# Patient Record
Sex: MALE | Race: BLACK OR AFRICAN AMERICAN | Employment: UNEMPLOYED | URBAN - METROPOLITAN AREA
[De-identification: names, ages, dates, MRNs, and addresses within clinical notes are randomized per-mention and may not be internally consistent; named-entity substitution may affect disease eponyms.]

---

## 2017-03-20 ENCOUNTER — OFFICE VISIT (OUTPATIENT)
Dept: SURGERY | Age: 42
End: 2017-03-20

## 2017-03-20 DIAGNOSIS — E66.01 MORBID OBESITY WITH BMI OF 60.0-69.9, ADULT (HCC): Primary | ICD-10-CM

## 2017-03-27 VITALS — BODY MASS INDEX: 39.17 KG/M2 | HEIGHT: 75 IN | WEIGHT: 315 LBS

## 2017-03-27 NOTE — PROGRESS NOTES
Ashish Lakhani participated in an educational session on the importance of starting to make healthy choices prior to weight loss surgery. General healthy foods were reviewed. Diet history was reviewed. Patient set a dietary, exercise, and behavioral goal in order to start making healthy changes now.      Visit Vitals    Ht 6' 3\" (1.905 m)    Wt (!) 239 kg (527 lb)    BMI 65.87 kg/m2     Demetri Telles RD

## 2017-04-24 ENCOUNTER — CLINICAL SUPPORT (OUTPATIENT)
Dept: SURGERY | Age: 42
End: 2017-04-24

## 2017-04-24 ENCOUNTER — OFFICE VISIT (OUTPATIENT)
Dept: SURGERY | Age: 42
End: 2017-04-24

## 2017-04-24 VITALS
DIASTOLIC BLOOD PRESSURE: 89 MMHG | SYSTOLIC BLOOD PRESSURE: 130 MMHG | HEART RATE: 115 BPM | OXYGEN SATURATION: 98 % | RESPIRATION RATE: 16 BRPM | BODY MASS INDEX: 39.17 KG/M2 | WEIGHT: 315 LBS | HEIGHT: 75 IN

## 2017-04-24 VITALS — BODY MASS INDEX: 39.17 KG/M2 | HEIGHT: 75 IN | WEIGHT: 315 LBS

## 2017-04-24 DIAGNOSIS — Z74.09 LIMITED MOBILITY: ICD-10-CM

## 2017-04-24 DIAGNOSIS — F17.200 SMOKING: ICD-10-CM

## 2017-04-24 DIAGNOSIS — E66.01 MORBID OBESITY DUE TO EXCESS CALORIES (HCC): Primary | ICD-10-CM

## 2017-04-24 DIAGNOSIS — E66.01 MORBID OBESITY WITH BODY MASS INDEX OF 60.0-69.9 IN ADULT (HCC): ICD-10-CM

## 2017-04-24 DIAGNOSIS — M19.90 ARTHRITIS: ICD-10-CM

## 2017-04-24 DIAGNOSIS — G47.30 SLEEP APNEA, UNSPECIFIED TYPE: ICD-10-CM

## 2017-04-24 DIAGNOSIS — G43.919 INTRACTABLE MIGRAINE, UNSPECIFIED MIGRAINE TYPE: ICD-10-CM

## 2017-04-24 DIAGNOSIS — Z86.79 HISTORY OF ATRIAL FLUTTER: ICD-10-CM

## 2017-04-24 DIAGNOSIS — E66.01 MORBID OBESITY WITH BODY MASS INDEX OF 60.0-69.9 IN ADULT (HCC): Primary | ICD-10-CM

## 2017-04-24 NOTE — PATIENT INSTRUCTIONS
Goals: 1. Start taking a multivitamin daily. 2. Continue walking daily- further distance as tolerated. 3. Very low carbohydrate plan- follow this guide prior to surgery- for pre-op weight loss. Keep a daily diet log.

## 2017-04-24 NOTE — PROGRESS NOTES
Bariatric Surgery Consultation    Subjective: The patient is a 35 y.o. obese male with a Body mass index is 63.5 kg/(m^2). [place] The patient is at his heaviest weight for the past 5 years. he has   been overweight since age 20- 27.   he has been considering surgery since last year. he desires surgery at this time because of multiple health   concerns and their lifestyle issues which are hindered by their weight. he has been referred by his family physician Dr Madelyn Leyden for evaluation and   treatment of their obesity via surgical intervention. Rory Georgeosmar. has tried multiple diets in his lifetime most recently tried   physician supervised, behavior modification, unsupervised diets, Weight Watchers and Atkins    Bariatric comorbidities present are   Patient Active Problem List   Diagnosis Code    Cellulitis of left lower leg L03. 116    Anemia D64.9    Morbid obesity ([facility]) E66.01    Dyspnea R06.00    Sleep apnea G47.30    Sinus tachycardia R00.0    Acute respiratory failure with hypoxia and hypercarbia ([facility]) J96.01, J96.02    LOY (acute kidney injury) ([facility] [address] N17.9    Morbid obesity with body mass index of 60.0-69.9 in adult ([facility]) E66.01, Z68.44    Chronic back pain M54.9, G89.29    Arthritis M19.90    Migraine G43.909    History of atrial flutter Z86.79    Limited mobility Z74.09    Smoking F17.200       The patient is considering laparoscopic sleeve gastrectomy for surgical weight loss due to their ineffective progress with medical forms of   weight loss and the urging of their physician who cares for their primary medical issues. The patient  now presents  for consideration for   weight loss surgery understanding the benefits of this over a medical approach of weight loss as was discussed in our presentation on weight   loss surgery. They have discussed their plans both with their family and primary care physician who is in support of their pursuit of such.    The patient has not had health issues as of late and denies and gastrointestinal disturbances other than what is outlined below in their review   of symptoms. All of their prior evaluations available by both their PCP's and specialists physicians have been reviewed today either in the   Care Everywhere portal or scanned under the media tab. I have spent a large portion of my initial consultation today reviewing the patients current dietary habits which have contributed to their health   issues and obesity. I have suggested to them personally a dietary regimen that they can initiate now to help with their status as it pertains to their weight. They   understand that the most important aspect of their journey through their weight loss endeavor will be their adherence to a new lifestyle of   healthy eating behavior. They also understand that an adherence to an exercise program will not only help with weight loss but is ultimately   important in weight maintenance. The patients goal weight is 250lb. These goals are not consistent with expected outcomes of their desired operation. his Medical goals are resolution of these health issues.         Patient Active Problem List    Diagnosis Date Noted    Morbid obesity with body mass index of 60.0-69.9 in adult [facility])     Chronic back pain     Arthritis     Migraine     History of atrial flutter     Limited mobility     Smoking     LOY [facility]acute kidney [facility]) ([facility] 04/05/2016    Cellulitis of left lower leg 04/04/2016    Anemia 04/04/2016    Morbid obesity ([facility] [address] 04/04/2016    Dyspnea 04/04/2016    Sleep apnea 04/04/2016    Sinus tachycardia 04/04/2016    Acute respiratory failure with hypoxia and hypercarbia ([facility] [address] 04/04/2016     Past Surgical History:   Procedure Laterality Date    CARDIAC SURG PROCEDURE UNLIST      cardioversion    ECHOCARDIOGRAM  04/2016    EF 60-65% (performed at [facility] - see cardiology tab)      Social History   Substance Use Topics    Smoking status: Current Some Day Smoker Types: Cigars    Smokeless tobacco: Not on file    Alcohol use No      Comment: occasionally      Family History   Problem Relation Age of Onset    Obesity Mother     Liver Disease Sister       Current Outpatient Prescriptions   Medication Sig Dispense Refill    diltiazem CD (CARDIZEM CD) 240 mg ER capsule Take 1 Cap by mouth daily. (Patient taking differently: Take 300 mg by mouth daily.) 30 Cap 0    furosemide (LASIX) 40 mg tablet Take 1 Tab by mouth two (2) times a day.  60 Tab 1     No Known Allergies       Review of Systems:       General - No history or complaints of unexpected fever, chills, or weight loss  Head/Neck - No history or complaints of headache, diplopia, dysphagia, hearing loss  Cardiac - No history or complaints of chest pain, palpitations, murmur, or shortness of breath  Pulmonary - No history or complaints of shortness of breath, productive cough, hemoptysis  Gastrointestinal - mild reflux,no  abdominal pain, obstipation/constipation or blood per rectum  Genitourinary - No history or complaints of hematuria/dysuria, stress urinary incontinence symptoms, or renal lithiasis  Musculoskeletal - severe joint pain in their knees,  no muscular weakness  Hematologic - No history or complaints of bleeding disorders,  No blood transfusions  Neurologic - No history or complaints of  migraine headaches, seizure activity, syncopal episodes, TIA or stroke  Integumentary - No history or complaints of rashes, abnormal nevi, skin cancer  Gynecological - n/a               Objective:     Visit Vitals    /89 (BP 1 Location: Left arm, BP Patient Position: Sitting)    Pulse (!) 115    Resp 16    Ht 6' 3\" (1.905 m)    Wt (!) 230.4 kg (508 lb)    SpO2 98%    BMI 63.5 kg/m2       Physical Examination: General appearance - alert, well appearing, and in no distress and oriented to person, place, and time  Mental status - alert, oriented to person, place, and time, normal mood, behavior, speech, dress, motor activity, and thought processes  Eyes - pupils equal and reactive, extraocular eye movements intact, sclera anicteric, left eye normal, right eye normal  Ears - right ear normal, left ear normal  Nose - normal and patent, no erythema, discharge or polyps  Mouth - mucous membranes moist, pharynx normal without lesions  Neck - supple, no significant adenopathy  Lymphatics - no palpable lymphadenopathy, no hepatosplenomegaly  Chest - clear to auscultation, no wheezes, rales or rhonchi, symmetric air entry  Heart - normal rate, regular rhythm, normal S1, S2, no murmurs, rubs, clicks or gallops  Abdomen - soft, nontender, nondistended, no masses or organomegaly, massive central obesity  Back exam - full range of motion, no tenderness, palpable spasm or pain on motion  Neurological - alert, oriented, normal speech, no focal findings or movement disorder noted  Musculoskeletal - severe varus deformity of both knees with limited ROM  Extremities - peripheral pulses normal, moderate pedal edema, severe venous stasis changes  Skin - normal coloration and turgor, no rashes, no suspicious skin lesions noted    Labs:       No results found for this or any previous visit (from the past 1440 hour(s)). Assessment:     Morbid obesity with comorbidity    Plan:     laparoscopic sleeve gastrectomy    This is a 35 y.o. male with a BMI of Body mass index is 63.5 kg/(m^2). and the weight-related co-morbidties as noted below. Chanel Whelan meets the NIH criteria for bariatric surgery based upon the BMI of Body mass index is 63.5 kg/(m^2). and multiple weight-related co-morbidties. Chanel Whelan has elected laparoscopic sleeve gastrectomy as his intervention of choice for treatment of morbid obestiy through surgical means secondary to its safety profile, rapid return to work  and decreases in operative risks over gastric bypass.     In the office today, following Almas's history and physical examination, a 30 minute discussion regarding the anatomic alterations for the laparoscopic sleeve gastrectomy was undertaken. The dietary expectations and the patient and physician dependent factors for success were thoroughly discussed, to include the need for interval follow-up and long-term dietary changes associated with success. The possible complications of the sleeve gastrectomy  were also discussed, to include;death, DVT/PE, staple line leak, bleeding, stricture formation, infection, nutritional deficiencies and sleeve dilation. Specific weight related outcomes for success were also discussed with an emphasis on careful and close follow-up with the first year and eating behavior modification as the baseline and cyclical hunger return. The patient expressed an understanding of the above factors, and his questions were answered in their entirety. In addition, the patient attended a 1.5 hour power point seminar regarding obesity, surgical weight loss including, adjustable gastric band, gastric bypass, and sleeve gastrectomy. This discussion contrasted the different surgical techniques, mechanisms of actions and expected outcomes, and surgical and medical risks associated with each procedure. During this seminar, there was a long question and answer session where each questions was answered until there were no additional questions. Today, the patient had all of his questions answered and desires to proceed with  bariatric surgery initially choosing sleeve gastrectomy as his surgical option. Secondary Diagnoses:     Coronary Artery Disease - The patient has undergone or will undergo a preoperative evaluation by a cardiologist such that they are deemed a reasonable candidate for surgery. The patient understands that with a history of cardiac disease that there is always an increased risk compared to the average patient.   Appropriate recommendations have been followed as recommended by the cardiologist.  The patients ASA will be resumed approximately 1 month postoperatively in a coated form. Dietary Intervention  - The patient is currently scheduled to see or has been followed by a bariatric nutritionist for an attempt at preoperative weight loss as has been dictated by their insurance carrier. They will be assessed at various times during their follow up to evaluate their progress depending on the length of time that is required once again by their carrier. I have explained the importance of preoperative weight loss and the benefits regarding lower surgical risk and also assisting the patient in reaching their weight loss goal.  Finally they understand there is a physiologic benefit from the standpoint of hepatic volume reduction and reduction of central visceral adiposity preoperatively. I have reiterated the importance of a low carbohydrate and high protein regimen to achieve their stated goal. I have reviewed their current eating behavior prior to this encounter and explained to them in an exhaustive fashion the appropriate diet that they should adhere to. They have been encouraged to loose weight pre operatively and understand it is our prerogative to cancel surgery or postpone their procedure in the event of significant weight gain. The patients weight loss goal pre operatively is 30-40 pounds. Obstructive Sleep Apnea -The patient understands the association of sleep apnea and obesity and the additional risk that it caries related to post surgical complications. If they have not been tested for sleep apnea and I feel they are at increased risk for this diagnosis, then they will be scheduled for a consultation with a Pulmonologist for such.  In the event that they kyaw this diagnosis we will have the patient bring their CPAP machine to the hospital for use both postoperatively in the PACU and on the floor at its appropriate setting.  We will have them continue using it while at home after surgery and follow up with their pulmonologist 6 months after to be retested to see if it can be discontinued at that time period. Weight Related Arthritis -The patient understands the benefits that weight loss surgery can have on their arthritis but also understands that weight loss is not a guaranteed cure and relief of symptoms is often dependent on the severity of the underlying disease.  The patient also understands that traditional pharmaceutical treatments for this diagnosis are usually unavailable to post-operative weight loss patients due to the effects on the gastrointestinal tract particularly with the gastric bypass and to a lesser effect with the sleeve gastrectomy.  Any changes to the patients medication treatment will ultimately be made the patients PCP with input by our office. GERD -The patient understands that weight loss surgery is not a guaranteed cure for reflux disease but does understand the benefits that weight loss can have on reflux disease.  They also understand that at the time of surgery the gastroesophageal junction will be evaluated for the presence of a diaphragmatic hernia.  Hernias will be corrected always with the gastric band and sleeve gastrectomy procedures, but only on a case by case basis with the gastric bypass if it prevents our ability to perform the operation at hand, or if I feel that they would benefit long term with correction of this issue.  The patient also understands that neither weight loss surgery nor repair of a diaphragmatic hernia repair guarantees the complete cessation of the disease. They also understand there is a possibility of recurrence with a simple crural repair as is performed with these procedures. They understand they may have to continue their medications in the postoperative period.  They have a good understanding that the gastric bypass procedure is better suited to total resolution of this issue and that neither the Lap Band nor sleeve gastrectomy is considered a curative procedure as it pertains to this diagnosis. DVT / Pulmonary Embolus Risk - The patient is at a higher risk for post operative DVT / pulmonary embolus secondary to their morbid obese status, relative sedentary lifestyle, and impending general anesthetic. We will plan to use anticoagulation therapy pre and post operative as well as TEDs and  pneumatic compression devices and encourage ambulation once on the hospital nursing floor. The need for possible at home anticoagulation therapy has also been discussed and any decision on this matter will be made during post operative evaluations. The patient understands that their efforts at ambulation are of vital importance to reduce the risk of this complication thus placing significant burden on them as to the prevention of such issues. Signs and symptoms of DVT / PE have been discussed with the patient and they have been instructed to call the office if any these occur in the \"at home\" post op phase. Will use Lovenox for 2 weeks post op.         Signed By: Arthur Alcazar MD     April 24, 2017

## 2017-04-24 NOTE — MR AVS SNAPSHOT
Visit Information Date & Time Provider Department Dept. Phone Encounter #  
 4/24/2017  2:00 PM [facility] [place] [phone] [id number] Upcoming Health Maintenance Date Due DTaP/Tdap/Td series (1 - Tdap) 6/7/1994 INFLUENZA AGE 9 TO ADULT 8/1/2016 Allergies as of 4/24/2017  Review Complete On: 4/24/2017 By: Danitza Cabral MD  
 No Known Allergies Current Immunizations  Never Reviewed Name Date Pneumococcal Polysaccharide (PPSV-23) 4/12/2016  2:10 PM  
  
 Not reviewed this visit Vitals Height(growth percentile) Weight(growth percentile) BMI Smoking Status 6' 3\" (1.905 m) (!) 508 lb (230.4 kg) 63.5 kg/m2 Current Some Day Smoker BMI and BSA Data Body Mass Index Body Surface Area  
 63.5 kg/m 2 3.49 m 2 Your Updated Medication List  
  
   
This list is accurate as of: 4/24/17  3:36 PM.  Always use your most recent med list.  
  
  
  
  
 dilTIAZem  mg ER capsule Commonly known as:  CARDIZEM CD Take 1 Cap by mouth daily. furosemide 40 mg tablet Commonly known as:  LASIX Take 1 Tab by mouth two (2) times a day. Patient Instructions Goals: 1. Start taking a multivitamin daily. 2. Continue walking daily- further distance as tolerated. 3. Very low carbohydrate plan- follow this guide prior to surgery- for pre-op weight loss. Keep a daily diet log. Introducing [facility]! Isrrael Nguyen introduces [employer] patient portal. Now you can access parts of your medical record, email your doctor's office, and request medication refills online. 1. In your internet browser, go to [email or web address] 2. Click on the First Time User? Click Here link in the Sign In box. You will see the New Member Sign Up page. 3. Enter your [employer] Access Code exactly as it appears below.  You will not need to use this code after youve completed the sign-up process. If you do not sign up before the expiration date, you must request a new code. · [employer] Access Code: [email or web address] Expires: 7/23/2017  2:45 PM 
 
4. Enter the last four digits of your Social Security Number (xxxx) and Date of Birth (mm/dd/yyyy) as indicated and click Submit. You will be taken to the next sign-up page. 5. Create a [employer] ID. This will be your [employer] login ID and cannot be changed, so think of one that is secure and easy to remember. 6. Create a [employer] password. You can change your password at any time. 7. Enter your Password Reset Question and Answer. This can be used at a later time if you forget your password. 8. Enter your e-mail address. You will receive e-mail notification when new information is available in [address]. 9. Click Sign Up. You can now view and download portions of your medical record. 10. Click the Download Summary menu link to download a portable copy of your medical information. If you have questions, please visit the Frequently Asked Questions section of the [employer] website. Remember, [employer] is NOT to be used for urgent needs. For medical emergencies, dial 911. Now available from your iPhone and Android! Please provide this summary of care documentation to your next provider. Your primary care clinician is listed as Tay Osei. If you have any questions after today's visit, please call [phone].

## 2017-04-24 NOTE — PATIENT INSTRUCTIONS
Body Mass Index: Care Instructions  Your Care Instructions    Body mass index (BMI) can help you see if your weight is raising your risk for health problems. It uses a formula to compare how much you weigh with how tall you are. · A BMI lower than 18.5 is considered underweight. · A BMI between 18.5 and 24.9 is considered healthy. · A BMI between 25 and 29.9 is considered overweight. A BMI of 30 or higher is considered obese. If your BMI is in the normal range, it means that you have a lower risk for weight-related health problems. If your BMI is in the overweight or obese range, you may be at increased risk for weight-related health problems, such as high blood pressure, heart disease, stroke, arthritis or joint pain, and diabetes. If your BMI is in the underweight range, you may be at increased risk for health problems such as fatigue, lower protection (immunity) against illness, muscle loss, bone loss, hair loss, and hormone problems. BMI is just one measure of your risk for weight-related health problems. You may be at higher risk for health problems if you are not active, you eat an unhealthy diet, or you drink too much alcohol or use tobacco products. Follow-up care is a key part of your treatment and safety. Be sure to make and go to all appointments, and call your doctor if you are having problems. It's also a good idea to know your test results and keep a list of the medicines you take. How can you care for yourself at home? · Practice healthy eating habits. This includes eating plenty of fruits, vegetables, whole grains, lean protein, and low-fat dairy. · If your doctor recommends it, get more exercise. Walking is a good choice. Bit by bit, increase the amount you walk every day. Try for at least 30 minutes on most days of the week. · Do not smoke. Smoking can increase your risk for health problems. If you need help quitting, talk to your doctor about stop-smoking programs and medicines. These can increase your chances of quitting for good. · Limit alcohol to 2 drinks a day for men and 1 drink a day for women. Too much alcohol can cause health problems. If you have a BMI higher than 25  · Your doctor may do other tests to check your risk for weight-related health problems. This may include measuring the distance around your waist. A waist measurement of more than 40 inches in men or 35 inches in women can increase the risk of weight-related health problems. · Talk with your doctor about steps you can take to stay healthy or improve your health. You may need to make lifestyle changes to lose weight and stay healthy, such as changing your diet and getting regular exercise. If you have a BMI lower than 18.5  · Your doctor may do other tests to check your risk for health problems. · Talk with your doctor about steps you can take to stay healthy or improve your health. You may need to make lifestyle changes to gain or maintain weight and stay healthy, such as getting more healthy foods in your diet and doing exercises to build muscle. Where can you learn more? Go to http://charo-mirta.info/. Enter S176 in the search box to learn more about \"Body Mass Index: Care Instructions. \"  Current as of: January 23, 2017  Content Version: 11.2  © 3715-5526 [employer]. Care instructions adapted under license by [employer] (which disclaims liability or warranty for this information). If you have questions about a medical condition or this instruction, always ask your healthcare professional. [place] [address] any warranty or liability for your use of this information. New patient Instructions      1. Ensure all pre-operative insurances requirements are complete (ie; dietary visits, psychology consults, primary care documentation, etc)    2. Adhere to pre-operative weight loss / weight maintenance plan discussed in the office today.     3. Contact the office with any questions on pre-operative clearance issues (ie; cardiology work-up, pulmonary work-up, upper GI study, etc). 4. If a barium upper GI study has been ordered for your evaluation, make sure you are on liquids only the morning of the procedure.

## 2017-04-24 NOTE — PROGRESS NOTES
Medical Weight Loss Multi-Disciplinary Program    Name: Chet Blount : 1975    Session# 2  Date: 2017     Height: 6' 3\" (190.5 cm)    Weight: (!) 230.4 kg (508 lb) lbs. Body mass index is 63.5 kg/(m^2). Pounds Lost: 19   Pt to lose 30-40 more lbs from his weight today of 508 lbs, prior to surgery. Dietary Instructions    Reviewed intake  Understanding low carbohydrates, low sugar, higher protein meals  Understanding proper portions  Instruction given for personal dietary changes  Comments: Pt given brief pre/post-op diet ed and diet hx reviewed. Physical Activity/Exercise    Discussed Perceived Compliance  Reasonable Goals Set  Motivation  Comments: Pt walks 30-35 minutes daily. Goal to continue walking daily- further distance as tolerated. Behavior Modification    Positive attitude  Comments: Pt is working on the following goals:    1. Start taking a multivitamin daily. 2. Continue walking daily- further distance as tolerated. 3. Very low carbohydrate plan- follow this guide prior to surgery- for pre-op weight loss    Candidate for surgery (per RD): pending    Dietitian: Maryjo Arroyo RD     Chet Blount is a 35 y.o. male who present for a pre-op evaluation. Visit Vitals    Ht 6' 3\" (1.905 m)    Wt (!) 230.4 kg (508 lb)    BMI 63.5 kg/m2     Past Medical History:   Diagnosis Date    Arthritis     knees    Chronic back pain     History of atrial flutter     Dx 2016 - anticoagulated by Nelida Fothergill    Hypertension     Migraine headache     Morbid obesity ([facility] [address]     Morbid obesity with body mass index of 60.0-69.9 in adult [facility])     Sleep apnea     uses c-pap           Procedure:  laparoscopic sleeve gastrectomy     Reasons for Surgery:  BMI > 40 with one or more medically significant comorbidities    Summary:  Pt given brief pre/post-op diet ed and diet hx reviewed. Pt set several goals. See below.  Pt has an issue with night eating- will grab something in the middle of the night. He attributes his recent weight loss to decreasing fast food and stopping regular sodas. Current Vitamins:  none    Patient Education and Materials Provided:  Supplement Resource Guide, B Vitamin Information, MVI Recommendations, Calcium Citrate Information, Bariatric Supplement Companies, Protein Supplement Information, Fluid Requirements, No Caffeine or Carbonation, No Alcohol for One Year Post Op, 3 Balanced Meals a Day, Food Group Guide, Exercising and Addressed Current Habits / Changes to make    Nutritional Hx: What is the number of meals you eat per day? 3    Do you eat between meals / snack? yes  Typical snack: crasins or granola bar     How fast do you eat your meals? rapid    How many sodas/sugared beverages do you drink per day? Was drinking 2- 16 oz bottles per day. Now has decreased to none now. How many caffeinated drinks do you have per day? Water or crystal light    How much water do you drink per day? 64+ oz    How often do you eat fast food? 1 times a week, just cut down, was about 3 times per week before. Was pizza or chinese food     How often do you consume alcohol? never;     Diet History:  Breakfast  What are you eating and how much? skipped   When? ii   Where? iii   Snacks  What are you eating and how much? i   When? ii   Where? iii   Hydration  What are you eating and how much? i   When? ii   Where? ii   Lunch  What are you eating and how much? Bowl of honey nut cheerios with 1% milk    When? 11:30 am   Where? iii   Snacks  What are you eating and how much? i   When? ii   Where? iii   Hydration  What are you eating and how much? i   When? ii   Where? iii   Dinner  What are you eating and how much? Baked fish, kale and carrot salad    When? 7:30 pm    Where? iii   Snacks  What are you eating and how much? Pineapple- ate a can of these (average regular can of chunks)    When?  Middle of the night   Where? iii   Hydration  What are you eating and how much? water When? ii   Where? iii     Exercise:  Do you currently have an exercise routine? yes  What kind of exercise do you do? walking . For how long? 30-35 min. For how often? daily. Goals:   1. Start taking a multivitamin daily. 2. Continue walking daily- further distance as tolerated.    3. Very low carbohydrate plan- follow this guide prior to surgery- for pre-op weight loss

## 2017-04-24 NOTE — MR AVS SNAPSHOT
Visit Information Date & Time Provider Department Dept. Phone Encounter #  
 4/24/2017  1:00 PM [facility] [phone] [id number] Follow-up Instructions Return in about 2 months (around 6/24/2017). Follow-up and Disposition History Your Appointments 5/23/2017 10:30 AM  
NUTRITION COUNSELING with TSS NUTRI VISIT PEN [facility] ([facility]) Appt Note: f/u 3 of 4, [address] [address]  
[phone]  
  
   
 [address] [place] [address]  
  
    
 6/21/2017 11:00 AM  
NUTRITION COUNSELING with TSS NUTR VISIT2 [facility] ([facility]) Appt Note: 4 of 4, brown chart f/u / UGI at [address] [address]  
[phone]  
  
   
 [address] [place] [address]  
  
    
 6/21/2017 11:45 AM  
New Patient with Pati Srinivasan MD  
[facility]) Appt Note: 4 of 4, brown chart f/u / UGI at [address] [place]  
  
   
 [address] [place] [address] Upcoming Health Maintenance Date Due DTaP/Tdap/Td series (1 - Tdap) 6/7/1994 INFLUENZA AGE 9 TO ADULT 8/1/2016 Allergies as of 4/24/2017  Review Complete On: 4/24/2017 By: Pati Srinivasan MD  
 No Known Allergies Current Immunizations  Never Reviewed Name Date Pneumococcal Polysaccharide (PPSV-23) 4/12/2016  2:10 PM  
  
 Not reviewed this visit You Were Diagnosed With   
  
 Codes Comments Morbid obesity due to excess calories ([facility] [address]    -  Primary ICD-10-CM: E66.01 
ICD-9-CM: 278.01  Morbid obesity with body mass index of 60.0-69.9 in adult [facility])     ICD-10-CM: E66.01, Z68.44 
 ICD-9-CM: 278.01, V85.44 Arthritis     ICD-10-CM: M19.90 ICD-9-CM: 716.90 Intractable migraine, unspecified migraine type     ICD-10-CM: G43.919 ICD-9-CM: 346.91 History of atrial flutter     ICD-10-CM: Z86.79 
ICD-9-CM: V12.59 Limited mobility     ICD-10-CM: Z74.09 
ICD-9-CM: V49.89 Smoking     ICD-10-CM: F17.200 ICD-9-CM: 305.1 Sleep apnea, unspecified type     ICD-10-CM: G47.30 ICD-9-CM: 780.57 Vitals BP Pulse Resp Height(growth percentile) Weight(growth percentile) SpO2  
 130/89 (BP 1 Location: Left arm, BP Patient Position: Sitting) (!) 115 16 6' 3\" (1.905 m) (!) 508 lb (230.4 kg) 98% BMI Smoking Status 63.5 kg/m2 Current Some Day Smoker Vitals History BMI and BSA Data Body Mass Index Body Surface Area  
 63.5 kg/m 2 3.49 m 2 Your Updated Medication List  
  
   
This list is accurate as of: 4/24/17  3:52 PM.  Always use your most recent med list.  
  
  
  
  
 dilTIAZem  mg ER capsule Commonly known as:  CARDIZEM CD Take 1 Cap by mouth daily. furosemide 40 mg tablet Commonly known as:  LASIX Take 1 Tab by mouth two (2) times a day. Follow-up Instructions Return in about 2 months (around 6/24/2017). To-Do List   
 04/24/2017 Lab:  CBC WITH AUTOMATED DIFF   
  
 04/24/2017 Lab:  H. PYLORI ABS, IGG, IGA, IGM   
  
 04/24/2017 Lab:  TSH 3RD GENERATION Patient Instructions Body Mass Index: Care Instructions Your Care Instructions Body mass index (BMI) can help you see if your weight is raising your risk for health problems. It uses a formula to compare how much you weigh with how tall you are. · A BMI lower than 18.5 is considered underweight. · A BMI between 18.5 and 24.9 is considered healthy. · A BMI between 25 and 29.9 is considered overweight. A BMI of 30 or higher is considered obese. If your BMI is in the normal range, it means that you have a lower risk for weight-related health problems. If your BMI is in the overweight or obese range, you may be at increased risk for weight-related health problems, such as high blood pressure, heart disease, stroke, arthritis or joint pain, and diabetes. If your BMI is in the underweight range, you may be at increased risk for health problems such as fatigue, lower protection (immunity) against illness, muscle loss, bone loss, hair loss, and hormone problems. BMI is just one measure of your risk for weight-related health problems. You may be at higher risk for health problems if you are not active, you eat an unhealthy diet, or you drink too much alcohol or use tobacco products. Follow-up care is a key part of your treatment and safety. Be sure to make and go to all appointments, and call your doctor if you are having problems. It's also a good idea to know your test results and keep a list of the medicines you take. How can you care for yourself at home? · Practice healthy eating habits. This includes eating plenty of fruits, vegetables, whole grains, lean protein, and low-fat dairy. · If your doctor recommends it, get more exercise. Walking is a good choice. Bit by bit, increase the amount you walk every day. Try for at least 30 minutes on most days of the week. · Do not smoke. Smoking can increase your risk for health problems. If you need help quitting, talk to your doctor about stop-smoking programs and medicines. These can increase your chances of quitting for good. · Limit alcohol to 2 drinks a day for men and 1 drink a day for women. Too much alcohol can cause health problems. If you have a BMI higher than 25 · Your doctor may do other tests to check your risk for weight-related health problems.  This may include measuring the distance around your waist. A waist measurement of more than 40 inches in men or 35 inches in women can increase the risk of weight-related health problems. · Talk with your doctor about steps you can take to stay healthy or improve your health. You may need to make lifestyle changes to lose weight and stay healthy, such as changing your diet and getting regular exercise. If you have a BMI lower than 18.5 · Your doctor may do other tests to check your risk for health problems. · Talk with your doctor about steps you can take to stay healthy or improve your health. You may need to make lifestyle changes to gain or maintain weight and stay healthy, such as getting more healthy foods in your diet and doing exercises to build muscle. Where can you learn more? Go to http://charo-mirta.info/. Enter S176 in the search box to learn more about \"Body Mass Index: Care Instructions. \" Current as of: January 23, 2017 Content Version: 11.2 © 6879-8574 [employer]. Care instructions adapted under license by [employer] (which disclaims liability or warranty for this information). If you have questions about a medical condition or this instruction, always ask your healthcare professional. [place] [address] any warranty or liability for your use of this information. New patient Instructions 1. Ensure all pre-operative insurances requirements are complete (ie; dietary visits, psychology consults, primary care documentation, etc) 2. Adhere to pre-operative weight loss / weight maintenance plan discussed in the office today. 3. Contact the office with any questions on pre-operative clearance issues (ie; cardiology work-up, pulmonary work-up, upper GI study, etc). 4. If a barium upper GI study has been ordered for your evaluation, make sure you are on liquids only the morning of the procedure. Patient Instructions History Introducing [facility]!    
 [facility] introduces Warply patient portal. Now you can access parts of your medical record, email your doctor's office, and request medication refills online. 1. In your internet browser, go to [email or web address] 2. Click on the First Time User? Click Here link in the Sign In box. You will see the New Member Sign Up page. 3. Enter your Posiq Access Code exactly as it appears below. You will not need to use this code after youve completed the sign-up process. If you do not sign up before the expiration date, you must request a new code. · Posiq Access Code: [email or web address] Expires: 7/23/2017  2:45 PM 
 
4. Enter the last four digits of your Social Security Number (xxxx) and Date of Birth (mm/dd/yyyy) as indicated and click Submit. You will be taken to the next sign-up page. 5. Create a Posiq ID. This will be your Posiq login ID and cannot be changed, so think of one that is secure and easy to remember. 6. Create a Posiq password. You can change your password at any time. 7. Enter your Password Reset Question and Answer. This can be used at a later time if you forget your password. 8. Enter your e-mail address. You will receive e-mail notification when new information is available in [address] 9. Click Sign Up. You can now view and download portions of your medical record. 10. Click the Download Summary menu link to download a portable copy of your medical information. If you have questions, please visit the Frequently Asked Questions section of the Posiq website. Remember, Posiq is NOT to be used for urgent needs. For medical emergencies, dial 911. Now available from your iPhone and Android! Please provide this summary of care documentation to your next provider. Your primary care clinician is listed as Elder Gonzales. If you have any questions after today's visit, please call [phone].

## 2017-05-23 ENCOUNTER — CLINICAL SUPPORT (OUTPATIENT)
Dept: SURGERY | Age: 42
End: 2017-05-23

## 2017-05-23 VITALS — WEIGHT: 315 LBS | BODY MASS INDEX: 39.17 KG/M2 | HEIGHT: 75 IN

## 2017-05-23 DIAGNOSIS — E66.01 MORBID OBESITY WITH BODY MASS INDEX OF 60.0-69.9 IN ADULT (HCC): Primary | ICD-10-CM

## 2017-05-23 NOTE — PROGRESS NOTES
Medical Weight Loss Multi-Disciplinary Program    Name: Valerie Kingsley. : 1975    Session# 3  Date: 2017     Height: 6' 3\" (190.5 cm)    Weight: (!) 221.8 kg (489 lb) lbs. Body mass index is 61.12 kg/(m^2). Pounds Lost: 19, pt has lost 38 lbs since seminar  Pt to lose 30-40 lbs from a starting weight of 508 lbs, prior to surgery. At least 11 lbs left to lose. Dietary Instructions    Reviewed intake  Understanding low carbohydrates, low sugar, higher protein meals  Understanding proper portions  Instruction given for personal dietary changes  Discussed perceived compliance  Comments: Diet hx reviewed and personal dietary changes discussed. Physical Activity/Exercise    Reviewed Activity Log  Discussed Perceived Compliance  Reasonable Goals Set  Motivation  Comments: Walking daily- treadmill in apartment complex or walks around the parking lot, [employer] walking. Daily walking 30-45 minutes. Continue to increase walking as able. Used to use a walker- walked in here today without one. Has stopped using walker as knee pain has decreased with weight loss. Behavior Modification    Achieving/Rewarding goals met  Positive attitude  Discussed perceived compliance  Comments: Pt is doing excellent increasing activity and plans to continue to increase as able. He is also doing excellent following a ketogenic diet (daily diet log reviewed). He is working on the following this month:     Goals:  1. Purchase the Slimfast high protein instead of regular- red and purple. Or you could use Premier protein shake- near [address] at [facility]. 2. Continue low carbohydrates (ketogenic diet). 3. Increase walking and activity as able. Update: pt emailed and let RD know that he is using the Slimfast high protein with the purple label.        Candidate for surgery (per RD): pending    Dietitian: Cherie Brooks RD

## 2017-06-09 ENCOUNTER — HOSPITAL ENCOUNTER (EMERGENCY)
Age: 42
Discharge: HOME | End: 2017-06-10
Attending: EMERGENCY MEDICINE | Admitting: EMERGENCY MEDICINE
Payer: MEDICARE

## 2017-06-09 ENCOUNTER — APPOINTMENT (OUTPATIENT)
Dept: GENERAL RADIOLOGY | Age: 42
End: 2017-06-09
Attending: PHYSICIAN ASSISTANT
Payer: MEDICARE

## 2017-06-09 DIAGNOSIS — E87.6 HYPOKALEMIA: ICD-10-CM

## 2017-06-09 DIAGNOSIS — L03.116 LEFT LEG CELLULITIS: Primary | ICD-10-CM

## 2017-06-09 DIAGNOSIS — Z86.79 HISTORY OF ATRIAL FLUTTER: ICD-10-CM

## 2017-06-09 LAB
ALBUMIN SERPL BCP-MCNC: 3.4 G/DL (ref 3.4–5)
ALBUMIN/GLOB SERPL: 0.7 {RATIO} (ref 0.8–1.7)
ALP SERPL-CCNC: 73 U/L (ref 45–117)
ALT SERPL-CCNC: 35 U/L (ref 16–61)
ANION GAP BLD CALC-SCNC: 10 MMOL/L (ref 3–18)
APPEARANCE UR: CLEAR
AST SERPL W P-5'-P-CCNC: 26 U/L (ref 15–37)
BASOPHILS # BLD AUTO: 0 K/UL (ref 0–0.06)
BASOPHILS # BLD: 0 % (ref 0–2)
BILIRUB SERPL-MCNC: 0.6 MG/DL (ref 0.2–1)
BILIRUB UR QL: NEGATIVE
BNP SERPL-MCNC: 105 PG/ML (ref 0–450)
BUN SERPL-MCNC: 18 MG/DL (ref 7–18)
BUN/CREAT SERPL: 13 (ref 12–20)
CALCIUM SERPL-MCNC: 8.5 MG/DL (ref 8.5–10.1)
CHLORIDE SERPL-SCNC: 102 MMOL/L (ref 100–108)
CO2 SERPL-SCNC: 27 MMOL/L (ref 21–32)
COLOR UR: YELLOW
CREAT SERPL-MCNC: 1.4 MG/DL (ref 0.6–1.3)
DIFFERENTIAL METHOD BLD: ABNORMAL
EOSINOPHIL # BLD: 0.1 K/UL (ref 0–0.4)
EOSINOPHIL NFR BLD: 2 % (ref 0–5)
ERYTHROCYTE [DISTWIDTH] IN BLOOD BY AUTOMATED COUNT: 12.6 % (ref 11.6–14.5)
GLOBULIN SER CALC-MCNC: 4.6 G/DL (ref 2–4)
GLUCOSE SERPL-MCNC: 103 MG/DL (ref 74–99)
GLUCOSE UR STRIP.AUTO-MCNC: NEGATIVE MG/DL
HCT VFR BLD AUTO: 41 % (ref 36–48)
HGB BLD-MCNC: 14 G/DL (ref 13–16)
HGB UR QL STRIP: NEGATIVE
KETONES UR QL STRIP.AUTO: NEGATIVE MG/DL
LACTATE SERPL-SCNC: 1.1 MMOL/L (ref 0.4–2)
LEUKOCYTE ESTERASE UR QL STRIP.AUTO: NEGATIVE
LYMPHOCYTES # BLD AUTO: 20 % (ref 21–52)
LYMPHOCYTES # BLD: 1.7 K/UL (ref 0.9–3.6)
MAGNESIUM SERPL-MCNC: 1.7 MG/DL (ref 1.6–2.6)
MCH RBC QN AUTO: 29.7 PG (ref 24–34)
MCHC RBC AUTO-ENTMCNC: 34.1 G/DL (ref 31–37)
MCV RBC AUTO: 86.9 FL (ref 74–97)
MONOCYTES # BLD: 0.7 K/UL (ref 0.05–1.2)
MONOCYTES NFR BLD AUTO: 9 % (ref 3–10)
NEUTS SEG # BLD: 5.6 K/UL (ref 1.8–8)
NEUTS SEG NFR BLD AUTO: 69 % (ref 40–73)
NITRITE UR QL STRIP.AUTO: NEGATIVE
PH UR STRIP: 6.5 [PH] (ref 5–8)
PLATELET # BLD AUTO: 117 K/UL (ref 135–420)
PMV BLD AUTO: 10.6 FL (ref 9.2–11.8)
POTASSIUM SERPL-SCNC: 3.4 MMOL/L (ref 3.5–5.5)
PROT SERPL-MCNC: 8 G/DL (ref 6.4–8.2)
PROT UR STRIP-MCNC: NEGATIVE MG/DL
RBC # BLD AUTO: 4.72 M/UL (ref 4.7–5.5)
SODIUM SERPL-SCNC: 139 MMOL/L (ref 136–145)
SP GR UR REFRACTOMETRY: 1.01 (ref 1–1.03)
UROBILINOGEN UR QL STRIP.AUTO: 1 EU/DL (ref 0.2–1)
WBC # BLD AUTO: 8.1 K/UL (ref 4.6–13.2)

## 2017-06-09 PROCEDURE — 74011250636 HC RX REV CODE- 250/636: Performed by: PHYSICIAN ASSISTANT

## 2017-06-09 PROCEDURE — 85025 COMPLETE CBC W/AUTO DIFF WBC: CPT | Performed by: PHYSICIAN ASSISTANT

## 2017-06-09 PROCEDURE — 96367 TX/PROPH/DG ADDL SEQ IV INF: CPT

## 2017-06-09 PROCEDURE — 96375 TX/PRO/DX INJ NEW DRUG ADDON: CPT

## 2017-06-09 PROCEDURE — 87040 BLOOD CULTURE FOR BACTERIA: CPT | Performed by: PHYSICIAN ASSISTANT

## 2017-06-09 PROCEDURE — 71010 XR CHEST PORT: CPT

## 2017-06-09 PROCEDURE — 93005 ELECTROCARDIOGRAM TRACING: CPT

## 2017-06-09 PROCEDURE — 80053 COMPREHEN METABOLIC PANEL: CPT | Performed by: PHYSICIAN ASSISTANT

## 2017-06-09 PROCEDURE — 83880 ASSAY OF NATRIURETIC PEPTIDE: CPT | Performed by: PHYSICIAN ASSISTANT

## 2017-06-09 PROCEDURE — 96366 THER/PROPH/DIAG IV INF ADDON: CPT

## 2017-06-09 PROCEDURE — 87086 URINE CULTURE/COLONY COUNT: CPT | Performed by: PHYSICIAN ASSISTANT

## 2017-06-09 PROCEDURE — 74011250637 HC RX REV CODE- 250/637: Performed by: PHYSICIAN ASSISTANT

## 2017-06-09 PROCEDURE — 81003 URINALYSIS AUTO W/O SCOPE: CPT | Performed by: PHYSICIAN ASSISTANT

## 2017-06-09 PROCEDURE — 96365 THER/PROPH/DIAG IV INF INIT: CPT

## 2017-06-09 PROCEDURE — 83735 ASSAY OF MAGNESIUM: CPT | Performed by: PHYSICIAN ASSISTANT

## 2017-06-09 PROCEDURE — 83605 ASSAY OF LACTIC ACID: CPT | Performed by: PHYSICIAN ASSISTANT

## 2017-06-09 PROCEDURE — 99285 EMERGENCY DEPT VISIT HI MDM: CPT

## 2017-06-09 PROCEDURE — 93971 EXTREMITY STUDY: CPT

## 2017-06-09 RX ORDER — FUROSEMIDE 10 MG/ML
100 INJECTION INTRAMUSCULAR; INTRAVENOUS
Status: COMPLETED | OUTPATIENT
Start: 2017-06-09 | End: 2017-06-09

## 2017-06-09 RX ORDER — POTASSIUM CHLORIDE 750 MG/1
20 TABLET, FILM COATED, EXTENDED RELEASE ORAL DAILY
Qty: 20 TAB | Refills: 0 | Status: SHIPPED | OUTPATIENT
Start: 2017-06-09 | End: 2017-06-19

## 2017-06-09 RX ORDER — POTASSIUM CHLORIDE 20 MEQ/1
40 TABLET, EXTENDED RELEASE ORAL
Status: COMPLETED | OUTPATIENT
Start: 2017-06-09 | End: 2017-06-09

## 2017-06-09 RX ORDER — CEPHALEXIN 500 MG/1
500 CAPSULE ORAL 4 TIMES DAILY
Qty: 40 CAP | Refills: 0 | Status: SHIPPED | OUTPATIENT
Start: 2017-06-09 | End: 2017-06-19

## 2017-06-09 RX ORDER — HYDROCODONE BITARTRATE AND ACETAMINOPHEN 5; 325 MG/1; MG/1
1-2 TABLET ORAL
Qty: 16 TAB | Refills: 0 | Status: ON HOLD | OUTPATIENT
Start: 2017-06-09 | End: 2017-09-27 | Stop reason: ALTCHOICE

## 2017-06-09 RX ORDER — SULFAMETHOXAZOLE AND TRIMETHOPRIM 800; 160 MG/1; MG/1
2 TABLET ORAL 2 TIMES DAILY
Qty: 40 TAB | Refills: 0 | Status: SHIPPED | OUTPATIENT
Start: 2017-06-09 | End: 2017-06-19

## 2017-06-09 RX ORDER — DILTIAZEM HYDROCHLORIDE EXTENDED-RELEASE TABLETS 240 MG/1
240 TABLET, EXTENDED RELEASE ORAL DAILY
COMMUNITY
End: 2017-11-13

## 2017-06-09 RX ORDER — SODIUM CHLORIDE 0.9 % (FLUSH) 0.9 %
5-10 SYRINGE (ML) INJECTION AS NEEDED
Status: DISCONTINUED | OUTPATIENT
Start: 2017-06-09 | End: 2017-06-10 | Stop reason: HOSPADM

## 2017-06-09 RX ORDER — LEVOFLOXACIN 5 MG/ML
750 INJECTION, SOLUTION INTRAVENOUS ONCE
Status: COMPLETED | OUTPATIENT
Start: 2017-06-09 | End: 2017-06-09

## 2017-06-09 RX ADMIN — FUROSEMIDE 100 MG: 10 INJECTION, SOLUTION INTRAMUSCULAR; INTRAVENOUS at 19:33

## 2017-06-09 RX ADMIN — VANCOMYCIN HYDROCHLORIDE 1500 MG: 10 INJECTION, POWDER, LYOPHILIZED, FOR SOLUTION INTRAVENOUS at 21:40

## 2017-06-09 RX ADMIN — LEVOFLOXACIN 750 MG: 5 INJECTION, SOLUTION INTRAVENOUS at 19:33

## 2017-06-09 RX ADMIN — POTASSIUM CHLORIDE 40 MEQ: 20 TABLET, EXTENDED RELEASE ORAL at 20:55

## 2017-06-09 NOTE — PROGRESS NOTES
Pharmacy Dosing Services: Vancomycin    Consult for Vancomycin Dosing by Pharmacy by [phone] provided for this 38y.o. year old male , for indication of LLE Cellulitis (Hx MRSA). Day of Therapy 01    Ht Readings from Last 1 Encounters:   06/09/17 180.3 cm (71\")        Wt Readings from Last 1 Encounters:   06/09/17 (!) 221.8 kg (489 lb)        Previous Regimen NA   Last Level NA   Other Current Antibiotics Levaquin 750 mg IV x1   Significant Cultures Pending   Serum Creatinine Lab Results   Component Value Date/Time    Creatinine 1.54 04/12/2016 06:12 AM      Creatinine Clearance CrCl cannot be calculated (Patient has no serum creatinine result on file.). BUN Lab Results   Component Value Date/Time    BUN 22 04/12/2016 06:12 AM      WBC Lab Results   Component Value Date/Time    WBC 6.1 04/08/2016 02:30 AM      H/H Lab Results   Component Value Date/Time    HGB 12.9 04/08/2016 02:30 AM      Platelets Lab Results   Component Value Date/Time    PLATELET 579 24/29/5043 02:30 AM      Temp 99.4 °F (37.4 °C)     Current labs not yet available, but will initiate Vancomycin 1500 mg IV q12h, beginning @ 19:00 today;  this regimen previously produced a Trough of ~13 mcg/ml during last admission. Dose calculated to approximate a therapeutic trough of 10-15 mcg/mL. Pharmacy to follow daily and will make changes to dose and/or frequency based on clinical status. Isi Mayorga, Pharm. D.   Clinical Pharmacist  [phone]

## 2017-06-09 NOTE — ED PROVIDER NOTES
HPI Comments: 5:49 PM     Rory Porter is a 38 y.o. male HX MRSA presenting to the ED via EMS C/O gradually worsening left leg pain onset yesterday. Associated Sx. Include Nausea, Diarrhea, SOB. Pt takes 2x 20 mg of Lasix daily. PMHx Atrial Flutter, and MRSA in the left ankle. Pt denies vomiting, Hx DVT , and any other symptoms or complaints. Written by Desiree Blancw, ED Scribe, as dictated by Reji Castillo PA-C      The history is provided by the patient. No  was used. Past Medical History:   Diagnosis Date    Arthritis     knees    Chronic back pain     History of atrial flutter     Dx 6/2016 - anticoagulated by Tesha Sinclair    Hypertension     Limited mobility     Migraine headache     Morbid obesity ([facility] [address]     Morbid obesity with body mass index of 60.0-69.9 in adult [facility])     Sleep apnea     uses c-pap    Smoking     very passive / cigars only        Past Surgical History:   Procedure Laterality Date    CARDIAC SURG PROCEDURE UNLIST      cardioversion    ECHOCARDIOGRAM  04/2016    EF 60-65% (performed at [facility] - see cardiology tab)         Family History:   Problem Relation Age of Onset    Obesity Mother     Liver Disease Sister        Social History     Social History    Marital status:      Spouse name: N/A    Number of children: N/A    Years of education: N/A     Occupational History    Not on file. Social History Main Topics    Smoking status: Current Some Day Smoker     Types: Cigars    Smokeless tobacco: Not on file    Alcohol use No      Comment: occasionally    Drug use: No    Sexual activity: Not on file     Other Topics Concern    Not on file     Social History Narrative         ALLERGIES: Review of patient's allergies indicates no known allergies. Review of Systems   Constitutional: Negative for chills and fever. Respiratory: Positive for shortness of breath. Negative for cough. Cardiovascular: Positive for leg swelling.  Negative for chest pain and palpitations. Gastrointestinal: Positive for diarrhea and nausea. Negative for abdominal pain and vomiting. Musculoskeletal: Negative for back pain. Skin: Positive for color change. Neurological: Negative for dizziness, weakness and numbness. Hematological: Negative for adenopathy. Vitals:    06/09/17 2000 06/09/17 2057 06/09/17 2337 06/10/17 0016   BP: 125/77 135/83 108/50 111/70   Pulse: 83 84 85 90   Resp: 18 18 16 16   Temp:    98.5 °F (36.9 °C)   SpO2: 97% 98% 97% 97%   Weight:       Height:                Physical Exam   Constitutional: He is oriented to person, place, and time. He appears well-developed and well-nourished. No distress. Morbidly obese AA male. No resp distress. HENT:   Head: Normocephalic and atraumatic. Right Ear: External ear normal.   Left Ear: External ear normal.   Nose: Nose normal. Left sinus exhibits no maxillary sinus tenderness. Mouth/Throat: Oropharynx is clear and moist.   Eyes: Conjunctivae are normal. Left eye exhibits no discharge. Neck: Normal range of motion. Neck supple. Cardiovascular: Normal rate, regular rhythm, normal heart sounds and intact distal pulses. Exam reveals no gallop and no friction rub. No murmur heard. Pulmonary/Chest: Effort normal and breath sounds normal. No accessory muscle usage. No tachypnea. No respiratory distress. He has no decreased breath sounds. He has no wheezes. He has no rhonchi. He has no rales. Abdominal: Soft. Musculoskeletal: Normal range of motion. He exhibits edema. Right lower leg: He exhibits swelling. He exhibits no tenderness. Left lower leg: He exhibits tenderness and edema. Legs:  Neurological: He is alert and oriented to person, place, and time. Skin: Skin is warm and dry. He is not diaphoretic. Psychiatric: He has a normal mood and affect. Judgment normal.   Nursing note and vitals reviewed.      RESULTS:    CARDIAC MONITOR NOTE:  Cardiac Rhythm: Irregular  Rate: 88 bpm     PULSE OXIMETRY NOTE:  Pulse-ox is 92% on Room Air  Interpretation: Normal           DUPLEX LOWER EXT VENOUS LEFT   INTERPRETATION/FINDINGS  Duplex images were obtained using 2-D gray scale, color flow and  spectral doppler analysis.     Left leg :  1. Deep vein(s) visualized include the common femoral, deep femoral,  proximal femoral, mid femoral, distal femoral, popliteal(above knee),  popliteal(fossa), popliteal(below knee) and posterior tibial veins. 2. No evidence of deep venous thrombosis detected in the veins  visualized. 3. No evidence of deep vein thrombosis in the contralateral left  common femoral vein. 4. No evidence of superficial thrombosis detected. 5. Extensive subcutaneous tissue fluid noted left calf/ankle area -  unable to visualize left peroneal veins, can not rule out deep venous  thrombosis in these vessels. 6. Technically difficult and suboptimal study secondary to body  [place]ab[place]. As read by the radiologic technologist.        XR CHEST PORT    (Results Pending)      7:52 PM  RADIOLOGY FINDINGS  Chest Port X-ray shows No discrete infiltrate. Adulates vs small plural effusion at the bases  Pending review by Radiologist  Recorded by IMAN Ge, as dictated by Collins Castro PA-C      Labs Reviewed   METABOLIC PANEL, COMPREHENSIVE - Abnormal; Notable for the following:        Result Value    Potassium 3.4 (*)     Glucose 103 (*)     Creatinine 1.40 (*)     GFR est non-AA 55 (*)     Globulin 4.6 (*)     A-G Ratio 0.7 (*)     All other components within normal limits   CBC WITH AUTOMATED DIFF - Abnormal; Notable for the following:     PLATELET 621 (*)     LYMPHOCYTES 20 (*)     All other components within normal limits   CULTURE, BLOOD   CULTURE, URINE   LACTIC ACID, PLASMA   URINALYSIS W/ RFLX MICROSCOPIC   PRO-BNP   MAGNESIUM       No results found for this or any previous visit (from the past 12 hour(s)).    MDM  Number of Diagnoses or Management Options  History of atrial flutter:   Hypokalemia:   Left leg cellulitis:   Diagnosis management comments: Cellulitis, DVT, peripheral edema, CHF. No evidence of septic joint, ischemic limb, or compartment syndrome. Amount and/or Complexity of Data Reviewed  Clinical lab tests: reviewed and ordered  Tests in the radiology section of CPT®: reviewed and ordered (EKG  CXR)  Tests in the medicine section of CPT®: reviewed and ordered  Independent visualization of images, tracings, or specimens: yes      ED Course     Medications   levoFLOXacin (LEVAQUIN) 750 mg in D5W IVPB (0 mg IntraVENous IV Completed 6/9/17 2135)   furosemide (LASIX) injection 100 mg (100 mg IntraVENous Given 6/9/17 1933)   potassium chloride (K-DUR, KLOR-CON) SR tablet 40 mEq (40 mEq Oral Given 6/9/17 2055)        Procedures      PROGRESS NOTE:  5:59 PM  Initial assessment performed. Written by David Darden ED Scribe, as dictated by Suhas Moncada PA-C     EKG interpretation: (Preliminary)  96 BPM, Possible atrial flutter, QRS 0.112s, Inferior/lateral ST-T abnormality, may be due to MI.  EKG read by Suhas Moncada PA-C  at 5:52 PM      EKG interpretation: (Preliminary)  Rate 80 BPM, Atrial flutter with variable AV Block, St & T wave abnormality, consider inferior ischemia, QRS duration 104 ms  EKG read by Suhas Moncada PA-C  at 7:27 PM     CONSULT NOTE:   8:09 PM  Suhas Moncada PA-C  spoke with Claudell Downs, MD    Specialty: ED Medicine  Discussed pt's hx, disposition, and available diagnostic and imaging results in ED. Reviewed care plans. Consulting physician agrees with plans as outlined. Agrees likely safe to go home. Agrees with treatment. Recommends Bactrim and Keflex for cellulites of LLE. Written by David Darden ED Scribe, as dictated by Suhas Moncada PA-C . PROGRESS NOTE:   8:59 PM  Pt has been re-examined by Suhas Moncada PA-C. Pt feels much better. US is negative for DVT with technically difficult study.  Will treat for LLE cellulitis with Bactrim and Keflex. Will replace K+ at home. Elevation. Rest. Recommend RTED in 48-72 hours for recheck, earlier if sx worsen or vomiting begins. Reasons to RTED discussed with pt. All questions answered. Pt feels comfortable going home at this time. Pt expressed understanding and he agrees with plan. Written by Garfield Jimenez ED Scribe, as dictated by Claudia Ward PA-C.    DISCHARGE NOTE:  9:01 PM  Penny Dumontgalindo Mccarty's  results have been reviewed with him. He has been counseled regarding his diagnosis, treatment, and plan. He verbally conveys understanding and agreement of the signs, symptoms, diagnosis, treatment and prognosis and additionally agrees to follow up as discussed. He also agrees with the care-plan and conveys that all of his questions have been answered. I have also provided discharge instructions for him that include: educational information regarding their diagnosis and treatment, and list of reasons why they would want to return to the ED prior to their follow-up appointment, should his condition change. The patient and/or family has been provided with education for proper Emergency Department utilization. CLINICAL IMPRESSION:    1. Left leg cellulitis    2. Hypokalemia    3. History of atrial flutter        PLAN: DISCHARGE HOME    Follow-up Information     Follow up With Details Comments Contact Eric Valencia MD Call in 1 day  [address]      [facility] EMERGENCY DEPT  return to ER in 48-72 hours for recheck of left leg cellulitis. [address] [facility]  [phone]          Discharge Medication List as of 6/9/2017  9:00 PM      START taking these medications    Details   trimethoprim-sulfamethoxazole (BACTRIM DS) 160-800 mg per tablet Take 2 Tabs by mouth two (2) times a day for 10 days.  Indications: Skin and Skin Structure Infection, Print, Disp-40 Tab, R-0      cephALEXin (KEFLEX) 500 mg capsule Take 1 Cap by mouth four (4) times daily for 10 days. Indications: Skin and Skin Structure Infection, Print, Disp-40 Cap, R-0      potassium chloride SR (KLOR-CON 10) 10 mEq tablet Take 2 Tabs by mouth daily for 10 days. Indications: HYPOKALEMIA, Print, Disp-20 Tab, R-0      HYDROcodone-acetaminophen (NORCO) 5-325 mg per tablet Take 1-2 Tabs by mouth every four (4) hours as needed for Pain. Max Daily Amount: 12 Tabs., Print, Disp-16 Tab, R-0         CONTINUE these medications which have NOT CHANGED    Details   dilTIAZem ER (CARDIZEM LA) 240 mg Tb24 tablet Take 240 mg by mouth daily. , Historical Med      furosemide (LASIX) 40 mg tablet Take 1 Tab by mouth two (2) times a day., Print, Disp-60 Tab, R-1             ATTESTATIONS:  This note is prepared by Tony Lorenzo and Jeison Dunbar, acting as Scribe for Ryan Osman PA-C . Ryan Osman PA-C : The scribe's documentation has been prepared under my direction and personally reviewed by me in its entirety. I confirm that the note above accurately reflects all work, treatment, procedures, and medical decision making performed by me.

## 2017-06-10 VITALS
DIASTOLIC BLOOD PRESSURE: 70 MMHG | RESPIRATION RATE: 16 BRPM | WEIGHT: 315 LBS | HEART RATE: 90 BPM | BODY MASS INDEX: 44.1 KG/M2 | OXYGEN SATURATION: 97 % | SYSTOLIC BLOOD PRESSURE: 111 MMHG | TEMPERATURE: 98.5 F | HEIGHT: 71 IN

## 2017-06-10 LAB
ATRIAL RATE: 240 BPM
CALCULATED P AXIS, ECG09: -93 DEGREES
CALCULATED R AXIS, ECG10: -20 DEGREES
CALCULATED T AXIS, ECG11: 44 DEGREES
DIAGNOSIS, 93000: NORMAL
Q-T INTERVAL, ECG07: 406 MS
QRS DURATION, ECG06: 104 MS
QTC CALCULATION (BEZET), ECG08: 468 MS
VENTRICULAR RATE, ECG03: 80 BPM

## 2017-06-10 NOTE — PROCEDURES
[facility]  *** FINAL REPORT ***    Name: Sultana Blum  MRN: [id number]    Outpatient  : 1975  HIS Order #: [id number]  [address] [facility] Visit #: [id number]  Date: 2017    TYPE OF TEST: Peripheral Venous Testing    REASON FOR TEST  Pain in limb, Limb swelling    Left Leg:-  Deep venous thrombosis:           No  Superficial venous thrombosis:    No  Deep venous insufficiency:        Not examined  Superficial venous insufficiency: Not examined      INTERPRETATION/FINDINGS  Duplex images were obtained using 2-D gray scale, color flow and  spectral doppler analysis. Left leg :  1. Deep vein(s) visualized include the common femoral, deep femoral,  proximal femoral, mid femoral, distal femoral, popliteal(above knee),  popliteal(fossa), popliteal(below knee) and posterior tibial veins. 2. No evidence of deep venous thrombosis detected in the veins  visualized. 3. No evidence of deep vein thrombosis in the contralateral left  common femoral vein. 4. No evidence of superficial thrombosis detected. 5. Extensive subcutaneous tissue fluid noted left calf/ankle area -  unable to visualize left peroneal veins, can not rule out deep venous  thrombosis in these vessels. 6. Technically difficult and suboptimal study secondary to body  habitus. ADDITIONAL COMMENTS    I have personally reviewed the data relevant to the interpretation of  this  study. TECHNOLOGIST: Gloria Gonsales.  Beatriz Dahl  Signed: 2017 08:47 PM    PHYSICIAN: Kavin Morales MD  Signed: 2017 08:07 AM

## 2017-06-11 ENCOUNTER — HOSPITAL ENCOUNTER (EMERGENCY)
Age: 42
Discharge: HOME | End: 2017-06-11
Attending: EMERGENCY MEDICINE
Payer: MEDICARE

## 2017-06-11 VITALS
OXYGEN SATURATION: 98 % | SYSTOLIC BLOOD PRESSURE: 144 MMHG | DIASTOLIC BLOOD PRESSURE: 88 MMHG | RESPIRATION RATE: 24 BRPM | WEIGHT: 315 LBS | HEIGHT: 70 IN | BODY MASS INDEX: 45.1 KG/M2 | TEMPERATURE: 98.6 F | HEART RATE: 79 BPM

## 2017-06-11 DIAGNOSIS — L03.116 CELLULITIS OF LEFT LOWER EXTREMITY: Primary | ICD-10-CM

## 2017-06-11 LAB
BACTERIA SPEC CULT: NORMAL
SERVICE CMNT-IMP: NORMAL

## 2017-06-11 PROCEDURE — 99281 EMR DPT VST MAYX REQ PHY/QHP: CPT

## 2017-06-11 NOTE — DISCHARGE INSTRUCTIONS

## 2017-06-11 NOTE — ED PROVIDER NOTES
[address]  EMERGENCY DEPARTMENT HISTORY AND PHYSICAL EXAM       Date: 6/11/2017   Patient Name: Napoleon Gerard. YOB: 1975  Medical Record Number: [id number]    History of Presenting Illness     Chief Complaint   Patient presents with    Wound Check        History Provided By:  patient    Additional History: 10:37 AM   Napoleon Gerard. is a 38 y.o. male with hx of MRSA, and DVT who presents to the emergency department for wound check. Pt was seen at this facility 2 days ago for left lower leg pain which started 3 days ago. Pt was evaluated by lower leg doppler which was negative, and dx'd with cellulitis. He was rx'd with Bactrim, Keflex, Norco, and Klor-con and was instructed to come back to the ED in 2 days. Pt states he has taken all of his medications today except for his Lasix. Denies fevers and any other sxs or complaints.      Primary Care Provider: Yajaira Ortiz MD   Specialist:    Past History     Past Medical History:   Past Medical History:   Diagnosis Date    Arthritis     knees    Chronic back pain     History of atrial flutter     Dx 6/2016 - anticoagulated by Terrell Bryan    Hypertension     Limited mobility     Migraine headache     Morbid obesity ([facility] [address]     Morbid obesity with body mass index of 60.0-69.9 in adult (HCC)     Sleep apnea     uses c-pap    Smoking     very passive / cigars only         Past Surgical History:   Past Surgical History:   Procedure Laterality Date    CARDIAC SURG PROCEDURE UNLIST      cardioversion    ECHOCARDIOGRAM  04/2016    EF 60-65% (performed at [facility] - see cardiology tab)        Family History:   Family History   Problem Relation Age of Onset    Obesity Mother     Liver Disease Sister         Social History:   Social History   Substance Use Topics    Smoking status: Current Some Day Smoker     Types: Cigars    Smokeless tobacco: None    Alcohol use No      Comment: occasionally        Allergies:   No Known Allergies     Review of Systems   Review of Systems   Constitutional: Negative for fever. Skin:        Left lower leg pain   All other systems reviewed and are negative. Physical Exam  Vitals:    06/11/17 1025   BP: 144/88   Pulse: 79   Resp: 24   Temp: 98.6 °F (37 °C)   SpO2: 98%   Weight: (!) 221.8 kg (489 lb)   Height: 5' 10\" (1.778 m)       Physical Exam   Constitutional: He is oriented to person, place, and time. He appears well-developed and well-nourished. No distress. HENT:   Head: Normocephalic and atraumatic. Eyes: Conjunctivae and EOM are normal. Pupils are equal, round, and reactive to light. Neck: Normal range of motion. Neck supple. Musculoskeletal: Normal range of motion. He exhibits no tenderness. Neurological: He is alert and oriented to person, place, and time. Skin: Skin is warm and dry. Chronic skin changes BLE's, no abscess streaking exudates or lesions noted; distal pulses normal BLE's   Psychiatric: He has a normal mood and affect. His behavior is normal.       Diagnostic Study Results     Labs -    No results found for this or any previous visit (from the past 12 hour(s)). Radiologic Studies -  The following have been ordered and reviewed:  No orders to display       Medical Decision Making   I am the first provider for this patient. I reviewed the vital signs, available nursing notes, past medical history, past surgical history, family history and social history. Vital Signs-Reviewed the patient's vital signs. Patient Vitals for the past 12 hrs:   Temp Pulse Resp BP SpO2   06/11/17 1025 98.6 °F (37 °C) 79 24 144/88 98 %       Pulse Oximetry Analysis - Normal 98% on room air     Old Medical Records: Old medical records. Nursing notes. Procedures:   Procedures    ED Course:  10:37 AM  Initial assessment performed. The patients presenting problems have been discussed, and they are in agreement with the care plan formulated and outlined with them. Medications Given in the ED:  Medications - No data to display    Discharge Note:  10:43 AM   All of pt's questions and concerns were addressed. Patient was instructed and agrees to follow up with his PCP, as well as to return to the ED upon further deterioration. Patient is ready to go home. Diagnosis   Clinical Impression:   1. Cellulitis of left lower extremity, imporved         Follow-up Information     Follow up With Details Comments Contact Cala Olszewski, MD In 2 days  [address] [place] [address]      [facility] EMERGENCY DEPT  If symptoms worsen, As needed [address] [facility]  [phone]          Current Discharge Medication List          _______________________________   Attestations: This note is prepared by Kd Zaman, acting as a Scribe for Jenni Infante PA-C on 10:36 AM on 6/11/2017 . Jenni Infante PA-C : The scribe's documentation has been prepared under my direction and personally reviewed by me in its entirety.   _______________________________

## 2017-06-11 NOTE — ED TRIAGE NOTES
Pt seen here Friday for possible cellulitis to lt lower leg, pt states hx of same in the past, started taking meds yest. Pt states he thinks it is getting better.

## 2017-06-15 LAB
BACTERIA SPEC CULT: NORMAL
SERVICE CMNT-IMP: NORMAL

## 2017-06-21 ENCOUNTER — CLINICAL SUPPORT (OUTPATIENT)
Dept: SURGERY | Age: 42
End: 2017-06-21

## 2017-06-21 ENCOUNTER — APPOINTMENT (OUTPATIENT)
Dept: GENERAL RADIOLOGY | Age: 42
End: 2017-06-21
Attending: SPECIALIST
Payer: MEDICARE

## 2017-06-21 ENCOUNTER — HOSPITAL ENCOUNTER (OUTPATIENT)
Age: 42
Setting detail: OUTPATIENT SURGERY
Discharge: HOME | End: 2017-06-21
Attending: SPECIALIST | Admitting: SPECIALIST
Payer: MEDICARE

## 2017-06-21 VITALS
BODY MASS INDEX: 39.17 KG/M2 | HEART RATE: 118 BPM | RESPIRATION RATE: 18 BRPM | DIASTOLIC BLOOD PRESSURE: 89 MMHG | WEIGHT: 315 LBS | SYSTOLIC BLOOD PRESSURE: 140 MMHG | HEIGHT: 75 IN | TEMPERATURE: 97.1 F | OXYGEN SATURATION: 98 %

## 2017-06-21 VITALS
RESPIRATION RATE: 16 BRPM | HEIGHT: 70 IN | SYSTOLIC BLOOD PRESSURE: 131 MMHG | OXYGEN SATURATION: 96 % | WEIGHT: 315 LBS | HEART RATE: 106 BPM | BODY MASS INDEX: 45.1 KG/M2 | DIASTOLIC BLOOD PRESSURE: 54 MMHG

## 2017-06-21 VITALS — WEIGHT: 315 LBS | BODY MASS INDEX: 39.17 KG/M2 | HEIGHT: 75 IN

## 2017-06-21 DIAGNOSIS — Z86.79 HISTORY OF ATRIAL FLUTTER: ICD-10-CM

## 2017-06-21 DIAGNOSIS — E66.01 MORBID OBESITY WITH BMI OF 50.0-59.9, ADULT (HCC): Primary | ICD-10-CM

## 2017-06-21 DIAGNOSIS — G43.919 INTRACTABLE MIGRAINE, UNSPECIFIED MIGRAINE TYPE: ICD-10-CM

## 2017-06-21 DIAGNOSIS — M19.90 ARTHRITIS: ICD-10-CM

## 2017-06-21 DIAGNOSIS — G47.30 SLEEP APNEA, UNSPECIFIED TYPE: ICD-10-CM

## 2017-06-21 DIAGNOSIS — E66.01 MORBID OBESITY WITH BODY MASS INDEX OF 60.0-69.9 IN ADULT (HCC): ICD-10-CM

## 2017-06-21 DIAGNOSIS — E66.01 MORBID OBESITY DUE TO EXCESS CALORIES (HCC): Primary | ICD-10-CM

## 2017-06-21 DIAGNOSIS — Z74.09 LIMITED MOBILITY: ICD-10-CM

## 2017-06-21 DIAGNOSIS — E66.01 MORBID OBESITY (HCC): ICD-10-CM

## 2017-06-21 PROCEDURE — 76040000019: Performed by: SPECIALIST

## 2017-06-21 PROCEDURE — 74240 X-RAY XM UPR GI TRC 1CNTRST: CPT

## 2017-06-21 PROCEDURE — 74011000255 HC RX REV CODE- 255: Performed by: SPECIALIST

## 2017-06-21 NOTE — IP AVS SNAPSHOT
[facility] 
 
 
 [address] [facility] [address] 
[phone] Patient: Dawson Flores. MRN: [id number] HGA:[birth year] You are allergic to the following No active allergies Recent Documentation Height Weight BMI Smoking Status 1.905 m (!) 210.8 kg 58.1 kg/m2 Former Smoker Emergency Contacts Name Discharge Info Relation Home Work Mobile [address] [facility] CAREGIVER [3] Spouse [3] [phone] About your hospitalization You were admitted on:  June 21, 2017 You last received care in the:  [facility] ENDOSCOPY You were discharged on:  June 21, 2017 Unit phone number:  [phone] Why you were hospitalized Your primary diagnosis was:  Not on File Providers Seen During Your Hospitalizations Provider Role Specialty Primary office phone Mo Garcia MD Attending Provider General Surgery [phone] Your Primary Care Physician (PCP) Primary Care Physician Office Phone Office Fax [address] [facility],[address], [address] [phone] Follow-up Information None Current Discharge Medication List  
  
ASK your doctor about these medications Dose & Instructions Dispensing Information Comments Morning Noon Evening Bedtime  
 dilTIAZem  mg Tb24 tablet Commonly known as:  CARDIZEM LA Your last dose was: Your next dose is:    
   
   
 Dose:  240 mg Take 240 mg by mouth daily. Refills:  0  
     
   
   
   
  
 furosemide 40 mg tablet Commonly known as:  LASIX Your last dose was: Your next dose is:    
   
   
 Dose:  40 mg Take 1 Tab by mouth two (2) times a day. Quantity:  60 Tab Refills:  1 HYDROcodone-acetaminophen 5-325 mg per tablet Commonly known as:  Ella Gowers Your last dose was: Your next dose is:    
   
   
 Dose:  1-2 Tab Take 1-2 Tabs by mouth every four (4) hours as needed for Pain. Max Daily Amount: 12 Tabs. Quantity:  16 Tab Refills:  0 Discharge Instructions Verbal and written post adjustment / UGI instructions given. Patient acknowledges understanding. Discussed diet, activities, and s/s that should be reported. Encouraged to call to schedule next appointment and to call with any questions or concerns. Discharge Orders None Introducing [facility]! Eduarda Lepe introduces [employer] patient portal. Now you can access parts of your medical record, email your doctor's office, and request medication refills online. 1. In your internet browser, go to [email or web address] 2. Click on the First Time User? Click Here link in the Sign In box. You will see the New Member Sign Up page. 3. Enter your [employer] Access Code exactly as it appears below. You will not need to use this code after youve completed the sign-up process. If you do not sign up before the expiration date, you must request a new code. · [employer] Access Code: [email or web address] Expires: 7/23/2017  2:45 PM 
 
4. Enter the last four digits of your Social Security Number (xxxx) and Date of Birth (mm/dd/yyyy) as indicated and click Submit. You will be taken to the next sign-up page. 5. Create a [employer] ID. This will be your [employer] login ID and cannot be changed, so think of one that is secure and easy to remember. 6. Create a [employer] password. You can change your password at any time. 7. Enter your Password Reset Question and Answer. This can be used at a later time if you forget your password. 8. Enter your e-mail address. You will receive e-mail notification when new information is available in [address] 9. Click Sign Up. You can now view and download portions of your medical record. 10. Click the Download Summary menu link to download a portable copy of your medical information. If you have questions, please visit the Frequently Asked Questions section of the MyChart website. Remember, MyChart is NOT to be used for urgent needs. For medical emergencies, dial 911. Now available from your iPhone and Android! General Information Please provide this summary of care documentation to your next provider. Patient Signature:  ____________________________________________________________ Date:  ____________________________________________________________  
  
Gatha Cyphers Provider Signature:  ____________________________________________________________ Date:  ____________________________________________________________

## 2017-06-21 NOTE — PATIENT INSTRUCTIONS
Body Mass Index: Care Instructions  Your Care Instructions    Body mass index (BMI) can help you see if your weight is raising your risk for health problems. It uses a formula to compare how much you weigh with how tall you are. · A BMI lower than 18.5 is considered underweight. · A BMI between 18.5 and 24.9 is considered healthy. · A BMI between 25 and 29.9 is considered overweight. A BMI of 30 or higher is considered obese. If your BMI is in the normal range, it means that you have a lower risk for weight-related health problems. If your BMI is in the overweight or obese range, you may be at increased risk for weight-related health problems, such as high blood pressure, heart disease, stroke, arthritis or joint pain, and diabetes. If your BMI is in the underweight range, you may be at increased risk for health problems such as fatigue, lower protection (immunity) against illness, muscle loss, bone loss, hair loss, and hormone problems. BMI is just one measure of your risk for weight-related health problems. You may be at higher risk for health problems if you are not active, you eat an unhealthy diet, or you drink too much alcohol or use tobacco products. Follow-up care is a key part of your treatment and safety. Be sure to make and go to all appointments, and call your doctor if you are having problems. It's also a good idea to know your test results and keep a list of the medicines you take. How can you care for yourself at home? · Practice healthy eating habits. This includes eating plenty of fruits, vegetables, whole grains, lean protein, and low-fat dairy. · If your doctor recommends it, get more exercise. Walking is a good choice. Bit by bit, increase the amount you walk every day. Try for at least 30 minutes on most days of the week. · Do not smoke. Smoking can increase your risk for health problems. If you need help quitting, talk to your doctor about stop-smoking programs and medicines. These can increase your chances of quitting for good. · Limit alcohol to 2 drinks a day for men and 1 drink a day for women. Too much alcohol can cause health problems. If you have a BMI higher than 25  · Your doctor may do other tests to check your risk for weight-related health problems. This may include measuring the distance around your waist. A waist measurement of more than 40 inches in men or 35 inches in women can increase the risk of weight-related health problems. · Talk with your doctor about steps you can take to stay healthy or improve your health. You may need to make lifestyle changes to lose weight and stay healthy, such as changing your diet and getting regular exercise. If you have a BMI lower than 18.5  · Your doctor may do other tests to check your risk for health problems. · Talk with your doctor about steps you can take to stay healthy or improve your health. You may need to make lifestyle changes to gain or maintain weight and stay healthy, such as getting more healthy foods in your diet and doing exercises to build muscle. Where can you learn more? Go to http://charo-mirta.info/. Enter S176 in the search box to learn more about \"Body Mass Index: Care Instructions. \"  Current as of: January 23, 2017  Content Version: 11.3  © 9161-4223 [employer]. Care instructions adapted under license by [employer] (which disclaims liability or warranty for this information). If you have questions about a medical condition or this instruction, always ask your healthcare professional. [place] [address] any warranty or liability for your use of this information. Patient Instructions      1. Continue to monitor carbohydrate and protein intake- remember to keep your           total  carbohydrates to 50 grams or less per day for best results.   2. Remember hydration goals - usually 48 to 64 ounces of liquids per day  3. Continue to work towards exercise goals - minimum 3 days per week of 45          minutes to  1 hour at a time. 4. Remember to take vitamins as directed        Supplement Resource Guide    Importance of Protein:   Maintains lean body mass, produces antibodies to fight off infections, heals wounds, minimizes hair loss, helps to give you energy, helps with satiety, and keeping you full between meals. Importance of Calcium:  Needed for healthy bones and teeth, normal blood clotting, and nervous system functioning, higher risk of osteoporosis and bone disease with non-compliance. Importance of Multivitamins: Many functions. Supply you with extra nutrients that you may be missing from food. May lead to iron deficiency anemia, weakness, fatigue, and many other symptoms with non-compliance. Importance of B Vitamins:  Important for red blood cell formation, metabolism, energy, and helps to maintain a healthy nervous system. Protein Supplement  Find one you like now. Use immediately after surgery. Look for:  35-50g protein each day from your protein supplement once you reach the progression diet. 0-3 g fat per serving  0-3 g sugar per serving    Protein drinks should be split in separate dosages. Recommend: Lifelong  1 year + Calcium Supplement:     Start taking within a month after surgery. Look for: Calcium Citrate Plus D (1500 mg per day)  Recommend: Citracal     .          Avoid chocolate chewable calcium. Can use chewable bariatric or [employer] brand or similar chewable. The body cannot absorb more than 500-600 mg @ a time. Take for Life Multi-vitamin Supplement:      1st Month After Surgery: Any complete chewable, such as: Bogarts Complete chewables. Avoid Bogart sours or gummies. They lack iron and other important nutrients and also have added sugar.     Continue with chewable vitamin or change to adult complete multivitamin one month after surgery. Menstruating women can take a prenatal vitamin. Make sure has at least 18 mg iron and 261-375 mcg folic acid):    Vitamin B12, B Complex Vitamin, and Biotin  Start taking within a month after surgery. Vitamin B12:  1000 mcg of Vitamin B12 three times weekly    Must take sublingually (meaning you take it under your tongue) or in a liquid drop form for easy absorption. B Complex Vitamin: Take a pill or liquid drop form once daily. Biotin: This vitamin can help prevent hair loss.     Recommend 5mg   (5000 mcg) a day  Biotin is Optional

## 2017-06-21 NOTE — IP AVS SNAPSHOT
Current Discharge Medication List  
  
ASK your doctor about these medications Dose & Instructions Dispensing Information Comments Morning Noon Evening Bedtime  
 dilTIAZem  mg Tb24 tablet Commonly known as:  CARDIZEM LA Your last dose was: Your next dose is:    
   
   
 Dose:  240 mg Take 240 mg by mouth daily. Refills:  0  
     
   
   
   
  
 furosemide 40 mg tablet Commonly known as:  LASIX Your last dose was: Your next dose is:    
   
   
 Dose:  40 mg Take 1 Tab by mouth two (2) times a day. Quantity:  60 Tab Refills:  1 HYDROcodone-acetaminophen 5-325 mg per tablet Commonly known as:  Claudia Carbajal Your last dose was: Your next dose is:    
   
   
 Dose:  1-2 Tab Take 1-2 Tabs by mouth every four (4) hours as needed for Pain. Max Daily Amount: 12 Tabs. Quantity:  16 Tab Refills:  0

## 2017-06-21 NOTE — PROGRESS NOTES
Medical Weight Loss Multi-Disciplinary Program    Name: Rossy Kendrick. : 1975    Session# 4  Date: 2017     Height: 6' 3\" (190.5 cm)    Weight: (!) 211.8 kg (467 lb) lbs. Body mass index is 58.37 kg/(m^2). Pounds Lost: 22     Dietary Instructions    Reviewed intake  Instruction given for personal dietary changes  Discussed perceived compliance  Comments: pt Is drinking a slimfast high protein shake as a meal replacement and snack throughout his day; been eating boiled eggs, turkey skaggs and special K protein cereal with 1% or almond breeze milk; lunch and dinner have been salads with protein or baked chicken or baked pork chops. Pt has been taking his biotin, MVI, and calcium citrate chewies      Physical Activity/Exercise    Reviewed Activity Log  Discussed Perceived Compliance  Reasonable Goals Set  Motivation  Comments: walking 5-7 days a week around his apartment complex or going to the fitness center for 30 minutes a day     Behavior Modification    Reviewed behavior modification log  Identify obstacles to trigger change  Achieving/Rewarding goals met  Positive attitude  Discussed perceived compliance  Comments:     Goals:  1. Continue to take current vitamins once per day before surgery and increase the multivitamin to twice a day after surgery  2. Continue current exercise routine; increasing as schedule permits and weather  3.  Continue proper portion sizes and ketogenic diet     Candidate for surgery (per RD): Yes    Dietitian: Abe Parnell

## 2017-06-21 NOTE — PATIENT INSTRUCTIONS
Goals: 1. Continue to take current vitamins once per day before surgery and increase the multivitamin to twice a day after surgery  2. Continue current exercise routine; increasing as schedule permits and weather  3.  Continue proper portion sizes and ketogenic diet

## 2017-06-21 NOTE — PROCEDURES
Patient:Almas Cabrera. : 1975  Medical Record [id number]            PREPROCEDURE DIAGNOSIS: This patient is preoperative for laparoscopic sleeve gastrectomyprocedure with a history of  reflux disease. POSTPROCEDURE DIAGNOSIS: This patient is preoperative for laparoscopic sleeve gastrectomyprocedure with a history of  reflux disease. PROCEDURES PERFORMED: Upper GI study with barium. ESTIMATED BLOOD LOSS: None. SPECIMENS: None. STATEMENT OF MEDICAL NECESSITY: The patient is a patient with a  longstanding history of obesity. They are now considering the laparoscopic sleeve gastrectomyprocedure as a means of surgical weight control and due to their history of reflux disease and are being assessed preoperatively for such. DESCRIPTION OF PROCEDURE: The patient was brought to the fluoroscopy unit and  was given thin barium. On swallowing of barium, they were noted to have  normal peristalsis of their esophagus. They had prompt filling of distal  esophagus with tapering into the gastroesophageal junction. There was no evidence of a hiatal hernia present. Contrast then filled the gastric cardia, fundus,body and pre pyloric region with no abnormalities noted. Contrast then exited the pylorus in normal fashion. No obstruction was noted. There was no evidence of reflux noted.     (normal anatomy)    Hiwot Blount MD

## 2017-06-21 NOTE — PROGRESS NOTES
Bariatric Surgery Consultation    Subjective:     Chet Blount is a 38 y.o. obese male with a Body mass index is 67.01 kg/(m^2). .  he desires surgery at this time because of health issues and quality of life issues. Chet Blount has been seen by a bariatric nutritionist and has been placed on an appropriate low carbohydrate diet. The patient desires laparoscopic sleeve gastrectomy for surgical weight loss, however he is here today to review their workup to date. Chet Blount is here also today to check progress with weight loss / evaluate nutritional status and review all subspecialty clearances in hopes of proceeding to the operating room. Patient Active Problem List    Diagnosis Date Noted    Chronic renal insufficiency, stage II (mild)     Morbid obesity with body mass index of 60.0-69.9 in [facility])     Chronic back pain     Arthritis     Migraine     History of atrial flutter     Limited mobility     Smoking     Cellulitis of left lower leg 04/04/2016    Anemia 04/04/2016    Morbid obesity ([facility] [address] 04/04/2016    Dyspnea 04/04/2016    Sleep apnea 04/04/2016    Sinus tachycardia 04/04/2016    Acute respiratory failure with hypoxia and hypercarbia ([facility] [address] 04/04/2016      Past Surgical History:   Procedure Laterality Date    CARDIAC SURG PROCEDURE UNLIST      cardioversion    ECHOCARDIOGRAM  04/2016    EF 60-65% (performed at THE [facility] - see cardiology tab)      Social History   Substance Use Topics    Smoking status: Former Smoker     Types: Cigars    Smokeless tobacco: Never Used    Alcohol use No      Comment: occasionally      Family History   Problem Relation Age of Onset    Obesity Mother     Liver Disease Sister       Current Outpatient Prescriptions   Medication Sig Dispense Refill    dilTIAZem ER (CARDIZEM LA) 240 mg Tb24 tablet Take 240 mg by mouth daily.       HYDROcodone-acetaminophen (NORCO) 5-325 mg per tablet Take 1-2 Tabs by mouth every four (4) hours as needed for Pain. Max Daily Amount: 12 Tabs. 16 Tab 0    furosemide (LASIX) 40 mg tablet Take 1 Tab by mouth two (2) times a day.  60 Tab 1     No Known Allergies       Review of Systems:            General - No history or complaints of unexpected fever, chills, or weight loss  Head/Neck - No history or complaints of headache, diplopia, dysphagia, hearing loss  Cardiac - No history or complaints of chest pain, palpitations, murmur, or shortness of breath  Pulmonary - No history or complaints of shortness of breath, productive cough, hemoptysis  Gastrointestinal - No history or complaints of reflux,  abdominal pain, obstipation/constipation, blood per rectum  Genitourinary - No history or complaints of hematuria/dysuria, stress urinary incontinence symptoms, or renal lithiasis  Musculoskeletal - No history or complaints of joint pain or muscular weakness  Hematologic - No history or complaints of bleeding disorders, blood transfusions, sickle cell anemia  Neurologic - No history or complaints of  migraine headaches, seizure activity, syncopal episodes, TIA or stroke  Integumentary - No history or complaints of rashes, abnormal nevi, skin cancer  Gynecological - No history of heavy menses/abnormal menses           Objective:     Visit Vitals    /54 (BP 1 Location: Left arm, BP Patient Position: Sitting)    Pulse (!) 106    Resp 16    Ht 5' 10\" (1.778 m)    Wt (!) 211.8 kg (467 lb)    SpO2 96%    BMI 67.01 kg/m2     Physical Examination: General appearance - alert, well appearing, and in no distress and oriented to person, place, and time  Mental status - alert, oriented to person, place, and time, normal mood, behavior, speech, dress, motor activity, and thought processes  Eyes - pupils equal and reactive, extraocular eye movements intact, sclera anicteric, left eye normal, right eye normal  Ears - right ear normal, left ear normal  Nose - normal and patent, no erythema, discharge or polyps  Mouth - mucous membranes moist, pharynx normal without lesions  Neck - supple, no significant adenopathy  Lymphatics - no palpable lymphadenopathy, no hepatosplenomegaly  Chest - clear to auscultation, no wheezes, rales or rhonchi, symmetric air entry  Heart - normal rate, regular rhythm, occasional premature beats  Abdomen - soft, nontender, nondistended, no masses or organomegaly, massive central obesity  Back exam - full range of motion, no tenderness, palpable spasm or pain on motion  Neurological - alert, oriented, normal speech, no focal findings or movement disorder noted  Musculoskeletal - severe varus deformity of both knees with limited ROM  Extremities - peripheral pulses normal, moderate pedal edema, severe venous stasis changes  Skin - normal coloration and turgor, no rashes, no suspicious skin lesions noted      Labs:     Recent Results (from the past 2016 hour(s))   LACTIC ACID, PLASMA    Collection Time: 06/09/17  5:57 PM   Result Value Ref Range    Lactic acid 1.1 0.4 - 2.0 MMOL/L   CULTURE, BLOOD    Collection Time: 06/09/17  5:57 PM   Result Value Ref Range    Special Requests: NO SPECIAL REQUESTS      Culture result: NO GROWTH 6 DAYS     METABOLIC PANEL, COMPREHENSIVE    Collection Time: 06/09/17  5:57 PM   Result Value Ref Range    Sodium 139 136 - 145 mmol/L    Potassium 3.4 (L) 3.5 - 5.5 mmol/L    Chloride 102 100 - 108 mmol/L    CO2 27 21 - 32 mmol/L    Anion gap 10 3.0 - 18 mmol/L    Glucose 103 (H) 74 - 99 mg/dL    BUN 18 7.0 - 18 MG/DL    Creatinine 1.40 (H) 0.6 - 1.3 MG/DL    BUN/Creatinine ratio 13 12 - 20      GFR est AA >60 >60 ml/min/1.73m2    GFR est non-AA 55 (L) >60 ml/min/1.73m2    Calcium 8.5 8.5 - 10.1 MG/DL    Bilirubin, total 0.6 0.2 - 1.0 MG/DL    ALT (SGPT) 35 16 - 61 U/L    AST (SGOT) 26 15 - 37 U/L    Alk.  phosphatase 73 45 - 117 U/L    Protein, total 8.0 6.4 - 8.2 g/dL    Albumin 3.4 3.4 - 5.0 g/dL    Globulin 4.6 (H) 2.0 - 4.0 g/dL    A-G Ratio 0.7 (L) 0.8 - 1.7     CBC WITH AUTOMATED DIFF Collection Time: 06/09/17  5:57 PM   Result Value Ref Range    WBC 8.1 4.6 - 13.2 K/uL    RBC 4.72 4.70 - 5.50 M/uL    HGB 14.0 13.0 - 16.0 g/dL    HCT 41.0 36.0 - 48.0 %    MCV 86.9 74.0 - 97.0 FL    MCH 29.7 24.0 - 34.0 PG    MCHC 34.1 31.0 - 37.0 g/dL    RDW 12.6 11.6 - 14.5 %    PLATELET 139 (L) 549 - 420 K/uL    MPV 10.6 9.2 - 11.8 FL    NEUTROPHILS 69 40 - 73 %    LYMPHOCYTES 20 (L) 21 - 52 %    MONOCYTES 9 3 - 10 %    EOSINOPHILS 2 0 - 5 %    BASOPHILS 0 0 - 2 %    ABS. NEUTROPHILS 5.6 1.8 - 8.0 K/UL    ABS. LYMPHOCYTES 1.7 0.9 - 3.6 K/UL    ABS. MONOCYTES 0.7 0.05 - 1.2 K/UL    ABS. EOSINOPHILS 0.1 0.0 - 0.4 K/UL    ABS. BASOPHILS 0.0 0.0 - 0.06 K/UL    DF AUTOMATED     PRO-BNP    Collection Time: 06/09/17  5:57 PM   Result Value Ref Range    NT pro- 0 - 450 PG/ML   MAGNESIUM    Collection Time: 06/09/17  5:57 PM   Result Value Ref Range    Magnesium 1.7 1.6 - 2.6 mg/dL   EKG, 12 LEAD, INITIAL    Collection Time: 06/09/17  7:27 PM   Result Value Ref Range    Ventricular Rate 80 BPM    Atrial Rate 240 BPM    QRS Duration 104 ms    Q-T Interval 406 ms    QTC Calculation (Bezet) 468 ms    Calculated P Axis -93 degrees    Calculated R Axis -20 degrees    Calculated T Axis 44 degrees    Diagnosis       Atrial flutter with variable AV block  ST & T wave abnormality, consider inferior ischemia  Prolonged QT  Abnormal ECG  When compared with ECG of 12-APR-2016 08:58,  ST less depressed in Anterior leads  Nonspecific T wave abnormality now evident in Anterolateral leads  Confirmed by Luly Vincent MD. ([id number]) on 6/10/2017 5:35:02 PM     CULTURE, URINE    Collection Time: 06/09/17  7:45 PM   Result Value Ref Range    Special Requests: NO SPECIAL REQUESTS      Culture result:        <10,000  COLONIES/mL  MIXED GRAM POSITIVE BRYN, PROBABLE SKIN/GENITAL CONTAMINATION.      URINALYSIS W/ RFLX MICROSCOPIC    Collection Time: 06/09/17  7:58 PM   Result Value Ref Range    Color YELLOW      Appearance CLEAR Specific gravity 1.010 1.005 - 1.030      pH (UA) 6.5 5.0 - 8.0      Protein NEGATIVE  NEG mg/dL    Glucose NEGATIVE  NEG mg/dL    Ketone NEGATIVE  NEG mg/dL    Bilirubin NEGATIVE  NEG      Blood NEGATIVE  NEG      Urobilinogen 1.0 0.2 - 1.0 EU/dL    Nitrites NEGATIVE  NEG      Leukocyte Esterase NEGATIVE  NEG             Assessment:     Morbid obesity with associated comorbidity &   Plan:     Continuation of Pre-Operative evaluation / clearance. Yanelis Alonzo has returned to the office today to discuss his status as a surgical candidate. his progress has been noted and reviewed. He is ready for surgery other than needing to see his cardiologist .    he will try to [id number] more pounds before proceeding to the OR. (22 pounds lost since last visit)  he has 0 more nutritional visits to complete before proceeding to the OR  he has an outstanding cardiac clearance to review before proceeding to the OR. ( will send to Dr Dinesh Yeung for evaluation)    Yanelis Alonzo understand the rationales for all the above. It has been discussed that given his   condition that the best surgical option for this patient would be the laparoscopic sleeve gastrectomy. Yanelis Alonzo agrees with the surgical choice and has been educated in it's; risks, benefits, and alternatives. We will continue with the pre-operative evaluation as needed to check progress. The patient seems quite motivated to lose weight and is totally committed to the follow up required with weight loss surgery. Secondary Diagnoses:     Obstructive Sleep Apnea -The patient understands the association of sleep apnea and obesity and the additional risk that it caries related to post surgical complications. If they have not been tested for sleep apnea and I feel they are at increased risk for this diagnosis, then they will be scheduled for a consultation with a Pulmonologist for such.  In the event that they kyaw this diagnosis we will have the patient bring their CPAP machine to the hospital for use both postoperatively in the PACU and on the floor at its appropriate setting.  We will have them continue using it while at home after surgery and follow up with their pulmonologist 6 months after to be retested to see if it can be discontinued at that time period. The patient is already established and uses his CPAP nightly and sleeps well.       Signed By: Radha Dockery MD     June 21, 2017

## 2017-09-27 ENCOUNTER — HOSPITAL ENCOUNTER (OUTPATIENT)
Dept: MEDSURG UNIT | Age: 42
Discharge: HOME | End: 2017-09-27

## 2017-09-27 ENCOUNTER — HOSPITAL ENCOUNTER (OUTPATIENT)
Dept: NON INVASIVE DIAGNOSTICS | Age: 42
Discharge: HOME | End: 2017-09-27
Attending: INTERNAL MEDICINE | Admitting: INTERNAL MEDICINE
Payer: MEDICARE

## 2017-09-27 VITALS
HEART RATE: 82 BPM | SYSTOLIC BLOOD PRESSURE: 166 MMHG | TEMPERATURE: 98.3 F | BODY MASS INDEX: 42.66 KG/M2 | WEIGHT: 315 LBS | HEIGHT: 72 IN | RESPIRATION RATE: 21 BRPM | OXYGEN SATURATION: 94 % | DIASTOLIC BLOOD PRESSURE: 111 MMHG

## 2017-09-27 PROCEDURE — 74011250636 HC RX REV CODE- 250/636

## 2017-09-27 PROCEDURE — 93312 ECHO TRANSESOPHAGEAL: CPT

## 2017-09-27 PROCEDURE — 74011000250 HC RX REV CODE- 250: Performed by: INTERNAL MEDICINE

## 2017-09-27 PROCEDURE — 99152 MOD SED SAME PHYS/QHP 5/>YRS: CPT

## 2017-09-27 PROCEDURE — 74011250636 HC RX REV CODE- 250/636: Performed by: INTERNAL MEDICINE

## 2017-09-27 PROCEDURE — 93005 ELECTROCARDIOGRAM TRACING: CPT

## 2017-09-27 RX ORDER — MIDAZOLAM HYDROCHLORIDE 1 MG/ML
.5-2 INJECTION, SOLUTION INTRAMUSCULAR; INTRAVENOUS
Status: DISCONTINUED | OUTPATIENT
Start: 2017-09-27 | End: 2017-09-27 | Stop reason: HOSPADM

## 2017-09-27 RX ORDER — MIDAZOLAM HYDROCHLORIDE 1 MG/ML
INJECTION, SOLUTION INTRAMUSCULAR; INTRAVENOUS
Status: COMPLETED
Start: 2017-09-27 | End: 2017-09-27

## 2017-09-27 RX ORDER — FENTANYL CITRATE 50 UG/ML
INJECTION, SOLUTION INTRAMUSCULAR; INTRAVENOUS
Status: COMPLETED
Start: 2017-09-27 | End: 2017-09-27

## 2017-09-27 RX ORDER — FENTANYL CITRATE 50 UG/ML
25-100 INJECTION, SOLUTION INTRAMUSCULAR; INTRAVENOUS
Status: DISCONTINUED | OUTPATIENT
Start: 2017-09-27 | End: 2017-09-27 | Stop reason: HOSPADM

## 2017-09-27 RX ORDER — AMIODARONE HYDROCHLORIDE 200 MG/1
200 TABLET ORAL DAILY
Qty: 90 TAB | Refills: 3 | Status: ON HOLD | OUTPATIENT
Start: 2017-09-28 | End: 2020-01-01 | Stop reason: ALTCHOICE

## 2017-09-27 RX ORDER — FUROSEMIDE 40 MG/1
40 TABLET ORAL 2 TIMES DAILY
Status: DISCONTINUED | OUTPATIENT
Start: 2017-09-27 | End: 2017-09-27 | Stop reason: HOSPADM

## 2017-09-27 RX ORDER — AMIODARONE HYDROCHLORIDE 200 MG/1
200 TABLET ORAL DAILY
Status: DISCONTINUED | OUTPATIENT
Start: 2017-09-28 | End: 2017-09-27 | Stop reason: HOSPADM

## 2017-09-27 RX ORDER — SODIUM CHLORIDE 9 MG/ML
50 INJECTION, SOLUTION INTRAVENOUS CONTINUOUS
Status: DISCONTINUED | OUTPATIENT
Start: 2017-09-27 | End: 2017-09-27 | Stop reason: HOSPADM

## 2017-09-27 RX ADMIN — FENTANYL CITRATE 50 MCG: 50 INJECTION, SOLUTION INTRAMUSCULAR; INTRAVENOUS at 11:41

## 2017-09-27 RX ADMIN — AMIODARONE HYDROCHLORIDE 150 MG: 1.5 INJECTION, SOLUTION INTRAVENOUS at 11:55

## 2017-09-27 RX ADMIN — BENZOCAINE 3 SPRAY: 220 SPRAY, METERED PERIODONTAL at 11:36

## 2017-09-27 RX ADMIN — MIDAZOLAM HYDROCHLORIDE 1 MG: 1 INJECTION, SOLUTION INTRAMUSCULAR; INTRAVENOUS at 11:52

## 2017-09-27 RX ADMIN — FENTANYL CITRATE 50 MCG: 50 INJECTION, SOLUTION INTRAMUSCULAR; INTRAVENOUS at 11:50

## 2017-09-27 RX ADMIN — MIDAZOLAM HYDROCHLORIDE 1 MG: 1 INJECTION, SOLUTION INTRAMUSCULAR; INTRAVENOUS at 11:48

## 2017-09-27 RX ADMIN — MIDAZOLAM HYDROCHLORIDE 1 MG: 1 INJECTION, SOLUTION INTRAMUSCULAR; INTRAVENOUS at 11:47

## 2017-09-27 RX ADMIN — MIDAZOLAM HYDROCHLORIDE 2 MG: 1 INJECTION, SOLUTION INTRAMUSCULAR; INTRAVENOUS at 11:41

## 2017-09-27 RX ADMIN — SODIUM CHLORIDE 50 ML/HR: 900 INJECTION, SOLUTION INTRAVENOUS at 11:36

## 2017-09-27 RX ADMIN — FENTANYL CITRATE 50 MCG: 50 INJECTION, SOLUTION INTRAMUSCULAR; INTRAVENOUS at 11:42

## 2017-09-27 NOTE — PROCEDURES
Atrial flutter converted to sinus rhythm with 200 joul syncronized shock after HU. Procedure was well tolerated.     Dann Loja MD

## 2017-09-27 NOTE — DISCHARGE INSTRUCTIONS
PATIENT INSTRUCTIONS:    After general anesthesia or intravenous sedation, for 24 hours or while taking prescription Narcotics:  · Limit your activities  · Do not drive and operate hazardous machinery  · Do not make important personal or business decisions  · Do  not drink alcoholic beverages  · If you have not urinated within 8 hours after discharge, please contact your surgeon on call. Report the following to your surgeon:  · Excessive pain, swelling, redness or odor of or around the surgical area  · Temperature over 100.5  · Nausea and vomiting lasting longer than 4 hours or if unable to take medications  · Any signs of decreased circulation or nerve impairment to extremity: change in color, persistent  numbness, tingling, coldness or increase pain  · Any questions        What to do at Home:  Recommended activity: Activity as tolerated and no driving for today,     *  Please give a list of your current medications to your Primary Care Provider. *  Please update this list whenever your medications are discontinued, doses are      changed, or new medications (including over-the-counter products) are added. *  Please carry medication information at all times in case of emergency situations. These are general instructions for a healthy lifestyle:    No smoking/ No tobacco products/ Avoid exposure to second hand smoke    Surgeon General's Warning:  Quitting smoking now greatly reduces serious risk to your health. Obesity, smoking, and sedentary lifestyle greatly increases your risk for illness    A healthy diet, regular physical exercise & weight monitoring are important for maintaining a healthy lifestyle    You may be retaining fluid if you have a history of heart failure or if you experience any of the following symptoms:  Weight gain of 3 pounds or more overnight or 5 pounds in a week, increased swelling in our hands or feet or shortness of breath while lying flat in bed.   Please call your doctor as soon as you notice any of these symptoms; do not wait until your next office visit. Recognize signs and symptoms of STROKE:    F-face looks uneven    A-arms unable to move or move unevenly    S-speech slurred or non-existent    T-time-call 911 as soon as signs and symptoms begin-DO NOT go       Back to bed or wait to see if you get better-TIME IS BRAIN. Warning Signs of HEART ATTACK     Call 911 if you have these symptoms:   Chest discomfort. Most heart attacks involve discomfort in the center of the chest that lasts more than a few minutes, or that goes away and comes back. It can feel like uncomfortable pressure, squeezing, fullness, or pain.  Discomfort in other areas of the upper body. Symptoms can include pain or discomfort in one or both arms, the back, neck, jaw, or stomach.  Shortness of breath with or without chest discomfort.  Other signs may include breaking out in a cold sweat, nausea, or lightheadedness. Don't wait more than five minutes to call 911 - MINUTES MATTER! Fast action can save your life. Calling 911 is almost always the fastest way to get lifesaving treatment. Emergency Medical Services staff can begin treatment when they arrive -- up to an hour sooner than if someone gets to the hospital by car. The discharge information has been reviewed with the patient and spouse. The patient and spouse verbalized understanding. Discharge medications reviewed with the patient and spouse and appropriate educational materials and side effects teaching were provided. Transesophageal Echocardiogram: What to Expect at [address] [place] [address]  A transesophageal echocardiogram is a test to help your doctor look at the inside of your heart. A small device called a transducer directs sound waves toward your heart. The sound waves make a picture of the heart's valves and chambers. Before the test, your throat was sprayed with medicine to numb it.  You won't be able to eat or drink until the numbness wears off. Your throat may be sore for a few days. You may have had a sedative to help you relax. You may be unsteady after having sedation. It can take a few hours for the medicine's effects to wear off. Common side effects include nausea, vomiting, and feeling sleepy or tired. This care sheet gives you a general idea about how long it will take for you to recover. But each person recovers at a different pace. Follow the steps below to feel better as quickly as possible. How can you care for yourself at home? Activity  · If a sedative was used, your doctor will tell you when it is safe for you to do your normal activities. · For your safety, do not drive or operate any machinery that could be dangerous. Wait until the medicine wears off and you can think clearly and react easily. Diet  · You can eat your normal diet. Follow-up care is a key part of your treatment and safety. Be sure to make and go to all appointments, and call your doctor if you are having problems. It's also a good idea to know your test results and keep a list of the medicines you take. When should you call for help? Call your doctor now or seek immediate medical care if:  · You have new or worse trouble swallowing. · You have pain in your chest.  · You have worse pain in your throat. · You have bleeding from your mouth. Watch closely for changes in your health, and be sure to contact your doctor if you have any problems. Where can you learn more? Go to http://charo-mirta.info/. Enter K549 in the search box to learn more about \"Transesophageal Echocardiogram: What to Expect at Home. \"  Current as of: October 26, 2016  Content Version: 11.3  © 4357-7172 [employer]. Care instructions adapted under license by [employer] (which disclaims liability or warranty for this information).  If you have questions about a medical condition or this instruction, always ask your healthcare professional. [address] any warranty or liability for your use of this information. Electrical Cardioversion: What to Expect at Home  Your Recovery    Electrical cardioversion is a treatment for an abnormal heartbeat, such as atrial fibrillation, supraventricular tachycardia, or ventricular tachycardia (VT). It uses a brief electrical shock to reset your heart's rhythm. After cardioversion, you may have redness, like a sunburn, where the patches were. The medicines you got to make you sleepy may make you feel drowsy for the rest of the day. Your doctor may have you take medicines to help the heart beat normally and to prevent blood clots. This care sheet gives you a general idea about how long it will take for you to recover. But each person recovers at a different pace. Follow the steps below to feel better as quickly as possible. How can you care for yourself at home? Medicines  · Be safe with medicines. Take your medicines exactly as prescribed. Call your doctor if you think you are having a problem with your medicine. You may take one or more of the following medicines:  ¨ Rate-control medicines to slow the heart rate. These include beta-blockers, calcium channel blockers, and digoxin. ¨ Rhythm control medicines that help the heart keep a normal rhythm. ¨ Blood thinners, also called anticoagulants, which help prevent blood clots. You will get more details on the specific medicines your doctor prescribes. Be sure you know how to take your medicines safely. · Do not take any vitamins, over-the-counter medicines, or herbal products without talking to your doctor first.  Exercise  · Start light exercise if your doctor says that it is okay. Even a small amount will help you get stronger, have more energy, and manage your stress. Walking is an easy way to get exercise.  Start out by walking a little more than you did in the hospital. Bit by bit, increase the amount you walk.  · When you exercise, watch for signs that your heart is working too hard. You are pushing too hard if you cannot talk while you are exercising. If you become short of breath or dizzy or have chest pain, sit down and rest right away. · Check your pulse regularly. Place two fingers on the artery at the palm side of your wrist in line with your thumb. If your heartbeat seems uneven or fast, talk to your doctor. Other instructions  · Ask your doctor when you can drive again. · Do not smoke. If you need help quitting, talk to your doctor about stop-smoking programs and medicines. These can increase your chances of quitting for good. · Limit alcohol. Follow-up care is a key part of your treatment and safety. Be sure to make and go to all appointments, and call your doctor if you are having problems. It's also a good idea to know your test results and keep a list of the medicines you take. When should you call for help? Call 911 anytime you think you may need emergency care. For example, call if:  · You have chest pain or pressure. This may occur with:  ¨ Sweating. ¨ Shortness of breath. ¨ Nausea or vomiting. ¨ Pain that spreads from the chest to the neck, jaw, or one or both shoulders or arms. ¨ A fast or uneven pulse. After calling 911, the  may tell you to chew 1 adult-strength or 2 to 4 low-dose aspirin. Wait for an ambulance. Do not try to drive yourself. · You have symptoms of a stroke. These may include:  ¨ Sudden numbness, tingling, weakness, or loss of movement in your face, arm, or leg, especially on only one side of your body. ¨ Sudden vision changes. ¨ Sudden trouble speaking. ¨ Sudden confusion or trouble understanding simple statements. ¨ Sudden problems with walking or balance. ¨ A sudden, severe headache that is different from past headaches. · You vomit blood or what looks like coffee grounds. · You pass maroon or very bloody stools.   · You passed out (lost consciousness). Call your doctor now or seek immediate medical care if:  · You feel dizzy or lightheaded, or you feel like you may faint. · Your heart rate becomes irregular. · You have shortness of breath. · You have any unusual bleeding, such as:  ¨ Bruises or blood spots under the skin. ¨ A nosebleed that you cannot stop. ¨ Bleeding gums when you brush your teeth. ¨ Blood in your urine. ¨ Vaginal bleeding when you are not having your period, or heavy period bleeding. ¨ Your stools are black and tarlike or have streaks of blood. Watch closely for any changes in your health, and be sure to contact your doctor if:  · You do not get better as expected. Where can you learn more? Go to http://charo-mirta.info/. Enter A617 in the search box to learn more about \"Electrical Cardioversion: What to Expect at Home. \"  Current as of: September 21, 2016  Content Version: 11.3  © 0114-0231 [employer]. Care instructions adapted under license by [employer] (which disclaims liability or warranty for this information). If you have questions about a medical condition or this instruction, always ask your healthcare professional. [place] [address] any warranty or liability for your use of this information.

## 2017-09-27 NOTE — H&P
Date of Surgery Update:  Clay Armenta was seen and examined. History and physical has been reviewed. The patient has been examined. There have been no significant clinical changes since the completion of the originally dated History and Physical.  Plan HU/cardioversion with moderate sedation.     Signed By: Ayush Coombs MD     September 27, 2017 11:27 AM

## 2017-09-27 NOTE — PROCEDURES
Mr. Tiara Grimaldo was brought to the catheterization laboratory and underwent a transesophageal echocardiogram with moderate conscious sedation. After completion of the echocardiogram which demonstrated no left atrial thrombus or left atrial appendage thrombus, the patient was given additional sedation medications. . After an appropriate sedation level was obtained, the patient was cardioverted with the 150 Jouls which converted his atrial flutter to atrial fibrillation. He was given further sedation and a second shock of 200 J was applied. After this shock, he converted to sinus rhythm. The procedure was well tolerated and there were no complications. Patient was returned to recovery in satisfactory condition having tolerated the procedure well.     Kenia Feliz MD

## 2017-09-27 NOTE — PROGRESS NOTES
Pt discharged to home under care of spouse. Had uneventful recovery on care unit. Denies any pain or concerns. Amiodarone script given to pt's wife. Patient armband removed and shredded.

## 2017-09-27 NOTE — IP AVS SNAPSHOT
Yasmany Moraes 
 
 
 [address] [facility] [address] 
[phone] Patient: Genaro Valladares. MRN: [id number] JUX:[birth year] You are allergic to the following No active allergies Recent Documentation Height Weight BMI Smoking Status 1.829 m (!) 205.9 kg 61.57 kg/m2 Former Smoker Emergency Contacts Name Discharge Info Relation Home Work Mobile [address] [facility] CAREGIVER [3] Spouse [3] [phone] About your hospitalization You were admitted on:  September 27, 2017 You last received care in the:  [address] You were discharged on:  September 27, 2017 Unit phone number:  [phone] Why you were hospitalized Your primary diagnosis was:  Not on File Providers Seen During Your Hospitalizations Provider Role Specialty Primary office phone Paula Topete MD Attending Provider Cardiology [phone] Your Primary Care Physician (PCP) Primary Care Physician Office Phone Office Fax [address] [facility],[address] Floor, [address] [phone] Follow-up Information Follow up With Details Comments Contact Info Paula Topete MD   [id number] [address] 
[phone] Warren Zamora [address] [place] [address] 
[phone] Paula Topete MD In 1 week october 4th at 300 pm [id number] [address] 
[phone] Current Discharge Medication List  
  
START taking these medications Dose & Instructions Dispensing Information Comments Morning Noon Evening Bedtime  
 amiodarone 200 mg tablet Commonly known as:  CORDARONE Start taking on:  9/28/2017 Your last dose was: Your next dose is:    
   
   
 Dose:  200 mg Take 1 Tab by mouth daily. Quantity:  90 Tab Refills:  3 CONTINUE these medications which have NOT CHANGED Dose & Instructions Dispensing Information Comments Morning Noon Evening Bedtime  
 dilTIAZem  mg Tb24 tablet Commonly known as:  CARDIZEM LA Your last dose was: Your next dose is:    
   
   
 Dose:  240 mg Take 240 mg by mouth daily. Refills:  0  
     
   
   
   
  
 furosemide 40 mg tablet Commonly known as:  LASIX Your last dose was: Your next dose is:    
   
   
 Dose:  40 mg Take 1 Tab by mouth two (2) times a day. Quantity:  60 Tab Refills:  1 XARELTO 20 mg Tab tablet Generic drug:  rivaroxaban Your last dose was: Your next dose is:    
   
   
 Dose:  20 mg Take 20 mg by mouth daily. Refills:  0 ASK your doctor about these medications Dose & Instructions Dispensing Information Comments Morning Noon Evening Bedtime HYDROcodone-acetaminophen 5-325 mg per tablet Commonly known as:  Alma No Your last dose was: Your next dose is:    
   
   
 Dose:  1-2 Tab Take 1-2 Tabs by mouth every four (4) hours as needed for Pain. Max Daily Amount: 12 Tabs. Quantity:  16 Tab Refills:  0 Where to Get Your Medications Information on where to get these meds will be given to you by the nurse or doctor. ! Ask your nurse or doctor about these medications  
  amiodarone 200 mg tablet Discharge Instructions PATIENT INSTRUCTIONS: 
 
 
F-face looks uneven A-arms unable to move or move unevenly S-speech slurred or non-existent T-time-call 911 as soon as signs and symptoms begin-DO NOT go Back to bed or wait to see if you get better-TIME IS BRAIN.  
 
Warning Signs of HEART ATTACK  
 
 Call 911 if you have these symptoms: 
? Chest discomfort. Most heart attacks involve discomfort in the center of the chest that lasts more than a few minutes, or that goes away and comes back. It can feel like uncomfortable pressure, squeezing, fullness, or pain. ? Discomfort in other areas of the upper body. Symptoms can include pain or discomfort in one or both arms, the back, neck, jaw, or stomach. ? Shortness of breath with or without chest discomfort. ? Other signs may include breaking out in a cold sweat, nausea, or lightheadedness. Don't wait more than five minutes to call [address]! Fast action can save your life. Calling 911 is almost always the fastest way to get lifesaving treatment. Emergency Medical Services staff can begin treatment when they arrive  up to an hour sooner than if someone gets to the hospital by car. The discharge information has been reviewed with the patient and spouse. The patient and spouse verbalized understanding. Discharge medications reviewed with the patient and spouse and appropriate educational materials and side effects teaching were provided. Transesophageal Echocardiogram: What to Expect at [facility] Your Recovery A transesophageal echocardiogram is a test to help your doctor look at the inside of your heart. A small device called a transducer directs sound waves toward your heart. The sound waves make a picture of the heart's valves and chambers. Before the test, your throat was sprayed with medicine to numb it. You won't be able to eat or drink until the numbness wears off. Your throat may be sore for a few days. You may have had a sedative to help you relax. You may be unsteady after having sedation. It can take a few hours for the medicine's effects to wear off. Common side effects include nausea, vomiting, and feeling sleepy or tired.  
This care sheet gives you a general idea about how long it will take for you to recover. But each person recovers at a different pace. Follow the steps below to feel better as quickly as possible. How can you care for yourself at home? Activity · If a sedative was used, your doctor will tell you when it is safe for you to do your normal activities. · For your safety, do not drive or operate any machinery that could be dangerous. Wait until the medicine wears off and you can think clearly and react easily. Diet · You can eat your normal diet. Follow-up care is a key part of your treatment and safety. Be sure to make and go to all appointments, and call your doctor if you are having problems. It's also a good idea to know your test results and keep a list of the medicines you take. When should you call for help? Call your doctor now or seek immediate medical care if: 
· You have new or worse trouble swallowing. · You have pain in your chest. 
· You have worse pain in your throat. · You have bleeding from your mouth. Watch closely for changes in your health, and be sure to contact your doctor if you have any problems. Where can you learn more? Go to http://charo-mirta.info/. Enter J225 in the search box to learn more about \"Transesophageal Echocardiogram: What to Expect at Home. \" Current as of: October 26, 2016 Content Version: 11.3 © 2446-3862 [employer]. Care instructions adapted under license by [employer] (which disclaims liability or warranty for this information). If you have questions about a medical condition or this instruction, always ask your healthcare professional. [place] [address] any warranty or liability for your use of this information. Electrical Cardioversion: What to Expect at [facility] Your Recovery Electrical cardioversion is a treatment for an abnormal heartbeat, such as atrial fibrillation, supraventricular tachycardia, or ventricular tachycardia (VT). It uses a brief electrical shock to reset your heart's rhythm. After cardioversion, you may have redness, like a sunburn, where the patches were. The medicines you got to make you sleepy may make you feel drowsy for the rest of the day. Your doctor may have you take medicines to help the heart beat normally and to prevent blood clots. This care sheet gives you a general idea about how long it will take for you to recover. But each person recovers at a different pace. Follow the steps below to feel better as quickly as possible. How can you care for yourself at home? Medicines · Be safe with medicines. Take your medicines exactly as prescribed. Call your doctor if you think you are having a problem with your medicine. You may take one or more of the following medicines: 
¨ Rate-control medicines to slow the heart rate. These include beta-blockers, calcium channel blockers, and digoxin. ¨ Rhythm control medicines that help the heart keep a normal rhythm. ¨ Blood thinners, also called anticoagulants, which help prevent blood clots. You will get more details on the specific medicines your doctor prescribes. Be sure you know how to take your medicines safely. · Do not take any vitamins, over-the-counter medicines, or herbal products without talking to your doctor first. 
Exercise · Start light exercise if your doctor says that it is okay. Even a small amount will help you get stronger, have more energy, and manage your stress. Walking is an easy way to get exercise. Start out by walking a little more than you did in the hospital. Bit by bit, increase the amount you walk. · When you exercise, watch for signs that your heart is working too hard. You are pushing too hard if you cannot talk while you are exercising. If you become short of breath or dizzy or have chest pain, sit down and rest right away. · Check your pulse regularly.  Place two fingers on the artery at the palm side of your wrist in line with your thumb. If your heartbeat seems uneven or fast, talk to your doctor. Other instructions · Ask your doctor when you can drive again. · Do not smoke. If you need help quitting, talk to your doctor about stop-smoking programs and medicines. These can increase your chances of quitting for good. · Limit alcohol. Follow-up care is a key part of your treatment and safety. Be sure to make and go to all appointments, and call your doctor if you are having problems. It's also a good idea to know your test results and keep a list of the medicines you take. When should you call for help? Call 911 anytime you think you may need emergency care. For example, call if: 
· You have chest pain or pressure. This may occur with: ¨ Sweating. ¨ Shortness of breath. ¨ Nausea or vomiting. ¨ Pain that spreads from the chest to the neck, jaw, or one or both shoulders or arms. ¨ A fast or uneven pulse. After calling 911, the  may tell you to chew 1 adult-strength or 2 to 4 low-dose aspirin. Wait for an ambulance. Do not try to drive yourself. · You have symptoms of a stroke. These may include: 
¨ Sudden numbness, tingling, weakness, or loss of movement in your face, arm, or leg, especially on only one side of your body. ¨ Sudden vision changes. ¨ Sudden trouble speaking. ¨ Sudden confusion or trouble understanding simple statements. ¨ Sudden problems with walking or balance. ¨ A sudden, severe headache that is different from past headaches. · You vomit blood or what looks like coffee grounds. · You pass maroon or very bloody stools. · You passed out (lost consciousness). Call your doctor now or seek immediate medical care if: 
· You feel dizzy or lightheaded, or you feel like you may faint. · Your heart rate becomes irregular. · You have shortness of breath. · You have any unusual bleeding, such as: ¨ Bruises or blood spots under the skin. ¨ A nosebleed that you cannot stop. ¨ Bleeding gums when you brush your teeth. ¨ Blood in your urine. ¨ Vaginal bleeding when you are not having your period, or heavy period bleeding. ¨ Your stools are black and tarlike or have streaks of blood. Watch closely for any changes in your health, and be sure to contact your doctor if: 
· You do not get better as expected. Where can you learn more? Go to http://charo-mirta.info/. Enter A617 in the search box to learn more about \"Electrical Cardioversion: What to Expect at Home. \" Current as of: September 21, 2016 Content Version: 11.3 © 7900-6687 [employer]. Care instructions adapted under license by [employer] (which disclaims liability or warranty for this information). If you have questions about a medical condition or this instruction, always ask your healthcare professional. [address] any warranty or liability for your use of this information. Discharge Orders None Introducing [facility]! Rafael Brown introduces [employer] patient portal. Now you can access parts of your medical record, email your doctor's office, and request medication refills online. 1. In your internet browser, go to [email or web address] 2. Click on the First Time User? Click Here link in the Sign In box. You will see the New Member Sign Up page. 3. Enter your [employer]ix Access Code exactly as it appears below. You will not need to use this code after youve completed the sign-up process. If you do not sign up before the expiration date, you must request a new code. · [employer] Access Code: [email or web address] Expires: 12/26/2017  9:32 AM 
 
4. Enter the last four digits of your Social Security Number (xxxx) and Date of Birth (mm/dd/yyyy) as indicated and click Submit. You will be taken to the next sign-up page. 5. Create a Simple[employer] ID. This will be your [employer] login ID and cannot be changed, so think of one that is secure and easy to remember. 6. Create a [employer] password. You can change your password at any time. 7. Enter your Password Reset Question and Answer. This can be used at a later time if you forget your password. 8. Enter your e-mail address. You will receive e-mail notification when new information is available in [address] 9. Click Sign Up. You can now view and download portions of your medical record. 10. Click the Download Summary menu link to download a portable copy of your medical information. If you have questions, please visit the Frequently Asked Questions section of the [employer] website. Remember, [employer] is NOT to be used for urgent needs. For medical emergencies, dial 911. Now available from your iPhone and Android! General Information Please provide this summary of care documentation to your next provider. Patient Signature:  ____________________________________________________________ Date:  ____________________________________________________________  
  
Maegan Lyn Provider Signature:  ____________________________________________________________ Date:  ____________________________________________________________

## 2017-10-03 LAB
ATRIAL RATE: 70 BPM
CALCULATED P AXIS, ECG09: 65 DEGREES
CALCULATED R AXIS, ECG10: 27 DEGREES
CALCULATED T AXIS, ECG11: 48 DEGREES
DIAGNOSIS, 93000: NORMAL
P-R INTERVAL, ECG05: 200 MS
Q-T INTERVAL, ECG07: 424 MS
QRS DURATION, ECG06: 104 MS
QTC CALCULATION (BEZET), ECG08: 457 MS
VENTRICULAR RATE, ECG03: 70 BPM

## 2017-11-06 ENCOUNTER — HOSPITAL ENCOUNTER (OUTPATIENT)
Dept: PREADMISSION TESTING | Age: 42
Discharge: HOME | End: 2017-11-06
Payer: MEDICARE

## 2017-11-06 ENCOUNTER — OFFICE VISIT (OUTPATIENT)
Dept: SURGERY | Age: 42
End: 2017-11-06

## 2017-11-06 VITALS
HEIGHT: 72 IN | DIASTOLIC BLOOD PRESSURE: 76 MMHG | HEART RATE: 63 BPM | RESPIRATION RATE: 16 BRPM | BODY MASS INDEX: 42.66 KG/M2 | SYSTOLIC BLOOD PRESSURE: 139 MMHG | OXYGEN SATURATION: 99 % | WEIGHT: 315 LBS

## 2017-11-06 DIAGNOSIS — Z86.79 HISTORY OF ATRIAL FLUTTER: ICD-10-CM

## 2017-11-06 DIAGNOSIS — E66.01 MORBID OBESITY (HCC): ICD-10-CM

## 2017-11-06 DIAGNOSIS — Z01.812 BLOOD TESTS PRIOR TO TREATMENT OR PROCEDURE: ICD-10-CM

## 2017-11-06 DIAGNOSIS — E66.01 MORBID OBESITY WITH BODY MASS INDEX OF 60.0-69.9 IN ADULT (HCC): ICD-10-CM

## 2017-11-06 DIAGNOSIS — N18.2 CHRONIC RENAL INSUFFICIENCY, STAGE II (MILD): ICD-10-CM

## 2017-11-06 DIAGNOSIS — E66.01 MORBID OBESITY (HCC): Primary | ICD-10-CM

## 2017-11-06 DIAGNOSIS — Z74.09 LIMITED MOBILITY: ICD-10-CM

## 2017-11-06 DIAGNOSIS — E66.01 MORBID OBESITY WITH BODY MASS INDEX OF 60.0-69.9 IN ADULT (HCC): Primary | ICD-10-CM

## 2017-11-06 DIAGNOSIS — G43.919 INTRACTABLE MIGRAINE, UNSPECIFIED MIGRAINE TYPE: ICD-10-CM

## 2017-11-06 DIAGNOSIS — G47.30 SLEEP APNEA, UNSPECIFIED TYPE: ICD-10-CM

## 2017-11-06 DIAGNOSIS — M19.90 ARTHRITIS: ICD-10-CM

## 2017-11-06 LAB
ALBUMIN SERPL-MCNC: 3.7 G/DL (ref 3.4–5)
ALBUMIN/GLOB SERPL: 0.8 {RATIO} (ref 0.8–1.7)
ALP SERPL-CCNC: 91 U/L (ref 45–117)
ALT SERPL-CCNC: 41 U/L (ref 16–61)
ANION GAP SERPL CALC-SCNC: 8 MMOL/L (ref 3–18)
AST SERPL-CCNC: 32 U/L (ref 15–37)
BASOPHILS # BLD: 0 K/UL (ref 0–0.06)
BASOPHILS NFR BLD: 1 % (ref 0–2)
BILIRUB SERPL-MCNC: 0.4 MG/DL (ref 0.2–1)
BUN SERPL-MCNC: 8 MG/DL (ref 7–18)
BUN/CREAT SERPL: 7 (ref 12–20)
CALCIUM SERPL-MCNC: 9.1 MG/DL (ref 8.5–10.1)
CHLORIDE SERPL-SCNC: 103 MMOL/L (ref 100–108)
CO2 SERPL-SCNC: 31 MMOL/L (ref 21–32)
CREAT SERPL-MCNC: 1.16 MG/DL (ref 0.6–1.3)
DIFFERENTIAL METHOD BLD: NORMAL
EOSINOPHIL # BLD: 0.2 K/UL (ref 0–0.4)
EOSINOPHIL NFR BLD: 4 % (ref 0–5)
ERYTHROCYTE [DISTWIDTH] IN BLOOD BY AUTOMATED COUNT: 12.5 % (ref 11.6–14.5)
GLOBULIN SER CALC-MCNC: 4.6 G/DL (ref 2–4)
GLUCOSE SERPL-MCNC: 76 MG/DL (ref 74–99)
HCT VFR BLD AUTO: 42.6 % (ref 36–48)
HGB BLD-MCNC: 14.6 G/DL (ref 13–16)
LYMPHOCYTES # BLD: 2.5 K/UL (ref 0.9–3.6)
LYMPHOCYTES NFR BLD: 41 % (ref 21–52)
MCH RBC QN AUTO: 30.2 PG (ref 24–34)
MCHC RBC AUTO-ENTMCNC: 34.3 G/DL (ref 31–37)
MCV RBC AUTO: 88.2 FL (ref 74–97)
MONOCYTES # BLD: 0.4 K/UL (ref 0.05–1.2)
MONOCYTES NFR BLD: 7 % (ref 3–10)
NEUTS SEG # BLD: 2.9 K/UL (ref 1.8–8)
NEUTS SEG NFR BLD: 47 % (ref 40–73)
PLATELET # BLD AUTO: 166 K/UL (ref 135–420)
PMV BLD AUTO: 10.4 FL (ref 9.2–11.8)
POTASSIUM SERPL-SCNC: 3.9 MMOL/L (ref 3.5–5.5)
PROT SERPL-MCNC: 8.3 G/DL (ref 6.4–8.2)
RBC # BLD AUTO: 4.83 M/UL (ref 4.7–5.5)
SODIUM SERPL-SCNC: 142 MMOL/L (ref 136–145)
WBC # BLD AUTO: 6.1 K/UL (ref 4.6–13.2)

## 2017-11-06 PROCEDURE — 85025 COMPLETE CBC W/AUTO DIFF WBC: CPT | Performed by: SPECIALIST

## 2017-11-06 PROCEDURE — 93005 ELECTROCARDIOGRAM TRACING: CPT

## 2017-11-06 PROCEDURE — 36415 COLL VENOUS BLD VENIPUNCTURE: CPT | Performed by: SPECIALIST

## 2017-11-06 PROCEDURE — 80053 COMPREHEN METABOLIC PANEL: CPT | Performed by: SPECIALIST

## 2017-11-06 RX ORDER — PHENOL/SODIUM PHENOLATE
20 AEROSOL, SPRAY (ML) MUCOUS MEMBRANE DAILY
Qty: 30 TAB | Refills: 1 | Status: ON HOLD | OUTPATIENT
Start: 2017-11-06 | End: 2018-10-15

## 2017-11-06 RX ORDER — OXYCODONE AND ACETAMINOPHEN 5; 325 MG/1; MG/1
1 TABLET ORAL
Qty: 30 TAB | Refills: 0 | Status: ON HOLD | OUTPATIENT
Start: 2017-11-06 | End: 2018-10-15

## 2017-11-06 RX ORDER — ENOXAPARIN SODIUM 100 MG/ML
40 INJECTION SUBCUTANEOUS EVERY 12 HOURS
Qty: 28 SYRINGE | Refills: 0 | Status: SHIPPED | OUTPATIENT
Start: 2017-11-06 | End: 2017-11-20

## 2017-11-06 NOTE — PROGRESS NOTES
Appears to have a good understanding of the diet progression, food choices, and dietary/exercise habits for successful weight loss and nourishment after surgery. The class material included: post-op diet progression, including liquid, pureed, and low fat, low sugar food recommendations; proper food group choices, and encouraging dietary and exercise habits that lead to weight loss success.      Brit Purvis, RD

## 2017-11-06 NOTE — PROGRESS NOTES
Sleeve Gastrectomy - History and Physical    Subjective: The patient is a 38 y.o. obese male with a Body mass index is 63.88 kg/(m^2). .   he presents now to review their work up to date to see if they are a candidate for surgery and whether or not to proceed with the previously requested procedure. Bariatric comorbidities continue to include:   Patient Active Problem List   Diagnosis Code    Cellulitis of left lower leg L03. 116    Anemia D64.9    Morbid obesity ([facility]) E66.01    Dyspnea R06.00    Sleep apnea G47.30    Sinus tachycardia R00.0    Acute respiratory failure with hypoxia and hypercarbia ([facility]) J96.01, J96.02    Morbid obesity with body mass index of 60.0-69.9 in adult ([facility]) E66.01, Z68.44    Chronic back pain M54.9, G89.29    Arthritis M19.90    Migraine G43.909    History of atrial flutter Z86.79    Limited mobility Z74.09    Smoking F17.200    Chronic renal insufficiency, stage II (mild) N18.2       They have been generally well prior to this visit and have had no recent significant illnesses. The patient has had no gastrointestinal issues that would preclude them from proceeding with the surgery they have chosen. Sabina Matthews. has recently tried a preoperative weight loss program  in addition to seeing a bariatric nutritionist preoperatively. We have discussed on at least one other occasion about the various types of surgical weight loss procedures and they have considered these options after our initial consultation. We have once again discussed these procedures in detail and they have now decided on a surgical procedure. They present today to discuss this and confirm that their evaluation pre operatively is acceptable to continue with surgery. The patient desires laparoscopic sleeve gastrectomy for surgical weight loss. The patients goal weight is 200lb.  (this represents a BMI of 27)    Patient Active Problem List    Diagnosis Date Noted    Chronic renal insufficiency, stage II (mild)     Morbid obesity with body mass index of 60.0-69.9 in adult [facility])     Chronic back pain     Arthritis     Migraine     History of atrial flutter     Limited mobility     Smoking     Cellulitis of left lower leg 04/04/2016    Anemia 04/04/2016    Morbid obesity ([address] 04/04/2016    Dyspnea 04/04/2016    Sleep apnea 04/04/2016    Sinus tachycardia 04/04/2016    Acute respiratory failure with hypoxia and hypercarbia ([facility]) 04/04/2016     Past Surgical History:   Procedure Laterality Date    CARDIAC SURG PROCEDURE UNLIST      cardioversion    ECHOCARDIOGRAM  04/2016    EF 60-65% (performed at [facility] - see cardiology tab)      Social History   Substance Use Topics    Smoking status: Former Smoker     Types: Cigars    Smokeless tobacco: Never Used    Alcohol use No      Comment: occasionally      Family History   Problem Relation Age of Onset    Obesity Mother     Liver Disease Sister       Current Outpatient Prescriptions   Medication Sig Dispense Refill    oxyCODONE-acetaminophen (PERCOCET) 5-325 mg per tablet Take 1 Tab by mouth every four (4) hours as needed for Pain. Max Daily Amount: 6 Tabs. 30 Tab 0    Omeprazole delayed release (PRILOSEC D/R) 20 mg tablet Take 1 Tab by mouth daily. OTC 30 Tab 1    enoxaparin (LOVENOX) 40 mg/0.4 mL 0.4 mL by SubCUTAneous route every twelve (12) hours every twelve (12) hours for 14 days. 28 Syringe 0    rivaroxaban (XARELTO) 20 mg tab tablet Take 20 mg by mouth daily.  amiodarone (CORDARONE) 200 mg tablet Take 1 Tab by mouth daily. 90 Tab 3    dilTIAZem ER (CARDIZEM LA) 240 mg Tb24 tablet Take 240 mg by mouth daily.  furosemide (LASIX) 40 mg tablet Take 1 Tab by mouth two (2) times a day.  60 Tab 1     No Known Allergies     Review of Systems:     General - No history or complaints of unexpected fever, chills, or weight loss  Head/Neck - No history or complaints of headache, diplopia, dysphagia, hearing loss  Cardiac - No history or complaints of chest pain, palpitations, murmur, or shortness of breath  Pulmonary - No history or complaints of shortness of breath, productive cough, hemoptysis  Gastrointestinal - mild reflux,no  abdominal pain, obstipation/constipation or blood per rectum  Genitourinary - No history or complaints of hematuria/dysuria, stress urinary incontinence symptoms, or renal lithiasis  Musculoskeletal - severe joint pain in their knees,  no muscular weakness  Hematologic - No history or complaints of bleeding disorders,  No blood transfusions  Neurologic - No history or complaints of  migraine headaches, seizure activity, syncopal episodes, TIA or stroke  Integumentary - No history or complaints of rashes, abnormal nevi, skin cancer    Objective:     Visit Vitals    /76 (BP 1 Location: Left arm, BP Patient Position: Sitting)    Pulse 63    Resp 16    Ht 6' (1.829 m)    Wt (!) 213.6 kg (471 lb)    SpO2 99%    BMI 63.88 kg/m2     Physical Examination:   General appearance - alert, well appearing, and in no distress and oriented to person, place, and time  Mental status - alert, oriented to person, place, and time, normal mood, behavior, speech, dress, motor activity, and thought processes  Eyes - pupils equal and reactive, extraocular eye movements intact, sclera anicteric, left eye normal, right eye normal  Ears - right ear normal, left ear normal  Nose - normal and patent, no erythema, discharge or polyps  Mouth - mucous membranes moist, pharynx normal without lesions  Neck - supple, no significant adenopathy  Lymphatics - no palpable lymphadenopathy, no hepatosplenomegaly  Chest - clear to auscultation, no wheezes, rales or rhonchi, symmetric air entry  Heart - normal rate, regular rhythm, normal S1, S2, no murmurs, rubs, clicks or gallops  Abdomen - soft, nontender, nondistended, no masses or organomegaly, massive central obesity  Back exam - full range of motion, no tenderness, palpable spasm or pain on motion  Neurological - alert, oriented, normal speech, no focal findings or movement disorder noted  Musculoskeletal - severe varus deformity of both knees with limited ROM  Extremities - peripheral pulses normal, moderate pedal edema, severe venous stasis changes  Skin - normal coloration and turgor, no rashes, no suspicious skin lesions noted    Labs / Preoperative Evaluation:        Recent Results (from the past 1008 hour(s))   EKG, 12 LEAD, SUBSEQUENT    Collection Time: 09/27/17 12:54 PM   Result Value Ref Range    Ventricular Rate 70 BPM    Atrial Rate 70 BPM    P-R Interval 200 ms    QRS Duration 104 ms    Q-T Interval 424 ms    QTC Calculation (Bezet) 457 ms    Calculated P Axis 65 degrees    Calculated R Axis 27 degrees    Calculated T Axis 48 degrees    Diagnosis       Normal sinus rhythm  Normal ECG  When compared with ECG of 09-JUN-2017 19:27,  Sinus rhythm has replaced Atrial flutter  ST no longer depressed in Inferior leads  ST no longer depressed in Anterolateral leads  T wave inversion no longer evident in Inferior leads  Nonspecific T wave abnormality no longer evident in Anterior leads  Confirmed by Apple Baltazar MD. ([id number]) on 10/3/2017 11:58:22 PM     EKG, 12 LEAD, INITIAL    Collection Time: 11/06/17 12:05 PM   Result Value Ref Range    Ventricular Rate 59 BPM    Atrial Rate 59 BPM    P-R Interval 170 ms    QRS Duration 104 ms    Q-T Interval 428 ms    QTC Calculation (Bezet) 423 ms    Calculated P Axis 49 degrees    Calculated R Axis 42 degrees    Calculated T Axis 69 degrees    Diagnosis       Sinus bradycardia  Otherwise normal ECG  When compared with ECG of 27-SEP-2017 12:54,  No significant change was found     CBC WITH AUTOMATED DIFF    Collection Time: 11/06/17 12:12 PM   Result Value Ref Range    WBC 6.1 4.6 - 13.2 K/uL    RBC 4.83 4.70 - 5.50 M/uL    HGB 14.6 13.0 - 16.0 g/dL    HCT 42.6 36.0 - 48.0 %    MCV 88.2 74.0 - 97.0 FL    MCH 30.2 24.0 - 34.0 PG    MCHC 34.3 31.0 - 37.0 g/dL    RDW 12.5 11.6 - 14.5 %    PLATELET 054 131 - 338 K/uL    MPV 10.4 9.2 - 11.8 FL    NEUTROPHILS 47 40 - 73 %    LYMPHOCYTES 41 21 - 52 %    MONOCYTES 7 3 - 10 %    EOSINOPHILS 4 0 - 5 %    BASOPHILS 1 0 - 2 %    ABS. NEUTROPHILS 2.9 1.8 - 8.0 K/UL    ABS. LYMPHOCYTES 2.5 0.9 - 3.6 K/UL    ABS. MONOCYTES 0.4 0.05 - 1.2 K/UL    ABS. EOSINOPHILS 0.2 0.0 - 0.4 K/UL    ABS. BASOPHILS 0.0 0.0 - 0.06 K/UL    DF AUTOMATED     METABOLIC PANEL, COMPREHENSIVE    Collection Time: 11/06/17 12:12 PM   Result Value Ref Range    Sodium 142 136 - 145 mmol/L    Potassium 3.9 3.5 - 5.5 mmol/L    Chloride 103 100 - 108 mmol/L    CO2 31 21 - 32 mmol/L    Anion gap 8 3.0 - 18 mmol/L    Glucose 76 74 - 99 mg/dL    BUN 8 7.0 - 18 MG/DL    Creatinine 1.16 0.6 - 1.3 MG/DL    BUN/Creatinine ratio 7 (L) 12 - 20      GFR est AA >60 >60 ml/min/1.73m2    GFR est non-AA >60 >60 ml/min/1.73m2    Calcium 9.1 8.5 - 10.1 MG/DL    Bilirubin, total 0.4 0.2 - 1.0 MG/DL    ALT (SGPT) 41 16 - 61 U/L    AST (SGOT) 32 15 - 37 U/L    Alk. phosphatase 91 45 - 117 U/L    Protein, total 8.3 (H) 6.4 - 8.2 g/dL    Albumin 3.7 3.4 - 5.0 g/dL    Globulin 4.6 (H) 2.0 - 4.0 g/dL    A-G Ratio 0.8 0.8 - 1.7         Assessment:     Morbid obesity with comorbidity    Plan:     laparoscopic sleeve gastrectomy    This is a 38 y.o. male with a BMI of Body mass index is 63.88 kg/(m^2). and the weight-related co-morbidties as noted above. Cesar Garcia meets the NIH criteria for bariatric surgery based upon the BMI of Body mass index is 63.88 kg/(m^2). and multiple weight-related co-morbidties. Cesarlele Garcia has elected laparoscopic sleeve gastrectomy as his intervention of choice for treatment of morbid obestiy through surgical means secondary to its safety profile, rapid return to work  and decreases in operative risks over gastric bypass.     In the office today, following Almas's history and physical examination, a 40 minute discussion regarding the anatomic alterations for the laparoscopic sleeve gastrectomy was undertaken. The dietary expectations and the patient  dependent factors for success were thoroughly discussed, to include the need for interval follow-up and long-term dietary changes associated with success. The possible complications of the sleeve gastrectomy  were also discussed, to include;death, DVT/PE, staple line leak, bleeding, stricture formation, infection, nutritional deficiencies and sleeve dilation. Specific weight related outcomes for success were also discussed with an emphasis on careful and close follow-up with the first year and eating behavior modification as the baseline and cyclical hunger return. The patient expressed an understanding of the above factors, and his questions were answered in their entirety. In addition, the patient attended a 1.5 hour power point seminar regarding obesity, surgical weight loss including, adjustable gastric band, gastric bypass, and sleeve gastrectomy. This discussion contrasted the different surgical techniques, mechanisms of actions and expected outcomes, and surgical and medical risks associated with each procedure. During this seminar, there was a long question and answer session where each questions was answered until there were no additional questions. Today, the patient had all of his questions answered and the decision was made today that the patient's preoperative evaluation is acceptable for them  to proceed with bariatric surgery  choosing the sleeve gastrectomy as his surgical option. The patient understands the plan of action    Since the patient's original consult 6.5 months ago they have been seen by their cardiologist for a cardiac clearance and a visit to [facility] ER for a leg cellulitis (seen and released). There has been no change to their overall medical or surgical history and they have been on no steroids in any form.     He has lost 37 lbs over the past 6.5 months    His pre-op UGI was normal    Renal function WNL as above    He understands the need for lovenox 2 weeks post-op given BMI of 63.88    Secondary Diagnoses:     DVT / Pulmonary Embolus Risk - The patient is at a higher risk for post operative DVT / pulmonary embolus secondary to their morbid obese status, relative sedentary lifestyle, and impending general anesthetic. We will plan to use anticoagulation therapy pre and post operative as well as  pneumatic compression devices and encourage ambulation once on the hospital nursing floor. The need for possible at home anticoagulation therapy has also been discussed and any decision on this matter will be made during post operative evaluations. The patient understands that their efforts at ambulation are of vital importance to reduce the risk of this complication thus placing significant burden on them as to the prevention of such issues. Signs and symptoms of DVT / PE have been discussed with the patient and they have been instructed to call the office if any these occur in the \"at home\" post op phase. Will use Lovenox for 2 weeks post op.     Coronary Artery Disease - The patient has undergone or will undergo a preoperative evaluation by a cardiologist such that they are deemed a reasonable candidate for surgery. The patient understands that with a history of cardiac disease that there is always an increased risk compared to the average patient. Appropriate recommendations have been followed as recommended by the cardiologist.  The patients ASA will be resumed approximately 1 month postoperatively in a coated form.     Obstructive Sleep Apnea -The patient understands the association of sleep apnea and obesity and the additional risk that it caries related to post surgical complications. If they have not been tested for sleep apnea and I feel they are at increased risk for this diagnosis, then they will be scheduled for a consultation with a Pulmonologist for such.  In the event that they kyaw this diagnosis we will have the patient bring their CPAP machine to the hospital for use both postoperatively in the PACU and on the floor at its appropriate setting.  We will have them continue using it while at home after surgery and follow up with their pulmonologist 6 months after to be retested to see if it can be discontinued at that time period.     Weight Related Arthritis -The patient understands the benefits that weight loss surgery can have on their arthritis but also understands that weight loss is not a guaranteed cure and relief of symptoms is often dependent on the severity of the underlying disease.  The patient also understands that traditional pharmaceutical treatments for this diagnosis are usually unavailable to post-operative weight loss patients due to the effects on the gastrointestinal tract particularly with the gastric bypass and to a lesser effect with the sleeve gastrectomy.  Any changes to the patients medication treatment will ultimately be made the patients PCP with input by our office.     GERD -The patient understands that weight loss surgery is not a guaranteed cure for reflux disease but does understand the benefits that weight loss can have on reflux disease.  They also understand that at the time of surgery the gastroesophageal junction will be evaluated for the presence of a diaphragmatic hernia.  Hernias will be corrected always with the gastric band and sleeve gastrectomy procedures, but only on a case by case basis with the gastric bypass if it prevents our ability to perform the operation at hand, or if I feel that they would benefit long term with correction of this issue.  The patient also understands that neither weight loss surgery nor repair of a diaphragmatic hernia repair guarantees the complete cessation of the disease. They also understand there is a possibility of recurrence with a simple crural repair as is performed with these procedures.  They understand they may have to continue their medications in the postoperative period.  They have a good understanding that the gastric bypass procedure is better suited to total resolution of this issue and that neither the Lap Band nor sleeve gastrectomy is considered a curative procedure as it pertains to this diagnosis.       Signed By: Minor Benoit MD     November 6, 2017

## 2017-11-06 NOTE — PATIENT INSTRUCTIONS
Body Mass Index: Care Instructions  Your Care Instructions    Body mass index (BMI) can help you see if your weight is raising your risk for health problems. It uses a formula to compare how much you weigh with how tall you are. · A BMI lower than 18.5 is considered underweight. · A BMI between 18.5 and 24.9 is considered healthy. · A BMI between 25 and 29.9 is considered overweight. A BMI of 30 or higher is considered obese. If your BMI is in the normal range, it means that you have a lower risk for weight-related health problems. If your BMI is in the overweight or obese range, you may be at increased risk for weight-related health problems, such as high blood pressure, heart disease, stroke, arthritis or joint pain, and diabetes. If your BMI is in the underweight range, you may be at increased risk for health problems such as fatigue, lower protection (immunity) against illness, muscle loss, bone loss, hair loss, and hormone problems. BMI is just one measure of your risk for weight-related health problems. You may be at higher risk for health problems if you are not active, you eat an unhealthy diet, or you drink too much alcohol or use tobacco products. Follow-up care is a key part of your treatment and safety. Be sure to make and go to all appointments, and call your doctor if you are having problems. It's also a good idea to know your test results and keep a list of the medicines you take. How can you care for yourself at home? · Practice healthy eating habits. This includes eating plenty of fruits, vegetables, whole grains, lean protein, and low-fat dairy. · If your doctor recommends it, get more exercise. Walking is a good choice. Bit by bit, increase the amount you walk every day. Try for at least 30 minutes on most days of the week. · Do not smoke. Smoking can increase your risk for health problems. If you need help quitting, talk to your doctor about stop-smoking programs and medicines. These can increase your chances of quitting for good. · Limit alcohol to 2 drinks a day for men and 1 drink a day for women. Too much alcohol can cause health problems. If you have a BMI higher than 25  · Your doctor may do other tests to check your risk for weight-related health problems. This may include measuring the distance around your waist. A waist measurement of more than 40 inches in men or 35 inches in women can increase the risk of weight-related health problems. · Talk with your doctor about steps you can take to stay healthy or improve your health. You may need to make lifestyle changes to lose weight and stay healthy, such as changing your diet and getting regular exercise. If you have a BMI lower than 18.5  · Your doctor may do other tests to check your risk for health problems. · Talk with your doctor about steps you can take to stay healthy or improve your health. You may need to make lifestyle changes to gain or maintain weight and stay healthy, such as getting more healthy foods in your diet and doing exercises to build muscle. Where can you learn more? Go to http://charo-mirta.info/. Enter S176 in the search box to learn more about \"Body Mass Index: Care Instructions. \"  Current as of: October 13, 2016  Content Version: 11.4  © 0870-1634 [employer]. Care instructions adapted under license by [employer] (which disclaims liability or warranty for this information). If you have questions about a medical condition or this instruction, always ask your healthcare professional. [address] any warranty or liability for your use of this information.

## 2017-11-07 LAB
ATRIAL RATE: 59 BPM
CALCULATED P AXIS, ECG09: 49 DEGREES
CALCULATED R AXIS, ECG10: 42 DEGREES
CALCULATED T AXIS, ECG11: 69 DEGREES
DIAGNOSIS, 93000: NORMAL
P-R INTERVAL, ECG05: 170 MS
Q-T INTERVAL, ECG07: 428 MS
QRS DURATION, ECG06: 104 MS
QTC CALCULATION (BEZET), ECG08: 423 MS
VENTRICULAR RATE, ECG03: 59 BPM

## 2017-11-13 ENCOUNTER — HOSPITAL ENCOUNTER (OUTPATIENT)
Dept: PREADMISSION TESTING | Age: 42
Discharge: HOME | End: 2017-11-13

## 2017-11-13 VITALS — BODY MASS INDEX: 42.66 KG/M2 | HEIGHT: 72 IN | WEIGHT: 315 LBS

## 2017-11-13 RX ORDER — DILTIAZEM HYDROCHLORIDE 300 MG/1
300 CAPSULE, COATED, EXTENDED RELEASE ORAL DAILY
Status: ON HOLD | COMMUNITY
End: 2018-10-15

## 2017-11-13 RX ORDER — SODIUM CHLORIDE, SODIUM LACTATE, POTASSIUM CHLORIDE, CALCIUM CHLORIDE 600; 310; 30; 20 MG/100ML; MG/100ML; MG/100ML; MG/100ML
125 INJECTION, SOLUTION INTRAVENOUS CONTINUOUS
Status: CANCELLED | OUTPATIENT
Start: 2017-11-13

## 2018-10-15 ENCOUNTER — HOSPITAL ENCOUNTER (INPATIENT)
Age: 43
LOS: 7 days | Discharge: ANOTHER HOSPITAL | DRG: 291 | End: 2018-10-22
Attending: EMERGENCY MEDICINE | Admitting: HOSPITALIST
Payer: MEDICARE

## 2018-10-15 ENCOUNTER — APPOINTMENT (OUTPATIENT)
Dept: GENERAL RADIOLOGY | Age: 43
DRG: 291 | End: 2018-10-15
Attending: PHYSICIAN ASSISTANT
Payer: MEDICARE

## 2018-10-15 DIAGNOSIS — R00.0 SINUS TACHYCARDIA: ICD-10-CM

## 2018-10-15 DIAGNOSIS — I50.9 CONGESTIVE HEART FAILURE, UNSPECIFIED HF CHRONICITY, UNSPECIFIED HEART FAILURE TYPE (HCC): Primary | ICD-10-CM

## 2018-10-15 DIAGNOSIS — J96.02 ACUTE RESPIRATORY FAILURE WITH HYPOXIA AND HYPERCARBIA (HCC): ICD-10-CM

## 2018-10-15 DIAGNOSIS — E66.01 MORBID OBESITY WITH BODY MASS INDEX OF 60.0-69.9 IN ADULT (HCC): ICD-10-CM

## 2018-10-15 DIAGNOSIS — J96.01 ACUTE RESPIRATORY FAILURE WITH HYPOXIA AND HYPERCARBIA (HCC): ICD-10-CM

## 2018-10-15 DIAGNOSIS — R09.02 HYPOXIA: ICD-10-CM

## 2018-10-15 DIAGNOSIS — Z86.79 HISTORY OF ATRIAL FLUTTER: ICD-10-CM

## 2018-10-15 DIAGNOSIS — G47.30 SLEEP APNEA, UNSPECIFIED TYPE: ICD-10-CM

## 2018-10-15 PROBLEM — I48.92 ATRIAL FLUTTER (HCC): Chronic | Status: ACTIVE | Noted: 2018-10-15

## 2018-10-15 PROBLEM — I50.31 ACUTE DIASTOLIC CHF (CONGESTIVE HEART FAILURE) (HCC): Status: ACTIVE | Noted: 2018-10-15

## 2018-10-15 PROBLEM — I48.20 CHRONIC A-FIB (HCC): Status: ACTIVE | Noted: 2018-10-15

## 2018-10-15 LAB
ALBUMIN SERPL-MCNC: 3.7 G/DL (ref 3.4–5)
ALBUMIN/GLOB SERPL: 1 {RATIO} (ref 0.8–1.7)
ALP SERPL-CCNC: 70 U/L (ref 45–117)
ALT SERPL-CCNC: 52 U/L (ref 16–61)
ANION GAP SERPL CALC-SCNC: 8 MMOL/L (ref 3–18)
ARTERIAL PATENCY WRIST A: ABNORMAL
ARTERIAL PATENCY WRIST A: ABNORMAL
AST SERPL-CCNC: 45 U/L (ref 15–37)
BASE EXCESS BLD CALC-SCNC: 12 MMOL/L
BASE EXCESS BLDV CALC-SCNC: 16 MMOL/L
BASOPHILS # BLD: 0 K/UL (ref 0–0.1)
BASOPHILS NFR BLD: 0 % (ref 0–2)
BDY SITE: ABNORMAL
BDY SITE: ABNORMAL
BILIRUB SERPL-MCNC: 0.6 MG/DL (ref 0.2–1)
BNP SERPL-MCNC: 177 PG/ML (ref 0–450)
BODY TEMPERATURE: 98.7
BUN SERPL-MCNC: 12 MG/DL (ref 7–18)
BUN/CREAT SERPL: 7 (ref 12–20)
CALCIUM SERPL-MCNC: 9.1 MG/DL (ref 8.5–10.1)
CHLORIDE SERPL-SCNC: 101 MMOL/L (ref 100–108)
CK MB CFR SERPL CALC: 0.8 % (ref 0–4)
CK MB CFR SERPL CALC: 0.8 % (ref 0–4)
CK MB SERPL-MCNC: 10.1 NG/ML (ref 5–25)
CK MB SERPL-MCNC: 9.2 NG/ML (ref 5–25)
CK SERPL-CCNC: 1136 U/L (ref 39–308)
CK SERPL-CCNC: 1236 U/L (ref 39–308)
CO2 SERPL-SCNC: 34 MMOL/L (ref 21–32)
CREAT SERPL-MCNC: 1.64 MG/DL (ref 0.6–1.3)
DIFFERENTIAL METHOD BLD: ABNORMAL
EOSINOPHIL # BLD: 0.1 K/UL (ref 0–0.4)
EOSINOPHIL NFR BLD: 1 % (ref 0–5)
ERYTHROCYTE [DISTWIDTH] IN BLOOD BY AUTOMATED COUNT: 12.9 % (ref 11.6–14.5)
GAS FLOW.O2 O2 DELIVERY SYS: ABNORMAL L/MIN
GAS FLOW.O2 O2 DELIVERY SYS: ABNORMAL L/MIN
GAS FLOW.O2 SETTING OXYMISER: 2 L/M
GLOBULIN SER CALC-MCNC: 3.7 G/DL (ref 2–4)
GLUCOSE SERPL-MCNC: 110 MG/DL (ref 74–99)
HCO3 BLD-SCNC: 37 MMOL/L (ref 22–26)
HCO3 BLDV-SCNC: 39.4 MMOL/L (ref 23–28)
HCT VFR BLD AUTO: 48.2 % (ref 36–48)
HGB BLD-MCNC: 15.8 G/DL (ref 13–16)
LYMPHOCYTES # BLD: 1.5 K/UL (ref 0.9–3.6)
LYMPHOCYTES NFR BLD: 33 % (ref 21–52)
MAGNESIUM SERPL-MCNC: 1.7 MG/DL (ref 1.6–2.6)
MCH RBC QN AUTO: 30 PG (ref 24–34)
MCHC RBC AUTO-ENTMCNC: 32.8 G/DL (ref 31–37)
MCV RBC AUTO: 91.6 FL (ref 74–97)
MONOCYTES # BLD: 0.5 K/UL (ref 0.05–1.2)
MONOCYTES NFR BLD: 11 % (ref 3–10)
NEUTS SEG # BLD: 2.5 K/UL (ref 1.8–8)
NEUTS SEG NFR BLD: 55 % (ref 40–73)
O2/TOTAL GAS SETTING VFR VENT: 0.28 %
O2/TOTAL GAS SETTING VFR VENT: 21 %
PCO2 BLD: 61.6 MMHG (ref 35–45)
PCO2 BLDV: 52.8 MMHG (ref 41–51)
PH BLD: 7.39 [PH] (ref 7.35–7.45)
PH BLDV: 7.48 [PH] (ref 7.32–7.42)
PLATELET # BLD AUTO: 157 K/UL (ref 135–420)
PMV BLD AUTO: 10.7 FL (ref 9.2–11.8)
PO2 BLD: 62 MMHG (ref 80–100)
PO2 BLDV: 47 MMHG (ref 25–40)
POTASSIUM SERPL-SCNC: 3.5 MMOL/L (ref 3.5–5.5)
PROT SERPL-MCNC: 7.4 G/DL (ref 6.4–8.2)
RBC # BLD AUTO: 5.26 M/UL (ref 4.7–5.5)
SAO2 % BLD: 90 % (ref 92–97)
SAO2 % BLDV: 84 % (ref 65–88)
SERVICE CMNT-IMP: ABNORMAL
SERVICE CMNT-IMP: ABNORMAL
SODIUM SERPL-SCNC: 143 MMOL/L (ref 136–145)
SPECIMEN TYPE: ABNORMAL
SPECIMEN TYPE: ABNORMAL
TOTAL RESP. RATE, ITRR: 20
TROPONIN I SERPL-MCNC: 0.07 NG/ML (ref 0–0.06)
TROPONIN I SERPL-MCNC: 0.07 NG/ML (ref 0–0.06)
WBC # BLD AUTO: 4.6 K/UL (ref 4.6–13.2)

## 2018-10-15 PROCEDURE — 99285 EMERGENCY DEPT VISIT HI MDM: CPT

## 2018-10-15 PROCEDURE — 65610000006 HC RM INTENSIVE CARE

## 2018-10-15 PROCEDURE — 82550 ASSAY OF CK (CPK): CPT | Performed by: EMERGENCY MEDICINE

## 2018-10-15 PROCEDURE — 93005 ELECTROCARDIOGRAM TRACING: CPT

## 2018-10-15 PROCEDURE — 5A09557 ASSISTANCE WITH RESPIRATORY VENTILATION, GREATER THAN 96 CONSECUTIVE HOURS, CONTINUOUS POSITIVE AIRWAY PRESSURE: ICD-10-PCS | Performed by: HOSPITALIST

## 2018-10-15 PROCEDURE — 85025 COMPLETE CBC W/AUTO DIFF WBC: CPT | Performed by: EMERGENCY MEDICINE

## 2018-10-15 PROCEDURE — 80053 COMPREHEN METABOLIC PANEL: CPT | Performed by: EMERGENCY MEDICINE

## 2018-10-15 PROCEDURE — 77030035694 HC MSK BIPAP FLL FAC PERFMAX PHIL -B

## 2018-10-15 PROCEDURE — 83880 ASSAY OF NATRIURETIC PEPTIDE: CPT | Performed by: EMERGENCY MEDICINE

## 2018-10-15 PROCEDURE — 94660 CPAP INITIATION&MGMT: CPT

## 2018-10-15 PROCEDURE — 94762 N-INVAS EAR/PLS OXIMTRY CONT: CPT

## 2018-10-15 PROCEDURE — 71046 X-RAY EXAM CHEST 2 VIEWS: CPT

## 2018-10-15 PROCEDURE — 83735 ASSAY OF MAGNESIUM: CPT | Performed by: EMERGENCY MEDICINE

## 2018-10-15 PROCEDURE — 36600 WITHDRAWAL OF ARTERIAL BLOOD: CPT

## 2018-10-15 PROCEDURE — 82803 BLOOD GASES ANY COMBINATION: CPT

## 2018-10-15 PROCEDURE — 74011250636 HC RX REV CODE- 250/636: Performed by: EMERGENCY MEDICINE

## 2018-10-15 RX ORDER — FUROSEMIDE 10 MG/ML
40 INJECTION INTRAMUSCULAR; INTRAVENOUS EVERY 8 HOURS
Status: DISPENSED | OUTPATIENT
Start: 2018-10-15 | End: 2018-10-16

## 2018-10-15 RX ORDER — FUROSEMIDE 10 MG/ML
60 INJECTION INTRAMUSCULAR; INTRAVENOUS 2 TIMES DAILY
Status: DISCONTINUED | OUTPATIENT
Start: 2018-10-16 | End: 2018-10-22 | Stop reason: HOSPADM

## 2018-10-15 RX ORDER — SODIUM CHLORIDE 0.9 % (FLUSH) 0.9 %
5-10 SYRINGE (ML) INJECTION EVERY 8 HOURS
Status: DISCONTINUED | OUTPATIENT
Start: 2018-10-15 | End: 2018-10-22 | Stop reason: HOSPADM

## 2018-10-15 RX ORDER — ONDANSETRON 2 MG/ML
4 INJECTION INTRAMUSCULAR; INTRAVENOUS
Status: DISCONTINUED | OUTPATIENT
Start: 2018-10-15 | End: 2018-10-22 | Stop reason: HOSPADM

## 2018-10-15 RX ORDER — ACETAMINOPHEN 325 MG/1
650 TABLET ORAL
Status: DISCONTINUED | OUTPATIENT
Start: 2018-10-15 | End: 2018-10-22 | Stop reason: HOSPADM

## 2018-10-15 RX ORDER — OMEPRAZOLE 20 MG/1
20 CAPSULE, DELAYED RELEASE ORAL DAILY
Status: DISCONTINUED | OUTPATIENT
Start: 2018-10-16 | End: 2018-10-22 | Stop reason: HOSPADM

## 2018-10-15 RX ORDER — AMIODARONE HYDROCHLORIDE 200 MG/1
200 TABLET ORAL DAILY
Status: DISCONTINUED | OUTPATIENT
Start: 2018-10-16 | End: 2018-10-17

## 2018-10-15 RX ORDER — FUROSEMIDE 10 MG/ML
40 INJECTION INTRAMUSCULAR; INTRAVENOUS ONCE
Status: COMPLETED | OUTPATIENT
Start: 2018-10-15 | End: 2018-10-15

## 2018-10-15 RX ORDER — SODIUM CHLORIDE 0.9 % (FLUSH) 0.9 %
5-10 SYRINGE (ML) INJECTION AS NEEDED
Status: DISCONTINUED | OUTPATIENT
Start: 2018-10-15 | End: 2018-10-22 | Stop reason: HOSPADM

## 2018-10-15 RX ADMIN — FUROSEMIDE 40 MG: 10 INJECTION, SOLUTION INTRAMUSCULAR; INTRAVENOUS at 19:29

## 2018-10-15 RX ADMIN — Medication 10 ML: at 21:05

## 2018-10-15 NOTE — ED NOTES
Patient continues to sleep; sleep apnea present. Arouses easy. Encourage patient to take deep breaths. Request for Dr Giovana Galicia to talk with wife.

## 2018-10-15 NOTE — ED NOTES
Patient sleeping; easily aroused but the drifts back off to sleep. Dr Marcos Shows aware. VBG done. Wife stated that patient uses CPAP at home and only uses oxygen at night.

## 2018-10-15 NOTE — ED PROVIDER NOTES
EMERGENCY DEPARTMENT HISTORY AND PHYSICAL EXAM 
 
Date: 10/15/2018 Patient Name: Justin Vela. History of Presenting Illness Chief Complaint Patient presents with  Shortness of Breath  Dizziness History Provided By: Patient Chief Complaint: SOB Duration: since 2:30 PM - 3:00 PM today Timing:  Intermittent Location: respiratory Quality: short Associated Symptoms: light headedness Additional History (Context):  
4:49 PM 
Justin Vela. is a 37 y.o. male with PMHX of morbid obesity, chronic back pain, arthritis in knees, migraines, chronic renal insufficiency, HTN, arrhythmia, and MARIA FERNANDA who presents to the emergency department C/O intermittent SOB since 2:30 PM - 3:00 PM worse with laying flat today. Associated sxs include lightheadedness. Pt states that he used to be on oxygen at home. Pt lost a lot of weight and turned his oxygen back in. Pt was not accepted for his gastric bypass, so he gained the weight back and has since needed oxygen again, but has not been able to attain it. Pt uses CPAP at night. Pt takes Lasix 40 mg, managed by PCP. Pt states that he was followed by Marlena Cuba MD (Cardiology). NKDA. Pt endorses being a former smoker, a non EtOH user, and a non recreational drug user. PSHx of Echocardiogram and cardioversion. Pt denies chest pain and any other sxs or complaints. PCP: Buck Steward MD 
 
Current Outpatient Prescriptions Medication Sig Dispense Refill  rivaroxaban (XARELTO) 20 mg tab tablet Take 20 mg by mouth daily.  amiodarone (CORDARONE) 200 mg tablet Take 1 Tab by mouth daily. 90 Tab 3  furosemide (LASIX) 40 mg tablet Take 1 Tab by mouth two (2) times a day. 60 Tab 1  
 dilTIAZem CD (CARDIZEM CD) 300 mg ER capsule Take 300 mg by mouth daily.  oxyCODONE-acetaminophen (PERCOCET) 5-325 mg per tablet Take 1 Tab by mouth every four (4) hours as needed for Pain. Max Daily Amount: 6 Tabs.  30 Tab 0  
  Omeprazole delayed release (PRILOSEC D/R) 20 mg tablet Take 1 Tab by mouth daily. OTC 30 Tab 1 Past History Past Medical History: 
Past Medical History:  
Diagnosis Date  Arrhythmia  Arthritis   
 knees  Chronic back pain  Chronic renal insufficiency, stage II (mild)  History of atrial flutter Dx 6/2016 - anticoagulated by Arcelia Tian  Hypertension 2015  Limited mobility  Migraine headache  Morbid obesity ([address]  Morbid obesity with body mass index of 60.0-69.9 in adult [facility])  Sleep apnea   
 uses c-pap instructed to bring day of surgery  Smoking   
 very passive / cigars only Past Surgical History: 
Past Surgical History:  
Procedure Laterality Date  CARDIAC SURG PROCEDURE UNLIST    
 cardioversion  ECHOCARDIOGRAM  04/2016 EF 60-65% (performed at THE [facility] - see cardiology tab) Family History: 
Family History Problem Relation Age of Onset  Obesity Mother  Liver Disease Sister Social History: 
Social History Substance Use Topics  Smoking status: Former Smoker Types: Cigars  Smokeless tobacco: Never Used  Alcohol use No  
 
 
Allergies: 
No Known Allergies Review of Systems Review of Systems Respiratory: Positive for shortness of breath. Cardiovascular: Negative for chest pain. Neurological: Positive for light-headedness. All other systems reviewed and are negative. Physical Exam  
 
Vitals:  
 10/15/18 1614 BP: 131/76 Pulse: (!) 114 Resp: 20 Temp: 98.5 °F (36.9 °C) SpO2: (!) 88% Weight: (!) 220.9 kg (487 lb) Height: 5' 10\" (1.778 m) Physical Exam  
Nursing note and vitals reviewed. Constitutional: Morbidly obese middle aged [facility] American male. Mild acute distress Head: Normocephalic, Atraumatic Eyes: Pupils are equal, round, and reactive to light, EOMI, no scleral icterus, no conjunctival pallor ENT: moist mucous membranes, hearing intact Neck: Supple, non-tender Cardiovascular: Tachycardic rate and regular rhythm, no murmurs, rubs, or gallops Chest: Mildly dyspnic and tachypnic. Lungs, secondary to obese habitus, with bibasilar crackles. Increased work of breathing and chest excursion bilaterally Lungs: Clear to ausculation bilaterally, no wheezes, no rhonchi Abdomen: Soft, non tender, non distended, normoactive bowel sounds Back: No evidence of trauma or deformity Extremities: No evidence of trauma or deformity, +4 non pitting LE edema bilaterally. Skin: Warm and dry, normal cap refill Neuro: Alert and appropriate, CN intact, normal speech, moving all 4 extremities freely and symmetrically Psychiatric: Cooperative, appropriate mood Diagnostic Study Results Labs - No results found for this or any previous visit (from the past 12 hour(s)). Radiologic Studies -  
XR CHEST PA LAT Final Result CT Results  (Last 48 hours) None CXR Results  (Last 48 hours) 10/15/18 1634  XR CHEST PA LAT Final result Impression:  Impression:  
   
Cardiomegaly with bronchovascular and interstitial prominence most likely due to  
vascular congestion possibly due to CHF/fluid overload. No dense consolidation  
is seen to suggest pneumonia. Narrative:  History: Shortness of breath Views: Frontal and lateral views. Lateral view is suboptimal due to motion  
artifacts. Comparison study: 6/9/2017 Findings:  
   
Heart and mediastinum: Enlarged cardiac silhouette. Lungs and pleura: Bronchovascular and interstitial prominence noted without  
dense consolidation or pleural effusion. Aorta: Unremarkable. Bones: Within normal limits for age. Medications given in the ED- Medications - No data to display Medical Decision Making I am the first provider for this patient. I reviewed the vital signs, available nursing notes, past medical history, past surgical history, family history and social history. Vital Signs-Reviewed the patient's vital signs. Pulse Oximetry Analysis - 94% on room air Cardiac Monitor: 
Rate: 78 bpm 
Rhythm: sinus rhythm EKG interpretation: (Preliminary) 6:00 PM  
NSR with sinus arrhythmia at 77 bpm. QTc at 448 ms. Negative ST elevation. EKG read by Mary Moreno DO at 6:08 PM  
 
Records Reviewed: Nursing Notes and Old Medical Records Provider Notes (Medical Decision Making): 37year old male with hx of morbid obesity and CHF, oxygen dependence, and MARIA FERNANDA on CPAP at night who presents with SOB. On exam, pt is noted to be hypoxic with minimal exertion. Saturates in high 80s however with rest is at mid 90s. Tachypnic and dyspnic and noted to be tachycardic. Although there are bibasilar crackles in lung exam, lung auscultation is limited due to habitus. We will evaluate for CHF vs ACS and also obtain a ABG to evaluate for CO2 retention. Due to the fact that pt is hypoxic, he will require admission. Procedures: 
Procedures ED Course:  
4:49 PM Initial assessment performed. The patients presenting problems have been discussed, and they are in agreement with the care plan formulated and outlined with them. I have encouraged them to ask questions as they arise throughout their visit. 
 
7:02 PM Pt workup significant for Top 0.07, CXR showing vascular congestion but normal BNP. Mild hypercarbia on VBG. Discussed patient's history, exam, and available diagnostics results with Abad Aaron MD, cardiology, who agree with recommends pulmonology consult and admit to the ICU. 7:07 PM Discussed patient's history, exam, and available diagnostics results with Krys Carbajal MD, pulmonology, who recommends that we diurese the pt and if the pt is on Xarelto, that a PE is unlikely. Recommends 40 mg of Lasix x 3 and CPAP at night.  
 
7:16 PM Discussed patient's history, exam, and available diagnostics results with Reyna rTimble MD, internal medicine, who agree with admitting to ICU. Diagnosis and Disposition Critical Care Time:  
I have spent 100 minutes of critical care time involved in lab review, consultations with specialist, family decision-making, and documentation. During this entire length of time I was immediately available to the patient. Critical Care: The reason for providing this level of medical care for this critically ill patient was due a critical illness that impaired one or more vital organ systems such that there was a high probability of imminent or life threatening deterioration in the patients condition. This care involved high complexity decision making to assess, manipulate, and support vital system functions, to treat this degreee vital organ system failure and to prevent further life threatening deterioration of the patients condition. Core Measures: 
For Hospitalized Patients: 
 
1. Hospitalization Decision Time: The decision to hospitalize the patient was made by General Hardy DO at 4:49 PM on 10/15/2018 2. Aspirin: Aspirin was not given because the patient did not present with a stroke at the time of their Emergency Department evaluation 7:09 PM  Patient is being admitted to the hospital by Richard Barger MD. The results of their tests and reasons for their admission have been discussed with them and/or available family. They convey agreement and understanding for the need to be admitted and for their admission diagnosis. CONDITIONS ON ADMISSION: 
Sepsis is not present at the time of admission. Deep Vein Thrombosis is not present at the time of admission. Thrombosis is not present at the time of admission. Urinary Tract Infection is not present at the time of admission. Pneumonia is not present at the time of admission. MRSA is not present at the time of admission. Wound infection is not present at the time of admission. Pressure Ulcer is not present at the time of admission.    
 
CLINICAL IMPRESSION: 
 
 1. Congestive heart failure, unspecified HF chronicity, unspecified heart failure type ([address] 2. Hypoxia 3. Morbid obesity with body mass index of 60.0-69.9 in adult [facility]) 4. History of atrial flutter 5. Sleep apnea, unspecified type 6. Sinus tachycardia 7. Acute respiratory failure with hypoxia and hypercarbia (HCC) PLAN: 
1. ADMIT to ICU 
_______________________________ Attestations: This note is prepared by Michelle Hernandez, acting as Scribe for Henok Bryant DO. Henok Bryant DO:  The scribe's documentation has been prepared under my direction and personally reviewed by me in its entirety. I confirm that the note above accurately reflects all work, treatment, procedures, and medical decision making performed by me. 
_______________________________

## 2018-10-15 NOTE — CONSULTS
[facility] Consult Note Patient: Mike Apodaca MRN: [id number]  SSN: [id number] YOB: 1975  Age: 37 y.o. Sex: male Date of Consultation: 10/15/2018 Referring Physician: Dr. Moses Nunez Reason for Consultation: oJnnathan Norris HPI: 
I was asked by Dr. Moses Nunez to see this pleasant patient. Mr. Shanae Byrne is 37years old morbidly obesem patient with history of atrial flutter, sleep apnea on CPAP and on Xarelto for atrial flutter came in worsening of SOB and ankle edema and hypoxia which becomes worse when pt talk. Patient denied CP, fever, cough. No prolonged bed rest or travel. According to patient he is compliant to xarelto. Past Medical History:  
Diagnosis Date  Arrhythmia  Arthritis   
 knees  Chronic back pain  Chronic renal insufficiency, stage II (mild)  History of atrial flutter Dx 6/2016 - anticoagulated by Sixto Basilio  Hypertension 2015  Limited mobility  Migraine headache  Morbid obesity ([address]  Morbid obesity with body mass index of 60.0-69.9 in [facility])  Sleep apnea   
 uses c-pap instructed to bring day of surgery  Smoking   
 very passive / cigars only Past Surgical History:  
Procedure Laterality Date  CARDIAC SURG PROCEDURE UNLIST    
 cardioversion  ECHOCARDIOGRAM  04/2016 EF 60-65% (performed at THE [facility] - see cardiology tab) Current Facility-Administered Medications Medication Dose Route Frequency  sodium chloride (NS) flush 5-10 mL  5-10 mL IntraVENous Q8H  
 sodium chloride (NS) flush 5-10 mL  5-10 mL IntraVENous PRN  
 furosemide (LASIX) injection 40 mg  40 mg IntraVENous ONCE Current Outpatient Prescriptions Medication Sig  
 rivaroxaban (XARELTO) 20 mg tab tablet Take 20 mg by mouth daily.  amiodarone (CORDARONE) 200 mg tablet Take 1 Tab by mouth daily.  furosemide (LASIX) 40 mg tablet Take 1 Tab by mouth two (2) times a day.  dilTIAZem CD (CARDIZEM CD) 300 mg ER capsule Take 300 mg by mouth daily.  oxyCODONE-acetaminophen (PERCOCET) 5-325 mg per tablet Take 1 Tab by mouth every four (4) hours as needed for Pain. Max Daily Amount: 6 Tabs.  Omeprazole delayed release (PRILOSEC D/R) 20 mg tablet Take 1 Tab by mouth daily. OTC Allergies and Intolerances:  
No Known Allergies Family History:  
Family History Problem Relation Age of Onset  Obesity Mother  Liver Disease Sister Social History: He  reports that he has quit smoking. His smoking use included Cigars. He has never used smokeless tobacco.  He  reports that he does not drink alcohol. Review of Systems:  
 
Review of Systems Gen: No fever, chills, malaise, weight loss/gain. Heent: No headache, rhinorrhea, epistaxis, ear pain, hearing loss, sinus pain, neck pain/stiffness, sore throat. Heart: No chest pain, palpitations, +ZAPIEN, pnd, or +orthopnea, +ankle edema Resp: No cough, hemoptysis, wheezing and +shortness of breath. GI: No nausea, vomiting, diarrhea, constipation, melena or hematochezia. : No urinary obstruction, dysuria or hematuria. Derm: No rash, new skin lesion or pruritis. Musc/skeletal: no bone or joint complains. Vasc: No edema, cyanosis or claudication. Endo: No heat/cold intolerance, no polyuria, polydipsia or polyphagia. Neuro: No unilateral weakness, numbness, tingling. No seizures. Heme: No easy bruising or bleeding. Physical:  
Patient Vitals for the past 6 hrs: 
 Temp Pulse Resp BP SpO2  
10/15/18 1830 - 75 (!) 36 93/61 (!) 89 % 10/15/18 1800 - 76 18 126/77 92 % 10/15/18 1700 - (!) 115 24 118/68 90 % 10/15/18 1614 98.5 °F (36.9 °C) (!) 114 20 131/76 (!) 88 % Exam:  
General Appearance: mild distress on face mask HEENT: AMMON. HEAD: Atraumatic NECK: No JVD, no thyroidomeglay. CAROTIDS: clear LUNGS: Clear bilaterally. HEART: S1 variable +S2 ABD: Non-tender, BS Audible EXT: ++ edema, and no cysnosis. VASCULAR EXAM: Pulses are intact. PSYCHIATRIC EXAM: Mood is appropriate. MUSCULOSKELETAL: Grossly no joint deformity. NEUROLOGICAL: Motor and sensory sytem intact and Cranial nerves II-XII intact. Review of Data:  
LABS:  
Lab Results Component Value Date/Time WBC 4.6 10/15/2018 05:45 PM  
 HGB 15.8 10/15/2018 05:45 PM  
 HCT 48.2 (H) 10/15/2018 05:45 PM  
 PLATELET 929 48/64/6099 05:45 PM  
 
Lab Results Component Value Date/Time Sodium 143 10/15/2018 05:45 PM  
 Potassium 3.5 10/15/2018 05:45 PM  
 Chloride 101 10/15/2018 05:45 PM  
 CO2 34 (H) 10/15/2018 05:45 PM  
 Glucose 110 (H) 10/15/2018 05:45 PM  
 BUN 12 10/15/2018 05:45 PM  
 Creatinine 1.64 (H) 10/15/2018 05:45 PM  
 
No results found for: CHOL, CHOLX, CHLST, CHOLV, HDL, LDL, LDLC, DLDLP, TGLX, TRIGL, TRIGP No results found for: GPT Lab Results Component Value Date/Time Hemoglobin A1c 6.1 (H) 04/06/2016 03:53 AM  
 
 
 
Cardiology Procedures:  
Results for orders placed or performed during the hospital encounter of 10/15/18 EKG, 12 LEAD, INITIAL Result Value Ref Range Ventricular Rate 77 BPM  
 Atrial Rate 77 BPM  
 P-R Interval 206 ms QRS Duration 100 ms Q-T Interval 396 ms QTC Calculation (Bezet) 448 ms Calculated R Axis -41 degrees Calculated T Axis 26 degrees Diagnosis Normal sinus rhythm with sinus arrhythmia Left axis deviation Nonspecific ST and T wave abnormality Abnormal ECG When compared with ECG of 06-NOV-2017 12:05, 
ST now depressed in Inferior leads T wave inversion now evident in Inferior leads T wave inversion now evident in Anterior leads Impression / Plan:   
Patient Active Problem List  
Diagnosis Code  Cellulitis of left lower leg L03. 116  
 Anemia D64.9  Morbid obesity ([facility]) E66.01  
 Dyspnea R06.00  Sleep apnea G47.30  Sinus tachycardia R00.0  Acute respiratory failure with hypoxia and hypercarbia ([facility]) J96.01, J96.02  
 Morbid obesity with body mass index of 60.0-69.9 in adult ([facility]) E66.01, Z68.44  Chronic back pain M54.9, G89.29  
 Arthritis M19.90  Migraine G43.909  
 History of atrial flutter Z86.79  
 Limited mobility Z74.09  
 Smoking F17.200  Chronic renal insufficiency, stage II (mild) N18.2  CHF (congestive heart failure) ([facility]) I50.9  Hypoxia R09.02  
 
 
A/P 
 
---  Acute hypoxic respiratory failure/insufficiency- multifactorial ( morbid obesity, DHF, atrial flutter) --- Chronic atrial flutter- on Xarelto+ amioadrone 
--- Obstructive sleep apnea-- sleep apnea 
---  Morbid obesity. Plan: 
IV Lasix Atrial flutter is rate controlled- according to patient he is taking Xarelto Echo to reassess EF and diastolic function/filling pressure. Will exclude DVT/PE Discussed with patient. Signed By: Kami Contreras MD   
 October 15, 2018

## 2018-10-16 ENCOUNTER — APPOINTMENT (OUTPATIENT)
Dept: NON INVASIVE DIAGNOSTICS | Age: 43
DRG: 291 | End: 2018-10-16
Attending: HOSPITALIST
Payer: MEDICARE

## 2018-10-16 ENCOUNTER — APPOINTMENT (OUTPATIENT)
Dept: GENERAL RADIOLOGY | Age: 43
DRG: 291 | End: 2018-10-16
Attending: INTERNAL MEDICINE
Payer: MEDICARE

## 2018-10-16 ENCOUNTER — APPOINTMENT (OUTPATIENT)
Dept: VASCULAR SURGERY | Age: 43
DRG: 291 | End: 2018-10-16
Attending: INTERNAL MEDICINE
Payer: MEDICARE

## 2018-10-16 PROBLEM — R06.03 ACUTE RESPIRATORY DISTRESS: Status: ACTIVE | Noted: 2018-10-16

## 2018-10-16 PROBLEM — R77.8 ELEVATED TROPONIN: Status: ACTIVE | Noted: 2018-10-16

## 2018-10-16 LAB
ANION GAP SERPL CALC-SCNC: 7 MMOL/L (ref 3–18)
BUN SERPL-MCNC: 12 MG/DL (ref 7–18)
BUN/CREAT SERPL: 8 (ref 12–20)
CALCIUM SERPL-MCNC: 9.1 MG/DL (ref 8.5–10.1)
CHLORIDE SERPL-SCNC: 101 MMOL/L (ref 100–108)
CK MB CFR SERPL CALC: 0.7 % (ref 0–4)
CK MB SERPL-MCNC: 7.5 NG/ML (ref 5–25)
CK SERPL-CCNC: 1047 U/L (ref 39–308)
CO2 SERPL-SCNC: 34 MMOL/L (ref 21–32)
CREAT SERPL-MCNC: 1.47 MG/DL (ref 0.6–1.3)
ECHO AO ARCH DIAM: 4.03 CM
ECHO AO ASC DIAM: 3.34 CM
ECHO AO ROOT DIAM: 3.72 CM
ECHO AV AREA PEAK VELOCITY: 2.8 CM2
ECHO AV AREA VTI: 2.6 CM2
ECHO AV AREA/BSA PEAK VELOCITY: 0.8 CM2/M2
ECHO AV AREA/BSA VTI: 0.8 CM2/M2
ECHO AV MEAN GRADIENT: 4.1 MMHG
ECHO AV PEAK GRADIENT: 7.4 MMHG
ECHO AV PEAK VELOCITY: 135.69 CM/S
ECHO AV VTI: 25.85 CM
ECHO LA VOL 2C: 50.89 ML (ref 18–58)
ECHO LA VOL 4C: 101.39 ML (ref 18–58)
ECHO LA VOL BP: 81.08 ML (ref 18–58)
ECHO LA VOL/BSA BIPLANE: 25.96 ML/M2
ECHO LA VOLUME INDEX A2C: 16.3 ML/M2
ECHO LA VOLUME INDEX A4C: 32.47 ML/M2
ECHO LV E' LATERAL VELOCITY: 0.6 CM/S
ECHO LV E' SEPTAL VELOCITY: 0.7 CM/S
ECHO LV EDV A2C: 184.3 ML
ECHO LV EDV A4C: 167.9 ML
ECHO LV EDV BP: 186.6 ML (ref 67–155)
ECHO LV EDV INDEX A4C: 53.8 ML/M2
ECHO LV EDV INDEX BP: 59.8 ML/M2
ECHO LV EDV NDEX A2C: 59 ML/M2
ECHO LV EJECTION FRACTION A2C: 46 %
ECHO LV EJECTION FRACTION A4C: 37 %
ECHO LV EJECTION FRACTION BIPLANE: 44.5 % (ref 55–100)
ECHO LV ESV A2C: 99.4 ML
ECHO LV ESV A4C: 106 ML
ECHO LV ESV BP: 103.6 ML (ref 22–58)
ECHO LV ESV INDEX A2C: 31.8 ML/M2
ECHO LV ESV INDEX A4C: 33.9 ML/M2
ECHO LV ESV INDEX BP: 33.2 ML/M2
ECHO LVOT DIAM: 2.09 CM
ECHO LVOT PEAK GRADIENT: 4.9 MMHG
ECHO LVOT PEAK VELOCITY: 110.84 CM/S
ECHO LVOT VTI: 19.88 CM
ECHO MV A VELOCITY: 39.3 CM/S
ECHO MV AREA PHT: 3.8 CM2
ECHO MV E DECELERATION TIME (DT): 201.6 MS
ECHO MV E VELOCITY: 0.54 CM/S
ECHO MV E/A RATIO: 0.01
ECHO MV E/E' LATERAL: 0.9
ECHO MV E/E' RATIO (AVERAGED): 0.84
ECHO MV E/E' SEPTAL: 0.77
ECHO MV PRESSURE HALF TIME (PHT): 58.5 MS
ECHO RA AREA 4C: 23.94 CM2
ECHO RV INTERNAL DIMENSION: 5.17 CM
ECHO RV TAPSE: 2 CM (ref 1.5–2)
ERYTHROCYTE [DISTWIDTH] IN BLOOD BY AUTOMATED COUNT: 13.1 % (ref 11.6–14.5)
GLUCOSE SERPL-MCNC: 93 MG/DL (ref 74–99)
HCT VFR BLD AUTO: 48.3 % (ref 36–48)
HGB BLD-MCNC: 15.5 G/DL (ref 13–16)
MAGNESIUM SERPL-MCNC: 1.9 MG/DL (ref 1.6–2.6)
MCH RBC QN AUTO: 30 PG (ref 24–34)
MCHC RBC AUTO-ENTMCNC: 32.1 G/DL (ref 31–37)
MCV RBC AUTO: 93.6 FL (ref 74–97)
PHOSPHATE SERPL-MCNC: 5.5 MG/DL (ref 2.5–4.9)
PLATELET # BLD AUTO: 147 K/UL (ref 135–420)
PMV BLD AUTO: 10.5 FL (ref 9.2–11.8)
POTASSIUM SERPL-SCNC: 3.8 MMOL/L (ref 3.5–5.5)
RBC # BLD AUTO: 5.16 M/UL (ref 4.7–5.5)
SODIUM SERPL-SCNC: 142 MMOL/L (ref 136–145)
TROPONIN I SERPL-MCNC: 0.07 NG/ML (ref 0–0.06)
WBC # BLD AUTO: 4.5 K/UL (ref 4.6–13.2)

## 2018-10-16 PROCEDURE — 36415 COLL VENOUS BLD VENIPUNCTURE: CPT | Performed by: INTERNAL MEDICINE

## 2018-10-16 PROCEDURE — 71045 X-RAY EXAM CHEST 1 VIEW: CPT

## 2018-10-16 PROCEDURE — 83735 ASSAY OF MAGNESIUM: CPT | Performed by: INTERNAL MEDICINE

## 2018-10-16 PROCEDURE — 80048 BASIC METABOLIC PNL TOTAL CA: CPT | Performed by: INTERNAL MEDICINE

## 2018-10-16 PROCEDURE — 74011250637 HC RX REV CODE- 250/637: Performed by: HOSPITALIST

## 2018-10-16 PROCEDURE — 93970 EXTREMITY STUDY: CPT

## 2018-10-16 PROCEDURE — 74011250636 HC RX REV CODE- 250/636: Performed by: HOSPITALIST

## 2018-10-16 PROCEDURE — 97166 OT EVAL MOD COMPLEX 45 MIN: CPT

## 2018-10-16 PROCEDURE — 84443 ASSAY THYROID STIM HORMONE: CPT | Performed by: INTERNAL MEDICINE

## 2018-10-16 PROCEDURE — 97162 PT EVAL MOD COMPLEX 30 MIN: CPT

## 2018-10-16 PROCEDURE — 85027 COMPLETE CBC AUTOMATED: CPT | Performed by: HOSPITALIST

## 2018-10-16 PROCEDURE — 74011250636 HC RX REV CODE- 250/636: Performed by: INTERNAL MEDICINE

## 2018-10-16 PROCEDURE — 74011250637 HC RX REV CODE- 250/637: Performed by: INTERNAL MEDICINE

## 2018-10-16 PROCEDURE — 82550 ASSAY OF CK (CPK): CPT | Performed by: INTERNAL MEDICINE

## 2018-10-16 PROCEDURE — 65610000006 HC RM INTENSIVE CARE

## 2018-10-16 PROCEDURE — C8929 TTE W OR WO FOL WCON,DOPPLER: HCPCS

## 2018-10-16 PROCEDURE — 77010033678 HC OXYGEN DAILY

## 2018-10-16 PROCEDURE — 84100 ASSAY OF PHOSPHORUS: CPT | Performed by: INTERNAL MEDICINE

## 2018-10-16 RX ORDER — CARVEDILOL 3.12 MG/1
3.12 TABLET ORAL 2 TIMES DAILY WITH MEALS
Status: DISCONTINUED | OUTPATIENT
Start: 2018-10-16 | End: 2018-10-18

## 2018-10-16 RX ORDER — LOSARTAN POTASSIUM 25 MG/1
25 TABLET ORAL DAILY
Status: DISCONTINUED | OUTPATIENT
Start: 2018-10-16 | End: 2018-10-22 | Stop reason: HOSPADM

## 2018-10-16 RX ADMIN — FUROSEMIDE 60 MG: 10 INJECTION, SOLUTION INTRAMUSCULAR; INTRAVENOUS at 21:19

## 2018-10-16 RX ADMIN — FUROSEMIDE 40 MG: 10 INJECTION, SOLUTION INTRAMUSCULAR; INTRAVENOUS at 04:55

## 2018-10-16 RX ADMIN — CARVEDILOL 3.12 MG: 3.12 TABLET, FILM COATED ORAL at 18:43

## 2018-10-16 RX ADMIN — Medication 10 ML: at 04:55

## 2018-10-16 RX ADMIN — LOSARTAN POTASSIUM 25 MG: 25 TABLET ORAL at 18:43

## 2018-10-16 RX ADMIN — FUROSEMIDE 40 MG: 10 INJECTION, SOLUTION INTRAMUSCULAR; INTRAVENOUS at 13:04

## 2018-10-16 RX ADMIN — OMEPRAZOLE 20 MG: 20 CAPSULE, DELAYED RELEASE ORAL at 09:46

## 2018-10-16 RX ADMIN — Medication 10 ML: at 21:21

## 2018-10-16 RX ADMIN — AMIODARONE HYDROCHLORIDE 200 MG: 200 TABLET ORAL at 09:46

## 2018-10-16 RX ADMIN — PERFLUTREN 1 ML: 6.52 INJECTION, SUSPENSION INTRAVENOUS at 09:55

## 2018-10-16 RX ADMIN — RIVAROXABAN 20 MG: 10 TABLET, FILM COATED ORAL at 09:46

## 2018-10-16 NOTE — PROGRESS NOTES
PULMONARY CRITICAL CARE MEDICINE 
ICU PROGRESS NOTE: 
10/15/2018 Called by ED attending. Patient morbidly obese. On Xarelto for AF. Uses CPAP at home. Too large for CTA. Chest x-ray suggests CHF. No CP. Seen in follow up of dyspnea. No urgency for ETT and PPV. Interval History: In with dyspnea. On Xarelto. OSAS on home CPAP. Assessment:  
 
Principal Problem: 
  Acute diastolic CHF (congestive heart failure) ([facility] (10/15/2018) Active Problems: Morbid obesity ([facility] (4/4/2016) CHF (congestive heart failure) ([facility] (10/15/2018) Hypoxia (10/15/2018) Chronic a-fib ([facility] (10/15/2018) Elevated troponin Plan: PLANS FOR ABOVE PROBLEMS: 
1. Continue Xarelto 2. Echo in am 
3. BI-PAP in the event dyspnea with hypoxemia and increased WOB 4. Am ABG 5. Diuresis 6. Lower extremity venous doppler/US Activity 
bedrest 
 
Disposition and Family  
home Subjective:  
 
Review of Systems Objective:  
 
Visit Vitals  /79  Pulse 83  Temp 98.7 °F (37.1 °C)  Resp 20  
 Ht 5' 10\" (1.778 m)  Wt (!) 220.9 kg (487 lb)  SpO2 92%  BMI 69.88 kg/m2 PA- /; CVP- ; CI-   
DRIPS: 
 
No intake or output data in the 24 hours ending 10/15/18 2025 Physical Exam: 
 
 
Ventilator Settings:  
Reviewed Ventilator Flowsheet:  O2 
PAP:       
Wedge:   
CVP:       
CO:         
CI:           
SVR:       
PVR:       
 
  
  
 
Prophylactic Measures: 
Vt/IBW =  
Glucose: DVT: Drug - Compression Stockings SUP: Yes 
HOB > 30 degrees: Yes Spontaneous Awakening Trial: Yes Spontaneous Breathing Trial: Yes 
Nutrition: Yes Data Review:  
Reviewed available LABS since my last time stamp. Recent Results (from the past 24 hour(s)) CBC WITH AUTOMATED DIFF Collection Time: 10/15/18  5:45 PM  
Result Value Ref Range WBC 4.6 4.6 - 13.2 K/uL  
 RBC 5.26 4.70 - 5.50 M/uL  
 HGB 15.8 13.0 - 16.0 g/dL HCT 48.2 (H) 36.0 - 48.0 %  MCV 91.6 74.0 - 97.0 FL  
 MCH 30.0 24.0 - 34.0 PG  
 MCHC 32.8 31.0 - 37.0 g/dL  
 RDW 12.9 11.6 - 14.5 % PLATELET 339 378 - 699 K/uL MPV 10.7 9.2 - 11.8 FL  
 NEUTROPHILS 55 40 - 73 % LYMPHOCYTES 33 21 - 52 % MONOCYTES 11 (H) 3 - 10 % EOSINOPHILS 1 0 - 5 % BASOPHILS 0 0 - 2 %  
 ABS. NEUTROPHILS 2.5 1.8 - 8.0 K/UL  
 ABS. LYMPHOCYTES 1.5 0.9 - 3.6 K/UL  
 ABS. MONOCYTES 0.5 0.05 - 1.2 K/UL  
 ABS. EOSINOPHILS 0.1 0.0 - 0.4 K/UL  
 ABS. BASOPHILS 0.0 0.0 - 0.1 K/UL  
 DF AUTOMATED METABOLIC PANEL, COMPREHENSIVE Collection Time: 10/15/18  5:45 PM  
Result Value Ref Range Sodium 143 136 - 145 mmol/L Potassium 3.5 3.5 - 5.5 mmol/L Chloride 101 100 - 108 mmol/L  
 CO2 34 (H) 21 - 32 mmol/L Anion gap 8 3.0 - 18 mmol/L Glucose 110 (H) 74 - 99 mg/dL BUN 12 7.0 - 18 MG/DL Creatinine 1.64 (H) 0.6 - 1.3 MG/DL  
 BUN/Creatinine ratio 7 (L) 12 - 20 GFR est AA 55 (L) >60 ml/min/1.73m2 GFR est non-AA 46 (L) >60 ml/min/1.73m2 Calcium 9.1 8.5 - 10.1 MG/DL Bilirubin, total 0.6 0.2 - 1.0 MG/DL  
 ALT (SGPT) 52 16 - 61 U/L  
 AST (SGOT) 45 (H) 15 - 37 U/L Alk. phosphatase 70 45 - 117 U/L Protein, total 7.4 6.4 - 8.2 g/dL Albumin 3.7 3.4 - 5.0 g/dL Globulin 3.7 2.0 - 4.0 g/dL A-G Ratio 1.0 0.8 - 1.7 NT-PRO BNP Collection Time: 10/15/18  5:45 PM  
Result Value Ref Range NT pro- 0 - 450 PG/ML  
MAGNESIUM Collection Time: 10/15/18  5:45 PM  
Result Value Ref Range Magnesium 1.7 1.6 - 2.6 mg/dL CARDIAC PANEL,(CK, CKMB & TROPONIN) Collection Time: 10/15/18  5:45 PM  
Result Value Ref Range CK 1236 (H) 39 - 308 U/L  
 CK - MB 10.1 (H) <3.6 ng/ml CK-MB Index 0.8 0.0 - 4.0 % Troponin-I, Qt. 0.07 (H) 0.00 - 0.06 NG/ML  
EKG, 12 LEAD, INITIAL Collection Time: 10/15/18  6:00 PM  
Result Value Ref Range Ventricular Rate 77 BPM  
 Atrial Rate 77 BPM  
 P-R Interval 206 ms QRS Duration 100 ms Q-T Interval 396 ms QTC Calculation (Bezet) 448 ms Calculated R Axis -41 degrees Calculated T Axis 26 degrees Diagnosis Normal sinus rhythm with sinus arrhythmia Left axis deviation Nonspecific ST and T wave abnormality Abnormal ECG When compared with ECG of 06-NOV-2017 12:05, 
ST now depressed in Inferior leads T wave inversion now evident in Inferior leads T wave inversion now evident in Anterior leads POC VENOUS BLOOD GAS Collection Time: 10/15/18  6:08 PM  
Result Value Ref Range Device: ROOM AIR    
 FIO2 (POC) 21 %  
 pH, venous (POC) 7.481 (H) 7.32 - 7.42    
 pCO2, venous (POC) 52.8 (H) 41 - 51 MMHG  
 pO2, venous (POC) 47 (H) 25 - 40 mmHg HCO3, venous (POC) 39.4 (H) 23.0 - 28.0 MMOL/L  
 sO2, venous (POC) 84 65 - 88 % Base excess, venous (POC) 16 mmol/L Allens test (POC) N/A Site OTHER Specimen type (POC) VENOUS BLOOD Performed by Mariam Callaway CARDIAC PANEL,(CK, CKMB & TROPONIN) Collection Time: 10/15/18  6:45 PM  
Result Value Ref Range CK 1136 (H) 39 - 308 U/L  
 CK - MB 9.2 (H) <3.6 ng/ml CK-MB Index 0.8 0.0 - 4.0 % Troponin-I, Qt. 0.07 (H) 0.00 - 0.06 NG/ML  
EKG, 12 LEAD, SUBSEQUENT Collection Time: 10/15/18  7:26 PM  
Result Value Ref Range Ventricular Rate 76 BPM  
 Atrial Rate 228 BPM  
 QRS Duration 102 ms Q-T Interval 398 ms QTC Calculation (Bezet) 447 ms Calculated P Axis -98 degrees Calculated R Axis -34 degrees Calculated T Axis 40 degrees Diagnosis Atrial flutter with variable AV block Left axis deviation Pulmonary disease pattern Nonspecific ST and T wave abnormality Abnormal ECG When compared with ECG of 15-OCT-2018 18:00, 
Atrial flutter has replaced Sinus rhythm Microbiology: 
 
 
Radiology: Xr Chest Pa Lat Result Date: 10/15/2018 History: Shortness of breath Views: Frontal and lateral views. Lateral view is suboptimal due to motion artifacts.  Comparison study: 6/9/2017 Findings: Heart and mediastinum: Enlarged cardiac silhouette. Lungs and pleura: Bronchovascular and interstitial prominence noted without dense consolidation or pleural effusion. Aorta: Unremarkable. Bones: Within normal limits for age. Impression: Cardiomegaly with bronchovascular and interstitial prominence most likely due to vascular congestion possibly due to CHF/fluid overload. No dense consolidation is seen to suggest pneumonia. [place] [id number] [facility] 
[phone]

## 2018-10-16 NOTE — PROGRESS NOTES
Hospitalist Progress Note-critical care note Patient: Harman Leary. MRN: [id number]  CSN: [id number] YOB: 1975  Age: 37 y.o. Sex: male DOA: 10/15/2018 LOS:  LOS: 1 day Chief complaint: elevated trop , chf with exacerbation, chronic a fib , morbid obesity Assessment/Plan Hospital Problems  Date Reviewed: 11/6/2017 Codes Class Noted POA Elevated troponin ICD-10-CM: R74.8 ICD-9-CM: 790.6  10/16/2018 Unknown Acute respiratory distress ICD-10-CM: R06.03 
ICD-9-CM: 518.82  10/16/2018 Unknown CHF (congestive heart failure) (HCC) ICD-10-CM: I50.9 ICD-9-CM: 428.0  10/15/2018 Yes Hypoxia ICD-10-CM: R09.02 
ICD-9-CM: 799.02  10/15/2018 Yes * (Principal)Acute diastolic CHF (congestive heart failure) (HCC) ICD-10-CM: I50.31 ICD-9-CM: 428.31, 428.0  10/15/2018 Yes Chronic a-fib (HCC) ICD-10-CM: T18.2 ICD-9-CM: 427.31  10/15/2018 Yes Atrial flutter (HCC) (Chronic) ICD-10-CM: H85.48 
ICD-9-CM: 427.32  10/15/2018 Unknown Morbid obesity ([address] ICD-10-CM: E66.01 
ICD-9-CM: 278.01  4/4/2016 Yes  
   
  
 
chf exacerbation , combined systolic/diastalic Ef 41-45 % -Left ventricular global hypokinesis Will continue lasix  
-monitor electrolytes and renal function Cardiology was called per er Chronic a fib Controlled per amiodarone and xarelto Hypoxia /acute respiratory distress Off bipap now-on nc o2 Due to chf exacerbation Will continue wean off nc O2  
 
jac : continue cpap at night/nap time Elevated trop No chest pain Leaking due to chf exacerbation, but left ventricular global hypokinesis from echo, will defer to cardiology for further work up Subjective: sob better Nurse: no acute issue Disposition :tbd, Review of systems: 
 
General: No fevers or chills. Cardiovascular: No chest pain or pressure. No palpitations. Pulmonary:  shortness of breath -a little bit better Gastrointestinal: No nausea, vomiting. Vital signs/Intake and Output: 
Visit Vitals  /74  Pulse 70  Temp 97.7 °F (36.5 °C)  Resp 20  
 Ht 5' 10\" (1.778 m)  Wt (!) 234.1 kg (516 lb)  SpO2 95%  BMI 74.04 kg/m2 Current Shift:  10/16 0701 - 10/16 1900 In: 400 [P.O.:400] Out: 400 [Urine:400] Last three shifts:  10/14 1901 - 10/16 0700 In: -  
Out: 1985 [[id number]] Physical Exam: 
General: WD, WN. Alert, cooperative, no acute distress   
HEENT: NC, Atraumatic. PERRLA, anicteric sclerae. Lungs: CTA Bilaterally. No Wheezing/Rhonchi/Rales. Heart:  Regular  rhythm,  No murmur, No Rubs, No Gallops Abdomen: Soft, Non distended, Non tender.  +Bowel sounds, Extremities: No c/c/e Psych:   Not anxious or agitated. Neurologic:  No acute neurological deficit. Labs: Results:  
   
Chemistry Recent Labs 10/16/18 
 0553  10/15/18 
 1745 GLU  93  110* NA  142  143  
K  3.8  3.5 CL  101  101 CO2  34*  34* BUN  12  12 CREA  1.47*  1.64* CA  9.1  9.1 AGAP  7  8 BUCR  8*  7* AP   --   70  
TP   --   7.4 ALB   --   3.7 GLOB   --   3.7 AGRAT   --   1.0  
  
CBC w/Diff Recent Labs 10/16/18 
 0553  10/15/18 
 1745 WBC  4.5*  4.6  
RBC  5.16  5.26  
HGB  15.5  15.8 HCT  48.3*  48.2*  
PLT  147  157 GRANS   --   55  
LYMPH   --   33 EOS   --   1 Cardiac Enzymes Recent Labs 10/16/18 
 0553  10/15/18 
 1845 CPK  1047*  1136* CKND1  0.7  0.8 Coagulation No results for input(s): PTP, INR, APTT in the last 72 hours. No lab exists for component: INREXT, INREXT Lipid Panel No results found for: CHOL, CHOLPOCT, CHOLX, CHLST, CHOLV, 256105, HDL, LDL, LDLC, DLDLP, 082216, VLDLC, VLDL, TGLX, TRIGL, TRIGP, TGLPOCT, CHHD, CHHDX  
BNP No results for input(s): BNPP in the last 72 hours. Liver Enzymes Recent Labs 10/15/18 
 1745 TP  7.4 ALB  3.7 AP  70  
 SGOT  45* Thyroid Studies Lab Results Component Value Date/Time TSH 0.68 04/04/2016 02:30 PM  
    
Procedures/imaging: see electronic medical records for all procedures/Xrays and details which were not copied into this note but were reviewed prior to creation of Plan Kurt Crawley MD

## 2018-10-16 NOTE — PROGRESS NOTES
2037- Received from ED, assessment completed per flow sheet. Alert, oriented, cooperative, denies pain. +SOB at rest, respirations unlabored. NAD. 2040- SpO2 90%, 2 liters nasal cannula applied. 0000- Reassessment completed and without change. 0430- Reassessment completed and without change. 3477- Noted to drop HR into the 30's briefly when asleep, HR recovers without intervention (especially when awake).  notified, states okay for day RN to administer amiodarone as ordered.

## 2018-10-16 NOTE — H&P
[facility] [place] HISTORY AND PHYSICAL Name:FIOR KO JR. MR#: [id number] : 1975 ACCOUNT #: [de-identified] ADMIT DATE: 10/15/2018 ADMITTING DIAGNOSES: 
1. Acute hypoxic respiratory insufficiency. 2.  Morbid obesity. 3.  Acute diastolic congestive heart failure. 4.  Chronic atrial fibrillation. 5.  Obstructive sleep apnea on CPAP. HISTORY OF PRESENT ILLNESS:  This is a 49-year-old -American gentleman who is morbidly obese over 400 pounds. He does suffer with chronic arthritis in his knees and back. He has chronic renal insufficiency, hypertension, atrial fibrillation. He is on anticoagulation with Xarelto. He has had previous cardioversion in 2017 and he wears a CPAP for obstructive sleep apnea. He came into the hospital with 3-4 days of worsening shortness of breath, dyspnea on exertion, lightheadedness and blurry vision. He was scheduled a number of months ago for a gastric sleeve procedure to lose weight, but apparently his insurance canceled that procedure. He gained about 60 pounds back after having lost that in anticipation of the surgery and he had been off his Lasix for some time as well. He just had represcribed by his PCP. It sounds like he was getting back with some regular followups which he might not have been for some time, but he is short of breath when he arrived, he was hypoxic. His BNP was actually normal, but chest x-ray showed evidence for pulmonary edema. He has responded to Lasix IV and feels better after that. He is already urinating quite a bit after that dosing. He describes orthopnea. He has been compliant with his CPAP; however, he denies any chest pain. He has had no fevers, no respiratory infection symptoms. His meds at home include Xarelto, Lasix and amiodarone.   He is not taking any other prescription meds listed in his current list. 
 
PAST MEDICAL HISTORY:  Morbid obesity, migraines, sleep apnea, cigar smoking, atrial fibrillation and atrial flutter, status post cardioversion in 2017. Chronic knee and back pain and chronic kidney disease. His kidney disease is stage II. PAST SURGICAL HISTORY:  Includes a cardioversion. FAMILY HISTORY:  Cancer in a number of family members, his mother was also obese. He denies any history of heart disease or diabetes. SOCIAL HISTORY:  He quit smoking cigars a few months ago. He was smoking a couple times a week. He denies cigarette smoking. He denies any regular alcohol use. He denies illicit drugs. He is not currently working. He worked as a counselor prior. He lives at home with his wife and 2 stepchildren. ALLERGIES:  HE HAS NO MEDICATION ALLERGIES. REVIEW OF SYSTEMS:   
RESPIRATORY:  Worsening shortness of breath and dyspnea on exertion as well as orthopnea over the last number of days. He also describes leg swelling. CARDIOVASCULAR:  No chest pain, no palpitations. NEUROLOGIC:  He has had lightheadedness and blurry vision in the past 24 hours, which prompted his return to the emergency room. HEMATOLOGIC:  No bleeding or bruisability. GENITOURINARY:  No difficulty urinating, hematuria or dysuria. GASTROINTESTINAL:  No nausea, vomiting, diarrhea or blood in the stool. NEUROLOGIC:  No syncopal symptoms. PHYSICAL EXAMINATION:  The patient is a pleasant, oriented x3 -American male. He is morbidly obese, in no acute distress. VITAL SIGNS:  His blood pressure is 135/82, pulse 76, temperature 98.5, respiratory rate 16, SpO2 is 94%. He is on room air currently. He was 88% when he arrived to the emergency room and was tachycardic to 114. GENERAL:  Obese. HEENT:  Oropharynx is dry. No lesions. Sclerae are anicteric. Conjunctivae pink. LUNGS:  Diminished breath sounds at the bases. Difficult to auscultate breath sounds, but no notable wheezing or rhonchi. HEART:  Irregular rhythm, regular rate. No murmur, rub or gallop. ABDOMEN:  Obese, nontender, no abdominal wall edema. No ascites noted. LOWER EXTREMITIES:  3+ edema to the knees bilaterally. It appears chronic lymphedema. Distal pulses weak but palpable. Feet are warm. NEUROLOGIC:  Nonfocal.  His mentation is appropriate. LABORATORY DATA:  Show a normal CBC. His metabolic panel shows a BUN of 12, creatinine 1.64. Two sets of cardiac markers show troponin of 0.07. CK is 1236 and then 1136. BNP was 177. His sodium, potassium, chloride are normal.  LFTs are unremarkable. Chest x-ray showed cardiomegaly with bronchovascular and interstitial prominence, most likely due to vascular congestion. No dense consolidation to suggest pneumonia. His EKG shows atrial flutter with variable AV block. His last echo was 2016 on our records here and a blood gas earlier showed a pH of 7.481, pCO2 of 52, pO2 was 47. ASSESSMENT: 
1. Acute hypoxic respiratory insufficiency. 2.  Acute on chronic diastolic heart failure. 3.  Chronic atrial fibrillation. 4.  Morbid obesity. 5.  Obstructive sleep apnea on CPAP. PLAN:  Admission to the ICU as recommended by the ER physician who saw the patient initially. He was rather hypoxic on presentation. So for caution this evening since she has already discussed it with Pulmonology and Cardiology will go ahead and proceed to the ICU. Also, I think at this point he is feeling better after Lasix. We will continue the Lasix 60 mg IV q.12. He has a large body habitus. At this point, with what appears to be acute on chronic degree of edema so I think we will need some number of days for a notable difference in his edema pattern at this point. We will continue his Xarelto, which he is on for chronic AFib. He has been compliant with that. We will continue his amiodarone that he is on as well. We will follow up a CBC and metabolic panel daily while he is receiving Lasix. He should be on a cardiac, low sodium diet.   He can be up ad gaurav. We will continue oxygen therapy cardiac monitoring. We will get a duplex of his lower extremities to rule out DVT. We will place him on the hospital CPAP machine at night unless his wife wants to bring his into wear. So our overall plan is to diurese him. Consult with Cardiology and the intensivist.  Jj Ochoa to ICU tonight. Repeat the echo in the morning. Check for DVT. Continue his anticoagulation with Xarelto. Continue IV Lasix. Follow his electrolytes. Continue his amiodarone and expect at least 3 days in the hospital for effective diuresis from what appears to be at this point in time primarily congestive heart failure. Gerardo Prieto MD 
 
  
RI/AMELIA 
D: 10/15/2018 20:03    
T: 10/15/2018 20:31 
JOB #: [id number] CC: Cam Santos MD

## 2018-10-16 NOTE — ED NOTES
TRANSFER - OUT REPORT: 
 
Verbal report given to Gama Garcia RN on Maribell Spears.  being transferred to ICU for routine progression of care Report consisted of patients Situation, Background, Assessment and  
Recommendations(SBAR). Information from the following report(s) SBAR, ED Summary, [facility] and Recent Results was reviewed with the receiving nurse. Lines:  
Peripheral IV 10/15/18 Right Hand (Active) Site Assessment Clean, dry, & intact 10/15/2018  5:45 PM  
Phlebitis Assessment 0 10/15/2018  5:45 PM  
Infiltration Assessment 0 10/15/2018  5:45 PM  
Dressing Status Clean, dry, & intact 10/15/2018  5:45 PM  
Dressing Type Transparent 10/15/2018  5:45 PM  
Hub Color/Line Status Pink 10/15/2018  5:45 PM  
Alcohol Cap Used Yes 10/15/2018  5:45 PM  
  
 
Opportunity for questions and clarification was provided. Patient transported with: 
 Monitor Registered Nurse

## 2018-10-16 NOTE — PROGRESS NOTES
Reason for Admission:    Chart reviewed and noted Pt admitted for CHF. Pt is IADL's prior to admission, lives with spouse. Per patient he has a Bipap machine (trilogy, ABC) that he uses and occasionally a cane for ambulating long distances. Pt admits he has not followed up on his health since he was denied bariatric surgery last year. Pt was being seen by Dr. Benjie Biswas, but due to his MCC should go to the Cardiologist who took over his patients. At this time, Pt is currently wearing O2 in ICU. Per patient he no longer has O2 at home. Should MD feel patient will require home O2 he will need to have a walk test within 48 hours of dc to determine if he qualifies FOC offered and pt has wife have chosen test company [place] [employer]. RRAT Score:      10 Plan for utilizing home health:      Yes, [facility] Likelihood of Readmission:  Low-moderate (depending on if we can get him Oxygen) Transition of Care Plan:   Home with wife and [address] [place] [employer] [facility] Care Management Interventions PCP Verified by CM: Yes Transition of Care Consult (CM Consult): Home Health [address]: Yes Discharge Durable Medical Equipment: No 
Physical Therapy Consult: Yes Occupational Therapy Consult: Yes Current Support Network: Lives with Spouse Confirm Follow Up Transport: Family Plan discussed with Pt/Family/Caregiver: Yes Freedom of Choice Offered: Yes Discharge Location Discharge Placement: Home with home health

## 2018-10-16 NOTE — PROGRESS NOTES
Problem: Mobility Impaired (Adult and Pediatric) Goal: *Acute Goals and Plan of Care (Insert Text) Physical Therapy Goals Physical Therapy Goals Initiated 10/16/2018 to be met in 2-3 days 1. Activity tolerance: Tolerate >10 minutes of activity with minimal c/o dyspnea and SpO2 on O2 >93% for improved efficiency with motor tasks. 2. Gait: Ambulate >75-100ft S with LRAD, O2 saturation > 90% on RA, for improved mobility in environment. 3. Patient Education: Independent with HEP and energy conservation techniques for improved endurance for home/facility discharge as indicated. Outcome: Progressing Towards Goal 
physical Therapy EVALUATION Patient: Floyd Ocampo. (36 y.o. male) Date: 10/16/2018 Primary Diagnosis: Hypoxia CHF (congestive heart failure) ([facility] [address] Morbid obesity ([facility] [address] Precautions:Fall ASSESSMENT : 
Based on the objective data described below, the patient is a 38 yo M seen in ICU. pt found seated EOB. Pt presents with ROM/motor performance WFL bilat, and with decreased activity tolerance during overall functional mobility activities. Demonstrates gait without AD 15ft with wide MATILDE, increased lateral trunk sway and genu varus bilaterally. HR increased from  and O2 sat decreased from 94% to 88%. Note BP in 170's/150's and nurse Ryley Tinajero made aware. Pt returned to bed and left with all needs in reach. Recommend HHPT for follow up physical therapy upon discharge to reach maximal level of independence/safety with functional mobility. Pt Education: pt educated in safety/technique during functional mobility tasks Patient will benefit from skilled intervention to address the above impairments. Patients rehabilitation potential is considered to be Good Factors which may influence rehabilitation potential include:  
[]         None noted 
[]         Mental ability/status [x]         Medical condition 
[]         Home/family situation and support systems []         Safety awareness 
[]         Pain tolerance/management 
[]         Other: PLAN : 
Recommendations and Planned Interventions: 
[x]           Bed Mobility Training             []    Neuromuscular Re-Education 
[x]           Transfer Training                   []    Orthotic/Prosthetic Training 
[x]           Gait Training                          []    Modalities [x]           Therapeutic Exercises          []    Edema Management/Control 
[x]           Therapeutic Activities            [x]    Patient and Family Training/Education 
[]           Other (comment): Frequency/Duration: Patient will be followed by physical therapy 1-2 times per day to address goals. Discharge Recommendations: Home Health Further Equipment Recommendations for Discharge: N/A  
 
SUBJECTIVE:  
Patient stated I get short of breath.  OBJECTIVE DATA SUMMARY:  
 
Past Medical History:  
Diagnosis Date  Arrhythmia  Arthritis   
 knees  Chronic back pain  Chronic renal insufficiency, stage II (mild)  History of atrial flutter Dx 6/2016 - anticoagulated by [facility]  Hypertension 2015  Limited mobility  Migraine headache  Morbid obesity ([address]  Morbid obesity with body mass index of 60.0-69.9 in [facility])  Sleep apnea   
 uses c-pap instructed to bring day of surgery  Smoking   
 very passive / cigars only Past Surgical History:  
Procedure Laterality Date  CARDIAC SURG PROCEDURE UNLIST    
 cardioversion  ECHOCARDIOGRAM  04/2016 EF 60-65% (performed at [facility] - see cardiology tab) Barriers to Learning/Limitations: None Compensate with: N/A Prior Level of Function/Home Situation: amb with QC PTA Home Situation Home Environment: Apartment # Steps to Enter: 0 One/Two Story Residence: One story Living Alone: No 
Support Systems: Spouse/Significant Other/Partner Patient Expects to be Discharged to[de-identified] CVIUYRBVW Current DME Used/Available at Home: Cane, quad Tub or Shower Type: Tub/Shower combinationCritical Behavior: 
Neurologic State: Alert; Appropriate for age Orientation Level: Oriented X4 Safety/Judgement: Awareness of environment; Fall prevention Psychosocial 
Patient Behaviors: Appropriate for age;Calm Purposeful Interaction: Yes Pt Identified Daily Priority: Clinical issues (comment) Caritas Process: Nurture loving kindness;Establish trust;Attend basic human needs; Teaching/learning;Create healing environment;Supportive expression Caring Interventions: Reassure Reassure: Informing;Caring roundsSkin Condition/Temp: Warm;Dry Skin Integrity: Lesion (comment) (Multiple scattered lesions over most of body,1 open on back.) Skin Integumentary Skin Color: Appropriate for ethnicity (except BLE skin darker and leathery.) Skin Condition/Temp: Warm;Dry Skin Integrity: Lesion (comment) (Multiple scattered lesions over most of body,1 open on back.) Turgor: Non-tenting Hair Growth: Present Varicosities: Absent Strength:   
Strength: Within functional limits Tone & Sensation:  
Tone: Normal 
Sensation: Intact Range Of Motion: 
AROM: Within functional limits Functional Mobility: 
Bed Mobility: 
Sit to Supine: Stand-by assistance Transfers: 
Sit to Stand: Stand-by assistance Stand to Sit: Stand-by assistance Balance:  
Sitting: Intact Standing: IntactAmbulation/Gait Training: 
Distance (ft): 15 Feet (ft) Ambulation - Level of Assistance: Supervision Gait Abnormalities: Other (lateral trunk sway L/R with genu varus bilat) Base of Support: Widened Speed/Laurence: Pace decreased (<100 feet/min) Step Length: Left shortened;Right shortened Pain: 
Pain Scale 1: Numeric (0 - 10) Pain Intensity 1: 0 Activity Tolerance:  
Fair Please refer to the flowsheet for vital signs taken during this treatment. After treatment:  
[]         Patient left in no apparent distress sitting up in chair 
[x]         Patient left in no apparent distress in bed [x]         Call bell left within reach [x]         Nursing notified 
[]         Caregiver present 
[]         Bed alarm activated COMMUNICATION/EDUCATION:  
[x]         Fall prevention education was provided and the patient/caregiver indicated understanding. [x]         Patient/family have participated as able in goal setting and plan of care. [x]         Patient/family agree to work toward stated goals and plan of care. []         Patient understands intent and goals of therapy, but is neutral about his/her participation. []         Patient is unable to participate in goal setting and plan of care. Eval Complexity: History: HIGH Complexity :3+ comorbidities / personal factors will impact the outcome/ POC Exam:LOW Complexity : 1-2 Standardized tests and measures addressing body structure, function, activity limitation and / or participation in recreation  Presentation: MEDIUM Complexity : Evolving with changing characteristics  Clinical Decision Making:Medium Complexity amb <30ft, O2 sat decreased to 88% from 94% Overall Complexity:MEDIUM 
 
G-Codes (GP) Mobility  Current  CI= 1-19%   Goal  CI= 1-19% The severity rating is based on the functional mobility assessment. Thank you for this referral. 
Mariola Joseph, PT Time Calculation: 20 mins

## 2018-10-16 NOTE — PROGRESS NOTES
Bedside and Verbal shift change report given to Irlanda Valle RN (oncoming nurse) by Kaylan Johnson RN (offgoing nurse). Report included the following information SBAR, Kardex, Intake/Output, MAR, Recent Results and Cardiac Rhythm A Flutter/fib.  
 
0800- Initial shift assessment complete, see EMR.  
 
1200- Reassessment complete, no changes. 1600- Reassessment complete, no changes. 1915-Bedside and Verbal shift change report given to Rachel Mckee RN (oncoming nurse) by Irlanda Valle RN (offgoing nurse). Report included the following information A Flutter cardiac rhythmn.

## 2018-10-16 NOTE — CONSULTS
[facility] Pulmonary, Critical Care, and Sleep Medicine Name: Harman Leary. MRN: [id number] : 1975 Hospital: [facility] [place] Date: 10/16/2018 Pulmonary Critical Care Initial Patient Consult IMPRESSION:  
· Principal Problem: ·   Acute diastolic CHF (congestive heart failure) ([address] (10/15/2018) ·  
· Active Problems: ·   Morbid obesity ([address] (2016) ·  
·   CHF (congestive heart failure) ([address] (10/15/2018) ·  
·   Hypoxia (10/15/2018) ·  
·   Chronic a-fib ([address] (10/15/2018) ·  
·   Atrial flutter ([address] (10/15/2018) ·  
· Acute on chronic hypercapnic hypoxic respiratory failure · HFpEF · Elevated CK · Severe MARIA FERNANDA, OHS on home O2 and BiPAP  
RECOMMENDATIONS:  
Continue BiPAP use routine at night and PRN during day- adjust settings per ABG or for goal SPO2> 90% Supplemental O2 via NC when off of BiPAP, titrate flow for goal SPO2> 90% Aspiration prevention bundle, head of the bed at 30' all times, pulmonary hygiene care Diuresis per cardiology Low clinica suspicion for any VTE given other explanation for symptoms and clinical response to treatment Defer any anticoagulant agent choice to cardiology since limited data for NOAC in morbidly obese patient Check CK daily, ECHO per cardiology Monitor renal function and lytes Stress ulcer prophylaxis DVT prophylaxis AM labs. Diet when off of BiPAP Will defer respective systems problem management to primary and other consultant and follow patient in ICU with primary and other medical team 
Further recommendations will be based on the patient's response to recommended treatment and results of the investigation ordered. Quality Care: PPI, DVT prophylaxis, HOB elevated, Infection control all reviewed and addressed. PAIN AND SEDATION: none at present · Prophylaxis: DVT and GI Prophylaxis reviewed. · Restraints: none 
· PT/OT eval and treat: as needed · Lines/Tubes: lines PIV; daniel none ADVANCE DIRECTIVE: Full code FAMILY DISCUSSION: spoke with patient and answered all his questions Events and notes from last 24 hours reviewed. Care plan discussed with nursing Subjective/History: Mr. Tracy Manzano. has been seen and evaluated as Dr. Doris Michaels requested for Acute on chronic hypoxic respiratory failure. Patient is a 37 y.o. male with PMhx for severe MARIA FERNANDA [[place] , on BiPAP [25/18 cwp] and O2, morbid obesity, HFpEF, HTN, chronic arthritis in his knees and back, atrial fibrillation on chronic anticoagulation with Xarelto, previous cardioversion in 2017 came to ED with several days of worsening SOB, leg swelling, orthopnea. His cardiologist Dr. Francisca Mckenzie retired recently and after his gastric sleeve procedure was denied by his insurance found himself  gained about 60 pounds back had been off his Lasix for some time. He was represcribed Lasix recently by his PCP. In ED, chest x-ray showed pulmonary edema and he responded to Lasix IV by diuresing and feels better. The patient denies fever, chills, sick contact, cough, chest pain, wheezing, hemoptysis, palpitations, syncope, PND. He has been compliant with his BiPAP and uses O2 intermittently with it during sleep. Review of Systems: 
Pertinent items are noted in HPI. [x]The patient is critically ill on     
[]Mechanical ventilation []Pressors  
[x]BiPAP [] Latest lactic acid:  
Lactic acid Date Value Ref Range Status 06/09/2017 1.1 0.4 - 2.0 MMOL/L Final  
 
 
Past Medical History: 
Past Medical History:  
Diagnosis Date  Arrhythmia  Arthritis   
 knees  Chronic back pain  Chronic renal insufficiency, stage II (mild)  History of atrial flutter Dx 6/2016 - anticoagulated by Francisca Mckenzie  Hypertension 2015  Limited mobility  Migraine headache  Morbid obesity ([facility]  Morbid obesity with body mass index of 60.0-69.9 in adult [facility])  Sleep apnea uses c-pap instructed to bring day of surgery  Smoking   
 very passive / cigars only Past Surgical History: 
Past Surgical History:  
Procedure Laterality Date  CARDIAC SURG PROCEDURE UNLIST    
 cardioversion  ECHOCARDIOGRAM  04/2016 EF 60-65% (performed at THE [facility] - see cardiology tab) Medications: 
Prior to Admission medications Medication Sig Start Date End Date Taking? Authorizing Provider  
rivaroxaban (XARELTO) 20 mg tab tablet Take 20 mg by mouth daily. Yes Historical Provider  
amiodarone (CORDARONE) 200 mg tablet Take 1 Tab by mouth daily. 9/28/17  Yes William Wilder MD  
furosemide (LASIX) 40 mg tablet Take 1 Tab by mouth two (2) times a day. 4/12/16  Yes Freda Garcia MD  
 
 
Current Facility-Administered Medications Medication Dose Route Frequency  perflutren lipid microspheres (DEFINITY) in NS bolus IV  1 mL IntraVENous RAD ONCE  
 sodium chloride (NS) flush 5-10 mL  5-10 mL IntraVENous Q8H  
 amiodarone (CORDARONE) tablet 200 mg  200 mg Oral DAILY  omeprazole (PRILOSEC) capsule 20 mg  20 mg Oral DAILY  rivaroxaban (XARELTO) tablet 20 mg  20 mg Oral DAILY  furosemide (LASIX) injection 60 mg  60 mg IntraVENous BID  furosemide (LASIX) injection 40 mg  40 mg IntraVENous Q8H  
 influenza vaccine 2018-19 (6 mos+)(PF) (FLUARIX QUAD/FLULAVAL QUAD) injection 0.5 mL  0.5 mL IntraMUSCular PRIOR TO DISCHARGE Allergy: No Known Allergies Social History: 
Social History Substance Use Topics  Smoking status: Former Smoker Types: Cigars  Smokeless tobacco: Never Used  Alcohol use No  
  
 
Family History: 
Family History Problem Relation Age of Onset  Obesity Mother  Liver Disease Sister Objective: 
Vital Signs:   
Blood pressure 127/78, pulse 61, temperature 97.5 °F (36.4 °C), resp. rate 20, height 5' 10\" (1.778 m), weight (!) 234.3 kg (516 lb 8.6 oz), SpO2 96 %. Body mass index is 74.12 kg/(m^2). O2 Device: CPAP mask O2 Flow Rate (L/min): 2 l/min Temp (24hrs), Av.1 °F (36.7 °C), Min:97.5 °F (36.4 °C), Max:98.7 °F (37.1 °C) Intake/Output:  
Last shift:      10/16 0701 - 10/16 1900 In: -  
Out: 200 [Urine:200] Last 3 shifts: 10/14 1901 - 10/16 0700 In: -  
Out:  [QRTXE:0693] Intake/Output Summary (Last 24 hours) at 10/16/18 5058 Last data filed at 10/16/18 0872 Gross per 24 hour Intake                0 ml Output             2185 ml Net            -2185 ml Physical Exam: 
General: AAO x 3, in no respiratory distress and acyanotic, cooperative, appears older than stated age, morbidly obese, on BiPAP HEENT: PERRL, fundi benign, BiPAP mask in place Neck: No abnormally enlarged lymph nodes or thyroid, supple Chest: obese chest 
Lungs: moderate air entry and exam limitation 2' to body habitus, breathing normal, normal percussion bilaterally anterior chest, no tenderness/ rash Heart: irregular rate and rhythm, S1S2 present or without murmur or extra heart sounds Abdomen: obese, bowel sounds normoactive, tympanic, abdomen is soft without significant tenderness, masses, organomegaly or guarding, rigidity, rebound Extremity: 2+, pitting and bl LE edema, no cyanosis, clubbing Capillary refill: normal 
Neuro: alert, oriented x 3, no defects noted in general exam. 
Skin: Skin color, texture, turgor fair. Skin dry, warm, non-diaphoretic Data:  
 
Recent Results (from the past 24 hour(s)) CBC WITH AUTOMATED DIFF Collection Time: 10/15/18  5:45 PM  
Result Value Ref Range WBC 4.6 4.6 - 13.2 K/uL  
 RBC 5.26 4.70 - 5.50 M/uL  
 HGB 15.8 13.0 - 16.0 g/dL HCT 48.2 (H) 36.0 - 48.0 % MCV 91.6 74.0 - 97.0 FL  
 MCH 30.0 24.0 - 34.0 PG  
 MCHC 32.8 31.0 - 37.0 g/dL  
 RDW 12.9 11.6 - 14.5 % PLATELET 990 537 - 445 K/uL MPV 10.7 9.2 - 11.8 FL  
 NEUTROPHILS 55 40 - 73 % LYMPHOCYTES 33 21 - 52 % MONOCYTES 11 (H) 3 - 10 % EOSINOPHILS 1 0 - 5 % BASOPHILS 0 0 - 2 % ABS. NEUTROPHILS 2.5 1.8 - 8.0 K/UL  
 ABS. LYMPHOCYTES 1.5 0.9 - 3.6 K/UL  
 ABS. MONOCYTES 0.5 0.05 - 1.2 K/UL  
 ABS. EOSINOPHILS 0.1 0.0 - 0.4 K/UL  
 ABS. BASOPHILS 0.0 0.0 - 0.1 K/UL  
 DF AUTOMATED METABOLIC PANEL, COMPREHENSIVE Collection Time: 10/15/18  5:45 PM  
Result Value Ref Range Sodium 143 136 - 145 mmol/L Potassium 3.5 3.5 - 5.5 mmol/L Chloride 101 100 - 108 mmol/L  
 CO2 34 (H) 21 - 32 mmol/L Anion gap 8 3.0 - 18 mmol/L Glucose 110 (H) 74 - 99 mg/dL BUN 12 7.0 - 18 MG/DL Creatinine 1.64 (H) 0.6 - 1.3 MG/DL  
 BUN/Creatinine ratio 7 (L) 12 - 20 GFR est AA 55 (L) >60 ml/min/1.73m2 GFR est non-AA 46 (L) >60 ml/min/1.73m2 Calcium 9.1 8.5 - 10.1 MG/DL Bilirubin, total 0.6 0.2 - 1.0 MG/DL  
 ALT (SGPT) 52 16 - 61 U/L  
 AST (SGOT) 45 (H) 15 - 37 U/L Alk. phosphatase 70 45 - 117 U/L Protein, total 7.4 6.4 - 8.2 g/dL Albumin 3.7 3.4 - 5.0 g/dL Globulin 3.7 2.0 - 4.0 g/dL A-G Ratio 1.0 0.8 - 1.7 NT-PRO BNP Collection Time: 10/15/18  5:45 PM  
Result Value Ref Range NT pro- 0 - 450 PG/ML  
MAGNESIUM Collection Time: 10/15/18  5:45 PM  
Result Value Ref Range Magnesium 1.7 1.6 - 2.6 mg/dL CARDIAC PANEL,(CK, CKMB & TROPONIN) Collection Time: 10/15/18  5:45 PM  
Result Value Ref Range CK 1236 (H) 39 - 308 U/L  
 CK - MB 10.1 (H) <3.6 ng/ml CK-MB Index 0.8 0.0 - 4.0 % Troponin-I, Qt. 0.07 (H) 0.00 - 0.06 NG/ML  
EKG, 12 LEAD, INITIAL Collection Time: 10/15/18  6:00 PM  
Result Value Ref Range Ventricular Rate 77 BPM  
 Atrial Rate 77 BPM  
 P-R Interval 206 ms QRS Duration 100 ms Q-T Interval 396 ms QTC Calculation (Bezet) 448 ms Calculated R Axis -41 degrees Calculated T Axis 26 degrees Diagnosis Normal sinus rhythm with sinus arrhythmia Left axis deviation Nonspecific ST and T wave abnormality Abnormal ECG When compared with ECG of 06-NOV-2017 12:05, 
 ST now depressed in Inferior leads T wave inversion now evident in Inferior leads T wave inversion now evident in Anterior leads POC VENOUS BLOOD GAS Collection Time: 10/15/18  6:08 PM  
Result Value Ref Range Device: ROOM AIR    
 FIO2 (POC) 21 %  
 pH, venous (POC) 7.481 (H) 7.32 - 7.42    
 pCO2, venous (POC) 52.8 (H) 41 - 51 MMHG  
 pO2, venous (POC) 47 (H) 25 - 40 mmHg HCO3, venous (POC) 39.4 (H) 23.0 - 28.0 MMOL/L  
 sO2, venous (POC) 84 65 - 88 % Base excess, venous (POC) 16 mmol/L Allens test (POC) N/A Site OTHER Specimen type (POC) VENOUS BLOOD Performed by Lane Cole CARDIAC PANEL,(CK, CKMB & TROPONIN) Collection Time: 10/15/18  6:45 PM  
Result Value Ref Range CK 1136 (H) 39 - 308 U/L  
 CK - MB 9.2 (H) <3.6 ng/ml CK-MB Index 0.8 0.0 - 4.0 % Troponin-I, Qt. 0.07 (H) 0.00 - 0.06 NG/ML  
EKG, 12 LEAD, SUBSEQUENT Collection Time: 10/15/18  7:26 PM  
Result Value Ref Range Ventricular Rate 76 BPM  
 Atrial Rate 228 BPM  
 QRS Duration 102 ms Q-T Interval 398 ms QTC Calculation (Bezet) 447 ms Calculated P Axis -98 degrees Calculated R Axis -34 degrees Calculated T Axis 40 degrees Diagnosis Atrial flutter with variable AV block Left axis deviation Pulmonary disease pattern Nonspecific ST and T wave abnormality Abnormal ECG When compared with ECG of 15-OCT-2018 18:00, 
Atrial flutter has replaced Sinus rhythm POC G3 Collection Time: 10/15/18  9:11 PM  
Result Value Ref Range Device: NASAL CANNULA Flow rate (POC) 2 L/M  
 FIO2 (POC) 0.28 % pH (POC) 7.386 7.35 - 7.45    
 pCO2 (POC) 61.6 (H) 35.0 - 45.0 MMHG  
 pO2 (POC) 62 (L) 80 - 100 MMHG  
 HCO3 (POC) 37.0 (H) 22 - 26 MMOL/L  
 sO2 (POC) 90 (L) 92 - 97 % Base excess (POC) 12 mmol/L Allens test (POC) N/A Total resp. rate 20 Site RIGHT RADIAL Patient temp. 98.7 Specimen type (POC) ARTERIAL Performed by Michael Mariano CBC W/O DIFF Collection Time: 10/16/18  5:53 AM  
Result Value Ref Range WBC 4.5 (L) 4.6 - 13.2 K/uL  
 RBC 5.16 4.70 - 5.50 M/uL  
 HGB 15.5 13.0 - 16.0 g/dL HCT 48.3 (H) 36.0 - 48.0 % MCV 93.6 74.0 - 97.0 FL  
 MCH 30.0 24.0 - 34.0 PG  
 MCHC 32.1 31.0 - 37.0 g/dL  
 RDW 13.1 11.6 - 14.5 % PLATELET 543 700 - 421 K/uL MPV 10.5 9.2 - 11.8 FL  
CARDIAC PANEL,(CK, CKMB & TROPONIN) Collection Time: 10/16/18  5:53 AM  
Result Value Ref Range CK 1047 (H) 39 - 308 U/L  
 CK - MB 7.5 (H) <3.6 ng/ml CK-MB Index 0.7 0.0 - 4.0 % Troponin-I, Qt. 0.07 (H) 0.00 - 6.20 NG/ML  
METABOLIC PANEL, BASIC Collection Time: 10/16/18  5:53 AM  
Result Value Ref Range Sodium 142 136 - 145 mmol/L Potassium 3.8 3.5 - 5.5 mmol/L Chloride 101 100 - 108 mmol/L  
 CO2 34 (H) 21 - 32 mmol/L Anion gap 7 3.0 - 18 mmol/L Glucose 93 74 - 99 mg/dL BUN 12 7.0 - 18 MG/DL Creatinine 1.47 (H) 0.6 - 1.3 MG/DL  
 BUN/Creatinine ratio 8 (L) 12 - 20 GFR est AA >60 >60 ml/min/1.73m2 GFR est non-AA 52 (L) >60 ml/min/1.73m2 Calcium 9.1 8.5 - 10.1 MG/DL MAGNESIUM Collection Time: 10/16/18  5:53 AM  
Result Value Ref Range Magnesium 1.9 1.6 - 2.6 mg/dL PHOSPHORUS Collection Time: 10/16/18  5:53 AM  
Result Value Ref Range Phosphorus 5.5 (H) 2.5 - 4.9 MG/DL Recent Labs 10/15/18 
 2111  10/15/18 
 1808 FIO2I  0.28  21 HCO3I  37.0*   --   
PCO2I  61.6*   --   
PHI  7.386   --   
PO2I  62*   -- All Micro Results None Telemetry: AFIB Imaging: 
[x]I have personally reviewed the patients chest radiographs images and report Results from Hospital Encounter encounter on 10/15/18 XR CHEST PA LAT Narrative History: Shortness of breath Views: Frontal and lateral views. Lateral view is suboptimal due to motion 
artifacts. Comparison study: 6/9/2017 Findings: 
 
Heart and mediastinum: Enlarged cardiac silhouette. Lungs and pleura: Bronchovascular and interstitial prominence noted without 
dense consolidation or pleural effusion. Aorta: Unremarkable. Bones: Within normal limits for age. Impression Impression: 
 
Cardiomegaly with bronchovascular and interstitial prominence most likely due to 
vascular congestion possibly due to CHF/fluid overload. No dense consolidation 
is seen to suggest pneumonia. No results found for this or any previous visit. [x]See my orders for details My assessment, plan of care, findings, medications, side effects etc were discussed with: 
[x]nursing []PT/OT [x]respiratory therapy []Dr. Barr Speaker [x]Patient [x]Total critical care time exclusive of procedures 50 minutes with complex decision making performed and > 50% time spent in face to face evaluation.  
 
Vandana Gutiérrez MD

## 2018-10-16 NOTE — DIABETES MGMT
GLYCEMIC CONTROL & NUTRITION: 
 
 
- Discussed in rounds and chart reviewed - BG currently within target range - No glycemic control needs identified at this time Recent Glucose Results:  
Lab Results Component Value Date/Time GLU 93 10/16/2018 05:53 AM  
  (H) 10/15/2018 05:45 PM  
 
Silva Soni MS, RN, CDE Glycemic Control Team 
[phone] Pager [phone] (M-TH 8:00-4:30P) *After Hours pager [phone]

## 2018-10-16 NOTE — PROGRESS NOTES
conducted an initial consultation and Spiritual Assessment for Minal Quach, who is a 37 y.o.,male. Patient's wife was at the bedside. Patient was sitting on the side of the bed. At patient's request I reviewed Advance Medical Directives with him and left a copy for him to complete. He will call for a  if he completes it while here. Wife is a  at [employer]. Good support. The reason the Patient came to the hospital is:  
Patient Active Problem List  
 Diagnosis Date Noted  Elevated troponin 10/16/2018  Acute respiratory distress 10/16/2018  CHF (congestive heart failure) ([address] 10/15/2018  Hypoxia 10/15/2018  Acute diastolic CHF (congestive heart failure) ([address] 10/15/2018  Chronic a-fib ([address] 10/15/2018  Atrial flutter ([facility] 10/15/2018  Chronic renal insufficiency, stage II (mild)  Morbid obesity with body mass index of 60.0-69.9 in adult [facility])  Chronic back pain  Arthritis  Migraine  History of atrial flutter  Limited mobility  Smoking  Cellulitis of left lower leg 04/04/2016  Anemia 04/04/2016  Morbid obesity ([address] 04/04/2016  Dyspnea 04/04/2016  Sleep apnea 04/04/2016  Sinus tachycardia 04/04/2016  Acute respiratory failure with hypoxia and hypercarbia ([address] 04/04/2016 The  provided the following Interventions: 
Initiated a relationship of care and support. Listened empathically. Reviewed Advance Medical Directive forms and left a copy. Provided information about Spiritual Care Services. Offered assurance of continued prayers on patients behalf. Chart reviewed. The following outcomes were achieved: 
 confirmed Patient's Protestant Affiliation. Patient and wife expressed gratitude for Spiritual Care visit. Patient was reviewed in ICU Interdisciplinary Rounds.   
 
Assessment: 
There are no significant spiritual or Presybeterian issues which require further intervention at this time. Patient does not have any Denominational or cultural needs that will affect patients preferences in health care. Plan: 
Chaplains will continue to follow and will provide pastoral care as needed or requested.  recommends bedside caregivers page  on duty if patient shows signs of acute spiritual or emotional distress. [id number] [facility] [address]. Board Certified Amador Snow [phone] - Office

## 2018-10-16 NOTE — PROGRESS NOTES
Cardiology Progress Note Patient: Floyd Ocampo. Sex: male          DOA: 10/15/2018 YOB: 1975      Age:  37 y.o.        LOS:  LOS: 1 day Assessment/Plan Principal Problem: 
  Acute diastolic CHF (congestive heart failure) ([address] (10/15/2018) Active Problems: Morbid obesity ([address] (4/4/2016) CHF (congestive heart failure) ([address] (10/15/2018) Hypoxia (10/15/2018) Chronic a-fib ([address] (10/15/2018) Atrial flutter ([address] (10/15/2018) Elevated troponin (10/16/2018) Acute respiratory distress (10/16/2018) Plan: 
Discussed with pt about Xarelto vs warfarin, he prefer xarelto EF dropped 45% to 50% Will add losartan 25 mg mg po BID Coreg 3.125 mg po BID Discussed with patient Subjective:  
 cc: SOB Vanessa Pierre REVIEW OF SYSTEMS:  
 
General: No fevers or chills. Cardiovascular: No chest pain or pressure. No palpitations. No ankle swelling Pulmonary: No SOB, orthopnea, PND Gastrointestinal: No nausea, vomiting or diarrhea Objective:  
  
Visit Vitals  /55  Pulse 88  Temp 97.7 °F (36.5 °C)  Resp 26  
 Ht 5' 10\" (1.778 m)  Wt (!) 234.1 kg (516 lb)  SpO2 92%  BMI 74.04 kg/m2 Body mass index is 74.04 kg/(m^2). Physical Exam: 
General Appearance: Comfortable, not using accessory muscles of respiration. NECK: No JVD, no thyroidomeglay. LUNGS: Clear bilaterally. HEART: S1+S2 audible, ABD: Non-tender, BS Audible EXT: No edema, and no cysnosis. VASCULAR EXAM: Pulses are intact. PSYCHIATRIC EXAM: Mood is appropriate. Medication: 
Current Facility-Administered Medications Medication Dose Route Frequency  sodium chloride (NS) flush 5-10 mL  5-10 mL IntraVENous Q8H  
 sodium chloride (NS) flush 5-10 mL  5-10 mL IntraVENous PRN  
 amiodarone (CORDARONE) tablet 200 mg  200 mg Oral DAILY  omeprazole (PRILOSEC) capsule 20 mg  20 mg Oral DAILY  rivaroxaban (XARELTO) tablet 20 mg  20 mg Oral DAILY  furosemide (LASIX) injection 60 mg  60 mg IntraVENous BID  acetaminophen (TYLENOL) tablet 650 mg  650 mg Oral Q6H PRN  
 ondansetron (ZOFRAN) injection 4 mg  4 mg IntraVENous Q6H PRN  
 furosemide (LASIX) injection 40 mg  40 mg IntraVENous Q8H  
 influenza vaccine 2018-19 (6 mos+)(PF) (FLUARIX QUAD/FLULAVAL QUAD) injection 0.5 mL  0.5 mL IntraMUSCular PRIOR TO DISCHARGE Lab/Data Reviewed: 
Procedures/imaging: see electronic medical records for all procedures/Xrays  
and details which were not copied into this note but were reviewed prior to creation of Plan 
  
 
All lab results for the last 24 hours reviewed. Recent Labs 10/16/18 
 0553  10/15/18 
 1745 WBC  4.5*  4.6 HGB  15.5  15.8 HCT  48.3*  48.2*  
PLT  147  157 Recent Labs 10/16/18 
 0553  10/15/18 
 1745 NA  142  143  
K  3.8  3.5 CL  101  101 CO2  34*  34* GLU  93  110* BUN  12  12 CREA  1.47*  1.64* CA  9.1  9.1 Signed By: Tejal Downs MD   
 October 16, 2018 Will check TSH. THX

## 2018-10-16 NOTE — PROGRESS NOTES
Pt placed on hospital-issued Resmed cpap in auto-titrating cpap mode with 2 lpm bleed-in O2. Pt sleeping comfortably and VS stable. No distress noted at this time. Will continue to monitor.

## 2018-10-16 NOTE — PROGRESS NOTES
Occupational Therapy Screening: 
Services are indicated. Evaluate and Treat Order is requested. An InBasket screening referral was triggered for occupational therapy based on results obtained during the nursing admission assessment. The patients chart was reviewed and the patient is appropriate for a skilled therapy evaluation. Patient admitted d/t A flutter/hypoxia. Please order a consult for occupational therapy if you are in agreement and would like an evaluation to be completed. Thank you.  
Jessica Perez, OTR/L

## 2018-10-16 NOTE — PROGRESS NOTES
Problem: Self Care Deficits Care Plan (Adult) Goal: *Acute Goals and Plan of Care (Insert Text) Occupational Therapy Goals Initiated 10/16/2018 within 7 day(s). 1.  Patient will perform lower body dressing with supervision/set-up 2. Patient will perform toilet transfers with supervision/set-up. 3.  Patient will perform all aspects of toileting with supervision/set-up. 4.  Patient will complete standing with supervision/set-up for 5 minutes during ADL to increase activity tolerance for functional activity. 5.  Patient will utilize energy conservation techniques during functional activities with verbal cues. Occupational Therapy EVALUATION Patient: Deep Hay (36 y.o. male) Date: 10/16/2018 Primary Diagnosis: Hypoxia CHF (congestive heart failure) ([facility] [address] Morbid obesity ([facility] [address] Precautions:  Fall ASSESSMENT : 
Based on the objective data described below, the patient presents with hypoxia and SOB. Pt sitting EOB for session. /159 and O2 93 on 2 liters of nasal canula. Pt completed about 10 feet of functional mobility in room with SBA and O2 down to 88, RR 30 and HR up to 122. Pt denies dizziness with activity. Pt completed LB dressing with CGA at EOB. Pt could benefit from OT to increase activity tolerance and strength for functional activity. Patient will benefit from skilled intervention to address the above impairments. Patients rehabilitation potential is considered to be Excellent Factors which may influence rehabilitation potential include:  
[]             None noted []             Mental ability/status []             Medical condition []             Home/family situation and support systems []             Safety awareness []             Pain tolerance/management 
[]             Other: PLAN : 
Recommendations and Planned Interventions: 
[x]               Self Care Training                  [x]        Therapeutic Activities [x]               Functional Mobility Training    []        Cognitive Retraining 
[x]               Therapeutic Exercises           [x]        Endurance Activities [x]               Balance Training                   []        Neuromuscular Re-Education []               Visual/Perceptual Training     [x]   Home Safety Training 
[x]               Patient Education                 [x]        Family Training/Education []               Other (comment): Frequency/Duration: Patient will be followed by occupational therapy 1-2 times per day/4-7 days per week to address goals. Discharge Recommendations: Home Health Further Equipment Recommendations for Discharge: N/A  
 
SUBJECTIVE:  
Patient stated I've been getting SOB.  OBJECTIVE DATA SUMMARY:  
 
Past Medical History:  
Diagnosis Date  Arrhythmia  Arthritis   
 knees  Chronic back pain  Chronic renal insufficiency, stage II (mild)  History of atrial flutter Dx 6/2016 - anticoagulated by [facility]  Hypertension 2015  Limited mobility  Migraine headache  Morbid obesity ([facility] [address]  Morbid obesity with body mass index of 60.0-69.9 in adult [facility])  Sleep apnea   
 uses c-pap instructed to bring day of surgery  Smoking   
 very passive / cigars only Past Surgical History:  
Procedure Laterality Date  CARDIAC SURG PROCEDURE UNLIST    
 cardioversion  ECHOCARDIOGRAM  04/2016 EF 60-65% (performed at [facility] - see cardiology tab) Barriers to Learning/Limitations: yes;  physical 
Compensate with: visual, verbal, tactile, kinesthetic cues/model Prior Level of Function/Home Situation: I with ADLs prior to admission Home Situation Home Environment: Apartment # Steps to Enter: 0 One/Two Story Residence: One story Living Alone: No 
Support Systems: Spouse/Significant Other/Partner Patient Expects to be Discharged to[de-identified] [id number] Current DME Used/Available at Home: Cane, quad Tub or Shower Type: Tub/Shower combination []  Right hand dominant   []  Left hand dominantCognitive/Behavioral Status: 
Neurologic State: Alert; Appropriate for age Orientation Level: Oriented X4 Safety/Judgement: Awareness of environment; Fall prevention Skin: intact Edema: min edema noted on bilateral LE Vision/Perceptual:   
Corrective Lenses: Glasses Coordination: 
Coordination: Within functional limits Fine Motor Skills-Upper: Left Intact; Right Intact Gross Motor Skills-Upper: Left Intact; Right Intact Balance: 
Sitting: Intact Standing: Intact; With supportStrength: 
Strength: Within functional limits Tone & Sensation: 
Tone: Normal 
Sensation: Intact Range of Motion: 
AROM: Within functional limits Functional Mobility and Transfers for ADLs: 
Bed Mobility: N/A pt sitting up at EOB upon arrival 
Transfers: 
Sit to Stand: Stand-by assistance ADL Assessment: 
Upper Body Dressing: Supervision Lower Body Dressing: Contact guard assistance ADL Intervention: 
Cognitive Retraining Safety/Judgement: Awareness of environment; Fall prevention Pain: 
Pain Scale 1: Numeric (0 - 10) Pain Intensity 1: 0 Activity Tolerance:  
Fair - Please refer to the flowsheet for vital signs taken during this treatment. After treatment:  
[] Patient left in no apparent distress sitting up in chair 
[x] Patient left in no apparent distress in bed 
[x] Call bell left within reach [x] Nursing notified 
[] Caregiver present 
[] Bed alarm activated COMMUNICATION/EDUCATION:  
[x] Home safety education was provided and the patient/caregiver indicated understanding. [x] Patient/family have participated as able in goal setting and plan of care. [x] Patient/family agree to work toward stated goals and plan of care. [] Patient understands intent and goals of therapy, but is neutral about his/her participation. [] Patient is unable to participate in goal setting and plan of care.  
 
Thank you for this referral. 
 Clive Obregon, OTR/L Time Calculation: 20 mins

## 2018-10-17 ENCOUNTER — HOME HEALTH ADMISSION (OUTPATIENT)
Dept: HOME HEALTH SERVICES | Facility: HOME HEALTH | Age: 43
End: 2018-10-17

## 2018-10-17 LAB
ANION GAP SERPL CALC-SCNC: 5 MMOL/L (ref 3–18)
ANION GAP SERPL CALC-SCNC: 5 MMOL/L (ref 3–18)
BUN SERPL-MCNC: 15 MG/DL (ref 7–18)
BUN SERPL-MCNC: 18 MG/DL (ref 7–18)
BUN/CREAT SERPL: 11 (ref 12–20)
BUN/CREAT SERPL: 11 (ref 12–20)
CALCIUM SERPL-MCNC: 9.1 MG/DL (ref 8.5–10.1)
CALCIUM SERPL-MCNC: 9.2 MG/DL (ref 8.5–10.1)
CHLORIDE SERPL-SCNC: 101 MMOL/L (ref 100–108)
CHLORIDE SERPL-SCNC: 102 MMOL/L (ref 100–108)
CK SERPL-CCNC: 1398 U/L (ref 39–308)
CO2 SERPL-SCNC: 33 MMOL/L (ref 21–32)
CO2 SERPL-SCNC: 33 MMOL/L (ref 21–32)
CREAT SERPL-MCNC: 1.38 MG/DL (ref 0.6–1.3)
CREAT SERPL-MCNC: 1.62 MG/DL (ref 0.6–1.3)
ERYTHROCYTE [DISTWIDTH] IN BLOOD BY AUTOMATED COUNT: 13.1 % (ref 11.6–14.5)
GLUCOSE SERPL-MCNC: 102 MG/DL (ref 74–99)
GLUCOSE SERPL-MCNC: 84 MG/DL (ref 74–99)
HCT VFR BLD AUTO: 49.6 % (ref 36–48)
HGB BLD-MCNC: 15.8 G/DL (ref 13–16)
MAGNESIUM SERPL-MCNC: 1.9 MG/DL (ref 1.6–2.6)
MCH RBC QN AUTO: 30.3 PG (ref 24–34)
MCHC RBC AUTO-ENTMCNC: 31.9 G/DL (ref 31–37)
MCV RBC AUTO: 95.2 FL (ref 74–97)
PLATELET # BLD AUTO: 126 K/UL (ref 135–420)
PMV BLD AUTO: 10.5 FL (ref 9.2–11.8)
POTASSIUM SERPL-SCNC: 3.9 MMOL/L (ref 3.5–5.5)
POTASSIUM SERPL-SCNC: 4 MMOL/L (ref 3.5–5.5)
RBC # BLD AUTO: 5.21 M/UL (ref 4.7–5.5)
SODIUM SERPL-SCNC: 139 MMOL/L (ref 136–145)
SODIUM SERPL-SCNC: 140 MMOL/L (ref 136–145)
TSH SERPL DL<=0.05 MIU/L-ACNC: 2.2 UIU/ML (ref 0.36–3.74)
WBC # BLD AUTO: 5.2 K/UL (ref 4.6–13.2)

## 2018-10-17 PROCEDURE — 94660 CPAP INITIATION&MGMT: CPT

## 2018-10-17 PROCEDURE — 82550 ASSAY OF CK (CPK): CPT | Performed by: INTERNAL MEDICINE

## 2018-10-17 PROCEDURE — 74011250637 HC RX REV CODE- 250/637: Performed by: HOSPITALIST

## 2018-10-17 PROCEDURE — 65660000000 HC RM CCU STEPDOWN

## 2018-10-17 PROCEDURE — 80048 BASIC METABOLIC PNL TOTAL CA: CPT | Performed by: INTERNAL MEDICINE

## 2018-10-17 PROCEDURE — 83735 ASSAY OF MAGNESIUM: CPT | Performed by: INTERNAL MEDICINE

## 2018-10-17 PROCEDURE — 74011250636 HC RX REV CODE- 250/636: Performed by: HOSPITALIST

## 2018-10-17 PROCEDURE — 74011250637 HC RX REV CODE- 250/637: Performed by: INTERNAL MEDICINE

## 2018-10-17 PROCEDURE — 97116 GAIT TRAINING THERAPY: CPT

## 2018-10-17 PROCEDURE — 85027 COMPLETE CBC AUTOMATED: CPT | Performed by: INTERNAL MEDICINE

## 2018-10-17 PROCEDURE — 77010033678 HC OXYGEN DAILY

## 2018-10-17 PROCEDURE — 36415 COLL VENOUS BLD VENIPUNCTURE: CPT | Performed by: INTERNAL MEDICINE

## 2018-10-17 RX ORDER — AMIODARONE HYDROCHLORIDE 200 MG/1
100 TABLET ORAL DAILY
Status: DISCONTINUED | OUTPATIENT
Start: 2018-10-18 | End: 2018-10-18

## 2018-10-17 RX ADMIN — CARVEDILOL 3.12 MG: 3.12 TABLET, FILM COATED ORAL at 10:27

## 2018-10-17 RX ADMIN — LOSARTAN POTASSIUM 25 MG: 25 TABLET ORAL at 09:11

## 2018-10-17 RX ADMIN — FUROSEMIDE 60 MG: 10 INJECTION, SOLUTION INTRAMUSCULAR; INTRAVENOUS at 09:10

## 2018-10-17 RX ADMIN — AMIODARONE HYDROCHLORIDE 200 MG: 200 TABLET ORAL at 10:27

## 2018-10-17 RX ADMIN — Medication 10 ML: at 14:00

## 2018-10-17 RX ADMIN — Medication 10 ML: at 22:00

## 2018-10-17 RX ADMIN — RIVAROXABAN 20 MG: 10 TABLET, FILM COATED ORAL at 09:11

## 2018-10-17 RX ADMIN — CARVEDILOL 3.12 MG: 3.12 TABLET, FILM COATED ORAL at 17:51

## 2018-10-17 RX ADMIN — FUROSEMIDE 60 MG: 10 INJECTION, SOLUTION INTRAMUSCULAR; INTRAVENOUS at 22:00

## 2018-10-17 RX ADMIN — OMEPRAZOLE 20 MG: 20 CAPSULE, DELAYED RELEASE ORAL at 09:10

## 2018-10-17 NOTE — PROGRESS NOTES
Problem: Mobility Impaired (Adult and Pediatric) Goal: *Acute Goals and Plan of Care (Insert Text) Physical Therapy Goals Physical Therapy Goals Initiated 10/16/2018 to be met in 2-3 days 1. Activity tolerance: Tolerate >10 minutes of activity with minimal c/o dyspnea and SpO2 on O2 >93% for improved efficiency with motor tasks. 2. Gait: Ambulate >75-100ft S with LRAD, O2 saturation > 90% on RA, for improved mobility in environment. 3. Patient Education: Independent with HEP and energy conservation techniques for improved endurance for home/facility discharge as indicated. physical Therapy TREATMENT Patient: Robby Langston (36 y.o. male) Date: 10/17/2018 Diagnosis: Hypoxia CHF (congestive heart failure) ([address] Morbid obesity ([address] Acute diastolic CHF (congestive heart failure) ([address] Precautions: Fall Chart, physical therapy assessment, plan of care and goals were reviewed. ASSESSMENT: 
Pt is very mobile, but uses lateral swaying and circumduction to ambulate. Pt has HR increase from 79, to 170's, with 5mIn V-tach (Reported by telemetry monitor tech). Pt remained asymptomatic. Progression toward goals: 
[]      Improving appropriately and progressing toward goals [x]      Improving slowly and progressing toward goals 
[]      Not making progress toward goals and plan of care will be adjusted PLAN: 
Patient continues to benefit from skilled intervention to address the above impairments. Continue treatment per established plan of care. Discharge Recommendations:  Home Health Further Equipment Recommendations for Discharge:  None SUBJECTIVE:  
Patient stated I'll get up.  OBJECTIVE DATA SUMMARY:  
Critical Behavior: 
Neurologic State: Alert Orientation Level: Oriented X4 Safety/Judgement: Awareness of environment, Fall prevention Functional Mobility Training: 
Bed Mobility: 
Rolling: Supervision Supine to Sit: Supervision Sit to Supine: Supervision Scooting: Supervision Transfers: 
Sit to Stand: Supervision Stand to Sit: Supervision Balance: 
Sitting: Intact Standing: IntactAmbulation/Gait Training: 
Distance (ft): 450 Feet (ft) Ambulation - Level of Assistance: Supervision Gait Abnormalities: Other Base of Support: Widened Speed/Laurence: Pace decreased (<100 feet/min) Step Length: Left shortened;Right shortened Pain: 
Pain Scale 1: Numeric (0 - 10) Pain Intensity 1: 0 Activity Tolerance:  
Good Please refer to the flowsheet for vital signs taken during this treatment. After treatment:  
[] Patient left in no apparent distress sitting up in chair 
[x] Patient left in no apparent distress in bed 
[x] Call bell left within reach [x] Nursing notified 
[] Caregiver present 
[] Bed alarm activated Danielle Thomas PTA Time Calculation: 24 mins

## 2018-10-17 NOTE — PROGRESS NOTES
0815: assumed care of pt. Head to toe assessment completed per flow sheet please see for further details. Pt denies pain at this time. No s/s of distress noted. Pt is afebrile and vital signs stable. Denies any needs. Call light left in reach will continue to monitor closely. 1230: reassessment completed. No acute changes noted at this time. Pt denies pain or needs. Afebrile and vital signs stable. Call light left in reach. Will continue to monitor closely. 1600: reassessment completed. No acute changes noted at this time. Pt is afebriel and vss. Denies needs or pain at this time. Call light left in reach. Will continue to monitor closely. 1612: TRANSFER - OUT REPORT: 
 
Verbal report given to [facility], RN(name) on [address]  being transferred to Telemetry(unit) for routine progression of care Report consisted of patients Situation, Background, Assessment and  
Recommendations(SBAR). Information from the following report(s) SBAR, Kardex, Intake/Output, MAR, Recent Results and Cardiac Rhythm A flutter was reviewed with the receiving nurse. Lines:  
Peripheral IV 10/15/18 Right Hand (Active) Site Assessment Clean, dry, & intact 10/17/2018  4:00 AM  
Phlebitis Assessment 0 10/17/2018  4:00 AM  
Infiltration Assessment 0 10/17/2018  4:00 AM  
Dressing Status Clean, dry, & intact 10/17/2018  4:00 AM  
Dressing Type Transparent 10/17/2018  4:00 AM  
Hub Color/Line Status Capped 10/17/2018  4:00 AM  
Action Taken Open ports on tubing capped 10/17/2018  4:00 AM  
Alcohol Cap Used Yes 10/17/2018  4:00 AM  
  
 
Opportunity for questions and clarification was provided. Patient transported with: 
 Monitor Registered Nurse

## 2018-10-17 NOTE — PROGRESS NOTES
Hospitalist Progress Note-critical care note Patient: Edita Chin MRN: [id number]  CSN: [id number] YOB: 1975  Age: 37 y.o. Sex: male DOA: 10/15/2018 LOS:  LOS: 2 days Chief complaint: elevated trop , chf with exacerbation, chronic a fib , morbid obesity  , bradycardia Assessment/Plan Hospital Problems  Date Reviewed: 11/6/2017 Codes Class Noted POA Elevated troponin ICD-10-CM: R74.8 ICD-9-CM: 790.6  10/16/2018 Unknown Acute respiratory distress ICD-10-CM: R06.03 
ICD-9-CM: 518.82  10/16/2018 Unknown CHF (congestive heart failure) (HCC) ICD-10-CM: I50.9 ICD-9-CM: 428.0  10/15/2018 Yes Hypoxia ICD-10-CM: R09.02 
ICD-9-CM: 799.02  10/15/2018 Yes * (Principal) Acute diastolic CHF (congestive heart failure) (HCC) ICD-10-CM: I50.31 ICD-9-CM: 428.31, 428.0  10/15/2018 Yes Chronic a-fib (HCC) ICD-10-CM: B70.3 ICD-9-CM: 427.31  10/15/2018 Yes Atrial flutter (HCC) (Chronic) ICD-10-CM: R62.16 
ICD-9-CM: 427.32  10/15/2018 Unknown Morbid obesity ([address] ICD-10-CM: E66.01 
ICD-9-CM: 278.01  4/4/2016 Yes  
   
  
 
chf exacerbation , combined systolic/diastalic Ef 45-50 % -Left ventricular global hypokinesis Lasix, coreg and losartan-added per cariology  
-monitor electrolytes and renal function Cardiology was called per er Chronic a fib Controlled per amiodarone and xarelto Hypoxia /acute respiratory distress , home O2-2 L Off bipap now-on nc o2 Due to chf exacerbation Will continue wean off nc O2 to his home level Bradycardia During the sleep Likely due to jac  
 
jac : continue cpap at night/nap time Elevated trop No chest pain Trop flat Subjective: sob better Nurse: natalia to 30s during the sleep, coreg was hold Disposition :2-3 days Review of systems: 
 
General: No fevers or chills. Cardiovascular: No chest pain or pressure. No palpitations. Pulmonary:  shortness of breath -a little bit better Gastrointestinal: No nausea, vomiting. Vital signs/Intake and Output: 
Visit Vitals BP (!) 139/126 Pulse 77 Temp 98.4 °F (36.9 °C) Resp 17 Ht 5' 10\" (1.778 m) Wt (!) 234.1 kg (516 lb) SpO2 91% BMI 74.04 kg/m² Current Shift:  No intake/output data recorded. Last three shifts:  10/15 1901 - 10/17 0700 In: 520 [P.O.:520] Out: 4660 [VXOPY:3061] Physical Exam: 
General: WD, WN. Alert, cooperative, no acute distress   
HEENT: NC, Atraumatic. PERRLA, anicteric sclerae. cpap mask noted Lungs: CTA Bilaterally. No Wheezing/Rhonchi/Rales. Heart:  Regular  rhythm,  No murmur, No Rubs, No Gallops Abdomen: Soft, Non distended, Non tender.  +Bowel sounds, Extremities: No c/c/e Psych:   Not anxious or agitated. Neurologic:  No acute neurological deficit. Labs: Results:  
   
Chemistry Recent Labs 10/17/18 
0455 10/16/18 
0553 10/15/18 
1745 GLU 84 93 110*  142 143  
K 4.0 3.8 3.5  101 101 CO2 33* 34* 34* BUN 15 12 12 CREA 1.38* 1.47* 1.64* CA 9.1 9.1 9.1 AGAP 5 7 8 BUCR 11* 8* 7* AP  --   --  70  
TP  --   --  7.4 ALB  --   --  3.7 GLOB  --   --  3.7 AGRAT  --   --  1.0  
  
CBC w/Diff Recent Labs 10/17/18 
0455 10/16/18 
0553 10/15/18 
1745 WBC 5.2 4.5* 4.6  
RBC 5.21 5.16 5.26  
HGB 15.8 15.5 15.8 HCT 49.6* 48.3* 48.2*  
* 147 157 GRANS  --   --  55  
LYMPH  --   --  33 EOS  --   --  1 Cardiac Enzymes Recent Labs 10/17/18 
0455 10/16/18 
0553 10/15/18 
1845 CPK 1,398* 1,047* 1,136* CKND1  --  0.7 0.8 Coagulation No results for input(s): PTP, INR, APTT in the last 72 hours. No lab exists for component: INREXT, INREXT Lipid Panel No results found for: CHOL, CHOLPOCT, CHOLX, CHLST, CHOLV, 375707, HDL, LDL, LDLC, DLDLP, 395197, VLDLC, VLDL, TGLX, TRIGL, TRIGP, TGLPOCT, CHHD, CHHDX  
BNP No results for input(s): BNPP in the last 72 hours. Liver Enzymes Recent Labs 10/15/18 
1745 TP 7.4 ALB 3.7 AP 70 SGOT 45* Thyroid Studies Lab Results Component Value Date/Time TSH 2.20 10/16/2018 05:53 AM  
    
Procedures/imaging: see electronic medical records for all procedures/Xrays and details which were not copied into this note but were reviewed prior to creation of Plan Juan Aguayo MD

## 2018-10-17 NOTE — PROGRESS NOTES
0058--pt placed on hospital-issued Resmed cpap in auto-titrating cpap mode with 2 lpm bleed-in O2 for HS at this time. No distress noted. Will continue to monitor.

## 2018-10-17 NOTE — PROGRESS NOTES
Cardiology Progress Note Patient: Maxime Sotomayor Sex: male          DOA: 10/15/2018 YOB: 1975      Age:  37 y.o.        LOS:  LOS: 2 days Assessment/Plan Principal Problem: 
  Acute diastolic CHF (congestive heart failure) ([address] (10/15/2018) Active Problems: Morbid obesity ([address] (4/4/2016) CHF (congestive heart failure) ([facility] [address] (10/15/2018) Hypoxia (10/15/2018) Chronic a-fib ([address] (10/15/2018) Atrial flutter ([address] (10/15/2018) Elevated troponin (10/16/2018) Acute respiratory distress (10/16/2018) Plan: 
asymptomatic nocturnal bradycardia Decrease amiodarone from 200 mg to 100 mg po once daily Continue with losartan, coreg, Xarelto and lasix Cardiomyopathy- needs ischemia work - stress test vs cath . Pt prefer stress test, will check with stress lab if stress test lab can accommodate pt from weight stand point. Subjective:  
 cc: 
diastolic heart failure Aflutter 
cardiomyopathy REVIEW OF SYSTEMS:  
 
General: No fevers or chills. Cardiovascular: No chest pain or pressure. No palpitations. improving ankle swelling Pulmonary: + SOB improving, orthopnea, PND Gastrointestinal: No nausea, vomiting or diarrhea Objective:  
  
Visit Vitals BP (!) 107/32 Pulse 88 Temp 97.8 °F (36.6 °C) Resp 18 Ht 5' 10\" (1.778 m) Wt (!) 234.1 kg (516 lb) SpO2 100% BMI 74.04 kg/m² Body mass index is 74.04 kg/m². Physical Exam: 
General Appearance: Comfortable, not using accessory muscles of respiration. NECK: No JVD, no thyroidomeglay. LUNGS: Clear bilaterally. HEART: S1+S2 audible, ABD: Non-tender, BS Audible EXT: No edema, and no cysnosis. VASCULAR EXAM: Pulses are intact. PSYCHIATRIC EXAM: Mood is appropriate. Medication: 
Current Facility-Administered Medications Medication Dose Route Frequency  [START ON 10/18/2018] amiodarone (CORDARONE) tablet 100 mg  100 mg Oral DAILY  losartan (COZAAR) tablet 25 mg  25 mg Oral DAILY  carvedilol (COREG) tablet 3.125 mg  3.125 mg Oral BID WITH MEALS  sodium chloride (NS) flush 5-10 mL  5-10 mL IntraVENous Q8H  
 sodium chloride (NS) flush 5-10 mL  5-10 mL IntraVENous PRN  
 omeprazole (PRILOSEC) capsule 20 mg  20 mg Oral DAILY  rivaroxaban (XARELTO) tablet 20 mg  20 mg Oral DAILY  furosemide (LASIX) injection 60 mg  60 mg IntraVENous BID  acetaminophen (TYLENOL) tablet 650 mg  650 mg Oral Q6H PRN  
 ondansetron (ZOFRAN) injection 4 mg  4 mg IntraVENous Q6H PRN  
 influenza vaccine 2018-19 (6 mos+)(PF) (FLUARIX QUAD/FLULAVAL QUAD) injection 0.5 mL  0.5 mL IntraMUSCular PRIOR TO DISCHARGE Lab/Data Reviewed: 
Procedures/imaging: see electronic medical records for all procedures/Xrays  
and details which were not copied into this note but were reviewed prior to creation of Plan 
  
 
All lab results for the last 24 hours reviewed. Recent Labs 10/17/18 
0455 10/16/18 
0553 10/15/18 
1745 WBC 5.2 4.5* 4.6 HGB 15.8 15.5 15.8 HCT 49.6* 48.3* 48.2*  
* 147 157 Recent Labs 10/17/18 
0455 10/16/18 
0553 10/15/18 
1745  142 143  
K 4.0 3.8 3.5  101 101 CO2 33* 34* 34* GLU 84 93 110* BUN 15 12 12 CREA 1.38* 1.47* 1.64* CA 9.1 9.1 9.1 Signed By: Gail Barnes MD   
 October 17, 2018 Discussed with pt, can not do stress test because of weight limit. Discussed with him cath versus medica treatment, he prefer medical treatment since he is not having chest pain. THX

## 2018-10-17 NOTE — ROUTINE PROCESS
Bedside and Verbal shift change report given to ELPIDIO Haines (oncoming nurse) by BAR Vasquez (offgoing nurse). Report included the following information SBAR, Kardex, Intake/Output and MAR\.

## 2018-10-17 NOTE — ROUTINE PROCESS
TRANSFER - IN REPORT: 
 
Verbal report received from Joshua Farias RN (name) on [facility].  being received from ICU(unit) for routine progression of care Report consisted of patients Situation, Background, Assessment and  
Recommendations(SBAR). Information from the following report(s) SBAR, Kardex, Intake/Output and MAR was reviewed with the receiving nurse. Opportunity for questions and clarification was provided. Assessment completed upon patients arrival to unit and care assumed.

## 2018-10-17 NOTE — PROGRESS NOTES
Pulmonary Specialists Pulmonary, Critical Care, and Sleep Medicine Name: April Pickett MRN: [id number] : 1975 Hospital: [facility] [place] Date: 10/17/2018 Pulmonary Critical Care Patient F/Up Note IMPRESSION:  
Principal Problem: 
  Acute diastolic CHF (congestive heart failure) ([address] (10/15/2018) Active Problems: Morbid obesity ([address] (2016) CHF (congestive heart failure) ([address] (10/15/2018) Hypoxia (10/15/2018) Chronic a-fib ([address] (10/15/2018) Atrial flutter ([address] (10/15/2018) Elevated troponin (10/16/2018) Acute respiratory distress (10/16/2018) · Acute on chronic hypercapnic hypoxic respiratory failure · HFpEF · Episode of nocturnal sinus bradycardia, asymptomatic · Elevated CK · Severe MARIA FERNANDA, OHS on home O2 and BiPAP  
RECOMMENDATIONS:  
Continue BiPAP use routine at night and PRN during day- adjust settings per ABG or for goal SPO2> 90% Supplemental O2 via NC when off of BiPAP, titrate flow for goal SPO2> 90% Aspiration prevention bundle, head of the bed at 30' all times, pulmonary hygiene care Diuresis per cardiology Defer any anticoagulant agent choice to cardiology 10/16/18 PVL LE venous negative for DVT Monitor CK daily; ECHO showed LVEF 46-50%; RV dilated Monitor renal function and lytes Stress ulcer prophylaxis DVT prophylaxis AM labs. Diet as tolerated Okay to transfer to [facility] from pulmonary standpoint Will defer respective systems problem management to primary and other consultant and follow patient with primary and other medical team 
Further recommendations will be based on the patient's response to recommended treatment and results of the investigation ordered. Quality Care: PPI, DVT prophylaxis, HOB elevated, Infection control all reviewed and addressed. PAIN AND SEDATION: none at present · Prophylaxis: DVT and GI Prophylaxis reviewed. · Restraints: none 
· PT/OT eval and treat: as needed · Lines/Tubes: lines PIV; daniel none ADVANCE DIRECTIVE: Full code FAMILY DISCUSSION: spoke with patient and answered all his questions Events and notes from last 24 hours reviewed. Care plan discussed with nursing Subjective/History: 
Patient is a 37 y.o. male with PMhx for severe MARIA FERNANDA [[place] , on BiPAP [25/18 cwp] and O2, morbid obesity, HFpEF, HTN, chronic arthritis in his knees and back, atrial fibrillation on chronic anticoagulation with Xarelto, previous cardioversion in 2017 came to ED with several days of worsening SOB, leg swelling, orthopnea. His cardiologist Dr. Mirian Ramos retired recently and after his gastric sleeve procedure was denied by his insurance found himself  gained about 60 pounds back had been off his Lasix for some time. He was represcribed Lasix recently by his PCP. In ED, chest x-ray showed pulmonary edema and he responded to Lasix IV by diuresing and feels better. The patient denies fever, chills, sick contact, cough, chest pain, wheezing, hemoptysis, palpitations, syncope, PND. He has been compliant with his BiPAP and uses O2 intermittently with it during sleep. 10/17/18 Patient used BiPAP last night Feeling better significantly compared to admission day with SOB, leg swelling Ambulating out of bed to bedside camode Diuresing well with Lasix and hemodynamically stable Episodes of sinus bradycardia during sleep without symptoms or hypotension. No awake bradycardia Patient denies any chest pain, palpitations, syncope, orthopnea, or paroxysmal nocturnal dyspnea. Patient admits not using O2 at home regularly despite being prescribed by his pulmonologist 
No fever, chills. Good oral intake Review of Systems: 
General ROS: negative for  - fever, chills, weight loss, fatigue and malaise Cardiovascular ROS: negative for - chest pain, murmur, orthopnea, palpitations or paroxysmal nocturnal dyspnea Gastrointestinal ROS: no abdominal pain, change in bowel habits, or black or bloody stools Dermatological ROS: negative for - pruritus, rash or skin lesion changes Past Medical History: 
Past Medical History:  
Diagnosis Date  Arrhythmia  Arthritis   
 knees  Chronic back pain  Chronic renal insufficiency, stage II (mild)  History of atrial flutter Dx 6/2016 - anticoagulated by Roger Sanchez  Hypertension 2015  Limited mobility  Migraine headache  Morbid obesity ([address]  Morbid obesity with body mass index of 60.0-69.9 in adult [facility])  Sleep apnea   
 uses c-pap instructed to bring day of surgery  Smoking   
 very passive / cigars only Past Surgical History: 
Past Surgical History:  
Procedure Laterality Date  CARDIAC SURG PROCEDURE UNLIST    
 cardioversion  ECHOCARDIOGRAM  04/2016 EF 60-65% (performed at THE [facility] - see cardiology tab) Medications: 
Prior to Admission medications Medication Sig Start Date End Date Taking? Authorizing Provider  
rivaroxaban (XARELTO) 20 mg tab tablet Take 20 mg by mouth daily. Yes Provider, Historical  
amiodarone (CORDARONE) 200 mg tablet Take 1 Tab by mouth daily. 9/28/17  Yes Meghan Jennings MD  
furosemide (LASIX) 40 mg tablet Take 1 Tab by mouth two (2) times a day. 4/12/16  Yes Neeru Costello MD  
 
 
Current Facility-Administered Medications Medication Dose Route Frequency  losartan (COZAAR) tablet 25 mg  25 mg Oral DAILY  carvedilol (COREG) tablet 3.125 mg  3.125 mg Oral BID WITH MEALS  sodium chloride (NS) flush 5-10 mL  5-10 mL IntraVENous Q8H  
 amiodarone (CORDARONE) tablet 200 mg  200 mg Oral DAILY  omeprazole (PRILOSEC) capsule 20 mg  20 mg Oral DAILY  rivaroxaban (XARELTO) tablet 20 mg  20 mg Oral DAILY  furosemide (LASIX) injection 60 mg  60 mg IntraVENous BID  influenza vaccine 2018-19 (6 mos+)(PF) (FLUARIX QUAD/FLULAVAL QUAD) injection 0.5 mL  0.5 mL IntraMUSCular PRIOR TO DISCHARGE Allergy: No Known Allergies Social History: 
Social History Tobacco Use  Smoking status: Former Smoker Types: Cigars  Smokeless tobacco: Never Used Substance Use Topics  Alcohol use: No  
 Drug use: No  
  
 
Family History: 
Family History Problem Relation Age of Onset  Obesity Mother  Liver Disease Sister Objective: 
Vital Signs:   
Blood pressure 138/78, pulse 88, temperature 97.8 °F (36.6 °C), resp. rate 27, height 5' 10\" (1.778 m), weight (!) 234.1 kg (516 lb), SpO2 100 %. Body mass index is 74.04 kg/m². O2 Device: Room air O2 Flow Rate (L/min): 2 l/min Temp (24hrs), Av °F (36.7 °C), Min:97.8 °F (36.6 °C), Max:98.4 °F (36.9 °C) Intake/Output:  
Last shift:      10/17 0701 - 10/17 1900 In: -  
Out: 1000 [Urine:1000] Last 3 shifts: 10/15 1901 - 10/17 0700 In: 520 [P.O.:520] Out: 4660 [[id number]] Intake/Output Summary (Last 24 hours) at 10/17/2018 1212 Last data filed at 10/17/2018 1000 Gross per 24 hour Intake 120 ml Output 3275 ml Net -3155 ml Physical Exam: 
General: AAO x 3, in no respiratory distress and acyanotic, cooperative, appears older than stated age, morbidly obese, on BiPAP HEENT: PERRL, fundi benign, BiPAP mask in place Neck: No abnormally enlarged lymph nodes or thyroid, supple Chest: obese chest 
Lungs: moderate air entry and exam limitation 2' to body habitus, breathing normal, normal percussion bilaterally anterior chest, no tenderness/ rash Heart: irregular rate and rhythm, S1S2 present or without murmur or extra heart sounds Abdomen: obese, bowel sounds normoactive, tympanic, abdomen is soft without significant tenderness, masses, organomegaly or guarding, rigidity, rebound Extremity: 2+, pitting and bl LE edema, no cyanosis, clubbing Capillary refill: normal 
Neuro: alert, oriented x 3, no defects noted in general exam. 
 Skin: Skin color, texture, turgor fair. Skin dry, warm, non-diaphoretic Data:  
 
Recent Results (from the past 24 hour(s)) CK Collection Time: 10/17/18  4:55 AM  
Result Value Ref Range CK 1,398 (H) 39 - 308 U/L  
CBC W/O DIFF Collection Time: 10/17/18  4:55 AM  
Result Value Ref Range WBC 5.2 4.6 - 13.2 K/uL  
 RBC 5.21 4.70 - 5.50 M/uL  
 HGB 15.8 13.0 - 16.0 g/dL HCT 49.6 (H) 36.0 - 48.0 % MCV 95.2 74.0 - 97.0 FL  
 MCH 30.3 24.0 - 34.0 PG  
 MCHC 31.9 31.0 - 37.0 g/dL  
 RDW 13.1 11.6 - 14.5 % PLATELET 008 (L) 675 - 420 K/uL MPV 10.5 9.2 - 56.7 FL  
METABOLIC PANEL, BASIC Collection Time: 10/17/18  4:55 AM  
Result Value Ref Range Sodium 140 136 - 145 mmol/L Potassium 4.0 3.5 - 5.5 mmol/L Chloride 102 100 - 108 mmol/L  
 CO2 33 (H) 21 - 32 mmol/L Anion gap 5 3.0 - 18 mmol/L Glucose 84 74 - 99 mg/dL BUN 15 7.0 - 18 MG/DL Creatinine 1.38 (H) 0.6 - 1.3 MG/DL  
 BUN/Creatinine ratio 11 (L) 12 - 20 GFR est AA >60 >60 ml/min/1.73m2 GFR est non-AA 56 (L) >60 ml/min/1.73m2 Calcium 9.1 8.5 - 10.1 MG/DL Recent Labs 10/15/18 
2111 10/15/18 
1808 FIO2I 0.28 21 HCO3I 37.0*  --   
PCO2I 61.6*  --   
PHI 7.386  --   
PO2I 62*  -- All Micro Results None Telemetry: AFIB Imaging: 
[x]I have personally reviewed the patients chest radiographs images and report Results from Hospital Encounter encounter on 10/15/18 XR CHEST PORT Narrative EXAM: Portable upright chest, one view INDICATION: Hypoxia. CHF. Chronic atrial fibrillation. COMPARISON: 10/15/2018 
 
_______________ FINDINGS: 
 
Cardiac silhouette is within normal range in size for the portable technique. Pulmonary vessels are equalized and indistinct, with improvement of previous 
interstitial markings. Parenchymal band is present at the left lung base not 
definitively present on the prior study. Costophrenic angles are sharp. No 
pneumothorax. _______________ Impression IMPRESSION: 
 
CHF with improvement. Focal left basilar linear atelectasis. No results found for this or any previous visit. [x]See my orders for details My assessment, plan of care, findings, medications, side effects etc were discussed with: 
[x]nursing []PT/OT [x]respiratory therapy []Dr. Elvia Andres [x]Patient [x]Total critical care time exclusive of procedures 50 minutes with complex decision making performed and > 50% time spent in face to face evaluation.  
 
Silvano Sandoval MD

## 2018-10-17 NOTE — PROGRESS NOTES
Chart reviewed and attended IDR's, at that time pt on room air tolerating well, no need for home 02 at this time cm will cont to review and remain available.

## 2018-10-17 NOTE — DIABETES MGMT
GLYCEMIC CONTROL & NUTRITION: 
 
 
- Discussed in rounds and chart reviewed - No glycemic control needs identified at this time Recent Glucose Results:  
Lab Results Component Value Date/Time GLU 84 10/17/2018 04:55 AM  
 
Paulo Weeks MS, RN, CDE Glycemic Control Team 
[phone] Pager [phone] (M-TH 8:00-4:30P) *After Hours pager [phone]

## 2018-10-17 NOTE — PROGRESS NOTES
1900-Bedside verbal report received from Michael Swanosn RN. Sbar, mar, labs, kardex and patient status reviewed. Assumed care of patient. 1935-Assessment completed. No complaints at this time. No changes from previous assessment. 0000-Pt stated his dinner never came today and is hungry at this time Ate 100% of frozen meal tray without difficulty. 0400-No changes from previous assessment. 0740-Verbal report given to [facility] [employer], RN. Sbar, mar, labs, kardex and patients status reviewed.

## 2018-10-18 LAB
ANION GAP SERPL CALC-SCNC: 6 MMOL/L (ref 3–18)
BUN SERPL-MCNC: 18 MG/DL (ref 7–18)
BUN/CREAT SERPL: 13 (ref 12–20)
CALCIUM SERPL-MCNC: 8.9 MG/DL (ref 8.5–10.1)
CHLORIDE SERPL-SCNC: 102 MMOL/L (ref 100–108)
CK SERPL-CCNC: 1737 U/L (ref 39–308)
CO2 SERPL-SCNC: 31 MMOL/L (ref 21–32)
CREAT SERPL-MCNC: 1.39 MG/DL (ref 0.6–1.3)
ERYTHROCYTE [DISTWIDTH] IN BLOOD BY AUTOMATED COUNT: 13 % (ref 11.6–14.5)
GLUCOSE SERPL-MCNC: 80 MG/DL (ref 74–99)
HCT VFR BLD AUTO: 50 % (ref 36–48)
HGB BLD-MCNC: 16 G/DL (ref 13–16)
MCH RBC QN AUTO: 30 PG (ref 24–34)
MCHC RBC AUTO-ENTMCNC: 32 G/DL (ref 31–37)
MCV RBC AUTO: 93.8 FL (ref 74–97)
PLATELET # BLD AUTO: 147 K/UL (ref 135–420)
PMV BLD AUTO: 10.9 FL (ref 9.2–11.8)
POTASSIUM SERPL-SCNC: 4.2 MMOL/L (ref 3.5–5.5)
RBC # BLD AUTO: 5.33 M/UL (ref 4.7–5.5)
SODIUM SERPL-SCNC: 139 MMOL/L (ref 136–145)
WBC # BLD AUTO: 5.3 K/UL (ref 4.6–13.2)

## 2018-10-18 PROCEDURE — 85027 COMPLETE CBC AUTOMATED: CPT | Performed by: HOSPITALIST

## 2018-10-18 PROCEDURE — 80048 BASIC METABOLIC PNL TOTAL CA: CPT | Performed by: HOSPITALIST

## 2018-10-18 PROCEDURE — 36415 COLL VENOUS BLD VENIPUNCTURE: CPT | Performed by: HOSPITALIST

## 2018-10-18 PROCEDURE — 97116 GAIT TRAINING THERAPY: CPT

## 2018-10-18 PROCEDURE — 74011250637 HC RX REV CODE- 250/637: Performed by: HOSPITALIST

## 2018-10-18 PROCEDURE — 74011250636 HC RX REV CODE- 250/636: Performed by: HOSPITALIST

## 2018-10-18 PROCEDURE — 82550 ASSAY OF CK (CPK): CPT | Performed by: HOSPITALIST

## 2018-10-18 PROCEDURE — 65660000000 HC RM CCU STEPDOWN

## 2018-10-18 PROCEDURE — 74011250637 HC RX REV CODE- 250/637: Performed by: INTERNAL MEDICINE

## 2018-10-18 PROCEDURE — 77010033678 HC OXYGEN DAILY

## 2018-10-18 RX ORDER — METOPROLOL SUCCINATE 25 MG/1
25 TABLET, EXTENDED RELEASE ORAL DAILY
Status: DISCONTINUED | OUTPATIENT
Start: 2018-10-18 | End: 2018-10-22 | Stop reason: HOSPADM

## 2018-10-18 RX ORDER — CARVEDILOL 3.12 MG/1
6.25 TABLET ORAL 2 TIMES DAILY WITH MEALS
Status: DISCONTINUED | OUTPATIENT
Start: 2018-10-19 | End: 2018-10-18

## 2018-10-18 RX ORDER — AMIODARONE HYDROCHLORIDE 200 MG/1
200 TABLET ORAL DAILY
Status: DISCONTINUED | OUTPATIENT
Start: 2018-10-19 | End: 2018-10-22 | Stop reason: HOSPADM

## 2018-10-18 RX ADMIN — CARVEDILOL 3.12 MG: 3.12 TABLET, FILM COATED ORAL at 09:07

## 2018-10-18 RX ADMIN — FUROSEMIDE 60 MG: 10 INJECTION, SOLUTION INTRAMUSCULAR; INTRAVENOUS at 09:07

## 2018-10-18 RX ADMIN — LOSARTAN POTASSIUM 25 MG: 25 TABLET ORAL at 09:08

## 2018-10-18 RX ADMIN — METOPROLOL SUCCINATE 25 MG: 25 TABLET, EXTENDED RELEASE ORAL at 18:09

## 2018-10-18 RX ADMIN — OMEPRAZOLE 20 MG: 20 CAPSULE, DELAYED RELEASE ORAL at 09:08

## 2018-10-18 RX ADMIN — AMIODARONE HYDROCHLORIDE 100 MG: 200 TABLET ORAL at 09:00

## 2018-10-18 RX ADMIN — RIVAROXABAN 20 MG: 10 TABLET, FILM COATED ORAL at 09:07

## 2018-10-18 RX ADMIN — Medication 10 ML: at 22:00

## 2018-10-18 RX ADMIN — Medication 10 ML: at 14:00

## 2018-10-18 RX ADMIN — FUROSEMIDE 60 MG: 10 INJECTION, SOLUTION INTRAMUSCULAR; INTRAVENOUS at 22:08

## 2018-10-18 RX ADMIN — Medication 10 ML: at 06:00

## 2018-10-18 NOTE — PROGRESS NOTES
Verbal bedside received from Constantin Calhoun off going nurse. Assumed patient care, bed in low position, call light within reach, white board updated. 0400 Patient HR in the 30's Patient assessed. Deep asleep, asymptomatic. No concerns voiced at the moment. Bedside and Verbal shift change report given to Paula Davenport RN (oncoming nurse) by Aziza Vinson (offgoing nurse). Report included the following information SBAR, Kardex and MAR.

## 2018-10-18 NOTE — PROGRESS NOTES
Problem: Falls - Risk of 
Goal: *Absence of Falls Document Genevive Camera Fall Risk and appropriate interventions in the flowsheet. Outcome: Progressing Towards Goal 
Fall Risk Interventions: 
Mobility Interventions: Patient to call before getting OOB, PT Consult for mobility concerns Medication Interventions: Patient to call before getting OOB, Teach patient to arise slowly Elimination Interventions: Call light in reach, Urinal in reach

## 2018-10-18 NOTE — PROGRESS NOTES
Pulmonary Specialists Pulmonary, Critical Care, and Sleep Medicine Name: Rashawn Mckenna. MRN: [id number] : 1975 Hospital: [facility] [place] Date: 10/18/2018 Pulmonary F/Up Note IMPRESSION:  
Principal Problem: 
  Acute diastolic CHF (congestive heart failure) ([address] (10/15/2018) Active Problems: Morbid obesity ([address] (2016) CHF (congestive heart failure) ([address] (10/15/2018) Hypoxia (10/15/2018) Chronic a-fib ([address] (10/15/2018) Atrial flutter ([address] (10/15/2018) Elevated troponin (10/16/2018) · Acute on chronic hypercapnic hypoxic respiratory failure · HFpEF · Episode of nocturnal sinus bradycardia, asymptomatic · Elevated CK · Severe MARIA FERNANDA, OHS on home O2 and BiPAP  
RECOMMENDATIONS:  
Continue BiPAP use routine at night and PRN during day- adjust settings per goal SPO2> 90% Supplemental O2 via NC when off of BiPAP, titrate flow for goal SPO2> 90% Check O2 requirement prior to DC home with walk test 
Aspiration prevention bundle, head of the bed at 30' all times, pulmonary hygiene care Diuresis, anticoagulant per cardiology 10/16/18 PVL LE venous negative for DVT Monitor CK daily; ECHO showed LVEF 46-50%; RV dilated Monitor renal function and lytes Stress ulcer prophylaxis DVT prophylaxis AM labs. Diet as tolerated Will defer respective systems problem management to primary and other consultant and follow patient with primary and other medical team 
Further recommendations will be based on the patient's response to recommended treatment and results of the investigation ordered. ADVANCE DIRECTIVE: Full code FAMILY DISCUSSION: spoke with patient and answered all his questions Subjective/History: 
Patient is a 37 y.o. male with PMhx for severe MARIA FERNANDA [[place] , on BiPAP [25/18 cwp] and O2, morbid obesity, HFpEF, HTN, chronic arthritis in his knees and back, atrial fibrillation on chronic anticoagulation with Xarelto, previous cardioversion in 2017 came to ED with several days of worsening SOB, leg swelling, orthopnea. His cardiologist Dr. Fabio Kim retired recently and after his gastric sleeve procedure was denied by his insurance found himself  gained about 60 pounds back had been off his Lasix for some time. He was represcribed Lasix recently by his PCP. In ED, chest x-ray showed pulmonary edema and he responded to Lasix IV by diuresing and feels better. The patient denies fever, chills, sick contact, cough, chest pain, wheezing, hemoptysis, palpitations, syncope, PND. He has been compliant with his BiPAP and uses O2 intermittently with it during sleep. 10/18/18 Patient used BiPAP last night; on O2 via NC at 2 lpm 
Feeling better significantly overall. Ambulating out of bed Diuresing with Lasix. Amiodarone adjusted by cardiology Patient denies any chest pain, palpitations, syncope, orthopnea, or paroxysmal nocturnal dyspnea. No fever, chills. Good oral intake Patient transferred to floor yesterday Review of Systems: 
General ROS: negative for  - fever, chills, weight loss, fatigue and malaise Cardiovascular ROS: negative for - chest pain, murmur, orthopnea, palpitations or paroxysmal nocturnal dyspnea Gastrointestinal ROS: no abdominal pain, change in bowel habits, or black or bloody stools Dermatological ROS: negative for - pruritus, rash or skin lesion changes Past Medical History: 
Past Medical History:  
Diagnosis Date  Arrhythmia  Arthritis   
 knees  Chronic back pain  Chronic renal insufficiency, stage II (mild)  History of atrial flutter Dx 6/2016 - anticoagulated by Fabio Kim  Hypertension 2015  Limited mobility  Migraine headache  Morbid obesity [address]  Morbid obesity with body mass index of 60.0-69.9 in adult [facility])  Sleep apnea   
 uses c-pap instructed to bring day of surgery  Smoking   
 very passive / cigars only Past Surgical History: 
Past Surgical History:  
Procedure Laterality Date  CARDIAC SURG PROCEDURE UNLIST    
 cardioversion  ECHOCARDIOGRAM  04/2016 EF 60-65% (performed at THE [facility] - see cardiology tab) Medications: 
Prior to Admission medications Medication Sig Start Date End Date Taking? Authorizing Provider  
rivaroxaban (XARELTO) 20 mg tab tablet Take 20 mg by mouth daily. Yes Provider, Historical  
amiodarone (CORDARONE) 200 mg tablet Take 1 Tab by mouth daily. 9/28/17  Yes Meghan Jennings MD  
furosemide (LASIX) 40 mg tablet Take 1 Tab by mouth two (2) times a day. 4/12/16  Yes Neeru Costello MD  
 
 
Current Facility-Administered Medications Medication Dose Route Frequency  amiodarone (CORDARONE) tablet 100 mg  100 mg Oral DAILY  losartan (COZAAR) tablet 25 mg  25 mg Oral DAILY  carvedilol (COREG) tablet 3.125 mg  3.125 mg Oral BID WITH MEALS  sodium chloride (NS) flush 5-10 mL  5-10 mL IntraVENous Q8H  
 omeprazole (PRILOSEC) capsule 20 mg  20 mg Oral DAILY  rivaroxaban (XARELTO) tablet 20 mg  20 mg Oral DAILY  furosemide (LASIX) injection 60 mg  60 mg IntraVENous BID  influenza vaccine 2018-19 (6 mos+)(PF) (FLUARIX QUAD/FLULAVAL QUAD) injection 0.5 mL  0.5 mL IntraMUSCular PRIOR TO DISCHARGE Allergy: No Known Allergies Social History: 
Social History Tobacco Use  Smoking status: Former Smoker Types: Cigars  Smokeless tobacco: Never Used Substance Use Topics  Alcohol use: No  
 Drug use: No  
  
 
Family History: 
Family History Problem Relation Age of Onset  Obesity Mother  Liver Disease Sister Objective: 
Vital Signs:   
Blood pressure 124/47, pulse 70, temperature 98.1 °F (36.7 °C), resp. rate 16, height 5' 10\" (1.778 m), weight (!) 234.1 kg (516 lb), SpO2 95 %. Body mass index is 74.04 kg/m². O2 Device: Nasal cannula O2 Flow Rate (L/min): 2 l/min Temp (24hrs), Av.8 °F (36.6 °C), Min:97.4 °F (36.3 °C), Max:98.6 °F (37 °C) Intake/Output:  
Last shift:      10/18 0701 - 10/18 1900 In: 240 [P.O.:240] Out: 700 [Urine:700] Last 3 shifts: 10/16 1901 - 10/18 0700 In: 360 [P.O.:360] Out: 2975 [Urine:2975] Intake/Output Summary (Last 24 hours) at 10/18/2018 1548 Last data filed at 10/18/2018 1210 Gross per 24 hour Intake 480 ml Output 1200 ml Net -720 ml Physical Exam: 
General: AAO x 3, appears older than stated age, morbidly obese HEENT: PERRL, fundi benign Neck: No abnormally enlarged lymph nodes or thyroid, supple Chest: obese chest 
Lungs: moderate air entry and exam limitation 2' to body habitus, normal percussion bilaterally anterior chest, no tenderness/ rash Heart: irregular rate and rhythm, S1S2 present or without murmur or extra heart sounds Abdomen: obese, bowel sounds normoactive, tympanic, soft without significant tenderness, masses, organomegaly or guarding, rigidity, rebound Extremity: 2+, pitting and bl LE edema, no cyanosis, clubbing Neuro: alert, oriented x 3, no defects noted in general exam. 
Skin: Skin color, texture, turgor fair. Skin dry, warm, non-diaphoretic Data:  
 
Recent Results (from the past 24 hour(s)) MAGNESIUM Collection Time: 10/17/18  6:45 PM  
Result Value Ref Range Magnesium 1.9 1.6 - 2.6 mg/dL METABOLIC PANEL, BASIC Collection Time: 10/17/18  6:45 PM  
Result Value Ref Range Sodium 139 136 - 145 mmol/L Potassium 3.9 3.5 - 5.5 mmol/L Chloride 101 100 - 108 mmol/L  
 CO2 33 (H) 21 - 32 mmol/L Anion gap 5 3.0 - 18 mmol/L Glucose 102 (H) 74 - 99 mg/dL BUN 18 7.0 - 18 MG/DL Creatinine 1.62 (H) 0.6 - 1.3 MG/DL  
 BUN/Creatinine ratio 11 (L) 12 - 20 GFR est AA 56 (L) >60 ml/min/1.73m2 GFR est non-AA 46 (L) >60 ml/min/1.73m2 Calcium 9.2 8.5 - 10.1 MG/DL  
CBC W/O DIFF Collection Time: 10/18/18  5:00 AM  
Result Value Ref Range WBC 5.3 4.6 - 13.2 K/uL  
 RBC 5.33 4.70 - 5.50 M/uL  
 HGB 16.0 13.0 - 16.0 g/dL HCT 50.0 (H) 36.0 - 48.0 % MCV 93.8 74.0 - 97.0 FL  
 MCH 30.0 24.0 - 34.0 PG  
 MCHC 32.0 31.0 - 37.0 g/dL  
 RDW 13.0 11.6 - 14.5 % PLATELET 106 618 - 992 K/uL MPV 10.9 9.2 - 92.6 FL  
METABOLIC PANEL, BASIC Collection Time: 10/18/18  5:00 AM  
Result Value Ref Range Sodium 139 136 - 145 mmol/L Potassium 4.2 3.5 - 5.5 mmol/L Chloride 102 100 - 108 mmol/L  
 CO2 31 21 - 32 mmol/L Anion gap 6 3.0 - 18 mmol/L Glucose 80 74 - 99 mg/dL BUN 18 7.0 - 18 MG/DL Creatinine 1.39 (H) 0.6 - 1.3 MG/DL  
 BUN/Creatinine ratio 13 12 - 20 GFR est AA >60 >60 ml/min/1.73m2 GFR est non-AA 55 (L) >60 ml/min/1.73m2 Calcium 8.9 8.5 - 10.1 MG/DL  
CK Collection Time: 10/18/18  5:00 AM  
Result Value Ref Range CK 1,737 (H) 39 - 308 U/L Recent Labs 10/15/18 
2111 10/15/18 
1808 FIO2I 0.28 21 HCO3I 37.0*  --   
PCO2I 61.6*  --   
PHI 7.386  --   
PO2I 62*  -- All Micro Results None Telemetry: AFIB Imaging: 
[x]I have personally reviewed the patients chest radiographs images and report Results from Hospital Encounter encounter on 10/15/18 XR CHEST PORT Narrative EXAM: Portable upright chest, one view INDICATION: Hypoxia. CHF. Chronic atrial fibrillation. COMPARISON: 10/15/2018 
 
_______________ FINDINGS: 
 
Cardiac silhouette is within normal range in size for the portable technique. Pulmonary vessels are equalized and indistinct, with improvement of previous 
interstitial markings. Parenchymal band is present at the left lung base not 
definitively present on the prior study. Costophrenic angles are sharp. No 
pneumothorax. 
 
_______________ Impression IMPRESSION: 
 
CHF with improvement. Focal left basilar linear atelectasis. No results found for this or any previous visit. [x]See my orders for details My assessment, plan of care, findings, medications, side effects etc were discussed with: 
[x]nursing []PT/OT [x]respiratory therapy []Dr. Ramin Kline [x]Patient [x]Moderate complex decision making performed and > 50% time spent in face to face evaluation.  
 
Lius Alberto Barroso MD

## 2018-10-18 NOTE — ROUTINE PROCESS
Bedside shift change report given to Tristian Pinon (oncoming nurse) by Ines Wilson RN (offgoing nurse). Report included the following information SBAR, Kardex, Intake/Output and MAR.

## 2018-10-18 NOTE — PROGRESS NOTES
Problem: Mobility Impaired (Adult and Pediatric) Goal: *Acute Goals and Plan of Care (Insert Text) Physical Therapy Goals Physical Therapy Goals Initiated 10/16/2018 to be met in 2-3 days 1. Activity tolerance: Tolerate >10 minutes of activity with minimal c/o dyspnea and SpO2 on O2 >93% for improved efficiency with motor tasks. 2. Gait: Ambulate >75-100ft S with LRAD, O2 saturation > 90% on RA, for improved mobility in environment. 3. Patient Education: Independent with HEP and energy conservation techniques for improved endurance for home/facility discharge as indicated. Outcome: Progressing Towards Goal 
physical Therapy TREATMENT Patient: López Bergman (36 y.o. male) Date: 10/18/2018 Diagnosis: Hypoxia CHF (congestive heart failure) ([facility] [address] Morbid obesity ([facility] [address] Acute diastolic CHF (congestive heart failure) ([facility] [address] Precautions: Fall Chart, physical therapy assessment, plan of care and goals were reviewed. ASSESSMENT: 
Patient able to maintain ambulate distance 450' without AD. Trailed off O2, which remained 91%-93% on exertion. HR however elevated per . Pt nonsymptomatic. Cont POC. Progression toward goals: 
[]      Improving appropriately and progressing toward goals [x]      Improving slowly and progressing toward goals 
[]      Not making progress toward goals and plan of care will be adjusted PLAN: 
Patient continues to benefit from skilled intervention to address the above impairments. Continue treatment per established plan of care. Discharge Recommendations:  Home Health Further Equipment Recommendations for Discharge:  N/A  
 
SUBJECTIVE:  
Patient stated  I should be leaving tomorrow.  
 
OBJECTIVE DATA SUMMARY:  
Critical Behavior: 
Neurologic State: Alert Orientation Level: Oriented X4 Safety/Judgement: Awareness of environment, Fall prevention Functional Mobility Training: 
Bed Mobility: 
Rolling: Supervision Supine to Sit: Supervision Scooting: Supervision Transfers: 
Sit to Stand: Supervision Stand to Sit: Supervision Balance: 
Sitting: Intact Standing: IntactAmbulation/Gait Training: 
Distance (ft): 450 Feet (ft) Ambulation - Level of Assistance: Supervision Gait Abnormalities: Other;Trunk sway increased;Circumduction Speed/Laurence: Slow Step Length: Right shortened;Left shortened Activity Tolerance:  
Fair After treatment:  
[] Patient left in no apparent distress sitting up in chair 
[x] Patient left in no apparent distress in bed 
[x] Call bell left within reach 
[] Nursing notified 
[] Caregiver present 
[] Bed alarm activated Julissa Hudson PTA Time Calculation: 15 mins

## 2018-10-18 NOTE — PROGRESS NOTES
Cardiology Progress Note Patient: Bakari Dillard. Sex: male          DOA: 10/15/2018 YOB: 1975      Age:  37 y.o.        LOS:  LOS: 3 days Assessment/Plan Principal Problem: 
  Acute diastolic CHF (congestive heart failure) ([address] (10/15/2018) Active Problems: Morbid obesity ([address] (4/4/2016) CHF (congestive heart failure) ([address] (10/15/2018) Hypoxia (10/15/2018) Chronic a-fib ([address] (10/15/2018) Atrial flutter ([facility] [address] (10/15/2018) Elevated troponin (10/16/2018) Acute respiratory distress (10/16/2018) Plan: Aflutter with RVR on physical activities/PT Nocturnal bradycardia DC coreg, start metoprolol succinate 25 Resume amiodarone 200 mg po daily Last DCCV in 2017 Discussed with patient about possible  DCCV versus EP consultation, pt want to think about EP evaluation and treatment Subjective:  
 cc: Aflutter with RVR REVIEW OF SYSTEMS:  
 
General: No fevers or chills. Cardiovascular: No chest pain or pressure. No palpitations. No ankle swelling Pulmonary: No SOB, orthopnea, PND Gastrointestinal: No nausea, vomiting or diarrhea Objective:  
  
Visit Vitals /47 (BP 1 Location: Left arm, BP Patient Position: At rest) Pulse 70 Temp 98.1 °F (36.7 °C) Resp 16 Ht 5' 10\" (1.778 m) Wt (!) 234.1 kg (516 lb) SpO2 95% BMI 74.04 kg/m² Body mass index is 74.04 kg/m². Physical Exam: 
General Appearance: Comfortable, not using accessory muscles of respiration. NECK: No JVD, no thyroidomeglay. LUNGS: Clear bilaterally. HEART: S1 variable +S2 audible, ABD: Non-tender, BS Audible EXT: +edema, and no cysnosis. VASCULAR EXAM: Pulses are intact. PSYCHIATRIC EXAM: Mood is appropriate. Medication: 
Current Facility-Administered Medications Medication Dose Route Frequency  [START ON 10/19/2018] amiodarone (CORDARONE) tablet 200 mg  200 mg Oral DAILY  [START ON 10/19/2018] carvedilol (COREG) tablet 6.25 mg  6.25 mg Oral BID WITH MEALS  
 losartan (COZAAR) tablet 25 mg  25 mg Oral DAILY  sodium chloride (NS) flush 5-10 mL  5-10 mL IntraVENous Q8H  
 sodium chloride (NS) flush 5-10 mL  5-10 mL IntraVENous PRN  
 omeprazole (PRILOSEC) capsule 20 mg  20 mg Oral DAILY  rivaroxaban (XARELTO) tablet 20 mg  20 mg Oral DAILY  furosemide (LASIX) injection 60 mg  60 mg IntraVENous BID  acetaminophen (TYLENOL) tablet 650 mg  650 mg Oral Q6H PRN  
 ondansetron (ZOFRAN) injection 4 mg  4 mg IntraVENous Q6H PRN  
 influenza vaccine 2018-19 (6 mos+)(PF) (FLUARIX QUAD/FLULAVAL QUAD) injection 0.5 mL  0.5 mL IntraMUSCular PRIOR TO DISCHARGE Lab/Data Reviewed: 
Procedures/imaging: see electronic medical records for all procedures/Xrays  
and details which were not copied into this note but were reviewed prior to creation of Plan 
  
 
All lab results for the last 24 hours reviewed. Recent Labs 10/18/18 
0500 10/17/18 
0455 10/16/18 
3790 WBC 5.3 5.2 4.5* HGB 16.0 15.8 15.5 HCT 50.0* 49.6* 48.3*  
 126* 147 Recent Labs 10/18/18 
0500 10/17/18 
1845 10/17/18 
0455  139 140  
K 4.2 3.9 4.0  
 101 102 CO2 31 33* 33* GLU 80 102* 84 BUN 18 18 15 CREA 1.39* 1.62* 1.38* CA 8.9 9.2 9.1 Signed By: Kami Contreras MD   
 October 18, 2018

## 2018-10-18 NOTE — PROGRESS NOTES
Hospitalist Progress Note Patient: Nam Charlton. MRN: [id number]  CSN: [id number] YOB: 1975  Age: 37 y.o. Sex: male DOA: 10/15/2018 LOS:  LOS: 3 days Chief Complaint: SOB Assessment/Plan 1. Acute Combined Systolic/Diastolic CHF Exacerbation 2. Chronic Atrial Fibrillation 3. Hypoxia / Acute Respiratory Distress 4. Asymptomatic Nocturnal Bradycardia 5. MARIA FERNANDA 1. Continue diuresis with Lasix. Previous cardiology note stating will need ischemic workup. Defer to cardiology if stress test lab can accommodate patient. 2. Continue Losartan, Coreg, Xarelto. 3. Currently on supplemental oxygen at 2L/min. Patient reported today that he \"voluntarily took himself off oxygen\" at home, and currently does not wear O2 at home. Wean as tolerable for the patient, keep O2 sats >92%. Will also need walk test prior to discharge. 4. Per cardiology, decrease amiodarone from 200mg to 100mg PO once daily. Remains asymptomatic. 5. Continue CPAP qhs. Overall patient states he feels much better today. Will attempt to wean off of oxygen. DVT Prophylaxis - Xarelto Disposition - 24-48 hours. Patient Active Problem List  
Diagnosis Code  Cellulitis of left lower leg L03. 116  
 Anemia D64.9  Morbid obesity ([facility]) E66.01  
 Dyspnea R06.00  Sleep apnea G47.30  Sinus tachycardia R00.0  Acute respiratory failure with hypoxia and hypercarbia ([facility]) J96.01, J96.02  
 Morbid obesity with body mass index of 60.0-69.9 in adult ([facility]) E66.01, Z68.44  Chronic back pain M54.9, G89.29  
 Arthritis M19.90  Migraine G43.909  
 History of atrial flutter Z86.79  
 Limited mobility Z74.09  
 Smoking F17.200  Chronic renal insufficiency, stage II (mild) N18.2  CHF (congestive heart failure) ([facility]) I50.9  Hypoxia R09.02  
 Acute diastolic CHF (congestive heart failure) ([facility]) I50.31  Chronic a-fib ([facility]) I48.2  Atrial flutter ([address] I48.92  
  Elevated troponin R74.8  Acute respiratory distress R06.03 Subjective: 
 
Doing well Says leg swelling has improved No CP Review of systems: 
 
Constitutional: denies fevers, chills, myalgias Respiratory: improving SOB, denies cough Cardiovascular: denies chest pain, palpitations Gastrointestinal: denies nausea, vomiting, diarrhea Vital signs/Intake and Output: 
Visit Vitals /47 (BP 1 Location: Left arm, BP Patient Position: At rest) Pulse 70 Temp 98.1 °F (36.7 °C) Resp 16 Ht 5' 10\" (1.778 m) Wt (!) 234.1 kg (516 lb) SpO2 95% BMI 74.04 kg/m² Current Shift:  10/18 0701 - 10/18 1900 In: 240 [P.O.:240] Out: 700 [Urine:700] Last three shifts:  10/16 1901 - 10/18 0700 In: 360 [P.O.:360] Out: 2975 [Urine:2975] Exam: 
 
General: Morbidly obese AA male, alert, NAD, OX3 Head/Neck: NCAT, supple, No masses, No lymphadenopathy CVS:Regular rate and rhythm, no M/R/G, S1/S2 heard, no thrill Lungs:Clear to auscultation bilaterally, no wheezes, rhonchi, or rales Abdomen: Soft, Nontender, No distention, Normal Bowel sounds, No hepatomegaly Extremities: 3+ edema BLE Skin:normal texture and turgor, no rashes, no lesions Neuro:grossly normal , follows commands Psych:appropriate Labs: Results:  
   
Chemistry Recent Labs 10/18/18 
0500 10/17/18 
1845 10/17/18 
0455  10/15/18 
1745 GLU 80 102* 84   < > 110*  139 140   < > 143 K 4.2 3.9 4.0   < > 3.5  101 102   < > 101 CO2 31 33* 33*   < > 34* BUN 18 18 15   < > 12 CREA 1.39* 1.62* 1.38*   < > 1.64* CA 8.9 9.2 9.1   < > 9.1 AGAP 6 5 5   < > 8  
BUCR 13 11* 11*   < > 7* AP  --   --   --   --  70  
TP  --   --   --   --  7.4 ALB  --   --   --   --  3.7 GLOB  --   --   --   --  3.7 AGRAT  --   --   --   --  1.0  
 < > = values in this interval not displayed. CBC w/Diff Recent Labs 10/18/18 
0500 10/17/18 
0455 10/16/18 
0553 10/15/18 
0695 WBC 5.3 5.2 4.5* 4.6 RBC 5.33 5.21 5.16 5.26  
HGB 16.0 15.8 15.5 15.8 HCT 50.0* 49.6* 48.3* 48.2*  
 126* 147 157 GRANS  --   --   --  55  
LYMPH  --   --   --  33 EOS  --   --   --  1 Cardiac Enzymes Recent Labs 10/18/18 
0500 10/17/18 
0455 10/16/18 
0553 10/15/18 
1845 CPK 1,737* 1,398* 1,047* 1,136* CKND1  --   --  0.7 0.8 Coagulation No results for input(s): PTP, INR, APTT in the last 72 hours. No lab exists for component: INREXT Lipid Panel No results found for: CHOL, CHOLPOCT, CHOLX, CHLST, CHOLV, 482316, HDL, LDL, LDLC, DLDLP, 177032, VLDLC, VLDL, TGLX, TRIGL, TRIGP, TGLPOCT, CHHD, CHHDX  
BNP No results for input(s): BNPP in the last 72 hours. Liver Enzymes Recent Labs 10/15/18 
1745 TP 7.4 ALB 3.7 AP 70 SGOT 45* Thyroid Studies Lab Results Component Value Date/Time TSH 2.20 10/16/2018 05:53 AM  
    
Procedures/imaging: see electronic medical records for all procedures/Xrays and details which were not copied into this note but were reviewed prior to creation of Plan Lm Coleman PA-C

## 2018-10-18 NOTE — PROGRESS NOTES
DC Plan:  Discharge home with [facility], once medically stable Chart reviewed. Pt admitted for CHF. Pt transferred from ICU to telemetry. Met with pt at bedside. Pt lives with his wife who assist him at home. Pts home address confirmed per face sheet. Pt states he has a cane for use as needed. Pt has a home CPAP. Pt noted to be weaing oxygen by NC during assessment, denies wearing at home. Nursing, please conduct and chart walk test if patient will require oxygen at discharge. Please notify CM if pt does not pass walk test.  Pts PCP is MD Cody Leggett, will updated AVS.  Pt states he saw MD Anastacia Godinez for cardiology. Pt requesting dietetics consult, advised him to discuss with provider. PT recommending home health. FOC offered for [place]. Pt provided [place] list.  Pt will call CM with [place] decision. No other dc concerns identified. CM will cont to follow. 200 Received call from Tasha with [facility] earlier that pt had signed [address] for [facility]. Called pt and discussed with him. He agrees to discharge home with [facility], once medically stable.

## 2018-10-18 NOTE — PROGRESS NOTES
Resmed set up at bedside with humidity. Patient wears a Resmed at home and is comfortable with donning and doffing mask.

## 2018-10-18 NOTE — PROGRESS NOTES
0710: Assumed care of pt from 1280 Ayush Bingham 1500: Pt was ambulating in the cee with PT HR was elevated to 204. Jeremias LE is aware he was at monitors at the time. Pt HR returned to 119 when pt was seated back in the bed. Pt states he was not under any distress. Pt states he just has pain in his knees while ambulating. 1700: Dr. Lily Nina was also made aware. He stated pt may need ablation.

## 2018-10-19 LAB — CK SERPL-CCNC: 1694 U/L (ref 39–308)

## 2018-10-19 PROCEDURE — 74011250636 HC RX REV CODE- 250/636: Performed by: HOSPITALIST

## 2018-10-19 PROCEDURE — 74011250637 HC RX REV CODE- 250/637: Performed by: HOSPITALIST

## 2018-10-19 PROCEDURE — 36415 COLL VENOUS BLD VENIPUNCTURE: CPT | Performed by: INTERNAL MEDICINE

## 2018-10-19 PROCEDURE — 77010033678 HC OXYGEN DAILY

## 2018-10-19 PROCEDURE — 77030018846 HC SOL IRR STRL H20 ICUM -A

## 2018-10-19 PROCEDURE — 74011250637 HC RX REV CODE- 250/637: Performed by: INTERNAL MEDICINE

## 2018-10-19 PROCEDURE — 65660000000 HC RM CCU STEPDOWN

## 2018-10-19 PROCEDURE — 97116 GAIT TRAINING THERAPY: CPT

## 2018-10-19 PROCEDURE — 82550 ASSAY OF CK (CPK): CPT | Performed by: INTERNAL MEDICINE

## 2018-10-19 RX ADMIN — FUROSEMIDE 60 MG: 10 INJECTION, SOLUTION INTRAMUSCULAR; INTRAVENOUS at 09:25

## 2018-10-19 RX ADMIN — LOSARTAN POTASSIUM 25 MG: 25 TABLET ORAL at 09:25

## 2018-10-19 RX ADMIN — Medication 10 ML: at 14:00

## 2018-10-19 RX ADMIN — AMIODARONE HYDROCHLORIDE 200 MG: 200 TABLET ORAL at 09:25

## 2018-10-19 RX ADMIN — FUROSEMIDE 60 MG: 10 INJECTION, SOLUTION INTRAMUSCULAR; INTRAVENOUS at 21:59

## 2018-10-19 RX ADMIN — Medication 10 ML: at 06:00

## 2018-10-19 RX ADMIN — OMEPRAZOLE 20 MG: 20 CAPSULE, DELAYED RELEASE ORAL at 09:25

## 2018-10-19 RX ADMIN — METOPROLOL SUCCINATE 25 MG: 25 TABLET, EXTENDED RELEASE ORAL at 09:25

## 2018-10-19 RX ADMIN — RIVAROXABAN 20 MG: 10 TABLET, FILM COATED ORAL at 09:25

## 2018-10-19 NOTE — PROGRESS NOTES
PULMONARY CRITICAL CARE MEDICINE 
ICU PROGRESS NOTE: 
10/19/2018 Seen in follow up of dyspnea. Interval History:  OSAS with acute decompensated CHF. Assessment:  
 
Principal Problem: 
  Acute diastolic CHF (congestive heart failure) ([facility] [address] (10/15/2018) Active Problems: Morbid obesity ([address] (4/4/2016) CHF (congestive heart failure) ([facility] [address] (10/15/2018) Hypoxia (10/15/2018) Chronic a-fib ([facility] [address] (10/15/2018) Atrial flutter ([address] (10/15/2018) Elevated troponin (10/16/2018) Acute respiratory distress (10/16/2018) Hypercapneic respiratory failure Suspected WHO III pulmonary hypertension Plan: PLANS FOR ABOVE PROBLEMS: 
1. CPAP HS and Naps 2.  diuresis 3. PFTs as outpatient 4. Weight loss would be ideal 
5. Cardiac diet 6. anticoagulation 7. Nocturnal O2 trend? Activity As tolerated Disposition and Family  
home Subjective: No CP. No orthopnea. Legs less swollen. Review of Systems See H&P Objective:  
 
Visit Vitals /85 (BP 1 Location: Left arm, BP Patient Position: Sitting) Pulse 90 Temp 97.9 °F (36.6 °C) Resp 18 Ht 5' 10\" (1.778 m) Wt (!) 226.3 kg (498 lb 14.4 oz) SpO2 94% BMI 71.58 kg/m² PA- /; CVP- ; CI-   
DRIPS: 
 
 
Intake/Output Summary (Last 24 hours) at 10/19/2018 1409 Last data filed at 10/19/2018 1139 Gross per 24 hour Intake 340 ml Output 1325 ml Net -985 ml Physical Exam: 
Awake, alert, NAD Skin warm and dry Neck supple, no stridor Chest decreased BS bilaterally Heart RSR 
abdomen soft, BS active 
legs +edema Speech coherent Ventilator Settings:  
Reviewed Ventilator Flowsheet:  CPAP 
PAP:       
Wedge:   
CVP:       
CO:         
CI:           
SVR:       
PVR:       
 
  
  
 
Prophylactic Measures: 
Vt/IBW =  
Glucose: DVT: Drug - Compression Stockings SUP: Yes 
HOB > 30 degrees: Yes Spontaneous Awakening Trial:  
Spontaneous Breathing Trial:  
Nutrition: Yes Data Review: Reviewed available LABS since my last time stamp. Recent Results (from the past 24 hour(s)) CK Collection Time: 10/19/18  4:13 AM  
Result Value Ref Range CK 1,694 (H) 39 - 308 U/L Microbiology: 
 
 
Radiology: No results found. Stable, [place] [id number] [facility] 
[phone]

## 2018-10-19 NOTE — PROGRESS NOTES
1930 - 2200 The documentation for this period is being entered following the guidelines as defined in the [facility] downtime policy by Rody Chiang RN.

## 2018-10-19 NOTE — PROGRESS NOTES
Problem: Pressure Injury - Risk of 
Goal: *Prevention of pressure injury Document Kevin Scale and appropriate interventions in the flowsheet. Outcome: Progressing Towards Goal 
Pressure Injury Interventions: 
Sensory Interventions: Pressure redistribution bed/mattress (bed type) Moisture Interventions: Maintain skin hydration (lotion/cream) Activity Interventions: PT/OT evaluation, Pressure redistribution bed/mattress(bed type) Mobility Interventions: PT/OT evaluation, Pressure redistribution bed/mattress (bed type) Nutrition Interventions: Offer support with meals,snacks and hydration, Document food/fluid/supplement intake Friction and Shear Interventions: Transferring/repositioning devices Problem: Falls - Risk of 
Goal: *Absence of Falls Document Ladi Jesus Fall Risk and appropriate interventions in the flowsheet. Outcome: Progressing Towards Goal 
Fall Risk Interventions: 
Mobility Interventions: Patient to call before getting OOB Medication Interventions: Patient to call before getting OOB, Teach patient to arise slowly Elimination Interventions: Call light in reach, Urinal in reach

## 2018-10-19 NOTE — PROGRESS NOTES
Hospitalist Progress Note Patient: Kvng Hanson. MRN: [id number]  CSN: [id number] YOB: 1975  Age: 37 y.o. Sex: male DOA: 10/15/2018 LOS:  LOS: 4 days Chief Complaint: 
 
Dypsnea Assessment/Plan 1. Acute Combined Systolic/Diastolic CHF 2. Aflutter with RVR on Exertion 3. Hypoxia / Acute Respiratory Distress - Resolved 4. Asymptomatic Nocturnal Bradycardia 5. MARIA FERNANDA 1. Continue diuresis with Lasix. Continued deferment to cardiologist for necessity of ischemic workup. No chest pain at time of exam. Still with some edema to lower extremities. 2. Continue Losartan, Toprol XL, Xarelto. Patient discussing options with cardiology for DCCV or EP studies. 3. No longer requiring supplemental oxygen. Worked with PT yesterday without oxygen and maintained satisfactory saturations. Continue CPAP qhs.  
4. Cardiology following. 5. CPAP qhs and for naps. DVT Prophylaxis - Xarelto Disposition - TBD 
 
CDMP - Acute respiratory failure, which has resolved. Patient Active Problem List  
Diagnosis Code  Cellulitis of left lower leg L03. 116  
 Anemia D64.9  Morbid obesity ([facility]) E66.01  
 Dyspnea R06.00  Sleep apnea G47.30  Sinus tachycardia R00.0  Acute respiratory failure with hypoxia and hypercarbia ([facility]) J96.01, J96.02  
 Morbid obesity with body mass index of 60.0-69.9 in adult ([facility]) E66.01, Z68.44  Chronic back pain M54.9, G89.29  
 Arthritis M19.90  Migraine G43.909  
 History of atrial flutter Z86.79  
 Limited mobility Z74.09  
 Smoking F17.200  Chronic renal insufficiency, stage II (mild) N18.2  CHF (congestive heart failure) ([facility]) I50.9  Hypoxia R09.02  
 Acute diastolic CHF (congestive heart failure) ([facility]) I50.31  Chronic a-fib ([facility]) I48.2  Atrial flutter ([facility]) I48.92  
 Elevated troponin R74.8  Acute respiratory distress R06.03 Subjective: 
 
Feeling well. No longer needing oxygen. Review of systems: Constitutional: denies fevers, chills, myalgias Respiratory: denies SOB, cough Cardiovascular: denies chest pain, palpitations Gastrointestinal: denies nausea, vomiting, diarrhea Vital signs/Intake and Output: 
Visit Vitals /85 (BP 1 Location: Left arm, BP Patient Position: Sitting) Pulse 90 Temp 97.9 °F (36.6 °C) Resp 18 Ht 5' 10\" (1.778 m) Wt (!) 226.3 kg (498 lb 14.4 oz) SpO2 94% BMI 71.58 kg/m² Current Shift:  10/19 0701 - 10/19 1900 In: 240 [P.O.:240] Out: 725 [Urine:725] Last three shifts:  10/17 1901 - 10/19 0700 In: 340 [P.O.:340] Out: 1800 [Urine:1800] Exam: 
 
General: Morbidly obese AA male, alert, NAD, OX3 Head/Neck: NCAT, supple, No masses, No lymphadenopathy CVS:Regular rate and rhythm, no M/R/G, S1/S2 heard, no thrill Lungs:Clear to auscultation bilaterally, no wheezes, rhonchi, or rales Abdomen: Soft, Nontender, No distention, Normal Bowel sounds, No hepatomegaly Extremities: BLE nonpitting edema. Skin:normal texture and turgor, no rashes, no lesions Neuro:grossly normal , follows commands Psych:appropriate Labs: Results:  
   
Chemistry Recent Labs 10/18/18 
0500 10/17/18 
1845 10/17/18 
0455 GLU 80 102* 84  
 139 140  
K 4.2 3.9 4.0  
 101 102 CO2 31 33* 33* BUN 18 18 15 CREA 1.39* 1.62* 1.38* CA 8.9 9.2 9.1 AGAP 6 5 5 BUCR 13 11* 11* CBC w/Diff Recent Labs 10/18/18 
0500 10/17/18 
0455 WBC 5.3 5.2  
RBC 5.33 5.21  
HGB 16.0 15.8 HCT 50.0* 49.6*  
 126* Cardiac Enzymes Recent Labs 10/19/18 
0413 10/18/18 
0500 CPK 1,694* 1,737* Coagulation No results for input(s): PTP, INR, APTT in the last 72 hours. No lab exists for component: INREXT Lipid Panel No results found for: CHOL, CHOLPOCT, CHOLX, CHLST, CHOLV, 644264, HDL, LDL, LDLC, DLDLP, 654798, VLDLC, VLDL, TGLX, TRIGL, TRIGP, TGLPOCT, CHHD, CHHDX  
BNP No results for input(s): BNPP in the last 72 hours. Liver Enzymes No results for input(s): TP, ALB, TBIL, AP, SGOT, GPT in the last 72 hours. No lab exists for component: DBIL Thyroid Studies Lab Results Component Value Date/Time TSH 2.20 10/16/2018 05:53 AM  
    
Procedures/imaging: see electronic medical records for all procedures/Xrays and details which were not copied into this note but were reviewed prior to creation of Plan Shama Mayorga PA-C

## 2018-10-19 NOTE — PROGRESS NOTES
Problem: Mobility Impaired (Adult and Pediatric) Goal: *Acute Goals and Plan of Care (Insert Text) Physical Therapy Goals Physical Therapy Goals Initiated 10/16/2018 to be met in 2-3 days 1. Activity tolerance: Tolerate >10 minutes of activity with minimal c/o dyspnea and SpO2 on O2 >93% for improved efficiency with motor tasks. 2. Gait: Ambulate >75-100ft S with LRAD, O2 saturation > 90% on RA, for improved mobility in environment. 3. Patient Education: Independent with HEP and energy conservation techniques for improved endurance for home/facility discharge as indicated. Outcome: Progressing Towards Goal 
physical Therapy TREATMENT Patient: Vinayak Moise (36 y.o. male) Date: 10/19/2018 Diagnosis: Hypoxia CHF (congestive heart failure) ([facility] [address] Morbid obesity ([facility] [address] Acute diastolic CHF (congestive heart failure) ([facility] [address] Precautions: Fall Chart, physical therapy assessment, plan of care and goals were reviewed. PLOF: independent ASSESSMENT: 
, SpO2 98% on RA pre ambulation. No assistance needed for bed mobility or sit to stand. (B) Varus knee deformity, wide MATILDE and increased trunk sway during gait due to body habitus. No AD needed during gait. , SpO2 87% on RA during ambulation. Returned to room, donned nasal cannula at 2L and pt left sitting EOB with Dr. weaver Education: safety Progression toward goals: 
[]      Improving appropriately and progressing toward goals [x]      Improving slowly and progressing toward goals 
[]      Not making progress toward goals and plan of care will be adjusted PLAN: 
Patient continues to benefit from skilled intervention to address the above impairments. Continue treatment per established plan of care. Discharge Recommendations:  Home Health Further Equipment Recommendations for Discharge:  N/A  
 
SUBJECTIVE:  
Patient stated I feel fine.  OBJECTIVE DATA SUMMARY:  
Critical Behavior: 
Neurologic State: Alert Orientation Level: Oriented X4 Safety/Judgement: Awareness of environment, Fall prevention Functional Mobility Training: 
Bed Mobility: 
Supine to Sit: Independent Transfers: 
Sit to Stand: Independent Stand to Sit: Independent Balance: 
Sitting: Intact Standing: Intact; Without supportAmbulation/Gait Training: 
Distance (ft): 200 Feet (ft) Ambulation - Level of Assistance: Supervision Gait Abnormalities: Trunk sway increased Base of Support: Widened Speed/Laurence: Slow [address] [facility] Current  CH= 0%   Goal  CI= 1-19%. The severity rating is based on the Level of Assistance required for Functional Mobility and ADLs. Activity Tolerance:  
Decreased O2, increased HR Please refer to the flowsheet for vital signs taken during this treatment. After treatment:  
[] Patient left in no apparent distress sitting up in chair 
[x] Patient left in no apparent distress in bed 
[x] Call bell left within reach 
[] Nursing notified 
[] Caregiver present 
[] Bed alarm activated Yesika Holloway PTA Time Calculation: 25 mins

## 2018-10-19 NOTE — PROGRESS NOTES
0730: in to do bedside report, patient sleeping quietly with BiPap on. Updated white board, allowed patient to rest.  
 
0930: Assessed patient. Refilled water pitcher. No further needs at this time, call bell within reach. 1200: patient ordering lunch, no needs at this time. Call bell within reach. 1342: patient sleeping. No further needs at this time, call bell within reach. 1530: walked with patient to assess heart rate with activity. Patient heart rate shot up to 216, returned to room. Pt denies chest pain or abnormal SOB.   Reported findings to [place]vallis[place]enny lottma.

## 2018-10-19 NOTE — PROGRESS NOTES
Verbal bedside received from Ki Bland off going nurse. Assumed patient care, bed in low position, call light within reach, white board updated. 0400 Patient bradycardia. HR in 30s but not sustaining. Patient had uneventful night. No complains of pain was reported or observed. NAD Bedside and Verbal shift change report given to Evelyn Anderson RN (oncoming nurse) by Casey Ayoub (offgoing nurse). Report included the following information SBAR, Kardex and MAR.

## 2018-10-19 NOTE — PROGRESS NOTES
Cardiology Progress Note Patient: Madonna Salcedo. Sex: male          DOA: 10/15/2018 YOB: 1975      Age:  37 y.o.        LOS:  LOS: 4 days Assessment/Plan Principal Problem: 
  Acute diastolic CHF (congestive heart failure) ([address] (10/15/2018) Active Problems: Morbid obesity ([address] (4/4/2016) CHF (congestive heart failure) ([address] (10/15/2018) Hypoxia (10/15/2018) Chronic a-fib ([address] (10/15/2018) Atrial flutter ([address] (10/15/2018) Elevated troponin (10/16/2018) Acute respiratory distress (10/16/2018) Plan: Aflutter with variable block Nocturnal bradycardia 30s Exertional heart rate > 200 History of DCCV in past 
Pt does not want DCCV, , he prefer Aflutter ablation at [facility]- [facility]CHIATRY [facility] ADDICTIVE MED Will try to call [facility] electrophysiologist 
Discussed with pt and RN. Subjective:  
 cc: Aflutter 
cardiomyopathy REVIEW OF SYSTEMS:  
 
General: No fevers or chills. Cardiovascular: No chest pain or pressure. No palpitations. No ankle swelling Pulmonary: No SOB, orthopnea, PND Gastrointestinal: No nausea, vomiting or diarrhea Objective:  
  
Visit Vitals /80 (BP 1 Location: Left arm, BP Patient Position: Sitting) Pulse 83 Temp 97.8 °F (36.6 °C) Resp 18 Ht 5' 10\" (1.778 m) Wt (!) 226.3 kg (498 lb 14.4 oz) SpO2 96% BMI 71.58 kg/m² Body mass index is 71.58 kg/m². Physical Exam: 
General Appearance: Comfortable, not using accessory muscles of respiration. NECK: No JVD, no thyroidomeglay. LUNGS: Clear bilaterally. HEART: S1 variable +S2 audible, ABD: Non-tender, BS Audible EXT: No edema, and no cysnosis. VASCULAR EXAM: Pulses are intact. PSYCHIATRIC EXAM: Mood is appropriate. Medication: 
Current Facility-Administered Medications Medication Dose Route Frequency  methylPREDNISolone (PF) (SOLU-MEDROL) 125 mg/2 mL injection  amiodarone (CORDARONE) tablet 200 mg  200 mg Oral DAILY  metoprolol succinate (TOPROL-XL) XL tablet 25 mg  25 mg Oral DAILY  losartan (COZAAR) tablet 25 mg  25 mg Oral DAILY  sodium chloride (NS) flush 5-10 mL  5-10 mL IntraVENous Q8H  
 sodium chloride (NS) flush 5-10 mL  5-10 mL IntraVENous PRN  
 omeprazole (PRILOSEC) capsule 20 mg  20 mg Oral DAILY  rivaroxaban (XARELTO) tablet 20 mg  20 mg Oral DAILY  furosemide (LASIX) injection 60 mg  60 mg IntraVENous BID  acetaminophen (TYLENOL) tablet 650 mg  650 mg Oral Q6H PRN  
 ondansetron (ZOFRAN) injection 4 mg  4 mg IntraVENous Q6H PRN  
 influenza vaccine 2018-19 (6 mos+)(PF) (FLUARIX QUAD/FLULAVAL QUAD) injection 0.5 mL  0.5 mL IntraMUSCular PRIOR TO DISCHARGE Lab/Data Reviewed: 
Procedures/imaging: see electronic medical records for all procedures/Xrays  
and details which were not copied into this note but were reviewed prior to creation of Plan 
  
 
All lab results for the last 24 hours reviewed. Recent Labs 10/18/18 
0500 10/17/18 
0455 WBC 5.3 5.2 HGB 16.0 15.8 HCT 50.0* 49.6*  
 126* Recent Labs 10/18/18 
0500 10/17/18 
1845 10/17/18 
0455  139 140  
K 4.2 3.9 4.0  
 101 102 CO2 31 33* 33* GLU 80 102* 84 BUN 18 18 15 CREA 1.39* 1.62* 1.38* CA 8.9 9.2 9.1 Signed By: Alicia Flores MD   
 October 19, 2018

## 2018-10-20 PROCEDURE — 74011250636 HC RX REV CODE- 250/636: Performed by: HOSPITALIST

## 2018-10-20 PROCEDURE — 74011250637 HC RX REV CODE- 250/637: Performed by: INTERNAL MEDICINE

## 2018-10-20 PROCEDURE — 97116 GAIT TRAINING THERAPY: CPT

## 2018-10-20 PROCEDURE — 74011250637 HC RX REV CODE- 250/637: Performed by: HOSPITALIST

## 2018-10-20 PROCEDURE — 65660000000 HC RM CCU STEPDOWN

## 2018-10-20 RX ADMIN — LOSARTAN POTASSIUM 25 MG: 25 TABLET ORAL at 09:55

## 2018-10-20 RX ADMIN — OMEPRAZOLE 20 MG: 20 CAPSULE, DELAYED RELEASE ORAL at 09:55

## 2018-10-20 RX ADMIN — Medication 10 ML: at 15:25

## 2018-10-20 RX ADMIN — FUROSEMIDE 60 MG: 10 INJECTION, SOLUTION INTRAMUSCULAR; INTRAVENOUS at 20:32

## 2018-10-20 RX ADMIN — METOPROLOL SUCCINATE 25 MG: 25 TABLET, EXTENDED RELEASE ORAL at 09:55

## 2018-10-20 RX ADMIN — Medication 10 ML: at 09:56

## 2018-10-20 RX ADMIN — FUROSEMIDE 60 MG: 10 INJECTION, SOLUTION INTRAMUSCULAR; INTRAVENOUS at 09:55

## 2018-10-20 RX ADMIN — RIVAROXABAN 20 MG: 10 TABLET, FILM COATED ORAL at 09:55

## 2018-10-20 RX ADMIN — AMIODARONE HYDROCHLORIDE 200 MG: 200 TABLET ORAL at 09:55

## 2018-10-20 RX ADMIN — Medication 10 ML: at 21:35

## 2018-10-20 NOTE — ROUTINE PROCESS
Bedside and Verbal shift change report given to L. Leopold Pass (oncoming nurse) by Steve (offgoing nurse). Report included the following information SBAR, Kardex, Intake/Output, MAR, Recent Results and Cardiac Rhythm Aflutter.

## 2018-10-20 NOTE — PROGRESS NOTES
Cardiology Progress Note 
 
 
10/20/2018 7:47 PM 
 
Admit Date: 10/15/2018 Admit Diagnosis: Hypoxia CHF (congestive heart failure) ([facility] Morbid obesity ([facility] Subjective:  
 
[place] denies chest pain. Visit Vitals /81 (BP 1 Location: Left arm, BP Patient Position: At rest) Pulse 74 Temp 98 °F (36.7 °C) Resp 18 Ht 5' 10\" (1.778 m) Wt (!) 226 kg (498 lb 3.8 oz) SpO2 96% BMI 71.49 kg/m² Current Facility-Administered Medications Medication Dose Route Frequency  amiodarone (CORDARONE) tablet 200 mg  200 mg Oral DAILY  metoprolol succinate (TOPROL-XL) XL tablet 25 mg  25 mg Oral DAILY  losartan (COZAAR) tablet 25 mg  25 mg Oral DAILY  sodium chloride (NS) flush 5-10 mL  5-10 mL IntraVENous Q8H  
 sodium chloride (NS) flush 5-10 mL  5-10 mL IntraVENous PRN  
 omeprazole (PRILOSEC) capsule 20 mg  20 mg Oral DAILY  rivaroxaban (XARELTO) tablet 20 mg  20 mg Oral DAILY  furosemide (LASIX) injection 60 mg  60 mg IntraVENous BID  acetaminophen (TYLENOL) tablet 650 mg  650 mg Oral Q6H PRN  
 ondansetron (ZOFRAN) injection 4 mg  4 mg IntraVENous Q6H PRN  
 influenza vaccine 2018-19 (6 mos+)(PF) (FLUARIX QUAD/FLULAVAL QUAD) injection 0.5 mL  0.5 mL IntraMUSCular PRIOR TO DISCHARGE Objective:  
  
Physical Exam: 
Visit Vitals /81 (BP 1 Location: Left arm, BP Patient Position: At rest) Pulse 74 Temp 98 °F (36.7 °C) Resp 18 Ht 5' 10\" (1.778 m) Wt (!) 226 kg (498 lb 3.8 oz) SpO2 96% BMI 71.49 kg/m² General Appearance:  Well developed, morbidly obese individual in no acute distress. Ears/Nose/Mouth/Throat:   Hearing grossly normal. 
  
    Neck: Supple. Chest:   Lungs clear to auscultation bilaterally. Cardiovascular:  Regular rate and rhythm, S1, S2 normal, no murmur. Abdomen:   Soft, non-tender, bowel sounds are active. Extremities: No edema bilaterally. Skin: Warm and dry. Data Review: Labs:  No results found for this or any previous visit (from the past 24 hour(s)). Telemetry: AFlut Assessment:  
 
Principal Problem: 
  Acute diastolic CHF (congestive heart failure) ([address] (10/15/2018) Active Problems: Morbid obesity ([address] (4/4/2016) BMI 70 and over, adult [facility]) () 
 
  CHF (congestive heart failure) [address] (10/15/2018) Hypoxia (10/15/2018) Chronic a-fib ([address] (10/15/2018) Atrial flutter ([address] (10/15/2018) Elevated troponin (10/16/2018) Acute respiratory distress (10/16/2018) Plan:  
 
Heart rate is controlled at rest and increased some with activity. He is considering EP evaluation.  
 
Ayan Resendiz MD

## 2018-10-20 NOTE — PROGRESS NOTES
Hospitalist Progress Note Patient: Minal Quach MRN: [id number]  CSN: [id number] YOB: 1975  Age: 37 y.o. Sex: male DOA: 10/15/2018 LOS:  LOS: 5 days Assessment/Plan Principal Problem: 
  Acute diastolic CHF (congestive heart failure) ([address] (10/15/2018) Active Problems: Morbid obesity ([address] (4/4/2016) CHF (congestive heart failure) ([address] (10/15/2018) Hypoxia (10/15/2018) Chronic a-fib ([address] (10/15/2018) Atrial flutter ([address] (10/15/2018) Elevated troponin (10/16/2018) Acute respiratory distress (10/16/2018) Acute Combined Systolic/Diastolic CHF: Continue diuresis with Lasix. Cardiology following, monitor In/out. Continue Losartan, Toprol XL, Xarelto. Aflutter with RVR on Exertion: On Losartan, Toprol XL, Xarelto. Pt does not want DCCV, , he prefer Aflutter ablation at [facility]- PSYCHIATRY & ADDICTIVE MED. Cardiology will arrange for him Hypoxia / Acute Respiratory Distress: improving Asymptomatic Nocturnal Bradycardia: HR drop to 30s at night. Cardiology aware. Defer to cardiology MARIA FERNANDA: On CPAP Morbid Obesity with BMI of 71: weight control 
  
DVT Prophylaxis - Xarelto Disposition - TBD 
  
CDMP - Acute respiratory failure, which has resolved.  
  
 
CC:   Combined systolic and diastolic CHF and A flutter. Subjective: Pt was seen and examined with the nurse in the morning round. Feeling better. Less SOB. HR drop to 30s per monitor but asymptomatic Review of systems General: No fevers or chills. Cardiovascular: No chest pain or pressure. No palpitations. Pulmonary: No cough, SOB Gastrointestinal: No nausea, vomiting. Objective:  
  
Visit Vitals /60 (BP 1 Location: Left arm, BP Patient Position: At rest;Standing) Pulse 72 Temp 98.7 °F (37.1 °C) Resp 20 Ht 5' 10\" (1.778 m) Wt (!) 226 kg (498 lb 3.8 oz) SpO2 94% BMI 71.49 kg/m² Physical Exam: 
 
Gen: NAD, obese Heent:  MMM, NC, AT. Cor: Irregular irregular Resp:  CTA b/l. No w/r/r. Nml effort and diaphragmatic excursion. Abd:  NT ND.  BS positive. No rebound or guarding. No masses. Ext: 2+ edema. Intake and Output: 
Current Shift:  No intake/output data recorded. Last three shifts:  10/18 1901 - 10/20 0700 In: 480 [P.O.:480] Out: 1740 [Urine:2825] Labs: Results:  
   
Chemistry Recent Labs 10/18/18 
0500 10/17/18 
1845 GLU 80 102*  139  
K 4.2 3.9  101 CO2 31 33* BUN 18 18 CREA 1.39* 1.62* CA 8.9 9.2 AGAP 6 5 BUCR 13 11* CBC w/Diff Recent Labs 10/18/18 
0500 WBC 5.3  
RBC 5.33  
HGB 16.0 HCT 50.0*  
 Cardiac Enzymes Recent Labs 10/19/18 
0413 10/18/18 
0500 CPK 1,694* 1,737* Coagulation No results for input(s): PTP, INR, APTT in the last 72 hours. No lab exists for component: INREXT Lipid Panel No results found for: CHOL, CHOLPOCT, CHOLX, CHLST, CHOLV, 414950, HDL, LDL, LDLC, DLDLP, 116116, VLDLC, VLDL, TGLX, TRIGL, TRIGP, TGLPOCT, CHHD, CHHDX  
BNP No results for input(s): BNPP in the last 72 hours. Liver Enzymes No results for input(s): TP, ALB, TBIL, AP, SGOT, GPT in the last 72 hours. No lab exists for component: DBIL Thyroid Studies Lab Results Component Value Date/Time TSH 2.20 10/16/2018 05:53 AM  
    
Procedures/imaging: see electronic medical records for all procedures/Xrays and details which were not copied into this note but were reviewed prior to creation of Plan Medications Reviewed Roland Nelson MD

## 2018-10-20 NOTE — ROUTINE PROCESS
Bedside and Verbal shift change report given to Boris Iqbal RN (oncoming nurse) by Yash Kumar RN 
 (offgoing nurse). Report included the following information SBAR, Kardex, Intake/Output, MAR, Recent Results and Med Rec Status.

## 2018-10-20 NOTE — PROGRESS NOTES
0730 Assumed care of patient from [address] [place] [address]. Patient awaiting transfer to [place] [address], bed assignment pending. 1123 Dr Mala Rubin on unit rounding, made aware patient's heart rate has been as low as 29 while at rest/sleeping. Rhythm changes from Aflutter to NSR intermittently. No verbal orders received. 35 20 43 Patient had a uneventful shift, waiting on Cardiology plans for transfer to [place] [address]. EMTALA not initiated as of yet.

## 2018-10-20 NOTE — PROGRESS NOTES
Problem: Falls - Risk of 
Goal: *Absence of Falls Document Flip Brooke Fall Risk and appropriate interventions in the flowsheet. Outcome: Progressing Towards Goal 
Fall Risk Interventions: 
Mobility Interventions: Assess mobility with egress test 
 
  
 
Medication Interventions: Evaluate medications/consider consulting pharmacy, Teach patient to arise slowly Elimination Interventions: Call light in reach, Urinal in reach

## 2018-10-20 NOTE — PROGRESS NOTES
Pulmonary Specialists Pulmonary, Critical Care, and Sleep Medicine Name: Robby Langston MRN: [id number] : 1975 Hospital: [facility] [place] Date: 10/20/2018 Pulmonary F/Up Note IMPRESSION:  
Principal Problem: 
  Acute diastolic CHF (congestive heart failure) ([address] (10/15/2018) Active Problems: Morbid obesity ([address] (2016) CHF (congestive heart failure) ([address] (10/15/2018) Hypoxia (10/15/2018) Chronic a-fib ([address] (10/15/2018) Atrial flutter ([address] (10/15/2018) Elevated troponin (10/16/2018) · Chronic hypercapnic hypoxic respiratory failure · HFpEF · Episode of nocturnal sinus bradycardia, asymptomatic · Elevated CK · Severe MARIA FERNANDA, OHS on home O2 and BiPAP  
RECOMMENDATIONS:  
Continue BiPAP use routine at night and PRN during day- adjust settings per goal SPO2> 90% Supplemental O2 via NC with activity as found on walk test 
Aspiration prevention bundle, head of the bed at 30' all times, pulmonary hygiene care Diuresis, anticoagulant per cardiology 10/16/18 PVL LE venous negative for DVT 
ECHO showed LVEF 46-50%; RV dilated Monitor Ck, renal function and lytes per hospitalist 
Stress ulcer prophylaxis DVT prophylaxis AM labs. Diet as tolerated Will defer respective systems problem management to primary and other consultant and follow patient with primary and other medical team 
Further recommendations will be based on the patient's response to recommended treatment and results of the investigation ordered. ADVANCE DIRECTIVE: Full code FAMILY DISCUSSION: spoke with patient and answered all his questions Subjective/History: 
Patient is a 37 y.o. male with PMhx for severe MARIA FERNANDA [[place] , on BiPAP [25/18 cwp] and O2, morbid obesity, HFpEF, HTN, chronic arthritis in his knees and back, atrial fibrillation on chronic anticoagulation with Xarelto, previous cardioversion in 2017 came to ED with several days of worsening SOB, leg swelling, orthopnea. His cardiologist Dr. Smiley Troy retired recently and after his gastric sleeve procedure was denied by his insurance found himself  gained about 60 pounds back had been off his Lasix for some time. He was represcribed Lasix recently by his PCP. In ED, chest x-ray showed pulmonary edema and he responded to Lasix IV by diuresing and feels better. The patient denies fever, chills, sick contact, cough, chest pain, wheezing, hemoptysis, palpitations, syncope, PND. He has been compliant with his BiPAP and uses O2 intermittently with it during sleep. 10/20/18 Patient used BiPAP last night; on O2 via NC at 2 lpm with activity and RA at rest 
Feeling better significantly with SOB. Ambulating out of bed Patient denies any chest pain, palpitations, syncope, orthopnea, or paroxysmal nocturnal dyspnea. No fever, chills. Good oral intake Review of Systems: 
General ROS: negative for  - fever, chills, weight loss, fatigue and malaise Cardiovascular ROS: negative for - chest pain, murmur, orthopnea, palpitations or paroxysmal nocturnal dyspnea Gastrointestinal ROS: no abdominal pain, change in bowel habits, or black or bloody stools Dermatological ROS: negative for - pruritus, rash or skin lesion changes Past Medical History: 
Past Medical History:  
Diagnosis Date  Arrhythmia  Arthritis   
 knees  Chronic back pain  Chronic renal insufficiency, stage II (mild)  History of atrial flutter Dx 6/2016 - anticoagulated by Smiley Troy  Hypertension 2015  Limited mobility  Migraine headache  Morbid obesity ([facility]  Morbid obesity with body mass index of 60.0-69.9 in adult [facility])  Sleep apnea   
 uses c-pap instructed to bring day of surgery  Smoking   
 very passive / cigars only Past Surgical History: 
Past Surgical History:  
Procedure Laterality Date  CARDIAC SURG PROCEDURE UNLIST    
 cardioversion  ECHOCARDIOGRAM  2016 EF 60-65% (performed at [facility] - see cardiology tab) Medications: 
Prior to Admission medications Medication Sig Start Date End Date Taking? Authorizing Provider  
rivaroxaban (XARELTO) 20 mg tab tablet Take 20 mg by mouth daily. Yes Provider, Historical  
amiodarone (CORDARONE) 200 mg tablet Take 1 Tab by mouth daily. 17  Yes Mamie Canales MD  
furosemide (LASIX) 40 mg tablet Take 1 Tab by mouth two (2) times a day. 16  Yes Kwasi Corrigan MD  
 
 
Current Facility-Administered Medications Medication Dose Route Frequency  amiodarone (CORDARONE) tablet 200 mg  200 mg Oral DAILY  metoprolol succinate (TOPROL-XL) XL tablet 25 mg  25 mg Oral DAILY  losartan (COZAAR) tablet 25 mg  25 mg Oral DAILY  sodium chloride (NS) flush 5-10 mL  5-10 mL IntraVENous Q8H  
 omeprazole (PRILOSEC) capsule 20 mg  20 mg Oral DAILY  rivaroxaban (XARELTO) tablet 20 mg  20 mg Oral DAILY  furosemide (LASIX) injection 60 mg  60 mg IntraVENous BID  influenza vaccine - (6 mos+)(PF) (FLUARIX QUAD/FLULAVAL QUAD) injection 0.5 mL  0.5 mL IntraMUSCular PRIOR TO DISCHARGE Allergy: No Known Allergies Social History: 
Social History Tobacco Use  Smoking status: Former Smoker Types: Cigars  Smokeless tobacco: Never Used Substance Use Topics  Alcohol use: No  
 Drug use: No  
  
 
Family History: 
Family History Problem Relation Age of Onset  Obesity Mother  Liver Disease Sister Objective: 
Vital Signs:   
Blood pressure 111/81, pulse 77, temperature 98.5 °F (36.9 °C), resp. rate 20, height 5' 10\" (1.778 m), weight (!) 226 kg (498 lb 3.8 oz), SpO2 94 %. Body mass index is 71.49 kg/m². O2 Device: Room air O2 Flow Rate (L/min): 2 l/min Temp (24hrs), Av °F (36.7 °C), Min:97.5 °F (36.4 °C), Max:98.7 °F (37.1 °C) Intake/Output:  
Last shift:      10/20 0701 - 10/20 1900 In: -  
Out: 200 [Urine:200] Last 3 shifts: 10/18 1901 - 10/20 0700 In: 480 [P.O.:480] Out: 0053 [Urine:2825] Intake/Output Summary (Last 24 hours) at 10/20/2018 1510 Last data filed at 10/20/2018 1408 Gross per 24 hour Intake 240 ml Output 2100 ml Net -1860 ml Physical Exam: 
General: AAO x 3, appears older than stated age, morbidly obese, on RA in bed HEENT: PERRL, oral mucosa moist 
Neck: No abnormally enlarged lymph nodes or thyroid, supple Chest: obese chest 
Lungs: moderate air entry and exam limitation 2' to body habitus, normal percussion bilaterally anterior chest, no tenderness/ rash Heart: irregular rate and rhythm, S1S2 present or without murmur or extra heart sounds Abdomen: obese, bowel sounds normoactive, tympanic, soft without significant tenderness, masses, organomegaly or guarding, rigidity, rebound Extremity: 1-2+, pitting and bl LE edema, no cyanosis, clubbing Neuro: alert, oriented x 3, no defects noted in general exam. 
Skin: Skin color, texture, turgor fair. Skin dry, warm, non-diaphoretic Data:  
 
No results found for this or any previous visit (from the past 24 hour(s)). No results for input(s): FIO2I, IFO2, HCO3I, IHCO3, HCOPOC, PCO2I, PCOPOC, IPHI, PHI, PHPOC, PO2I, PO2POC in the last 72 hours. No lab exists for component: IPOC2 All Micro Results None Telemetry: AFIB Imaging: 
[x]I have personally reviewed the patients chest radiographs images and report Results from Hospital Encounter encounter on 10/15/18 XR CHEST PORT Narrative EXAM: Portable upright chest, one view INDICATION: Hypoxia. CHF. Chronic atrial fibrillation. COMPARISON: 10/15/2018 
 
_______________ FINDINGS: 
 
Cardiac silhouette is within normal range in size for the portable technique. Pulmonary vessels are equalized and indistinct, with improvement of previous 
interstitial markings. Parenchymal band is present at the left lung base not definitively present on the prior study. Costophrenic angles are sharp. No 
pneumothorax. 
 
_______________ Impression IMPRESSION: 
 
CHF with improvement. Focal left basilar linear atelectasis. No results found for this or any previous visit. [x]See my orders for details My assessment, plan of care, findings, medications, side effects etc were discussed with: 
[x]nursing []PT/OT   
[]respiratory therapy []Dr. Glover Cuff [x]Patient [x]Moderate complex decision making performed and > 50% time spent in face to face evaluation.  
 
Sana Novoa MD

## 2018-10-20 NOTE — PROGRESS NOTES
Problem: Mobility Impaired (Adult and Pediatric) Goal: *Acute Goals and Plan of Care (Insert Text) Physical Therapy Goals Physical Therapy Goals Initiated 10/16/2018 to be met in 2-3 days 1. Activity tolerance: Tolerate >10 minutes of activity with minimal c/o dyspnea and SpO2 on O2 >93% for improved efficiency with motor tasks. 2. Gait: Ambulate >75-100ft S with LRAD, O2 saturation > 90% on RA, for improved mobility in environment. 3. Patient Education: Independent with HEP and energy conservation techniques for improved endurance for home/facility discharge as indicated. Outcome: Resolved/Met Date Met: 10/20/18 physical Therapy TREATMENT/DISCHARGE Patient: Johanne Rojas. (36 y.o. male) Date: 10/20/2018 Diagnosis: Hypoxia CHF (congestive heart failure) ([address] Morbid obesity ([address] Acute diastolic CHF (congestive heart failure) ([facility] Precautions: Fall Chart, physical therapy assessment, plan of care and goals were reviewed. ASSESSMENT: 
No assistance needed for bed mobility or sit to stand. Ambulated 180ft without AD, reciprocal gt pattern, steady pace, wide MATILDE and increased trunk sway due to body habitus. Pt has been exceeding ambulation distance every session, no longer needs supplemental O2 outside of CPAP when sleeping, discharging pt from PT caseload. Nursing/CNAs can safely ambulate with pt daily. EDUCATION wt loss Progression toward goals: 
[x]      Goals met 
[]      Improving appropriately and progressing toward goals 
[]      Improving slowly and progressing toward goals 
[]      Not making progress toward goals and plan of care will be adjusted PLAN: 
Patient will be discharged from physical therapy at this time. Rationale for discharge: 
[x] Goals Achieved 
[] [address] 
[] Patient not participating in therapy 
[] Other: 
Discharge Recommendations:  None Further Equipment Recommendations for Discharge:  N/A  
 
SUBJECTIVE:  
 Patient stated I have to transfer to [facility] for electro physiology for my heart.  OBJECTIVE DATA SUMMARY:  
 
G CODES: Mobility  [id number] Goal  CI= 1-19%  D/C  CH= 0%. The severity rating is based on the Level of Assistance required for Functional Mobility and ADLs. Critical Behavior: 
Neurologic State: Alert Orientation Level: Oriented X4 Safety/Judgement: Awareness of environment, Fall prevention Functional Mobility Training: 
Bed Mobility: 
Supine to Sit: Independent Sit to Supine: Independent Transfers: 
Sit to Stand: Independent Stand to Sit: Independent Balance: 
Sitting: Intact Standing: Intact; Without supportAmbulation/Gait Training: 
Distance (ft): 180 Feet (ft) Ambulation - Level of Assistance: Independent Gait Abnormalities: Trunk sway increased Pain: 
Pre treatment pain:  0 Post treatment pain:  0 Pain Scale 1: Numeric (0 - 10) Pain Intensity 1: 0 Activity Tolerance:  
good Please refer to the flowsheet for vital signs taken during this treatment. After treatment:  
[] Patient left in no apparent distress sitting up in chair 
[x] Patient left in no apparent distress in bed 
[x] Call bell left within reach 
[] Nursing notified 
[] Caregiver present 
[] Bed alarm activated Kathy Montez PTA Time Calculation: 13 mins

## 2018-10-21 LAB
ANION GAP SERPL CALC-SCNC: 6 MMOL/L (ref 3–18)
BASOPHILS # BLD: 0 K/UL (ref 0–0.1)
BASOPHILS NFR BLD: 0 % (ref 0–2)
BUN SERPL-MCNC: 15 MG/DL (ref 7–18)
BUN/CREAT SERPL: 10 (ref 12–20)
CALCIUM SERPL-MCNC: 9 MG/DL (ref 8.5–10.1)
CHLORIDE SERPL-SCNC: 102 MMOL/L (ref 100–108)
CO2 SERPL-SCNC: 34 MMOL/L (ref 21–32)
CREAT SERPL-MCNC: 1.47 MG/DL (ref 0.6–1.3)
DIFFERENTIAL METHOD BLD: ABNORMAL
EOSINOPHIL # BLD: 0.2 K/UL (ref 0–0.4)
EOSINOPHIL NFR BLD: 4 % (ref 0–5)
ERYTHROCYTE [DISTWIDTH] IN BLOOD BY AUTOMATED COUNT: 12.8 % (ref 11.6–14.5)
GLUCOSE SERPL-MCNC: 87 MG/DL (ref 74–99)
HCT VFR BLD AUTO: 50.1 % (ref 36–48)
HGB BLD-MCNC: 15.9 G/DL (ref 13–16)
LYMPHOCYTES # BLD: 2.1 K/UL (ref 0.9–3.6)
LYMPHOCYTES NFR BLD: 46 % (ref 21–52)
MCH RBC QN AUTO: 29.8 PG (ref 24–34)
MCHC RBC AUTO-ENTMCNC: 31.7 G/DL (ref 31–37)
MCV RBC AUTO: 93.8 FL (ref 74–97)
MONOCYTES # BLD: 0.4 K/UL (ref 0.05–1.2)
MONOCYTES NFR BLD: 9 % (ref 3–10)
NEUTS SEG # BLD: 1.9 K/UL (ref 1.8–8)
NEUTS SEG NFR BLD: 41 % (ref 40–73)
PLATELET # BLD AUTO: 146 K/UL (ref 135–420)
PMV BLD AUTO: 10.6 FL (ref 9.2–11.8)
POTASSIUM SERPL-SCNC: 4.1 MMOL/L (ref 3.5–5.5)
RBC # BLD AUTO: 5.34 M/UL (ref 4.7–5.5)
SODIUM SERPL-SCNC: 142 MMOL/L (ref 136–145)
WBC # BLD AUTO: 4.5 K/UL (ref 4.6–13.2)

## 2018-10-21 PROCEDURE — 65660000000 HC RM CCU STEPDOWN

## 2018-10-21 PROCEDURE — 36415 COLL VENOUS BLD VENIPUNCTURE: CPT | Performed by: FAMILY MEDICINE

## 2018-10-21 PROCEDURE — 74011250636 HC RX REV CODE- 250/636: Performed by: HOSPITALIST

## 2018-10-21 PROCEDURE — 80048 BASIC METABOLIC PNL TOTAL CA: CPT | Performed by: FAMILY MEDICINE

## 2018-10-21 PROCEDURE — 74011250637 HC RX REV CODE- 250/637: Performed by: INTERNAL MEDICINE

## 2018-10-21 PROCEDURE — 85025 COMPLETE CBC W/AUTO DIFF WBC: CPT | Performed by: FAMILY MEDICINE

## 2018-10-21 PROCEDURE — 94660 CPAP INITIATION&MGMT: CPT

## 2018-10-21 PROCEDURE — 74011250637 HC RX REV CODE- 250/637: Performed by: HOSPITALIST

## 2018-10-21 RX ADMIN — FUROSEMIDE 60 MG: 10 INJECTION, SOLUTION INTRAMUSCULAR; INTRAVENOUS at 20:55

## 2018-10-21 RX ADMIN — Medication 10 ML: at 22:00

## 2018-10-21 RX ADMIN — Medication 10 ML: at 06:21

## 2018-10-21 RX ADMIN — LOSARTAN POTASSIUM 25 MG: 25 TABLET ORAL at 08:29

## 2018-10-21 RX ADMIN — OMEPRAZOLE 20 MG: 20 CAPSULE, DELAYED RELEASE ORAL at 08:29

## 2018-10-21 RX ADMIN — RIVAROXABAN 20 MG: 10 TABLET, FILM COATED ORAL at 08:28

## 2018-10-21 RX ADMIN — AMIODARONE HYDROCHLORIDE 200 MG: 200 TABLET ORAL at 08:29

## 2018-10-21 RX ADMIN — FUROSEMIDE 60 MG: 10 INJECTION, SOLUTION INTRAMUSCULAR; INTRAVENOUS at 08:29

## 2018-10-21 RX ADMIN — METOPROLOL SUCCINATE 25 MG: 25 TABLET, EXTENDED RELEASE ORAL at 08:29

## 2018-10-21 NOTE — PROGRESS NOTES
Hospitalist Progress Note Patient: Yani Lane MRN: [id number]  CSN: [id number] YOB: 1975  Age: 37 y.o. Sex: male DOA: 10/15/2018 LOS:  LOS: 6 days Assessment/Plan Principal Problem: 
  Acute diastolic CHF (congestive heart failure) ([facility] (10/15/2018) Active Problems: Morbid obesity ([facility] (4/4/2016) BMI 70 and over, adult [facility]) () 
 
  CHF (congestive heart failure) ([facility] (10/15/2018) Hypoxia (10/15/2018) Chronic a-fib ([facility] (10/15/2018) Atrial flutter ([facility] (10/15/2018) Elevated troponin (10/16/2018) Acute respiratory distress (10/16/2018) Acute Combined Systolic/Diastolic CHF: Continue diuresis with Lasix. Cardiology following, monitor In/out. Continue Losartan, Toprol XL, Xarelto.  
  
Aflutter with RVR on Exertion: On Losartan, Toprol XL, Xarelto. Pt does not want DCCV, , he prefer Aflutter ablation at [facility]. Cardiology will arrange for him 
  
Hypoxia / Acute Respiratory Distress: improving 
  
Asymptomatic Nocturnal Bradycardia: HR drop to 30s at night. Cardiology aware. Defer to cardiology 
  
MARIA FERNANDA: On CPAP 
  
Morbid Obesity with BMI of 71: weight control 
  
DVT Prophylaxis - Xarelto 
  
Disposition - TBD 
  
CDMP - Acute respiratory failure, which has resolved.  
  
  
CC:   Combined systolic and diastolic CHF and A flutter. Subjective:  
  
Pt was seen and examined with the nurse in the morning round.  
  
Feeling better. Less SOB. HR drop to 29 per monitor but asymptomatic Review of systems General: No fevers or chills. Cardiovascular: No chest pain or pressure. No palpitations. Pulmonary: No cough, + SOB Gastrointestinal: No nausea, vomiting. Objective:  
  
Visit Vitals /68 (BP 1 Location: Left arm, BP Patient Position: At rest;Sitting) Pulse 70 Temp 97.8 °F (36.6 °C) Resp 18 Ht 5' 10\" (1.778 m) Wt (!) 226 kg (498 lb 3.8 oz) SpO2 96% BMI 71.49 kg/m² Physical Exam: Gen: NAD, obese Heent:  MMM, NC, AT. Cor: s1s2 RRR. No MRG. PMI mid 5th intercostal space. Resp:  CTA b/l. No w/r/r. Nml effort and diaphragmatic excursion. Abd:  NT ND.  BS positive. No rebound or guarding. No masses. Ext: 2+ edema. Intake and Output: 
Current Shift:  10/21 0701 - 10/21 1900 In: 360 [P.O.:360] Out: 300 [Urine:300] Last three shifts:  10/19 1901 - 10/21 0700 In: 370 [P.O.:360; I.V.:10] Out: 3460 [RSXIF:7794] Labs: Results:  
   
Chemistry Recent Labs 10/21/18 
0535 GLU 87   
K 4.1  CO2 34* BUN 15  
CREA 1.47* CA 9.0 AGAP 6  
BUCR 10* CBC w/Diff Recent Labs 10/21/18 
0535 WBC 4.5*  
RBC 5.34  
HGB 15.9 HCT 50.1*  
 GRANS 41  
LYMPH 46 EOS 4 Cardiac Enzymes Recent Labs 10/19/18 
0413 CPK 1,694* Coagulation No results for input(s): PTP, INR, APTT in the last 72 hours. No lab exists for component: INREXT Lipid Panel No results found for: CHOL, CHOLPOCT, CHOLX, CHLST, CHOLV, 858735, HDL, LDL, LDLC, DLDLP, 343119, VLDLC, VLDL, TGLX, TRIGL, TRIGP, TGLPOCT, CHHD, CHHDX  
BNP No results for input(s): BNPP in the last 72 hours. Liver Enzymes No results for input(s): TP, ALB, TBIL, AP, SGOT, GPT in the last 72 hours. No lab exists for component: DBIL Thyroid Studies Lab Results Component Value Date/Time TSH 2.20 10/16/2018 05:53 AM  
    
Procedures/imaging: see electronic medical records for all procedures/Xrays and details which were not copied into this note but were reviewed prior to creation of Plan Medications Reviewed Kade Watts MD

## 2018-10-21 NOTE — ROUTINE PROCESS
Bedside and Verbal shift change report given to [address] [place] [address],[address] (oncoming nurse) by [facility] (offgoing nurse). Report included the following information SBAR, Kardex, MAR and Cardiac Rhythm A Flutter.

## 2018-10-21 NOTE — PROGRESS NOTES
Cardiology Progress Note 
 
 
10/21/2018 12:56 PM 
 
Admit Date: 10/15/2018 Admit Diagnosis: Hypoxia CHF (congestive heart failure) ([facility] Morbid obesity ([facility] Subjective:  
 
[place] denies chest pain. Visit Vitals /68 (BP 1 Location: Left arm, BP Patient Position: At rest;Sitting) Pulse 70 Temp 97.8 °F (36.6 °C) Resp 18 Ht 5' 10\" (1.778 m) Wt (!) 226 kg (498 lb 3.8 oz) SpO2 96% BMI 71.49 kg/m² Current Facility-Administered Medications Medication Dose Route Frequency  amiodarone (CORDARONE) tablet 200 mg  200 mg Oral DAILY  metoprolol succinate (TOPROL-XL) XL tablet 25 mg  25 mg Oral DAILY  losartan (COZAAR) tablet 25 mg  25 mg Oral DAILY  sodium chloride (NS) flush 5-10 mL  5-10 mL IntraVENous Q8H  
 sodium chloride (NS) flush 5-10 mL  5-10 mL IntraVENous PRN  
 omeprazole (PRILOSEC) capsule 20 mg  20 mg Oral DAILY  rivaroxaban (XARELTO) tablet 20 mg  20 mg Oral DAILY  furosemide (LASIX) injection 60 mg  60 mg IntraVENous BID  acetaminophen (TYLENOL) tablet 650 mg  650 mg Oral Q6H PRN  
 ondansetron (ZOFRAN) injection 4 mg  4 mg IntraVENous Q6H PRN  
 influenza vaccine 2018-19 (6 mos+)(PF) (FLUARIX QUAD/FLULAVAL QUAD) injection 0.5 mL  0.5 mL IntraMUSCular PRIOR TO DISCHARGE Objective:  
  
Physical Exam: 
Visit Vitals /68 (BP 1 Location: Left arm, BP Patient Position: At rest;Sitting) Pulse 70 Temp 97.8 °F (36.6 °C) Resp 18 Ht 5' 10\" (1.778 m) Wt (!) 226 kg (498 lb 3.8 oz) SpO2 96% BMI 71.49 kg/m² General Appearance:  Well developed, well nourished,alert and oriented x 3, and individual in no acute distress. Ears/Nose/Mouth/Throat:   Hearing grossly normal. 
  
    Neck: Supple. Chest:   Lungs clear to auscultation bilaterally. Cardiovascular:  Regular rate and rhythm, S1, S2 normal, no murmur. Abdomen:   Soft, non-tender, bowel sounds are active. Extremities: No edema bilaterally. Skin: Warm and dry. Data Review:  
Labs:   
Recent Results (from the past 24 hour(s)) CBC WITH AUTOMATED DIFF Collection Time: 10/21/18  5:35 AM  
Result Value Ref Range WBC 4.5 (L) 4.6 - 13.2 K/uL  
 RBC 5.34 4.70 - 5.50 M/uL  
 HGB 15.9 13.0 - 16.0 g/dL HCT 50.1 (H) 36.0 - 48.0 % MCV 93.8 74.0 - 97.0 FL  
 MCH 29.8 24.0 - 34.0 PG  
 MCHC 31.7 31.0 - 37.0 g/dL  
 RDW 12.8 11.6 - 14.5 % PLATELET 996 319 - 510 K/uL MPV 10.6 9.2 - 11.8 FL  
 NEUTROPHILS 41 40 - 73 % LYMPHOCYTES 46 21 - 52 % MONOCYTES 9 3 - 10 % EOSINOPHILS 4 0 - 5 % BASOPHILS 0 0 - 2 %  
 ABS. NEUTROPHILS 1.9 1.8 - 8.0 K/UL  
 ABS. LYMPHOCYTES 2.1 0.9 - 3.6 K/UL  
 ABS. MONOCYTES 0.4 0.05 - 1.2 K/UL  
 ABS. EOSINOPHILS 0.2 0.0 - 0.4 K/UL  
 ABS. BASOPHILS 0.0 0.0 - 0.1 K/UL  
 DF AUTOMATED METABOLIC PANEL, BASIC Collection Time: 10/21/18  5:35 AM  
Result Value Ref Range Sodium 142 136 - 145 mmol/L Potassium 4.1 3.5 - 5.5 mmol/L Chloride 102 100 - 108 mmol/L  
 CO2 34 (H) 21 - 32 mmol/L Anion gap 6 3.0 - 18 mmol/L Glucose 87 74 - 99 mg/dL BUN 15 7.0 - 18 MG/DL Creatinine 1.47 (H) 0.6 - 1.3 MG/DL  
 BUN/Creatinine ratio 10 (L) 12 - 20 GFR est AA >60 >60 ml/min/1.73m2 GFR est non-AA 52 (L) >60 ml/min/1.73m2 Calcium 9.0 8.5 - 10.1 MG/DL Telemetry: AFlut Assessment:  
 
Principal Problem: 
  Acute diastolic CHF (congestive heart failure) ([facility] (10/15/2018) Active Problems: Morbid obesity ([facility] (4/4/2016) BMI 70 and over, adult [facility]) () 
 
  CHF (congestive heart failure) ([facility] (10/15/2018) Hypoxia (10/15/2018) Chronic a-fib ([facility] (10/15/2018) Atrial flutter ([facility] (10/15/2018) Elevated troponin (10/16/2018) Acute respiratory distress (10/16/2018) Plan:  
 
Pt has decided to go the EP route with possible ablation. Rate is currently controlled.  
 
Shama Valdovinos MD

## 2018-10-21 NOTE — PROGRESS NOTES
Pulmonary Specialists Pulmonary, Critical Care, and Sleep Medicine Name: Carlos Lawrence. MRN: [id number] : 1975 Hospital: [facility] [place] Date: 10/21/2018 Pulmonary F/Up Note IMPRESSION:  
Principal Problem: 
  Acute diastolic CHF (congestive heart failure) ([address] (10/15/2018) Active Problems: Morbid obesity ([address] (2016) CHF (congestive heart failure) ([address] (10/15/2018) Hypoxia (10/15/2018) Chronic a-fib ([address] (10/15/2018) Atrial flutter ([address] (10/15/2018) Elevated troponin (10/16/2018) · Chronic hypercapnic hypoxic respiratory failure · HFpEF · Episode of nocturnal sinus bradycardia, asymptomatic · Elevated CK · Severe MARIA FERNANDA, OHS on home O2 and BiPAP  
RECOMMENDATIONS:  
Continue BiPAP use routine at night and PRN during day- adjust settings per goal SPO2> 90% Supplemental O2 via NC with activity as found on walk test. Patient already has home O2 Aspiration prevention bundle, head of the bed at 30' all times, pulmonary hygiene care Diuresis, anticoagulant per cardiology Await decision on any transfer to [facility] for management of bradycardia. Patient is using BiPAP and clinically benefiting from device and bradycardia does not appear to be related with MARIA FERNANDA as patient is already treated 10/16/18 PVL LE venous negative for DVT 
ECHO showed LVEF 46-50%; RV dilated Monitor Ck, renal function and lytes per hospitalist 
Stress ulcer and DVT prophylaxis AM labs. Diet as tolerated Will defer respective systems problem management to primary and other consultant and sign off. Call if need assistance. ADVANCE DIRECTIVE: Full code FAMILY DISCUSSION: spoke with patient and answered all his questions Subjective/History: 
Patient is a 37 y.o. male with PMhx for severe MARIA FERNANDA [[place] , on BiPAP [25/18 cwp] and O2, morbid obesity, HFpEF, HTN, chronic arthritis in his knees and back, atrial fibrillation on chronic anticoagulation with Xarelto, previous cardioversion in 2017 came to ED with several days of worsening SOB, leg swelling, orthopnea. His cardiologist Dr. [place] retired recently and after his gastric sleeve procedure was denied by his insurance found himself  gained about 60 pounds back had been off his Lasix for some time. He was represcribed Lasix recently by his PCP. In ED, chest x-ray showed pulmonary edema and he responded to Lasix IV by diuresing and feels better. The patient denies fever, chills, sick contact, cough, chest pain, wheezing, hemoptysis, palpitations, syncope, PND. He has been compliant with his BiPAP and uses O2 intermittently with it during sleep. 10/21/18 Patient used BiPAP last night; on O2 via NC at 2 lpm with activity and RA at rest during daytime Continued with nocturnal bradycardia per staff and cardiology aware and following Feeling better significantly with SOB. Ambulating out of bed Patient denies any chest pain, palpitations, syncope, orthopnea, or paroxysmal nocturnal dyspnea. No fever, chills. Review of Systems: 
General ROS: negative for  - fever, chills, weight loss, fatigue and malaise Cardiovascular ROS: negative for - chest pain, murmur, orthopnea, palpitations or paroxysmal nocturnal dyspnea Gastrointestinal ROS: no abdominal pain, change in bowel habits, or black or bloody stools Dermatological ROS: negative for - pruritus, rash or skin lesion changes Past Medical History: 
Past Medical History:  
Diagnosis Date  Arrhythmia  Arthritis   
 knees  Chronic back pain  Chronic renal insufficiency, stage II (mild)  History of atrial flutter Dx 6/2016 - anticoagulated by [place]  Hypertension 2015  Limited mobility  Migraine headache  Morbid obesity ([address]  Morbid obesity with body mass index of 60.0-69.9 in adult [facility])  Sleep apnea   
 uses c-pap instructed to bring day of surgery  Smoking   
 very passive / cigars only Past Surgical History: 
Past Surgical History:  
Procedure Laterality Date  CARDIAC SURG PROCEDURE UNLIST    
 cardioversion  ECHOCARDIOGRAM  04/2016 EF 60-65% (performed at [facility] - see cardiology tab) Medications: 
Prior to Admission medications Medication Sig Start Date End Date Taking? Authorizing Provider  
rivaroxaban (XARELTO) 20 mg tab tablet Take 20 mg by mouth daily. Yes Provider, Historical  
amiodarone (CORDARONE) 200 mg tablet Take 1 Tab by mouth daily. 9/28/17  Yes Joaquín Caceres MD  
furosemide (LASIX) 40 mg tablet Take 1 Tab by mouth two (2) times a day. 4/12/16  Yes Rachel Mann MD  
 
 
Current Facility-Administered Medications Medication Dose Route Frequency  amiodarone (CORDARONE) tablet 200 mg  200 mg Oral DAILY  metoprolol succinate (TOPROL-XL) XL tablet 25 mg  25 mg Oral DAILY  losartan (COZAAR) tablet 25 mg  25 mg Oral DAILY  sodium chloride (NS) flush 5-10 mL  5-10 mL IntraVENous Q8H  
 omeprazole (PRILOSEC) capsule 20 mg  20 mg Oral DAILY  rivaroxaban (XARELTO) tablet 20 mg  20 mg Oral DAILY  furosemide (LASIX) injection 60 mg  60 mg IntraVENous BID  influenza vaccine 2018-19 (6 mos+)(PF) (FLUARIX QUAD/FLULAVAL QUAD) injection 0.5 mL  0.5 mL IntraMUSCular PRIOR TO DISCHARGE Allergy: No Known Allergies Social History: 
Social History Tobacco Use  Smoking status: Former Smoker Types: Cigars  Smokeless tobacco: Never Used Substance Use Topics  Alcohol use: No  
 Drug use: No  
  
 
Family History: 
Family History Problem Relation Age of Onset  Obesity Mother  Liver Disease Sister Objective: 
Vital Signs:   
Blood pressure 120/68, pulse 70, temperature 97.8 °F (36.6 °C), resp. rate 18, height 5' 10\" (1.778 m), weight (!) 226 kg (498 lb 3.8 oz), SpO2 96 %. Body mass index is 71.49 kg/m². O2 Device: Room air O2 Flow Rate (L/min): 2 l/min Temp (24hrs), Av.9 °F (36.6 °C), Min:97.4 °F (36.3 °C), Max:98.2 °F (36.8 °C) Intake/Output:  
Last shift:      10/21 0701 - 10/21 1900 In: 360 [P.O.:360] Out: 300 [Urine:300] Last 3 shifts: 10/19 1901 - 10/21 0700 In: 370 [P.O.:360; I.V.:10] Out: 3460 [XTUHW:1228] Intake/Output Summary (Last 24 hours) at 10/21/2018 1510 Last data filed at 10/21/2018 4967 Gross per 24 hour Intake 730 ml Output 1860 ml Net -1130 ml Physical Exam: 
General: AAO x 3, appears older than stated age, morbidly obese, on RA in bed HEENT: PERRL, oral mucosa moist 
Neck: No abnormally enlarged lymph nodes or thyroid, supple Chest: obese chest 
Lungs: moderate air entry and exam limitation 2' to body habitus, normal percussion bilaterally anterior chest, no tenderness/ rash Heart: irregular rate and rhythm, S1S2 present or without murmur or extra heart sounds Abdomen: obese, bowel sounds normoactive, tympanic, soft without significant tenderness, masses, organomegaly or guarding, rigidity, rebound Extremity: 1-2+, pitting and bl LE edema, chronic venous stasis changes, no cyanosis, clubbing Skin: Skin color, texture, turgor fair. Skin dry, warm, non-diaphoretic Data:  
 
Recent Results (from the past 24 hour(s)) CBC WITH AUTOMATED DIFF Collection Time: 10/21/18  5:35 AM  
Result Value Ref Range WBC 4.5 (L) 4.6 - 13.2 K/uL  
 RBC 5.34 4.70 - 5.50 M/uL  
 HGB 15.9 13.0 - 16.0 g/dL HCT 50.1 (H) 36.0 - 48.0 % MCV 93.8 74.0 - 97.0 FL  
 MCH 29.8 24.0 - 34.0 PG  
 MCHC 31.7 31.0 - 37.0 g/dL  
 RDW 12.8 11.6 - 14.5 % PLATELET 880 114 - 872 K/uL MPV 10.6 9.2 - 11.8 FL  
 NEUTROPHILS 41 40 - 73 % LYMPHOCYTES 46 21 - 52 % MONOCYTES 9 3 - 10 % EOSINOPHILS 4 0 - 5 % BASOPHILS 0 0 - 2 %  
 ABS. NEUTROPHILS 1.9 1.8 - 8.0 K/UL  
 ABS. LYMPHOCYTES 2.1 0.9 - 3.6 K/UL  
 ABS. MONOCYTES 0.4 0.05 - 1.2 K/UL  
 ABS. EOSINOPHILS 0.2 0.0 - 0.4 K/UL  
 ABS. BASOPHILS 0.0 0.0 - 0.1 K/UL DF AUTOMATED METABOLIC PANEL, BASIC Collection Time: 10/21/18  5:35 AM  
Result Value Ref Range Sodium 142 136 - 145 mmol/L Potassium 4.1 3.5 - 5.5 mmol/L Chloride 102 100 - 108 mmol/L  
 CO2 34 (H) 21 - 32 mmol/L Anion gap 6 3.0 - 18 mmol/L Glucose 87 74 - 99 mg/dL BUN 15 7.0 - 18 MG/DL Creatinine 1.47 (H) 0.6 - 1.3 MG/DL  
 BUN/Creatinine ratio 10 (L) 12 - 20 GFR est AA >60 >60 ml/min/1.73m2 GFR est non-AA 52 (L) >60 ml/min/1.73m2 Calcium 9.0 8.5 - 10.1 MG/DL No results for input(s): FIO2I, IFO2, HCO3I, IHCO3, HCOPOC, PCO2I, PCOPOC, IPHI, PHI, PHPOC, PO2I, PO2POC in the last 72 hours. No lab exists for component: IPOC2 All Micro Results None Telemetry: AFIB Imaging: 
[x]I have personally reviewed the patients chest radiographs images and report Results from Hospital Encounter encounter on 10/15/18 XR CHEST PORT Narrative EXAM: Portable upright chest, one view INDICATION: Hypoxia. CHF. Chronic atrial fibrillation. COMPARISON: 10/15/2018 
 
_______________ FINDINGS: 
 
Cardiac silhouette is within normal range in size for the portable technique. Pulmonary vessels are equalized and indistinct, with improvement of previous 
interstitial markings. Parenchymal band is present at the left lung base not 
definitively present on the prior study. Costophrenic angles are sharp. No 
pneumothorax. 
 
_______________ Impression IMPRESSION: 
 
CHF with improvement. Focal left basilar linear atelectasis. No results found for this or any previous visit. [x]See my orders for details My assessment, plan of care, findings, medications, side effects etc were discussed with: 
[x]nursing []PT/OT   
[]respiratory therapy []Dr. Tony Sanchez [x]Patient [x]Moderate complex decision making performed and > 50% time spent in face to face evaluation.  
 
Cherise Burnham MD

## 2018-10-21 NOTE — PROGRESS NOTES
2220- Patient sitting up in bed watching TV at this time, no complaints verbalized, and no distress noted. Call bell within patient's reach and lights dimmed. 1428- Patient resting quietly in bed at this time with eyes closed, CPAP in place, and no apparent distress/problems noted. Lights dimmed and call bell and bedside table left within patient's reach. 56- Patient resting quietly in bed at this time with eyes closed. NAD noted, call bell within patient's reach, and lights dimmed. 1330- Patient resting quietly in bed at this time, CPAP in place, and NAD noted. Call light within reach and lights dimmed.

## 2018-10-21 NOTE — PROGRESS NOTES
Problem: Pressure Injury - Risk of 
Goal: *Prevention of pressure injury Document Kevin Scale and appropriate interventions in the flowsheet. Outcome: Progressing Towards Goal 
Pressure Injury Interventions: 
Sensory Interventions: Assess changes in LOC, Pressure redistribution bed/mattress (bed type) Moisture Interventions: Absorbent underpads Activity Interventions: Pressure redistribution bed/mattress(bed type) Mobility Interventions: Pressure redistribution bed/mattress (bed type) Nutrition Interventions: Document food/fluid/supplement intake Friction and Shear Interventions: HOB 30 degrees or less

## 2018-10-21 NOTE — PROGRESS NOTES
Pt placed on hospital-issued Resmed cpap in auto-titrating mode on 21% fio2 for HS. No distress noted. Will continue to monitor.

## 2018-10-21 NOTE — ROUTINE PROCESS
Bedside and Verbal shift change report given to Fay Lee. RN (oncoming nurse) by Kwan Tabares (offgoing nurse). Report included the following information SBAR, Kardex and Cardiac Rhythm A flutter.

## 2018-10-21 NOTE — ROUTINE PROCESS
Bedside and Verbal shift change report given to BAR Torres (oncoming nurse) by [employer] (offgoing nurse). Report included the following information SBAR, Kardex, Intake/Output, MAR, Recent Results and Cardiac Rhythm Aflutter.

## 2018-10-22 VITALS
RESPIRATION RATE: 18 BRPM | OXYGEN SATURATION: 94 % | WEIGHT: 315 LBS | TEMPERATURE: 97.7 F | SYSTOLIC BLOOD PRESSURE: 141 MMHG | HEIGHT: 70 IN | BODY MASS INDEX: 45.1 KG/M2 | DIASTOLIC BLOOD PRESSURE: 63 MMHG | HEART RATE: 98 BPM

## 2018-10-22 PROCEDURE — 74011250636 HC RX REV CODE- 250/636: Performed by: HOSPITALIST

## 2018-10-22 PROCEDURE — 74011250637 HC RX REV CODE- 250/637: Performed by: INTERNAL MEDICINE

## 2018-10-22 PROCEDURE — 77030018846 HC SOL IRR STRL H20 ICUM -A

## 2018-10-22 PROCEDURE — 94660 CPAP INITIATION&MGMT: CPT

## 2018-10-22 PROCEDURE — 74011250637 HC RX REV CODE- 250/637: Performed by: HOSPITALIST

## 2018-10-22 RX ORDER — LOSARTAN POTASSIUM 25 MG/1
25 TABLET ORAL DAILY
Qty: 30 TAB | Refills: 0 | Status: ON HOLD
Start: 2018-10-23 | End: 2020-01-01 | Stop reason: ALTCHOICE

## 2018-10-22 RX ORDER — METOPROLOL SUCCINATE 25 MG/1
25 TABLET, EXTENDED RELEASE ORAL DAILY
Qty: 30 TAB | Refills: 0 | Status: ON HOLD
Start: 2018-10-23 | End: 2020-01-01 | Stop reason: ALTCHOICE

## 2018-10-22 RX ADMIN — RIVAROXABAN 20 MG: 10 TABLET, FILM COATED ORAL at 09:33

## 2018-10-22 RX ADMIN — LOSARTAN POTASSIUM 25 MG: 25 TABLET ORAL at 09:33

## 2018-10-22 RX ADMIN — FUROSEMIDE 60 MG: 10 INJECTION, SOLUTION INTRAMUSCULAR; INTRAVENOUS at 09:32

## 2018-10-22 RX ADMIN — OMEPRAZOLE 20 MG: 20 CAPSULE, DELAYED RELEASE ORAL at 09:33

## 2018-10-22 RX ADMIN — METOPROLOL SUCCINATE 25 MG: 25 TABLET, EXTENDED RELEASE ORAL at 09:33

## 2018-10-22 RX ADMIN — Medication 6 ML: at 06:00

## 2018-10-22 RX ADMIN — AMIODARONE HYDROCHLORIDE 200 MG: 200 TABLET ORAL at 09:33

## 2018-10-22 NOTE — PROGRESS NOTES
Problem: Falls - Risk of 
Goal: *Absence of Falls Document Rios Krishna Fall Risk and appropriate interventions in the flowsheet. Outcome: Progressing Towards Goal 
Fall Risk Interventions: 
Mobility Interventions: Assess mobility with egress test, Patient to call before getting OOB Medication Interventions: Patient to call before getting OOB, Teach patient to arise slowly Elimination Interventions: Urinal in reach, Call light in reach

## 2018-10-22 NOTE — PROGRESS NOTES
0700: Assumed care of pt from [facility]. 1350: Asked to weigh pt and get girth. See flow chart for measurements.

## 2018-10-22 NOTE — PROGRESS NOTES
Chart reviewed. Pt will be transferred to [facility] today. CM will cont to follow, as needed.

## 2018-10-22 NOTE — PROGRESS NOTES
Cardiology Progress Note Patient: Marissa Jarrell. Sex: male          DOA: 10/15/2018 YOB: 1975      Age:  37 y.o.        LOS:  LOS: 7 days Assessment/Plan Principal Problem: 
  Acute diastolic CHF (congestive heart failure) ([address] (10/15/2018) Active Problems: Morbid obesity ([address] (4/4/2016) BMI 70 and over, adult [facility]) () 
 
  CHF (congestive heart failure) ([address] (10/15/2018) Hypoxia (10/15/2018) Chronic a-fib ([address] (10/15/2018) Atrial flutter ([facility] (10/15/2018) Elevated troponin (10/16/2018) Acute respiratory distress (10/16/2018) Plan: 
Resting and nocturnal bradycardia with aflutter Exertional HR> 200 Pt is accepted at [facility] for possible ablation/EP evaluation Discussed with patint Subjective:  
 cc: JUSTIN Kim REVIEW OF SYSTEMS:  
 
General: No fevers or chills. Cardiovascular: No chest pain or pressure. No palpitations. No ankle swelling Pulmonary: No SOB, orthopnea, PND Gastrointestinal: No nausea, vomiting or diarrhea Objective:  
  
Visit Vitals /63 (BP 1 Location: Left arm, BP Patient Position: Sitting) Pulse 98 Temp 97.7 °F (36.5 °C) Resp 18 Ht 5' 10\" (1.778 m) Wt (!) 221.6 kg (488 lb 8.6 oz) SpO2 94% BMI 70.10 kg/m² Body mass index is 70.1 kg/m². Physical Exam: 
General Appearance: Comfortable, not using accessory muscles of respiration. NECK: No JVD, no thyroidomeglay. LUNGS: Clear bilaterally. HEART: S1 variable +S2 audible, ABD: Non-tender, BS Audible EXT: chronic mild edema, and no cysnosis. VASCULAR EXAM: Pulses are intact. PSYCHIATRIC EXAM: Mood is appropriate. Medication: 
Current Facility-Administered Medications Medication Dose Route Frequency  amiodarone (CORDARONE) tablet 200 mg  200 mg Oral DAILY  metoprolol succinate (TOPROL-XL) XL tablet 25 mg  25 mg Oral DAILY  losartan (COZAAR) tablet 25 mg  25 mg Oral DAILY  sodium chloride (NS) flush 5-10 mL  5-10 mL IntraVENous Q8H  
 sodium chloride (NS) flush 5-10 mL  5-10 mL IntraVENous PRN  
 omeprazole (PRILOSEC) capsule 20 mg  20 mg Oral DAILY  rivaroxaban (XARELTO) tablet 20 mg  20 mg Oral DAILY  furosemide (LASIX) injection 60 mg  60 mg IntraVENous BID  acetaminophen (TYLENOL) tablet 650 mg  650 mg Oral Q6H PRN  
 ondansetron (ZOFRAN) injection 4 mg  4 mg IntraVENous Q6H PRN  
 influenza vaccine 2018-19 (6 mos+)(PF) (FLUARIX QUAD/FLULAVAL QUAD) injection 0.5 mL  0.5 mL IntraMUSCular PRIOR TO DISCHARGE Lab/Data Reviewed: 
Procedures/imaging: see electronic medical records for all procedures/Xrays  
and details which were not copied into this note but were reviewed prior to creation of Plan 
  
 
All lab results for the last 24 hours reviewed. Recent Labs 10/21/18 
0535 WBC 4.5* HGB 15.9 HCT 50.1*  
 Recent Labs 10/21/18 
0535   
K 4.1  CO2 34* GLU 87 BUN 15  
CREA 1.47* CA 9.0 Signed By: Jen Mendez MD   
 October 22, 2018

## 2018-10-22 NOTE — ROUTINE PROCESS
Bedside and Verbal shift change report given to [address] (oncoming nurse) by [id number] (offgoing nurse). Report included the following information SBAR, Kardex, Intake/Output and Cardiac Rhythm  NSR, A flutter.

## 2018-10-22 NOTE — PROGRESS NOTES
Pt on hospital-issued Resmed cpap in auto-titrating cpap mode for HS on 21% fio2 at this time. No distress noted. Will continue to monitor.

## 2018-10-22 NOTE — PROGRESS NOTES
2024-Patient up in room sitting on side of bed watching TV, NAD noted, and no complaints made at this time. Call bell and urinal within patient's reach. Patient encouraged to call for help if needed and understanding verbalized. 2232- Patient sitting quietly in bed talking on cell phone at this time. NAD noted and no complaints voiced. Call bell within reach and lights dimmed. 9148- Patient lying in bed on cell phone at this time, NAD noted, and no complaints verbalized. CPAP in place, call light within reach, and lights dimmed. 4864- Patient resting quietly in bed at this time with eyes closed and CPAP in place. NAD noted, call light within reach, and lights dimmed. 7301- Patient resting quietly in bed at this time. CPAP in place and NAD noted. Call bell within reach. 0720-Patient had an uneventful night and continues to rest quietly in bed at this time with CPAP in place.

## 2018-10-22 NOTE — PROGRESS NOTES
Problem: Pressure Injury - Risk of 
Goal: *Prevention of pressure injury Document Kevin Scale and appropriate interventions in the flowsheet. Outcome: Progressing Towards Goal 
Pressure Injury Interventions: 
Sensory Interventions: Assess changes in LOC, Pressure redistribution bed/mattress (bed type) Moisture Interventions: Absorbent underpads Activity Interventions: Pressure redistribution bed/mattress(bed type) Mobility Interventions: HOB 30 degrees or less, Pressure redistribution bed/mattress (bed type), PT/OT evaluation Nutrition Interventions: Document food/fluid/supplement intake Friction and Shear Interventions: HOB 30 degrees or less Problem: Falls - Risk of 
Goal: *Absence of Falls Document Carlos Steward Fall Risk and appropriate interventions in the flowsheet. Outcome: Progressing Towards Goal 
Fall Risk Interventions: 
Mobility Interventions: Assess mobility with egress test 
 
  
 
Medication Interventions: Patient to call before getting OOB Elimination Interventions: Call light in reach

## 2018-10-22 NOTE — DISCHARGE SUMMARY
Discharge Summary Patient: Julio Juárez MRN: [id number]  CSN: [id number] YOB: 1975  Age: 37 y.o. Sex: male DOA: 10/15/2018 LOS:  LOS: 7 days   Discharge Date:   
 
Primary Care Provider:  Juventino Posada MD 
 
Admission Diagnoses: Hypoxia CHF (congestive heart failure) ([facility] [address] Morbid obesity ([facility] [address] Discharge Diagnoses:   
Problem List as of 10/22/2018 Date Reviewed: 11/6/2017 Codes Class Noted - Resolved Elevated troponin ICD-10-CM: R74.8 ICD-9-CM: 790.6  10/16/2018 - Present Acute respiratory distress ICD-10-CM: R06.03 
ICD-9-CM: 518.82  10/16/2018 - Present CHF (congestive heart failure) (HCC) ICD-10-CM: I50.9 ICD-9-CM: 428.0  10/15/2018 - Present Hypoxia ICD-10-CM: R09.02 
ICD-9-CM: 799.02  10/15/2018 - Present * (Principal) Acute diastolic CHF (congestive heart failure) (HCC) ICD-10-CM: I50.31 ICD-9-CM: 428.31, 428.0  10/15/2018 - Present Chronic a-fib (HCC) ICD-10-CM: I14.4 ICD-9-CM: 427.31  10/15/2018 - Present Atrial flutter (HCC) (Chronic) ICD-10-CM: A89.08 
ICD-9-CM: 427.32  10/15/2018 - Present Chronic renal insufficiency, stage II (mild) ICD-10-CM: N18.2 ICD-9-CM: 418. 2  Unknown - Present BMI 70 and over, adult [facility]) ICD-10-CM: C59.33 ICD-9-CM: V85.45  Unknown - Present Chronic back pain ICD-10-CM: M54.9, G89.29 ICD-9-CM: 724.5, 338.29  Unknown - Present Arthritis ICD-10-CM: M19.90 ICD-9-CM: 716.90  Unknown - Present Overview Signed 4/24/2017  2:31 PM by REY Levine  
  knees Migraine ICD-10-CM: V41.344 ICD-9-CM: 346.90  Unknown - Present History of atrial flutter ICD-10-CM: Z86.79 
ICD-9-CM: V12.59  Unknown - Present Overview Signed 4/24/2017  2:33 PM by REY Levine Dx 6/2016 - anticoagulated by Robel Fredo Limited mobility ICD-10-CM: Z74.09 
ICD-9-CM: V49.89  Unknown - Present Smoking ICD-10-CM: F17.200 ICD-9-CM: 305.1  Unknown - Present Overview Signed 4/24/2017  2:39 PM by REY Hargrove  
  very passive Cellulitis of left lower leg ICD-10-CM: L03.116 ICD-9-CM: 682.6  4/4/2016 - Present Anemia ICD-10-CM: D64.9 ICD-9-CM: 285.9  4/4/2016 - Present Morbid obesity ([address] ICD-10-CM: E66.01 
ICD-9-CM: 278.01  4/4/2016 - Present Dyspnea ICD-10-CM: R06.00 
ICD-9-CM: 786.09  4/4/2016 - Present Sleep apnea ICD-10-CM: G47.30 ICD-9-CM: 780.57  4/4/2016 - Present Sinus tachycardia ICD-10-CM: R00.0 ICD-9-CM: 427.89  4/4/2016 - Present Acute respiratory failure with hypoxia and hypercarbia (HCC) ICD-10-CM: J96.01, J96.02 
ICD-9-CM: 518.81  4/4/2016 - Present RESOLVED: Suspected pulmonary embolism ICD-10-CM: R09.89 ICD-9-CM: 786.9  4/4/2016 - 4/5/2016 RESOLVED: Hypoxia ICD-10-CM: R09.02 
ICD-9-CM: 799.02  4/4/2016 - 4/5/2016 RESOLVED: PE (pulmonary embolism) ICD-10-CM: I26.99 
ICD-9-CM: 415.19  4/4/2016 - 4/5/2016 Discharge Medications:    
Current Discharge Medication List  
  
START taking these medications Details  
losartan (COZAAR) 25 mg tablet Take 1 Tab by mouth daily. Qty: 30 Tab, Refills: 0  
  
metoprolol succinate (TOPROL-XL) 25 mg XL tablet Take 1 Tab by mouth daily. Qty: 30 Tab, Refills: 0 CONTINUE these medications which have NOT CHANGED Details  
rivaroxaban (XARELTO) 20 mg tab tablet Take 20 mg by mouth daily. amiodarone (CORDARONE) 200 mg tablet Take 1 Tab by mouth daily. Qty: 90 Tab, Refills: 3  
  
furosemide (LASIX) 40 mg tablet Take 1 Tab by mouth two (2) times a day. Qty: 60 Tab, Refills: 1 STOP taking these medications Omeprazole delayed release (PRILOSEC D/R) 20 mg tablet Comments:  
Reason for Stopping:   
   
  
 
 
Discharge Condition: Stable Procedures : None Consults: Cardiology PHYSICAL EXAM   
Visit Vitals /75 (BP 1 Location: Left arm, BP Patient Position: Sitting) Pulse 88 Temp 97.8 °F (36.6 °C) Resp 20 Ht 5' 10\" (1.778 m) Wt (!) 221.6 kg (488 lb 8.6 oz) SpO2 96% BMI 70.10 kg/m² General: Morbidly obese AA male. Awake, cooperative, no acute distress   
HEENT: NC, Atraumatic. PERRLA, EOMI. Anicteric sclerae. Lungs:  CTA Bilaterally. No Wheezing/Rhonchi/Rales. Heart:  Regular  rhythm,  No murmur, No Rubs, No Gallops Abdomen: Soft, Non distended, Non tender. +Bowel sounds, Extremities: 2+ edema bilateral lower extremities Psych:   Not anxious or agitated. Neurologic:  No acute neurological deficits. Admission HPI : This is a 47-year-old -American gentleman who is morbidly obese over 400 pounds. He does suffer with chronic arthritis in his knees and back. He has chronic renal insufficiency, hypertension, atrial fibrillation. He is on anticoagulation with Xarelto. He has had previous cardioversion in 2017 and he wears a CPAP for obstructive sleep apnea. He came into the hospital with 3-4 days of worsening shortness of breath, dyspnea on exertion, lightheadedness and blurry vision. He was scheduled a number of months ago for a gastric sleeve procedure to lose weight, but apparently his insurance canceled that procedure. He gained about 60 pounds back after having lost that in anticipation of the surgery and he had been off his Lasix for some time as well. He just had represcribed by his PCP. It sounds like he was getting back with some regular followups which he might not have been for some time, but he is short of breath when he arrived, he was hypoxic. His BNP was actually normal, but chest x-ray showed evidence for pulmonary edema. He has responded to Lasix IV and feels better after that. He is already urinating quite a bit after that dosing. He describes orthopnea.   He has been compliant with his CPAP; however, he denies any chest pain. He has had no fevers, no respiratory infection symptoms. His meds at home include Xarelto, Lasix and amiodarone. He is not taking any other prescription meds listed in his current list. 
 
 
 
Hospital Course : This patient was admitted to medical services on October 15, 2018 for acute hypoxic respiratory insufficiency. Other admission diagnoses include acute diastolic CHF, chronic atrial fibrillation, and MARIA FERNANDA on CPAP. Acute Hypoxic Respiratory Insufficiency The patient was admitted to the ICU initially for close monitoring and stabilization. He was placed on supplemental oxygen, placed on IV diuretics, and monitored overnight in the ICU. The patient was seen by pulmonology for co-management purposes in the ICU. He was stabilized initially, then transferred to the telemetry unit for the remainder of his stay. He underwent an echocardiogram which showed an EF of 46-60%. He was weaned off of supplemental oxygen during his stay on the telemetry unit, and no longer required oxygen during his stay. Acute CHF Exacerbation As stated above, the patient was monitored in the ICU initially. He was started on IV diuretics and monitored. His edema was monitored over the course of his hospital stay and improved marginally. His fluid status improved, he was weaned off of supplemental oxygen, and remains on room air at the time of discharge. Chronic Atrial Fibrillation The patient was resumed on his usual Xarelto at the time of admission. Over the course of his stay, he developed asymptomatic nocturnal bradycardia. Cardiology was on the case and recommended changes to his amiodarone dosing. After the change to the amiodarone dose, he continued to have asymptomatic nocturnal bradycardia.  Even with the nocturnal bradycardia, the patient would experience atrial fibrillation with RVR during exertion, with hear rates reaching >200 bpm. At this juncture, cardiology recommended DCCV vs EP studies. The patient decided that since he has had DCCV in the past without long term success, he wished to proceed with EP studies. It was decided at this time that the patient needed to be transferred for a higher level of care. The case was discussed with the cardiac ICU nurse practitioner, Jessica Isbell, at [facility]. Ultimately, the patient was accepted to the CICU under Dr Faina Ratliff' services. He will be transferred to [facility] today. MARIA FERNANDA The patient continued with CPAP while sleeping and with naps without any further respiratory distress after initial stabilization. He was advised to continue outpatient follow up with his PCP once he is discharged from [facility]. He was also advised that his medication list may change after discharge from [facility]. Activity: Activity as tolerated Diet: Cardiac Diet Follow-up: To be determined upon discharge from [facility] Disposition: To be determined upon discharge from [facility] Minutes spent on discharge: 60 Labs: Results:  
   
Chemistry Recent Labs 10/21/18 
0535 GLU 87   
K 4.1  CO2 34* BUN 15  
CREA 1.47* CA 9.0 AGAP 6  
BUCR 10* CBC w/Diff Recent Labs 10/21/18 
0535 WBC 4.5*  
RBC 5.34  
HGB 15.9 HCT 50.1*  
 GRANS 41  
LYMPH 46 EOS 4 Cardiac Enzymes No results for input(s): CPK, CKND1, CONSTANZA in the last 72 hours. No lab exists for component: Roselind Salon Coagulation No results for input(s): PTP, INR, APTT in the last 72 hours. No lab exists for component: INREXT Lipid Panel No results found for: CHOL, CHOLPOCT, CHOLX, CHLST, CHOLV, 182120, HDL, LDL, LDLC, DLDLP, 265946, VLDLC, VLDL, TGLX, TRIGL, TRIGP, TGLPOCT, CHHD, CHHDX  
BNP No results for input(s): BNPP in the last 72 hours. Liver Enzymes No results for input(s): TP, ALB, TBIL, AP, SGOT, GPT in the last 72 hours. No lab exists for component: DBIL Thyroid Studies Lab Results Component Value Date/Time TSH 2.20 10/16/2018 05:53 AM  
    
 
 
Significant Diagnostic Studies: Xr Chest Pa Lat Result Date: 10/15/2018 History: Shortness of breath Views: Frontal and lateral views. Lateral view is suboptimal due to motion artifacts. Comparison study: 6/9/2017 Findings: Heart and mediastinum: Enlarged cardiac silhouette. Lungs and pleura: Bronchovascular and interstitial prominence noted without dense consolidation or pleural effusion. Aorta: Unremarkable. Bones: Within normal limits for age. Impression: Cardiomegaly with bronchovascular and interstitial prominence most likely due to vascular congestion possibly due to CHF/fluid overload. No dense consolidation is seen to suggest pneumonia. Xr Chest [facility] Result Date: 10/16/2018 EXAM: Portable upright chest, one view INDICATION: Hypoxia. CHF. Chronic atrial fibrillation. COMPARISON: 10/15/2018 _______________ FINDINGS: Cardiac silhouette is within normal range in size for the portable technique. Pulmonary vessels are equalized and indistinct, with improvement of previous interstitial markings. Parenchymal band is present at the left lung base not definitively present on the prior study. Costophrenic angles are sharp. No pneumothorax. _______________ IMPRESSION: CHF with improvement. Focal left basilar linear atelectasis. No results found for this or any previous visit.  
 
 
 
CC: Gilbert Mohamud MD

## 2018-11-25 LAB
ATRIAL RATE: 228 BPM
ATRIAL RATE: 77 BPM
CALCULATED P AXIS, ECG09: -98 DEGREES
CALCULATED R AXIS, ECG10: -34 DEGREES
CALCULATED R AXIS, ECG10: -41 DEGREES
CALCULATED T AXIS, ECG11: 26 DEGREES
CALCULATED T AXIS, ECG11: 40 DEGREES
DIAGNOSIS, 93000: NORMAL
DIAGNOSIS, 93000: NORMAL
P-R INTERVAL, ECG05: 206 MS
Q-T INTERVAL, ECG07: 396 MS
Q-T INTERVAL, ECG07: 398 MS
QRS DURATION, ECG06: 100 MS
QRS DURATION, ECG06: 102 MS
QTC CALCULATION (BEZET), ECG08: 447 MS
QTC CALCULATION (BEZET), ECG08: 448 MS
VENTRICULAR RATE, ECG03: 76 BPM
VENTRICULAR RATE, ECG03: 77 BPM

## 2020-01-01 ENCOUNTER — APPOINTMENT (OUTPATIENT)
Dept: GENERAL RADIOLOGY | Age: 45
DRG: 004 | End: 2020-01-01
Attending: INTERNAL MEDICINE
Payer: MEDICARE

## 2020-01-01 ENCOUNTER — APPOINTMENT (OUTPATIENT)
Dept: GENERAL RADIOLOGY | Age: 45
DRG: 004 | End: 2020-01-01
Attending: FAMILY MEDICINE
Payer: MEDICARE

## 2020-01-01 ENCOUNTER — APPOINTMENT (OUTPATIENT)
Dept: NON INVASIVE DIAGNOSTICS | Age: 45
DRG: 004 | End: 2020-01-01
Attending: INTERNAL MEDICINE
Payer: MEDICARE

## 2020-01-01 ENCOUNTER — APPOINTMENT (OUTPATIENT)
Dept: VASCULAR SURGERY | Age: 45
DRG: 004 | End: 2020-01-01
Attending: INTERNAL MEDICINE
Payer: MEDICARE

## 2020-01-01 ENCOUNTER — ANESTHESIA (OUTPATIENT)
Dept: SURGERY | Age: 45
DRG: 004 | End: 2020-01-01
Payer: MEDICARE

## 2020-01-01 ENCOUNTER — ANESTHESIA EVENT (OUTPATIENT)
Dept: SURGERY | Age: 45
DRG: 004 | End: 2020-01-01
Payer: MEDICARE

## 2020-01-01 ENCOUNTER — ANESTHESIA (OUTPATIENT)
Dept: ICU | Age: 45
DRG: 004 | End: 2020-01-01
Payer: MEDICARE

## 2020-01-01 ENCOUNTER — APPOINTMENT (OUTPATIENT)
Dept: GENERAL RADIOLOGY | Age: 45
DRG: 004 | End: 2020-01-01
Attending: EMERGENCY MEDICINE
Payer: MEDICARE

## 2020-01-01 ENCOUNTER — ANESTHESIA EVENT (OUTPATIENT)
Dept: ICU | Age: 45
DRG: 004 | End: 2020-01-01
Payer: MEDICARE

## 2020-01-01 ENCOUNTER — HOSPITAL ENCOUNTER (EMERGENCY)
Age: 45
End: 2020-05-25
Attending: EMERGENCY MEDICINE
Payer: MEDICARE

## 2020-01-01 ENCOUNTER — APPOINTMENT (OUTPATIENT)
Dept: NON INVASIVE DIAGNOSTICS | Age: 45
DRG: 004 | End: 2020-01-01
Attending: FAMILY MEDICINE
Payer: MEDICARE

## 2020-01-01 ENCOUNTER — HOSPITAL ENCOUNTER (INPATIENT)
Age: 45
LOS: 33 days | Discharge: NURSING, REHAB OR LONG-TERM CARE | DRG: 004 | End: 2020-05-24
Attending: EMERGENCY MEDICINE | Admitting: FAMILY MEDICINE
Payer: MEDICARE

## 2020-01-01 ENCOUNTER — APPOINTMENT (OUTPATIENT)
Dept: INTERVENTIONAL RADIOLOGY/VASCULAR | Age: 45
DRG: 004 | End: 2020-01-01
Attending: INTERNAL MEDICINE
Payer: MEDICARE

## 2020-01-01 VITALS
RESPIRATION RATE: 18 BRPM | SYSTOLIC BLOOD PRESSURE: 142 MMHG | BODY MASS INDEX: 45.1 KG/M2 | TEMPERATURE: 99.2 F | OXYGEN SATURATION: 96 % | WEIGHT: 315 LBS | HEART RATE: 94 BPM | HEIGHT: 70 IN | DIASTOLIC BLOOD PRESSURE: 88 MMHG

## 2020-01-01 VITALS
HEIGHT: 70 IN | WEIGHT: 315 LBS | DIASTOLIC BLOOD PRESSURE: 80 MMHG | SYSTOLIC BLOOD PRESSURE: 123 MMHG | OXYGEN SATURATION: 100 % | HEART RATE: 139 BPM | RESPIRATION RATE: 28 BRPM | BODY MASS INDEX: 45.1 KG/M2

## 2020-01-01 DIAGNOSIS — I46.9 CARDIAC ARREST (HCC): Primary | ICD-10-CM

## 2020-01-01 DIAGNOSIS — I50.9 CHF (CONGESTIVE HEART FAILURE) (HCC): ICD-10-CM

## 2020-01-01 DIAGNOSIS — I50.23 ACUTE ON CHRONIC SYSTOLIC HEART FAILURE (HCC): ICD-10-CM

## 2020-01-01 DIAGNOSIS — R04.2 MASSIVE HEMOPTYSIS: ICD-10-CM

## 2020-01-01 DIAGNOSIS — J96.22 ACUTE ON CHRONIC RESPIRATORY FAILURE WITH HYPOXIA AND HYPERCAPNIA (HCC): ICD-10-CM

## 2020-01-01 DIAGNOSIS — J96.21 ACUTE ON CHRONIC RESPIRATORY FAILURE WITH HYPOXIA AND HYPERCAPNIA (HCC): ICD-10-CM

## 2020-01-01 DIAGNOSIS — G93.1 ANOXIC BRAIN INJURY (HCC): ICD-10-CM

## 2020-01-01 DIAGNOSIS — I48.0 PAF (PAROXYSMAL ATRIAL FIBRILLATION) (HCC): ICD-10-CM

## 2020-01-01 DIAGNOSIS — E66.01 MORBIDLY OBESE (HCC): Primary | ICD-10-CM

## 2020-01-01 DIAGNOSIS — R09.02 HYPOXIA: ICD-10-CM

## 2020-01-01 DIAGNOSIS — I50.9 ACUTE CONGESTIVE HEART FAILURE, UNSPECIFIED HEART FAILURE TYPE (HCC): ICD-10-CM

## 2020-01-01 DIAGNOSIS — R06.02 SOB (SHORTNESS OF BREATH): ICD-10-CM

## 2020-01-01 DIAGNOSIS — I50.23 SYSTOLIC CHF, ACUTE ON CHRONIC (HCC): ICD-10-CM

## 2020-01-01 DIAGNOSIS — R77.8 ELEVATED TROPONIN: ICD-10-CM

## 2020-01-01 LAB
ALBUMIN SERPL-MCNC: 2 G/DL (ref 3.4–5)
ALBUMIN SERPL-MCNC: 2.1 G/DL (ref 3.4–5)
ALBUMIN SERPL-MCNC: 2.1 G/DL (ref 3.4–5)
ALBUMIN SERPL-MCNC: 2.3 G/DL (ref 3.4–5)
ALBUMIN SERPL-MCNC: 2.4 G/DL (ref 3.4–5)
ALBUMIN SERPL-MCNC: 2.5 G/DL (ref 3.4–5)
ALBUMIN SERPL-MCNC: 2.5 G/DL (ref 3.4–5)
ALBUMIN SERPL-MCNC: 2.6 G/DL (ref 3.4–5)
ALBUMIN SERPL-MCNC: 2.7 G/DL (ref 3.4–5)
ALBUMIN SERPL-MCNC: 2.8 G/DL (ref 3.4–5)
ALBUMIN SERPL-MCNC: 2.9 G/DL (ref 3.4–5)
ALBUMIN SERPL-MCNC: 2.9 G/DL (ref 3.4–5)
ALBUMIN SERPL-MCNC: 3 G/DL (ref 3.4–5)
ALBUMIN SERPL-MCNC: 3 G/DL (ref 3.4–5)
ALBUMIN SERPL-MCNC: 3.1 G/DL (ref 3.4–5)
ALBUMIN SERPL-MCNC: 3.1 G/DL (ref 3.4–5)
ALBUMIN SERPL-MCNC: 3.2 G/DL (ref 3.4–5)
ALBUMIN SERPL-MCNC: 3.4 G/DL (ref 3.4–5)
ALBUMIN SERPL-MCNC: 3.4 G/DL (ref 3.4–5)
ALBUMIN SERPL-MCNC: 3.7 G/DL (ref 3.4–5)
ALBUMIN SERPL-MCNC: 3.8 G/DL (ref 3.4–5)
ALBUMIN SERPL-MCNC: 3.9 G/DL (ref 3.4–5)
ALBUMIN SERPL-MCNC: 4.1 G/DL (ref 3.4–5)
ALBUMIN SERPL-MCNC: 4.2 G/DL (ref 3.4–5)
ALBUMIN SERPL-MCNC: 4.5 G/DL (ref 3.4–5)
ALBUMIN SERPL-MCNC: 4.8 G/DL (ref 3.4–5)
ALBUMIN/GLOB SERPL: 0.6 {RATIO} (ref 0.8–1.7)
ALBUMIN/GLOB SERPL: 0.6 {RATIO} (ref 0.8–1.7)
ALBUMIN/GLOB SERPL: 0.7 {RATIO} (ref 0.8–1.7)
ALBUMIN/GLOB SERPL: 0.8 {RATIO} (ref 0.8–1.7)
ALBUMIN/GLOB SERPL: 0.9 {RATIO} (ref 0.8–1.7)
ALBUMIN/GLOB SERPL: 1 {RATIO} (ref 0.8–1.7)
ALBUMIN/GLOB SERPL: 1 {RATIO} (ref 0.8–1.7)
ALBUMIN/GLOB SERPL: 1.1 {RATIO} (ref 0.8–1.7)
ALBUMIN/GLOB SERPL: 1.2 {RATIO} (ref 0.8–1.7)
ALBUMIN/GLOB SERPL: 1.3 {RATIO} (ref 0.8–1.7)
ALBUMIN/GLOB SERPL: 1.3 {RATIO} (ref 0.8–1.7)
ALBUMIN/GLOB SERPL: 1.4 {RATIO} (ref 0.8–1.7)
ALBUMIN/GLOB SERPL: 1.4 {RATIO} (ref 0.8–1.7)
ALBUMIN/GLOB SERPL: 1.5 {RATIO} (ref 0.8–1.7)
ALBUMIN/GLOB SERPL: 1.6 {RATIO} (ref 0.8–1.7)
ALBUMIN/GLOB SERPL: 1.6 {RATIO} (ref 0.8–1.7)
ALBUMIN/GLOB SERPL: 1.7 {RATIO} (ref 0.8–1.7)
ALP SERPL-CCNC: 111 U/L (ref 45–117)
ALP SERPL-CCNC: 35 U/L (ref 45–117)
ALP SERPL-CCNC: 35 U/L (ref 45–117)
ALP SERPL-CCNC: 37 U/L (ref 45–117)
ALP SERPL-CCNC: 38 U/L (ref 45–117)
ALP SERPL-CCNC: 39 U/L (ref 45–117)
ALP SERPL-CCNC: 39 U/L (ref 45–117)
ALP SERPL-CCNC: 40 U/L (ref 45–117)
ALP SERPL-CCNC: 41 U/L (ref 45–117)
ALP SERPL-CCNC: 43 U/L (ref 45–117)
ALP SERPL-CCNC: 44 U/L (ref 45–117)
ALP SERPL-CCNC: 45 U/L (ref 45–117)
ALP SERPL-CCNC: 46 U/L (ref 45–117)
ALP SERPL-CCNC: 47 U/L (ref 45–117)
ALP SERPL-CCNC: 48 U/L (ref 45–117)
ALP SERPL-CCNC: 50 U/L (ref 45–117)
ALP SERPL-CCNC: 51 U/L (ref 45–117)
ALP SERPL-CCNC: 52 U/L (ref 45–117)
ALP SERPL-CCNC: 52 U/L (ref 45–117)
ALP SERPL-CCNC: 53 U/L (ref 45–117)
ALP SERPL-CCNC: 54 U/L (ref 45–117)
ALP SERPL-CCNC: 54 U/L (ref 45–117)
ALP SERPL-CCNC: 55 U/L (ref 45–117)
ALP SERPL-CCNC: 56 U/L (ref 45–117)
ALP SERPL-CCNC: 57 U/L (ref 45–117)
ALP SERPL-CCNC: 58 U/L (ref 45–117)
ALP SERPL-CCNC: 60 U/L (ref 45–117)
ALP SERPL-CCNC: 61 U/L (ref 45–117)
ALP SERPL-CCNC: 62 U/L (ref 45–117)
ALP SERPL-CCNC: 63 U/L (ref 45–117)
ALP SERPL-CCNC: 70 U/L (ref 45–117)
ALP SERPL-CCNC: 78 U/L (ref 45–117)
ALP SERPL-CCNC: 80 U/L (ref 45–117)
ALT SERPL-CCNC: 20 U/L (ref 16–61)
ALT SERPL-CCNC: 206 U/L (ref 16–61)
ALT SERPL-CCNC: 23 U/L (ref 16–61)
ALT SERPL-CCNC: 24 U/L (ref 16–61)
ALT SERPL-CCNC: 25 U/L (ref 16–61)
ALT SERPL-CCNC: 25 U/L (ref 16–61)
ALT SERPL-CCNC: 26 U/L (ref 16–61)
ALT SERPL-CCNC: 27 U/L (ref 16–61)
ALT SERPL-CCNC: 27 U/L (ref 16–61)
ALT SERPL-CCNC: 28 U/L (ref 16–61)
ALT SERPL-CCNC: 28 U/L (ref 16–61)
ALT SERPL-CCNC: 29 U/L (ref 16–61)
ALT SERPL-CCNC: 30 U/L (ref 16–61)
ALT SERPL-CCNC: 30 U/L (ref 16–61)
ALT SERPL-CCNC: 31 U/L (ref 16–61)
ALT SERPL-CCNC: 32 U/L (ref 16–61)
ALT SERPL-CCNC: 32 U/L (ref 16–61)
ALT SERPL-CCNC: 33 U/L (ref 16–61)
ALT SERPL-CCNC: 33 U/L (ref 16–61)
ALT SERPL-CCNC: 34 U/L (ref 16–61)
ALT SERPL-CCNC: 35 U/L (ref 16–61)
ALT SERPL-CCNC: 38 U/L (ref 16–61)
ALT SERPL-CCNC: 39 U/L (ref 16–61)
ALT SERPL-CCNC: 41 U/L (ref 16–61)
ALT SERPL-CCNC: 42 U/L (ref 16–61)
ALT SERPL-CCNC: 42 U/L (ref 16–61)
ALT SERPL-CCNC: 45 U/L (ref 16–61)
ALT SERPL-CCNC: 48 U/L (ref 16–61)
ALT SERPL-CCNC: 49 U/L (ref 16–61)
ALT SERPL-CCNC: 50 U/L (ref 16–61)
ALT SERPL-CCNC: 54 U/L (ref 16–61)
ALT SERPL-CCNC: 59 U/L (ref 16–61)
ALT SERPL-CCNC: 68 U/L (ref 16–61)
ALT SERPL-CCNC: 80 U/L (ref 16–61)
ALT SERPL-CCNC: 99 U/L (ref 16–61)
ANION GAP SERPL CALC-SCNC: 0 MMOL/L (ref 3–18)
ANION GAP SERPL CALC-SCNC: 2 MMOL/L (ref 3–18)
ANION GAP SERPL CALC-SCNC: 3 MMOL/L (ref 3–18)
ANION GAP SERPL CALC-SCNC: 4 MMOL/L (ref 3–18)
ANION GAP SERPL CALC-SCNC: 5 MMOL/L (ref 3–18)
ANION GAP SERPL CALC-SCNC: 6 MMOL/L (ref 3–18)
ANION GAP SERPL CALC-SCNC: 7 MMOL/L (ref 3–18)
APPEARANCE UR: ABNORMAL
APPEARANCE UR: CLEAR
APPEARANCE UR: CLEAR
APTT PPP: 102 SEC (ref 23–36.4)
APTT PPP: 148.7 SEC (ref 23–36.4)
APTT PPP: 170.5 SEC (ref 23–36.4)
APTT PPP: 22.1 SEC (ref 23–36.4)
APTT PPP: 25.3 SEC (ref 23–36.4)
APTT PPP: 27.1 SEC (ref 23–36.4)
APTT PPP: 28 SEC (ref 23–36.4)
APTT PPP: 28.2 SEC (ref 23–36.4)
APTT PPP: 28.4 SEC (ref 23–36.4)
APTT PPP: 28.6 SEC (ref 23–36.4)
APTT PPP: 28.6 SEC (ref 23–36.4)
APTT PPP: 29 SEC (ref 23–36.4)
APTT PPP: 29 SEC (ref 23–36.4)
APTT PPP: 29.6 SEC (ref 23–36.4)
APTT PPP: 30 SEC (ref 23–36.4)
APTT PPP: 30.1 SEC (ref 23–36.4)
APTT PPP: 30.4 SEC (ref 23–36.4)
APTT PPP: 31.3 SEC (ref 23–36.4)
APTT PPP: 32 SEC (ref 23–36.4)
APTT PPP: 34 SEC (ref 23–36.4)
APTT PPP: 34 SEC (ref 23–36.4)
APTT PPP: 35.3 SEC (ref 23–36.4)
APTT PPP: 35.8 SEC (ref 23–36.4)
APTT PPP: 36.1 SEC (ref 23–36.4)
APTT PPP: 40.4 SEC (ref 23–36.4)
APTT PPP: 43.9 SEC (ref 23–36.4)
APTT PPP: 44 SEC (ref 23–36.4)
APTT PPP: 51.4 SEC (ref 23–36.4)
APTT PPP: 56.6 SEC (ref 23–36.4)
APTT PPP: 60.7 SEC (ref 23–36.4)
APTT PPP: 63.2 SEC (ref 23–36.4)
APTT PPP: 65.5 SEC (ref 23–36.4)
APTT PPP: 68.5 SEC (ref 23–36.4)
APTT PPP: 68.6 SEC (ref 23–36.4)
APTT PPP: 71.8 SEC (ref 23–36.4)
APTT PPP: 77 SEC (ref 23–36.4)
APTT PPP: 80 SEC (ref 23–36.4)
APTT PPP: 91.4 SEC (ref 23–36.4)
APTT PPP: 96.1 SEC (ref 23–36.4)
APTT PPP: >180 SEC (ref 23–36.4)
ARTERIAL PATENCY WRIST A: ABNORMAL
ARTERIAL PATENCY WRIST A: NORMAL
ARTERIAL PATENCY WRIST A: NORMAL
ARTERIAL PATENCY WRIST A: YES
AST SERPL-CCNC: 106 U/L (ref 10–38)
AST SERPL-CCNC: 11 U/L (ref 10–38)
AST SERPL-CCNC: 11 U/L (ref 10–38)
AST SERPL-CCNC: 14 U/L (ref 10–38)
AST SERPL-CCNC: 15 U/L (ref 10–38)
AST SERPL-CCNC: 16 U/L (ref 10–38)
AST SERPL-CCNC: 18 U/L (ref 10–38)
AST SERPL-CCNC: 18 U/L (ref 10–38)
AST SERPL-CCNC: 20 U/L (ref 10–38)
AST SERPL-CCNC: 21 U/L (ref 10–38)
AST SERPL-CCNC: 23 U/L (ref 10–38)
AST SERPL-CCNC: 24 U/L (ref 10–38)
AST SERPL-CCNC: 26 U/L (ref 10–38)
AST SERPL-CCNC: 27 U/L (ref 10–38)
AST SERPL-CCNC: 29 U/L (ref 10–38)
AST SERPL-CCNC: 29 U/L (ref 10–38)
AST SERPL-CCNC: 31 U/L (ref 10–38)
AST SERPL-CCNC: 32 U/L (ref 10–38)
AST SERPL-CCNC: 33 U/L (ref 10–38)
AST SERPL-CCNC: 34 U/L (ref 10–38)
AST SERPL-CCNC: 35 U/L (ref 10–38)
AST SERPL-CCNC: 35 U/L (ref 10–38)
AST SERPL-CCNC: 42 U/L (ref 10–38)
AST SERPL-CCNC: 43 U/L (ref 10–38)
AST SERPL-CCNC: 44 U/L (ref 10–38)
AST SERPL-CCNC: 48 U/L (ref 10–38)
AST SERPL-CCNC: 48 U/L (ref 10–38)
AST SERPL-CCNC: 49 U/L (ref 10–38)
AST SERPL-CCNC: 50 U/L (ref 10–38)
AST SERPL-CCNC: 52 U/L (ref 10–38)
AST SERPL-CCNC: 57 U/L (ref 10–38)
ATRIAL RATE: 54 BPM
ATRIAL RATE: 81 BPM
AV VELOCITY RATIO: 1.04
AV VTI RATIO: 1.1
BACTERIA SPEC CULT: ABNORMAL
BACTERIA SPEC CULT: NORMAL
BACTERIA URNS QL MICRO: ABNORMAL /HPF
BACTERIA URNS QL MICRO: NEGATIVE /HPF
BASE DEFICIT BLD-SCNC: 1 MMOL/L
BASE DEFICIT BLD-SCNC: 2 MMOL/L
BASE DEFICIT BLD-SCNC: 3 MMOL/L
BASE EXCESS BLD CALC-SCNC: 1 MMOL/L
BASE EXCESS BLD CALC-SCNC: 1 MMOL/L
BASE EXCESS BLD CALC-SCNC: 10 MMOL/L
BASE EXCESS BLD CALC-SCNC: 11 MMOL/L
BASE EXCESS BLD CALC-SCNC: 12 MMOL/L
BASE EXCESS BLD CALC-SCNC: 12 MMOL/L
BASE EXCESS BLD CALC-SCNC: 13 MMOL/L
BASE EXCESS BLD CALC-SCNC: 13 MMOL/L
BASE EXCESS BLD CALC-SCNC: 14 MMOL/L
BASE EXCESS BLD CALC-SCNC: 15 MMOL/L
BASE EXCESS BLD CALC-SCNC: 16 MMOL/L
BASE EXCESS BLD CALC-SCNC: 16 MMOL/L
BASE EXCESS BLD CALC-SCNC: 2 MMOL/L
BASE EXCESS BLD CALC-SCNC: 3 MMOL/L
BASE EXCESS BLD CALC-SCNC: 3 MMOL/L
BASE EXCESS BLD CALC-SCNC: 5 MMOL/L
BASE EXCESS BLD CALC-SCNC: 5 MMOL/L
BASE EXCESS BLD CALC-SCNC: 6 MMOL/L
BASE EXCESS BLD CALC-SCNC: 7 MMOL/L
BASE EXCESS BLD CALC-SCNC: 8 MMOL/L
BASE EXCESS BLD CALC-SCNC: 9 MMOL/L
BASE EXCESS BLD CALC-SCNC: 9 MMOL/L
BASOPHILS # BLD: 0 K/UL (ref 0–0.1)
BASOPHILS NFR BLD: 0 % (ref 0–2)
BASOPHILS NFR BLD: 0 % (ref 0–3)
BDY SITE: ABNORMAL
BDY SITE: NORMAL
BDY SITE: NORMAL
BILIRUB SERPL-MCNC: 0.5 MG/DL (ref 0.2–1)
BILIRUB SERPL-MCNC: 0.8 MG/DL (ref 0.2–1)
BILIRUB SERPL-MCNC: 0.9 MG/DL (ref 0.2–1)
BILIRUB SERPL-MCNC: 1 MG/DL (ref 0.2–1)
BILIRUB SERPL-MCNC: 1.1 MG/DL (ref 0.2–1)
BILIRUB SERPL-MCNC: 1.2 MG/DL (ref 0.2–1)
BILIRUB SERPL-MCNC: 1.3 MG/DL (ref 0.2–1)
BILIRUB SERPL-MCNC: 1.4 MG/DL (ref 0.2–1)
BILIRUB SERPL-MCNC: 1.5 MG/DL (ref 0.2–1)
BILIRUB SERPL-MCNC: 1.5 MG/DL (ref 0.2–1)
BILIRUB SERPL-MCNC: 1.6 MG/DL (ref 0.2–1)
BILIRUB SERPL-MCNC: 1.7 MG/DL (ref 0.2–1)
BILIRUB SERPL-MCNC: 1.8 MG/DL (ref 0.2–1)
BILIRUB SERPL-MCNC: 1.9 MG/DL (ref 0.2–1)
BILIRUB SERPL-MCNC: 2 MG/DL (ref 0.2–1)
BILIRUB UR QL: ABNORMAL
BILIRUB UR QL: NEGATIVE
BILIRUB UR QL: NEGATIVE
BNP SERPL-MCNC: 1183 PG/ML (ref 0–450)
BNP SERPL-MCNC: 1740 PG/ML (ref 0–450)
BNP SERPL-MCNC: 2097 PG/ML (ref 0–450)
BNP SERPL-MCNC: 341 PG/ML (ref 0–450)
BNP SERPL-MCNC: 614 PG/ML (ref 0–450)
BODY TEMPERATURE: 100
BODY TEMPERATURE: 102
BODY TEMPERATURE: 36.3
BODY TEMPERATURE: 37
BODY TEMPERATURE: 97
BODY TEMPERATURE: 97
BODY TEMPERATURE: 97.4
BODY TEMPERATURE: 97.7
BODY TEMPERATURE: 98.3
BODY TEMPERATURE: 98.4
BODY TEMPERATURE: 98.6
BODY TEMPERATURE: 98.7
BODY TEMPERATURE: 98.8
BODY TEMPERATURE: 98.8
BODY TEMPERATURE: 98.9
BODY TEMPERATURE: 99
BODY TEMPERATURE: 99.1
BODY TEMPERATURE: 99.1
BODY TEMPERATURE: 99.5
BODY TEMPERATURE: 99.6
BODY TEMPERATURE: 99.7
BODY TEMPERATURE: 99.8
BODY TEMPERATURE: 99.9
BUN SERPL-MCNC: 15 MG/DL (ref 7–18)
BUN SERPL-MCNC: 17 MG/DL (ref 7–18)
BUN SERPL-MCNC: 18 MG/DL (ref 7–18)
BUN SERPL-MCNC: 20 MG/DL (ref 7–18)
BUN SERPL-MCNC: 21 MG/DL (ref 7–18)
BUN SERPL-MCNC: 22 MG/DL (ref 7–18)
BUN SERPL-MCNC: 22 MG/DL (ref 7–18)
BUN SERPL-MCNC: 23 MG/DL (ref 7–18)
BUN SERPL-MCNC: 24 MG/DL (ref 7–18)
BUN SERPL-MCNC: 25 MG/DL (ref 7–18)
BUN SERPL-MCNC: 26 MG/DL (ref 7–18)
BUN SERPL-MCNC: 27 MG/DL (ref 7–18)
BUN SERPL-MCNC: 28 MG/DL (ref 7–18)
BUN SERPL-MCNC: 29 MG/DL (ref 7–18)
BUN SERPL-MCNC: 31 MG/DL (ref 7–18)
BUN SERPL-MCNC: 32 MG/DL (ref 7–18)
BUN SERPL-MCNC: 32 MG/DL (ref 7–18)
BUN SERPL-MCNC: 33 MG/DL (ref 7–18)
BUN SERPL-MCNC: 33 MG/DL (ref 7–18)
BUN SERPL-MCNC: 35 MG/DL (ref 7–18)
BUN SERPL-MCNC: 36 MG/DL (ref 7–18)
BUN SERPL-MCNC: 37 MG/DL (ref 7–18)
BUN SERPL-MCNC: 38 MG/DL (ref 7–18)
BUN SERPL-MCNC: 39 MG/DL (ref 7–18)
BUN SERPL-MCNC: 40 MG/DL (ref 7–18)
BUN SERPL-MCNC: 41 MG/DL (ref 7–18)
BUN SERPL-MCNC: 46 MG/DL (ref 7–18)
BUN SERPL-MCNC: 50 MG/DL (ref 7–18)
BUN SERPL-MCNC: 50 MG/DL (ref 7–18)
BUN SERPL-MCNC: 51 MG/DL (ref 7–18)
BUN SERPL-MCNC: 52 MG/DL (ref 7–18)
BUN SERPL-MCNC: 53 MG/DL (ref 7–18)
BUN/CREAT SERPL: 12 (ref 12–20)
BUN/CREAT SERPL: 13 (ref 12–20)
BUN/CREAT SERPL: 14 (ref 12–20)
BUN/CREAT SERPL: 16 (ref 12–20)
BUN/CREAT SERPL: 22 (ref 12–20)
BUN/CREAT SERPL: 23 (ref 12–20)
BUN/CREAT SERPL: 25 (ref 12–20)
BUN/CREAT SERPL: 27 (ref 12–20)
BUN/CREAT SERPL: 28 (ref 12–20)
BUN/CREAT SERPL: 29 (ref 12–20)
BUN/CREAT SERPL: 29 (ref 12–20)
BUN/CREAT SERPL: 30 (ref 12–20)
BUN/CREAT SERPL: 30 (ref 12–20)
BUN/CREAT SERPL: 31 (ref 12–20)
BUN/CREAT SERPL: 31 (ref 12–20)
BUN/CREAT SERPL: 32 (ref 12–20)
BUN/CREAT SERPL: 32 (ref 12–20)
BUN/CREAT SERPL: 33 (ref 12–20)
BUN/CREAT SERPL: 33 (ref 12–20)
BUN/CREAT SERPL: 35 (ref 12–20)
BUN/CREAT SERPL: 36 (ref 12–20)
BUN/CREAT SERPL: 37 (ref 12–20)
BUN/CREAT SERPL: 38 (ref 12–20)
BUN/CREAT SERPL: 39 (ref 12–20)
BUN/CREAT SERPL: 40 (ref 12–20)
BUN/CREAT SERPL: 41 (ref 12–20)
BUN/CREAT SERPL: 41 (ref 12–20)
BUN/CREAT SERPL: 42 (ref 12–20)
BUN/CREAT SERPL: 43 (ref 12–20)
BUN/CREAT SERPL: 45 (ref 12–20)
BUN/CREAT SERPL: 49 (ref 12–20)
CA-I SERPL-SCNC: 1.05 MMOL/L (ref 1.12–1.32)
CA-I SERPL-SCNC: 1.09 MMOL/L (ref 1.12–1.32)
CA-I SERPL-SCNC: 1.09 MMOL/L (ref 1.12–1.32)
CA-I SERPL-SCNC: 1.1 MMOL/L (ref 1.12–1.32)
CA-I SERPL-SCNC: 1.12 MMOL/L (ref 1.12–1.32)
CA-I SERPL-SCNC: 1.13 MMOL/L (ref 1.12–1.32)
CA-I SERPL-SCNC: 1.14 MMOL/L (ref 1.12–1.32)
CA-I SERPL-SCNC: 1.14 MMOL/L (ref 1.12–1.32)
CA-I SERPL-SCNC: 1.15 MMOL/L (ref 1.12–1.32)
CA-I SERPL-SCNC: 1.16 MMOL/L (ref 1.12–1.32)
CA-I SERPL-SCNC: 1.16 MMOL/L (ref 1.12–1.32)
CA-I SERPL-SCNC: 1.17 MMOL/L (ref 1.12–1.32)
CA-I SERPL-SCNC: 1.17 MMOL/L (ref 1.12–1.32)
CA-I SERPL-SCNC: 1.18 MMOL/L (ref 1.12–1.32)
CA-I SERPL-SCNC: 1.19 MMOL/L (ref 1.12–1.32)
CA-I SERPL-SCNC: 1.21 MMOL/L (ref 1.12–1.32)
CA-I SERPL-SCNC: 1.22 MMOL/L (ref 1.12–1.32)
CA-I SERPL-SCNC: 1.22 MMOL/L (ref 1.12–1.32)
CA-I SERPL-SCNC: 1.24 MMOL/L (ref 1.12–1.32)
CA-I SERPL-SCNC: 1.24 MMOL/L (ref 1.12–1.32)
CALCIUM SERPL-MCNC: 7.2 MG/DL (ref 8.5–10.1)
CALCIUM SERPL-MCNC: 7.5 MG/DL (ref 8.5–10.1)
CALCIUM SERPL-MCNC: 7.5 MG/DL (ref 8.5–10.1)
CALCIUM SERPL-MCNC: 7.6 MG/DL (ref 8.5–10.1)
CALCIUM SERPL-MCNC: 7.6 MG/DL (ref 8.5–10.1)
CALCIUM SERPL-MCNC: 7.7 MG/DL (ref 8.5–10.1)
CALCIUM SERPL-MCNC: 7.7 MG/DL (ref 8.5–10.1)
CALCIUM SERPL-MCNC: 7.8 MG/DL (ref 8.5–10.1)
CALCIUM SERPL-MCNC: 7.9 MG/DL (ref 8.5–10.1)
CALCIUM SERPL-MCNC: 8 MG/DL (ref 8.5–10.1)
CALCIUM SERPL-MCNC: 8.1 MG/DL (ref 8.5–10.1)
CALCIUM SERPL-MCNC: 8.1 MG/DL (ref 8.5–10.1)
CALCIUM SERPL-MCNC: 8.2 MG/DL (ref 8.5–10.1)
CALCIUM SERPL-MCNC: 8.3 MG/DL (ref 8.5–10.1)
CALCIUM SERPL-MCNC: 8.4 MG/DL (ref 8.5–10.1)
CALCIUM SERPL-MCNC: 8.6 MG/DL (ref 8.5–10.1)
CALCIUM SERPL-MCNC: 8.7 MG/DL (ref 8.5–10.1)
CALCIUM SERPL-MCNC: 8.8 MG/DL (ref 8.5–10.1)
CALCIUM SERPL-MCNC: 8.8 MG/DL (ref 8.5–10.1)
CALCIUM SERPL-MCNC: 9 MG/DL (ref 8.5–10.1)
CALCIUM SERPL-MCNC: 9 MG/DL (ref 8.5–10.1)
CALCIUM SERPL-MCNC: 9.2 MG/DL (ref 8.5–10.1)
CALCULATED P AXIS, ECG09: 45 DEGREES
CALCULATED P AXIS, ECG09: 60 DEGREES
CALCULATED R AXIS, ECG10: -25 DEGREES
CALCULATED R AXIS, ECG10: 37 DEGREES
CALCULATED T AXIS, ECG11: -14 DEGREES
CALCULATED T AXIS, ECG11: 156 DEGREES
CHLORIDE SERPL-SCNC: 101 MMOL/L (ref 100–111)
CHLORIDE SERPL-SCNC: 102 MMOL/L (ref 100–111)
CHLORIDE SERPL-SCNC: 103 MMOL/L (ref 100–111)
CHLORIDE SERPL-SCNC: 104 MMOL/L (ref 100–111)
CHLORIDE SERPL-SCNC: 105 MMOL/L (ref 100–111)
CHLORIDE SERPL-SCNC: 106 MMOL/L (ref 100–111)
CHLORIDE SERPL-SCNC: 107 MMOL/L (ref 100–111)
CHLORIDE SERPL-SCNC: 108 MMOL/L (ref 100–111)
CHLORIDE SERPL-SCNC: 109 MMOL/L (ref 100–111)
CHLORIDE SERPL-SCNC: 109 MMOL/L (ref 100–111)
CHLORIDE SERPL-SCNC: 110 MMOL/L (ref 100–111)
CHLORIDE SERPL-SCNC: 111 MMOL/L (ref 100–111)
CHLORIDE SERPL-SCNC: 112 MMOL/L (ref 100–111)
CHLORIDE SERPL-SCNC: 113 MMOL/L (ref 100–111)
CHLORIDE SERPL-SCNC: 113 MMOL/L (ref 100–111)
CHLORIDE SERPL-SCNC: 114 MMOL/L (ref 100–111)
CHLORIDE SERPL-SCNC: 117 MMOL/L (ref 100–111)
CHLORIDE SERPL-SCNC: 118 MMOL/L (ref 100–111)
CHLORIDE SERPL-SCNC: 119 MMOL/L (ref 100–111)
CHLORIDE SERPL-SCNC: 119 MMOL/L (ref 100–111)
CK MB CFR SERPL CALC: 0.8 % (ref 0–4)
CK MB CFR SERPL CALC: 1 % (ref 0–4)
CK MB CFR SERPL CALC: 1.1 % (ref 0–4)
CK MB CFR SERPL CALC: 1.3 % (ref 0–4)
CK MB CFR SERPL CALC: 1.6 % (ref 0–4)
CK MB CFR SERPL CALC: 1.9 % (ref 0–4)
CK MB SERPL-MCNC: 1.6 NG/ML (ref 5–25)
CK MB SERPL-MCNC: 4.6 NG/ML (ref 5–25)
CK MB SERPL-MCNC: 6.1 NG/ML (ref 5–25)
CK MB SERPL-MCNC: 8 NG/ML (ref 5–25)
CK MB SERPL-MCNC: 9.2 NG/ML (ref 5–25)
CK MB SERPL-MCNC: 9.4 NG/ML (ref 5–25)
CK SERPL-CCNC: 100 U/L (ref 39–308)
CK SERPL-CCNC: 1097 U/L (ref 39–308)
CK SERPL-CCNC: 324 U/L (ref 39–308)
CK SERPL-CCNC: 347 U/L (ref 39–308)
CK SERPL-CCNC: 738 U/L (ref 39–308)
CK SERPL-CCNC: 979 U/L (ref 39–308)
CO2 SERPL-SCNC: 24 MMOL/L (ref 21–32)
CO2 SERPL-SCNC: 25 MMOL/L (ref 21–32)
CO2 SERPL-SCNC: 26 MMOL/L (ref 21–32)
CO2 SERPL-SCNC: 27 MMOL/L (ref 21–32)
CO2 SERPL-SCNC: 27 MMOL/L (ref 21–32)
CO2 SERPL-SCNC: 28 MMOL/L (ref 21–32)
CO2 SERPL-SCNC: 30 MMOL/L (ref 21–32)
CO2 SERPL-SCNC: 31 MMOL/L (ref 21–32)
CO2 SERPL-SCNC: 31 MMOL/L (ref 21–32)
CO2 SERPL-SCNC: 32 MMOL/L (ref 21–32)
CO2 SERPL-SCNC: 33 MMOL/L (ref 21–32)
CO2 SERPL-SCNC: 33 MMOL/L (ref 21–32)
CO2 SERPL-SCNC: 34 MMOL/L (ref 21–32)
CO2 SERPL-SCNC: 35 MMOL/L (ref 21–32)
CO2 SERPL-SCNC: 36 MMOL/L (ref 21–32)
CO2 SERPL-SCNC: 37 MMOL/L (ref 21–32)
CO2 SERPL-SCNC: 37 MMOL/L (ref 21–32)
CO2 SERPL-SCNC: 38 MMOL/L (ref 21–32)
CO2 SERPL-SCNC: 39 MMOL/L (ref 21–32)
CO2 SERPL-SCNC: 39 MMOL/L (ref 21–32)
COLOR UR: ABNORMAL
COLOR UR: YELLOW
COLOR UR: YELLOW
CREAT SERPL-MCNC: 0.59 MG/DL (ref 0.6–1.3)
CREAT SERPL-MCNC: 0.6 MG/DL (ref 0.6–1.3)
CREAT SERPL-MCNC: 0.64 MG/DL (ref 0.6–1.3)
CREAT SERPL-MCNC: 0.65 MG/DL (ref 0.6–1.3)
CREAT SERPL-MCNC: 0.66 MG/DL (ref 0.6–1.3)
CREAT SERPL-MCNC: 0.66 MG/DL (ref 0.6–1.3)
CREAT SERPL-MCNC: 0.68 MG/DL (ref 0.6–1.3)
CREAT SERPL-MCNC: 0.69 MG/DL (ref 0.6–1.3)
CREAT SERPL-MCNC: 0.69 MG/DL (ref 0.6–1.3)
CREAT SERPL-MCNC: 0.71 MG/DL (ref 0.6–1.3)
CREAT SERPL-MCNC: 0.72 MG/DL (ref 0.6–1.3)
CREAT SERPL-MCNC: 0.74 MG/DL (ref 0.6–1.3)
CREAT SERPL-MCNC: 0.75 MG/DL (ref 0.6–1.3)
CREAT SERPL-MCNC: 0.76 MG/DL (ref 0.6–1.3)
CREAT SERPL-MCNC: 0.76 MG/DL (ref 0.6–1.3)
CREAT SERPL-MCNC: 0.78 MG/DL (ref 0.6–1.3)
CREAT SERPL-MCNC: 0.78 MG/DL (ref 0.6–1.3)
CREAT SERPL-MCNC: 0.79 MG/DL (ref 0.6–1.3)
CREAT SERPL-MCNC: 0.79 MG/DL (ref 0.6–1.3)
CREAT SERPL-MCNC: 0.84 MG/DL (ref 0.6–1.3)
CREAT SERPL-MCNC: 0.93 MG/DL (ref 0.6–1.3)
CREAT SERPL-MCNC: 0.95 MG/DL (ref 0.6–1.3)
CREAT SERPL-MCNC: 0.97 MG/DL (ref 0.6–1.3)
CREAT SERPL-MCNC: 0.98 MG/DL (ref 0.6–1.3)
CREAT SERPL-MCNC: 0.98 MG/DL (ref 0.6–1.3)
CREAT SERPL-MCNC: 1 MG/DL (ref 0.6–1.3)
CREAT SERPL-MCNC: 1 MG/DL (ref 0.6–1.3)
CREAT SERPL-MCNC: 1.02 MG/DL (ref 0.6–1.3)
CREAT SERPL-MCNC: 1.03 MG/DL (ref 0.6–1.3)
CREAT SERPL-MCNC: 1.06 MG/DL (ref 0.6–1.3)
CREAT SERPL-MCNC: 1.06 MG/DL (ref 0.6–1.3)
CREAT SERPL-MCNC: 1.07 MG/DL (ref 0.6–1.3)
CREAT SERPL-MCNC: 1.08 MG/DL (ref 0.6–1.3)
CREAT SERPL-MCNC: 1.1 MG/DL (ref 0.6–1.3)
CREAT SERPL-MCNC: 1.13 MG/DL (ref 0.6–1.3)
CREAT SERPL-MCNC: 1.16 MG/DL (ref 0.6–1.3)
CREAT SERPL-MCNC: 1.16 MG/DL (ref 0.6–1.3)
CREAT SERPL-MCNC: 1.24 MG/DL (ref 0.6–1.3)
CREAT SERPL-MCNC: 1.26 MG/DL (ref 0.6–1.3)
CREAT SERPL-MCNC: 1.28 MG/DL (ref 0.6–1.3)
CREAT SERPL-MCNC: 1.3 MG/DL (ref 0.6–1.3)
CREAT SERPL-MCNC: 1.31 MG/DL (ref 0.6–1.3)
CREAT SERPL-MCNC: 1.33 MG/DL (ref 0.6–1.3)
CREAT SERPL-MCNC: 1.39 MG/DL (ref 0.6–1.3)
CREAT SERPL-MCNC: 1.44 MG/DL (ref 0.6–1.3)
CREAT SERPL-MCNC: 1.5 MG/DL (ref 0.6–1.3)
CREAT SERPL-MCNC: 1.58 MG/DL (ref 0.6–1.3)
CREAT UR-MCNC: 153 MG/DL (ref 30–125)
CREAT UR-MCNC: 161 MG/DL (ref 30–125)
D DIMER PPP FEU-MCNC: 0.58 UG/ML(FEU)
DATE LAST DOSE: ABNORMAL
DIAGNOSIS, 93000: NORMAL
DIAGNOSIS, 93000: NORMAL
DIFFERENTIAL METHOD BLD: ABNORMAL
ECHO AO ROOT DIAM: 3.72 CM
ECHO AV AREA PEAK VELOCITY: 3.4 CM2
ECHO AV AREA VTI: 3.5 CM2
ECHO AV CUSP MM: 0 CM
ECHO AV MEAN GRADIENT: 4 MMHG
ECHO AV MEAN VELOCITY: 0.96 M/S
ECHO AV PEAK GRADIENT: 6.3 MMHG
ECHO AV PEAK VELOCITY: 125.75 CM/S
ECHO AV VTI: 24.83 CM
ECHO IVC PROX: 3.81 CM
ECHO LA AREA 4C: 17.4 CM2
ECHO LA MAJOR AXIS: 3.86 CM
ECHO LA VOL 4C: 38.14 ML (ref 18–58)
ECHO LA VOLUME INDEX A4C: 11.5 ML/M2 (ref 16–28)
ECHO LV E' LATERAL VELOCITY: 4 CM/S
ECHO LV E' SEPTAL VELOCITY: 4 CM/S
ECHO LV EDV A2C: 0 ML
ECHO LV EDV A4C: 254.1 ML
ECHO LV EDV INDEX A4C: 76.6 ML/M2
ECHO LV EDV NDEX A2C: 0 ML/M2
ECHO LV EJECTION FRACTION A4C: 57 %
ECHO LV ESV A4C: 109.1 ML
ECHO LV ESV INDEX A4C: 32.9 ML/M2
ECHO LV INTERNAL DIMENSION DIASTOLIC: 6.59 CM (ref 4.2–5.9)
ECHO LV INTERNAL DIMENSION SYSTOLIC: 5.35 CM
ECHO LV IVSD: 1.01 CM (ref 0.6–1)
ECHO LV MASS 2D: 376.9 G (ref 88–224)
ECHO LV MASS INDEX 2D: 113.6 G/M2 (ref 49–115)
ECHO LV POSTERIOR WALL DIASTOLIC: 1.11 CM (ref 0.6–1)
ECHO LVOT DIAM: 2.05 CM
ECHO LVOT PEAK GRADIENT: 6.9 MMHG
ECHO LVOT PEAK VELOCITY: 130.87 CM/S
ECHO LVOT VTI: 26.41 CM
ECHO MV A VELOCITY: 47.31 CM/S
ECHO MV AREA PHT: 4 CM2
ECHO MV E DECELERATION TIME (DT): 189.4 MS
ECHO MV E VELOCITY: 68.34 CM/S
ECHO MV E/A RATIO: 1.44
ECHO MV E/E' LATERAL: 17.09
ECHO MV E/E' RATIO (AVERAGED): 17.09
ECHO MV E/E' SEPTAL: 17.09
ECHO MV PRESSURE HALF TIME (PHT): 54.9 MS
ECHO RA AREA 4C: 22.83 CM2
ECHO RA VOLUME: 82.9 ML
ECHO RV INTERNAL DIMENSION: 6.35 CM
ECHO RV TAPSE: 2.32 CM (ref 1.5–2)
EOSINOPHIL # BLD: 0 K/UL (ref 0–0.4)
EOSINOPHIL # BLD: 0.1 K/UL (ref 0–0.4)
EOSINOPHIL # BLD: 0.2 K/UL (ref 0–0.4)
EOSINOPHIL # BLD: 0.2 K/UL (ref 0–0.4)
EOSINOPHIL # BLD: 0.3 K/UL (ref 0–0.4)
EOSINOPHIL NFR BLD: 0 % (ref 0–5)
EOSINOPHIL NFR BLD: 1 % (ref 0–5)
EOSINOPHIL NFR BLD: 2 % (ref 0–5)
EOSINOPHIL NFR BLD: 3 % (ref 0–5)
EPITH CASTS URNS QL MICRO: ABNORMAL /LPF (ref 0–5)
EPITH CASTS URNS QL MICRO: ABNORMAL /LPF (ref 0–5)
ERYTHROCYTE [DISTWIDTH] IN BLOOD BY AUTOMATED COUNT: 14.6 % (ref 11.6–14.5)
ERYTHROCYTE [DISTWIDTH] IN BLOOD BY AUTOMATED COUNT: 14.6 % (ref 11.6–14.5)
ERYTHROCYTE [DISTWIDTH] IN BLOOD BY AUTOMATED COUNT: 14.8 % (ref 11.6–14.5)
ERYTHROCYTE [DISTWIDTH] IN BLOOD BY AUTOMATED COUNT: 14.9 % (ref 11.6–14.5)
ERYTHROCYTE [DISTWIDTH] IN BLOOD BY AUTOMATED COUNT: 15 % (ref 11.6–14.5)
ERYTHROCYTE [DISTWIDTH] IN BLOOD BY AUTOMATED COUNT: 15.1 % (ref 11.6–14.5)
ERYTHROCYTE [DISTWIDTH] IN BLOOD BY AUTOMATED COUNT: 15.2 % (ref 11.6–14.5)
ERYTHROCYTE [DISTWIDTH] IN BLOOD BY AUTOMATED COUNT: 15.4 % (ref 11.6–14.5)
ERYTHROCYTE [DISTWIDTH] IN BLOOD BY AUTOMATED COUNT: 15.5 % (ref 11.6–14.5)
ERYTHROCYTE [DISTWIDTH] IN BLOOD BY AUTOMATED COUNT: 15.5 % (ref 11.6–14.5)
ERYTHROCYTE [DISTWIDTH] IN BLOOD BY AUTOMATED COUNT: 15.7 % (ref 11.6–14.5)
ERYTHROCYTE [DISTWIDTH] IN BLOOD BY AUTOMATED COUNT: 16.2 % (ref 11.6–14.5)
ERYTHROCYTE [DISTWIDTH] IN BLOOD BY AUTOMATED COUNT: 16.4 % (ref 11.6–14.5)
ERYTHROCYTE [DISTWIDTH] IN BLOOD BY AUTOMATED COUNT: 16.5 % (ref 11.6–14.5)
ERYTHROCYTE [DISTWIDTH] IN BLOOD BY AUTOMATED COUNT: 16.6 % (ref 11.6–14.5)
ERYTHROCYTE [DISTWIDTH] IN BLOOD BY AUTOMATED COUNT: 16.7 % (ref 11.6–14.5)
ERYTHROCYTE [DISTWIDTH] IN BLOOD BY AUTOMATED COUNT: 16.9 % (ref 11.6–14.5)
ERYTHROCYTE [DISTWIDTH] IN BLOOD BY AUTOMATED COUNT: 17.1 % (ref 11.6–14.5)
ERYTHROCYTE [DISTWIDTH] IN BLOOD BY AUTOMATED COUNT: 17.4 % (ref 11.6–14.5)
ERYTHROCYTE [DISTWIDTH] IN BLOOD BY AUTOMATED COUNT: 17.9 % (ref 11.6–14.5)
ERYTHROCYTE [DISTWIDTH] IN BLOOD BY AUTOMATED COUNT: 18.4 % (ref 11.6–14.5)
ERYTHROCYTE [DISTWIDTH] IN BLOOD BY AUTOMATED COUNT: 18.9 % (ref 11.6–14.5)
ERYTHROCYTE [SEDIMENTATION RATE] IN BLOOD: 3 MM/HR (ref 0–15)
FERRITIN SERPL-MCNC: 26 NG/ML (ref 8–388)
GAS FLOW.O2 O2 DELIVERY SYS: ABNORMAL L/MIN
GAS FLOW.O2 O2 DELIVERY SYS: NORMAL L/MIN
GAS FLOW.O2 O2 DELIVERY SYS: NORMAL L/MIN
GAS FLOW.O2 SETTING OXYMISER: 15 L/M
GAS FLOW.O2 SETTING OXYMISER: 16 BPM
GAS FLOW.O2 SETTING OXYMISER: 20 BPM
GAS FLOW.O2 SETTING OXYMISER: 24 BPM
GLOBULIN SER CALC-MCNC: 2.5 G/DL (ref 2–4)
GLOBULIN SER CALC-MCNC: 2.8 G/DL (ref 2–4)
GLOBULIN SER CALC-MCNC: 2.8 G/DL (ref 2–4)
GLOBULIN SER CALC-MCNC: 2.9 G/DL (ref 2–4)
GLOBULIN SER CALC-MCNC: 3 G/DL (ref 2–4)
GLOBULIN SER CALC-MCNC: 3.1 G/DL (ref 2–4)
GLOBULIN SER CALC-MCNC: 3.1 G/DL (ref 2–4)
GLOBULIN SER CALC-MCNC: 3.2 G/DL (ref 2–4)
GLOBULIN SER CALC-MCNC: 3.2 G/DL (ref 2–4)
GLOBULIN SER CALC-MCNC: 3.3 G/DL (ref 2–4)
GLOBULIN SER CALC-MCNC: 3.4 G/DL (ref 2–4)
GLOBULIN SER CALC-MCNC: 3.5 G/DL (ref 2–4)
GLOBULIN SER CALC-MCNC: 3.5 G/DL (ref 2–4)
GLOBULIN SER CALC-MCNC: 3.6 G/DL (ref 2–4)
GLOBULIN SER CALC-MCNC: 3.7 G/DL (ref 2–4)
GLOBULIN SER CALC-MCNC: 3.8 G/DL (ref 2–4)
GLOBULIN SER CALC-MCNC: 3.8 G/DL (ref 2–4)
GLOBULIN SER CALC-MCNC: 3.9 G/DL (ref 2–4)
GLOBULIN SER CALC-MCNC: 3.9 G/DL (ref 2–4)
GLOBULIN SER CALC-MCNC: 4.7 G/DL (ref 2–4)
GLUCOSE BLD STRIP.AUTO-MCNC: 100 MG/DL (ref 70–110)
GLUCOSE BLD STRIP.AUTO-MCNC: 101 MG/DL (ref 70–110)
GLUCOSE BLD STRIP.AUTO-MCNC: 102 MG/DL (ref 70–110)
GLUCOSE BLD STRIP.AUTO-MCNC: 103 MG/DL (ref 70–110)
GLUCOSE BLD STRIP.AUTO-MCNC: 104 MG/DL (ref 70–110)
GLUCOSE BLD STRIP.AUTO-MCNC: 104 MG/DL (ref 70–110)
GLUCOSE BLD STRIP.AUTO-MCNC: 105 MG/DL (ref 70–110)
GLUCOSE BLD STRIP.AUTO-MCNC: 107 MG/DL (ref 70–110)
GLUCOSE BLD STRIP.AUTO-MCNC: 107 MG/DL (ref 70–110)
GLUCOSE BLD STRIP.AUTO-MCNC: 108 MG/DL (ref 70–110)
GLUCOSE BLD STRIP.AUTO-MCNC: 109 MG/DL (ref 70–110)
GLUCOSE BLD STRIP.AUTO-MCNC: 109 MG/DL (ref 70–110)
GLUCOSE BLD STRIP.AUTO-MCNC: 110 MG/DL (ref 70–110)
GLUCOSE BLD STRIP.AUTO-MCNC: 111 MG/DL (ref 70–110)
GLUCOSE BLD STRIP.AUTO-MCNC: 112 MG/DL (ref 70–110)
GLUCOSE BLD STRIP.AUTO-MCNC: 113 MG/DL (ref 70–110)
GLUCOSE BLD STRIP.AUTO-MCNC: 114 MG/DL (ref 70–110)
GLUCOSE BLD STRIP.AUTO-MCNC: 115 MG/DL (ref 70–110)
GLUCOSE BLD STRIP.AUTO-MCNC: 116 MG/DL (ref 70–110)
GLUCOSE BLD STRIP.AUTO-MCNC: 119 MG/DL (ref 70–110)
GLUCOSE BLD STRIP.AUTO-MCNC: 122 MG/DL (ref 70–110)
GLUCOSE BLD STRIP.AUTO-MCNC: 122 MG/DL (ref 70–110)
GLUCOSE BLD STRIP.AUTO-MCNC: 124 MG/DL (ref 70–110)
GLUCOSE BLD STRIP.AUTO-MCNC: 131 MG/DL (ref 70–110)
GLUCOSE BLD STRIP.AUTO-MCNC: 140 MG/DL (ref 70–110)
GLUCOSE BLD STRIP.AUTO-MCNC: 75 MG/DL (ref 70–110)
GLUCOSE BLD STRIP.AUTO-MCNC: 83 MG/DL (ref 70–110)
GLUCOSE BLD STRIP.AUTO-MCNC: 84 MG/DL (ref 70–110)
GLUCOSE BLD STRIP.AUTO-MCNC: 86 MG/DL (ref 70–110)
GLUCOSE BLD STRIP.AUTO-MCNC: 86 MG/DL (ref 70–110)
GLUCOSE BLD STRIP.AUTO-MCNC: 87 MG/DL (ref 70–110)
GLUCOSE BLD STRIP.AUTO-MCNC: 87 MG/DL (ref 70–110)
GLUCOSE BLD STRIP.AUTO-MCNC: 88 MG/DL (ref 70–110)
GLUCOSE BLD STRIP.AUTO-MCNC: 89 MG/DL (ref 70–110)
GLUCOSE BLD STRIP.AUTO-MCNC: 90 MG/DL (ref 70–110)
GLUCOSE BLD STRIP.AUTO-MCNC: 90 MG/DL (ref 70–110)
GLUCOSE BLD STRIP.AUTO-MCNC: 91 MG/DL (ref 70–110)
GLUCOSE BLD STRIP.AUTO-MCNC: 92 MG/DL (ref 70–110)
GLUCOSE BLD STRIP.AUTO-MCNC: 92 MG/DL (ref 70–110)
GLUCOSE BLD STRIP.AUTO-MCNC: 93 MG/DL (ref 70–110)
GLUCOSE BLD STRIP.AUTO-MCNC: 94 MG/DL (ref 70–110)
GLUCOSE BLD STRIP.AUTO-MCNC: 94 MG/DL (ref 70–110)
GLUCOSE BLD STRIP.AUTO-MCNC: 95 MG/DL (ref 70–110)
GLUCOSE BLD STRIP.AUTO-MCNC: 96 MG/DL (ref 70–110)
GLUCOSE BLD STRIP.AUTO-MCNC: 97 MG/DL (ref 70–110)
GLUCOSE BLD STRIP.AUTO-MCNC: 98 MG/DL (ref 70–110)
GLUCOSE BLD STRIP.AUTO-MCNC: 99 MG/DL (ref 70–110)
GLUCOSE SERPL-MCNC: 100 MG/DL (ref 74–99)
GLUCOSE SERPL-MCNC: 101 MG/DL (ref 74–99)
GLUCOSE SERPL-MCNC: 102 MG/DL (ref 74–99)
GLUCOSE SERPL-MCNC: 104 MG/DL (ref 74–99)
GLUCOSE SERPL-MCNC: 105 MG/DL (ref 74–99)
GLUCOSE SERPL-MCNC: 106 MG/DL (ref 74–99)
GLUCOSE SERPL-MCNC: 106 MG/DL (ref 74–99)
GLUCOSE SERPL-MCNC: 107 MG/DL (ref 74–99)
GLUCOSE SERPL-MCNC: 107 MG/DL (ref 74–99)
GLUCOSE SERPL-MCNC: 108 MG/DL (ref 74–99)
GLUCOSE SERPL-MCNC: 110 MG/DL (ref 74–99)
GLUCOSE SERPL-MCNC: 111 MG/DL (ref 74–99)
GLUCOSE SERPL-MCNC: 112 MG/DL (ref 74–99)
GLUCOSE SERPL-MCNC: 113 MG/DL (ref 74–99)
GLUCOSE SERPL-MCNC: 114 MG/DL (ref 74–99)
GLUCOSE SERPL-MCNC: 114 MG/DL (ref 74–99)
GLUCOSE SERPL-MCNC: 115 MG/DL (ref 74–99)
GLUCOSE SERPL-MCNC: 116 MG/DL (ref 74–99)
GLUCOSE SERPL-MCNC: 117 MG/DL (ref 74–99)
GLUCOSE SERPL-MCNC: 117 MG/DL (ref 74–99)
GLUCOSE SERPL-MCNC: 120 MG/DL (ref 74–99)
GLUCOSE SERPL-MCNC: 122 MG/DL (ref 74–99)
GLUCOSE SERPL-MCNC: 124 MG/DL (ref 74–99)
GLUCOSE SERPL-MCNC: 131 MG/DL (ref 74–99)
GLUCOSE SERPL-MCNC: 132 MG/DL (ref 74–99)
GLUCOSE SERPL-MCNC: 135 MG/DL (ref 74–99)
GLUCOSE SERPL-MCNC: 139 MG/DL (ref 74–99)
GLUCOSE SERPL-MCNC: 141 MG/DL (ref 74–99)
GLUCOSE SERPL-MCNC: 90 MG/DL (ref 74–99)
GLUCOSE SERPL-MCNC: 92 MG/DL (ref 74–99)
GLUCOSE SERPL-MCNC: 93 MG/DL (ref 74–99)
GLUCOSE SERPL-MCNC: 95 MG/DL (ref 74–99)
GLUCOSE SERPL-MCNC: 97 MG/DL (ref 74–99)
GLUCOSE SERPL-MCNC: 98 MG/DL (ref 74–99)
GLUCOSE SERPL-MCNC: 99 MG/DL (ref 74–99)
GLUCOSE SERPL-MCNC: 99 MG/DL (ref 74–99)
GLUCOSE UR STRIP.AUTO-MCNC: NEGATIVE MG/DL
GRAM STN SPEC: ABNORMAL
HCO3 BLD-SCNC: 22.1 MMOL/L (ref 22–26)
HCO3 BLD-SCNC: 23 MMOL/L (ref 22–26)
HCO3 BLD-SCNC: 24.1 MMOL/L (ref 22–26)
HCO3 BLD-SCNC: 25.2 MMOL/L (ref 22–26)
HCO3 BLD-SCNC: 25.9 MMOL/L (ref 22–26)
HCO3 BLD-SCNC: 26.8 MMOL/L (ref 22–26)
HCO3 BLD-SCNC: 27.2 MMOL/L (ref 22–26)
HCO3 BLD-SCNC: 27.3 MMOL/L (ref 22–26)
HCO3 BLD-SCNC: 29.3 MMOL/L (ref 22–26)
HCO3 BLD-SCNC: 29.9 MMOL/L (ref 22–26)
HCO3 BLD-SCNC: 30 MMOL/L (ref 22–26)
HCO3 BLD-SCNC: 30.5 MMOL/L (ref 22–26)
HCO3 BLD-SCNC: 30.9 MMOL/L (ref 22–26)
HCO3 BLD-SCNC: 30.9 MMOL/L (ref 22–26)
HCO3 BLD-SCNC: 31 MMOL/L (ref 22–26)
HCO3 BLD-SCNC: 31.2 MMOL/L (ref 22–26)
HCO3 BLD-SCNC: 31.3 MMOL/L (ref 22–26)
HCO3 BLD-SCNC: 31.4 MMOL/L (ref 22–26)
HCO3 BLD-SCNC: 31.5 MMOL/L (ref 22–26)
HCO3 BLD-SCNC: 32 MMOL/L (ref 22–26)
HCO3 BLD-SCNC: 32.2 MMOL/L (ref 22–26)
HCO3 BLD-SCNC: 32.3 MMOL/L (ref 22–26)
HCO3 BLD-SCNC: 32.5 MMOL/L (ref 22–26)
HCO3 BLD-SCNC: 33.1 MMOL/L (ref 22–26)
HCO3 BLD-SCNC: 33.3 MMOL/L (ref 22–26)
HCO3 BLD-SCNC: 34 MMOL/L (ref 22–26)
HCO3 BLD-SCNC: 34.1 MMOL/L (ref 22–26)
HCO3 BLD-SCNC: 34.6 MMOL/L (ref 22–26)
HCO3 BLD-SCNC: 34.8 MMOL/L (ref 22–26)
HCO3 BLD-SCNC: 35.1 MMOL/L (ref 22–26)
HCO3 BLD-SCNC: 35.4 MMOL/L (ref 22–26)
HCO3 BLD-SCNC: 35.9 MMOL/L (ref 22–26)
HCO3 BLD-SCNC: 36.2 MMOL/L (ref 22–26)
HCO3 BLD-SCNC: 36.7 MMOL/L (ref 22–26)
HCO3 BLD-SCNC: 37.3 MMOL/L (ref 22–26)
HCO3 BLD-SCNC: 37.4 MMOL/L (ref 22–26)
HCO3 BLD-SCNC: 37.9 MMOL/L (ref 22–26)
HCO3 BLD-SCNC: 38.2 MMOL/L (ref 22–26)
HCO3 BLD-SCNC: 38.4 MMOL/L (ref 22–26)
HCO3 BLD-SCNC: 38.6 MMOL/L (ref 22–26)
HCO3 BLD-SCNC: 39.7 MMOL/L (ref 22–26)
HCO3 BLD-SCNC: 40.6 MMOL/L (ref 22–26)
HCT VFR BLD AUTO: 30.8 % (ref 36–48)
HCT VFR BLD AUTO: 31.8 % (ref 36–48)
HCT VFR BLD AUTO: 32.3 % (ref 36–48)
HCT VFR BLD AUTO: 32.7 % (ref 36–48)
HCT VFR BLD AUTO: 32.9 % (ref 36–48)
HCT VFR BLD AUTO: 33 % (ref 36–48)
HCT VFR BLD AUTO: 33.3 % (ref 36–48)
HCT VFR BLD AUTO: 33.6 % (ref 36–48)
HCT VFR BLD AUTO: 33.7 % (ref 36–48)
HCT VFR BLD AUTO: 34 % (ref 36–48)
HCT VFR BLD AUTO: 34.1 % (ref 36–48)
HCT VFR BLD AUTO: 34.3 % (ref 36–48)
HCT VFR BLD AUTO: 34.9 % (ref 36–48)
HCT VFR BLD AUTO: 35.1 % (ref 36–48)
HCT VFR BLD AUTO: 37.5 % (ref 36–48)
HCT VFR BLD AUTO: 38.6 % (ref 36–48)
HCT VFR BLD AUTO: 39.5 % (ref 36–48)
HCT VFR BLD AUTO: 40.8 % (ref 36–48)
HCT VFR BLD AUTO: 41.2 % (ref 36–48)
HCT VFR BLD AUTO: 41.4 % (ref 36–48)
HCT VFR BLD AUTO: 41.9 % (ref 36–48)
HCT VFR BLD AUTO: 42.2 % (ref 36–48)
HCT VFR BLD AUTO: 42.7 % (ref 36–48)
HCT VFR BLD AUTO: 42.8 % (ref 36–48)
HCT VFR BLD AUTO: 43.1 % (ref 36–48)
HCT VFR BLD AUTO: 43.6 % (ref 36–48)
HCT VFR BLD AUTO: 43.7 % (ref 36–48)
HCT VFR BLD AUTO: 43.9 % (ref 36–48)
HCT VFR BLD AUTO: 44 % (ref 36–48)
HCT VFR BLD AUTO: 45.6 % (ref 36–48)
HCT VFR BLD AUTO: 45.7 % (ref 36–48)
HCT VFR BLD AUTO: 48.6 % (ref 36–48)
HCT VFR BLD AUTO: 50.7 % (ref 36–48)
HCT VFR BLD AUTO: 50.8 % (ref 36–48)
HGB BLD-MCNC: 10 G/DL (ref 13–16)
HGB BLD-MCNC: 10.4 G/DL (ref 13–16)
HGB BLD-MCNC: 10.5 G/DL (ref 13–16)
HGB BLD-MCNC: 10.6 G/DL (ref 13–16)
HGB BLD-MCNC: 10.7 G/DL (ref 13–16)
HGB BLD-MCNC: 10.7 G/DL (ref 13–16)
HGB BLD-MCNC: 10.8 G/DL (ref 13–16)
HGB BLD-MCNC: 11.3 G/DL (ref 13–16)
HGB BLD-MCNC: 11.3 G/DL (ref 13–16)
HGB BLD-MCNC: 11.6 G/DL (ref 13–16)
HGB BLD-MCNC: 11.8 G/DL (ref 13–16)
HGB BLD-MCNC: 11.8 G/DL (ref 13–16)
HGB BLD-MCNC: 12.1 G/DL (ref 13–16)
HGB BLD-MCNC: 12.3 G/DL (ref 13–16)
HGB BLD-MCNC: 12.4 G/DL (ref 13–16)
HGB BLD-MCNC: 12.5 G/DL (ref 13–16)
HGB BLD-MCNC: 12.6 G/DL (ref 13–16)
HGB BLD-MCNC: 12.6 G/DL (ref 13–16)
HGB BLD-MCNC: 12.8 G/DL (ref 13–16)
HGB BLD-MCNC: 12.8 G/DL (ref 13–16)
HGB BLD-MCNC: 12.9 G/DL (ref 13–16)
HGB BLD-MCNC: 13.1 G/DL (ref 13–16)
HGB BLD-MCNC: 13.6 G/DL (ref 13–16)
HGB BLD-MCNC: 13.7 G/DL (ref 13–16)
HGB BLD-MCNC: 13.8 G/DL (ref 13–16)
HGB BLD-MCNC: 13.9 G/DL (ref 13–16)
HGB BLD-MCNC: 13.9 G/DL (ref 13–16)
HGB BLD-MCNC: 14.6 G/DL (ref 13–16)
HGB BLD-MCNC: 15.3 G/DL (ref 13–16)
HGB BLD-MCNC: 9.6 G/DL (ref 13–16)
HGB BLD-MCNC: 9.6 G/DL (ref 13–16)
HGB BLD-MCNC: 9.8 G/DL (ref 13–16)
HGB UR QL STRIP: ABNORMAL
HGB UR QL STRIP: ABNORMAL
HGB UR QL STRIP: NEGATIVE
INR PPP: 1.4 (ref 0.8–1.2)
INR PPP: 1.5 (ref 0.8–1.2)
INR PPP: 1.7 (ref 0.8–1.2)
INR PPP: 2 (ref 0.8–1.2)
INR PPP: 2.1 (ref 0.8–1.2)
INR PPP: 2.2 (ref 0.8–1.2)
INR PPP: 2.2 (ref 0.8–1.2)
INR PPP: 2.4 (ref 0.8–1.2)
INR PPP: 2.9 (ref 0.8–1.2)
INR PPP: 4.1 (ref 0.8–1.2)
INR PPP: 4.9 (ref 0.8–1.2)
INR PPP: 5.6 (ref 0.8–1.2)
INSPIRATION.DURATION SETTING TIME VENT: 0.9 SEC
KETONES UR QL STRIP.AUTO: ABNORMAL MG/DL
KETONES UR QL STRIP.AUTO: NEGATIVE MG/DL
KETONES UR QL STRIP.AUTO: NEGATIVE MG/DL
LACTATE SERPL-SCNC: 1.4 MMOL/L (ref 0.4–2)
LACTATE SERPL-SCNC: 1.8 MMOL/L (ref 0.4–2)
LACTATE SERPL-SCNC: 2 MMOL/L (ref 0.4–2)
LACTATE SERPL-SCNC: 2.1 MMOL/L (ref 0.4–2)
LACTATE SERPL-SCNC: 4.1 MMOL/L (ref 0.4–2)
LDH SERPL L TO P-CCNC: 273 U/L (ref 81–234)
LEUKOCYTE ESTERASE UR QL STRIP.AUTO: ABNORMAL
LEUKOCYTE ESTERASE UR QL STRIP.AUTO: ABNORMAL
LEUKOCYTE ESTERASE UR QL STRIP.AUTO: NEGATIVE
LVFS 2D: 18.8 %
LVOT MG: 4.92 MMHG
LVOT MV: 1.09 CM/S
LYMPHOCYTES # BLD: 0.3 K/UL (ref 0.8–3.5)
LYMPHOCYTES # BLD: 0.4 K/UL (ref 0.9–3.6)
LYMPHOCYTES # BLD: 0.5 K/UL (ref 0.9–3.6)
LYMPHOCYTES # BLD: 0.6 K/UL (ref 0.9–3.6)
LYMPHOCYTES # BLD: 0.7 K/UL (ref 0.9–3.6)
LYMPHOCYTES # BLD: 0.8 K/UL (ref 0.9–3.6)
LYMPHOCYTES # BLD: 0.9 K/UL (ref 0.9–3.6)
LYMPHOCYTES # BLD: 1 K/UL (ref 0.9–3.6)
LYMPHOCYTES # BLD: 1.1 K/UL (ref 0.9–3.6)
LYMPHOCYTES # BLD: 1.1 K/UL (ref 0.9–3.6)
LYMPHOCYTES # BLD: 1.4 K/UL (ref 0.9–3.6)
LYMPHOCYTES # BLD: 1.5 K/UL (ref 0.9–3.6)
LYMPHOCYTES NFR BLD: 10 % (ref 21–52)
LYMPHOCYTES NFR BLD: 11 % (ref 21–52)
LYMPHOCYTES NFR BLD: 12 % (ref 21–52)
LYMPHOCYTES NFR BLD: 13 % (ref 21–52)
LYMPHOCYTES NFR BLD: 13 % (ref 21–52)
LYMPHOCYTES NFR BLD: 18 % (ref 21–52)
LYMPHOCYTES NFR BLD: 18 % (ref 21–52)
LYMPHOCYTES NFR BLD: 25 % (ref 21–52)
LYMPHOCYTES NFR BLD: 3 % (ref 21–52)
LYMPHOCYTES NFR BLD: 4 % (ref 21–52)
LYMPHOCYTES NFR BLD: 5 % (ref 20–51)
LYMPHOCYTES NFR BLD: 5 % (ref 21–52)
LYMPHOCYTES NFR BLD: 6 % (ref 21–52)
LYMPHOCYTES NFR BLD: 6 % (ref 21–52)
LYMPHOCYTES NFR BLD: 7 % (ref 21–52)
LYMPHOCYTES NFR BLD: 7 % (ref 21–52)
LYMPHOCYTES NFR BLD: 8 % (ref 21–52)
LYMPHOCYTES NFR BLD: 8 % (ref 21–52)
LYMPHOCYTES NFR BLD: 9 % (ref 21–52)
MAGNESIUM SERPL-MCNC: 1.6 MG/DL (ref 1.6–2.6)
MAGNESIUM SERPL-MCNC: 1.8 MG/DL (ref 1.6–2.6)
MAGNESIUM SERPL-MCNC: 1.8 MG/DL (ref 1.6–2.6)
MAGNESIUM SERPL-MCNC: 1.9 MG/DL (ref 1.6–2.6)
MAGNESIUM SERPL-MCNC: 2 MG/DL (ref 1.6–2.6)
MAGNESIUM SERPL-MCNC: 2.1 MG/DL (ref 1.6–2.6)
MAGNESIUM SERPL-MCNC: 2.2 MG/DL (ref 1.6–2.6)
MAGNESIUM SERPL-MCNC: 2.3 MG/DL (ref 1.6–2.6)
MAGNESIUM SERPL-MCNC: 2.4 MG/DL (ref 1.6–2.6)
MAGNESIUM SERPL-MCNC: 2.6 MG/DL (ref 1.6–2.6)
MAGNESIUM SERPL-MCNC: 2.7 MG/DL (ref 1.6–2.6)
MAGNESIUM SERPL-MCNC: 2.7 MG/DL (ref 1.6–2.6)
MAGNESIUM SERPL-MCNC: 2.8 MG/DL (ref 1.6–2.6)
MAGNESIUM SERPL-MCNC: 3 MG/DL (ref 1.6–2.6)
MCH RBC QN AUTO: 26.1 PG (ref 24–34)
MCH RBC QN AUTO: 26.2 PG (ref 24–34)
MCH RBC QN AUTO: 26.3 PG (ref 24–34)
MCH RBC QN AUTO: 26.4 PG (ref 24–34)
MCH RBC QN AUTO: 26.5 PG (ref 24–34)
MCH RBC QN AUTO: 26.6 PG (ref 24–34)
MCH RBC QN AUTO: 26.6 PG (ref 24–34)
MCH RBC QN AUTO: 26.7 PG (ref 24–34)
MCH RBC QN AUTO: 26.8 PG (ref 24–34)
MCH RBC QN AUTO: 26.9 PG (ref 24–34)
MCH RBC QN AUTO: 27 PG (ref 24–34)
MCH RBC QN AUTO: 27 PG (ref 24–34)
MCH RBC QN AUTO: 27.1 PG (ref 24–34)
MCH RBC QN AUTO: 27.1 PG (ref 24–34)
MCH RBC QN AUTO: 27.2 PG (ref 24–34)
MCH RBC QN AUTO: 27.2 PG (ref 24–34)
MCHC RBC AUTO-ENTMCNC: 28.6 G/DL (ref 31–37)
MCHC RBC AUTO-ENTMCNC: 28.8 G/DL (ref 31–37)
MCHC RBC AUTO-ENTMCNC: 29.1 G/DL (ref 31–37)
MCHC RBC AUTO-ENTMCNC: 29.2 G/DL (ref 31–37)
MCHC RBC AUTO-ENTMCNC: 29.3 G/DL (ref 31–37)
MCHC RBC AUTO-ENTMCNC: 29.4 G/DL (ref 31–37)
MCHC RBC AUTO-ENTMCNC: 29.6 G/DL (ref 31–37)
MCHC RBC AUTO-ENTMCNC: 29.7 G/DL (ref 31–37)
MCHC RBC AUTO-ENTMCNC: 29.7 G/DL (ref 31–37)
MCHC RBC AUTO-ENTMCNC: 29.8 G/DL (ref 31–37)
MCHC RBC AUTO-ENTMCNC: 29.9 G/DL (ref 31–37)
MCHC RBC AUTO-ENTMCNC: 29.9 G/DL (ref 31–37)
MCHC RBC AUTO-ENTMCNC: 30 G/DL (ref 31–37)
MCHC RBC AUTO-ENTMCNC: 30 G/DL (ref 31–37)
MCHC RBC AUTO-ENTMCNC: 30.1 G/DL (ref 31–37)
MCHC RBC AUTO-ENTMCNC: 30.2 G/DL (ref 31–37)
MCHC RBC AUTO-ENTMCNC: 30.3 G/DL (ref 31–37)
MCHC RBC AUTO-ENTMCNC: 30.4 G/DL (ref 31–37)
MCHC RBC AUTO-ENTMCNC: 30.5 G/DL (ref 31–37)
MCHC RBC AUTO-ENTMCNC: 30.6 G/DL (ref 31–37)
MCHC RBC AUTO-ENTMCNC: 31 G/DL (ref 31–37)
MCHC RBC AUTO-ENTMCNC: 31.2 G/DL (ref 31–37)
MCHC RBC AUTO-ENTMCNC: 31.5 G/DL (ref 31–37)
MCHC RBC AUTO-ENTMCNC: 31.6 G/DL (ref 31–37)
MCHC RBC AUTO-ENTMCNC: 31.7 G/DL (ref 31–37)
MCHC RBC AUTO-ENTMCNC: 32.1 G/DL (ref 31–37)
MCV RBC AUTO: 83.7 FL (ref 74–97)
MCV RBC AUTO: 83.9 FL (ref 74–97)
MCV RBC AUTO: 84.6 FL (ref 74–97)
MCV RBC AUTO: 84.6 FL (ref 74–97)
MCV RBC AUTO: 85.3 FL (ref 74–97)
MCV RBC AUTO: 85.8 FL (ref 74–97)
MCV RBC AUTO: 86.4 FL (ref 74–97)
MCV RBC AUTO: 86.6 FL (ref 74–97)
MCV RBC AUTO: 87.2 FL (ref 74–97)
MCV RBC AUTO: 87.7 FL (ref 74–97)
MCV RBC AUTO: 88 FL (ref 74–97)
MCV RBC AUTO: 88.3 FL (ref 74–97)
MCV RBC AUTO: 88.6 FL (ref 74–97)
MCV RBC AUTO: 88.8 FL (ref 74–97)
MCV RBC AUTO: 88.8 FL (ref 74–97)
MCV RBC AUTO: 88.9 FL (ref 74–97)
MCV RBC AUTO: 88.9 FL (ref 74–97)
MCV RBC AUTO: 89 FL (ref 74–97)
MCV RBC AUTO: 89.1 FL (ref 74–97)
MCV RBC AUTO: 89.2 FL (ref 74–97)
MCV RBC AUTO: 89.2 FL (ref 74–97)
MCV RBC AUTO: 89.4 FL (ref 74–97)
MCV RBC AUTO: 89.4 FL (ref 74–97)
MCV RBC AUTO: 89.5 FL (ref 74–97)
MCV RBC AUTO: 89.6 FL (ref 74–97)
MCV RBC AUTO: 89.6 FL (ref 74–97)
MCV RBC AUTO: 89.7 FL (ref 74–97)
MCV RBC AUTO: 89.8 FL (ref 74–97)
MCV RBC AUTO: 89.9 FL (ref 74–97)
MCV RBC AUTO: 90.2 FL (ref 74–97)
MCV RBC AUTO: 90.5 FL (ref 74–97)
MCV RBC AUTO: 90.5 FL (ref 74–97)
MCV RBC AUTO: 90.7 FL (ref 74–97)
MCV RBC AUTO: 90.9 FL (ref 74–97)
MCV RBC AUTO: 90.9 FL (ref 74–97)
MCV RBC AUTO: 91.2 FL (ref 74–97)
MCV RBC AUTO: 92.4 FL (ref 74–97)
MCV RBC AUTO: 94.2 FL (ref 74–97)
MONOCYTES # BLD: 0.1 K/UL (ref 0–1)
MONOCYTES # BLD: 0.3 K/UL (ref 0.05–1.2)
MONOCYTES # BLD: 0.3 K/UL (ref 0.05–1.2)
MONOCYTES # BLD: 0.4 K/UL (ref 0.05–1.2)
MONOCYTES # BLD: 0.5 K/UL (ref 0.05–1.2)
MONOCYTES # BLD: 0.6 K/UL (ref 0.05–1.2)
MONOCYTES # BLD: 0.7 K/UL (ref 0.05–1.2)
MONOCYTES # BLD: 0.8 K/UL (ref 0.05–1.2)
MONOCYTES # BLD: 0.9 K/UL (ref 0.05–1.2)
MONOCYTES # BLD: 1 K/UL (ref 0.05–1.2)
MONOCYTES # BLD: 1 K/UL (ref 0.05–1.2)
MONOCYTES # BLD: 1.1 K/UL (ref 0.05–1.2)
MONOCYTES # BLD: 1.2 K/UL (ref 0.05–1.2)
MONOCYTES NFR BLD: 10 % (ref 3–10)
MONOCYTES NFR BLD: 11 % (ref 3–10)
MONOCYTES NFR BLD: 12 % (ref 3–10)
MONOCYTES NFR BLD: 13 % (ref 3–10)
MONOCYTES NFR BLD: 13 % (ref 3–10)
MONOCYTES NFR BLD: 14 % (ref 3–10)
MONOCYTES NFR BLD: 14 % (ref 3–10)
MONOCYTES NFR BLD: 16 % (ref 3–10)
MONOCYTES NFR BLD: 2 % (ref 2–9)
MONOCYTES NFR BLD: 3 % (ref 3–10)
MONOCYTES NFR BLD: 4 % (ref 3–10)
MONOCYTES NFR BLD: 4 % (ref 3–10)
MONOCYTES NFR BLD: 5 % (ref 3–10)
MONOCYTES NFR BLD: 6 % (ref 3–10)
MONOCYTES NFR BLD: 7 % (ref 3–10)
MONOCYTES NFR BLD: 8 % (ref 3–10)
MONOCYTES NFR BLD: 8 % (ref 3–10)
MONOCYTES NFR BLD: 9 % (ref 3–10)
MONOCYTES NFR BLD: 9 % (ref 3–10)
MV DEC SLOPE: 3.61
NEUTS BAND NFR BLD MANUAL: 4 % (ref 0–5)
NEUTS SEG # BLD: 10.2 K/UL (ref 1.8–8)
NEUTS SEG # BLD: 10.4 K/UL (ref 1.8–8)
NEUTS SEG # BLD: 11.7 K/UL (ref 1.8–8)
NEUTS SEG # BLD: 2.6 K/UL (ref 1.8–8)
NEUTS SEG # BLD: 3 K/UL (ref 1.8–8)
NEUTS SEG # BLD: 3.7 K/UL (ref 1.8–8)
NEUTS SEG # BLD: 4.6 K/UL (ref 1.8–8)
NEUTS SEG # BLD: 5 K/UL (ref 1.8–8)
NEUTS SEG # BLD: 5.1 K/UL (ref 1.8–8)
NEUTS SEG # BLD: 5.4 K/UL (ref 1.8–8)
NEUTS SEG # BLD: 5.5 K/UL (ref 1.8–8)
NEUTS SEG # BLD: 5.6 K/UL (ref 1.8–8)
NEUTS SEG # BLD: 5.7 K/UL (ref 1.8–8)
NEUTS SEG # BLD: 5.8 K/UL (ref 1.8–8)
NEUTS SEG # BLD: 5.8 K/UL (ref 1.8–8)
NEUTS SEG # BLD: 5.9 K/UL (ref 1.8–8)
NEUTS SEG # BLD: 6 K/UL (ref 1.8–8)
NEUTS SEG # BLD: 6 K/UL (ref 1.8–8)
NEUTS SEG # BLD: 6.1 K/UL (ref 1.8–8)
NEUTS SEG # BLD: 6.2 K/UL (ref 1.8–8)
NEUTS SEG # BLD: 6.4 K/UL (ref 1.8–8)
NEUTS SEG # BLD: 6.8 K/UL (ref 1.8–8)
NEUTS SEG # BLD: 6.9 K/UL (ref 1.8–8)
NEUTS SEG # BLD: 6.9 K/UL (ref 1.8–8)
NEUTS SEG # BLD: 7.2 K/UL (ref 1.8–8)
NEUTS SEG # BLD: 7.3 K/UL (ref 1.8–8)
NEUTS SEG # BLD: 7.4 K/UL (ref 1.8–8)
NEUTS SEG # BLD: 7.4 K/UL (ref 1.8–8)
NEUTS SEG # BLD: 7.9 K/UL (ref 1.8–8)
NEUTS SEG # BLD: 8.5 K/UL (ref 1.8–8)
NEUTS SEG # BLD: 8.6 K/UL (ref 1.8–8)
NEUTS SEG # BLD: 8.6 K/UL (ref 1.8–8)
NEUTS SEG # BLD: 9 K/UL (ref 1.8–8)
NEUTS SEG # BLD: 9.5 K/UL (ref 1.8–8)
NEUTS SEG # BLD: 9.9 K/UL (ref 1.8–8)
NEUTS SEG NFR BLD: 58 % (ref 40–73)
NEUTS SEG NFR BLD: 64 % (ref 40–73)
NEUTS SEG NFR BLD: 69 % (ref 40–73)
NEUTS SEG NFR BLD: 71 % (ref 40–73)
NEUTS SEG NFR BLD: 74 % (ref 40–73)
NEUTS SEG NFR BLD: 77 % (ref 40–73)
NEUTS SEG NFR BLD: 78 % (ref 40–73)
NEUTS SEG NFR BLD: 79 % (ref 40–73)
NEUTS SEG NFR BLD: 81 % (ref 40–73)
NEUTS SEG NFR BLD: 82 % (ref 40–73)
NEUTS SEG NFR BLD: 83 % (ref 40–73)
NEUTS SEG NFR BLD: 84 % (ref 40–73)
NEUTS SEG NFR BLD: 84 % (ref 40–73)
NEUTS SEG NFR BLD: 86 % (ref 40–73)
NEUTS SEG NFR BLD: 86 % (ref 40–73)
NEUTS SEG NFR BLD: 87 % (ref 40–73)
NEUTS SEG NFR BLD: 88 % (ref 40–73)
NEUTS SEG NFR BLD: 89 % (ref 40–73)
NEUTS SEG NFR BLD: 89 % (ref 40–73)
NEUTS SEG NFR BLD: 89 % (ref 42–75)
NEUTS SEG NFR BLD: 90 % (ref 40–73)
NEUTS SEG NFR BLD: 90 % (ref 40–73)
NEUTS SEG NFR BLD: 93 % (ref 40–73)
NEUTS SEG NFR BLD: 93 % (ref 40–73)
NITRITE UR QL STRIP.AUTO: NEGATIVE
O2/TOTAL GAS SETTING VFR VENT: 0.4 %
O2/TOTAL GAS SETTING VFR VENT: 0.65 %
O2/TOTAL GAS SETTING VFR VENT: 0.7 %
O2/TOTAL GAS SETTING VFR VENT: 0.75 %
O2/TOTAL GAS SETTING VFR VENT: 0.8 %
O2/TOTAL GAS SETTING VFR VENT: 0.8 %
O2/TOTAL GAS SETTING VFR VENT: 0.9 %
O2/TOTAL GAS SETTING VFR VENT: 0.9 %
O2/TOTAL GAS SETTING VFR VENT: 1 %
O2/TOTAL GAS SETTING VFR VENT: 100 %
O2/TOTAL GAS SETTING VFR VENT: 35 %
O2/TOTAL GAS SETTING VFR VENT: 35 %
O2/TOTAL GAS SETTING VFR VENT: 40 %
O2/TOTAL GAS SETTING VFR VENT: 40 %
O2/TOTAL GAS SETTING VFR VENT: 50 %
O2/TOTAL GAS SETTING VFR VENT: 50 %
O2/TOTAL GAS SETTING VFR VENT: 60 %
O2/TOTAL GAS SETTING VFR VENT: 70 %
O2/TOTAL GAS SETTING VFR VENT: 80 %
O2/TOTAL GAS SETTING VFR VENT: 80 %
O2/TOTAL GAS SETTING VFR VENT: 95 %
P-R INTERVAL, ECG05: 198 MS
P-R INTERVAL, ECG05: 202 MS
PCO2 BLD: 36.4 MMHG (ref 35–45)
PCO2 BLD: 37.7 MMHG (ref 35–45)
PCO2 BLD: 37.9 MMHG (ref 35–45)
PCO2 BLD: 38.9 MMHG (ref 35–45)
PCO2 BLD: 40 MMHG (ref 35–45)
PCO2 BLD: 40.1 MMHG (ref 35–45)
PCO2 BLD: 40.8 MMHG (ref 35–45)
PCO2 BLD: 41.4 MMHG (ref 35–45)
PCO2 BLD: 42 MMHG (ref 35–45)
PCO2 BLD: 43.1 MMHG (ref 35–45)
PCO2 BLD: 43.8 MMHG (ref 35–45)
PCO2 BLD: 44 MMHG (ref 35–45)
PCO2 BLD: 44 MMHG (ref 35–45)
PCO2 BLD: 46 MMHG (ref 35–45)
PCO2 BLD: 47.2 MMHG (ref 35–45)
PCO2 BLD: 49.5 MMHG (ref 35–45)
PCO2 BLD: 50 MMHG (ref 35–45)
PCO2 BLD: 50.1 MMHG (ref 35–45)
PCO2 BLD: 50.7 MMHG (ref 35–45)
PCO2 BLD: 51 MMHG (ref 35–45)
PCO2 BLD: 51.3 MMHG (ref 35–45)
PCO2 BLD: 51.4 MMHG (ref 35–45)
PCO2 BLD: 51.5 MMHG (ref 35–45)
PCO2 BLD: 51.7 MMHG (ref 35–45)
PCO2 BLD: 51.8 MMHG (ref 35–45)
PCO2 BLD: 52.5 MMHG (ref 35–45)
PCO2 BLD: 52.6 MMHG (ref 35–45)
PCO2 BLD: 52.9 MMHG (ref 35–45)
PCO2 BLD: 52.9 MMHG (ref 35–45)
PCO2 BLD: 55.1 MMHG (ref 35–45)
PCO2 BLD: 55.3 MMHG (ref 35–45)
PCO2 BLD: 55.6 MMHG (ref 35–45)
PCO2 BLD: 57.1 MMHG (ref 35–45)
PCO2 BLD: 57.5 MMHG (ref 35–45)
PCO2 BLD: 58.5 MMHG (ref 35–45)
PCO2 BLD: 58.8 MMHG (ref 35–45)
PCO2 BLD: 66.8 MMHG (ref 35–45)
PCO2 BLD: 68.9 MMHG (ref 35–45)
PCO2 BLD: 69.1 MMHG (ref 35–45)
PCO2 BLD: 73.2 MMHG (ref 35–45)
PCO2 BLD: 80.8 MMHG (ref 35–45)
PCO2 BLD: 96.5 MMHG (ref 35–45)
PCO2 BLD: >130 MMHG (ref 35–45)
PCO2 BLD: >130 MMHG (ref 35–45)
PEEP RESPIRATORY: 10 CMH2O
PEEP RESPIRATORY: 12 CMH2O
PEEP RESPIRATORY: 12 CMH2O
PEEP RESPIRATORY: 14 CMH2O
PEEP RESPIRATORY: 15 CMH2O
PEEP RESPIRATORY: 16 CMH2O
PEEP RESPIRATORY: 16 CMH2O
PEEP RESPIRATORY: 18 CMH2O
PEEP RESPIRATORY: 20 CMH2O
PEEP RESPIRATORY: 5 CMH2O
PEEP RESPIRATORY: 8 CMH2O
PF4 HEPARIN CMPLX AB SER-ACNC: 0.08 OD (ref 0–0.4)
PH BLD: 7.12 [PH] (ref 7.35–7.45)
PH BLD: 7.12 [PH] (ref 7.35–7.45)
PH BLD: 7.21 [PH] (ref 7.35–7.45)
PH BLD: 7.27 [PH] (ref 7.35–7.45)
PH BLD: 7.29 [PH] (ref 7.35–7.45)
PH BLD: 7.32 [PH] (ref 7.35–7.45)
PH BLD: 7.35 [PH] (ref 7.35–7.45)
PH BLD: 7.36 [PH] (ref 7.35–7.45)
PH BLD: 7.37 [PH] (ref 7.35–7.45)
PH BLD: 7.38 [PH] (ref 7.35–7.45)
PH BLD: 7.39 [PH] (ref 7.35–7.45)
PH BLD: 7.4 [PH] (ref 7.35–7.45)
PH BLD: 7.41 [PH] (ref 7.35–7.45)
PH BLD: 7.42 [PH] (ref 7.35–7.45)
PH BLD: 7.43 [PH] (ref 7.35–7.45)
PH BLD: 7.45 [PH] (ref 7.35–7.45)
PH BLD: 7.46 [PH] (ref 7.35–7.45)
PH BLD: 7.46 [PH] (ref 7.35–7.45)
PH BLD: 7.47 [PH] (ref 7.35–7.45)
PH BLD: 7.48 [PH] (ref 7.35–7.45)
PH BLD: 7.5 [PH] (ref 7.35–7.45)
PH BLD: 7.51 [PH] (ref 7.35–7.45)
PH UR STRIP: 5.5 [PH] (ref 5–8)
PH UR STRIP: 5.5 [PH] (ref 5–8)
PH UR STRIP: 6 [PH] (ref 5–8)
PHOSPHATE SERPL-MCNC: 2 MG/DL (ref 2.5–4.9)
PHOSPHATE SERPL-MCNC: 2.2 MG/DL (ref 2.5–4.9)
PHOSPHATE SERPL-MCNC: 2.3 MG/DL (ref 2.5–4.9)
PHOSPHATE SERPL-MCNC: 2.3 MG/DL (ref 2.5–4.9)
PHOSPHATE SERPL-MCNC: 2.4 MG/DL (ref 2.5–4.9)
PHOSPHATE SERPL-MCNC: 2.4 MG/DL (ref 2.5–4.9)
PHOSPHATE SERPL-MCNC: 2.5 MG/DL (ref 2.5–4.9)
PHOSPHATE SERPL-MCNC: 2.6 MG/DL (ref 2.5–4.9)
PHOSPHATE SERPL-MCNC: 2.6 MG/DL (ref 2.5–4.9)
PHOSPHATE SERPL-MCNC: 2.7 MG/DL (ref 2.5–4.9)
PHOSPHATE SERPL-MCNC: 2.8 MG/DL (ref 2.5–4.9)
PHOSPHATE SERPL-MCNC: 2.9 MG/DL (ref 2.5–4.9)
PHOSPHATE SERPL-MCNC: 3 MG/DL (ref 2.5–4.9)
PHOSPHATE SERPL-MCNC: 3 MG/DL (ref 2.5–4.9)
PHOSPHATE SERPL-MCNC: 3.1 MG/DL (ref 2.5–4.9)
PHOSPHATE SERPL-MCNC: 3.2 MG/DL (ref 2.5–4.9)
PHOSPHATE SERPL-MCNC: 3.3 MG/DL (ref 2.5–4.9)
PHOSPHATE SERPL-MCNC: 3.3 MG/DL (ref 2.5–4.9)
PHOSPHATE SERPL-MCNC: 3.4 MG/DL (ref 2.5–4.9)
PHOSPHATE SERPL-MCNC: 3.8 MG/DL (ref 2.5–4.9)
PHOSPHATE SERPL-MCNC: 3.8 MG/DL (ref 2.5–4.9)
PHOSPHATE SERPL-MCNC: 4.4 MG/DL (ref 2.5–4.9)
PHOSPHATE SERPL-MCNC: 4.7 MG/DL (ref 2.5–4.9)
PHOSPHATE SERPL-MCNC: 4.9 MG/DL (ref 2.5–4.9)
PIP ISTAT,IPIP: 13
PIP ISTAT,IPIP: 15
PIP ISTAT,IPIP: 17
PIP ISTAT,IPIP: 17
PIP ISTAT,IPIP: 18
PIP ISTAT,IPIP: 18
PIP ISTAT,IPIP: 19
PIP ISTAT,IPIP: 26
PIP ISTAT,IPIP: 26
PIP ISTAT,IPIP: 28
PIP ISTAT,IPIP: 29
PIP ISTAT,IPIP: 30
PIP ISTAT,IPIP: 31
PIP ISTAT,IPIP: 32
PIP ISTAT,IPIP: 33
PIP ISTAT,IPIP: 35
PLATELET # BLD AUTO: 103 K/UL (ref 135–420)
PLATELET # BLD AUTO: 105 K/UL (ref 135–420)
PLATELET # BLD AUTO: 107 K/UL (ref 135–420)
PLATELET # BLD AUTO: 109 K/UL (ref 135–420)
PLATELET # BLD AUTO: 114 K/UL (ref 135–420)
PLATELET # BLD AUTO: 118 K/UL (ref 135–420)
PLATELET # BLD AUTO: 122 K/UL (ref 135–420)
PLATELET # BLD AUTO: 126 K/UL (ref 135–420)
PLATELET # BLD AUTO: 130 K/UL (ref 135–420)
PLATELET # BLD AUTO: 131 K/UL (ref 135–420)
PLATELET # BLD AUTO: 139 K/UL (ref 135–420)
PLATELET # BLD AUTO: 139 K/UL (ref 135–420)
PLATELET # BLD AUTO: 141 K/UL (ref 135–420)
PLATELET # BLD AUTO: 148 K/UL (ref 135–420)
PLATELET # BLD AUTO: 150 K/UL (ref 135–420)
PLATELET # BLD AUTO: 152 K/UL (ref 135–420)
PLATELET # BLD AUTO: 152 K/UL (ref 135–420)
PLATELET # BLD AUTO: 154 K/UL (ref 135–420)
PLATELET # BLD AUTO: 155 K/UL (ref 135–420)
PLATELET # BLD AUTO: 155 K/UL (ref 135–420)
PLATELET # BLD AUTO: 162 K/UL (ref 135–420)
PLATELET # BLD AUTO: 164 K/UL (ref 135–420)
PLATELET # BLD AUTO: 169 K/UL (ref 135–420)
PLATELET # BLD AUTO: 169 K/UL (ref 135–420)
PLATELET # BLD AUTO: 170 K/UL (ref 135–420)
PLATELET # BLD AUTO: 171 K/UL (ref 135–420)
PLATELET # BLD AUTO: 177 K/UL (ref 135–420)
PLATELET # BLD AUTO: 183 K/UL (ref 135–420)
PLATELET # BLD AUTO: 188 K/UL (ref 135–420)
PLATELET # BLD AUTO: 191 K/UL (ref 135–420)
PLATELET # BLD AUTO: 195 K/UL (ref 135–420)
PLATELET # BLD AUTO: 206 K/UL (ref 135–420)
PLATELET # BLD AUTO: 207 K/UL (ref 135–420)
PLATELET # BLD AUTO: 210 K/UL (ref 135–420)
PLATELET # BLD AUTO: 213 K/UL (ref 135–420)
PLATELET # BLD AUTO: 87 K/UL (ref 135–420)
PLATELET # BLD AUTO: 94 K/UL (ref 135–420)
PLATELET # BLD AUTO: 96 K/UL (ref 135–420)
PLATELET COMMENTS,PCOM: ABNORMAL
PMV BLD AUTO: 10 FL (ref 9.2–11.8)
PMV BLD AUTO: 10 FL (ref 9.2–11.8)
PMV BLD AUTO: 10.1 FL (ref 9.2–11.8)
PMV BLD AUTO: 10.1 FL (ref 9.2–11.8)
PMV BLD AUTO: 10.2 FL (ref 9.2–11.8)
PMV BLD AUTO: 10.3 FL (ref 9.2–11.8)
PMV BLD AUTO: 10.5 FL (ref 9.2–11.8)
PMV BLD AUTO: 10.6 FL (ref 9.2–11.8)
PMV BLD AUTO: 10.6 FL (ref 9.2–11.8)
PMV BLD AUTO: 10.7 FL (ref 9.2–11.8)
PMV BLD AUTO: 10.8 FL (ref 9.2–11.8)
PMV BLD AUTO: 10.9 FL (ref 9.2–11.8)
PMV BLD AUTO: 10.9 FL (ref 9.2–11.8)
PMV BLD AUTO: 11.1 FL (ref 9.2–11.8)
PMV BLD AUTO: 11.1 FL (ref 9.2–11.8)
PMV BLD AUTO: 11.2 FL (ref 9.2–11.8)
PMV BLD AUTO: 11.2 FL (ref 9.2–11.8)
PMV BLD AUTO: 11.5 FL (ref 9.2–11.8)
PMV BLD AUTO: 11.5 FL (ref 9.2–11.8)
PMV BLD AUTO: 11.6 FL (ref 9.2–11.8)
PMV BLD AUTO: 11.6 FL (ref 9.2–11.8)
PMV BLD AUTO: 11.8 FL (ref 9.2–11.8)
PMV BLD AUTO: 9.4 FL (ref 9.2–11.8)
PMV BLD AUTO: 9.5 FL (ref 9.2–11.8)
PMV BLD AUTO: 9.6 FL (ref 9.2–11.8)
PMV BLD AUTO: 9.6 FL (ref 9.2–11.8)
PMV BLD AUTO: 9.7 FL (ref 9.2–11.8)
PMV BLD AUTO: 9.7 FL (ref 9.2–11.8)
PMV BLD AUTO: 9.8 FL (ref 9.2–11.8)
PMV BLD AUTO: 9.9 FL (ref 9.2–11.8)
PO2 BLD: 106 MMHG (ref 80–100)
PO2 BLD: 116 MMHG (ref 80–100)
PO2 BLD: 51 MMHG (ref 80–100)
PO2 BLD: 55 MMHG (ref 80–100)
PO2 BLD: 58 MMHG (ref 80–100)
PO2 BLD: 58 MMHG (ref 80–100)
PO2 BLD: 59 MMHG (ref 80–100)
PO2 BLD: 59 MMHG (ref 80–100)
PO2 BLD: 60 MMHG (ref 80–100)
PO2 BLD: 61 MMHG (ref 80–100)
PO2 BLD: 63 MMHG (ref 80–100)
PO2 BLD: 63 MMHG (ref 80–100)
PO2 BLD: 64 MMHG (ref 80–100)
PO2 BLD: 65 MMHG (ref 80–100)
PO2 BLD: 65 MMHG (ref 80–100)
PO2 BLD: 66 MMHG (ref 80–100)
PO2 BLD: 66 MMHG (ref 80–100)
PO2 BLD: 67 MMHG (ref 80–100)
PO2 BLD: 68 MMHG (ref 80–100)
PO2 BLD: 69 MMHG (ref 80–100)
PO2 BLD: 70 MMHG (ref 80–100)
PO2 BLD: 71 MMHG (ref 80–100)
PO2 BLD: 71 MMHG (ref 80–100)
PO2 BLD: 72 MMHG (ref 80–100)
PO2 BLD: 72 MMHG (ref 80–100)
PO2 BLD: 73 MMHG (ref 80–100)
PO2 BLD: 73 MMHG (ref 80–100)
PO2 BLD: 74 MMHG (ref 80–100)
PO2 BLD: 75 MMHG (ref 80–100)
PO2 BLD: 76 MMHG (ref 80–100)
PO2 BLD: 78 MMHG (ref 80–100)
PO2 BLD: 83 MMHG (ref 80–100)
PO2 BLD: 86 MMHG (ref 80–100)
PO2 BLD: 89 MMHG (ref 80–100)
PO2 BLD: 89 MMHG (ref 80–100)
PO2 BLD: 90 MMHG (ref 80–100)
POTASSIUM SERPL-SCNC: 3.5 MMOL/L (ref 3.5–5.5)
POTASSIUM SERPL-SCNC: 3.5 MMOL/L (ref 3.5–5.5)
POTASSIUM SERPL-SCNC: 3.6 MMOL/L (ref 3.5–5.5)
POTASSIUM SERPL-SCNC: 3.7 MMOL/L (ref 3.5–5.5)
POTASSIUM SERPL-SCNC: 3.8 MMOL/L (ref 3.5–5.5)
POTASSIUM SERPL-SCNC: 3.9 MMOL/L (ref 3.5–5.5)
POTASSIUM SERPL-SCNC: 4 MMOL/L (ref 3.5–5.5)
POTASSIUM SERPL-SCNC: 4.1 MMOL/L (ref 3.5–5.5)
POTASSIUM SERPL-SCNC: 4.2 MMOL/L (ref 3.5–5.5)
POTASSIUM SERPL-SCNC: 4.3 MMOL/L (ref 3.5–5.5)
POTASSIUM SERPL-SCNC: 4.4 MMOL/L (ref 3.5–5.5)
POTASSIUM SERPL-SCNC: 4.5 MMOL/L (ref 3.5–5.5)
POTASSIUM SERPL-SCNC: 4.5 MMOL/L (ref 3.5–5.5)
POTASSIUM SERPL-SCNC: 4.6 MMOL/L (ref 3.5–5.5)
POTASSIUM SERPL-SCNC: 4.6 MMOL/L (ref 3.5–5.5)
POTASSIUM SERPL-SCNC: 4.7 MMOL/L (ref 3.5–5.5)
POTASSIUM SERPL-SCNC: 5.3 MMOL/L (ref 3.5–5.5)
PRESSURE CONTROL, IPC: YES
PRESSURE SUPPORT SETTING VENT: 10 CMH2O
PRESSURE SUPPORT SETTING VENT: 15 CMH2O
PRESSURE SUPPORT SETTING VENT: 15 CMH2O
PROCALCITONIN SERPL-MCNC: 0.06 NG/ML
PROCALCITONIN SERPL-MCNC: 0.2 NG/ML
PROCALCITONIN SERPL-MCNC: <0.05 NG/ML
PROT SERPL-MCNC: 5.2 G/DL (ref 6.4–8.2)
PROT SERPL-MCNC: 5.3 G/DL (ref 6.4–8.2)
PROT SERPL-MCNC: 5.5 G/DL (ref 6.4–8.2)
PROT SERPL-MCNC: 5.7 G/DL (ref 6.4–8.2)
PROT SERPL-MCNC: 5.8 G/DL (ref 6.4–8.2)
PROT SERPL-MCNC: 5.9 G/DL (ref 6.4–8.2)
PROT SERPL-MCNC: 6.1 G/DL (ref 6.4–8.2)
PROT SERPL-MCNC: 6.2 G/DL (ref 6.4–8.2)
PROT SERPL-MCNC: 6.2 G/DL (ref 6.4–8.2)
PROT SERPL-MCNC: 6.3 G/DL (ref 6.4–8.2)
PROT SERPL-MCNC: 6.4 G/DL (ref 6.4–8.2)
PROT SERPL-MCNC: 6.4 G/DL (ref 6.4–8.2)
PROT SERPL-MCNC: 6.5 G/DL (ref 6.4–8.2)
PROT SERPL-MCNC: 6.5 G/DL (ref 6.4–8.2)
PROT SERPL-MCNC: 6.6 G/DL (ref 6.4–8.2)
PROT SERPL-MCNC: 6.7 G/DL (ref 6.4–8.2)
PROT SERPL-MCNC: 6.8 G/DL (ref 6.4–8.2)
PROT SERPL-MCNC: 7 G/DL (ref 6.4–8.2)
PROT SERPL-MCNC: 7.1 G/DL (ref 6.4–8.2)
PROT SERPL-MCNC: 7.2 G/DL (ref 6.4–8.2)
PROT SERPL-MCNC: 7.3 G/DL (ref 6.4–8.2)
PROT SERPL-MCNC: 7.7 G/DL (ref 6.4–8.2)
PROT SERPL-MCNC: 7.8 G/DL (ref 6.4–8.2)
PROT UR STRIP-MCNC: 100 MG/DL
PROT UR STRIP-MCNC: ABNORMAL MG/DL
PROT UR STRIP-MCNC: NEGATIVE MG/DL
PROT UR-MCNC: 18 MG/DL
PROT/CREAT UR-RTO: 0.1
PROTHROMBIN TIME: 16.9 SEC (ref 11.5–15.2)
PROTHROMBIN TIME: 17 SEC (ref 11.5–15.2)
PROTHROMBIN TIME: 17.1 SEC (ref 11.5–15.2)
PROTHROMBIN TIME: 17.2 SEC (ref 11.5–15.2)
PROTHROMBIN TIME: 17.3 SEC (ref 11.5–15.2)
PROTHROMBIN TIME: 17.4 SEC (ref 11.5–15.2)
PROTHROMBIN TIME: 17.5 SEC (ref 11.5–15.2)
PROTHROMBIN TIME: 17.8 SEC (ref 11.5–15.2)
PROTHROMBIN TIME: 17.9 SEC (ref 11.5–15.2)
PROTHROMBIN TIME: 18 SEC (ref 11.5–15.2)
PROTHROMBIN TIME: 18.3 SEC (ref 11.5–15.2)
PROTHROMBIN TIME: 19.9 SEC (ref 11.5–15.2)
PROTHROMBIN TIME: 22.4 SEC (ref 11.5–15.2)
PROTHROMBIN TIME: 22.6 SEC (ref 11.5–15.2)
PROTHROMBIN TIME: 22.6 SEC (ref 11.5–15.2)
PROTHROMBIN TIME: 23.3 SEC (ref 11.5–15.2)
PROTHROMBIN TIME: 23.7 SEC (ref 11.5–15.2)
PROTHROMBIN TIME: 24.4 SEC (ref 11.5–15.2)
PROTHROMBIN TIME: 25.8 SEC (ref 11.5–15.2)
PROTHROMBIN TIME: 29.9 SEC (ref 11.5–15.2)
PROTHROMBIN TIME: 39.5 SEC (ref 11.5–15.2)
PROTHROMBIN TIME: 45.3 SEC (ref 11.5–15.2)
PROTHROMBIN TIME: 50.8 SEC (ref 11.5–15.2)
Q-T INTERVAL, ECG07: 406 MS
Q-T INTERVAL, ECG07: 516 MS
QRS DURATION, ECG06: 112 MS
QRS DURATION, ECG06: 112 MS
QTC CALCULATION (BEZET), ECG08: 471 MS
QTC CALCULATION (BEZET), ECG08: 489 MS
RBC # BLD AUTO: 3.64 M/UL (ref 4.7–5.5)
RBC # BLD AUTO: 3.67 M/UL (ref 4.7–5.5)
RBC # BLD AUTO: 3.74 M/UL (ref 4.7–5.5)
RBC # BLD AUTO: 3.75 M/UL (ref 4.7–5.5)
RBC # BLD AUTO: 3.78 M/UL (ref 4.7–5.5)
RBC # BLD AUTO: 3.81 M/UL (ref 4.7–5.5)
RBC # BLD AUTO: 3.9 M/UL (ref 4.7–5.5)
RBC # BLD AUTO: 3.91 M/UL (ref 4.7–5.5)
RBC # BLD AUTO: 3.93 M/UL (ref 4.7–5.5)
RBC # BLD AUTO: 3.93 M/UL (ref 4.7–5.5)
RBC # BLD AUTO: 3.95 M/UL (ref 4.7–5.5)
RBC # BLD AUTO: 3.95 M/UL (ref 4.7–5.5)
RBC # BLD AUTO: 3.97 M/UL (ref 4.7–5.5)
RBC # BLD AUTO: 4.03 M/UL (ref 4.7–5.5)
RBC # BLD AUTO: 4.21 M/UL (ref 4.7–5.5)
RBC # BLD AUTO: 4.31 M/UL (ref 4.7–5.5)
RBC # BLD AUTO: 4.4 M/UL (ref 4.7–5.5)
RBC # BLD AUTO: 4.42 M/UL (ref 4.7–5.5)
RBC # BLD AUTO: 4.43 M/UL (ref 4.7–5.5)
RBC # BLD AUTO: 4.54 M/UL (ref 4.7–5.5)
RBC # BLD AUTO: 4.64 M/UL (ref 4.7–5.5)
RBC # BLD AUTO: 4.64 M/UL (ref 4.7–5.5)
RBC # BLD AUTO: 4.69 M/UL (ref 4.7–5.5)
RBC # BLD AUTO: 4.71 M/UL (ref 4.7–5.5)
RBC # BLD AUTO: 4.71 M/UL (ref 4.7–5.5)
RBC # BLD AUTO: 4.76 M/UL (ref 4.7–5.5)
RBC # BLD AUTO: 4.76 M/UL (ref 4.7–5.5)
RBC # BLD AUTO: 4.83 M/UL (ref 4.7–5.5)
RBC # BLD AUTO: 4.84 M/UL (ref 4.7–5.5)
RBC # BLD AUTO: 4.87 M/UL (ref 4.7–5.5)
RBC # BLD AUTO: 4.92 M/UL (ref 4.7–5.5)
RBC # BLD AUTO: 5 M/UL (ref 4.7–5.5)
RBC # BLD AUTO: 5.09 M/UL (ref 4.7–5.5)
RBC # BLD AUTO: 5.11 M/UL (ref 4.7–5.5)
RBC # BLD AUTO: 5.16 M/UL (ref 4.7–5.5)
RBC # BLD AUTO: 5.26 M/UL (ref 4.7–5.5)
RBC # BLD AUTO: 5.38 M/UL (ref 4.7–5.5)
RBC # BLD AUTO: 5.63 M/UL (ref 4.7–5.5)
RBC #/AREA URNS HPF: ABNORMAL /HPF (ref 0–5)
RBC #/AREA URNS HPF: ABNORMAL /HPF (ref 0–5)
RBC MORPH BLD: ABNORMAL
REPORTED DOSE,DOSE: ABNORMAL UNITS
REPORTED DOSE/TIME,TMG: 1334
SAO2 % BLD: 85 % (ref 92–97)
SAO2 % BLD: 85 % (ref 92–97)
SAO2 % BLD: 88 % (ref 92–97)
SAO2 % BLD: 88 % (ref 92–97)
SAO2 % BLD: 89 % (ref 92–97)
SAO2 % BLD: 89 % (ref 92–97)
SAO2 % BLD: 91 % (ref 92–97)
SAO2 % BLD: 91 % (ref 92–97)
SAO2 % BLD: 92 % (ref 92–97)
SAO2 % BLD: 93 % (ref 92–97)
SAO2 % BLD: 94 % (ref 92–97)
SAO2 % BLD: 95 % (ref 92–97)
SAO2 % BLD: 96 % (ref 92–97)
SAO2 % BLD: 97 % (ref 92–97)
SAO2 % BLD: 97 % (ref 92–97)
SAO2 % BLD: 98 % (ref 92–97)
SAO2 % BLD: 99 % (ref 92–97)
SARS-COV-2, COV2NT: NOT DETECTED
SARS-COV-2, COV2NT: NOT DETECTED
SERVICE CMNT-IMP: ABNORMAL
SERVICE CMNT-IMP: NORMAL
SODIUM SERPL-SCNC: 137 MMOL/L (ref 136–145)
SODIUM SERPL-SCNC: 138 MMOL/L (ref 136–145)
SODIUM SERPL-SCNC: 139 MMOL/L (ref 136–145)
SODIUM SERPL-SCNC: 139 MMOL/L (ref 136–145)
SODIUM SERPL-SCNC: 140 MMOL/L (ref 136–145)
SODIUM SERPL-SCNC: 141 MMOL/L (ref 136–145)
SODIUM SERPL-SCNC: 141 MMOL/L (ref 136–145)
SODIUM SERPL-SCNC: 142 MMOL/L (ref 136–145)
SODIUM SERPL-SCNC: 143 MMOL/L (ref 136–145)
SODIUM SERPL-SCNC: 144 MMOL/L (ref 136–145)
SODIUM SERPL-SCNC: 145 MMOL/L (ref 136–145)
SODIUM SERPL-SCNC: 146 MMOL/L (ref 136–145)
SODIUM SERPL-SCNC: 147 MMOL/L (ref 136–145)
SODIUM SERPL-SCNC: 148 MMOL/L (ref 136–145)
SODIUM SERPL-SCNC: 148 MMOL/L (ref 136–145)
SODIUM SERPL-SCNC: 149 MMOL/L (ref 136–145)
SODIUM SERPL-SCNC: 150 MMOL/L (ref 136–145)
SODIUM SERPL-SCNC: 151 MMOL/L (ref 136–145)
SODIUM SERPL-SCNC: 151 MMOL/L (ref 136–145)
SODIUM SERPL-SCNC: 152 MMOL/L (ref 136–145)
SODIUM SERPL-SCNC: 152 MMOL/L (ref 136–145)
SODIUM SERPL-SCNC: 153 MMOL/L (ref 136–145)
SODIUM SERPL-SCNC: 153 MMOL/L (ref 136–145)
SODIUM UR-SCNC: 24 MMOL/L (ref 20–110)
SP GR UR REFRACTOMETRY: 1.01 (ref 1–1.03)
SP GR UR REFRACTOMETRY: 1.02 (ref 1–1.03)
SP GR UR REFRACTOMETRY: >1.03 (ref 1–1.03)
SPECIMEN TYPE: ABNORMAL
SPECIMEN TYPE: NORMAL
SPECIMEN TYPE: NORMAL
SRA 100IU/ML UFH SER-ACNC: <1 % (ref 0–20)
SRA UFH SER-IMP: NORMAL
TOTAL RESP. RATE, ITRR: 16
TOTAL RESP. RATE, ITRR: 17
TOTAL RESP. RATE, ITRR: 18
TOTAL RESP. RATE, ITRR: 18
TOTAL RESP. RATE, ITRR: 19
TOTAL RESP. RATE, ITRR: 20
TOTAL RESP. RATE, ITRR: 22
TOTAL RESP. RATE, ITRR: 24
TOTAL RESP. RATE, ITRR: 25
TOTAL RESP. RATE, ITRR: 26
TOTAL RESP. RATE, ITRR: 27
TOTAL RESP. RATE, ITRR: 33
TROPONIN I SERPL-MCNC: 0.09 NG/ML (ref 0–0.04)
TROPONIN I SERPL-MCNC: 0.09 NG/ML (ref 0–0.04)
TROPONIN I SERPL-MCNC: 0.12 NG/ML (ref 0–0.04)
TROPONIN I SERPL-MCNC: 0.47 NG/ML (ref 0–0.04)
TROPONIN I SERPL-MCNC: 2.43 NG/ML (ref 0–0.04)
TROPONIN I SERPL-MCNC: 3.31 NG/ML (ref 0–0.04)
TSH SERPL DL<=0.05 MIU/L-ACNC: 1.45 UIU/ML (ref 0.36–3.74)
UNFRAC HEPARIN LOW DOSE: <1 % (ref 0–20)
UROBILINOGEN UR QL STRIP.AUTO: 1 EU/DL (ref 0.2–1)
VANCOMYCIN TROUGH SERPL-MCNC: 37.1 UG/ML (ref 10–20)
VENTILATION MODE VENT: ABNORMAL
VENTILATION MODE VENT: NORMAL
VENTILATION MODE VENT: NORMAL
VENTRICULAR RATE, ECG03: 54 BPM
VENTRICULAR RATE, ECG03: 81 BPM
VOLUME CONTROL IVLC: YES
VOLUME CONTROL PLUS IVLCP: YES
VT SETTING VENT: 490 ML
VT SETTING VENT: 580 ML
VT SETTING VENT: 586 ML
VT SETTING VENT: 600 ML
VT SETTING VENT: 829 ML
WBC # BLD AUTO: 10.4 K/UL (ref 4.6–13.2)
WBC # BLD AUTO: 10.5 K/UL (ref 4.6–13.2)
WBC # BLD AUTO: 10.6 K/UL (ref 4.6–13.2)
WBC # BLD AUTO: 10.7 K/UL (ref 4.6–13.2)
WBC # BLD AUTO: 10.7 K/UL (ref 4.6–13.2)
WBC # BLD AUTO: 11.6 K/UL (ref 4.6–13.2)
WBC # BLD AUTO: 11.9 K/UL (ref 4.6–13.2)
WBC # BLD AUTO: 13.5 K/UL (ref 4.6–13.2)
WBC # BLD AUTO: 4.2 K/UL (ref 4.6–13.2)
WBC # BLD AUTO: 4.4 K/UL (ref 4.6–13.2)
WBC # BLD AUTO: 4.5 K/UL (ref 4.6–13.2)
WBC # BLD AUTO: 4.5 K/UL (ref 4.6–13.2)
WBC # BLD AUTO: 4.8 K/UL (ref 4.6–13.2)
WBC # BLD AUTO: 5.3 K/UL (ref 4.6–13.2)
WBC # BLD AUTO: 5.9 K/UL (ref 4.6–13.2)
WBC # BLD AUTO: 6.5 K/UL (ref 4.6–13.2)
WBC # BLD AUTO: 6.7 K/UL (ref 4.6–13.2)
WBC # BLD AUTO: 6.8 K/UL (ref 4.6–13.2)
WBC # BLD AUTO: 6.8 K/UL (ref 4.6–13.2)
WBC # BLD AUTO: 7.2 K/UL (ref 4.6–13.2)
WBC # BLD AUTO: 7.2 K/UL (ref 4.6–13.2)
WBC # BLD AUTO: 7.3 K/UL (ref 4.6–13.2)
WBC # BLD AUTO: 7.4 K/UL (ref 4.6–13.2)
WBC # BLD AUTO: 7.5 K/UL (ref 4.6–13.2)
WBC # BLD AUTO: 7.7 K/UL (ref 4.6–13.2)
WBC # BLD AUTO: 7.8 K/UL (ref 4.6–13.2)
WBC # BLD AUTO: 7.9 K/UL (ref 4.6–13.2)
WBC # BLD AUTO: 8.1 K/UL (ref 4.6–13.2)
WBC # BLD AUTO: 8.2 K/UL (ref 4.6–13.2)
WBC # BLD AUTO: 8.2 K/UL (ref 4.6–13.2)
WBC # BLD AUTO: 8.3 K/UL (ref 4.6–13.2)
WBC # BLD AUTO: 8.4 K/UL (ref 4.6–13.2)
WBC # BLD AUTO: 8.6 K/UL (ref 4.6–13.2)
WBC # BLD AUTO: 8.7 K/UL (ref 4.6–13.2)
WBC # BLD AUTO: 8.9 K/UL (ref 4.6–13.2)
WBC # BLD AUTO: 9.3 K/UL (ref 4.6–13.2)
WBC URNS QL MICRO: ABNORMAL /HPF (ref 0–5)
WBC URNS QL MICRO: ABNORMAL /HPF (ref 0–5)

## 2020-01-01 PROCEDURE — 36600 WITHDRAWAL OF ARTERIAL BLOOD: CPT

## 2020-01-01 PROCEDURE — 74011250636 HC RX REV CODE- 250/636: Performed by: FAMILY MEDICINE

## 2020-01-01 PROCEDURE — 65610000006 HC RM INTENSIVE CARE

## 2020-01-01 PROCEDURE — 74011000258 HC RX REV CODE- 258: Performed by: INTERNAL MEDICINE

## 2020-01-01 PROCEDURE — 74011250636 HC RX REV CODE- 250/636: Performed by: INTERNAL MEDICINE

## 2020-01-01 PROCEDURE — 74011000250 HC RX REV CODE- 250: Performed by: FAMILY MEDICINE

## 2020-01-01 PROCEDURE — 80053 COMPREHEN METABOLIC PANEL: CPT

## 2020-01-01 PROCEDURE — 84145 PROCALCITONIN (PCT): CPT

## 2020-01-01 PROCEDURE — 83735 ASSAY OF MAGNESIUM: CPT

## 2020-01-01 PROCEDURE — 74011250637 HC RX REV CODE- 250/637: Performed by: INTERNAL MEDICINE

## 2020-01-01 PROCEDURE — 82962 GLUCOSE BLOOD TEST: CPT

## 2020-01-01 PROCEDURE — 94003 VENT MGMT INPAT SUBQ DAY: CPT

## 2020-01-01 PROCEDURE — 74011000250 HC RX REV CODE- 250: Performed by: INTERNAL MEDICINE

## 2020-01-01 PROCEDURE — 85730 THROMBOPLASTIN TIME PARTIAL: CPT

## 2020-01-01 PROCEDURE — 77010033678 HC OXYGEN DAILY

## 2020-01-01 PROCEDURE — 82330 ASSAY OF CALCIUM: CPT

## 2020-01-01 PROCEDURE — 85027 COMPLETE CBC AUTOMATED: CPT

## 2020-01-01 PROCEDURE — P9047 ALBUMIN (HUMAN), 25%, 50ML: HCPCS | Performed by: FAMILY MEDICINE

## 2020-01-01 PROCEDURE — 71045 X-RAY EXAM CHEST 1 VIEW: CPT

## 2020-01-01 PROCEDURE — 85025 COMPLETE CBC W/AUTO DIFF WBC: CPT

## 2020-01-01 PROCEDURE — 80048 BASIC METABOLIC PNL TOTAL CA: CPT

## 2020-01-01 PROCEDURE — 84100 ASSAY OF PHOSPHORUS: CPT

## 2020-01-01 PROCEDURE — 74011250636 HC RX REV CODE- 250/636: Performed by: PHYSICIAN ASSISTANT

## 2020-01-01 PROCEDURE — 85610 PROTHROMBIN TIME: CPT

## 2020-01-01 PROCEDURE — 82803 BLOOD GASES ANY COMBINATION: CPT

## 2020-01-01 PROCEDURE — 84300 ASSAY OF URINE SODIUM: CPT

## 2020-01-01 PROCEDURE — 74018 RADEX ABDOMEN 1 VIEW: CPT

## 2020-01-01 PROCEDURE — 74011250637 HC RX REV CODE- 250/637: Performed by: FAMILY MEDICINE

## 2020-01-01 PROCEDURE — 92610 EVALUATE SWALLOWING FUNCTION: CPT

## 2020-01-01 PROCEDURE — 83880 ASSAY OF NATRIURETIC PEPTIDE: CPT

## 2020-01-01 PROCEDURE — 94640 AIRWAY INHALATION TREATMENT: CPT

## 2020-01-01 PROCEDURE — C9113 INJ PANTOPRAZOLE SODIUM, VIA: HCPCS | Performed by: FAMILY MEDICINE

## 2020-01-01 PROCEDURE — 93005 ELECTROCARDIOGRAM TRACING: CPT

## 2020-01-01 PROCEDURE — 74011250636 HC RX REV CODE- 250/636

## 2020-01-01 PROCEDURE — 81003 URINALYSIS AUTO W/O SCOPE: CPT

## 2020-01-01 PROCEDURE — P9047 ALBUMIN (HUMAN), 25%, 50ML: HCPCS | Performed by: INTERNAL MEDICINE

## 2020-01-01 PROCEDURE — 77030018836 HC SOL IRR NACL ICUM -A: Performed by: OTOLARYNGOLOGY

## 2020-01-01 PROCEDURE — B246ZZ4 ULTRASONOGRAPHY OF RIGHT AND LEFT HEART, TRANSESOPHAGEAL: ICD-10-PCS | Performed by: INTERNAL MEDICINE

## 2020-01-01 PROCEDURE — B543ZZA ULTRASONOGRAPHY OF RIGHT JUGULAR VEINS, GUIDANCE: ICD-10-PCS | Performed by: INTERNAL MEDICINE

## 2020-01-01 PROCEDURE — 77030040361 HC SLV COMPR DVT MDII -B

## 2020-01-01 PROCEDURE — 74011636637 HC RX REV CODE- 636/637: Performed by: INTERNAL MEDICINE

## 2020-01-01 PROCEDURE — 36591 DRAW BLOOD OFF VENOUS DEVICE: CPT

## 2020-01-01 PROCEDURE — 93306 TTE W/DOPPLER COMPLETE: CPT

## 2020-01-01 PROCEDURE — 97167 OT EVAL HIGH COMPLEX 60 MIN: CPT

## 2020-01-01 PROCEDURE — 77030035694 HC MSK BIPAP FLL FAC PERFMAX PHIL -B

## 2020-01-01 PROCEDURE — 77030018798 HC PMP KT ENTRL FED COVD -A

## 2020-01-01 PROCEDURE — 76450000000

## 2020-01-01 PROCEDURE — 77030040361 HC SLV COMPR DVT MDII -B: Performed by: OTOLARYNGOLOGY

## 2020-01-01 PROCEDURE — 83605 ASSAY OF LACTIC ACID: CPT

## 2020-01-01 PROCEDURE — 99285 EMERGENCY DEPT VISIT HI MDM: CPT

## 2020-01-01 PROCEDURE — 87186 SC STD MICRODIL/AGAR DIL: CPT

## 2020-01-01 PROCEDURE — 85652 RBC SED RATE AUTOMATED: CPT

## 2020-01-01 PROCEDURE — 81001 URINALYSIS AUTO W/SCOPE: CPT

## 2020-01-01 PROCEDURE — 92526 ORAL FUNCTION THERAPY: CPT

## 2020-01-01 PROCEDURE — 36415 COLL VENOUS BLD VENIPUNCTURE: CPT

## 2020-01-01 PROCEDURE — 31500 INSERT EMERGENCY AIRWAY: CPT

## 2020-01-01 PROCEDURE — 82550 ASSAY OF CK (CPK): CPT

## 2020-01-01 PROCEDURE — 94002 VENT MGMT INPAT INIT DAY: CPT

## 2020-01-01 PROCEDURE — 77030020010 HC FCPS BPLR DISP INLC -E: Performed by: OTOLARYNGOLOGY

## 2020-01-01 PROCEDURE — 03HY32Z INSERTION OF MONITORING DEVICE INTO UPPER ARTERY, PERCUTANEOUS APPROACH: ICD-10-PCS | Performed by: SURGERY

## 2020-01-01 PROCEDURE — 77030018846 HC SOL IRR STRL H20 ICUM -A

## 2020-01-01 PROCEDURE — 80202 ASSAY OF VANCOMYCIN: CPT

## 2020-01-01 PROCEDURE — 87077 CULTURE AEROBIC IDENTIFY: CPT

## 2020-01-01 PROCEDURE — 97530 THERAPEUTIC ACTIVITIES: CPT

## 2020-01-01 PROCEDURE — 83615 LACTATE (LD) (LDH) ENZYME: CPT

## 2020-01-01 PROCEDURE — 02HV33Z INSERTION OF INFUSION DEVICE INTO SUPERIOR VENA CAVA, PERCUTANEOUS APPROACH: ICD-10-PCS | Performed by: FAMILY MEDICINE

## 2020-01-01 PROCEDURE — 77030020291 HC FLEXSEAL FMS BMS -C

## 2020-01-01 PROCEDURE — 77030019563 HC DEV ATTCH FEED HOLL -A

## 2020-01-01 PROCEDURE — 82542 COL CHROMOTOGRAPHY QUAL/QUAN: CPT

## 2020-01-01 PROCEDURE — 74011000258 HC RX REV CODE- 258: Performed by: PHYSICIAN ASSISTANT

## 2020-01-01 PROCEDURE — 76060000033 HC ANESTHESIA 1 TO 1.5 HR: Performed by: OTOLARYNGOLOGY

## 2020-01-01 PROCEDURE — 97163 PT EVAL HIGH COMPLEX 45 MIN: CPT

## 2020-01-01 PROCEDURE — 87070 CULTURE OTHR SPECIMN AEROBIC: CPT

## 2020-01-01 PROCEDURE — 85379 FIBRIN DEGRADATION QUANT: CPT

## 2020-01-01 PROCEDURE — 74011636320 HC RX REV CODE- 636/320: Performed by: FAMILY MEDICINE

## 2020-01-01 PROCEDURE — 97110 THERAPEUTIC EXERCISES: CPT

## 2020-01-01 PROCEDURE — 77030018626 HC TU TRACH CUF3 COVD -B: Performed by: OTOLARYNGOLOGY

## 2020-01-01 PROCEDURE — 87040 BLOOD CULTURE FOR BACTERIA: CPT

## 2020-01-01 PROCEDURE — 74011000250 HC RX REV CODE- 250: Performed by: NURSE ANESTHETIST, CERTIFIED REGISTERED

## 2020-01-01 PROCEDURE — 87635 SARS-COV-2 COVID-19 AMP PRB: CPT

## 2020-01-01 PROCEDURE — 77030018836 HC SOL IRR NACL ICUM -A

## 2020-01-01 PROCEDURE — 86022 PLATELET ANTIBODIES: CPT

## 2020-01-01 PROCEDURE — B54MZZA ULTRASONOGRAPHY OF RIGHT UPPER EXTREMITY VEINS, GUIDANCE: ICD-10-PCS | Performed by: SURGERY

## 2020-01-01 PROCEDURE — 87086 URINE CULTURE/COLONY COUNT: CPT

## 2020-01-01 PROCEDURE — 5A1955Z RESPIRATORY VENTILATION, GREATER THAN 96 CONSECUTIVE HOURS: ICD-10-PCS | Performed by: INTERNAL MEDICINE

## 2020-01-01 PROCEDURE — 94660 CPAP INITIATION&MGMT: CPT

## 2020-01-01 PROCEDURE — 0B113F4 BYPASS TRACHEA TO CUTANEOUS WITH TRACHEOSTOMY DEVICE, PERCUTANEOUS APPROACH: ICD-10-PCS | Performed by: OTOLARYNGOLOGY

## 2020-01-01 PROCEDURE — 74011000250 HC RX REV CODE- 250: Performed by: OTOLARYNGOLOGY

## 2020-01-01 PROCEDURE — 76010000149 HC OR TIME 1 TO 1.5 HR: Performed by: OTOLARYNGOLOGY

## 2020-01-01 PROCEDURE — 74011250637 HC RX REV CODE- 250/637: Performed by: NURSE PRACTITIONER

## 2020-01-01 PROCEDURE — 92950 HEART/LUNG RESUSCITATION CPR: CPT

## 2020-01-01 PROCEDURE — 36556 INSERT NON-TUNNEL CV CATH: CPT

## 2020-01-01 PROCEDURE — A4217 STERILE WATER/SALINE, 500 ML: HCPCS

## 2020-01-01 PROCEDURE — 99291 CRITICAL CARE FIRST HOUR: CPT

## 2020-01-01 PROCEDURE — B54NZZA ULTRASONOGRAPHY OF LEFT UPPER EXTREMITY VEINS, GUIDANCE: ICD-10-PCS | Performed by: SURGERY

## 2020-01-01 PROCEDURE — 74011000250 HC RX REV CODE- 250: Performed by: ANESTHESIOLOGY

## 2020-01-01 PROCEDURE — 93970 EXTREMITY STUDY: CPT

## 2020-01-01 PROCEDURE — 74011250636 HC RX REV CODE- 250/636: Performed by: NURSE PRACTITIONER

## 2020-01-01 PROCEDURE — 05HM33Z INSERTION OF INFUSION DEVICE INTO RIGHT INTERNAL JUGULAR VEIN, PERCUTANEOUS APPROACH: ICD-10-PCS | Performed by: INTERNAL MEDICINE

## 2020-01-01 PROCEDURE — 74011000250 HC RX REV CODE- 250: Performed by: NURSE PRACTITIONER

## 2020-01-01 PROCEDURE — 82728 ASSAY OF FERRITIN: CPT

## 2020-01-01 PROCEDURE — C1751 CATH, INF, PER/CENT/MIDLINE: HCPCS

## 2020-01-01 PROCEDURE — 97535 SELF CARE MNGMENT TRAINING: CPT

## 2020-01-01 PROCEDURE — 5A09457 ASSISTANCE WITH RESPIRATORY VENTILATION, 24-96 CONSECUTIVE HOURS, CONTINUOUS POSITIVE AIRWAY PRESSURE: ICD-10-PCS | Performed by: INTERNAL MEDICINE

## 2020-01-01 PROCEDURE — 84156 ASSAY OF PROTEIN URINE: CPT

## 2020-01-01 PROCEDURE — 84443 ASSAY THYROID STIM HORMONE: CPT

## 2020-01-01 PROCEDURE — 94762 N-INVAS EAR/PLS OXIMTRY CONT: CPT

## 2020-01-01 PROCEDURE — 36592 COLLECT BLOOD FROM PICC: CPT

## 2020-01-01 PROCEDURE — 77030040831 HC BAG URINE DRNG MDII -A

## 2020-01-01 PROCEDURE — 93312 ECHO TRANSESOPHAGEAL: CPT

## 2020-01-01 PROCEDURE — 74011250636 HC RX REV CODE- 250/636: Performed by: ANESTHESIOLOGY

## 2020-01-01 PROCEDURE — 74011250636 HC RX REV CODE- 250/636: Performed by: NURSE ANESTHETIST, CERTIFIED REGISTERED

## 2020-01-01 PROCEDURE — 77030005402 HC CATH RAD ART LN KT TELE -B

## 2020-01-01 PROCEDURE — 77030002996 HC SUT SLK J&J -A: Performed by: OTOLARYNGOLOGY

## 2020-01-01 PROCEDURE — 92597 ORAL SPEECH DEVICE EVAL: CPT

## 2020-01-01 PROCEDURE — 96374 THER/PROPH/DIAG INJ IV PUSH: CPT

## 2020-01-01 PROCEDURE — 82570 ASSAY OF URINE CREATININE: CPT

## 2020-01-01 PROCEDURE — 77030018390 HC SPNG HEMSTAT2 J&J -B: Performed by: OTOLARYNGOLOGY

## 2020-01-01 PROCEDURE — 74011250636 HC RX REV CODE- 250/636: Performed by: EMERGENCY MEDICINE

## 2020-01-01 PROCEDURE — 0BH17EZ INSERTION OF ENDOTRACHEAL AIRWAY INTO TRACHEA, VIA NATURAL OR ARTIFICIAL OPENING: ICD-10-PCS | Performed by: INTERNAL MEDICINE

## 2020-01-01 DEVICE — TRACHEOSTOMY TUBE XLT CUFFED,PROXIMAL WITH DISPOSABLE INNER CANNULA
Type: IMPLANTABLE DEVICE | Site: NECK | Status: FUNCTIONAL
Brand: SHILEY

## 2020-01-01 RX ORDER — MIDAZOLAM HYDROCHLORIDE 1 MG/ML
INJECTION, SOLUTION INTRAMUSCULAR; INTRAVENOUS
Status: COMPLETED
Start: 2020-01-01 | End: 2020-01-01

## 2020-01-01 RX ORDER — FENTANYL CITRATE 50 UG/ML
INJECTION, SOLUTION INTRAMUSCULAR; INTRAVENOUS AS NEEDED
Status: DISCONTINUED | OUTPATIENT
Start: 2020-01-01 | End: 2020-01-01 | Stop reason: HOSPADM

## 2020-01-01 RX ORDER — GUAIFENESIN 100 MG/5ML
81 LIQUID (ML) ORAL DAILY
Qty: 30 TAB | Refills: 0 | Status: SHIPPED
Start: 2020-01-01

## 2020-01-01 RX ORDER — SCOLOPAMINE TRANSDERMAL SYSTEM 1 MG/1
1 PATCH, EXTENDED RELEASE TRANSDERMAL
Status: DISCONTINUED | OUTPATIENT
Start: 2020-01-01 | End: 2020-01-01

## 2020-01-01 RX ORDER — ARFORMOTEROL TARTRATE 15 UG/2ML
15 SOLUTION RESPIRATORY (INHALATION) 2 TIMES DAILY
Qty: 1 VIAL | Refills: 0 | Status: SHIPPED
Start: 2020-01-01

## 2020-01-01 RX ORDER — BUDESONIDE 0.5 MG/2ML
500 INHALANT ORAL
Status: DISCONTINUED | OUTPATIENT
Start: 2020-01-01 | End: 2020-01-01 | Stop reason: HOSPADM

## 2020-01-01 RX ORDER — FENTANYL 50 UG/1
1 PATCH TRANSDERMAL
Status: DISCONTINUED | OUTPATIENT
Start: 2020-01-01 | End: 2020-01-01

## 2020-01-01 RX ORDER — SODIUM,POTASSIUM PHOSPHATES 280-250MG
2 POWDER IN PACKET (EA) ORAL
Status: COMPLETED | OUTPATIENT
Start: 2020-01-01 | End: 2020-01-01

## 2020-01-01 RX ORDER — DOCUSATE SODIUM 100 MG/1
100 CAPSULE, LIQUID FILLED ORAL 2 TIMES DAILY
Status: DISCONTINUED | OUTPATIENT
Start: 2020-01-01 | End: 2020-01-01 | Stop reason: HOSPADM

## 2020-01-01 RX ORDER — MIDAZOLAM IN 0.9 % SOD.CHLORID 1 MG/ML
0-5 PLASTIC BAG, INJECTION (ML) INTRAVENOUS
Status: DISCONTINUED | OUTPATIENT
Start: 2020-01-01 | End: 2020-01-01

## 2020-01-01 RX ORDER — FUROSEMIDE 10 MG/ML
20 INJECTION INTRAMUSCULAR; INTRAVENOUS ONCE
Status: COMPLETED | OUTPATIENT
Start: 2020-01-01 | End: 2020-01-01

## 2020-01-01 RX ORDER — GUAIFENESIN 100 MG/5ML
81 LIQUID (ML) ORAL DAILY
Status: DISCONTINUED | OUTPATIENT
Start: 2020-01-01 | End: 2020-01-01 | Stop reason: HOSPADM

## 2020-01-01 RX ORDER — CISATRACURIUM BESYLATE 2 MG/ML
0.03 INJECTION, SOLUTION INTRAVENOUS ONCE
Status: COMPLETED | OUTPATIENT
Start: 2020-01-01 | End: 2020-01-01

## 2020-01-01 RX ORDER — DOCUSATE SODIUM 100 MG/1
100 CAPSULE, LIQUID FILLED ORAL 2 TIMES DAILY
Qty: 60 CAP | Refills: 2 | Status: SHIPPED | OUTPATIENT
Start: 2020-01-01 | End: 2020-08-22

## 2020-01-01 RX ORDER — POLYETHYLENE GLYCOL 3350 17 G/17G
17 POWDER, FOR SOLUTION ORAL
Qty: 1 PACKET | Refills: 1 | Status: SHIPPED
Start: 2020-01-01 | End: 2020-01-01

## 2020-01-01 RX ORDER — ALBUMIN HUMAN 250 G/1000ML
25 SOLUTION INTRAVENOUS EVERY 6 HOURS
Status: DISCONTINUED | OUTPATIENT
Start: 2020-01-01 | End: 2020-01-01

## 2020-01-01 RX ORDER — LORAZEPAM 2 MG/ML
1 INJECTION INTRAMUSCULAR
Status: DISCONTINUED | OUTPATIENT
Start: 2020-01-01 | End: 2020-01-01

## 2020-01-01 RX ORDER — AMLODIPINE BESYLATE 10 MG/1
10 TABLET ORAL DAILY
Qty: 30 TAB | Refills: 1 | Status: SHIPPED | OUTPATIENT
Start: 2020-01-01

## 2020-01-01 RX ORDER — PANTOPRAZOLE SODIUM 40 MG/1
40 TABLET, DELAYED RELEASE ORAL DAILY
Qty: 30 TAB | Refills: 0 | Status: SHIPPED | OUTPATIENT
Start: 2020-01-01 | End: 2020-06-23

## 2020-01-01 RX ORDER — CALCIUM GLUCONATE 20 MG/ML
2 INJECTION, SOLUTION INTRAVENOUS ONCE
Status: COMPLETED | OUTPATIENT
Start: 2020-01-01 | End: 2020-01-01

## 2020-01-01 RX ORDER — METOPROLOL SUCCINATE 25 MG/1
25 TABLET, EXTENDED RELEASE ORAL DAILY
Status: DISCONTINUED | OUTPATIENT
Start: 2020-01-01 | End: 2020-01-01

## 2020-01-01 RX ORDER — HEPARIN SODIUM 1000 [USP'U]/ML
10000 INJECTION, SOLUTION INTRAVENOUS; SUBCUTANEOUS ONCE
Status: COMPLETED | OUTPATIENT
Start: 2020-01-01 | End: 2020-01-01

## 2020-01-01 RX ORDER — FENTANYL CITRATE 50 UG/ML
25-50 INJECTION, SOLUTION INTRAMUSCULAR; INTRAVENOUS
Status: COMPLETED | OUTPATIENT
Start: 2020-01-01 | End: 2020-01-01

## 2020-01-01 RX ORDER — BUMETANIDE 0.25 MG/ML
1 INJECTION INTRAMUSCULAR; INTRAVENOUS DAILY
Status: DISCONTINUED | OUTPATIENT
Start: 2020-01-01 | End: 2020-01-01

## 2020-01-01 RX ORDER — PROPOFOL 10 MG/ML
INJECTION, EMULSION INTRAVENOUS AS NEEDED
Status: DISCONTINUED | OUTPATIENT
Start: 2020-01-01 | End: 2020-01-01 | Stop reason: HOSPADM

## 2020-01-01 RX ORDER — MAGNESIUM SULFATE HEPTAHYDRATE 40 MG/ML
2 INJECTION, SOLUTION INTRAVENOUS ONCE
Status: COMPLETED | OUTPATIENT
Start: 2020-01-01 | End: 2020-01-01

## 2020-01-01 RX ORDER — POTASSIUM CHLORIDE 20 MEQ/1
20 TABLET, EXTENDED RELEASE ORAL ONCE
Status: COMPLETED | OUTPATIENT
Start: 2020-01-01 | End: 2020-01-01

## 2020-01-01 RX ORDER — BUMETANIDE 0.25 MG/ML
1 INJECTION INTRAMUSCULAR; INTRAVENOUS 2 TIMES DAILY
Status: DISCONTINUED | OUTPATIENT
Start: 2020-01-01 | End: 2020-01-01

## 2020-01-01 RX ORDER — SODIUM,POTASSIUM PHOSPHATES 280-250MG
2 POWDER IN PACKET (EA) ORAL ONCE
Status: COMPLETED | OUTPATIENT
Start: 2020-01-01 | End: 2020-01-01

## 2020-01-01 RX ORDER — HYDRALAZINE HYDROCHLORIDE 20 MG/ML
20 INJECTION INTRAMUSCULAR; INTRAVENOUS
Qty: 1 VIAL | Refills: 0 | Status: SHIPPED
Start: 2020-01-01

## 2020-01-01 RX ORDER — SODIUM CHLORIDE 0.9 % (FLUSH) 0.9 %
5-40 SYRINGE (ML) INJECTION EVERY 8 HOURS
Status: DISCONTINUED | OUTPATIENT
Start: 2020-01-01 | End: 2020-01-01 | Stop reason: HOSPADM

## 2020-01-01 RX ORDER — LIDOCAINE HYDROCHLORIDE 10 MG/ML
1-20 INJECTION INFILTRATION; PERINEURAL
Status: COMPLETED | OUTPATIENT
Start: 2020-01-01 | End: 2020-01-01

## 2020-01-01 RX ORDER — BUMETANIDE 0.25 MG/ML
1 INJECTION INTRAMUSCULAR; INTRAVENOUS ONCE
Status: COMPLETED | OUTPATIENT
Start: 2020-01-01 | End: 2020-01-01

## 2020-01-01 RX ORDER — BUMETANIDE 0.25 MG/ML
1 INJECTION INTRAMUSCULAR; INTRAVENOUS 3 TIMES DAILY
Status: DISCONTINUED | OUTPATIENT
Start: 2020-01-01 | End: 2020-01-01

## 2020-01-01 RX ORDER — FUROSEMIDE 10 MG/ML
80 INJECTION INTRAMUSCULAR; INTRAVENOUS
Status: COMPLETED | OUTPATIENT
Start: 2020-01-01 | End: 2020-01-01

## 2020-01-01 RX ORDER — VANCOMYCIN 2 GRAM/500 ML IN 0.9 % SODIUM CHLORIDE INTRAVENOUS
2000 ONCE
Status: COMPLETED | OUTPATIENT
Start: 2020-01-01 | End: 2020-01-01

## 2020-01-01 RX ORDER — HEPARIN SODIUM 10000 [USP'U]/100ML
13.8-36 INJECTION, SOLUTION INTRAVENOUS
Status: DISCONTINUED | OUTPATIENT
Start: 2020-01-01 | End: 2020-01-01

## 2020-01-01 RX ORDER — MIDAZOLAM IN 0.9 % SOD.CHLORID 1 MG/ML
1-7 PLASTIC BAG, INJECTION (ML) INTRAVENOUS
Status: DISCONTINUED | OUTPATIENT
Start: 2020-01-01 | End: 2020-01-01 | Stop reason: SDUPTHER

## 2020-01-01 RX ORDER — POTASSIUM CHLORIDE 7.45 MG/ML
10 INJECTION INTRAVENOUS
Status: COMPLETED | OUTPATIENT
Start: 2020-01-01 | End: 2020-01-01

## 2020-01-01 RX ORDER — ACETAMINOPHEN 10 MG/ML
1000 INJECTION, SOLUTION INTRAVENOUS ONCE
Status: COMPLETED | OUTPATIENT
Start: 2020-01-01 | End: 2020-01-01

## 2020-01-01 RX ORDER — IPRATROPIUM BROMIDE 0.5 MG/2.5ML
0.5 SOLUTION RESPIRATORY (INHALATION)
Qty: 1 VIAL | Refills: 0 | Status: SHIPPED
Start: 2020-01-01

## 2020-01-01 RX ORDER — ASPIRIN 325 MG
325 TABLET ORAL DAILY
Status: DISCONTINUED | OUTPATIENT
Start: 2020-01-01 | End: 2020-01-01

## 2020-01-01 RX ORDER — AMIODARONE HYDROCHLORIDE 200 MG/1
200 TABLET ORAL DAILY
Status: DISCONTINUED | OUTPATIENT
Start: 2020-01-01 | End: 2020-01-01

## 2020-01-01 RX ORDER — LOSARTAN POTASSIUM 25 MG/1
25 TABLET ORAL DAILY
Status: DISCONTINUED | OUTPATIENT
Start: 2020-01-01 | End: 2020-01-01

## 2020-01-01 RX ORDER — CALCIUM GLUCONATE 20 MG/ML
2 INJECTION, SOLUTION INTRAVENOUS ONCE
Status: ACTIVE | OUTPATIENT
Start: 2020-01-01 | End: 2020-01-01

## 2020-01-01 RX ORDER — SUCCINYLCHOLINE CHLORIDE 20 MG/ML
INJECTION INTRAMUSCULAR; INTRAVENOUS
Status: DISCONTINUED
Start: 2020-01-01 | End: 2020-05-26 | Stop reason: HOSPADM

## 2020-01-01 RX ORDER — ARFORMOTEROL TARTRATE 15 UG/2ML
15 SOLUTION RESPIRATORY (INHALATION)
Status: DISCONTINUED | OUTPATIENT
Start: 2020-01-01 | End: 2020-01-01 | Stop reason: HOSPADM

## 2020-01-01 RX ORDER — FUROSEMIDE 10 MG/ML
20 INJECTION INTRAMUSCULAR; INTRAVENOUS 2 TIMES DAILY
Status: DISCONTINUED | OUTPATIENT
Start: 2020-01-01 | End: 2020-01-01

## 2020-01-01 RX ORDER — ALBUMIN HUMAN 250 G/1000ML
50 SOLUTION INTRAVENOUS ONCE
Status: COMPLETED | OUTPATIENT
Start: 2020-01-01 | End: 2020-01-01

## 2020-01-01 RX ORDER — ACETAZOLAMIDE 250 MG/1
250 TABLET ORAL 2 TIMES DAILY
Status: COMPLETED | OUTPATIENT
Start: 2020-01-01 | End: 2020-01-01

## 2020-01-01 RX ORDER — FUROSEMIDE 10 MG/ML
40 INJECTION INTRAMUSCULAR; INTRAVENOUS 2 TIMES DAILY
Status: DISCONTINUED | OUTPATIENT
Start: 2020-01-01 | End: 2020-01-01

## 2020-01-01 RX ORDER — ATORVASTATIN CALCIUM 20 MG/1
40 TABLET, FILM COATED ORAL DAILY
Status: DISCONTINUED | OUTPATIENT
Start: 2020-01-01 | End: 2020-01-01

## 2020-01-01 RX ORDER — MIDAZOLAM HYDROCHLORIDE 1 MG/ML
1 INJECTION, SOLUTION INTRAMUSCULAR; INTRAVENOUS
Status: COMPLETED | OUTPATIENT
Start: 2020-01-01 | End: 2020-01-01

## 2020-01-01 RX ORDER — LIDOCAINE HYDROCHLORIDE AND EPINEPHRINE 10; 10 MG/ML; UG/ML
INJECTION, SOLUTION INFILTRATION; PERINEURAL AS NEEDED
Status: DISCONTINUED | OUTPATIENT
Start: 2020-01-01 | End: 2020-01-01 | Stop reason: HOSPADM

## 2020-01-01 RX ORDER — HYDRALAZINE HYDROCHLORIDE 20 MG/ML
20 INJECTION INTRAMUSCULAR; INTRAVENOUS
Status: DISCONTINUED | OUTPATIENT
Start: 2020-01-01 | End: 2020-01-01 | Stop reason: HOSPADM

## 2020-01-01 RX ORDER — BUDESONIDE 0.5 MG/2ML
500 INHALANT ORAL 2 TIMES DAILY
Qty: 1 ML | Refills: 1 | Status: SHIPPED | OUTPATIENT
Start: 2020-01-01

## 2020-01-01 RX ORDER — POTASSIUM CHLORIDE 7.45 MG/ML
10 INJECTION INTRAVENOUS
Status: DISCONTINUED | OUTPATIENT
Start: 2020-01-01 | End: 2020-01-01

## 2020-01-01 RX ORDER — GLYCOPYRROLATE 1 MG/1
1 TABLET ORAL 2 TIMES DAILY
Status: DISCONTINUED | OUTPATIENT
Start: 2020-01-01 | End: 2020-01-01

## 2020-01-01 RX ORDER — HEPARIN SODIUM 10000 [USP'U]/100ML
8-36 INJECTION, SOLUTION INTRAVENOUS
Status: DISCONTINUED | OUTPATIENT
Start: 2020-01-01 | End: 2020-01-01

## 2020-01-01 RX ORDER — CALCIUM GLUCONATE 94 MG/ML
INJECTION, SOLUTION INTRAVENOUS
Status: COMPLETED
Start: 2020-01-01 | End: 2020-01-01

## 2020-01-01 RX ORDER — HEPARIN SODIUM 1000 [USP'U]/ML
40 INJECTION, SOLUTION INTRAVENOUS; SUBCUTANEOUS ONCE
Status: COMPLETED | OUTPATIENT
Start: 2020-01-01 | End: 2020-01-01

## 2020-01-01 RX ORDER — CHLORHEXIDINE GLUCONATE 1.2 MG/ML
10 RINSE ORAL EVERY 12 HOURS
Status: DISCONTINUED | OUTPATIENT
Start: 2020-01-01 | End: 2020-01-01 | Stop reason: HOSPADM

## 2020-01-01 RX ORDER — FENTANYL CITRATE 50 UG/ML
INJECTION, SOLUTION INTRAMUSCULAR; INTRAVENOUS
Status: DISPENSED
Start: 2020-01-01 | End: 2020-01-01

## 2020-01-01 RX ORDER — HEPARIN SODIUM (PORCINE) LOCK FLUSH IV SOLN 100 UNIT/ML 100 UNIT/ML
500 SOLUTION INTRAVENOUS
Status: DISCONTINUED | OUTPATIENT
Start: 2020-01-01 | End: 2020-01-01 | Stop reason: HOSPADM

## 2020-01-01 RX ORDER — SODIUM CHLORIDE 9 MG/ML
15 INJECTION INTRAMUSCULAR; INTRAVENOUS; SUBCUTANEOUS ONCE
Status: COMPLETED | OUTPATIENT
Start: 2020-01-01 | End: 2020-01-01

## 2020-01-01 RX ORDER — MAGNESIUM SULFATE 1 G/100ML
1 INJECTION INTRAVENOUS ONCE
Status: COMPLETED | OUTPATIENT
Start: 2020-01-01 | End: 2020-01-01

## 2020-01-01 RX ORDER — FUROSEMIDE 10 MG/ML
INJECTION INTRAMUSCULAR; INTRAVENOUS
Status: COMPLETED
Start: 2020-01-01 | End: 2020-01-01

## 2020-01-01 RX ORDER — SODIUM CHLORIDE 9 MG/ML
INJECTION INTRAMUSCULAR; INTRAVENOUS; SUBCUTANEOUS
Status: DISPENSED
Start: 2020-01-01 | End: 2020-01-01

## 2020-01-01 RX ORDER — VANCOMYCIN 1.75 GRAM/500 ML IN 0.9 % SODIUM CHLORIDE INTRAVENOUS
1750 EVERY 8 HOURS
Status: DISCONTINUED | OUTPATIENT
Start: 2020-01-01 | End: 2020-01-01

## 2020-01-01 RX ORDER — CHLORHEXIDINE GLUCONATE 1.2 MG/ML
10 RINSE ORAL EVERY 12 HOURS
Qty: 280 ML | Refills: 0 | Status: SHIPPED | OUTPATIENT
Start: 2020-01-01 | End: 2020-06-07

## 2020-01-01 RX ORDER — SODIUM CHLORIDE 0.9 % (FLUSH) 0.9 %
5-40 SYRINGE (ML) INJECTION AS NEEDED
Status: DISCONTINUED | OUTPATIENT
Start: 2020-01-01 | End: 2020-01-01 | Stop reason: HOSPADM

## 2020-01-01 RX ORDER — EPHEDRINE SULFATE/0.9% NACL/PF 50 MG/5 ML
SYRINGE (ML) INTRAVENOUS AS NEEDED
Status: DISCONTINUED | OUTPATIENT
Start: 2020-01-01 | End: 2020-01-01 | Stop reason: HOSPADM

## 2020-01-01 RX ORDER — ACETAMINOPHEN 325 MG/1
650 TABLET ORAL
Status: DISCONTINUED | OUTPATIENT
Start: 2020-01-01 | End: 2020-01-01 | Stop reason: HOSPADM

## 2020-01-01 RX ORDER — IPRATROPIUM BROMIDE AND ALBUTEROL SULFATE 2.5; .5 MG/3ML; MG/3ML
3 SOLUTION RESPIRATORY (INHALATION)
Status: DISCONTINUED | OUTPATIENT
Start: 2020-01-01 | End: 2020-01-01 | Stop reason: HOSPADM

## 2020-01-01 RX ORDER — BUMETANIDE 0.25 MG/ML
1 INJECTION INTRAMUSCULAR; INTRAVENOUS DAILY
Status: DISCONTINUED | OUTPATIENT
Start: 2020-01-01 | End: 2020-01-01 | Stop reason: HOSPADM

## 2020-01-01 RX ORDER — AMLODIPINE BESYLATE 5 MG/1
10 TABLET ORAL DAILY
Status: DISCONTINUED | OUTPATIENT
Start: 2020-01-01 | End: 2020-01-01 | Stop reason: HOSPADM

## 2020-01-01 RX ORDER — POTASSIUM CHLORIDE 20 MEQ/1
40 TABLET, EXTENDED RELEASE ORAL ONCE
Status: COMPLETED | OUTPATIENT
Start: 2020-01-01 | End: 2020-01-01

## 2020-01-01 RX ORDER — POLYETHYLENE GLYCOL 3350 17 G/17G
17 POWDER, FOR SOLUTION ORAL 2 TIMES DAILY
Status: DISCONTINUED | OUTPATIENT
Start: 2020-01-01 | End: 2020-01-01 | Stop reason: HOSPADM

## 2020-01-01 RX ORDER — PROPOFOL 10 MG/ML
0-50 VIAL (ML) INTRAVENOUS
Status: DISCONTINUED | OUTPATIENT
Start: 2020-01-01 | End: 2020-01-01

## 2020-01-01 RX ORDER — PROPOFOL 10 MG/ML
INJECTION, EMULSION INTRAVENOUS
Status: COMPLETED
Start: 2020-01-01 | End: 2020-01-01

## 2020-01-01 RX ORDER — HEPARIN SODIUM 200 [USP'U]/100ML
500 INJECTION, SOLUTION INTRAVENOUS
Status: COMPLETED | OUTPATIENT
Start: 2020-01-01 | End: 2020-01-01

## 2020-01-01 RX ORDER — ROCURONIUM BROMIDE 10 MG/ML
INJECTION, SOLUTION INTRAVENOUS AS NEEDED
Status: DISCONTINUED | OUTPATIENT
Start: 2020-01-01 | End: 2020-01-01 | Stop reason: HOSPADM

## 2020-01-01 RX ORDER — ACETAMINOPHEN 325 MG/1
650 TABLET ORAL
Qty: 30 TAB | Refills: 1 | Status: SHIPPED
Start: 2020-01-01

## 2020-01-01 RX ORDER — FENTANYL CITRATE 50 UG/ML
50 INJECTION, SOLUTION INTRAMUSCULAR; INTRAVENOUS
Status: DISCONTINUED | OUTPATIENT
Start: 2020-01-01 | End: 2020-01-01 | Stop reason: HOSPADM

## 2020-01-01 RX ORDER — DEXTROSE MONOHYDRATE 50 MG/ML
75 INJECTION, SOLUTION INTRAVENOUS CONTINUOUS
Status: DISCONTINUED | OUTPATIENT
Start: 2020-01-01 | End: 2020-01-01

## 2020-01-01 RX ORDER — FENTANYL 75 UG/H
1 PATCH TRANSDERMAL
Status: DISCONTINUED | OUTPATIENT
Start: 2020-01-01 | End: 2020-01-01

## 2020-01-01 RX ORDER — ATROPINE SULFATE 0.1 MG/ML
INJECTION INTRAVENOUS
Status: DISCONTINUED
Start: 2020-01-01 | End: 2020-01-01 | Stop reason: WASHOUT

## 2020-01-01 RX ORDER — FUROSEMIDE 10 MG/ML
80 INJECTION INTRAMUSCULAR; INTRAVENOUS 2 TIMES DAILY
Status: DISCONTINUED | OUTPATIENT
Start: 2020-01-01 | End: 2020-01-01

## 2020-01-01 RX ORDER — PHENYLEPHRINE HCL IN 0.9% NACL 1 MG/10 ML
SYRINGE (ML) INTRAVENOUS AS NEEDED
Status: DISCONTINUED | OUTPATIENT
Start: 2020-01-01 | End: 2020-01-01 | Stop reason: HOSPADM

## 2020-01-01 RX ORDER — POTASSIUM CHLORIDE 20 MEQ/1
40 TABLET, EXTENDED RELEASE ORAL ONCE
Status: DISCONTINUED | OUTPATIENT
Start: 2020-01-01 | End: 2020-01-01

## 2020-01-01 RX ORDER — VANCOMYCIN 1.75 GRAM/500 ML IN 0.9 % SODIUM CHLORIDE INTRAVENOUS
1750 EVERY 12 HOURS
Status: DISCONTINUED | OUTPATIENT
Start: 2020-01-01 | End: 2020-01-01

## 2020-01-01 RX ORDER — PREDNISONE 10 MG/1
10 TABLET ORAL
Status: DISCONTINUED | OUTPATIENT
Start: 2020-01-01 | End: 2020-01-01

## 2020-01-01 RX ORDER — IPRATROPIUM BROMIDE 0.5 MG/2.5ML
0.5 SOLUTION RESPIRATORY (INHALATION)
Status: DISCONTINUED | OUTPATIENT
Start: 2020-01-01 | End: 2020-01-01 | Stop reason: HOSPADM

## 2020-01-01 RX ORDER — SUCCINYLCHOLINE CHLORIDE 100 MG/5ML
SYRINGE (ML) INTRAVENOUS AS NEEDED
Status: DISCONTINUED | OUTPATIENT
Start: 2020-01-01 | End: 2020-01-01

## 2020-01-01 RX ORDER — NOREPINEPHRINE BIT/0.9 % NACL 8 MG/250ML
.5-3 INFUSION BOTTLE (ML) INTRAVENOUS
Status: DISCONTINUED | OUTPATIENT
Start: 2020-01-01 | End: 2020-01-01

## 2020-01-01 RX ORDER — ALBUTEROL SULFATE 90 UG/1
2 AEROSOL, METERED RESPIRATORY (INHALATION)
Status: DISCONTINUED | OUTPATIENT
Start: 2020-01-01 | End: 2020-01-01

## 2020-01-01 RX ORDER — CIPROFLOXACIN 2 MG/ML
400 INJECTION, SOLUTION INTRAVENOUS EVERY 12 HOURS
Status: DISCONTINUED | OUTPATIENT
Start: 2020-01-01 | End: 2020-01-01 | Stop reason: HOSPADM

## 2020-01-01 RX ORDER — BUMETANIDE 0.25 MG/ML
1 INJECTION INTRAMUSCULAR; INTRAVENOUS DAILY
Qty: 1 VIAL | Refills: 0 | Status: SHIPPED
Start: 2020-01-01

## 2020-01-01 RX ORDER — ETOMIDATE 2 MG/ML
INJECTION INTRAVENOUS
Status: DISCONTINUED
Start: 2020-01-01 | End: 2020-01-01 | Stop reason: WASHOUT

## 2020-01-01 RX ORDER — IPRATROPIUM BROMIDE AND ALBUTEROL SULFATE 2.5; .5 MG/3ML; MG/3ML
3 SOLUTION RESPIRATORY (INHALATION)
Qty: 30 NEBULE | Refills: 1 | Status: SHIPPED
Start: 2020-01-01

## 2020-01-01 RX ORDER — SODIUM CHLORIDE, SODIUM LACTATE, POTASSIUM CHLORIDE, CALCIUM CHLORIDE 600; 310; 30; 20 MG/100ML; MG/100ML; MG/100ML; MG/100ML
INJECTION, SOLUTION INTRAVENOUS
Status: DISCONTINUED | OUTPATIENT
Start: 2020-01-01 | End: 2020-01-01 | Stop reason: HOSPADM

## 2020-01-01 RX ORDER — ALBUTEROL SULFATE 0.83 MG/ML
2.5 SOLUTION RESPIRATORY (INHALATION)
Status: DISCONTINUED | OUTPATIENT
Start: 2020-01-01 | End: 2020-01-01

## 2020-01-01 RX ORDER — FUROSEMIDE 10 MG/ML
60 INJECTION INTRAMUSCULAR; INTRAVENOUS 2 TIMES DAILY
Status: DISCONTINUED | OUTPATIENT
Start: 2020-01-01 | End: 2020-01-01

## 2020-01-01 RX ORDER — CIPROFLOXACIN 2 MG/ML
400 INJECTION, SOLUTION INTRAVENOUS EVERY 12 HOURS
Qty: 1 ML | Refills: 1 | Status: SHIPPED | OUTPATIENT
Start: 2020-01-01

## 2020-01-01 RX ORDER — HEPARIN SODIUM 200 [USP'U]/100ML
INJECTION, SOLUTION INTRAVENOUS
Status: COMPLETED
Start: 2020-01-01 | End: 2020-01-01

## 2020-01-01 RX ORDER — HEPARIN SODIUM (PORCINE) LOCK FLUSH IV SOLN 100 UNIT/ML 100 UNIT/ML
SOLUTION INTRAVENOUS
Status: COMPLETED
Start: 2020-01-01 | End: 2020-01-01

## 2020-01-01 RX ORDER — LORAZEPAM 2 MG/ML
2 INJECTION INTRAMUSCULAR
Status: DISCONTINUED | OUTPATIENT
Start: 2020-01-01 | End: 2020-01-01 | Stop reason: HOSPADM

## 2020-01-01 RX ORDER — MIDAZOLAM HYDROCHLORIDE 1 MG/ML
INJECTION, SOLUTION INTRAMUSCULAR; INTRAVENOUS
Status: DISCONTINUED
Start: 2020-01-01 | End: 2020-01-01 | Stop reason: WASHOUT

## 2020-01-01 RX ADMIN — DEXTROSE MONOHYDRATE 50 ML/HR: 5 INJECTION, SOLUTION INTRAVENOUS at 13:36

## 2020-01-01 RX ADMIN — Medication 100 MCG/HR: at 20:10

## 2020-01-01 RX ADMIN — Medication 4 MG/HR: at 21:02

## 2020-01-01 RX ADMIN — CISATRACURIUM BESYLATE 2 MCG/KG/MIN: 2 INJECTION, SOLUTION INTRAVENOUS at 07:22

## 2020-01-01 RX ADMIN — WATER 500 MG: 1 INJECTION INTRAMUSCULAR; INTRAVENOUS; SUBCUTANEOUS at 21:18

## 2020-01-01 RX ADMIN — DOXYCYCLINE 100 MG: 100 INJECTION, POWDER, LYOPHILIZED, FOR SOLUTION INTRAVENOUS at 22:26

## 2020-01-01 RX ADMIN — HEPARIN SODIUM 10000 UNITS: 1000 INJECTION INTRAVENOUS; SUBCUTANEOUS at 03:55

## 2020-01-01 RX ADMIN — FUROSEMIDE 40 MG: 10 INJECTION, SOLUTION INTRAMUSCULAR; INTRAVENOUS at 20:44

## 2020-01-01 RX ADMIN — POTASSIUM BICARBONATE 20 MEQ: 782 TABLET, EFFERVESCENT ORAL at 08:23

## 2020-01-01 RX ADMIN — FUROSEMIDE 8 MG/HR: 10 INJECTION, SOLUTION INTRAMUSCULAR; INTRAVENOUS at 15:18

## 2020-01-01 RX ADMIN — PIPERACILLIN AND TAZOBACTAM 4.5 G: 4; .5 INJECTION, POWDER, LYOPHILIZED, FOR SOLUTION INTRAVENOUS; PARENTERAL at 03:29

## 2020-01-01 RX ADMIN — APIXABAN 2.5 MG: 5 TABLET, FILM COATED ORAL at 10:47

## 2020-01-01 RX ADMIN — Medication 10 ML: at 21:19

## 2020-01-01 RX ADMIN — CISATRACURIUM BESYLATE 3 MCG/KG/MIN: 2 INJECTION, SOLUTION INTRAVENOUS at 17:19

## 2020-01-01 RX ADMIN — ALBUMIN (HUMAN) 25 G: 0.25 INJECTION, SOLUTION INTRAVENOUS at 16:09

## 2020-01-01 RX ADMIN — PIPERACILLIN AND TAZOBACTAM 3.38 G: 3; .375 INJECTION, POWDER, LYOPHILIZED, FOR SOLUTION INTRAVENOUS at 00:18

## 2020-01-01 RX ADMIN — METHYLPREDNISOLONE SODIUM SUCCINATE 20 MG: 40 INJECTION, POWDER, FOR SOLUTION INTRAMUSCULAR; INTRAVENOUS at 21:51

## 2020-01-01 RX ADMIN — BUMETANIDE 1 MG: 0.25 INJECTION INTRAMUSCULAR; INTRAVENOUS at 10:46

## 2020-01-01 RX ADMIN — ACETAMINOPHEN 650 MG: 325 TABLET ORAL at 02:11

## 2020-01-01 RX ADMIN — Medication 50 MCG/HR: at 14:37

## 2020-01-01 RX ADMIN — SODIUM CHLORIDE 250 ML: 900 INJECTION, SOLUTION INTRAVENOUS at 11:15

## 2020-01-01 RX ADMIN — MIDAZOLAM HYDROCHLORIDE 1 MG: 1 INJECTION, SOLUTION INTRAMUSCULAR; INTRAVENOUS at 12:13

## 2020-01-01 RX ADMIN — ALBUMIN (HUMAN) 25 G: 0.25 INJECTION, SOLUTION INTRAVENOUS at 03:37

## 2020-01-01 RX ADMIN — DOCUSATE SODIUM 100 MG: 100 CAPSULE, LIQUID FILLED ORAL at 10:32

## 2020-01-01 RX ADMIN — DOCUSATE SODIUM 100 MG: 100 CAPSULE, LIQUID FILLED ORAL at 10:36

## 2020-01-01 RX ADMIN — FENTANYL CITRATE 25 MCG: 50 INJECTION, SOLUTION INTRAMUSCULAR; INTRAVENOUS at 11:46

## 2020-01-01 RX ADMIN — ALBUMIN (HUMAN) 25 G: 0.25 INJECTION, SOLUTION INTRAVENOUS at 19:00

## 2020-01-01 RX ADMIN — ASPIRIN 81 MG 81 MG: 81 TABLET ORAL at 09:00

## 2020-01-01 RX ADMIN — FENTANYL CITRATE 50 MCG: 50 INJECTION INTRAMUSCULAR; INTRAVENOUS at 00:55

## 2020-01-01 RX ADMIN — Medication 9 MG/HR: at 00:09

## 2020-01-01 RX ADMIN — Medication 5 MG/HR: at 13:18

## 2020-01-01 RX ADMIN — Medication 4 MG/HR: at 01:04

## 2020-01-01 RX ADMIN — POLYETHYLENE GLYCOL 3350 17 G: 17 POWDER, FOR SOLUTION ORAL at 09:12

## 2020-01-01 RX ADMIN — ASPIRIN 81 MG 81 MG: 81 TABLET ORAL at 10:38

## 2020-01-01 RX ADMIN — AMLODIPINE BESYLATE 10 MG: 5 TABLET ORAL at 08:54

## 2020-01-01 RX ADMIN — FENTANYL CITRATE 50 MCG: 50 INJECTION INTRAMUSCULAR; INTRAVENOUS at 12:13

## 2020-01-01 RX ADMIN — ALBUMIN (HUMAN) 25 G: 0.25 INJECTION, SOLUTION INTRAVENOUS at 08:55

## 2020-01-01 RX ADMIN — FUROSEMIDE 8 MG/HR: 10 INJECTION, SOLUTION INTRAMUSCULAR; INTRAVENOUS at 13:00

## 2020-01-01 RX ADMIN — ALBUMIN (HUMAN) 25 G: 0.25 INJECTION, SOLUTION INTRAVENOUS at 11:51

## 2020-01-01 RX ADMIN — Medication 100 MCG/HR: at 17:13

## 2020-01-01 RX ADMIN — CISATRACURIUM BESYLATE 3 MCG/KG/MIN: 2 INJECTION, SOLUTION INTRAVENOUS at 09:50

## 2020-01-01 RX ADMIN — ALBUMIN (HUMAN) 25 G: 0.25 INJECTION, SOLUTION INTRAVENOUS at 05:30

## 2020-01-01 RX ADMIN — ACETAMINOPHEN 650 MG: 325 TABLET ORAL at 13:21

## 2020-01-01 RX ADMIN — METHYLPREDNISOLONE SODIUM SUCCINATE 40 MG: 40 INJECTION, POWDER, FOR SOLUTION INTRAMUSCULAR; INTRAVENOUS at 21:16

## 2020-01-01 RX ADMIN — CHLORHEXIDINE GLUCONATE 10 ML: 1.2 RINSE ORAL at 08:28

## 2020-01-01 RX ADMIN — ARFORMOTEROL TARTRATE 15 MCG: 15 SOLUTION RESPIRATORY (INHALATION) at 11:20

## 2020-01-01 RX ADMIN — Medication 5 MG/HR: at 09:29

## 2020-01-01 RX ADMIN — Medication 2 MCG/MIN: at 10:49

## 2020-01-01 RX ADMIN — METHYLPREDNISOLONE SODIUM SUCCINATE 20 MG: 40 INJECTION, POWDER, FOR SOLUTION INTRAMUSCULAR; INTRAVENOUS at 21:23

## 2020-01-01 RX ADMIN — PROPOFOL 20 MCG/KG/MIN: 10 INJECTION, EMULSION INTRAVENOUS at 06:02

## 2020-01-01 RX ADMIN — ALBUMIN (HUMAN) 25 G: 0.25 INJECTION, SOLUTION INTRAVENOUS at 04:46

## 2020-01-01 RX ADMIN — PIPERACILLIN AND TAZOBACTAM 4.5 G: 4; .5 INJECTION, POWDER, LYOPHILIZED, FOR SOLUTION INTRAVENOUS; PARENTERAL at 18:23

## 2020-01-01 RX ADMIN — DEXTROSE MONOHYDRATE 75 ML/HR: 5 INJECTION, SOLUTION INTRAVENOUS at 14:25

## 2020-01-01 RX ADMIN — CHLORHEXIDINE GLUCONATE 10 ML: 1.2 RINSE ORAL at 09:12

## 2020-01-01 RX ADMIN — FUROSEMIDE 4 MG/HR: 10 INJECTION, SOLUTION INTRAMUSCULAR; INTRAVENOUS at 17:55

## 2020-01-01 RX ADMIN — POTASSIUM CHLORIDE 10 MEQ: 7.46 INJECTION, SOLUTION INTRAVENOUS at 01:55

## 2020-01-01 RX ADMIN — PIPERACILLIN AND TAZOBACTAM 4.5 G: 4; .5 INJECTION, POWDER, LYOPHILIZED, FOR SOLUTION INTRAVENOUS; PARENTERAL at 11:25

## 2020-01-01 RX ADMIN — GLYCOPYRROLATE 1 MG: 1 TABLET ORAL at 10:46

## 2020-01-01 RX ADMIN — CHLORHEXIDINE GLUCONATE 10 ML: 1.2 RINSE ORAL at 21:52

## 2020-01-01 RX ADMIN — POTASSIUM CHLORIDE 20 MEQ: 1500 TABLET, EXTENDED RELEASE ORAL at 08:51

## 2020-01-01 RX ADMIN — ASPIRIN 81 MG 81 MG: 81 TABLET ORAL at 08:39

## 2020-01-01 RX ADMIN — FENTANYL CITRATE 50 MCG: 50 INJECTION, SOLUTION INTRAMUSCULAR; INTRAVENOUS at 11:52

## 2020-01-01 RX ADMIN — Medication 50 MCG/HR: at 12:52

## 2020-01-01 RX ADMIN — ALBUMIN (HUMAN) 25 G: 0.25 INJECTION, SOLUTION INTRAVENOUS at 21:05

## 2020-01-01 RX ADMIN — SODIUM CHLORIDE 40 MG: 9 INJECTION INTRAMUSCULAR; INTRAVENOUS; SUBCUTANEOUS at 09:36

## 2020-01-01 RX ADMIN — WATER 500 MG: 1 INJECTION INTRAMUSCULAR; INTRAVENOUS; SUBCUTANEOUS at 10:28

## 2020-01-01 RX ADMIN — ALBUMIN (HUMAN) 25 G: 0.25 INJECTION, SOLUTION INTRAVENOUS at 14:47

## 2020-01-01 RX ADMIN — POTASSIUM BICARBONATE 20 MEQ: 782 TABLET, EFFERVESCENT ORAL at 09:20

## 2020-01-01 RX ADMIN — Medication 10 ML: at 16:37

## 2020-01-01 RX ADMIN — CHLORHEXIDINE GLUCONATE 10 ML: 1.2 RINSE ORAL at 08:44

## 2020-01-01 RX ADMIN — Medication 10 ML: at 23:00

## 2020-01-01 RX ADMIN — Medication 100 MCG/HR: at 03:07

## 2020-01-01 RX ADMIN — POTASSIUM & SODIUM PHOSPHATES POWDER PACK 280-160-250 MG 2 PACKET: 280-160-250 PACK at 13:50

## 2020-01-01 RX ADMIN — METHYLPREDNISOLONE SODIUM SUCCINATE 40 MG: 40 INJECTION, POWDER, FOR SOLUTION INTRAMUSCULAR; INTRAVENOUS at 04:16

## 2020-01-01 RX ADMIN — CALCIUM GLUCONATE 2 G: 20 INJECTION, SOLUTION INTRAVENOUS at 15:31

## 2020-01-01 RX ADMIN — POTASSIUM BICARBONATE 40 MEQ: 782 TABLET, EFFERVESCENT ORAL at 06:52

## 2020-01-01 RX ADMIN — CHLORHEXIDINE GLUCONATE 10 ML: 1.2 RINSE ORAL at 10:56

## 2020-01-01 RX ADMIN — DOCUSATE SODIUM 100 MG: 100 CAPSULE, LIQUID FILLED ORAL at 20:21

## 2020-01-01 RX ADMIN — CHLORHEXIDINE GLUCONATE 10 ML: 1.2 RINSE ORAL at 09:48

## 2020-01-01 RX ADMIN — ALBUMIN (HUMAN) 25 G: 0.25 INJECTION, SOLUTION INTRAVENOUS at 14:39

## 2020-01-01 RX ADMIN — CALCIUM GLUCONATE 2 G: 20 INJECTION, SOLUTION INTRAVENOUS at 09:20

## 2020-01-01 RX ADMIN — Medication 10 ML: at 21:41

## 2020-01-01 RX ADMIN — CISATRACURIUM BESYLATE 3 MCG/KG/MIN: 2 INJECTION, SOLUTION INTRAVENOUS at 19:25

## 2020-01-01 RX ADMIN — PROPOFOL 200 MG: 10 INJECTION, EMULSION INTRAVENOUS at 07:45

## 2020-01-01 RX ADMIN — LORAZEPAM 2 MG: 2 INJECTION INTRAMUSCULAR at 08:56

## 2020-01-01 RX ADMIN — METHYLPREDNISOLONE SODIUM SUCCINATE 20 MG: 40 INJECTION, POWDER, FOR SOLUTION INTRAMUSCULAR; INTRAVENOUS at 08:54

## 2020-01-01 RX ADMIN — CHLORHEXIDINE GLUCONATE 10 ML: 1.2 RINSE ORAL at 22:28

## 2020-01-01 RX ADMIN — Medication 50 MCG/HR: at 08:09

## 2020-01-01 RX ADMIN — ASPIRIN 81 MG 81 MG: 81 TABLET ORAL at 09:12

## 2020-01-01 RX ADMIN — DOCUSATE SODIUM 100 MG: 100 CAPSULE, LIQUID FILLED ORAL at 21:04

## 2020-01-01 RX ADMIN — Medication 10 ML: at 06:18

## 2020-01-01 RX ADMIN — HYDRALAZINE HYDROCHLORIDE 20 MG: 20 INJECTION INTRAMUSCULAR; INTRAVENOUS at 08:41

## 2020-01-01 RX ADMIN — CISATRACURIUM BESYLATE 4 MCG/KG/MIN: 2 INJECTION, SOLUTION INTRAVENOUS at 04:15

## 2020-01-01 RX ADMIN — DOCUSATE SODIUM 100 MG: 100 CAPSULE, LIQUID FILLED ORAL at 20:41

## 2020-01-01 RX ADMIN — BUDESONIDE 500 MCG: 0.5 INHALANT RESPIRATORY (INHALATION) at 11:20

## 2020-01-01 RX ADMIN — SODIUM CHLORIDE 40 MG: 9 INJECTION INTRAMUSCULAR; INTRAVENOUS; SUBCUTANEOUS at 08:51

## 2020-01-01 RX ADMIN — Medication 150 MCG/HR: at 12:34

## 2020-01-01 RX ADMIN — DEXTROSE MONOHYDRATE 75 ML/HR: 5 INJECTION, SOLUTION INTRAVENOUS at 09:26

## 2020-01-01 RX ADMIN — Medication 10 ML: at 15:55

## 2020-01-01 RX ADMIN — ALBUMIN (HUMAN) 25 G: 0.25 INJECTION, SOLUTION INTRAVENOUS at 08:32

## 2020-01-01 RX ADMIN — CISATRACURIUM BESYLATE 4 MCG/KG/MIN: 2 INJECTION, SOLUTION INTRAVENOUS at 22:26

## 2020-01-01 RX ADMIN — METHYLPREDNISOLONE SODIUM SUCCINATE 40 MG: 40 INJECTION, POWDER, FOR SOLUTION INTRAMUSCULAR; INTRAVENOUS at 22:03

## 2020-01-01 RX ADMIN — Medication 50 MCG/HR: at 12:06

## 2020-01-01 RX ADMIN — CISATRACURIUM BESYLATE 4 MCG/KG/MIN: 2 INJECTION, SOLUTION INTRAVENOUS at 05:34

## 2020-01-01 RX ADMIN — Medication 10 ML: at 15:45

## 2020-01-01 RX ADMIN — ALBUMIN (HUMAN) 25 G: 0.25 INJECTION, SOLUTION INTRAVENOUS at 03:47

## 2020-01-01 RX ADMIN — PIPERACILLIN AND TAZOBACTAM 4.5 G: 4; .5 INJECTION, POWDER, LYOPHILIZED, FOR SOLUTION INTRAVENOUS; PARENTERAL at 11:58

## 2020-01-01 RX ADMIN — CIPROFLOXACIN 400 MG: 2 INJECTION, SOLUTION INTRAVENOUS at 10:41

## 2020-01-01 RX ADMIN — FENTANYL CITRATE 50 MCG: 50 INJECTION INTRAMUSCULAR; INTRAVENOUS at 09:36

## 2020-01-01 RX ADMIN — ALBUMIN (HUMAN) 25 G: 0.25 INJECTION, SOLUTION INTRAVENOUS at 15:25

## 2020-01-01 RX ADMIN — Medication 10 ML: at 14:00

## 2020-01-01 RX ADMIN — CISATRACURIUM BESYLATE 3 MCG/KG/MIN: 2 INJECTION, SOLUTION INTRAVENOUS at 09:30

## 2020-01-01 RX ADMIN — POTASSIUM CHLORIDE 40 MEQ: 1500 TABLET, EXTENDED RELEASE ORAL at 18:32

## 2020-01-01 RX ADMIN — Medication 10 ML: at 16:54

## 2020-01-01 RX ADMIN — METHYLPREDNISOLONE SODIUM SUCCINATE 20 MG: 40 INJECTION, POWDER, FOR SOLUTION INTRAMUSCULAR; INTRAVENOUS at 22:28

## 2020-01-01 RX ADMIN — DOCUSATE SODIUM 100 MG: 100 CAPSULE, LIQUID FILLED ORAL at 09:36

## 2020-01-01 RX ADMIN — METHYLPREDNISOLONE SODIUM SUCCINATE 20 MG: 40 INJECTION, POWDER, FOR SOLUTION INTRAMUSCULAR; INTRAVENOUS at 10:13

## 2020-01-01 RX ADMIN — Medication 9 MG/HR: at 13:04

## 2020-01-01 RX ADMIN — Medication 9 MG/HR: at 01:09

## 2020-01-01 RX ADMIN — Medication 10 ML: at 22:00

## 2020-01-01 RX ADMIN — DOCUSATE SODIUM 100 MG: 100 CAPSULE, LIQUID FILLED ORAL at 09:41

## 2020-01-01 RX ADMIN — FENTANYL CITRATE 50 MCG: 50 INJECTION INTRAMUSCULAR; INTRAVENOUS at 16:02

## 2020-01-01 RX ADMIN — SODIUM CHLORIDE 40 MG: 9 INJECTION INTRAMUSCULAR; INTRAVENOUS; SUBCUTANEOUS at 08:35

## 2020-01-01 RX ADMIN — FUROSEMIDE 6 MG/HR: 10 INJECTION, SOLUTION INTRAMUSCULAR; INTRAVENOUS at 03:36

## 2020-01-01 RX ADMIN — POTASSIUM & SODIUM PHOSPHATES POWDER PACK 280-160-250 MG 2 PACKET: 280-160-250 PACK at 18:25

## 2020-01-01 RX ADMIN — LORAZEPAM 2 MG: 2 INJECTION INTRAMUSCULAR at 16:03

## 2020-01-01 RX ADMIN — CISATRACURIUM BESYLATE 4 MCG/KG/MIN: 2 INJECTION, SOLUTION INTRAVENOUS at 19:12

## 2020-01-01 RX ADMIN — CHLORHEXIDINE GLUCONATE 10 ML: 1.2 RINSE ORAL at 08:23

## 2020-01-01 RX ADMIN — LORAZEPAM 2 MG: 2 INJECTION INTRAMUSCULAR at 14:01

## 2020-01-01 RX ADMIN — Medication 10 MG: at 06:26

## 2020-01-01 RX ADMIN — DOCUSATE SODIUM 100 MG: 100 CAPSULE, LIQUID FILLED ORAL at 21:51

## 2020-01-01 RX ADMIN — AMLODIPINE BESYLATE 10 MG: 5 TABLET ORAL at 10:14

## 2020-01-01 RX ADMIN — Medication 9 MG/HR: at 14:03

## 2020-01-01 RX ADMIN — METHYLPREDNISOLONE SODIUM SUCCINATE 40 MG: 40 INJECTION, POWDER, FOR SOLUTION INTRAMUSCULAR; INTRAVENOUS at 13:30

## 2020-01-01 RX ADMIN — APIXABAN 2.5 MG: 5 TABLET, FILM COATED ORAL at 10:38

## 2020-01-01 RX ADMIN — CISATRACURIUM BESYLATE 3 MCG/KG/MIN: 2 INJECTION, SOLUTION INTRAVENOUS at 05:33

## 2020-01-01 RX ADMIN — CISATRACURIUM BESYLATE 2 MCG/KG/MIN: 2 INJECTION, SOLUTION INTRAVENOUS at 02:02

## 2020-01-01 RX ADMIN — CHLORHEXIDINE GLUCONATE 10 ML: 1.2 RINSE ORAL at 21:00

## 2020-01-01 RX ADMIN — POTASSIUM BICARBONATE 20 MEQ: 782 TABLET, EFFERVESCENT ORAL at 22:28

## 2020-01-01 RX ADMIN — PIPERACILLIN AND TAZOBACTAM 4.5 G: 4; .5 INJECTION, POWDER, LYOPHILIZED, FOR SOLUTION INTRAVENOUS; PARENTERAL at 03:35

## 2020-01-01 RX ADMIN — METHYLPREDNISOLONE SODIUM SUCCINATE 40 MG: 40 INJECTION, POWDER, FOR SOLUTION INTRAMUSCULAR; INTRAVENOUS at 13:43

## 2020-01-01 RX ADMIN — Medication 7 MG/HR: at 15:46

## 2020-01-01 RX ADMIN — SODIUM CHLORIDE 40 MG: 9 INJECTION INTRAMUSCULAR; INTRAVENOUS; SUBCUTANEOUS at 08:49

## 2020-01-01 RX ADMIN — CALCIUM GLUCONATE 2 G: 20 INJECTION, SOLUTION INTRAVENOUS at 11:59

## 2020-01-01 RX ADMIN — SODIUM CHLORIDE 40 MG: 9 INJECTION INTRAMUSCULAR; INTRAVENOUS; SUBCUTANEOUS at 09:27

## 2020-01-01 RX ADMIN — DOXYCYCLINE 100 MG: 100 INJECTION, POWDER, LYOPHILIZED, FOR SOLUTION INTRAVENOUS at 22:01

## 2020-01-01 RX ADMIN — METHYLPREDNISOLONE SODIUM SUCCINATE 20 MG: 40 INJECTION, POWDER, FOR SOLUTION INTRAMUSCULAR; INTRAVENOUS at 21:19

## 2020-01-01 RX ADMIN — Medication 10 ML: at 00:15

## 2020-01-01 RX ADMIN — DEXTROSE MONOHYDRATE 50 ML/HR: 5 INJECTION, SOLUTION INTRAVENOUS at 09:47

## 2020-01-01 RX ADMIN — Medication 75 MCG/HR: at 05:53

## 2020-01-01 RX ADMIN — CISATRACURIUM BESYLATE 4 MCG/KG/MIN: 2 INJECTION, SOLUTION INTRAVENOUS at 13:59

## 2020-01-01 RX ADMIN — CHLORHEXIDINE GLUCONATE 10 ML: 1.2 RINSE ORAL at 10:12

## 2020-01-01 RX ADMIN — DOCUSATE SODIUM 100 MG: 100 CAPSULE, LIQUID FILLED ORAL at 10:46

## 2020-01-01 RX ADMIN — APIXABAN 2.5 MG: 5 TABLET, FILM COATED ORAL at 21:07

## 2020-01-01 RX ADMIN — CISATRACURIUM BESYLATE 4 MCG/KG/MIN: 2 INJECTION, SOLUTION INTRAVENOUS at 16:50

## 2020-01-01 RX ADMIN — METHYLPREDNISOLONE SODIUM SUCCINATE 20 MG: 40 INJECTION, POWDER, FOR SOLUTION INTRAMUSCULAR; INTRAVENOUS at 22:03

## 2020-01-01 RX ADMIN — Medication 10 ML: at 05:12

## 2020-01-01 RX ADMIN — Medication 10 ML: at 05:46

## 2020-01-01 RX ADMIN — PIPERACILLIN AND TAZOBACTAM 4.5 G: 4; .5 INJECTION, POWDER, LYOPHILIZED, FOR SOLUTION INTRAVENOUS; PARENTERAL at 12:45

## 2020-01-01 RX ADMIN — METHYLPREDNISOLONE SODIUM SUCCINATE 40 MG: 40 INJECTION, POWDER, FOR SOLUTION INTRAMUSCULAR; INTRAVENOUS at 14:24

## 2020-01-01 RX ADMIN — CISATRACURIUM BESYLATE 2 MCG/KG/MIN: 2 INJECTION, SOLUTION INTRAVENOUS at 13:18

## 2020-01-01 RX ADMIN — CISATRACURIUM BESYLATE 4 MCG/KG/MIN: 2 INJECTION, SOLUTION INTRAVENOUS at 06:55

## 2020-01-01 RX ADMIN — POLYETHYLENE GLYCOL 3350 17 G: 17 POWDER, FOR SOLUTION ORAL at 08:43

## 2020-01-01 RX ADMIN — WATER 1 MG: 1 INJECTION INTRAMUSCULAR; INTRAVENOUS; SUBCUTANEOUS at 09:27

## 2020-01-01 RX ADMIN — ASPIRIN 81 MG 81 MG: 81 TABLET ORAL at 08:19

## 2020-01-01 RX ADMIN — CISATRACURIUM BESYLATE 3 MCG/KG/MIN: 2 INJECTION, SOLUTION INTRAVENOUS at 19:16

## 2020-01-01 RX ADMIN — Medication 10 ML: at 21:16

## 2020-01-01 RX ADMIN — Medication 10 ML: at 14:29

## 2020-01-01 RX ADMIN — CISATRACURIUM BESYLATE 4 MCG/KG/MIN: 2 INJECTION, SOLUTION INTRAVENOUS at 12:44

## 2020-01-01 RX ADMIN — PIPERACILLIN AND TAZOBACTAM 4.5 G: 4; .5 INJECTION, POWDER, LYOPHILIZED, FOR SOLUTION INTRAVENOUS; PARENTERAL at 04:10

## 2020-01-01 RX ADMIN — IPRATROPIUM BROMIDE 0.5 MG: 0.5 SOLUTION RESPIRATORY (INHALATION) at 11:20

## 2020-01-01 RX ADMIN — Medication 9 MG/HR: at 11:55

## 2020-01-01 RX ADMIN — METHYLPREDNISOLONE SODIUM SUCCINATE 30 MG: 40 INJECTION, POWDER, FOR SOLUTION INTRAMUSCULAR; INTRAVENOUS at 21:47

## 2020-01-01 RX ADMIN — MIDAZOLAM HYDROCHLORIDE 1 MG: 1 INJECTION, SOLUTION INTRAMUSCULAR; INTRAVENOUS at 12:27

## 2020-01-01 RX ADMIN — ASPIRIN 81 MG 81 MG: 81 TABLET ORAL at 09:05

## 2020-01-01 RX ADMIN — CISATRACURIUM BESYLATE 4 MCG/KG/MIN: 2 INJECTION, SOLUTION INTRAVENOUS at 01:14

## 2020-01-01 RX ADMIN — METHYLPREDNISOLONE SODIUM SUCCINATE 40 MG: 40 INJECTION, POWDER, FOR SOLUTION INTRAMUSCULAR; INTRAVENOUS at 18:05

## 2020-01-01 RX ADMIN — SODIUM CHLORIDE 40 MG: 9 INJECTION INTRAMUSCULAR; INTRAVENOUS; SUBCUTANEOUS at 09:00

## 2020-01-01 RX ADMIN — CISATRACURIUM BESYLATE 4 MCG/KG/MIN: 2 INJECTION, SOLUTION INTRAVENOUS at 09:54

## 2020-01-01 RX ADMIN — METHYLPREDNISOLONE SODIUM SUCCINATE 40 MG: 40 INJECTION, POWDER, FOR SOLUTION INTRAMUSCULAR; INTRAVENOUS at 20:54

## 2020-01-01 RX ADMIN — Medication 200 MG: at 05:55

## 2020-01-01 RX ADMIN — Medication 4 MG/HR: at 00:06

## 2020-01-01 RX ADMIN — Medication 2 MCG/MIN: at 06:38

## 2020-01-01 RX ADMIN — FUROSEMIDE 8 MG/HR: 10 INJECTION, SOLUTION INTRAMUSCULAR; INTRAVENOUS at 01:53

## 2020-01-01 RX ADMIN — SODIUM CHLORIDE 40 MG: 9 INJECTION INTRAMUSCULAR; INTRAVENOUS; SUBCUTANEOUS at 09:48

## 2020-01-01 RX ADMIN — Medication 10 ML: at 06:09

## 2020-01-01 RX ADMIN — BUMETANIDE 1 MG: 0.25 INJECTION INTRAMUSCULAR; INTRAVENOUS at 10:36

## 2020-01-01 RX ADMIN — IOHEXOL 30 ML: 240 INJECTION, SOLUTION INTRATHECAL; INTRAVASCULAR; INTRAVENOUS; ORAL at 09:12

## 2020-01-01 RX ADMIN — METHYLPREDNISOLONE SODIUM SUCCINATE 40 MG: 40 INJECTION, POWDER, FOR SOLUTION INTRAMUSCULAR; INTRAVENOUS at 05:48

## 2020-01-01 RX ADMIN — ALBUMIN (HUMAN) 25 G: 0.25 INJECTION, SOLUTION INTRAVENOUS at 22:03

## 2020-01-01 RX ADMIN — CISATRACURIUM BESYLATE 4 MCG/KG/MIN: 2 INJECTION, SOLUTION INTRAVENOUS at 10:00

## 2020-01-01 RX ADMIN — METHYLPREDNISOLONE SODIUM SUCCINATE 40 MG: 40 INJECTION, POWDER, FOR SOLUTION INTRAMUSCULAR; INTRAVENOUS at 04:57

## 2020-01-01 RX ADMIN — Medication 100 MCG/HR: at 06:44

## 2020-01-01 RX ADMIN — BUMETANIDE 1 MG: 0.25 INJECTION INTRAMUSCULAR; INTRAVENOUS at 09:47

## 2020-01-01 RX ADMIN — ALBUMIN (HUMAN) 25 G: 0.25 INJECTION, SOLUTION INTRAVENOUS at 21:51

## 2020-01-01 RX ADMIN — ASPIRIN 81 MG 81 MG: 81 TABLET ORAL at 09:41

## 2020-01-01 RX ADMIN — PIPERACILLIN AND TAZOBACTAM 3.38 G: 3; .375 INJECTION, POWDER, LYOPHILIZED, FOR SOLUTION INTRAVENOUS at 17:07

## 2020-01-01 RX ADMIN — CISATRACURIUM BESYLATE 3 MCG/KG/MIN: 2 INJECTION, SOLUTION INTRAVENOUS at 22:19

## 2020-01-01 RX ADMIN — Medication 5 MG/HR: at 10:59

## 2020-01-01 RX ADMIN — CHLORHEXIDINE GLUCONATE 10 ML: 1.2 RINSE ORAL at 08:30

## 2020-01-01 RX ADMIN — Medication 10 ML: at 06:02

## 2020-01-01 RX ADMIN — POTASSIUM & SODIUM PHOSPHATES POWDER PACK 280-160-250 MG 2 PACKET: 280-160-250 PACK at 09:40

## 2020-01-01 RX ADMIN — SODIUM CHLORIDE 40 MG: 9 INJECTION INTRAMUSCULAR; INTRAVENOUS; SUBCUTANEOUS at 08:44

## 2020-01-01 RX ADMIN — LORAZEPAM 2 MG: 2 INJECTION INTRAMUSCULAR at 20:41

## 2020-01-01 RX ADMIN — Medication 20 MG: at 06:15

## 2020-01-01 RX ADMIN — APIXABAN 2.5 MG: 5 TABLET, FILM COATED ORAL at 22:28

## 2020-01-01 RX ADMIN — PIPERACILLIN AND TAZOBACTAM 3.38 G: 3; .375 INJECTION, POWDER, LYOPHILIZED, FOR SOLUTION INTRAVENOUS at 06:55

## 2020-01-01 RX ADMIN — POTASSIUM BICARBONATE 20 MEQ: 782 TABLET, EFFERVESCENT ORAL at 13:50

## 2020-01-01 RX ADMIN — POLYETHYLENE GLYCOL 3350 17 G: 17 POWDER, FOR SOLUTION ORAL at 08:28

## 2020-01-01 RX ADMIN — Medication 10 ML: at 21:29

## 2020-01-01 RX ADMIN — Medication 9 MG/HR: at 05:44

## 2020-01-01 RX ADMIN — Medication 10 ML: at 05:16

## 2020-01-01 RX ADMIN — CISATRACURIUM BESYLATE 1 MCG/KG/MIN: 2 INJECTION, SOLUTION INTRAVENOUS at 00:30

## 2020-01-01 RX ADMIN — METHYLPREDNISOLONE SODIUM SUCCINATE 20 MG: 40 INJECTION, POWDER, FOR SOLUTION INTRAMUSCULAR; INTRAVENOUS at 10:39

## 2020-01-01 RX ADMIN — SODIUM CHLORIDE 40 MG: 9 INJECTION INTRAMUSCULAR; INTRAVENOUS; SUBCUTANEOUS at 08:30

## 2020-01-01 RX ADMIN — VANCOMYCIN HYDROCHLORIDE 2000 MG: 10 INJECTION, POWDER, LYOPHILIZED, FOR SOLUTION INTRAVENOUS at 14:44

## 2020-01-01 RX ADMIN — LORAZEPAM 2 MG: 2 INJECTION INTRAMUSCULAR at 22:41

## 2020-01-01 RX ADMIN — METHYLPREDNISOLONE SODIUM SUCCINATE 40 MG: 40 INJECTION, POWDER, FOR SOLUTION INTRAMUSCULAR; INTRAVENOUS at 14:28

## 2020-01-01 RX ADMIN — Medication 9 MG/HR: at 18:14

## 2020-01-01 RX ADMIN — HEPARIN SODIUM 10 UNITS/KG/HR: 10000 INJECTION, SOLUTION INTRAVENOUS at 23:55

## 2020-01-01 RX ADMIN — BUMETANIDE 1 MG: 0.25 INJECTION INTRAMUSCULAR; INTRAVENOUS at 18:05

## 2020-01-01 RX ADMIN — ALBUMIN (HUMAN) 25 G: 0.25 INJECTION, SOLUTION INTRAVENOUS at 17:16

## 2020-01-01 RX ADMIN — BUMETANIDE 1 MG: 0.25 INJECTION INTRAMUSCULAR; INTRAVENOUS at 08:23

## 2020-01-01 RX ADMIN — Medication 8 MG/HR: at 10:27

## 2020-01-01 RX ADMIN — METHYLPREDNISOLONE SODIUM SUCCINATE 20 MG: 40 INJECTION, POWDER, FOR SOLUTION INTRAMUSCULAR; INTRAVENOUS at 10:47

## 2020-01-01 RX ADMIN — ASPIRIN 81 MG 81 MG: 81 TABLET ORAL at 08:28

## 2020-01-01 RX ADMIN — CHLORHEXIDINE GLUCONATE 10 ML: 1.2 RINSE ORAL at 21:04

## 2020-01-01 RX ADMIN — RIVAROXABAN 20 MG: 10 TABLET, FILM COATED ORAL at 15:52

## 2020-01-01 RX ADMIN — CISATRACURIUM BESYLATE 3 MCG/KG/MIN: 2 INJECTION, SOLUTION INTRAVENOUS at 02:53

## 2020-01-01 RX ADMIN — POTASSIUM BICARBONATE 20 MEQ: 782 TABLET, EFFERVESCENT ORAL at 02:01

## 2020-01-01 RX ADMIN — ALBUMIN (HUMAN) 25 G: 0.25 INJECTION, SOLUTION INTRAVENOUS at 04:16

## 2020-01-01 RX ADMIN — ALBUMIN (HUMAN) 25 G: 0.25 INJECTION, SOLUTION INTRAVENOUS at 05:46

## 2020-01-01 RX ADMIN — DOCUSATE SODIUM 100 MG: 100 CAPSULE, LIQUID FILLED ORAL at 08:44

## 2020-01-01 RX ADMIN — Medication 10 ML: at 22:44

## 2020-01-01 RX ADMIN — BUMETANIDE 1 MG: 0.25 INJECTION INTRAMUSCULAR; INTRAVENOUS at 10:32

## 2020-01-01 RX ADMIN — Medication 100 MCG/HR: at 18:32

## 2020-01-01 RX ADMIN — CHLORHEXIDINE GLUCONATE 10 ML: 1.2 RINSE ORAL at 20:21

## 2020-01-01 RX ADMIN — CHLORHEXIDINE GLUCONATE 10 ML: 1.2 RINSE ORAL at 22:44

## 2020-01-01 RX ADMIN — ALBUMIN (HUMAN) 25 G: 0.25 INJECTION, SOLUTION INTRAVENOUS at 03:58

## 2020-01-01 RX ADMIN — LORAZEPAM 2 MG: 2 INJECTION INTRAMUSCULAR at 05:35

## 2020-01-01 RX ADMIN — SODIUM CHLORIDE 2 MCG/KG/MIN: 900 INJECTION, SOLUTION INTRAVENOUS at 12:42

## 2020-01-01 RX ADMIN — SODIUM CHLORIDE 40 MG: 9 INJECTION INTRAMUSCULAR; INTRAVENOUS; SUBCUTANEOUS at 08:02

## 2020-01-01 RX ADMIN — BUMETANIDE 1 MG: 0.25 INJECTION INTRAMUSCULAR; INTRAVENOUS at 08:35

## 2020-01-01 RX ADMIN — MAGNESIUM SULFATE HEPTAHYDRATE 2 G: 40 INJECTION, SOLUTION INTRAVENOUS at 15:31

## 2020-01-01 RX ADMIN — CHLORHEXIDINE GLUCONATE 10 ML: 1.2 RINSE ORAL at 21:28

## 2020-01-01 RX ADMIN — APIXABAN 5 MG: 5 TABLET, FILM COATED ORAL at 21:19

## 2020-01-01 RX ADMIN — DEXTROSE MONOHYDRATE 100 ML/HR: 5 INJECTION, SOLUTION INTRAVENOUS at 08:48

## 2020-01-01 RX ADMIN — METHYLPREDNISOLONE SODIUM SUCCINATE 40 MG: 40 INJECTION, POWDER, FOR SOLUTION INTRAMUSCULAR; INTRAVENOUS at 06:09

## 2020-01-01 RX ADMIN — Medication 150 MCG/HR: at 05:13

## 2020-01-01 RX ADMIN — CISATRACURIUM BESYLATE 3 MCG/KG/MIN: 2 INJECTION, SOLUTION INTRAVENOUS at 00:54

## 2020-01-01 RX ADMIN — Medication 10 ML: at 23:29

## 2020-01-01 RX ADMIN — PREDNISONE 10 MG: 10 TABLET ORAL at 12:09

## 2020-01-01 RX ADMIN — SODIUM CHLORIDE 2 MCG/KG/MIN: 900 INJECTION, SOLUTION INTRAVENOUS at 05:53

## 2020-01-01 RX ADMIN — CHLORHEXIDINE GLUCONATE 10 ML: 1.2 RINSE ORAL at 08:54

## 2020-01-01 RX ADMIN — Medication 100 MCG: at 06:33

## 2020-01-01 RX ADMIN — DOCUSATE SODIUM 100 MG: 100 CAPSULE, LIQUID FILLED ORAL at 08:03

## 2020-01-01 RX ADMIN — PIPERACILLIN AND TAZOBACTAM 4.5 G: 4; .5 INJECTION, POWDER, LYOPHILIZED, FOR SOLUTION INTRAVENOUS; PARENTERAL at 11:12

## 2020-01-01 RX ADMIN — METHYLPREDNISOLONE SODIUM SUCCINATE 20 MG: 40 INJECTION, POWDER, FOR SOLUTION INTRAMUSCULAR; INTRAVENOUS at 08:20

## 2020-01-01 RX ADMIN — Medication 10 ML: at 22:20

## 2020-01-01 RX ADMIN — APIXABAN 2.5 MG: 5 TABLET, FILM COATED ORAL at 21:37

## 2020-01-01 RX ADMIN — SODIUM CHLORIDE 40 MG: 9 INJECTION INTRAMUSCULAR; INTRAVENOUS; SUBCUTANEOUS at 10:35

## 2020-01-01 RX ADMIN — ALBUMIN (HUMAN) 25 G: 0.25 INJECTION, SOLUTION INTRAVENOUS at 03:10

## 2020-01-01 RX ADMIN — Medication 100 MCG/HR: at 09:13

## 2020-01-01 RX ADMIN — CHLORHEXIDINE GLUCONATE 10 ML: 1.2 RINSE ORAL at 20:08

## 2020-01-01 RX ADMIN — Medication 9 MG/HR: at 09:45

## 2020-01-01 RX ADMIN — ALBUMIN (HUMAN) 25 G: 0.25 INJECTION, SOLUTION INTRAVENOUS at 03:17

## 2020-01-01 RX ADMIN — Medication 10 ML: at 05:40

## 2020-01-01 RX ADMIN — FENTANYL CITRATE 25 MCG: 50 INJECTION INTRAMUSCULAR; INTRAVENOUS at 12:16

## 2020-01-01 RX ADMIN — FUROSEMIDE 20 MG: 10 INJECTION, SOLUTION INTRAMUSCULAR; INTRAVENOUS at 07:18

## 2020-01-01 RX ADMIN — Medication 10 ML: at 22:28

## 2020-01-01 RX ADMIN — Medication 10 ML: at 06:00

## 2020-01-01 RX ADMIN — CISATRACURIUM BESYLATE 4 MCG/KG/MIN: 2 INJECTION, SOLUTION INTRAVENOUS at 16:12

## 2020-01-01 RX ADMIN — CHLORHEXIDINE GLUCONATE 10 ML: 1.2 RINSE ORAL at 22:42

## 2020-01-01 RX ADMIN — Medication 10 ML: at 13:22

## 2020-01-01 RX ADMIN — HEPARIN SODIUM 13.8 UNITS/KG/HR: 10000 INJECTION, SOLUTION INTRAVENOUS at 02:57

## 2020-01-01 RX ADMIN — POTASSIUM & SODIUM PHOSPHATES POWDER PACK 280-160-250 MG 2 PACKET: 280-160-250 PACK at 18:11

## 2020-01-01 RX ADMIN — CHLORHEXIDINE GLUCONATE 10 ML: 1.2 RINSE ORAL at 21:29

## 2020-01-01 RX ADMIN — POLYETHYLENE GLYCOL 3350 17 G: 17 POWDER, FOR SOLUTION ORAL at 09:00

## 2020-01-01 RX ADMIN — ASPIRIN 81 MG 81 MG: 81 TABLET ORAL at 08:11

## 2020-01-01 RX ADMIN — POTASSIUM CHLORIDE 10 MEQ: 7.46 INJECTION, SOLUTION INTRAVENOUS at 10:30

## 2020-01-01 RX ADMIN — VANCOMYCIN HYDROCHLORIDE 1750 MG: 10 INJECTION, POWDER, LYOPHILIZED, FOR SOLUTION INTRAVENOUS at 03:08

## 2020-01-01 RX ADMIN — SODIUM CHLORIDE 2 MCG/KG/MIN: 900 INJECTION, SOLUTION INTRAVENOUS at 16:11

## 2020-01-01 RX ADMIN — CISATRACURIUM BESYLATE 3 MCG/KG/MIN: 2 INJECTION, SOLUTION INTRAVENOUS at 13:11

## 2020-01-01 RX ADMIN — CHLORHEXIDINE GLUCONATE 10 ML: 1.2 RINSE ORAL at 21:46

## 2020-01-01 RX ADMIN — Medication 10 ML: at 05:33

## 2020-01-01 RX ADMIN — ALBUMIN (HUMAN) 25 G: 0.25 INJECTION, SOLUTION INTRAVENOUS at 10:07

## 2020-01-01 RX ADMIN — Medication 8 MG/HR: at 18:14

## 2020-01-01 RX ADMIN — Medication 7 MG/HR: at 07:04

## 2020-01-01 RX ADMIN — METHYLPREDNISOLONE SODIUM SUCCINATE 125 MG: 125 INJECTION, POWDER, FOR SOLUTION INTRAMUSCULAR; INTRAVENOUS at 17:49

## 2020-01-01 RX ADMIN — Medication 10 ML: at 00:57

## 2020-01-01 RX ADMIN — MIDAZOLAM HYDROCHLORIDE 1 MG: 1 INJECTION, SOLUTION INTRAMUSCULAR; INTRAVENOUS at 12:19

## 2020-01-01 RX ADMIN — ACETAMINOPHEN 650 MG: 325 TABLET ORAL at 21:23

## 2020-01-01 RX ADMIN — Medication 5 MG/HR: at 16:19

## 2020-01-01 RX ADMIN — FUROSEMIDE 40 MG: 10 INJECTION, SOLUTION INTRAMUSCULAR; INTRAVENOUS at 14:33

## 2020-01-01 RX ADMIN — ASPIRIN 81 MG 81 MG: 81 TABLET ORAL at 08:35

## 2020-01-01 RX ADMIN — CHLORHEXIDINE GLUCONATE 10 ML: 1.2 RINSE ORAL at 10:38

## 2020-01-01 RX ADMIN — POTASSIUM & SODIUM PHOSPHATES POWDER PACK 280-160-250 MG 2 PACKET: 280-160-250 PACK at 12:43

## 2020-01-01 RX ADMIN — Medication 100 MCG/HR: at 05:12

## 2020-01-01 RX ADMIN — FUROSEMIDE 40 MG: 10 INJECTION, SOLUTION INTRAMUSCULAR; INTRAVENOUS at 08:49

## 2020-01-01 RX ADMIN — BUMETANIDE 1 MG: 0.25 INJECTION INTRAMUSCULAR; INTRAVENOUS at 08:57

## 2020-01-01 RX ADMIN — APIXABAN 2.5 MG: 5 TABLET, FILM COATED ORAL at 21:01

## 2020-01-01 RX ADMIN — DEXTROSE MONOHYDRATE 100 ML/HR: 5 INJECTION, SOLUTION INTRAVENOUS at 18:32

## 2020-01-01 RX ADMIN — AMLODIPINE BESYLATE 10 MG: 5 TABLET ORAL at 08:33

## 2020-01-01 RX ADMIN — BUMETANIDE 1 MG: 0.25 INJECTION INTRAMUSCULAR; INTRAVENOUS at 08:19

## 2020-01-01 RX ADMIN — PIPERACILLIN AND TAZOBACTAM 4.5 G: 4; .5 INJECTION, POWDER, LYOPHILIZED, FOR SOLUTION INTRAVENOUS; PARENTERAL at 05:29

## 2020-01-01 RX ADMIN — PIPERACILLIN AND TAZOBACTAM 3.38 G: 3; .375 INJECTION, POWDER, LYOPHILIZED, FOR SOLUTION INTRAVENOUS at 11:38

## 2020-01-01 RX ADMIN — CHLORHEXIDINE GLUCONATE 10 ML: 1.2 RINSE ORAL at 08:33

## 2020-01-01 RX ADMIN — GLYCOPYRROLATE 1 MG: 1 TABLET ORAL at 10:32

## 2020-01-01 RX ADMIN — HEPARIN SODIUM 13.8 UNITS/KG/HR: 10000 INJECTION, SOLUTION INTRAVENOUS at 01:50

## 2020-01-01 RX ADMIN — FENTANYL CITRATE 50 MCG: 50 INJECTION INTRAMUSCULAR; INTRAVENOUS at 05:02

## 2020-01-01 RX ADMIN — GLYCOPYRROLATE 1 MG: 1 TABLET ORAL at 22:28

## 2020-01-01 RX ADMIN — Medication 50 MCG/HR: at 08:35

## 2020-01-01 RX ADMIN — DOXYCYCLINE 100 MG: 100 INJECTION, POWDER, LYOPHILIZED, FOR SOLUTION INTRAVENOUS at 14:31

## 2020-01-01 RX ADMIN — METHYLPREDNISOLONE SODIUM SUCCINATE 40 MG: 40 INJECTION, POWDER, FOR SOLUTION INTRAMUSCULAR; INTRAVENOUS at 19:52

## 2020-01-01 RX ADMIN — CISATRACURIUM BESYLATE 2 MCG/KG/MIN: 2 INJECTION, SOLUTION INTRAVENOUS at 05:48

## 2020-01-01 RX ADMIN — Medication 100 MCG/HR: at 01:14

## 2020-01-01 RX ADMIN — Medication 125 MCG/HR: at 01:29

## 2020-01-01 RX ADMIN — CHLORHEXIDINE GLUCONATE 10 ML: 1.2 RINSE ORAL at 22:41

## 2020-01-01 RX ADMIN — Medication 10 ML: at 21:23

## 2020-01-01 RX ADMIN — SODIUM CHLORIDE 40 MG: 9 INJECTION INTRAMUSCULAR; INTRAVENOUS; SUBCUTANEOUS at 08:33

## 2020-01-01 RX ADMIN — CHLORHEXIDINE GLUCONATE 10 ML: 1.2 RINSE ORAL at 09:35

## 2020-01-01 RX ADMIN — SODIUM CHLORIDE 15 ML: 9 INJECTION, SOLUTION INTRAMUSCULAR; INTRAVENOUS; SUBCUTANEOUS at 13:12

## 2020-01-01 RX ADMIN — ALBUMIN (HUMAN) 25 G: 0.25 INJECTION, SOLUTION INTRAVENOUS at 17:17

## 2020-01-01 RX ADMIN — CISATRACURIUM BESYLATE 3 MCG/KG/MIN: 2 INJECTION, SOLUTION INTRAVENOUS at 04:10

## 2020-01-01 RX ADMIN — CHLORHEXIDINE GLUCONATE 10 ML: 1.2 RINSE ORAL at 22:03

## 2020-01-01 RX ADMIN — HYDRALAZINE HYDROCHLORIDE 20 MG: 20 INJECTION INTRAMUSCULAR; INTRAVENOUS at 09:58

## 2020-01-01 RX ADMIN — Medication 25 MCG/HR: at 16:20

## 2020-01-01 RX ADMIN — GLYCOPYRROLATE 1 MG: 1 TABLET ORAL at 20:41

## 2020-01-01 RX ADMIN — PIPERACILLIN AND TAZOBACTAM 3.38 G: 3; .375 INJECTION, POWDER, LYOPHILIZED, FOR SOLUTION INTRAVENOUS at 14:49

## 2020-01-01 RX ADMIN — Medication 100 MCG/HR: at 23:03

## 2020-01-01 RX ADMIN — SODIUM CHLORIDE 2 MCG/KG/MIN: 900 INJECTION, SOLUTION INTRAVENOUS at 19:12

## 2020-01-01 RX ADMIN — PIPERACILLIN AND TAZOBACTAM 3.38 G: 3; .375 INJECTION, POWDER, LYOPHILIZED, FOR SOLUTION INTRAVENOUS at 06:07

## 2020-01-01 RX ADMIN — ALBUMIN (HUMAN) 25 G: 0.25 INJECTION, SOLUTION INTRAVENOUS at 08:28

## 2020-01-01 RX ADMIN — SODIUM CHLORIDE 250 ML: 900 INJECTION, SOLUTION INTRAVENOUS at 11:45

## 2020-01-01 RX ADMIN — HEPARIN SODIUM (PORCINE) LOCK FLUSH IV SOLN 100 UNIT/ML 500 UNITS: 100 SOLUTION at 16:37

## 2020-01-01 RX ADMIN — Medication 10 ML: at 23:21

## 2020-01-01 RX ADMIN — CISATRACURIUM BESYLATE 3 MCG/KG/MIN: 2 INJECTION, SOLUTION INTRAVENOUS at 18:44

## 2020-01-01 RX ADMIN — WATER 500 MG: 1 INJECTION INTRAMUSCULAR; INTRAVENOUS; SUBCUTANEOUS at 23:35

## 2020-01-01 RX ADMIN — BUMETANIDE 1 MG: 0.25 INJECTION INTRAMUSCULAR; INTRAVENOUS at 09:36

## 2020-01-01 RX ADMIN — DORNASE ALFA 2.5 MG: 1 SOLUTION RESPIRATORY (INHALATION) at 07:21

## 2020-01-01 RX ADMIN — SODIUM CHLORIDE 40 MG: 9 INJECTION INTRAMUSCULAR; INTRAVENOUS; SUBCUTANEOUS at 10:13

## 2020-01-01 RX ADMIN — VANCOMYCIN HYDROCHLORIDE 1750 MG: 10 INJECTION, POWDER, LYOPHILIZED, FOR SOLUTION INTRAVENOUS at 12:58

## 2020-01-01 RX ADMIN — SODIUM CHLORIDE 40 MG: 9 INJECTION INTRAMUSCULAR; INTRAVENOUS; SUBCUTANEOUS at 08:18

## 2020-01-01 RX ADMIN — METHYLPREDNISOLONE SODIUM SUCCINATE 80 MG: 40 INJECTION, POWDER, FOR SOLUTION INTRAMUSCULAR; INTRAVENOUS at 21:16

## 2020-01-01 RX ADMIN — CISATRACURIUM BESYLATE 4 MCG/KG/MIN: 2 INJECTION, SOLUTION INTRAVENOUS at 01:41

## 2020-01-01 RX ADMIN — DEXTROSE MONOHYDRATE 75 ML/HR: 5 INJECTION, SOLUTION INTRAVENOUS at 22:44

## 2020-01-01 RX ADMIN — ALBUMIN (HUMAN) 25 G: 0.25 INJECTION, SOLUTION INTRAVENOUS at 20:06

## 2020-01-01 RX ADMIN — Medication 10 ML: at 14:47

## 2020-01-01 RX ADMIN — IOHEXOL 20 ML: 240 INJECTION, SOLUTION INTRATHECAL; INTRAVASCULAR; INTRAVENOUS; ORAL at 10:00

## 2020-01-01 RX ADMIN — Medication 5 MG/HR: at 00:57

## 2020-01-01 RX ADMIN — ASPIRIN 81 MG 81 MG: 81 TABLET ORAL at 08:50

## 2020-01-01 RX ADMIN — WATER 500 MG: 1 INJECTION INTRAMUSCULAR; INTRAVENOUS; SUBCUTANEOUS at 16:29

## 2020-01-01 RX ADMIN — Medication 10 ML: at 06:11

## 2020-01-01 RX ADMIN — Medication 50 MCG/HR: at 05:44

## 2020-01-01 RX ADMIN — HEPARIN SODIUM 9 UNITS/KG/HR: 10000 INJECTION, SOLUTION INTRAVENOUS at 14:56

## 2020-01-01 RX ADMIN — POLYETHYLENE GLYCOL 3350 17 G: 17 POWDER, FOR SOLUTION ORAL at 21:05

## 2020-01-01 RX ADMIN — Medication 10 ML: at 17:59

## 2020-01-01 RX ADMIN — CISATRACURIUM BESYLATE 3 MCG/KG/MIN: 2 INJECTION, SOLUTION INTRAVENOUS at 22:59

## 2020-01-01 RX ADMIN — SODIUM CHLORIDE 40 MG: 9 INJECTION INTRAMUSCULAR; INTRAVENOUS; SUBCUTANEOUS at 08:11

## 2020-01-01 RX ADMIN — Medication 9 MG/HR: at 23:44

## 2020-01-01 RX ADMIN — DOXYCYCLINE 100 MG: 100 INJECTION, POWDER, LYOPHILIZED, FOR SOLUTION INTRAVENOUS at 15:53

## 2020-01-01 RX ADMIN — ALBUMIN (HUMAN) 25 G: 0.25 INJECTION, SOLUTION INTRAVENOUS at 02:11

## 2020-01-01 RX ADMIN — Medication 25 MCG/HR: at 06:50

## 2020-01-01 RX ADMIN — PIPERACILLIN AND TAZOBACTAM 4.5 G: 4; .5 INJECTION, POWDER, LYOPHILIZED, FOR SOLUTION INTRAVENOUS; PARENTERAL at 03:23

## 2020-01-01 RX ADMIN — LORAZEPAM 2 MG: 2 INJECTION INTRAMUSCULAR at 15:07

## 2020-01-01 RX ADMIN — ACETAMINOPHEN 650 MG: 325 TABLET ORAL at 13:28

## 2020-01-01 RX ADMIN — Medication 20 MG: at 11:37

## 2020-01-01 RX ADMIN — HYDRALAZINE HYDROCHLORIDE 20 MG: 20 INJECTION INTRAMUSCULAR; INTRAVENOUS at 10:12

## 2020-01-01 RX ADMIN — DOCUSATE SODIUM 100 MG: 100 CAPSULE, LIQUID FILLED ORAL at 21:35

## 2020-01-01 RX ADMIN — Medication 10 MG/HR: at 06:21

## 2020-01-01 RX ADMIN — METHYLPREDNISOLONE SODIUM SUCCINATE 40 MG: 40 INJECTION, POWDER, FOR SOLUTION INTRAMUSCULAR; INTRAVENOUS at 14:34

## 2020-01-01 RX ADMIN — WATER 500 MG: 1 INJECTION INTRAMUSCULAR; INTRAVENOUS; SUBCUTANEOUS at 11:00

## 2020-01-01 RX ADMIN — BUMETANIDE 1 MG: 0.25 INJECTION INTRAMUSCULAR; INTRAVENOUS at 21:21

## 2020-01-01 RX ADMIN — DEXTROSE MONOHYDRATE 100 ML/HR: 5 INJECTION, SOLUTION INTRAVENOUS at 13:41

## 2020-01-01 RX ADMIN — AMLODIPINE BESYLATE 10 MG: 5 TABLET ORAL at 10:32

## 2020-01-01 RX ADMIN — METHYLPREDNISOLONE SODIUM SUCCINATE 20 MG: 40 INJECTION, POWDER, FOR SOLUTION INTRAMUSCULAR; INTRAVENOUS at 21:00

## 2020-01-01 RX ADMIN — APIXABAN 5 MG: 5 TABLET, FILM COATED ORAL at 08:19

## 2020-01-01 RX ADMIN — METHYLPREDNISOLONE SODIUM SUCCINATE 40 MG: 40 INJECTION, POWDER, FOR SOLUTION INTRAMUSCULAR; INTRAVENOUS at 21:04

## 2020-01-01 RX ADMIN — METHYLPREDNISOLONE SODIUM SUCCINATE 40 MG: 40 INJECTION, POWDER, FOR SOLUTION INTRAMUSCULAR; INTRAVENOUS at 13:56

## 2020-01-01 RX ADMIN — POLYETHYLENE GLYCOL 3350 17 G: 17 POWDER, FOR SOLUTION ORAL at 21:29

## 2020-01-01 RX ADMIN — POTASSIUM BICARBONATE 20 MEQ: 782 TABLET, EFFERVESCENT ORAL at 20:41

## 2020-01-01 RX ADMIN — METHYLPREDNISOLONE SODIUM SUCCINATE 40 MG: 40 INJECTION, POWDER, FOR SOLUTION INTRAMUSCULAR; INTRAVENOUS at 08:33

## 2020-01-01 RX ADMIN — ALBUMIN (HUMAN) 25 G: 0.25 INJECTION, SOLUTION INTRAVENOUS at 22:43

## 2020-01-01 RX ADMIN — CISATRACURIUM BESYLATE 4 MCG/KG/MIN: 2 INJECTION, SOLUTION INTRAVENOUS at 19:45

## 2020-01-01 RX ADMIN — CISATRACURIUM BESYLATE 4 MCG/KG/MIN: 2 INJECTION, SOLUTION INTRAVENOUS at 22:28

## 2020-01-01 RX ADMIN — DOCUSATE SODIUM 100 MG: 100 CAPSULE, LIQUID FILLED ORAL at 08:54

## 2020-01-01 RX ADMIN — CISATRACURIUM BESYLATE 4 MCG/KG/MIN: 2 INJECTION, SOLUTION INTRAVENOUS at 19:34

## 2020-01-01 RX ADMIN — Medication 9 MG/HR: at 06:30

## 2020-01-01 RX ADMIN — Medication 10 ML: at 09:35

## 2020-01-01 RX ADMIN — AMIODARONE HYDROCHLORIDE 0.5 MG/MIN: 1.8 INJECTION, SOLUTION INTRAVENOUS at 08:15

## 2020-01-01 RX ADMIN — CISATRACURIUM BESYLATE 4 MCG/KG/MIN: 2 INJECTION, SOLUTION INTRAVENOUS at 20:44

## 2020-01-01 RX ADMIN — CISATRACURIUM BESYLATE 4 MCG/KG/MIN: 2 INJECTION, SOLUTION INTRAVENOUS at 23:52

## 2020-01-01 RX ADMIN — METHYLPREDNISOLONE SODIUM SUCCINATE 40 MG: 40 INJECTION, POWDER, FOR SOLUTION INTRAMUSCULAR; INTRAVENOUS at 06:32

## 2020-01-01 RX ADMIN — CHLORHEXIDINE GLUCONATE 10 ML: 1.2 RINSE ORAL at 21:07

## 2020-01-01 RX ADMIN — SODIUM CHLORIDE 40 MG: 9 INJECTION INTRAMUSCULAR; INTRAVENOUS; SUBCUTANEOUS at 10:46

## 2020-01-01 RX ADMIN — ALBUMIN (HUMAN) 25 G: 0.25 INJECTION, SOLUTION INTRAVENOUS at 03:23

## 2020-01-01 RX ADMIN — Medication 10 ML: at 22:07

## 2020-01-01 RX ADMIN — CISATRACURIUM BESYLATE 3 MCG/KG/MIN: 2 INJECTION, SOLUTION INTRAVENOUS at 20:51

## 2020-01-01 RX ADMIN — PIPERACILLIN AND TAZOBACTAM 4.5 G: 4; .5 INJECTION, POWDER, LYOPHILIZED, FOR SOLUTION INTRAVENOUS; PARENTERAL at 04:38

## 2020-01-01 RX ADMIN — PIPERACILLIN AND TAZOBACTAM 4.5 G: 4; .5 INJECTION, POWDER, LYOPHILIZED, FOR SOLUTION INTRAVENOUS; PARENTERAL at 03:37

## 2020-01-01 RX ADMIN — Medication 75 MCG/HR: at 17:30

## 2020-01-01 RX ADMIN — PROPOFOL 50 MG: 10 INJECTION, EMULSION INTRAVENOUS at 11:34

## 2020-01-01 RX ADMIN — PIPERACILLIN AND TAZOBACTAM 4.5 G: 4; .5 INJECTION, POWDER, LYOPHILIZED, FOR SOLUTION INTRAVENOUS; PARENTERAL at 03:36

## 2020-01-01 RX ADMIN — VANCOMYCIN HYDROCHLORIDE 1750 MG: 10 INJECTION, POWDER, LYOPHILIZED, FOR SOLUTION INTRAVENOUS at 20:14

## 2020-01-01 RX ADMIN — CISATRACURIUM BESYLATE 4 MCG/KG/MIN: 2 INJECTION, SOLUTION INTRAVENOUS at 08:41

## 2020-01-01 RX ADMIN — LORAZEPAM 2 MG: 2 INJECTION INTRAMUSCULAR at 01:36

## 2020-01-01 RX ADMIN — ASPIRIN 81 MG 81 MG: 81 TABLET ORAL at 10:33

## 2020-01-01 RX ADMIN — BUMETANIDE 1 MG: 0.25 INJECTION INTRAMUSCULAR; INTRAVENOUS at 10:13

## 2020-01-01 RX ADMIN — WATER 500 MG: 1 INJECTION INTRAMUSCULAR; INTRAVENOUS; SUBCUTANEOUS at 06:34

## 2020-01-01 RX ADMIN — BUMETANIDE 1 MG: 0.25 INJECTION INTRAMUSCULAR; INTRAVENOUS at 15:45

## 2020-01-01 RX ADMIN — DOCUSATE SODIUM 100 MG: 100 CAPSULE, LIQUID FILLED ORAL at 08:23

## 2020-01-01 RX ADMIN — ASPIRIN 81 MG 81 MG: 81 TABLET ORAL at 15:52

## 2020-01-01 RX ADMIN — Medication 10 ML: at 20:35

## 2020-01-01 RX ADMIN — Medication 10 ML: at 14:09

## 2020-01-01 RX ADMIN — ASPIRIN 81 MG 81 MG: 81 TABLET ORAL at 09:27

## 2020-01-01 RX ADMIN — Medication 8 MG/HR: at 18:27

## 2020-01-01 RX ADMIN — VANCOMYCIN HYDROCHLORIDE 1750 MG: 10 INJECTION, POWDER, LYOPHILIZED, FOR SOLUTION INTRAVENOUS at 17:15

## 2020-01-01 RX ADMIN — SODIUM CHLORIDE 40 MG: 9 INJECTION INTRAMUSCULAR; INTRAVENOUS; SUBCUTANEOUS at 08:28

## 2020-01-01 RX ADMIN — SODIUM CHLORIDE 40 MG: 9 INJECTION INTRAMUSCULAR; INTRAVENOUS; SUBCUTANEOUS at 15:52

## 2020-01-01 RX ADMIN — CHLORHEXIDINE GLUCONATE 10 ML: 1.2 RINSE ORAL at 10:00

## 2020-01-01 RX ADMIN — FUROSEMIDE 80 MG: 10 INJECTION, SOLUTION INTRAMUSCULAR; INTRAVENOUS at 19:35

## 2020-01-01 RX ADMIN — Medication 10 ML: at 06:08

## 2020-01-01 RX ADMIN — FUROSEMIDE 6 MG/HR: 10 INJECTION, SOLUTION INTRAMUSCULAR; INTRAVENOUS at 08:32

## 2020-01-01 RX ADMIN — SODIUM CHLORIDE 40 MG: 9 INJECTION INTRAMUSCULAR; INTRAVENOUS; SUBCUTANEOUS at 08:23

## 2020-01-01 RX ADMIN — FUROSEMIDE 40 MG: 10 INJECTION, SOLUTION INTRAMUSCULAR; INTRAVENOUS at 19:52

## 2020-01-01 RX ADMIN — APIXABAN 2.5 MG: 5 TABLET, FILM COATED ORAL at 09:18

## 2020-01-01 RX ADMIN — AMLODIPINE BESYLATE 10 MG: 5 TABLET ORAL at 08:27

## 2020-01-01 RX ADMIN — Medication 100 MCG/HR: at 03:53

## 2020-01-01 RX ADMIN — FENTANYL CITRATE 25 MCG: 50 INJECTION, SOLUTION INTRAMUSCULAR; INTRAVENOUS at 11:41

## 2020-01-01 RX ADMIN — Medication 125 MCG/HR: at 08:45

## 2020-01-01 RX ADMIN — PIPERACILLIN AND TAZOBACTAM 4.5 G: 4; .5 INJECTION, POWDER, LYOPHILIZED, FOR SOLUTION INTRAVENOUS; PARENTERAL at 10:47

## 2020-01-01 RX ADMIN — BUMETANIDE 1 MG: 0.25 INJECTION INTRAMUSCULAR; INTRAVENOUS at 21:28

## 2020-01-01 RX ADMIN — IPRATROPIUM BROMIDE AND ALBUTEROL SULFATE 3 ML: .5; 3 SOLUTION RESPIRATORY (INHALATION) at 17:01

## 2020-01-01 RX ADMIN — ALBUMIN (HUMAN) 25 G: 0.25 INJECTION, SOLUTION INTRAVENOUS at 21:34

## 2020-01-01 RX ADMIN — ALBUMIN (HUMAN) 50 G: 0.25 INJECTION, SOLUTION INTRAVENOUS at 13:05

## 2020-01-01 RX ADMIN — Medication 10 ML: at 20:36

## 2020-01-01 RX ADMIN — SODIUM CHLORIDE 40 MG: 9 INJECTION INTRAMUSCULAR; INTRAVENOUS; SUBCUTANEOUS at 10:32

## 2020-01-01 RX ADMIN — CISATRACURIUM BESYLATE 3 MCG/KG/MIN: 2 INJECTION, SOLUTION INTRAVENOUS at 16:44

## 2020-01-01 RX ADMIN — VANCOMYCIN HYDROCHLORIDE 2000 MG: 10 INJECTION, POWDER, LYOPHILIZED, FOR SOLUTION INTRAVENOUS at 11:57

## 2020-01-01 RX ADMIN — BUMETANIDE 1 MG: 0.25 INJECTION INTRAMUSCULAR; INTRAVENOUS at 21:47

## 2020-01-01 RX ADMIN — CISATRACURIUM BESYLATE 3 MCG/KG/MIN: 2 INJECTION, SOLUTION INTRAVENOUS at 20:48

## 2020-01-01 RX ADMIN — PIPERACILLIN AND TAZOBACTAM 4.5 G: 4; .5 INJECTION, POWDER, LYOPHILIZED, FOR SOLUTION INTRAVENOUS; PARENTERAL at 18:32

## 2020-01-01 RX ADMIN — POTASSIUM BICARBONATE 40 MEQ: 782 TABLET, EFFERVESCENT ORAL at 15:31

## 2020-01-01 RX ADMIN — APIXABAN 2.5 MG: 5 TABLET, FILM COATED ORAL at 20:41

## 2020-01-01 RX ADMIN — VANCOMYCIN HYDROCHLORIDE 1500 MG: 1.5 INJECTION, POWDER, LYOPHILIZED, FOR SOLUTION INTRAVENOUS at 06:09

## 2020-01-01 RX ADMIN — ALBUMIN (HUMAN) 25 G: 0.25 INJECTION, SOLUTION INTRAVENOUS at 03:55

## 2020-01-01 RX ADMIN — POLYETHYLENE GLYCOL 3350 17 G: 17 POWDER, FOR SOLUTION ORAL at 10:48

## 2020-01-01 RX ADMIN — FENTANYL CITRATE 50 MCG: 50 INJECTION INTRAMUSCULAR; INTRAVENOUS at 09:38

## 2020-01-01 RX ADMIN — ALBUMIN (HUMAN) 25 G: 0.25 INJECTION, SOLUTION INTRAVENOUS at 14:58

## 2020-01-01 RX ADMIN — FUROSEMIDE 80 MG: 10 INJECTION, SOLUTION INTRAMUSCULAR; INTRAVENOUS at 05:15

## 2020-01-01 RX ADMIN — ALBUMIN (HUMAN) 25 G: 0.25 INJECTION, SOLUTION INTRAVENOUS at 05:11

## 2020-01-01 RX ADMIN — CISATRACURIUM BESYLATE 3 MCG/KG/MIN: 2 INJECTION, SOLUTION INTRAVENOUS at 21:14

## 2020-01-01 RX ADMIN — FUROSEMIDE 60 MG: 10 INJECTION, SOLUTION INTRAMUSCULAR; INTRAVENOUS at 20:41

## 2020-01-01 RX ADMIN — CHLORHEXIDINE GLUCONATE 10 ML: 1.2 RINSE ORAL at 21:16

## 2020-01-01 RX ADMIN — METHYLPREDNISOLONE SODIUM SUCCINATE 40 MG: 40 INJECTION, POWDER, FOR SOLUTION INTRAMUSCULAR; INTRAVENOUS at 20:40

## 2020-01-01 RX ADMIN — POTASSIUM & SODIUM PHOSPHATES POWDER PACK 280-160-250 MG 2 PACKET: 280-160-250 PACK at 13:41

## 2020-01-01 RX ADMIN — Medication 25 MCG/HR: at 13:46

## 2020-01-01 RX ADMIN — CHLORHEXIDINE GLUCONATE 10 ML: 1.2 RINSE ORAL at 20:40

## 2020-01-01 RX ADMIN — DEXTROSE MONOHYDRATE 75 ML/HR: 5 INJECTION, SOLUTION INTRAVENOUS at 01:07

## 2020-01-01 RX ADMIN — CHLORHEXIDINE GLUCONATE 10 ML: 1.2 RINSE ORAL at 21:23

## 2020-01-01 RX ADMIN — ACETAZOLAMIDE 250 MG: 250 TABLET ORAL at 08:12

## 2020-01-01 RX ADMIN — DORNASE ALFA 2.5 MG: 1 SOLUTION RESPIRATORY (INHALATION) at 20:15

## 2020-01-01 RX ADMIN — LORAZEPAM 2 MG: 2 INJECTION INTRAMUSCULAR at 16:10

## 2020-01-01 RX ADMIN — ACETAMINOPHEN 650 MG: 325 TABLET ORAL at 14:33

## 2020-01-01 RX ADMIN — SODIUM CHLORIDE 40 MG: 9 INJECTION INTRAMUSCULAR; INTRAVENOUS; SUBCUTANEOUS at 09:40

## 2020-01-01 RX ADMIN — ALBUMIN (HUMAN) 25 G: 0.25 INJECTION, SOLUTION INTRAVENOUS at 21:00

## 2020-01-01 RX ADMIN — ALBUMIN (HUMAN) 25 G: 0.25 INJECTION, SOLUTION INTRAVENOUS at 00:37

## 2020-01-01 RX ADMIN — POTASSIUM BICARBONATE 40 MEQ: 782 TABLET, EFFERVESCENT ORAL at 04:38

## 2020-01-01 RX ADMIN — CISATRACURIUM BESYLATE 3 MCG/KG/MIN: 2 INJECTION, SOLUTION INTRAVENOUS at 09:29

## 2020-01-01 RX ADMIN — PIPERACILLIN AND TAZOBACTAM 4.5 G: 4; .5 INJECTION, POWDER, LYOPHILIZED, FOR SOLUTION INTRAVENOUS; PARENTERAL at 18:10

## 2020-01-01 RX ADMIN — CALCIUM GLUCONATE 2 G: 20 INJECTION, SOLUTION INTRAVENOUS at 06:38

## 2020-01-01 RX ADMIN — Medication 10 ML: at 05:32

## 2020-01-01 RX ADMIN — DORNASE ALFA 2.5 MG: 1 SOLUTION RESPIRATORY (INHALATION) at 08:00

## 2020-01-01 RX ADMIN — HYDRALAZINE HYDROCHLORIDE 20 MG: 20 INJECTION INTRAMUSCULAR; INTRAVENOUS at 03:41

## 2020-01-01 RX ADMIN — CISATRACURIUM BESYLATE 3 MCG/KG/MIN: 2 INJECTION, SOLUTION INTRAVENOUS at 12:20

## 2020-01-01 RX ADMIN — ALBUMIN (HUMAN) 25 G: 0.25 INJECTION, SOLUTION INTRAVENOUS at 09:11

## 2020-01-01 RX ADMIN — POTASSIUM & SODIUM PHOSPHATES POWDER PACK 280-160-250 MG 2 PACKET: 280-160-250 PACK at 07:56

## 2020-01-01 RX ADMIN — PIPERACILLIN AND TAZOBACTAM 4.5 G: 4; .5 INJECTION, POWDER, LYOPHILIZED, FOR SOLUTION INTRAVENOUS; PARENTERAL at 06:09

## 2020-01-01 RX ADMIN — AMLODIPINE BESYLATE 10 MG: 5 TABLET ORAL at 08:49

## 2020-01-01 RX ADMIN — METHYLPREDNISOLONE SODIUM SUCCINATE 30 MG: 40 INJECTION, POWDER, FOR SOLUTION INTRAMUSCULAR; INTRAVENOUS at 09:52

## 2020-01-01 RX ADMIN — SODIUM CHLORIDE 40 MG: 9 INJECTION INTRAMUSCULAR; INTRAVENOUS; SUBCUTANEOUS at 08:39

## 2020-01-01 RX ADMIN — AMIODARONE HYDROCHLORIDE 0.5 MG/MIN: 1.8 INJECTION, SOLUTION INTRAVENOUS at 01:01

## 2020-01-01 RX ADMIN — SODIUM CHLORIDE 40 MG: 9 INJECTION INTRAMUSCULAR; INTRAVENOUS; SUBCUTANEOUS at 08:58

## 2020-01-01 RX ADMIN — AMIODARONE HYDROCHLORIDE 1 MG/MIN: 1.8 INJECTION, SOLUTION INTRAVENOUS at 14:00

## 2020-01-01 RX ADMIN — CISATRACURIUM BESYLATE 4 MCG/KG/MIN: 2 INJECTION, SOLUTION INTRAVENOUS at 18:14

## 2020-01-01 RX ADMIN — Medication 10 ML: at 22:38

## 2020-01-01 RX ADMIN — CISATRACURIUM BESYLATE 4 MCG/KG/MIN: 2 INJECTION, SOLUTION INTRAVENOUS at 03:06

## 2020-01-01 RX ADMIN — Medication 10 ML: at 21:08

## 2020-01-01 RX ADMIN — CHLORHEXIDINE GLUCONATE 10 ML: 1.2 RINSE ORAL at 10:25

## 2020-01-01 RX ADMIN — DEXTROSE MONOHYDRATE 75 ML/HR: 5 INJECTION, SOLUTION INTRAVENOUS at 11:31

## 2020-01-01 RX ADMIN — Medication 7 MG/HR: at 16:31

## 2020-01-01 RX ADMIN — PIPERACILLIN AND TAZOBACTAM 4.5 G: 4; .5 INJECTION, POWDER, LYOPHILIZED, FOR SOLUTION INTRAVENOUS; PARENTERAL at 18:14

## 2020-01-01 RX ADMIN — METHYLPREDNISOLONE SODIUM SUCCINATE 80 MG: 40 INJECTION, POWDER, FOR SOLUTION INTRAMUSCULAR; INTRAVENOUS at 05:12

## 2020-01-01 RX ADMIN — DEXTROSE MONOHYDRATE 100 ML/HR: 5 INJECTION, SOLUTION INTRAVENOUS at 22:31

## 2020-01-01 RX ADMIN — Medication 100 MCG/HR: at 21:44

## 2020-01-01 RX ADMIN — CHLORHEXIDINE GLUCONATE 10 ML: 1.2 RINSE ORAL at 10:34

## 2020-01-01 RX ADMIN — METHYLPREDNISOLONE SODIUM SUCCINATE 40 MG: 40 INJECTION, POWDER, FOR SOLUTION INTRAMUSCULAR; INTRAVENOUS at 08:49

## 2020-01-01 RX ADMIN — DOCUSATE SODIUM 100 MG: 100 CAPSULE, LIQUID FILLED ORAL at 22:42

## 2020-01-01 RX ADMIN — SODIUM CHLORIDE 2 MCG/KG/MIN: 900 INJECTION, SOLUTION INTRAVENOUS at 03:32

## 2020-01-01 RX ADMIN — METHYLPREDNISOLONE SODIUM SUCCINATE 40 MG: 40 INJECTION, POWDER, FOR SOLUTION INTRAMUSCULAR; INTRAVENOUS at 20:05

## 2020-01-01 RX ADMIN — BUMETANIDE 1 MG: 0.25 INJECTION INTRAMUSCULAR; INTRAVENOUS at 08:54

## 2020-01-01 RX ADMIN — FENTANYL CITRATE 50 MCG: 50 INJECTION INTRAMUSCULAR; INTRAVENOUS at 01:28

## 2020-01-01 RX ADMIN — Medication 10 MG/HR: at 18:17

## 2020-01-01 RX ADMIN — PROPOFOL 300 MG: 10 INJECTION, EMULSION INTRAVENOUS at 05:55

## 2020-01-01 RX ADMIN — Medication 10 ML: at 05:52

## 2020-01-01 RX ADMIN — Medication 10 ML: at 14:33

## 2020-01-01 RX ADMIN — SODIUM CHLORIDE 40 MG: 9 INJECTION INTRAMUSCULAR; INTRAVENOUS; SUBCUTANEOUS at 09:06

## 2020-01-01 RX ADMIN — Medication 10 MG/HR: at 14:02

## 2020-01-01 RX ADMIN — METHYLPREDNISOLONE SODIUM SUCCINATE 40 MG: 40 INJECTION, POWDER, FOR SOLUTION INTRAMUSCULAR; INTRAVENOUS at 09:48

## 2020-01-01 RX ADMIN — ALBUMIN (HUMAN) 25 G: 0.25 INJECTION, SOLUTION INTRAVENOUS at 17:59

## 2020-01-01 RX ADMIN — METHYLPREDNISOLONE SODIUM SUCCINATE 40 MG: 40 INJECTION, POWDER, FOR SOLUTION INTRAMUSCULAR; INTRAVENOUS at 05:46

## 2020-01-01 RX ADMIN — CISATRACURIUM BESYLATE 4 MCG/KG/MIN: 2 INJECTION, SOLUTION INTRAVENOUS at 08:36

## 2020-01-01 RX ADMIN — Medication 10 ML: at 04:47

## 2020-01-01 RX ADMIN — ASPIRIN 81 MG 81 MG: 81 TABLET ORAL at 09:36

## 2020-01-01 RX ADMIN — AMLODIPINE BESYLATE 10 MG: 5 TABLET ORAL at 10:46

## 2020-01-01 RX ADMIN — POLYETHYLENE GLYCOL 3350 17 G: 17 POWDER, FOR SOLUTION ORAL at 22:41

## 2020-01-01 RX ADMIN — BUMETANIDE 1 MG: 0.25 INJECTION INTRAMUSCULAR; INTRAVENOUS at 08:24

## 2020-01-01 RX ADMIN — CISATRACURIUM BESYLATE 3 MCG/KG/MIN: 2 INJECTION, SOLUTION INTRAVENOUS at 12:19

## 2020-01-01 RX ADMIN — Medication 100 MCG/HR: at 09:46

## 2020-01-01 RX ADMIN — ALBUMIN (HUMAN) 25 G: 0.25 INJECTION, SOLUTION INTRAVENOUS at 15:03

## 2020-01-01 RX ADMIN — Medication 100 MCG/HR: at 05:45

## 2020-01-01 RX ADMIN — GLYCOPYRROLATE 1 MG: 1 TABLET ORAL at 08:23

## 2020-01-01 RX ADMIN — Medication 100 MCG: at 06:28

## 2020-01-01 RX ADMIN — ASPIRIN 81 MG 81 MG: 81 TABLET ORAL at 08:44

## 2020-01-01 RX ADMIN — VANCOMYCIN HYDROCHLORIDE 1750 MG: 10 INJECTION, POWDER, LYOPHILIZED, FOR SOLUTION INTRAVENOUS at 11:24

## 2020-01-01 RX ADMIN — Medication 10 ML: at 20:55

## 2020-01-01 RX ADMIN — FENTANYL CITRATE 50 MCG: 50 INJECTION INTRAMUSCULAR; INTRAVENOUS at 19:11

## 2020-01-01 RX ADMIN — SODIUM CHLORIDE 40 MG: 9 INJECTION INTRAMUSCULAR; INTRAVENOUS; SUBCUTANEOUS at 14:38

## 2020-01-01 RX ADMIN — POTASSIUM CHLORIDE 10 MEQ: 7.46 INJECTION, SOLUTION INTRAVENOUS at 00:56

## 2020-01-01 RX ADMIN — Medication 10 ML: at 13:29

## 2020-01-01 RX ADMIN — CEFTRIAXONE 1 G: 1 INJECTION, POWDER, FOR SOLUTION INTRAMUSCULAR; INTRAVENOUS at 15:51

## 2020-01-01 RX ADMIN — Medication 9 MG/HR: at 02:09

## 2020-01-01 RX ADMIN — METHYLPREDNISOLONE SODIUM SUCCINATE 40 MG: 40 INJECTION, POWDER, FOR SOLUTION INTRAMUSCULAR; INTRAVENOUS at 20:21

## 2020-01-01 RX ADMIN — CISATRACURIUM BESYLATE 4 MCG/KG/MIN: 2 INJECTION, SOLUTION INTRAVENOUS at 06:10

## 2020-01-01 RX ADMIN — LORAZEPAM 2 MG: 2 INJECTION INTRAMUSCULAR at 21:59

## 2020-01-01 RX ADMIN — Medication 100 MCG/HR: at 18:53

## 2020-01-01 RX ADMIN — METHYLPREDNISOLONE SODIUM SUCCINATE 20 MG: 40 INJECTION, POWDER, FOR SOLUTION INTRAMUSCULAR; INTRAVENOUS at 09:36

## 2020-01-01 RX ADMIN — HEPARIN SODIUM IN SODIUM CHLORIDE 1000 UNITS: 200 INJECTION INTRAVENOUS at 16:36

## 2020-01-01 RX ADMIN — HEPARIN SODIUM 13.8 UNITS/KG/HR: 10000 INJECTION, SOLUTION INTRAVENOUS at 14:24

## 2020-01-01 RX ADMIN — PIPERACILLIN AND TAZOBACTAM 4.5 G: 4; .5 INJECTION, POWDER, LYOPHILIZED, FOR SOLUTION INTRAVENOUS; PARENTERAL at 12:00

## 2020-01-01 RX ADMIN — ALBUMIN (HUMAN) 25 G: 0.25 INJECTION, SOLUTION INTRAVENOUS at 10:33

## 2020-01-01 RX ADMIN — DOCUSATE SODIUM 100 MG: 100 CAPSULE, LIQUID FILLED ORAL at 10:48

## 2020-01-01 RX ADMIN — CISATRACURIUM BESYLATE 3 MCG/KG/MIN: 2 INJECTION, SOLUTION INTRAVENOUS at 21:50

## 2020-01-01 RX ADMIN — METHYLPREDNISOLONE SODIUM SUCCINATE 20 MG: 40 INJECTION, POWDER, FOR SOLUTION INTRAMUSCULAR; INTRAVENOUS at 08:29

## 2020-01-01 RX ADMIN — Medication 10 ML: at 21:36

## 2020-01-01 RX ADMIN — CISATRACURIUM BESYLATE 4 MCG/KG/MIN: 2 INJECTION, SOLUTION INTRAVENOUS at 02:57

## 2020-01-01 RX ADMIN — FENTANYL CITRATE 25 MCG: 50 INJECTION INTRAMUSCULAR; INTRAVENOUS at 12:27

## 2020-01-01 RX ADMIN — VANCOMYCIN HYDROCHLORIDE 1750 MG: 10 INJECTION, POWDER, LYOPHILIZED, FOR SOLUTION INTRAVENOUS at 04:31

## 2020-01-01 RX ADMIN — ATORVASTATIN CALCIUM 40 MG: 20 TABLET, FILM COATED ORAL at 18:05

## 2020-01-01 RX ADMIN — ACETAMINOPHEN 650 MG: 325 TABLET ORAL at 20:41

## 2020-01-01 RX ADMIN — PIPERACILLIN AND TAZOBACTAM 4.5 G: 4; .5 INJECTION, POWDER, LYOPHILIZED, FOR SOLUTION INTRAVENOUS; PARENTERAL at 03:57

## 2020-01-01 RX ADMIN — FENTANYL CITRATE 50 MCG: 50 INJECTION INTRAMUSCULAR; INTRAVENOUS at 00:49

## 2020-01-01 RX ADMIN — IPRATROPIUM BROMIDE AND ALBUTEROL SULFATE 3 ML: .5; 3 SOLUTION RESPIRATORY (INHALATION) at 11:48

## 2020-01-01 RX ADMIN — CHLORHEXIDINE GLUCONATE 10 ML: 1.2 RINSE ORAL at 08:02

## 2020-01-01 RX ADMIN — CISATRACURIUM BESYLATE 4 MCG/KG/MIN: 2 INJECTION, SOLUTION INTRAVENOUS at 13:04

## 2020-01-01 RX ADMIN — Medication 10 ML: at 15:19

## 2020-01-01 RX ADMIN — ALBUMIN (HUMAN) 25 G: 0.25 INJECTION, SOLUTION INTRAVENOUS at 09:57

## 2020-01-01 RX ADMIN — APIXABAN 2.5 MG: 5 TABLET, FILM COATED ORAL at 09:12

## 2020-01-01 RX ADMIN — METHYLPREDNISOLONE SODIUM SUCCINATE 80 MG: 40 INJECTION, POWDER, FOR SOLUTION INTRAMUSCULAR; INTRAVENOUS at 08:57

## 2020-01-01 RX ADMIN — PIPERACILLIN AND TAZOBACTAM 4.5 G: 4; .5 INJECTION, POWDER, LYOPHILIZED, FOR SOLUTION INTRAVENOUS; PARENTERAL at 22:38

## 2020-01-01 RX ADMIN — POLYETHYLENE GLYCOL 3350 17 G: 17 POWDER, FOR SOLUTION ORAL at 20:21

## 2020-01-01 RX ADMIN — SODIUM CHLORIDE 40 MG: 9 INJECTION INTRAMUSCULAR; INTRAVENOUS; SUBCUTANEOUS at 10:47

## 2020-01-01 RX ADMIN — Medication 10 ML: at 15:23

## 2020-01-01 RX ADMIN — DEXTROSE MONOHYDRATE 75 ML/HR: 5 INJECTION, SOLUTION INTRAVENOUS at 23:43

## 2020-01-01 RX ADMIN — MIDAZOLAM HYDROCHLORIDE 1 MG: 1 INJECTION, SOLUTION INTRAMUSCULAR; INTRAVENOUS at 12:16

## 2020-01-01 RX ADMIN — Medication 10 MG/HR: at 04:18

## 2020-01-01 RX ADMIN — ALBUMIN (HUMAN) 25 G: 0.25 INJECTION, SOLUTION INTRAVENOUS at 10:46

## 2020-01-01 RX ADMIN — ASPIRIN 81 MG 81 MG: 81 TABLET ORAL at 10:48

## 2020-01-01 RX ADMIN — Medication 10 ML: at 14:07

## 2020-01-01 RX ADMIN — Medication 50 MCG/HR: at 09:45

## 2020-01-01 RX ADMIN — Medication 10 ML: at 06:52

## 2020-01-01 RX ADMIN — METHYLPREDNISOLONE SODIUM SUCCINATE 40 MG: 40 INJECTION, POWDER, FOR SOLUTION INTRAMUSCULAR; INTRAVENOUS at 22:43

## 2020-01-01 RX ADMIN — HEPARIN SODIUM 8 UNITS/KG/HR: 10000 INJECTION, SOLUTION INTRAVENOUS at 05:44

## 2020-01-01 RX ADMIN — Medication 10 ML: at 06:37

## 2020-01-01 RX ADMIN — Medication 10 MG: at 06:17

## 2020-01-01 RX ADMIN — Medication 10 MG/HR: at 06:08

## 2020-01-01 RX ADMIN — ALBUMIN (HUMAN) 25 G: 0.25 INJECTION, SOLUTION INTRAVENOUS at 18:05

## 2020-01-01 RX ADMIN — CISATRACURIUM BESYLATE 4 MCG/KG/MIN: 2 INJECTION, SOLUTION INTRAVENOUS at 11:21

## 2020-01-01 RX ADMIN — CISATRACURIUM BESYLATE 4 MCG/KG/MIN: 2 INJECTION, SOLUTION INTRAVENOUS at 09:30

## 2020-01-01 RX ADMIN — HYDRALAZINE HYDROCHLORIDE 20 MG: 20 INJECTION INTRAMUSCULAR; INTRAVENOUS at 12:32

## 2020-01-01 RX ADMIN — FUROSEMIDE 80 MG: 10 INJECTION, SOLUTION INTRAMUSCULAR; INTRAVENOUS at 15:07

## 2020-01-01 RX ADMIN — Medication 100 MCG: at 06:25

## 2020-01-01 RX ADMIN — Medication 8 MG/HR: at 19:53

## 2020-01-01 RX ADMIN — HEPARIN SODIUM 10000 UNITS: 1000 INJECTION INTRAVENOUS; SUBCUTANEOUS at 19:31

## 2020-01-01 RX ADMIN — Medication 125 MCG/HR: at 20:53

## 2020-01-01 RX ADMIN — CHLORHEXIDINE GLUCONATE 10 ML: 1.2 RINSE ORAL at 09:00

## 2020-01-01 RX ADMIN — VANCOMYCIN HYDROCHLORIDE 1500 MG: 1.5 INJECTION, POWDER, LYOPHILIZED, FOR SOLUTION INTRAVENOUS at 22:38

## 2020-01-01 RX ADMIN — ALBUMIN (HUMAN) 25 G: 0.25 INJECTION, SOLUTION INTRAVENOUS at 23:05

## 2020-01-01 RX ADMIN — CISATRACURIUM BESYLATE 3 MCG/KG/MIN: 2 INJECTION, SOLUTION INTRAVENOUS at 12:47

## 2020-01-01 RX ADMIN — POLYETHYLENE GLYCOL 3350 17 G: 17 POWDER, FOR SOLUTION ORAL at 08:03

## 2020-01-01 RX ADMIN — Medication 10 ML: at 21:21

## 2020-01-01 RX ADMIN — ALBUMIN (HUMAN) 25 G: 0.25 INJECTION, SOLUTION INTRAVENOUS at 11:17

## 2020-01-01 RX ADMIN — ASPIRIN 81 MG 81 MG: 81 TABLET ORAL at 10:47

## 2020-01-01 RX ADMIN — BUMETANIDE 1 MG: 0.25 INJECTION INTRAMUSCULAR; INTRAVENOUS at 10:25

## 2020-01-01 RX ADMIN — PIPERACILLIN AND TAZOBACTAM 3.38 G: 3; .375 INJECTION, POWDER, LYOPHILIZED, FOR SOLUTION INTRAVENOUS at 21:59

## 2020-01-01 RX ADMIN — CISATRACURIUM BESYLATE 4 MCG/KG/MIN: 2 INJECTION, SOLUTION INTRAVENOUS at 14:00

## 2020-01-01 RX ADMIN — Medication 10 ML: at 21:50

## 2020-01-01 RX ADMIN — PIPERACILLIN AND TAZOBACTAM 4.5 G: 4; .5 INJECTION, POWDER, LYOPHILIZED, FOR SOLUTION INTRAVENOUS; PARENTERAL at 18:20

## 2020-01-01 RX ADMIN — METHYLPREDNISOLONE SODIUM SUCCINATE 40 MG: 40 INJECTION, POWDER, FOR SOLUTION INTRAMUSCULAR; INTRAVENOUS at 22:44

## 2020-01-01 RX ADMIN — FENTANYL CITRATE 50 MCG: 50 INJECTION INTRAMUSCULAR; INTRAVENOUS at 21:59

## 2020-01-01 RX ADMIN — POLYETHYLENE GLYCOL 3350 17 G: 17 POWDER, FOR SOLUTION ORAL at 08:23

## 2020-01-01 RX ADMIN — CISATRACURIUM BESYLATE 4 MCG/KG/MIN: 2 INJECTION, SOLUTION INTRAVENOUS at 17:00

## 2020-01-01 RX ADMIN — DOCUSATE SODIUM 100 MG: 100 CAPSULE, LIQUID FILLED ORAL at 22:03

## 2020-01-01 RX ADMIN — CALCIUM GLUCONATE 2 G: 20 INJECTION, SOLUTION INTRAVENOUS at 08:11

## 2020-01-01 RX ADMIN — HEPARIN SODIUM 10000 UNITS: 1000 INJECTION INTRAVENOUS; SUBCUTANEOUS at 11:02

## 2020-01-01 RX ADMIN — HEPARIN SODIUM 8 UNITS/KG/HR: 10000 INJECTION, SOLUTION INTRAVENOUS at 14:51

## 2020-01-01 RX ADMIN — LORAZEPAM 2 MG: 2 INJECTION INTRAMUSCULAR at 08:02

## 2020-01-01 RX ADMIN — CISATRACURIUM BESYLATE 2 MCG/KG/MIN: 2 INJECTION, SOLUTION INTRAVENOUS at 09:56

## 2020-01-01 RX ADMIN — CHLORHEXIDINE GLUCONATE 10 ML: 1.2 RINSE ORAL at 08:35

## 2020-01-01 RX ADMIN — CHLORHEXIDINE GLUCONATE 10 ML: 1.2 RINSE ORAL at 08:57

## 2020-01-01 RX ADMIN — POLYETHYLENE GLYCOL 3350 17 G: 17 POWDER, FOR SOLUTION ORAL at 22:42

## 2020-01-01 RX ADMIN — FUROSEMIDE 8 MG/HR: 10 INJECTION, SOLUTION INTRAMUSCULAR; INTRAVENOUS at 06:53

## 2020-01-01 RX ADMIN — POTASSIUM BICARBONATE 40 MEQ: 782 TABLET, EFFERVESCENT ORAL at 23:41

## 2020-01-01 RX ADMIN — BUMETANIDE 1 MG: 0.25 INJECTION INTRAMUSCULAR; INTRAVENOUS at 22:03

## 2020-01-01 RX ADMIN — METHYLPREDNISOLONE SODIUM SUCCINATE 40 MG: 40 INJECTION, POWDER, FOR SOLUTION INTRAMUSCULAR; INTRAVENOUS at 05:53

## 2020-01-01 RX ADMIN — CISATRACURIUM BESYLATE 4 MCG/KG/MIN: 2 INJECTION, SOLUTION INTRAVENOUS at 05:45

## 2020-01-01 RX ADMIN — Medication 9 MG/HR: at 22:57

## 2020-01-01 RX ADMIN — PIPERACILLIN AND TAZOBACTAM 4.5 G: 4; .5 INJECTION, POWDER, LYOPHILIZED, FOR SOLUTION INTRAVENOUS; PARENTERAL at 10:22

## 2020-01-01 RX ADMIN — FUROSEMIDE 60 MG: 10 INJECTION, SOLUTION INTRAMUSCULAR; INTRAVENOUS at 08:11

## 2020-01-01 RX ADMIN — FENTANYL CITRATE 50 MCG: 50 INJECTION INTRAMUSCULAR; INTRAVENOUS at 01:36

## 2020-01-01 RX ADMIN — CHLORHEXIDINE GLUCONATE 10 ML: 1.2 RINSE ORAL at 10:46

## 2020-01-01 RX ADMIN — CHLORHEXIDINE GLUCONATE 10 ML: 1.2 RINSE ORAL at 21:41

## 2020-01-01 RX ADMIN — DOCUSATE SODIUM 100 MG: 100 CAPSULE, LIQUID FILLED ORAL at 08:28

## 2020-01-01 RX ADMIN — Medication 100 MCG: at 06:35

## 2020-01-01 RX ADMIN — Medication 5 MG/HR: at 05:33

## 2020-01-01 RX ADMIN — Medication 10 ML: at 21:20

## 2020-01-01 RX ADMIN — Medication 10 ML: at 06:59

## 2020-01-01 RX ADMIN — LORAZEPAM 2 MG: 2 INJECTION INTRAMUSCULAR at 13:22

## 2020-01-01 RX ADMIN — POLYETHYLENE GLYCOL 3350 17 G: 17 POWDER, FOR SOLUTION ORAL at 10:46

## 2020-01-01 RX ADMIN — Medication 100 MCG/HR: at 11:42

## 2020-01-01 RX ADMIN — CISATRACURIUM BESYLATE 4 MCG/KG/MIN: 2 INJECTION, SOLUTION INTRAVENOUS at 02:53

## 2020-01-01 RX ADMIN — Medication 75 MCG/HR: at 20:31

## 2020-01-01 RX ADMIN — Medication 50 MCG/HR: at 19:46

## 2020-01-01 RX ADMIN — CHLORHEXIDINE GLUCONATE 10 ML: 1.2 RINSE ORAL at 09:57

## 2020-01-01 RX ADMIN — METHYLPREDNISOLONE SODIUM SUCCINATE 20 MG: 40 INJECTION, POWDER, FOR SOLUTION INTRAMUSCULAR; INTRAVENOUS at 21:36

## 2020-01-01 RX ADMIN — FENTANYL CITRATE 50 MCG: 50 INJECTION INTRAMUSCULAR; INTRAVENOUS at 00:52

## 2020-01-01 RX ADMIN — METHYLPREDNISOLONE SODIUM SUCCINATE 40 MG: 40 INJECTION, POWDER, FOR SOLUTION INTRAMUSCULAR; INTRAVENOUS at 05:52

## 2020-01-01 RX ADMIN — Medication 10 ML: at 14:58

## 2020-01-01 RX ADMIN — CHLORHEXIDINE GLUCONATE 10 ML: 1.2 RINSE ORAL at 20:24

## 2020-01-01 RX ADMIN — ALBUMIN (HUMAN) 25 G: 0.25 INJECTION, SOLUTION INTRAVENOUS at 14:33

## 2020-01-01 RX ADMIN — DIATRIZOATE MEGLUMINE AND DIATRIZOATE SODIUM 30 ML: 660; 100 LIQUID ORAL; RECTAL at 10:56

## 2020-01-01 RX ADMIN — POLYETHYLENE GLYCOL 3350 17 G: 17 POWDER, FOR SOLUTION ORAL at 20:41

## 2020-01-01 RX ADMIN — ALBUMIN (HUMAN) 25 G: 0.25 INJECTION, SOLUTION INTRAVENOUS at 18:00

## 2020-01-01 RX ADMIN — SODIUM CHLORIDE 2 MCG/KG/MIN: 900 INJECTION, SOLUTION INTRAVENOUS at 01:38

## 2020-01-01 RX ADMIN — Medication 150 MCG/HR: at 19:23

## 2020-01-01 RX ADMIN — POTASSIUM BICARBONATE 20 MEQ: 782 TABLET, EFFERVESCENT ORAL at 01:15

## 2020-01-01 RX ADMIN — Medication 10 ML: at 13:55

## 2020-01-01 RX ADMIN — DOCUSATE SODIUM 100 MG: 100 CAPSULE, LIQUID FILLED ORAL at 21:23

## 2020-01-01 RX ADMIN — BUMETANIDE 1 MG: 0.25 INJECTION INTRAMUSCULAR; INTRAVENOUS at 21:16

## 2020-01-01 RX ADMIN — Medication 10 ML: at 16:31

## 2020-01-01 RX ADMIN — CISATRACURIUM BESYLATE 4 MCG/KG/MIN: 2 INJECTION, SOLUTION INTRAVENOUS at 11:28

## 2020-01-01 RX ADMIN — POTASSIUM BICARBONATE 40 MEQ: 782 TABLET, EFFERVESCENT ORAL at 06:09

## 2020-01-01 RX ADMIN — CHLORHEXIDINE GLUCONATE 10 ML: 1.2 RINSE ORAL at 21:18

## 2020-01-01 RX ADMIN — DOCUSATE SODIUM 100 MG: 100 CAPSULE, LIQUID FILLED ORAL at 08:49

## 2020-01-01 RX ADMIN — POTASSIUM CHLORIDE 10 MEQ: 7.46 INJECTION, SOLUTION INTRAVENOUS at 09:30

## 2020-01-01 RX ADMIN — PIPERACILLIN AND TAZOBACTAM 4.5 G: 4; .5 INJECTION, POWDER, LYOPHILIZED, FOR SOLUTION INTRAVENOUS; PARENTERAL at 19:03

## 2020-01-01 RX ADMIN — METHYLPREDNISOLONE SODIUM SUCCINATE 40 MG: 40 INJECTION, POWDER, FOR SOLUTION INTRAMUSCULAR; INTRAVENOUS at 05:30

## 2020-01-01 RX ADMIN — POLYETHYLENE GLYCOL 3350 17 G: 17 POWDER, FOR SOLUTION ORAL at 22:03

## 2020-01-01 RX ADMIN — Medication 10 ML: at 18:05

## 2020-01-01 RX ADMIN — Medication 10 ML: at 05:49

## 2020-01-01 RX ADMIN — POLYETHYLENE GLYCOL 3350 17 G: 17 POWDER, FOR SOLUTION ORAL at 21:50

## 2020-01-01 RX ADMIN — ALBUMIN (HUMAN) 25 G: 0.25 INJECTION, SOLUTION INTRAVENOUS at 14:29

## 2020-01-01 RX ADMIN — GLYCOPYRROLATE 1 MG: 1 TABLET ORAL at 23:21

## 2020-01-01 RX ADMIN — Medication 10 ML: at 14:45

## 2020-01-01 RX ADMIN — CISATRACURIUM BESYLATE 4 MCG/KG/MIN: 2 INJECTION, SOLUTION INTRAVENOUS at 06:43

## 2020-01-01 RX ADMIN — Medication 30 MG: at 11:40

## 2020-01-01 RX ADMIN — ALBUMIN (HUMAN) 25 G: 0.25 INJECTION, SOLUTION INTRAVENOUS at 10:35

## 2020-01-01 RX ADMIN — CALCIUM GLUCONATE: 94 INJECTION, SOLUTION INTRAVENOUS at 09:00

## 2020-01-01 RX ADMIN — MAGNESIUM SULFATE HEPTAHYDRATE 1 G: 1 INJECTION, SOLUTION INTRAVENOUS at 01:11

## 2020-01-01 RX ADMIN — Medication 10 ML: at 05:09

## 2020-01-01 RX ADMIN — Medication 100 MCG/HR: at 20:30

## 2020-01-01 RX ADMIN — SODIUM CHLORIDE, SODIUM LACTATE, POTASSIUM CHLORIDE, AND CALCIUM CHLORIDE: 600; 310; 30; 20 INJECTION, SOLUTION INTRAVENOUS at 11:30

## 2020-01-01 RX ADMIN — DOCUSATE SODIUM 100 MG: 100 CAPSULE, LIQUID FILLED ORAL at 22:41

## 2020-01-01 RX ADMIN — Medication 125 MCG/HR: at 01:30

## 2020-01-01 RX ADMIN — PIPERACILLIN AND TAZOBACTAM 4.5 G: 4; .5 INJECTION, POWDER, LYOPHILIZED, FOR SOLUTION INTRAVENOUS; PARENTERAL at 14:43

## 2020-01-01 RX ADMIN — CISATRACURIUM BESYLATE 4 MCG/KG/MIN: 2 INJECTION, SOLUTION INTRAVENOUS at 16:06

## 2020-01-01 RX ADMIN — LORAZEPAM 2 MG: 2 INJECTION INTRAMUSCULAR at 01:13

## 2020-01-01 RX ADMIN — DOCUSATE SODIUM 100 MG: 100 CAPSULE, LIQUID FILLED ORAL at 21:50

## 2020-01-01 RX ADMIN — Medication 100 MCG/HR: at 12:47

## 2020-01-01 RX ADMIN — DOCUSATE SODIUM 100 MG: 100 CAPSULE, LIQUID FILLED ORAL at 21:29

## 2020-01-01 RX ADMIN — HEPARIN SODIUM 13.8 UNITS/KG/HR: 10000 INJECTION, SOLUTION INTRAVENOUS at 16:14

## 2020-01-01 RX ADMIN — METHYLPREDNISOLONE SODIUM SUCCINATE 20 MG: 40 INJECTION, POWDER, FOR SOLUTION INTRAMUSCULAR; INTRAVENOUS at 14:44

## 2020-01-01 RX ADMIN — PIPERACILLIN AND TAZOBACTAM 4.5 G: 4; .5 INJECTION, POWDER, LYOPHILIZED, FOR SOLUTION INTRAVENOUS; PARENTERAL at 10:06

## 2020-01-01 RX ADMIN — PIPERACILLIN AND TAZOBACTAM 4.5 G: 4; .5 INJECTION, POWDER, LYOPHILIZED, FOR SOLUTION INTRAVENOUS; PARENTERAL at 12:44

## 2020-01-01 RX ADMIN — PROPOFOL 20 MCG/KG/MIN: 10 INJECTION, EMULSION INTRAVENOUS at 09:33

## 2020-01-01 RX ADMIN — POLYETHYLENE GLYCOL 3350 17 G: 17 POWDER, FOR SOLUTION ORAL at 21:23

## 2020-01-01 RX ADMIN — CISATRACURIUM BESYLATE 4 MCG/KG/MIN: 2 INJECTION, SOLUTION INTRAVENOUS at 03:58

## 2020-01-01 RX ADMIN — CISATRACURIUM BESYLATE 2 MCG/KG/MIN: 2 INJECTION, SOLUTION INTRAVENOUS at 07:35

## 2020-01-01 RX ADMIN — Medication 10 ML: at 22:03

## 2020-01-01 RX ADMIN — METHYLPREDNISOLONE SODIUM SUCCINATE 40 MG: 40 INJECTION, POWDER, FOR SOLUTION INTRAMUSCULAR; INTRAVENOUS at 21:15

## 2020-01-01 RX ADMIN — Medication 10 MG: at 11:52

## 2020-01-01 RX ADMIN — PROPOFOL 30 MG: 10 INJECTION, EMULSION INTRAVENOUS at 11:51

## 2020-01-01 RX ADMIN — Medication 10 ML: at 06:22

## 2020-01-01 RX ADMIN — BUMETANIDE 1 MG: 0.25 INJECTION INTRAMUSCULAR; INTRAVENOUS at 16:30

## 2020-01-01 RX ADMIN — CHLORHEXIDINE GLUCONATE 10 ML: 1.2 RINSE ORAL at 21:20

## 2020-01-01 RX ADMIN — Medication 10 ML: at 21:59

## 2020-01-01 RX ADMIN — POLYETHYLENE GLYCOL 3350 17 G: 17 POWDER, FOR SOLUTION ORAL at 10:36

## 2020-01-01 RX ADMIN — ACETAMINOPHEN 1000 MG: 10 INJECTION, SOLUTION INTRAVENOUS at 15:00

## 2020-01-01 RX ADMIN — ALBUMIN (HUMAN) 25 G: 0.25 INJECTION, SOLUTION INTRAVENOUS at 05:32

## 2020-01-01 RX ADMIN — CHLORHEXIDINE GLUCONATE 10 ML: 1.2 RINSE ORAL at 09:37

## 2020-01-01 RX ADMIN — LORAZEPAM 1 MG: 2 INJECTION, SOLUTION INTRAMUSCULAR; INTRAVENOUS at 00:10

## 2020-01-01 RX ADMIN — BUMETANIDE 1 MG: 0.25 INJECTION INTRAMUSCULAR; INTRAVENOUS at 08:33

## 2020-01-01 RX ADMIN — Medication 20 MG: at 06:19

## 2020-01-01 RX ADMIN — LORAZEPAM 2 MG: 2 INJECTION INTRAMUSCULAR at 23:56

## 2020-01-01 RX ADMIN — ALBUMIN (HUMAN) 25 G: 0.25 INJECTION, SOLUTION INTRAVENOUS at 23:59

## 2020-01-01 RX ADMIN — DOCUSATE SODIUM 100 MG: 100 CAPSULE, LIQUID FILLED ORAL at 20:05

## 2020-01-01 RX ADMIN — POTASSIUM & SODIUM PHOSPHATES POWDER PACK 280-160-250 MG 2 PACKET: 280-160-250 PACK at 07:19

## 2020-01-01 RX ADMIN — Medication 50 MCG/HR: at 16:28

## 2020-01-01 RX ADMIN — POLYETHYLENE GLYCOL 3350 17 G: 17 POWDER, FOR SOLUTION ORAL at 21:35

## 2020-01-01 RX ADMIN — METHYLPREDNISOLONE SODIUM SUCCINATE 40 MG: 40 INJECTION, POWDER, FOR SOLUTION INTRAMUSCULAR; INTRAVENOUS at 13:50

## 2020-01-01 RX ADMIN — VANCOMYCIN HYDROCHLORIDE 1750 MG: 10 INJECTION, POWDER, LYOPHILIZED, FOR SOLUTION INTRAVENOUS at 23:53

## 2020-01-01 RX ADMIN — POLYETHYLENE GLYCOL 3350 17 G: 17 POWDER, FOR SOLUTION ORAL at 21:54

## 2020-01-01 RX ADMIN — PIPERACILLIN AND TAZOBACTAM 4.5 G: 4; .5 INJECTION, POWDER, LYOPHILIZED, FOR SOLUTION INTRAVENOUS; PARENTERAL at 18:07

## 2020-01-01 RX ADMIN — Medication 9 MG/HR: at 19:13

## 2020-01-01 RX ADMIN — Medication 10 ML: at 06:53

## 2020-01-01 RX ADMIN — ALBUMIN (HUMAN) 25 G: 0.25 INJECTION, SOLUTION INTRAVENOUS at 06:09

## 2020-01-01 RX ADMIN — CISATRACURIUM BESYLATE 6.6 MG: 2 INJECTION INTRAVENOUS at 17:46

## 2020-01-01 RX ADMIN — SODIUM CHLORIDE 40 MG: 9 INJECTION INTRAMUSCULAR; INTRAVENOUS; SUBCUTANEOUS at 08:54

## 2020-01-01 RX ADMIN — ALBUTEROL SULFATE 2 PUFF: 90 AEROSOL, METERED RESPIRATORY (INHALATION) at 20:00

## 2020-01-01 RX ADMIN — ACETAZOLAMIDE 250 MG: 250 TABLET ORAL at 21:33

## 2020-01-01 RX ADMIN — HEPARIN SODIUM 1000 UNITS: 200 INJECTION, SOLUTION INTRAVENOUS at 16:36

## 2020-01-01 RX ADMIN — Medication 10 ML: at 21:28

## 2020-01-01 RX ADMIN — PIPERACILLIN AND TAZOBACTAM 4.5 G: 4; .5 INJECTION, POWDER, LYOPHILIZED, FOR SOLUTION INTRAVENOUS; PARENTERAL at 18:25

## 2020-01-01 RX ADMIN — METHYLPREDNISOLONE SODIUM SUCCINATE 40 MG: 40 INJECTION, POWDER, FOR SOLUTION INTRAMUSCULAR; INTRAVENOUS at 09:27

## 2020-01-01 RX ADMIN — CHLORHEXIDINE GLUCONATE 10 ML: 1.2 RINSE ORAL at 08:19

## 2020-01-01 RX ADMIN — Medication 100 MCG/HR: at 10:15

## 2020-01-01 RX ADMIN — Medication 100 MCG/HR: at 02:31

## 2020-01-01 RX ADMIN — Medication 50 MCG/HR: at 02:05

## 2020-01-01 RX ADMIN — Medication 10 ML: at 21:05

## 2020-01-01 RX ADMIN — Medication 5 MG/HR: at 20:23

## 2020-01-01 RX ADMIN — POTASSIUM & SODIUM PHOSPHATES POWDER PACK 280-160-250 MG 2 PACKET: 280-160-250 PACK at 14:59

## 2020-01-01 RX ADMIN — LIDOCAINE HYDROCHLORIDE 5 ML: 10 INJECTION, SOLUTION INFILTRATION; PERINEURAL at 16:36

## 2020-01-01 RX ADMIN — Medication 10 MG: at 06:29

## 2020-01-01 RX ADMIN — CALCIUM GLUCONATE 2 G: 20 INJECTION, SOLUTION INTRAVENOUS at 02:01

## 2020-01-01 RX ADMIN — METHYLPREDNISOLONE SODIUM SUCCINATE 40 MG: 40 INJECTION, POWDER, FOR SOLUTION INTRAMUSCULAR; INTRAVENOUS at 21:35

## 2020-01-01 RX ADMIN — POTASSIUM BICARBONATE 20 MEQ: 782 TABLET, EFFERVESCENT ORAL at 10:46

## 2020-01-01 RX ADMIN — POLYETHYLENE GLYCOL 3350 17 G: 17 POWDER, FOR SOLUTION ORAL at 20:08

## 2020-01-01 RX ADMIN — Medication 100 MCG/HR: at 08:24

## 2020-01-01 RX ADMIN — CISATRACURIUM BESYLATE 3 MCG/KG/MIN: 2 INJECTION, SOLUTION INTRAVENOUS at 01:50

## 2020-01-01 RX ADMIN — Medication 100 MCG: at 06:31

## 2020-01-01 RX ADMIN — VANCOMYCIN HYDROCHLORIDE 1750 MG: 10 INJECTION, POWDER, LYOPHILIZED, FOR SOLUTION INTRAVENOUS at 13:34

## 2020-01-01 RX ADMIN — FUROSEMIDE 8 MG/HR: 10 INJECTION, SOLUTION INTRAMUSCULAR; INTRAVENOUS at 18:25

## 2020-01-01 RX ADMIN — ALBUMIN (HUMAN) 25 G: 0.25 INJECTION, SOLUTION INTRAVENOUS at 05:15

## 2020-01-01 RX ADMIN — AMLODIPINE BESYLATE 10 MG: 5 TABLET ORAL at 10:36

## 2020-01-01 RX ADMIN — PIPERACILLIN AND TAZOBACTAM 4.5 G: 4; .5 INJECTION, POWDER, LYOPHILIZED, FOR SOLUTION INTRAVENOUS; PARENTERAL at 18:54

## 2020-01-01 RX ADMIN — Medication 5 MG/HR: at 04:04

## 2020-01-01 RX ADMIN — CISATRACURIUM BESYLATE 4 MCG/KG/MIN: 2 INJECTION, SOLUTION INTRAVENOUS at 00:26

## 2020-01-01 RX ADMIN — ALBUMIN (HUMAN) 25 G: 0.25 INJECTION, SOLUTION INTRAVENOUS at 20:21

## 2020-01-01 RX ADMIN — POTASSIUM CHLORIDE 40 MEQ: 1500 TABLET, EXTENDED RELEASE ORAL at 15:25

## 2020-01-01 RX ADMIN — PIPERACILLIN AND TAZOBACTAM 3.38 G: 3; .375 INJECTION, POWDER, LYOPHILIZED, FOR SOLUTION INTRAVENOUS at 13:34

## 2020-01-01 RX ADMIN — Medication 100 MCG: at 06:26

## 2020-01-01 RX ADMIN — ALBUMIN (HUMAN) 25 G: 0.25 INJECTION, SOLUTION INTRAVENOUS at 21:22

## 2020-01-01 RX ADMIN — SODIUM CHLORIDE 40 MG: 9 INJECTION INTRAMUSCULAR; INTRAVENOUS; SUBCUTANEOUS at 09:12

## 2020-01-01 RX ADMIN — ALBUMIN (HUMAN) 25 G: 0.25 INJECTION, SOLUTION INTRAVENOUS at 09:47

## 2020-01-01 RX ADMIN — Medication 100 MCG/HR: at 03:33

## 2020-01-01 RX ADMIN — CISATRACURIUM BESYLATE 4 MCG/KG/MIN: 2 INJECTION, SOLUTION INTRAVENOUS at 21:49

## 2020-01-01 RX ADMIN — ACETAMINOPHEN 650 MG: 325 TABLET ORAL at 16:17

## 2020-01-01 RX ADMIN — Medication 10 ML: at 18:39

## 2020-01-01 RX ADMIN — METHYLPREDNISOLONE SODIUM SUCCINATE 20 MG: 40 INJECTION, POWDER, FOR SOLUTION INTRAMUSCULAR; INTRAVENOUS at 10:35

## 2020-01-01 RX ADMIN — APIXABAN 2.5 MG: 5 TABLET, FILM COATED ORAL at 21:50

## 2020-01-01 RX ADMIN — PIPERACILLIN AND TAZOBACTAM 4.5 G: 4; .5 INJECTION, POWDER, LYOPHILIZED, FOR SOLUTION INTRAVENOUS; PARENTERAL at 15:16

## 2020-01-01 RX ADMIN — CISATRACURIUM BESYLATE 3 MCG/KG/MIN: 2 INJECTION, SOLUTION INTRAVENOUS at 16:17

## 2020-01-01 RX ADMIN — Medication 100 MCG/HR: at 05:28

## 2020-01-01 RX ADMIN — METHYLPREDNISOLONE SODIUM SUCCINATE 40 MG: 40 INJECTION, POWDER, FOR SOLUTION INTRAMUSCULAR; INTRAVENOUS at 14:39

## 2020-01-01 RX ADMIN — CHLORHEXIDINE GLUCONATE 10 ML: 1.2 RINSE ORAL at 21:36

## 2020-01-01 RX ADMIN — POTASSIUM CHLORIDE 20 MEQ: 1500 TABLET, EXTENDED RELEASE ORAL at 18:25

## 2020-01-01 RX ADMIN — Medication 100 MG: at 07:46

## 2020-01-01 RX ADMIN — LORAZEPAM 2 MG: 2 INJECTION INTRAMUSCULAR at 20:33

## 2020-01-01 RX ADMIN — CISATRACURIUM BESYLATE 3 MCG/KG/MIN: 2 INJECTION, SOLUTION INTRAVENOUS at 14:50

## 2020-01-01 RX ADMIN — CHLORHEXIDINE GLUCONATE 10 ML: 1.2 RINSE ORAL at 08:49

## 2020-01-01 RX ADMIN — POTASSIUM BICARBONATE 20 MEQ: 782 TABLET, EFFERVESCENT ORAL at 10:34

## 2020-01-01 RX ADMIN — Medication 10 ML: at 05:36

## 2020-01-01 RX ADMIN — SODIUM CHLORIDE 40 MG: 9 INJECTION INTRAMUSCULAR; INTRAVENOUS; SUBCUTANEOUS at 09:52

## 2020-01-01 RX ADMIN — SODIUM CHLORIDE 40 MG: 9 INJECTION INTRAMUSCULAR; INTRAVENOUS; SUBCUTANEOUS at 09:57

## 2020-01-01 RX ADMIN — APIXABAN 2.5 MG: 5 TABLET, FILM COATED ORAL at 10:33

## 2020-01-01 RX ADMIN — Medication 10 ML: at 13:44

## 2020-01-01 RX ADMIN — CHLORHEXIDINE GLUCONATE 10 ML: 1.2 RINSE ORAL at 09:52

## 2020-01-01 RX ADMIN — Medication 100 MCG/HR: at 14:11

## 2020-01-01 RX ADMIN — Medication 100 MCG/HR: at 17:32

## 2020-01-01 RX ADMIN — CHLORHEXIDINE GLUCONATE 10 ML: 1.2 RINSE ORAL at 09:41

## 2020-04-21 PROBLEM — I50.9 ACUTE CHF (CONGESTIVE HEART FAILURE) (HCC): Status: ACTIVE | Noted: 2020-01-01

## 2020-04-21 PROBLEM — J96.92 RESPIRATORY FAILURE WITH HYPOXIA AND HYPERCAPNIA (HCC): Status: ACTIVE | Noted: 2020-01-01

## 2020-04-21 PROBLEM — J96.91 RESPIRATORY FAILURE WITH HYPOXIA AND HYPERCAPNIA (HCC): Status: ACTIVE | Noted: 2020-01-01

## 2020-04-21 NOTE — ED TRIAGE NOTES
Triage: pt with increasing SOB that began today. Pt with hx of CHF. States that he takes 40mg Lasix daily. Room air sat 79%. Pt with swelling to adbomen, scrotum, LLE.

## 2020-04-21 NOTE — ED NOTES
Patient noted to have o2 sats of 64% after pulling nasal cannula off. Patient placed on non-rebreather and Dr. Marcial Hernandez to bedside.

## 2020-04-21 NOTE — ED PROVIDER NOTES
EMERGENCY DEPARTMENT HISTORY AND PHYSICAL EXAM 
 
Date: 4/21/2020 Patient Name: Staci Owens. History of Presenting Illness Chief Complaint Patient presents with  Shortness of Breath  Leg Swelling History Provided By: Patient Additional History (Context):  
Staci Alcantara is a 53 y.o. male with PMHX atrial flutter on Xarelto, morbidly obese, history of congestive heart failure, history of sleep apnea on CPAP at night presents to the emergency department C/O testicular swelling, lower extremity swelling, shortness of breath. Dates that he has missed his Lasix x3 days. Reports that his swelling has increased to his abdomen. Pt denies chest pain, abdominal pain, nausea, vomiting, and any other sxs or complaints. PCP: Carmen Dang MD 
 
Current Outpatient Medications Medication Sig Dispense Refill  losartan (COZAAR) 25 mg tablet Take 1 Tab by mouth daily. 30 Tab 0  
 metoprolol succinate (TOPROL-XL) 25 mg XL tablet Take 1 Tab by mouth daily. 30 Tab 0  
 rivaroxaban (XARELTO) 20 mg tab tablet Take 20 mg by mouth daily.  amiodarone (CORDARONE) 200 mg tablet Take 1 Tab by mouth daily. 90 Tab 3  furosemide (LASIX) 40 mg tablet Take 1 Tab by mouth two (2) times a day. 60 Tab 1 Past History Past Medical History: 
Past Medical History:  
Diagnosis Date  Arrhythmia  Arthritis   
 knees  CHF (congestive heart failure) ([address]  Chronic back pain  Chronic renal insufficiency, stage II (mild)  History of atrial flutter Dx 6/2016 - anticoagulated by Chad Chauhan  Hypertension 2015  Limited mobility  Migraine headache  Morbid obesity ([address]  Morbid obesity with body mass index of 60.0-69.9 in adult [facility])  Sleep apnea   
 uses c-pap instructed to bring day of surgery  Smoking   
 very passive / cigars only Past Surgical History: 
Past Surgical History:  
Procedure Laterality Date  CARDIAC SURG PROCEDURE UNLIST    
 cardioversion  ECHOCARDIOGRAM  04/2016 EF 60-65% (performed at [facility] - see cardiology tab) Family History: 
Family History Problem Relation Age of Onset  Obesity Mother  Liver Disease Sister Social History: 
Social History Tobacco Use  Smoking status: Former Smoker Types: Cigars  Smokeless tobacco: Never Used Substance Use Topics  Alcohol use: No  
 Drug use: No  
 
 
Allergies: 
No Known Allergies Review of Systems Review of Systems Constitutional: Negative for chills and fever. HENT: Negative for congestion, ear pain, sinus pain and sore throat. Eyes: Negative for pain and visual disturbance. Respiratory: Positive for shortness of breath. Negative for cough. Cardiovascular: Negative for chest pain and leg swelling. Gastrointestinal: Negative for abdominal pain, constipation, diarrhea, nausea and vomiting. Genitourinary: Positive for scrotal swelling. Negative for dysuria and hematuria. Musculoskeletal: Negative for back pain and neck pain. Skin: Negative for pallor and rash. Neurological: Negative for dizziness, tremors, weakness, light-headedness and headaches. All other systems reviewed and are negative. Physical Exam  
 
Vitals:  
 04/21/20 1915 04/21/20 1916 04/21/20 1930 04/21/20 1951 BP: (!) 147/96  (!) 171/106 Pulse: 78 78 80 Resp: 24 25 22 Temp:      
SpO2: 90% 94% 92% 98% Weight:      
Height:      
 
Physical Exam 
 
Nursing note and vitals reviewed Constitutional: Morbidly obese middle-age -American male, dyspneic, generalized anasarca  
head: Normocephalic, Atraumatic Eyes: Pupils are equal, round, and reactive to light, EOMI Neck: Supple, non-tender Cardiovascular: Regular rate and rhythm, no murmurs, rubs, or gallops Chest: Dyspneic and tachypneic with symmetrical chest excursion bilaterally Lungs: Diminished breath sounds secondary to obese habitus, bibasilar crackles no wheezes, no rhonchi Abdomen: Soft, non tender, non distended, normoactive bowel sounds : Bilateral testicular swelling with generalized anasarca extending to his abdomen Back: No evidence of trauma or deformity Extremities: +4 pitting edema of his lower extremities generalized anasarca. No streaking erythema, vesicular lesions, ulcerations or bulla Skin: Warm and dry, normal cap refill Neuro: Alert and appropriate, CN intact, normal speech, moving all 4 extremities freely and symmetrically Psychiatric: Normal mood and affect Diagnostic Study Results Labs - Recent Results (from the past 12 hour(s)) CARDIAC PANEL,(CK, CKMB & TROPONIN) Collection Time: 04/21/20  6:30 PM  
Result Value Ref Range CK 1,097 (H) 39 - 308 U/L  
 CK - MB 9.2 (H) <3.6 ng/ml CK-MB Index 0.8 0.0 - 4.0 % Troponin-I, QT 0.12 (H) 0.0 - 0.045 NG/ML  
CBC WITH AUTOMATED DIFF Collection Time: 04/21/20  6:30 PM  
Result Value Ref Range WBC 4.5 (L) 4.6 - 13.2 K/uL  
 RBC 5.00 4.70 - 5.50 M/uL  
 HGB 13.6 13.0 - 16.0 g/dL HCT 45.6 36.0 - 48.0 % MCV 91.2 74.0 - 97.0 FL  
 MCH 27.2 24.0 - 34.0 PG  
 MCHC 29.8 (L) 31.0 - 37.0 g/dL  
 RDW 14.6 (H) 11.6 - 14.5 % PLATELET 923 180 - 436 K/uL MPV 9.8 9.2 - 11.8 FL  
 NEUTROPHILS 58 40 - 73 % LYMPHOCYTES 25 21 - 52 % MONOCYTES 14 (H) 3 - 10 % EOSINOPHILS 3 0 - 5 % BASOPHILS 0 0 - 2 %  
 ABS. NEUTROPHILS 2.6 1.8 - 8.0 K/UL  
 ABS. LYMPHOCYTES 1.1 0.9 - 3.6 K/UL  
 ABS. MONOCYTES 0.6 0.05 - 1.2 K/UL  
 ABS. EOSINOPHILS 0.1 0.0 - 0.4 K/UL  
 ABS. BASOPHILS 0.0 0.0 - 0.1 K/UL  
 DF AUTOMATED METABOLIC PANEL, COMPREHENSIVE Collection Time: 04/21/20  6:30 PM  
Result Value Ref Range Sodium 141 136 - 145 mmol/L Potassium 4.0 3.5 - 5.5 mmol/L Chloride 102 100 - 111 mmol/L  
 CO2 36 (H) 21 - 32 mmol/L  Anion gap 3 3.0 - 18 mmol/L  
 Glucose 95 74 - 99 mg/dL BUN 17 7.0 - 18 MG/DL Creatinine 1.31 (H) 0.6 - 1.3 MG/DL  
 BUN/Creatinine ratio 13 12 - 20 GFR est AA >60 >60 ml/min/1.73m2 GFR est non-AA 59 (L) >60 ml/min/1.73m2 Calcium 8.0 (L) 8.5 - 10.1 MG/DL Bilirubin, total 0.5 0.2 - 1.0 MG/DL  
 ALT (SGPT) 49 16 - 61 U/L  
 AST (SGOT) 48 (H) 10 - 38 U/L Alk. phosphatase 62 45 - 117 U/L Protein, total 6.7 6.4 - 8.2 g/dL Albumin 2.8 (L) 3.4 - 5.0 g/dL Globulin 3.9 2.0 - 4.0 g/dL A-G Ratio 0.7 (L) 0.8 - 1.7 NT-PRO BNP Collection Time: 04/21/20  6:30 PM  
Result Value Ref Range NT pro-BNP 1,740 (H) 0 - 450 PG/ML  
EKG, 12 LEAD, INITIAL Collection Time: 04/21/20  6:34 PM  
Result Value Ref Range Ventricular Rate 81 BPM  
 Atrial Rate 81 BPM  
 P-R Interval 198 ms QRS Duration 112 ms  
 Q-T Interval 406 ms QTC Calculation (Bezet) 471 ms Calculated P Axis 60 degrees Calculated R Axis -25 degrees Calculated T Axis -14 degrees Diagnosis Normal sinus rhythm Nonspecific ST and T wave abnormality Prolonged QT Abnormal ECG Confirmed by Olga Mcguire MD, [address] [place] [address] ([facility]) on 4/21/2020 7:49:37 PM 
  
POC G3 Collection Time: 04/21/20  7:45 PM  
Result Value Ref Range Device: Non rebreather Flow rate (POC) 15 L/M  
 FIO2 (POC) 1.0 %  
 pH (POC) 7.206 (LL) 7.35 - 7.45    
 pCO2 (POC) 96.5 (H) 35.0 - 45.0 MMHG  
 pO2 (POC) 89 80 - 100 MMHG  
 HCO3 (POC) 37.9 (H) 22 - 26 MMOL/L  
 sO2 (POC) 93 92 - 97 % Base excess (POC) 10 mmol/L Allens test (POC) YES Total resp. rate 22 Site LEFT RADIAL Patient temp. 100.0 Specimen type (POC) ARTERIAL Performed by Nikunj Alvarez Radiologic Studies -  
XR CHEST PORT Final Result IMPRESSION:  
  
  
1. Stable cardiomegaly with probable mild pulmonary congestion. 2. Cannot exclude left lower lobe infiltrate/atelectasis. CT Results  (Last 48 hours) None CXR Results  (Last 48 hours) 04/21/20 1848  XR CHEST PORT Final result Impression:  IMPRESSION:  
   
   
1. Stable cardiomegaly with probable mild pulmonary congestion. 2. Cannot exclude left lower lobe infiltrate/atelectasis. Narrative:  EXAM: CHEST RADIOGRAPH  
   
CLINICAL INDICATION/HISTORY: SOB  
  > Additional: None COMPARISON: 10/16/2018. TECHNIQUE: Portable frontal view of the chest  
   
_______________ FINDINGS:  
   
SUPPORT DEVICES: None. HEART AND MEDIASTINUM: Cardiac size remains enlarged. Pulmonary vasculature  
mediastinal structures are stable. LUNGS AND PLEURAL SPACES: Shallow inspiration. Cannot exclude a left  
retrocardiac opacity. No definite pleural effusion or pneumothorax. BONY THORAX AND SOFT TISSUES: Unremarkable.  
   
_______________ Medical Decision Making I am the first provider for this patient. I reviewed the vital signs, available nursing notes, past medical history, past surgical history, family history and social history. Vital Signs-Reviewed the patient's vital signs. Pulse Oximetry Analysis -69 % on room air Cardiac Monitor: 
Rate: 78 bpm 
Rhythm: Regular EKG interpretation: (Preliminary) 7:07 PM  
Normal sinus rhythm 78 beats minute. QTc 471 ms. No acute ST elevation. Records Reviewed: Nursing Notes and Old Medical Records Provider Notes:  
53 y.o. male with a history of morbid obesity, atrial flutter on anticoagulation, CHF, sleep apnea presenting with shortness of breath. On presentation, patient noted to be hypoxic. He appears dyspneic and tachypneic. He has generalized anasarca with +4 pitting edema of his lower extremities. Bibasilar crackles. Clinical suspicion for likely fluid overload as patient has been missing his Lasix dose. Will check BNP and chest x-ray. Will give 80 mg of Lasix. Will likely require admission for diuresis. Procedures: 
Procedures ED Course:  
7:07 PM 
 Initial assessment performed. The patients presenting problems have been discussed, and they are in agreement with the care plan formulated and outlined with them. I have encouraged them to ask questions as they arise throughout their visit. 7:44 PM 
Patient noted to have an elevated troponin at 0.12. Also CPK elevated at 1097. He does have chronically elevated troponin and is on Xarelto daily. BNP is elevated at 1700 and patient with pulmonary congestion on chest x-ray. Patient given 80 mg of Lasix. ABG showing acute on chronic hypercapnia. Although patient is usually on CPAP at night, he will require BiPAP for his acute hypercapnia. Will admit to the ICU. Diagnosis and Disposition 7:47 PM 
I have spent 45 minutes of critical care time involved in lab review, consultations with specialist, family decision-making, and documentation. During this entire length of time I was immediately available to the patient. Critical Care: The reason for providing this level of medical care for this critically ill patient was due a critical illness that impaired one or more vital organ systems such that there was a high probability of imminent or life threatening deterioration in the patients condition. This care involved high complexity decision making to assess, manipulate, and support vital system functions, to treat this degreee vital organ system failure and to prevent further life threatening deterioration of the patients condition. Core Measures: 
For Hospitalized Patients: 
 
1. Hospitalization Decision Time: The decision to hospitalize the patient was made by Anayeli Ross DO at 7:48 PM on 4/21/2020 2. Aspirin: Aspirin was not given because the patient did not present with a stroke at the time of their Emergency Department evaluation 7:28 PM  Patient is being admitted to the hospital by Dr. Liset Lopez Lazaro. The results of their tests and reasons for their admission have been discussed with them and/or available family. They convey agreement and understanding for the need to be admitted and for their admission diagnosis. CONDITIONS ON ADMISSION: 
Sepsis is not present at the time of admission. Pneumonia is not present at the time of admission. MRSA is not present at the time of admission. Wound infection is not present at the time of admission. Pressure Ulcer is not present at the time of admission. CLINICAL IMPRESSION: 
 
1. Morbidly obese ([address] 2. Acute congestive heart failure, unspecified heart failure type ([address] 3. Elevated troponin 4. Hypoxia 5. Acute on chronic respiratory failure with hypoxia and hypercapnia (HCC)   
 
____________________________________ Please note that this dictation was completed with [employer], the computer voice recognition software. Quite often unanticipated grammatical, syntax, homophones, and other interpretive errors are inadvertently transcribed by the computer software. Please disregard these errors. Please excuse any errors that have escaped final proofreading.

## 2020-04-22 PROBLEM — J96.21 ACUTE ON CHRONIC RESPIRATORY FAILURE WITH HYPOXIA AND HYPERCAPNIA (HCC): Status: ACTIVE | Noted: 2020-01-01

## 2020-04-22 PROBLEM — J96.22 ACUTE ON CHRONIC RESPIRATORY FAILURE WITH HYPOXIA AND HYPERCAPNIA (HCC): Status: ACTIVE | Noted: 2020-01-01

## 2020-04-22 NOTE — PROGRESS NOTES
PER HOSPITAL POLICY, PATIENTS ON DROPLET PLUS ISOLATION SHOULD NOT BE GIVEN AEROSOLIZED NEB TX'S. D/C'D ALBUTEROL NEBS.

## 2020-04-22 NOTE — PROCEDURES
[facility] Pul[facility], Critical Care, and Sleep Medicine Central Line Procedure with Ultrasound Guidance Indication: Need for vasopressors Risks, benefits, alternatives explained and consent obtained. Patient positioned in Trendelenburg. Timeout called with RN and confirmed patient ID, procedure, site, allergy, consent etc.  
 
Central line Bundle: 
Full sterile barrier precautions used. 7-Step Sterility Protocol followed. (cap, mask sterile gown, sterile gloves, large sterile sheet, hand hygiene, 2% chlorhexidine for cutaneous antisepsis) 5 mL 1% Lidocaine placed at insertion site. Using ultrasound guidance, Right internal jugular cannulated x 1 attempt(s) utilizing the modified Seldinger technique. mild difficulty. Position of wire confirmed in vein using US before dilating. Wire was removed. Good blood return. Catheter secured & Biopatch applied. Sterile Tegaderm placed. Ultrasound used to rule out pneumothorax. CXR pending.    
 
Jonah Meza MD  
4/22/2020 2:53 PM

## 2020-04-22 NOTE — ROUTINE PROCESS
In chart to receive report from ED. I was informed that this patient is on continuous bipap. This unit is does not accept continuous bipap. Nurse informed of this concern.

## 2020-04-22 NOTE — PROGRESS NOTES
conducted an initial consultation and Spiritual Assessment for Rosi Chandra, who is a 53 y.o.,male. Patient is intubated and in isolation. I called his wife at home. They have a daughter (24) in [place], [place] and two teens at home (13 and 15). They are active at a [employer] and the  is aware he is here. Wife is grateful for call, prayers and support The reason the Patient came to the hospital is:  
Patient Active Problem List  
 Diagnosis Date Noted  Acute on chronic respiratory failure with hypoxia and hypercapnia ([address] 04/22/2020  Acute CHF (congestive heart failure) ([address] 04/21/2020  Respiratory failure with hypoxia and hypercapnia ([address] 04/21/2020  Elevated troponin 10/16/2018  Acute respiratory distress 10/16/2018  CHF (congestive heart failure) ([address] 10/15/2018  Acute diastolic CHF (congestive heart failure) ([address] 10/15/2018  Chronic a-fib 10/15/2018  Atrial flutter ([address] 10/15/2018  Chronic renal insufficiency, stage II (mild)  BMI 70 and over, adult ([address]  Chronic back pain  Arthritis  Migraine  History of atrial flutter  Limited mobility  Smoking  Cellulitis of left lower leg 04/04/2016  Anemia 04/04/2016  Morbid obesity ([address] 04/04/2016  Dyspnea 04/04/2016  Sleep apnea 04/04/2016  Sinus tachycardia 04/04/2016 Initiated a relationship of care and support with family. Listened empathically. Provided information about Spiritual Care Services. Offered assurance of continued prayers on patients behalf. Chart reviewed.  confirmed Patient's [employer]ism Affiliation. Wife expressed gratitude for Spiritual Care visit. Chaplains will continue to follow and will provide pastoral care as needed or requested.  recommends bedside caregivers page  on duty if patient shows signs of acute spiritual or emotional distress.  
 
[address] [facility] M.Div. 
 Board Certified [employer] [phone] - Office

## 2020-04-22 NOTE — PROGRESS NOTES
Chart reviewed. Pt admitted to ICU. Pt noted to be vented. No ACP noted, per protocol palliative consult placed. Pt has spouse listed as emergency contact. Pt noted to be covid r/o. CM will follow for transition of care needs [phone] Reason for Admission:   Per H&P, pt is \"is a 53 y.o. male who has a hx of morbid obesity, MARIA FERNANDA, chronic afib on anticoagulation, and systolic CHF who presents with worsening swelling of his legs and scrotum over the past 3 days. He was not able to get his lasix refilled due to the pandemic. He reports taking all of his other prescribed medications and compliance with CPAP at night. He denies any CP or SOB. He has some abdominal distension. He reports being intubated once in the past for breathing problems. \" 
                
RUR Score:    low Plan for utilizing home health:      TBD 
 
PCP: First and Last name:  Listed as Marium Garvin Name of Practice:  
 Are you a current patient: Yes/No:  
 Approximate date of last visit:  
                 
Current Advanced Directive/Advance Care Plan: Not on file, palliative consult noted Transition of Care Plan:     TBD Care Management Interventions Transition of Care Consult (CM Consult): Discharge Planning

## 2020-04-22 NOTE — PROGRESS NOTES
Bedside, Verbal and Written shift change report given to JAS Cleary (oncoming nurse) by Johana Lu Ed (offgoing nurse). Report included the following information SBAR, Kardex, Intake/Output and Recent Results. 6050  Dr. Leeroy Schreiber at bedside for central line placement. Materials at bedside, consent obtained by Dr. Leeroy Schreiber. 200  Dr. Blas Finley at bedside assessing pt. Per Dr. Blas Finley, ok to given po amio, hold  For Hrt less than 50.   
1600  No change, VSS, remains vented with sedation, moved pt from rm 1 to rm 3 
1900  Bedside, Verbal and Written shift change report given to [address]  (oncoming nurse) by JAS Cleary (offgoing nurse). Report included the following information SBAR, Kardex, Intake/Output and Recent Results.

## 2020-04-22 NOTE — CONSULTS
[facility]ists Pulmonary, Critical Care, and Sleep Medicine Name: Justyn Avery MRN: [id number] : 1975 Hospital: [facility] [place] Date: 2020 Pulmonary Critical Care Consult IMPRESSION:  
Patient Active Problem List  
Diagnosis Code  Cellulitis of left lower leg L03. 116  
 Anemia D64.9  Morbid obesity ([facility]) E66.01  
 Dyspnea R06.00  Sleep apnea G47.30  Sinus tachycardia R00.0  BMI 70 and over, adult [facility]) T37.91  Chronic back pain M54.9, G89.29  
 Arthritis M19.90  Migraine G43.909  
 History of atrial flutter Z86.79  
 Limited mobility Z74.09  
 Smoking F17.200  Chronic renal insufficiency, stage II (mild) N18.2  CHF (congestive heart failure) ([facility]) I50.9  Acute diastolic CHF (congestive heart failure) ([facility]) I50.31  Chronic a-fib I48.20  Atrial flutter ([facility]) I48.92  
 Elevated troponin R79.89  
 Acute respiratory distress R06.03  
 Acute CHF (congestive heart failure) ([facility]) I50.9  Respiratory failure with hypoxia and hypercapnia ([facility]) J96.91, J96.92  
 Acute on chronic respiratory failure with hypoxia and hypercapnia ([facility]) J96.21, J96.22 · · Neuro: PRN pain medications, +/- sedation avoid propofol due to low HR. Versed and fentanyl. · CVS: hypotension and bradycardia sedation vs primary cardiac issues. Dc propofol, follow echo trop ecg cardiology consult add asa and continue xarelto  HD stable; Monitor CVP, Actively titrate vasopressors aim MAP >65mmHg, Check cardiac panel, ECHO results. · Resp: Intubated on 2020 on  at 7 AM seld extubated brioef cardiac arrest PEA/ re intubated. · Titrate FiO2/ supp O2 for SpO2 >90%; full ventilaotry support  Ac Fio2 90% wean · PEEP 10 · I/D:  ? Aspiration pneumonia check for COVID very unlikely  for now no plaquenil cardiac risks Afebrile; aleukocytosis; Sepsis bundle per hospital protocol, f/u BxCx/UC, Sputum Cx's.  LA ordered- initial and repeat Q4hrs till normalized. ABX :ceftriaone doxy  Descalate ABX once Cx's finalize. RESP CULURE COVID 19  
· Hem/Onc: Daily CBC; H/H, and plts are stable. pvl Velna Beer · Metabolic: Daily BMP; monitor e-lytes; replace PRN 
· Renal: Trend Renal indices; Diuresis, Clark to BSD, · Endocrine: POC Glucose q6; Check TSH level · GI: SUP, Trend LFTs, Zofran PRN for N/V  
· Musc/Skin: No acute issues, wound care · Fluids: NACL LBUMIBN  
· Code Status:FULL  
RECOMMENDATIONS:  
Neuro: VERSED FENTANYL Respiratory :full ventilatory support Ac TV  550 stable fio2 wean , PEEP 10 continue morbid obesity [facility]. Ventilated patients- aim to keep peak plateau pressure less than or equal to 30cm H2O. Titrate FiO2 for goal SPO2> 90% VAP prevention bundle, head of the bed at 30' all times Daily sedation holiday and assessment for weaning with SBT as tolerated Sputum culture Continue bronchodilators, pulmonary hygiene care Steroids add Sepsis bundle per hospital protocol CVS:? CHF/ ? NSTEMI 
 xarelto Asa 
lipitor Fluids: 
Monitor CVP Actively titrate vasopressors aim MAP >65mmHg Check cardiac panel, ECHO Diuresis ID Antibiotic choice: 
Cultures drawn and will be followed. Lactic acid ordered- initial and repeat Q6hrs if elevated till normalized. Endo Glycemic control Renal 
GI Stress ulcer prophylaxis DVT prophylaxis AM labs Diet: DIET NPO Palliative care consult Vent Bundle Followed, Vent Day 1 Will defer respective systems problem management to primary and other consultant and follow patient in ICU with primary and other medical team 
Further recommendations will be based on the patient's response to recommended treatment and results of the investigation ordered. Quality Care: PPI, DVT prophylaxis, HOB elevated, Infection control all reviewed and addressed. PAIN AND SEDATION: yes · Skin/Wound: yes · Nutritiyes · Prophylaxis: DVT and GI Prophylaxis reviewed. · Restraints: yes · PT/OT eval and treat: ·   
· Lines/Tubes: lines daniel  Ventilator ADVANCE DIRECTIVE: . Palliative FAMILY DISCUSSION: full Events and notes from last 24 hours reviewed. Care plan discussed with nursing Subjective/History:  
Mac Sanchez. has been seen and evaluated as respiratory failure. 26-year-old male with morbid obesity, ex-smoker, history of asthma, history of severe obstructive sleep apnea on CPAP questionable component compliance, history of congestive heart failure with reduced ejection fraction atrial fibrillation atrial flutter on amiodarone, history of chronic renal insufficiency, cellulitis of lower extremities and anemia,. Patient was in his usual state of health. As per family did not have any fever or upper respiratory tract infections and no flulike symptoms Ms. he is Lasix not clear if also missed his CPAP presented with acute shortness of breath worsening edema of lower extremities He was found to be in acute hypercarbic hypoxemic respiratory failure initially had a trial of BiPAP but failed and then eventually was intubated he was hypotensive. Periodically and slightly bradycardic. Moderate amount of secretions per next of breath groundglass clinical presentation trial strongly suspicious for congestive heart failure but to be on the safe side we will check patient also for viral infection and bacterial pneumonia. Consider also aspiration event. Morbid obesity. Patient is compliant with Xarelto. We will do follow-up PVL and continue his anticoagulation The patient is critically ill and can not provide additional history due to Ventilated. Review of Systems: A comprehensive review of systems was negative except for that written in the HPI. []The patient is unable to give any meaningful history or review of systems because the patient is: 
[]Intubated []Sedated  
[]Unresponsive []Ventilated []The patient is critically ill on     
 []Mechanical ventilation []Pressors []BiPAP [] Latest lactic acid:  
Lactic acid Date Value Ref Range Status 06/09/2017 1.1 0.4 - 2.0 MMOL/L Final  
 
 
Completed IVF Resuscitation (30ml/kg) - yes  IVF choice: 0.9NS Suspected source/s of severe sepsis: Pneumonia Organ dysfunction: Yes Antibiotics: ceftriaxone Completed physical exam: yes Past Medical History: 
Past Medical History:  
Diagnosis Date  Arrhythmia  Arthritis   
 knees  CHF (congestive heart failure) ([facility] [address]  Chronic back pain  Chronic renal insufficiency, stage II (mild)  History of atrial flutter Dx 6/2016 - anticoagulated by Roc Price  Hypertension 2015  Limited mobility  Migraine headache  Morbid obesity ([facility] [address]  Morbid obesity with body mass index of 60.0-69.9 in adult [facility])  Sleep apnea   
 uses c-pap instructed to bring day of surgery  Smoking   
 very passive / cigars only Past Surgical History: 
Past Surgical History:  
Procedure Laterality Date  CARDIAC SURG PROCEDURE UNLIST    
 cardioversion  ECHOCARDIOGRAM  04/2016 EF 60-65% (performed at [facility] - see cardiology tab) Medications: 
Prior to Admission medications Medication Sig Start Date End Date Taking? Authorizing Provider  
furosemide (LASIX) 40 mg tablet Take 1 Tab by mouth two (2) times a day. 4/12/16  Yes Deana Ovalles MD  
 
 
Current Facility-Administered Medications Medication Dose Route Frequency  pantoprazole (PROTONIX) 40 mg in 0.9% sodium chloride 10 mL injection  40 mg IntraVENous DAILY  albumin human 25% (BUMINATE) solution 25 g  25 g IntraVENous Q6H  
 furosemide (LASIX) injection 80 mg  80 mg IntraVENous BID  fentaNYL (PF) 900 mcg/30 ml infusion soln  25-50 mcg/hr IntraVENous TITRATE  
 NOREPINephrine (LEVOPHED) 8 mg in 0.9% NS 250ml infusion  0.5-30 mcg/min IntraVENous TITRATE  midazolam in normal saline (VERSED) 1 mg/mL infusion  1-3 mg/hr IntraVENous TITRATE  perflutren lipid microspheres (DEFINITY) in NS bolus IV  1 mL IntraVENous RAD ONCE  
 cefTRIAXone (ROCEPHIN) 1 g in sterile water (preservative free) 10 mL IV syringe  1 g IntraVENous Q24H  
 doxycycline (VIBRAMYCIN) 100 mg in 0.9% sodium chloride (MBP/ADV) 100 mL MBP  100 mg IntraVENous Q12H  
 sodium chloride (NS) flush 5-40 mL  5-40 mL IntraVENous Q8H  
 amiodarone (CORDARONE) tablet 200 mg  200 mg Oral DAILY  losartan (COZAAR) tablet 25 mg  25 mg Oral DAILY  rivaroxaban (XARELTO) tablet 20 mg  20 mg Oral DAILY  metoprolol succinate (TOPROL-XL) XL tablet 25 mg  25 mg Oral DAILY Allergy: No Known Allergies Social History: 
Social History Tobacco Use  Smoking status: Former Smoker Types: Cigars  Smokeless tobacco: Never Used Substance Use Topics  Alcohol use: No  
 Drug use: No  
  
 
Family History: 
Family History Problem Relation Age of Onset  Obesity Mother  Liver Disease Sister Objective:  
Vital Signs:   
Blood pressure 153/86, pulse 66, temperature 98.2 °F (36.8 °C), resp. rate 24, height 5' 10\" (1.778 m), weight (!) 269.9 kg (595 lb), SpO2 92 %. Body mass index is 85.37 kg/m². O2 Device: BIPAP  
O2 Flow Rate (L/min): 12 l/min Temp (24hrs), Av.1 °F (36.7 °C), Min:97.5 °F (36.4 °C), Max:99 °F (37.2 °C) Patient Vitals for the past 8 hrs: 
 Temp Pulse Resp BP SpO2  
20 0912  66 24  92 % 20 0706 98.2 °F (36.8 °C)      
20 0606  (!) 59 20  96 % 20 0515  80  153/86   
20 0410 97.5 °F (36.4 °C)      
20 0400 97.5 °F (36.4 °C) 78 23 144/67 93 % 20 0347     97 % 20 0330  79 22 160/90 94 % 20 0300  78 26 (!) 151/98 96 % Intake/Output:  
Last shift:      701 - 1900 In: 300 [I.V.:300] Out: 0 [Urine:2200] Last 3 shifts: 1901 -  0700 In: 71.9 [I.V.:71.9] Out: 0951 [BPHGY:1005] Intake/Output Summary (Last 24 hours) at 4/22/2020 1034 Last data filed at 4/22/2020 0909 Gross per 24 hour Intake 371.86 ml Output 5625 ml Net -5253.14 ml Ventilator Settings: 
Mode Rate Tidal Volume Pressure FiO2 PEEP Assist control, VC+   600 ml    100 % 10 cm H20 Peak airway pressure: 33 cm H2O Minute ventilation: 15.8 l/min ARDS network Guidelines: Lung protective strategy, Pl pressure goals 35less than or equal to 30. Physical Exam:intubated sedated General: Other, moderately ill, cooperative, uncooperative, no distress, appears stated age, moderately obese HEENT: Normal, ENT exam normal, no neck nodes or sinus tenderness Neck: No abnormally enlarged lymph nodes. or thyroid, supple Chest: normal 
Lungs: rales bilaterally, breathing normal , clear to auscultation bilaterally, diminished breath sounds R base, no tenderness/ rash Heart: ir Regular rate and rhythm Abdomen: non distended, bowel sounds normoactive, tympanic, no rebound tenderness, rigidity, rebound morbidly obesee Extremity: 3+ edema Capillary refill: normal 
Neuro: responds to voice, alert, oriented x3, affect appropriate, no focal neurological deficits, moves all extremities well, no involuntary movements, reflexes at knee and ankle intact Skin: Skin color, texture, turgor normal. No rashes or lesions or Skin color, texture, turgor normal 
 
Data:  
 
Recent Results (from the past 24 hour(s)) CARDIAC PANEL,(CK, CKMB & TROPONIN) Collection Time: 04/21/20  6:30 PM  
Result Value Ref Range CK 1,097 (H) 39 - 308 U/L  
 CK - MB 9.2 (H) <3.6 ng/ml CK-MB Index 0.8 0.0 - 4.0 % Troponin-I, QT 0.12 (H) 0.0 - 0.045 NG/ML  
CBC WITH AUTOMATED DIFF Collection Time: 04/21/20  6:30 PM  
Result Value Ref Range WBC 4.5 (L) 4.6 - 13.2 K/uL  
 RBC 5.00 4.70 - 5.50 M/uL  
 HGB 13.6 13.0 - 16.0 g/dL HCT 45.6 36.0 - 48.0 %  MCV 91.2 74.0 - 97.0 FL  
 MCH 27.2 24.0 - 34.0 PG  
 MCHC 29.8 (L) 31.0 - 37.0 g/dL  
 RDW 14.6 (H) 11.6 - 14.5 % PLATELET 858 190 - 842 K/uL MPV 9.8 9.2 - 11.8 FL  
 NEUTROPHILS 58 40 - 73 % LYMPHOCYTES 25 21 - 52 % MONOCYTES 14 (H) 3 - 10 % EOSINOPHILS 3 0 - 5 % BASOPHILS 0 0 - 2 %  
 ABS. NEUTROPHILS 2.6 1.8 - 8.0 K/UL  
 ABS. LYMPHOCYTES 1.1 0.9 - 3.6 K/UL  
 ABS. MONOCYTES 0.6 0.05 - 1.2 K/UL  
 ABS. EOSINOPHILS 0.1 0.0 - 0.4 K/UL  
 ABS. BASOPHILS 0.0 0.0 - 0.1 K/UL  
 DF AUTOMATED METABOLIC PANEL, COMPREHENSIVE Collection Time: 04/21/20  6:30 PM  
Result Value Ref Range Sodium 141 136 - 145 mmol/L Potassium 4.0 3.5 - 5.5 mmol/L Chloride 102 100 - 111 mmol/L  
 CO2 36 (H) 21 - 32 mmol/L Anion gap 3 3.0 - 18 mmol/L Glucose 95 74 - 99 mg/dL BUN 17 7.0 - 18 MG/DL Creatinine 1.31 (H) 0.6 - 1.3 MG/DL  
 BUN/Creatinine ratio 13 12 - 20 GFR est AA >60 >60 ml/min/1.73m2 GFR est non-AA 59 (L) >60 ml/min/1.73m2 Calcium 8.0 (L) 8.5 - 10.1 MG/DL Bilirubin, total 0.5 0.2 - 1.0 MG/DL  
 ALT (SGPT) 49 16 - 61 U/L  
 AST (SGOT) 48 (H) 10 - 38 U/L Alk. phosphatase 62 45 - 117 U/L Protein, total 6.7 6.4 - 8.2 g/dL Albumin 2.8 (L) 3.4 - 5.0 g/dL Globulin 3.9 2.0 - 4.0 g/dL A-G Ratio 0.7 (L) 0.8 - 1.7 NT-PRO BNP Collection Time: 04/21/20  6:30 PM  
Result Value Ref Range NT pro-BNP 1,740 (H) 0 - 450 PG/ML  
EKG, 12 LEAD, INITIAL Collection Time: 04/21/20  6:34 PM  
Result Value Ref Range Ventricular Rate 81 BPM  
 Atrial Rate 81 BPM  
 P-R Interval 198 ms QRS Duration 112 ms  
 Q-T Interval 406 ms QTC Calculation (Bezet) 471 ms Calculated P Axis 60 degrees Calculated R Axis -25 degrees Calculated T Axis -14 degrees Diagnosis Normal sinus rhythm Nonspecific ST and T wave abnormality Prolonged QT Abnormal ECG Confirmed by Annelise Vasquez MD, [address] [place] [address] (77) on 4/21/2020 7:49:37 PM 
  
POC G3 Collection Time: 04/21/20  7:45 PM  
Result Value Ref Range Device: Non rebreather Flow rate (POC) 15 L/M  
 FIO2 (POC) 1.0 %  
 pH (POC) 7.206 (LL) 7.35 - 7.45    
 pCO2 (POC) 96.5 (H) 35.0 - 45.0 MMHG  
 pO2 (POC) 89 80 - 100 MMHG  
 HCO3 (POC) 37.9 (H) 22 - 26 MMOL/L  
 sO2 (POC) 93 92 - 97 % Base excess (POC) 10 mmol/L Allens test (POC) YES Total resp. rate 22 Site LEFT RADIAL Patient temp. 100.0 Specimen type (POC) ARTERIAL Performed by Nae Baker URINALYSIS W/ RFLX MICROSCOPIC Collection Time: 04/21/20 10:45 PM  
Result Value Ref Range Color YELLOW Appearance CLEAR Specific gravity 1.006 1.005 - 1.030    
 pH (UA) 6.0 5.0 - 8.0 Protein Negative NEG mg/dL Glucose Negative NEG mg/dL Ketone Negative NEG mg/dL Bilirubin Negative NEG Blood Negative NEG Urobilinogen 1.0 0.2 - 1.0 EU/dL Nitrites Negative NEG Leukocyte Esterase Negative NEG    
CBC W/O DIFF Collection Time: 04/22/20  3:54 AM  
Result Value Ref Range WBC 4.5 (L) 4.6 - 13.2 K/uL  
 RBC 5.38 4.70 - 5.50 M/uL  
 HGB 14.6 13.0 - 16.0 g/dL HCT 50.7 (H) 36.0 - 48.0 % MCV 94.2 74.0 - 97.0 FL  
 MCH 27.1 24.0 - 34.0 PG  
 MCHC 28.8 (L) 31.0 - 37.0 g/dL  
 RDW 14.6 (H) 11.6 - 14.5 % PLATELET 836 (L) 772 - 420 K/uL MPV 10.8 9.2 - 34.6 FL  
METABOLIC PANEL, COMPREHENSIVE Collection Time: 04/22/20  3:54 AM  
Result Value Ref Range Sodium 140 136 - 145 mmol/L Potassium 5.3 3.5 - 5.5 mmol/L Chloride 101 100 - 111 mmol/L  
 CO2 39 (H) 21 - 32 mmol/L Anion gap 0 (L) 3.0 - 18 mmol/L Glucose 98 74 - 99 mg/dL BUN 15 7.0 - 18 MG/DL Creatinine 1.24 0.6 - 1.3 MG/DL  
 BUN/Creatinine ratio 12 12 - 20 GFR est AA >60 >60 ml/min/1.73m2 GFR est non-AA >60 >60 ml/min/1.73m2 Calcium 8.2 (L) 8.5 - 10.1 MG/DL Bilirubin, total 0.8 0.2 - 1.0 MG/DL  
 ALT (SGPT) 54 16 - 61 U/L  
 AST (SGOT) 57 (H) 10 - 38 U/L Alk. phosphatase 70 45 - 117 U/L Protein, total 7.8 6.4 - 8.2 g/dL Albumin 3.1 (L) 3.4 - 5.0 g/dL Globulin 4.7 (H) 2.0 - 4.0 g/dL A-G Ratio 0.7 (L) 0.8 - 1.7 MAGNESIUM Collection Time: 04/22/20  3:54 AM  
Result Value Ref Range Magnesium 2.2 1.6 - 2.6 mg/dL CARDIAC PANEL,(CK, CKMB & TROPONIN) Collection Time: 04/22/20  3:54 AM  
Result Value Ref Range  (H) 39 - 308 U/L  
 CK - MB 9.4 (H) <3.6 ng/ml CK-MB Index 1.0 0.0 - 4.0 % Troponin-I, QT 0.09 (H) 0.0 - 0.045 NG/ML  
NT-PRO BNP Collection Time: 04/22/20  4:09 AM  
Result Value Ref Range NT pro-BNP 1,183 (H) 0 - 450 PG/ML  
POC G3 Collection Time: 04/22/20  5:09 AM  
Result Value Ref Range Device: BIPAP    
 FIO2 (POC) 1.0 %  
 pH (POC) 7.116 (LL) 7.35 - 7.45    
 pCO2 (POC) >130.0 (H) 35.0 - 45.0 MMHG  
 pO2 (POC) 72 (L) 80 - 100 MMHG PEEP/CPAP (POC) 5 cmH2O  
 PIP (POC) 26 Pressure support 15 cmH2O Allens test (POC) YES Total resp. rate 24 Site RIGHT RADIAL Patient temp. 97.0 Specimen type (POC) ARTERIAL Performed by [employer] ([employer]) POC G3 Collection Time: 04/22/20  5:14 AM  
Result Value Ref Range Device: BIPAP    
 FIO2 (POC) 1.0 %  
 pH (POC) 7.119 (LL) 7.35 - 7.45    
 pCO2 (POC) >130.0 (H) 35.0 - 45.0 MMHG  
 pO2 (POC) 75 (L) 80 - 100 MMHG PEEP/CPAP (POC) 5 cmH2O  
 PIP (POC) 19 Pressure support 15 cmH2O Allens test (POC) YES Total resp. rate 19 Site RIGHT RADIAL Patient temp. 97.0 Specimen type (POC) ARTERIAL Performed by [employer] (TT[employer]) POC G3 Collection Time: 04/22/20  9:05 AM  
Result Value Ref Range Device: VENT    
 FIO2 (POC) 100 % pH (POC) 7.392 7.35 - 7.45    
 pCO2 (POC) 66.8 (H) 35.0 - 45.0 MMHG  
 pO2 (POC) 69 (L) 80 - 100 MMHG  
 HCO3 (POC) 40.6 (H) 22 - 26 MMOL/L  
 sO2 (POC) 92 92 - 97 % Base excess (POC) 16 mmol/L Mode ASSIST CONTROL Tidal volume 600 ml Set Rate 24 bpm  
 PEEP/CPAP (POC) 10 cmH2O Allens test (POC) YES Inspiratory Time 0.9 sec Total resp. rate 24 Site RIGHT RADIAL Specimen type (POC) ARTERIAL Performed by [place] [employer] Volume control plus YES Recent Labs  
  04/22/20 
0905 04/22/20 
0514 04/22/20 
0509 04/21/20 
1945 FIO2I 100 1.0 1.0 1.0  
HCO3I 40.6*  --   --  37.9*  
PCO2I 66.8* >130.0* >130.0* 96.5* PHI 7.392 7.119* 7.116* 7.206* PO2I 69* 75* 72* 89 All Micro Results Procedure Component Value Units Date/Time CULTURE, RESPIRATORY/SPUTUM/BRONCH Abdiel Monk STAIN [[id number]] Order Status:  Sent Specimen:  Sputum from Tracheal Aspirate Telemetry: AFIB 
 
 ECHO Imaging: 
[x]I have personally reviewed the patients chest radiographs images and report Results from [facility] Encounter encounter on 04/21/20 XR CHEST PORT Narrative EXAM: CHEST RADIOGRAPH, SINGLE VIEW CLINICAL INDICATION/HISTORY: Endotracheal and orogastric tube placement COMPARISON: 4/21/2020 TECHNIQUE: Portable frontal view of the chest was obtained.  
 
_______________ FINDINGS: 
 
SUPPORT DEVICES: Vertebral placement of endotracheal tube with tip approximately 5.3 cm above the miguel ángel. Enteric tube is seen coursing through midline thoracic 
esophagus. HEART AND MEDIASTINUM: Cardiomediastinal silhouette appears unchanged, markedly 
enlarged. Tortuous and atherosclerotic thoracic aorta. No central vascular 
congestion. LUNGS AND PLEURAL SPACES: Retrocardiac medial left lower lobe opacity 
silhouettes left hemidiaphragm. No confluent airspace opacity elsewhere 
throughout the lungs. No definite pleural effusion. No pneumothorax. BONY THORAX AND SOFT TISSUES: No acute osseous abnormality. _______________ Impression IMPRESSION: 
 
1. Adequate interval endotracheal tube placement. Enteric tube is better 
visualized on the concurrent abdominal radiographs. 2. Unchanged global cardiomegaly and retrocardiac left lower lobe 
atelectasis/infiltrate. No results found for this or any previous visit. [x]See my orders for details My assessment, plan of care, findings, medications, side effects etc were discussed with: 
[x]nursing []PT/OT [x]respiratory therapy []Dr. Martín Bush []Patient [x]Total critical care time exclusive of procedures 100 minutes with complex decision making performed and > 50% time spent in face to face evaluation.  
 
Iman Song MD

## 2020-04-22 NOTE — WOUND CARE
Called ICU about special bariatric bed for patient. They stated that they called [facility] @ 2230 and received a confirmation number but never received a return call for ETA so they called back at [address]. I called [employer] and left message about the bariatric beds in storage. I went to ICU. Paul Rivers and I followed Lorena Helen over to surgical pavilion and retrieved a bariatric plus bed. I will reach out to [facility] about the confirmation # [id number].

## 2020-04-22 NOTE — PROGRESS NOTES
ABG shows worsening acidosis and increased CO2 despite bipap and lasix. Will page anesthesia for intubation. I contacted his wife Ezra Cabral to notify her of intubation. She reports he was on a ventilator for about 2 days last year due to a CHF exacerbation at that time.

## 2020-04-22 NOTE — CDMP QUERY
Pt noted to have differing documentation regarding his type of CHF. After study, could you please document in progress notes and d/c summary if you are evaluating and /or treating any of the following: ? Acute on chronic systolic CHF ? Acute on chronic diastolic CHF ? Acute on chronic combined systolic and diastolic CHF 
? Other, please specify ? Clinically unable to determine The medical record reflects the following: 
 
---> Risk Factors: 53 yr old morbidly obese male with CHF 
 
---> Clinical Indicators: * 4-22-20 H&P:  \". ..acute diastolic CHF. ... hx of. ..systolic CHF. ..admitted with. ...acute CHF exacerbation. Bula Dayhoff Bula Dayhoff \" 
 
---> Treatment: labs; imaging; lasix IV Thank you for your time, 
Wendie Duverney, [address] [facility], [address] [place] [address]

## 2020-04-22 NOTE — ANESTHESIA PROCEDURE NOTES
Emergent Intubation Performed by: Miriam Akers CRNA Authorized by: Miriam Akers CRNA Emergent Intubation:  
Date/Time:  4/22/2020 5:57 AM 
Indications:  Respiratory failure Spontaneous Ventilation: present Level of Consciousness: awake Preoxygenated: Yes (on bipap) Airway Documentation: Airway:  ETT - Cuffed Technique:  Intubating stylet Advanced Technique:  Youssef Insertion Site:  Oral 
Blade Size:  4 ETT size (mm):  8.0 ETT Line Crispin:  Lips ETT Insertion depth (cm):  23 Placement verified by: auscultation, EtCO2 and BBS Attempts:  1 Difficult airway: No   
Did not attempt bag-mask ventilation. VL: VC visualized, Gr 1 view.

## 2020-04-22 NOTE — H&P
History & Physical 
 
Patient: Desire Monday. MRN: [id number]  CSN: [id number] YOB: 1975  Age: 53 y.o. Sex: male DOA: 4/21/2020 Primary Care Provider:  Daniel Garcia MD 
 
 
Assessment/Plan Patient Active Problem List  
Diagnosis Code  Cellulitis of left lower leg L03. 116  
 Anemia D64.9  Morbid obesity ([facility]) E66.01  
 Dyspnea R06.00  Sleep apnea G47.30  Sinus tachycardia R00.0  BMI 70 and over, adult [facility]) O23.57  Chronic back pain M54.9, G89.29  
 Arthritis M19.90  Migraine G43.909  
 History of atrial flutter Z86.79  
 Limited mobility Z74.09  
 Smoking F17.200  Chronic renal insufficiency, stage II (mild) N18.2  CHF (congestive heart failure) ([facility]) I50.9  Acute diastolic CHF (congestive heart failure) ([facility]) I50.31  Chronic a-fib I48.20  Atrial flutter ([facility]) I48.92  
 Elevated troponin R79.89  
 Acute respiratory distress R06.03  
 Acute CHF (congestive heart failure) ([facility]) I50.9  Respiratory failure with hypoxia and hypercapnia ([facility]) J96.91, J96.92  
 
52 y/o male with hx of morbid obesity, MARIA FERNANDA, chronic afib on anticoagulation, and systolic CHF (EF 44-93%) is admitted with respiratory failure with hypoxia and hypercapnia requiring BiPAP as well as acute CHF exacerbation. Neuro-no issues, is alert and conversant, will monitor for sleepiness given elevated CO2 level Pulm-MARIA FERNANDA compliant w/ CPAP at home. Mixed respiratory failure likely due to pulmonary edema, currently on BiPAP. CV-CHF w/ exacerbation, will diurese and get echo in the morning. Trend troponins. Cardiology consult. GI-NPO while on BiPAP Renal-CKD stage II, is stable, will monitor Cr with IV diuresis Heme-no issues ID-no issues Endo-morbid obesity F/E/N-SLIV, replete prn/NPO on bipap Full code Prophylaxis: SCDs, protonix IV, xarelto PO Estimated length of stay : 3 nights Fany Soni MD 
Nocturnist 
 
Critical Care Time:  
1507-5513 I have spent 45 minutes of critical care time involved in lab review, consultations with specialist, family decision-making, and documentation.  During this entire length of time I was immediately available to the patient. 
  
Critical Care:  The reason for providing this level of medical care for this critically ill patient was due a critical illness that impaired one or more vital organ systems such that there was a high probability of imminent or life threatening deterioration in the patients condition. This care involved high complexity decision making to assess, manipulate, and support vital system functions, to treat this degree vital organ system failure and to prevent further life threatening deterioration of the patients condition. 
  
CC: worsening swelling HPI:  
 
Lilia Hope is a 53 y.o. male who has a hx of morbid obesity, MARIA FERNANDA, chronic afib on anticoagulation, and systolic CHF who presents with worsening swelling of his legs and scrotum over the past 3 days. He was not able to get his lasix refilled due to the pandemic. He reports taking all of his other prescribed medications and compliance with CPAP at night. He denies any CP or SOB. He has some abdominal distension. He reports being intubated once in the past for breathing problems. Past Medical History:  
Diagnosis Date  Arrhythmia  Arthritis   
 knees  CHF (congestive heart failure) ([address]  Chronic back pain  Chronic renal insufficiency, stage II (mild)  History of atrial flutter Dx 6/2016 - anticoagulated by Kelton Samaniego  Hypertension 2015  Limited mobility  Migraine headache  Morbid obesity ([address]  Morbid obesity with body mass index of 60.0-69.9 in adult [facility])  Sleep apnea   
 uses c-pap instructed to bring day of surgery  Smoking   
 very passive / cigars only Past Surgical History:  
Procedure Laterality Date  CARDIAC SURG PROCEDURE UNLIST    
 cardioversion  ECHOCARDIOGRAM  2016 EF 60-65% (performed at [facility] - see cardiology tab) Family History Problem Relation Age of Onset  Obesity Mother  Liver Disease Sister Social History Socioeconomic History  Marital status:  Spouse name: Not on file  Number of children: Not on file  Years of education: Not on file  Highest education level: Not on file Tobacco Use  Smoking status: Former Smoker Types: Cigars  Smokeless tobacco: Never Used Substance and Sexual Activity  Alcohol use: No  
 Drug use: No  
 Sexual activity: Yes  
  Partners: Female Prior to Admission medications Medication Sig Start Date End Date Taking? Authorizing Provider  
furosemide (LASIX) 40 mg tablet Take 1 Tab by mouth two (2) times a day. 16  Yes Lety Pedraza MD  
 
 
No Known Allergies Review of Systems Gen: No fever, chills, malaise, +weight gain. Heent: No headache, rhinorrhea, epistaxis, ear pain, hearing loss, sinus pain, neck pain/stiffness, sore throat. Heart: No chest pain, palpitations, ZAPIEN, pnd, or orthopnea. Resp: No cough, hemoptysis, wheezing and shortness of breath. GI: No nausea, vomiting, diarrhea, constipation, melena or hematochezia. : No urinary obstruction, dysuria or hematuria. Derm: No rash, new skin lesion or pruritis. Musc/skeletal: no bone or joint complaints. Vasc: +edema throughout body, no cyanosis or claudication. Endo: No heat/cold intolerance, no polyuria,polydipsia or polyphagia. Neuro: No unilateral weakness, numbness, tingling. No seizures. Heme: No easy bruising or bleeding. Physical Exam:  
 
Physical Exam: 
Visit Vitals BP (!) 142/94 Pulse 77 Temp 97.6 °F (36.4 °C) Resp 24 Ht 5' 10\" (1.778 m) Wt (!) 269.9 kg (595 lb) SpO2 100% BMI 85.37 kg/m² O2 Flow Rate (L/min): 12 l/min O2 Device: BIPAP Temp (24hrs), Av.3 °F (36.8 °C), Min:97.6 °F (36.4 °C), Max:99 °F (37.2 °C) 04/21 1901 - 04/22 0700 In: 30 [I.V.:30] Out: 1500 [Urine:1500]   No intake/output data recorded. General:  Awake, cooperative, no distress, BiPAP in place. Head:  Normocephalic, without obvious abnormality, atraumatic. Eyes:  Conjunctivae/corneas clear, sclera anicteric. Neck: Supple, symmetrical, trachea midline. Lungs:   Rhonchi throughout b/l lung bases. Heart:  Regular rate and rhythm, S1, S2 normal, no murmur, click, rub or gallop. Abdomen: Distended due to edema, morbidly obese. Bowel sounds normal. No masses,  No organomegaly. Extremities: Extremities normal, atraumatic, edema in b/l lower extremities, scrotum and abdomen. Capillary refill normal.  
Pulses: 2+ and symmetric all extremities. Skin: Skin color pink, turgor normal. No rashes or lesions Neurologic: No focal motor or sensory deficit. Labs Reviewed: All lab results for the last 24 hours reviewed. Recent Results (from the past 24 hour(s)) CARDIAC PANEL,(CK, CKMB & TROPONIN) Collection Time: 04/21/20  6:30 PM  
Result Value Ref Range CK 1,097 (H) 39 - 308 U/L  
 CK - MB 9.2 (H) <3.6 ng/ml CK-MB Index 0.8 0.0 - 4.0 % Troponin-I, QT 0.12 (H) 0.0 - 0.045 NG/ML  
CBC WITH AUTOMATED DIFF Collection Time: 04/21/20  6:30 PM  
Result Value Ref Range WBC 4.5 (L) 4.6 - 13.2 K/uL  
 RBC 5.00 4.70 - 5.50 M/uL  
 HGB 13.6 13.0 - 16.0 g/dL HCT 45.6 36.0 - 48.0 % MCV 91.2 74.0 - 97.0 FL  
 MCH 27.2 24.0 - 34.0 PG  
 MCHC 29.8 (L) 31.0 - 37.0 g/dL  
 RDW 14.6 (H) 11.6 - 14.5 % PLATELET 556 183 - 317 K/uL MPV 9.8 9.2 - 11.8 FL  
 NEUTROPHILS 58 40 - 73 % LYMPHOCYTES 25 21 - 52 % MONOCYTES 14 (H) 3 - 10 % EOSINOPHILS 3 0 - 5 % BASOPHILS 0 0 - 2 %  
 ABS. NEUTROPHILS 2.6 1.8 - 8.0 K/UL  
 ABS. LYMPHOCYTES 1.1 0.9 - 3.6 K/UL  
 ABS. MONOCYTES 0.6 0.05 - 1.2 K/UL  
 ABS. EOSINOPHILS 0.1 0.0 - 0.4 K/UL  
 ABS. BASOPHILS 0.0 0.0 - 0.1 K/UL  
 DF AUTOMATED METABOLIC PANEL, COMPREHENSIVE  
 Collection Time: 04/21/20  6:30 PM  
Result Value Ref Range Sodium 141 136 - 145 mmol/L Potassium 4.0 3.5 - 5.5 mmol/L Chloride 102 100 - 111 mmol/L  
 CO2 36 (H) 21 - 32 mmol/L Anion gap 3 3.0 - 18 mmol/L Glucose 95 74 - 99 mg/dL BUN 17 7.0 - 18 MG/DL Creatinine 1.31 (H) 0.6 - 1.3 MG/DL  
 BUN/Creatinine ratio 13 12 - 20 GFR est AA >60 >60 ml/min/1.73m2 GFR est non-AA 59 (L) >60 ml/min/1.73m2 Calcium 8.0 (L) 8.5 - 10.1 MG/DL Bilirubin, total 0.5 0.2 - 1.0 MG/DL  
 ALT (SGPT) 49 16 - 61 U/L  
 AST (SGOT) 48 (H) 10 - 38 U/L Alk. phosphatase 62 45 - 117 U/L Protein, total 6.7 6.4 - 8.2 g/dL Albumin 2.8 (L) 3.4 - 5.0 g/dL Globulin 3.9 2.0 - 4.0 g/dL A-G Ratio 0.7 (L) 0.8 - 1.7 NT-PRO BNP Collection Time: 04/21/20  6:30 PM  
Result Value Ref Range NT pro-BNP 1,740 (H) 0 - 450 PG/ML  
EKG, 12 LEAD, INITIAL Collection Time: 04/21/20  6:34 PM  
Result Value Ref Range Ventricular Rate 81 BPM  
 Atrial Rate 81 BPM  
 P-R Interval 198 ms QRS Duration 112 ms  
 Q-T Interval 406 ms QTC Calculation (Bezet) 471 ms Calculated P Axis 60 degrees Calculated R Axis -25 degrees Calculated T Axis -14 degrees Diagnosis Normal sinus rhythm Nonspecific ST and T wave abnormality Prolonged QT Abnormal ECG Confirmed by Marcus David MD, [address] [place] [address] ([id number]) on 4/21/2020 7:49:37 PM 
  
POC G3 Collection Time: 04/21/20  7:45 PM  
Result Value Ref Range Device: Non rebreather Flow rate (POC) 15 L/M  
 FIO2 (POC) 1.0 %  
 pH (POC) 7.206 (LL) 7.35 - 7.45    
 pCO2 (POC) 96.5 (H) 35.0 - 45.0 MMHG  
 pO2 (POC) 89 80 - 100 MMHG  
 HCO3 (POC) 37.9 (H) 22 - 26 MMOL/L  
 sO2 (POC) 93 92 - 97 % Base excess (POC) 10 mmol/L Allens test (POC) YES Total resp. rate 22 Site LEFT RADIAL Patient temp. 100.0 Specimen type (POC) ARTERIAL Performed by Betito Christensen URINALYSIS W/ RFLX MICROSCOPIC  Collection Time: 04/21/20 10:45 PM  
 Result Value Ref Range Color YELLOW Appearance CLEAR Specific gravity 1.006 1.005 - 1.030    
 pH (UA) 6.0 5.0 - 8.0 Protein Negative NEG mg/dL Glucose Negative NEG mg/dL Ketone Negative NEG mg/dL Bilirubin Negative NEG Blood Negative NEG Urobilinogen 1.0 0.2 - 1.0 EU/dL Nitrites Negative NEG Leukocyte Esterase Negative NEG Results XR CHEST PORT (Accession [id number]) (Order [id number]) Allergies     
No Known Allergies Exam Information Status Exam Begun  Exam Ended Final [99] 4/21/2020 18:38 4/21/2020 18:48 Result Information Status: Final result (Exam End: 4/21/2020 18:48) Provider Status: Open Study Result EXAM: CHEST RADIOGRAPH 
  
CLINICAL INDICATION/HISTORY: SOB 
  > Additional: None 
  
COMPARISON: 10/16/2018. 
  
TECHNIQUE: Portable frontal view of the chest 
  
_______________ 
  
FINDINGS: 
  
SUPPORT DEVICES: None. 
  
HEART AND MEDIASTINUM: Cardiac size remains enlarged. Pulmonary vasculature 
mediastinal structures are stable. 
  
LUNGS AND PLEURAL SPACES: Shallow inspiration. Cannot exclude a left 
retrocardiac opacity. No definite pleural effusion or pneumothorax. 
  
BONY THORAX AND SOFT TISSUES: Unremarkable. 
  
_______________ 
  
IMPRESSION IMPRESSION: 
  
  
1. Stable cardiomegaly with probable mild pulmonary congestion. 2. Cannot exclude left lower lobe infiltrate/atelectasis.

## 2020-04-22 NOTE — PROGRESS NOTES
Palliative Medicine Consult [facility] [phone] (COPE) Patient Name: Sandra Mejia. YOB: 1975 Reason for Consult: Advance Care Planning Requesting Provider: Dr. La Nena Garner Primary Care Physician: Maranda Pratt MD 
 
 SUMMARY:  
Sandra Thomas is a 53 y.o. with a past history of  morbid obesity, MARIA FERNANDA, chronic afib on anticoagulation, and systolic CHF who was admitted on 4/21/2020 from home with a diagnosis of respiratory failure with hypoxia and hypercapnia requiring BiPAP as well as acute CHF exacerbation, Rule out COVID.  
hx of. Current medical issues leading to Palliative Medicine involvement include: Respiratory distress. Palliative medicine consult noted and appreciated. Patient currently sedated on ventilator. PM team members Charlotte REY and this writer spoke with his wife, Wife, Farnaz Hicks [phone], cell [phone]. She confirmed that patient does not have an advance directive, as such, she is his healthcare agent. She stated she was updated by physicians so feels well informed. She states awareness that her  is now on life support. She shared he was on life support two years ago but was only on for 2 days. She is hopeful for full recovery and return home to his family. She further stated I am optimistic but at peace with whatever comes. He will be with the Lord either way\". She wants to continue Full Code and all aggressive measures at this time. Palliative medicine team remains available for support as condition progresses. We plan to address Advance Directive when patient able to participate. GOALS OF CARE: 
Patient/Health Care Proxy Stated Goals: Prolong life TREATMENT PREFERENCES:  
Code Status: Full Code Advance Care Planning: 
 
Advance Care Planning 4/22/2020 Patient's Healthcare Decision Maker is: Legal Next of Kin Primary Decision Maker Name -  
Primary Decision Maker Phone Number -  
 Primary Decision Maker Relationship to Patient -  
Confirm Advance Directive None Patient Would Like to Complete Advance Directive Unable Patient does not have an Advance Directive and is currently unable to complete due to being on ventilator. Legal next of kin is his wife Wife, Veronica Hayes [phone], cell [phone]. Medical Interventions: Full interventions CLINICAL ASSESSMENT:  
Palliative Performance Scale (PPS): PPS: 20 Modified ESAS Completed by: provider Drowsiness: 10(Sedated on ventilator) PSYCHOSOCIAL/SPIRITUAL ASSESSMENT:  
Palliative IDT has assessed this patient for cultural preferences / practices and a referral made as appropriate to needs (Cultural Services, Patient Advocacy, Ethics, etc.)  for 6 years. Has one son from previous relationship and two step children. Wife is hopeful for full recovery but states she is [place] peace with whatever happens\". Support from family, friends and Faith Any spiritual / Alevism concerns: 
[] Yes /  [x] No 
 
Caregiver Burnout: 
[] Yes /  [] No /  [] No Caregiver Present Anticipatory grief assessment:  
[x] Normal  / [] Maladaptive

## 2020-04-22 NOTE — PROGRESS NOTES
1230-Accessed patient chart to assist in patient care with Paula cabral RN. Discussed with RN Supervisor, Brady Murillo RN that patient will need bariatric bed per orders from Dr. Johana Serna. Jesu-sammy notified that patient currently on ED stretcher at this time and will transfer to bariatric bed when available. Bariatric bed confirmation # [id number]. 
 
0042-Pt remains on ED stretcher at this time. Recalled Jesu-Sammy to verify placement of bed. Per after hours call center, a technician should be calling back soon for ETA of bed arrival. 
 
7310-No return call from [address] [place] [address] at this time. Notified Supervisor. Supervisor stated she spoke with Corbin Keith from [facility] after hours hotline and they will check status of Bed delivery.

## 2020-04-22 NOTE — PROGRESS NOTES
Hospitalist Progress Note Patient: Jason Castro MRN: [id number]  [facility]: [id number] YOB: 1975  Age: 53 y.o. Sex: male DOA: 4/21/2020 LOS:  LOS: 1 day Assessment/Plan Patient Active Problem List  
Diagnosis Code  Cellulitis of left lower leg L03. 116  
 Anemia D64.9  Morbid obesity ([facility]) E66.01  
 Dyspnea R06.00  Sleep apnea G47.30  Sinus tachycardia R00.0  BMI 70 and over, adult [facility]) O68.75  Chronic back pain M54.9, G89.29  
 Arthritis M19.90  Migraine G43.909  
 History of atrial flutter Z86.79  
 Limited mobility Z74.09  
 Smoking F17.200  Chronic renal insufficiency, stage II (mild) N18.2  CHF (congestive heart failure) ([facility]) I50.9  Acute diastolic CHF (congestive heart failure) ([facility]) I50.31  Chronic a-fib I48.20  Atrial flutter ([facility]) I48.92  
 Elevated troponin R79.89  
 Acute respiratory distress R06.03  
 Acute CHF (congestive heart failure) ([facility]) I50.9  Respiratory failure with hypoxia and hypercapnia ([facility]) J96.91, J96.92  
 Acute on chronic respiratory failure with hypoxia and hypercapnia ([facility]) J96.21, J96.22  
  
 
 
 
52 y/o male with hx of morbid obesity, MARIA FERNANDA, chronic afib on anticoagulation, and systolic CHF (EF 32-40%) is admitted with respiratory failure with hypoxia and hypercapnia, he required to be intubated. Orally intubated and follows command. CRITICAL CARE PLAN Resp - See vent orders, VAP bundle. HOB>30 degrees. Acute respiratory failure - with hypoxia and hypercapnia, follow ABGs. MARIA FERNANDA/OHS - per reports compliant with CPAP at home. ID - Follow up resp, cx. ANTIBIOTICS ceftriaxone, doxycycline. Suspected COVID 19 [facility] - Monitor HD. Hypotension - started on levophed, wean as tolerated Chronic A-fib - monitor rate, betablocker and amiodarone on hold. Continue with anticoagulation with xarelto. Acute on chronic systolic CHF -  
Continue with lasix IV. Follow echo. Elevated troponin - Troponin trending down Cardiology consulted. Heme/onc - Follow H&H, plts. Renal - Trend BUN, Cr, follow I/O, daniel in place. Check and replace Mg, K, phos. Endocrine -  Follow FSG Neuro/ Pain/ Sedation - Fentanyl, versed prn. Sedation bundle. GI - NPO for now. Morbid obesity Prophylaxis - DVT: xarelto, GI: protonix 2903-1016 
40 minutes of critical care time spent in the direct evaluation and treatment of this high risk patient. The reason for providing this level of medical care for this critically ill patient was due a critical illness that impaired one or more vital organ systems such that there was a high probability of imminent or life threatening deterioration in the patients condition. This care involved high complexity decision making to assess, manipulate, and support vital system functions, to treat this degreee vital organ system failure and to prevent further life threatening deterioration of the patients condition. Called and discussed with wife, also got consent for virtual visits. Disposition : TBD Physical Exam: 
General: Awake, cooperative, no acute distress   
HEENT: NC, Atraumatic. PERRLA, anicteric sclerae. Lungs: CTA Bilaterally. No Wheezing/Rhonchi/Rales. Heart:  S1 S2, Irregular No murmur, No Rubs, No Gallops Abdomen: Soft, Non distended, Non tender.  +Bowel sounds, Extremities: No c/c/e Vital signs/Intake and Output: 
Visit Vitals /89 Pulse 63 Temp 98.2 °F (36.8 °C) Resp 24 Ht 5' 10\" (1.778 m) Wt (!) 269.9 kg (595 lb) SpO2 97% BMI 85.37 kg/m² Current Shift:  04/22 0701 - 04/22 1900 In: 300 [I.V.:300] Out: 2200 [Urine:2200] Last three shifts:  04/20 1901 - 04/22 0700 In: 71.9 [I.V.:71.9] Out: 8123 [QQUIP:9163] Labs: Results:  
   
Chemistry Recent Labs  
  04/22/20 
0354 04/21/20 
1830 GLU 98 95  141  
K 5.3 4.0  
 102 CO2 39* 36* BUN 15 17  
 CREA 1.24 1.31* CA 8.2* 8.0* AGAP 0* 3 BUCR 12 13 AP 70 62 TP 7.8 6.7 ALB 3.1* 2.8*  
GLOB 4.7* 3.9 AGRAT 0.7* 0.7* CBC w/Diff Recent Labs  
  04/22/20 
0354 04/21/20 
1830 WBC 4.5* 4.5*  
RBC 5.38 5.00 HGB 14.6 13.6 HCT 50.7* 45.6 * 164 GRANS  --  58  
LYMPH  --  25  
EOS  --  3 Cardiac Enzymes Recent Labs  
  04/22/20 
1018 04/22/20 
0354 * 979* CKND1 1.1 1.0 Coagulation No results for input(s): PTP, INR, APTT, INREXT in the last 72 hours. Lipid Panel No results found for: CHOL, CHOLPOCT, CHOLX, CHLST, CHOLV, 096993, HDL, HDLP, LDL, LDLC, DLDLP, 057215, VLDLC, VLDL, TGLX, TRIGL, TRIGP, TGLPOCT, CHHD, CHHDX  
BNP No results for input(s): BNPP in the last 72 hours. Liver Enzymes Recent Labs  
  04/22/20 
0354 TP 7.8 ALB 3.1* AP 70 SGOT 57* Thyroid Studies Lab Results Component Value Date/Time TSH 2.20 10/16/2018 05:53 AM  
    
Procedures/imaging: see electronic medical records for all procedures/Xrays and details which were not copied into this note but were reviewed prior to creation of Plan

## 2020-04-22 NOTE — ACP (ADVANCE CARE PLANNING)
Palliative Medicine Consult [facility] [phone] Patient does not have an Advance Medical Directive. Unable to complete one at this time as patient sedated and on ventilator. Legal next of kin is his wife, Nellie Almaguer [phone], cell [phone]. Advance Directive will carlie to be addressed if patient recovers ability to complete. Full Code Family wants all aggressive treatments. Tab Jaquez RN Palliative Medicine

## 2020-04-22 NOTE — PROGRESS NOTES
Received ER report from Aldean Lombard, RN for ICU admission. 2230: Transfer/admission assessment completed. 2240: Yasir May RN (preceptor) contacted [facility]'s afterhours line to request a bariatric bed per physician's orders due to pt exceeding max bed weight limit. Representative stated he would contact tech and return call with estimated time frame. Confirmation number - [id number]. 
 
0000: Reassessment completed. 0057Harbelinda Escobedo RN contacted [facility] again and received the same response, without estimated delivery time for bariatric bed.  
 
0345: [facility] has not returned call with ETA of bariatric bed. Nursing supervisor contacted afterhours line as well and representative stated we will receive a call with status soon. 0400: Reassessment completed 46: Paged Dr. Portia Beatty concerning increased swelling in scrotum. 2956: Paged returned. Awaiting K results and ABG's before treating with diuretic. CMP resulted and physician aware. 3774: Paged RT to perform ABG. 
 
0456: RT at beside to draw ABG 
 
0500: Orders received for Lasix 80mg BID and Albumin 25% q6h. Awaiting pharmacy approval in [facility]. 8507: Physician at bedside. 9992: Paged Anesthesia for intubation due to severe levels of ABGs. 0543: Anesthesia at bedside prepping for intubation. 5371: Intubation initiated by CRNA, physician at bedside. >>Please see MAR for BP maintenance and sedation rates during 0600-0700hrs 0715: Bedside and Verbal shift change report given to Jose Caballero RN (oncoming nurse) by Dianah Najjar, RN (offgoing nurse). Report included the following information SBAR, Kardex, ED Summary, Intake/Output, MAR, Recent Results, Med Rec Status, Cardiac Rhythm NSR and Alarm Parameters .

## 2020-04-22 NOTE — ROUTINE PROCESS
TRANSFER - OUT REPORT: 
 
Verbal report [facility]  [facility] to ICU(unit) for routine progression of care Report consisted of patients Situation, Background, Assessment and  
Recommendations(SBAR). Information from the following report(s) SBAR, ED Summary, MAR, Recent Results and Cardiac Rhythm SR was reviewed with the receiving nurse. Lines:  
Peripheral IV 04/21/20 Right Antecubital (Active) Site Assessment Clean, dry, & intact 4/21/2020  6:38 PM  
Phlebitis Assessment 0 4/21/2020  6:38 PM  
Infiltration Assessment 0 4/21/2020  6:38 PM  
Dressing Status Clean, dry, & intact 4/21/2020  6:38 PM  
Dressing Type Transparent 4/21/2020  6:38 PM  
  
 
Opportunity for questions and clarification was provided. Patient transported with: 
 Monitor Registered Nurse

## 2020-04-23 PROBLEM — I46.9 CARDIAC ARREST (HCC): Status: ACTIVE | Noted: 2020-01-01

## 2020-04-23 NOTE — PROGRESS NOTES
RT at bedside and pt pulled ET tube out. RT unsuccessfully attempted to push tube back. NRB placed over ET tube and anaesthesia called. After several attempts pt intubated with 8.0 ET tube taped 25 cm at the lip. FIO2 and PEEP increased to 18 per Dr. Cristal Higgins for poor sats.

## 2020-04-23 NOTE — PROCEDURES
Arterial catheter Placement Using Ultrasound Guidance Date :  4/23/2020 Pre / Post Op Dx : inadvertent removal left radial arterial catheter. Hypotension. Procedure : placement of left radial artery, 20g, arterial catheter. Surgeon : Paul Prasad Procedure Performed by : Joe Gonzalez PA-C Anesthesia : 1% Lidocaine EBL : Minimal  
Complications : None Description :  
 The patient was properly identified and a time out was performed. The patient was then positioned supine. The area of the left wrist was prepped and draped in sterile fashion. This area was then anesthetized with approximately 5 ml of 1% lidocaine. A Notable Limited ultrasound was used to identify the right radial artery. Under direct ultrasound guidance, the right radial artery was accessed on the first stick via the Seldinger technique. A soft tipped J wire was inserted through the needle and advanced into the vein without difficulty. The 20g angiocatheter was advanced over the guide wire into the left radial artery. The guidewire was then removed. The catheter was then sutured in place and was dressed with a Biopatch and Tegaderm dressing. The patient tolerated the procedure well. There were no immediate complications noted.

## 2020-04-23 NOTE — PROGRESS NOTES
1900: Bedside and Verbal shift change report given to Kassi Barr RN (oncoming nurse) by Wilman Chin RN (offgoing nurse). Report included the following information SBAR, Kardex, Procedure Summary, Intake/Output, MAR, Recent Results, Med Rec Status, Cardiac Rhythm NSR and Alarm Parameters . 1930: Shift assessment performed during RIJ dressing change. 0000: Reassessment completed. 0400: Reassessment completed. Partial CHG bath performed due to insufficient staff to properly maneuver pt at this time. 0730: Bedside and Verbal shift change report given to RISHI Thomas RN (oncoming nurse) by Kassi Barr RN (offgoing nurse). Report included the following information SBAR, Kardex, Procedure Summary, Intake/Output, MAR, Recent Results, Med Rec Status, Cardiac Rhythm NSR and Alarm Parameters .

## 2020-04-23 NOTE — CONSULTS
[facility] Consult Note Patient: Lonn Halsted. MRN: [id number]  SSN: [id number] YOB: 1975  Age: 53 y.o. Sex: male Date of Consultation: 04/22/2020 Referring Physician: Yan Alonso MD 
Reason for Consultation: CHF Chief complain: 47-year-old gentleman brought to the emergency room with worsening of leg swelling, shortness of breath. Patient was given noninvasive ventilator for hypoxic and hypercapnic respiratory failure but his breathing got worse and he was intubated. Currently patient is intubated and sedated. Patient is morbidly obese. No other history available at this time. Cardiology consult called for congestive heart failure. Past Medical History:  
Diagnosis Date  Arrhythmia  Arthritis   
 knees  CHF (congestive heart failure) ([address]  Chronic back pain  Chronic renal insufficiency, stage II (mild)  History of atrial flutter Dx 6/2016 - anticoagulated by Gaylon Buerger  Hypertension 2015  Limited mobility  Migraine headache  Morbid obesity ([facility]  Morbid obesity with body mass index of 60.0-69.9 in [facility])  Sleep apnea   
 uses c-pap instructed to bring day of surgery  Smoking   
 very passive / cigars only Past Surgical History:  
Procedure Laterality Date  CARDIAC SURG PROCEDURE UNLIST    
 cardioversion  ECHOCARDIOGRAM  04/2016 EF 60-65% (performed at [facility] - see cardiology tab) Current Facility-Administered Medications Medication Dose Route Frequency  pantoprazole (PROTONIX) 40 mg in 0.9% sodium chloride 10 mL injection  40 mg IntraVENous DAILY  albumin human 25% (BUMINATE) solution 25 g  25 g IntraVENous Q6H  
 fentaNYL (PF) 900 mcg/30 ml infusion soln  0-150 mcg/hr IntraVENous TITRATE  
 NOREPINephrine (LEVOPHED) 8 mg in 0.9% NS 250ml infusion  0.5-30 mcg/min IntraVENous TITRATE  midazolam in normal saline (VERSED) 1 mg/mL infusion  1-7 mg/hr IntraVENous TITRATE  cefTRIAXone (ROCEPHIN) 1 g in sterile water (preservative free) 10 mL IV syringe  1 g IntraVENous Q24H  
 doxycycline (VIBRAMYCIN) 100 mg in 0.9% sodium chloride (MBP/ADV) 100 mL MBP  100 mg IntraVENous Q12H  furosemide (LASIX) injection 40 mg  40 mg IntraVENous BID  aspirin chewable tablet 81 mg  81 mg Oral DAILY  atorvastatin (LIPITOR) tablet 40 mg  40 mg Oral DAILY  methylPREDNISolone (PF) (SOLU-MEDROL) injection 40 mg  40 mg IntraVENous Q8H  
 sodium chloride (NS) flush 5-40 mL  5-40 mL IntraVENous Q8H  
 sodium chloride (NS) flush 5-40 mL  5-40 mL IntraVENous PRN  
 [Held by provider] amiodarone (CORDARONE) tablet 200 mg  200 mg Oral DAILY  [Held by provider] losartan (COZAAR) tablet 25 mg  25 mg Oral DAILY  rivaroxaban (XARELTO) tablet 20 mg  20 mg Oral DAILY  [Held by provider] metoprolol succinate (TOPROL-XL) XL tablet 25 mg  25 mg Oral DAILY Facility-Administered Medications Ordered in Other Encounters Medication Dose Route Frequency  propofoL (DIPRIVAN) 10 mg/mL injection   IntraVENous PRN  
 ePHEDrine (PF) (MISTOLE) 10 mg/mL in NS syringe   IntraVENous PRN  
 PHENYLephrine (RUPERT-SYNEPHRINE) 100 mcg/mL in NS syringe   IntraVENous PRN Allergies and Intolerances:  
No Known Allergies Family History:  
Family History Problem Relation Age of Onset  Obesity Mother  Liver Disease Sister Social History: He  reports that he has quit smoking. His smoking use included cigars. He has never used smokeless tobacco.  He  reports no history of alcohol use. Review of Systems:  
 
Can not obtain Physical:  
Patient Vitals for the past 6 hrs: 
 Temp Pulse Resp BP SpO2  
04/22/20 2100  (!) 53 24 144/85 92 % 04/22/20 2000 98.5 °F (36.9 °C) 62 21 149/88 93 % 04/22/20 1900  (!) 56 24 135/74 94 % 04/22/20 1830  62 24 134/79 95 % 04/22/20 1800  (!) 57 24 129/73 95 % Exam: General Appearance: Morbidly obese, Comfortable, not using accessory muscles of respiration. Orally intubated on mechanical ventilator HEENT: AMMON. HEAD: Atraumatic NECK: No JVD, no thyroidomeglay. CAROTIDS: No bruit LUNGS: Bilateral conducted sounds HEART: S1+S2 audible, no murmur, no pericardial rub. ABD: Non-tender, BS Audible EXT: Bilateral lower extremity edema, and no cyanosis. VASCULAR EXAM: Pulses are intact. PSYCHIATRIC EXAM: Mood is appropriate. MUSCULOSKELETAL: Grossly no joint deformity. NEUROLOGICAL: Sedated Review of Data:  
LABS:  
Lab Results Component Value Date/Time WBC 4.5 (L) 04/22/2020 03:54 AM  
 HGB 14.6 04/22/2020 03:54 AM  
 HCT 50.7 (H) 04/22/2020 03:54 AM  
 PLATELET 115 (L) 00/35/9661 03:54 AM  
 
Lab Results Component Value Date/Time Sodium 140 04/22/2020 03:54 AM  
 Potassium 5.3 04/22/2020 03:54 AM  
 Chloride 101 04/22/2020 03:54 AM  
 CO2 39 (H) 04/22/2020 03:54 AM  
 Glucose 98 04/22/2020 03:54 AM  
 BUN 15 04/22/2020 03:54 AM  
 Creatinine 1.24 04/22/2020 03:54 AM  
 
No results found for: CHOL, CHOLX, CHLST, CHOLV, HDL, HDLP, LDL, LDLC, DLDLP, TGLX, TRIGL, TRIGP No results found for: GPT Lab Results Component Value Date/Time Hemoglobin A1c 6.1 (H) 04/06/2016 03:53 AM  
 
 
 
Cardiology Procedures:  
Results for orders placed or performed during the hospital encounter of 04/21/20 EKG, 12 LEAD, INITIAL Result Value Ref Range Ventricular Rate 81 BPM  
 Atrial Rate 81 BPM  
 P-R Interval 198 ms QRS Duration 112 ms  
 Q-T Interval 406 ms QTC Calculation (Bezet) 471 ms Calculated P Axis 60 degrees Calculated R Axis -25 degrees Calculated T Axis -14 degrees Diagnosis Normal sinus rhythm Nonspecific ST and T wave abnormality Prolonged QT Abnormal ECG Confirmed by Deepika Ballard MD, Valerie Pagan ([id number]) on 4/21/2020 7:49:37 PM 
  
 
 
 
 
Impression / Plan:   
Patient Active Problem List  
Diagnosis Code  Cellulitis of left lower leg L03. 116  
 Anemia D64.9  Morbid obesity ([facility]) E66.01  
 Dyspnea R06.00  Sleep apnea G47.30  Sinus tachycardia R00.0  BMI 70 and over, adult [facility]) B95.72  Chronic back pain M54.9, G89.29  
 Arthritis M19.90  Migraine G43.909  
 History of atrial flutter Z86.79  
 Limited mobility Z74.09  
 Smoking F17.200  Chronic renal insufficiency, stage II (mild) N18.2  CHF (congestive heart failure) ([facility]) I50.9     Elevated troponin R79.89  
 Acute respiratory distress R06.03  
 Acute CHF (congestive heart failure) ([facility]) I50.9  Respiratory failure with hypoxia and hypercapnia ([facility]) J96.91, J96.92  
 Acute on chronic respiratory failure with hypoxia and hypercapnia ([facility]) J96.21, J96.22 Acute on chronic systolic heart failure Abnormal troponin no evidence of active coronary syndrome due to demand ischemia. Paroxysmal atrial flutter Echocardiogram done today revealed · Left Ventricle: Normal wall thickness and systolic function (ejection fraction normal). Mildly dilated left ventricle. The estimated ejection fraction is 45 - 50%. There is moderate (grade 2) left ventricular diastolic dysfunction E/e' Ratio = 17.09. Wall Scoring: The left ventricular wall motion is globally hypokinetic. · Right Ventricle: Not well visualized. Normal global systolic function. Moderately dilated right ventricle. · Right Atrium: Right atrium not well visualized. Moderately dilated right atrium. · IVC/Hepatic Veins: Dilated inferior vena cava. Mechanically ventilated; cannot use inferior caval vein diameter to estimate central venous pressure. Continue Xarelto. Continue oral Amiodarone. Continue IV Lasix 40 mg twice a day Strict I/O Continue Ventilatory support. Continue management as per hospital medicine Patient  Is on droplet plus isolation. COVID 19 is pending.   
 
45 minutes of critical care time spent in the direct evaluation and treatment of this high risk patient. The reason for providing this level of medical care for this critically ill patient was due a critical illness that impaired one or more vital organ systems such that there was a high probability of imminent or life threatening deterioration in the patients condition. This care involved high complexity decision making to assess, manipulate, and support vital system functions, to treat this degree vital organ system failure and to prevent further life threatening deterioration of the patients condition. Signed By: Avery Roberts MD   
 April 22, 2020

## 2020-04-23 NOTE — PROGRESS NOTES
1910 - Bedside and Verbal shift change report given to Martin Muhammad RN (oncoming nurse) by Ashley Epstein RN (offgoing nurse). Report included the following information SBAR, Kardex, ED Summary, Procedure Summary, Intake/Output, MAR, Recent Results, Med Rec Status and Cardiac Rhythm NSR.  
 
2000 - Shift assessment completed, patient frequently restless, follows simple commands. Pupils reactive to light. Bilateral soft wrist restraints in place to protect integrity of tubes, lines, and drains. OGT removed at this time due to inability for radiologist to confirm placement due to patients size. 0000 - Reassessment completed, patient intermittently restless. 7955 - Orders received by Dr Portia Beatty for second COVID test.  
 
9521 - Critical PTT reported from lab.  
 
6245 - Patient is extremely restless/agitated to the point where he disconnected the tubing connecting ET tube to ventilator and aggressively tugging wrists/restraints against bed side rails. Oxygen saturation decreased to 83%. O2 saturation returned to baseline of 90/91% after approximately 5 min post reconnecting ET tubing to ventilator. Patients sedation, both Fentanyl and Versed increased at this time to decrease patients agitation. 0400 - Reassessment completed, as noted above.  
 
9614 - Decreased patients Versed drip due to HR of 49/50.  
 
4288 - Patient became increasingly restless and agitated. Versed drip increased to maintain appropriate sedation. 0715 - Bedside and Verbal shift change report given to Ashley Epstein RN (oncoming nurse) by Martin Muhammad RN (offgoing nurse). Report included the following information SBAR, Kardex, ED Summary, Procedure Summary, Intake/Output, MAR, Recent Results, Med Rec Status and Cardiac Rhythm NSR/Sinus Daisy Cassette.

## 2020-04-23 NOTE — PROGRESS NOTES
With permission of the medical staff I called the wife and informed her that he was in a crisis and that we had the team working on him. She had been called earlier to get consent but didn't understand the seriousness of the condition. She was grateful for the brief update and is in prayer. She is awaiting more news. [address] \[facility] [place] [facility]\"", M.Div. Board Certified [employer] [phone] - Office

## 2020-04-23 NOTE — PROGRESS NOTES
Hospitalist Progress Note Patient: Je Alvarado. MRN: [id number]  CSN: [id number] YOB: 1975  Age: 53 y.o. Sex: male DOA: 4/21/2020 LOS:  LOS: 2 days Assessment/Plan Patient Active Problem List  
Diagnosis Code  Cellulitis of left lower leg L03. 116  
 Anemia D64.9  Morbid obesity ([facility]) E66.01  
 Dyspnea R06.00  Sleep apnea G47.30  Sinus tachycardia R00.0  BMI 70 and over, adult [facility]) C46.86  Chronic back pain M54.9, G89.29  
 Arthritis M19.90  Migraine G43.909  
 History of atrial flutter Z86.79  
 Limited mobility Z74.09  
 Smoking F17.200  Chronic renal insufficiency, stage II (mild) N18.2  CHF (congestive heart failure) ([facility]) I50.9  Acute diastolic CHF (congestive heart failure) ([facility]) I50.31  Chronic a-fib I48.20  Atrial flutter ([facility]) I48.92  
 Elevated troponin R79.89  
 Acute respiratory distress R06.03  
 Acute CHF (congestive heart failure) ([facility]) I50.9  Respiratory failure with hypoxia and hypercapnia ([facility]) J96.91, J96.92  
 Acute on chronic respiratory failure with hypoxia and hypercapnia ([facility]) J96.21, J96.22  
 Cardiac arrest ([facility]) I46.9  
  
 
 
 
52 y/o male with hx of morbid obesity, MARIA FERNANDA, chronic afib on anticoagulation, and systolic CHF (EF 71-29%) is admitted with respiratory failure with hypoxia and hypercapnia, he required to be intubated. Patient self extubated this morning and went into PEA arrest, he was re intubated, difficult intubation. He is awake and post resuscitation, placed on sedation. Requiring high PEEP 
 
CRITICAL CARE PLAN Resp - See vent orders, VAP bundle. HOB>30 degrees. Acute respiratory failure - with hypoxia and hypercapnia, follow ABGs. MARIA FERNANDA/OHS -  
 
ID - Follow up resp, cx. ANTIBIOTICS zosyn, doxycycline. COVID 19 pending results [facility] - Monitor HD. Hypotension - on pressors, wean as tolerated Chronic A-fib - monitor rate, on amiodarone drip. On heparin drip Acute on chronic systolic CHF -  
Continue with lasix IV. Echo with EF of 45-50% Elevated troponin -  
Cardiology following. Heme/onc - Follow H&H, plts. Renal - Trend BUN, Cr, follow I/O, daniel in place. Check and replace Mg, K, phos. Endocrine -  Follow FSG Neuro/ Pain/ Sedation - Fentanyl, versed prn. Sedation bundle. GI - NPO for now. Morbid obesity Prophylaxis - DVT: heparin drip, GI: protonix [id number] 
40 minutes of critical care time spent in the direct evaluation and treatment of this high risk patient. The reason for providing this level of medical care for this critically ill patient was due a critical illness that impaired one or more vital organ systems such that there was a high probability of imminent or life threatening deterioration in the patients condition. This care involved high complexity decision making to assess, manipulate, and support vital system functions, to treat this degreee vital organ system failure and to prevent further life threatening deterioration of the patients condition. Disposition : TBD Physical Exam: 
General: As above   
HEENT: NC, Atraumatic. PERRLA, anicteric sclerae. Lungs: CTA Bilaterally. Distant breath sounds secondary to body habitus. Heart:  S1 S2, No murmur, No Rubs, No Gallops Abdomen: Soft, Non distended, Non tender.  +Bowel sounds, Extremities: LE edema Vital signs/Intake and Output: 
Visit Vitals BP (!) 153/94 Pulse 92 Temp 98.1 °F (36.7 °C) Resp 26 Ht 5' 10\" (1.778 m) Wt (!) 269.9 kg (595 lb) SpO2 92% BMI 85.37 kg/m² Current Shift:  No intake/output data recorded. Last three shifts:  04/21 1901 - 04/23 0700 In: 1122.9 [I.V.:1122.9] Out: 25962 [QYVBE:[id number]] Labs: Results:  
   
Chemistry Recent Labs  
  04/23/20 
0530 04/22/20 
0354 04/21/20 
1830 * 98 95  140 141  
K 4.3 5.3 4.0  
 101 102 CO2 37* 39* 36* BUN 18 15 17  
 CREA 1.30 1.24 1.31* CA 8.4* 8.2* 8.0* AGAP 4 0* 3 BUCR 14 12 13 AP 61 70 62 TP 6.6 7.8 6.7 ALB 2.9* 3.1* 2.8*  
GLOB 3.7 4.7* 3.9 AGRAT 0.8 0.7* 0.7* CBC w/Diff Recent Labs  
  04/23/20 
0830 04/23/20 
0530 04/22/20 
0354 04/21/20 
1830 WBC 5.3 4.4* 4.5* 4.5*  
RBC 5.63* 5.16 5.38 5.00 HGB 15.3 13.9 14.6 13.6 HCT 50.8* 45.7 50.7* 45.6  152 107* 164 GRANS  --   --   --  58  
LYMPH  --   --   --  25  
EOS  --   --   --  3 Cardiac Enzymes Recent Labs  
  04/22/20 
1018 04/22/20 
0354 * 979* CKND1 1.1 1.0 Coagulation Recent Labs  
  04/23/20 
0830 APTT 28.6 Lipid Panel No results found for: CHOL, CHOLPOCT, CHOLX, CHLST, CHOLV, 883030, HDL, HDLP, LDL, LDLC, DLDLP, 767210, VLDLC, VLDL, TGLX, TRIGL, TRIGP, TGLPOCT, CHHD, CHHDX  
BNP No results for input(s): BNPP in the last 72 hours. Liver Enzymes Recent Labs  
  04/23/20 
0530 TP 6.6 ALB 2.9* AP 61 SGOT 32 Thyroid Studies Lab Results Component Value Date/Time TSH 2.20 10/16/2018 05:53 AM  
    
Procedures/imaging: see electronic medical records for all procedures/Xrays and details which were not copied into this note but were reviewed prior to creation of Plan

## 2020-04-23 NOTE — CONSULTS
[facility] Pulmonary, Critical Care, and Sleep Medicine Name: Gail Cardenas MRN: [id number] : 1975 Hospital: [facility] [place] Date: 2020 Pulmonary Critical Care Consult IMPRESSION:  
Patient Active Problem List  
Diagnosis Code  Cellulitis of left lower leg L03. 116  
 Anemia D64.9  Morbid obesity ([facility]) E66.01  
 Dyspnea R06.00  Sleep apnea G47.30  Sinus tachycardia R00.0  BMI 70 and over, adult [facility]) D63.31  Chronic back pain M54.9, G89.29  
 Arthritis M19.90  Migraine G43.909  
 History of atrial flutter Z86.79  
 Limited mobility Z74.09  
 Smoking F17.200  Chronic renal insufficiency, stage II (mild) N18.2  CHF (congestive heart failure) ([facility]) I50.9  Acute diastolic CHF (congestive heart failure) ([facility]) I50.31  Chronic a-fib I48.20  Atrial flutter ([facility]) I48.92  
 Elevated troponin R79.89  
 Acute respiratory distress R06.03  
 Acute CHF (congestive heart failure) ([facility]) I50.9  Respiratory failure with hypoxia and hypercapnia ([facility]) J96.91, J96.92  
 Acute on chronic respiratory failure with hypoxia and hypercapnia ([facility]) J96.21, J96.22  
cardiopulmonary arrest in a setting of self extubation · Neuro: PRN pain medications, +/- sedation avoid propofol due to low HR. Versed and fentanyl. deep sedation due to to self extubation · CVS: hypotension and bradycardia sedation vs primary cardiac issues. Dc propofol, follow echo trop ecg cardiology consult add asa and continue xarelto  HD stable; Monitor CVP, Actively titrate vasopressors aim MAP >65mmHg, Check cardiac panel, ECHO results. Today 5 minutes PEA arrest in a setting of self extubation. Now stable on amiodarone and levophed · Resp: Intubated on 2020 for hypercarbic and hypoxemic respiratory failure . On  at 7 AM seld extubated brioef cardiac arrest PEA/ re intubated.   
· Titrate FiO2/ supp O2 for SpO2 >90%; full ventilaotry support  Ac Fio2 90% wean · PEEP 16 wean CHF/morbid obesity too high risk to extubate very difficult reintubation will consult for tracheostomy · I/D:  ? Aspiration pneumonia check for COVID very unlikely  for now no plaquenil cardiac risks Afebrile; aleukocytosis; Sepsis bundle per hospital protocol, f/u BxCx/UC, Sputum Cx's. LA ordered- initial and repeat Q4hrs till normalized. ABX :ceftriaone doxy  Descalate ABX once Cx's finalize. RESP CULURE COVID 19  
· Hem/Onc: Daily CBC; H/H, and plts are stable. pvl negative but on Ac for afib change to heparin drip · Metabolic: Daily BMP; monitor e-lytes; replace PRN 
· Renal: Trend Renal indices; Diuresis, Clark to BSD, · Endocrine: POC Glucose q6; Check TSH level · GI: SUP, Trend LFTs, Zofran PRN for N/V  
· Musc/Skin: No acute issues, wound care · Fluids: NACL [id number]  
· Code Status:FULL  
RECOMMENDATIONS:  
Neuro: VERSED FENTANYL Respiratory :full ventilatory support Ac TV  550 stable wean Fiop2  , PEEP 16 continue morbid obesity . Oxygenation worsened  after self extubation now gradually imporving. Wean PEEP to 10 and fio2 to 50 % over 24 hrs [facility]. Ventilated patients- aim to keep peak plateau pressure less than or equal to 30cm H2O. Titrate FiO2 for goal SPO2> 90% VAP prevention bundle, head of the bed at 30' all times Daily sedation holiday and assessment for weaning with SBT as tolerated Sputum culture Continue bronchodilators, pulmonary hygiene care Steroids added Sepsis bundle per hospital protocol CVS:? CHF/ ? NSTEMI 
 xarelto change to Heparin drip,  Asa 
lipitor Fluids:prn albumins Monitor CVP Actively titrate vasopressors aim MAP >65mmHg Check cardiac panel, ECHO Diuresis ID Antibiotic choice:ceftriaxone and doxy I will change to zosyn ? Aspiration Cultures drawn and will be followed. Lactic acid ordered- initial and repeat Q6hrs if elevated till normalized. Endo Glycemic control Renal 
GI Stress ulcer prophylaxis DVT prophylaxis AM labs Diet: DIET NPO Palliative care consult Vent Bundle Followed, Vent Day 1 Will defer respective systems problem management to primary and other consultant and follow patient in ICU with primary and other medical team 
Further recommendations will be based on the patient's response to recommended treatment and results of the investigation ordered. Quality Care: PPI, DVT prophylaxis, HOB elevated, Infection control all reviewed and addressed. PAIN AND SEDATION: yes · Skin/Wound: yes · Nutritiyes · Prophylaxis: DVT and GI Prophylaxis reviewed. · Restraints: yes · PT/OT eval and treat: ·   
· Lines/Tubes: lines daniel  Ventilator ADVANCE DIRECTIVE: . Palliative FAMILY DISCUSSION: full Events and notes from last 24 hours reviewed. Care plan discussed with nursing Subjective/History:  
Britni Rivas. has been seen and evaluated as respiratory failure. 47-year-old male with morbid obesity, ex-smoker, history of asthma, history of severe obstructive sleep apnea on CPAP questionable component compliance, history of congestive heart failure with reduced ejection fraction atrial fibrillation atrial flutter on amiodarone, history of chronic renal insufficiency, cellulitis of lower extremities and anemia,. Patient was in his usual state of health. As per family did not have any fever or upper respiratory tract infections and no flulike symptoms Ms. he is Lasix not clear if also missed his CPAP presented with acute shortness of breath worsening edema of lower extremities He was found to be in acute hypercarbic hypoxemic respiratory failure initially had a trial of BiPAP but failed and then eventually was intubated he was hypotensive. Periodically and slightly bradycardic.  
Moderate amount of secretions per next of breath groundglass clinical presentation trial strongly suspicious for congestive heart failure but to be on the safe side we will check patient also for viral infection and bacterial pneumonia. Consider also aspiration event. Morbid obesity. Patient is compliant with Xarelto. We will do follow-up PVL and continue his anticoagulation The patient is critically ill and can not provide additional history due to Ventilated. 4/23/2020. Patient remains in ICU critically ill on mechanical ventilation and pressors. 7 AM today patient had brief PEA arrest at the time of self extubation reintubated pulse back. Walk After was following simple commands. Back on sedation. Required very high PEEP and high FiO2 to recover from hypoxemia responded now well improving. Low-dose pressors required continue deep sedation due to self extubation and morbidly obese consult ENT. Change ceftriaxone to Zosyn for possible aspiration event. Culture still pending. Continue treatment for congestive heart failure including diuresis change Xarelto to heparin Review of Systems: A comprehensive review of systems was negative except for that written in the HPI. []The patient is unable to give any meaningful history or review of systems because the patient is: 
[]Intubated []Sedated  
[]Unresponsive []Ventilated []The patient is critically ill on     
[]Mechanical ventilation []Pressors []BiPAP [] Latest lactic acid:  
Lactic acid Date Value Ref Range Status 04/23/2020 4.1 (HH) 0.4 - 2.0 MMOL/L Final  
  Comment:  
  CALLED TO AND CORRECTLY REPEATED BY: 
Jossue Echavarria RN ICU ON 4/23/2020 0988 TO 5063 
  
04/22/2020 1.8 0.4 - 2.0 MMOL/L Final  
06/09/2017 1.1 0.4 - 2.0 MMOL/L Final  
 
 
Completed IVF Resuscitation (30ml/kg) - yes  IVF choice: 0.9NS Suspected source/s of severe sepsis: Pneumonia Organ dysfunction: Yes Antibiotics: ceftriaxone Completed physical exam: yes Past Medical History: 
Past Medical History:  
Diagnosis Date  Arrhythmia  Arthritis   
 knees  CHF (congestive heart failure) ([facility] [address]  Chronic back pain  Chronic renal insufficiency, stage II (mild)  History of atrial flutter Dx 6/2016 - anticoagulated by Gaylon Buerger  Hypertension 2015  Limited mobility  Migraine headache  Morbid obesity ([facility] [address]  Morbid obesity with body mass index of 60.0-69.9 in adult [facility])  Sleep apnea   
 uses c-pap instructed to bring day of surgery  Smoking   
 very passive / cigars only Past Surgical History: 
Past Surgical History:  
Procedure Laterality Date  CARDIAC SURG PROCEDURE UNLIST    
 cardioversion  ECHOCARDIOGRAM  04/2016 EF 60-65% (performed at THE [facility] - see cardiology tab) Medications: 
Prior to Admission medications Medication Sig Start Date End Date Taking? Authorizing Provider  
furosemide (LASIX) 40 mg tablet Take 1 Tab by mouth two (2) times a day. 4/12/16  Yes Addison Whaley MD  
 
 
Current Facility-Administered Medications Medication Dose Route Frequency  amiodarone (NEXTERONE) 360 mg in dextrose 200 mL (1.8 mg/mL) infusion  0.5-1 mg/min IntraVENous TITRATE  calcium gluconate 2 g/100 mL sodium chloride (ISO-OSM)  2 g IntraVENous ONCE  
 vasopressin (VASOSTRICT) 20 Units in 0.9% sodium chloride 100 mL infusion  0-0.03 Units/min IntraVENous TITRATE  heparin 25,000 units in D5W 250 ml infusion  8-36 Units/kg/hr IntraVENous TITRATE  pantoprazole (PROTONIX) 40 mg in 0.9% sodium chloride 10 mL injection  40 mg IntraVENous DAILY  albumin human 25% (BUMINATE) solution 25 g  25 g IntraVENous Q6H  
 fentaNYL (PF) 900 mcg/30 ml infusion soln  0-150 mcg/hr IntraVENous TITRATE  
 NOREPINephrine (LEVOPHED) 8 mg in 0.9% NS 250ml infusion  0.5-30 mcg/min IntraVENous TITRATE  midazolam in normal saline (VERSED) 1 mg/mL infusion  0-10 mg/hr IntraVENous TITRATE  cefTRIAXone (ROCEPHIN) 1 g in sterile water (preservative free) 10 mL IV syringe  1 g IntraVENous Q24H  doxycycline (VIBRAMYCIN) 100 mg in 0.9% sodium chloride (MBP/ADV) 100 mL MBP  100 mg IntraVENous Q12H  furosemide (LASIX) injection 40 mg  40 mg IntraVENous BID  aspirin chewable tablet 81 mg  81 mg Oral DAILY  atorvastatin (LIPITOR) tablet 40 mg  40 mg Oral DAILY  methylPREDNISolone (PF) (SOLU-MEDROL) injection 40 mg  40 mg IntraVENous Q8H  
 sodium chloride (NS) flush 5-40 mL  5-40 mL IntraVENous Q8H  
 [Held by provider] losartan (COZAAR) tablet 25 mg  25 mg Oral DAILY  [Held by provider] metoprolol succinate (TOPROL-XL) XL tablet 25 mg  25 mg Oral DAILY Allergy: No Known Allergies Social History: 
Social History Tobacco Use  Smoking status: Former Smoker Types: Cigars  Smokeless tobacco: Never Used Substance Use Topics  Alcohol use: No  
 Drug use: No  
  
 
Family History: 
Family History Problem Relation Age of Onset  Obesity Mother  Liver Disease Sister Objective:  
Vital Signs:   
Blood pressure (!) 153/94, pulse 92, temperature 98.1 °F (36.7 °C), resp. rate 26, height 5' 10\" (1.778 m), weight (!) 269.9 kg (595 lb), SpO2 92 %. Body mass index is 85.37 kg/m². O2 Device: Ventilator O2 Flow Rate (L/min): 12 l/min Temp (24hrs), Av.4 °F (36.9 °C), Min:97.8 °F (36.6 °C), Max:99.1 °F (37.3 °C) Patient Vitals for the past 8 hrs: 
 Pulse Resp BP SpO2  
20 1000 92 26 (!) 153/94 92 % 20 0909 97 24  92 % 20 0900 (!) 104 21 145/87 92 % 20 0847 (!) 111 24 131/79 (!) 87 % 20 0827 (!) 128 24 120/59 (!) 78 % 20 0820 (!) 116 24 97/56 (!) 83 % 20 0818 (!) 114 24 124/68 (!) 84 % 20 0817 (!) 111 24 (!) 120/100 (!) 84 % 20 0811 (!) 127 24 (!) 190/108 (!) 69 % 20 0807 (!) 145 25 (!) 179/134   
20 0805 (!) 157 28 125/80   
20 0800 (!) 39 15 (!) 63/44   
20 0756 (!) 101 25 124/64 (!) 8 % 20 0754 (!) 103 28  (!) 44 % 04/23/20 0753 (!) 105 21 107/60 (!) 60 % 04/23/20 0752 (!) 104 20 128/77 (!) 62 %  
04/23/20 0737 100 22 158/88 (!) 52 % 04/23/20 0734 (!) 104 18 144/86 (!) 42 % 04/23/20 0700 68 19 123/64 94 % 04/23/20 0614 62 24 137/57 93 % 04/23/20 0552 (!) 56 24  93 % Intake/Output:  
Last shift:      No intake/output data recorded. Last 3 shifts: 04/21 1901 - 04/23 0700 In: 1122.9 [I.V.:1122.9] Out: 75451 [[facility]:[id number]] Intake/Output Summary (Last 24 hours) at 4/23/2020 1304 Last data filed at 4/23/2020 0952 Gross per 24 hour Intake 751.07 ml Output 3800 ml Net -3048.93 ml Ventilator Settings: 
Mode Rate Tidal Volume Pressure FiO2 PEEP Assist control, VC+   600 ml    100 % 18 cm H20 Peak airway pressure: 28 cm H2O Minute ventilation: 14.4 l/min ARDS network Guidelines: Lung protective strategy, Pl pressure goals 35less than or equal to 30. Physical Exam:intubated sedated General: Other, moderately ill, cooperative, uncooperative, no distress, appears stated age, moderately obese HEENT: Normal, ENT exam normal, no neck nodes or sinus tenderness Neck: No abnormally enlarged lymph nodes. or thyroid, supple Chest: normal 
Lungs: rales bilaterally, breathing normal , clear to auscultation bilaterally, diminished breath sounds R base, no tenderness/ rash Heart: ir Regular rate and rhythm Abdomen: non distended, bowel sounds normoactive, tympanic, no rebound tenderness, rigidity, rebound morbidly obesee Extremity: 3+ edema Capillary refill: normal 
Neuro: responds to voice, alert, oriented x3, affect appropriate, no focal neurological deficits, moves all extremities well, no involuntary movements, reflexes at knee and ankle intact Skin: Skin color, texture, turgor normal. No rashes or lesions or Skin color, texture, turgor normal 
 
Data:  
 
Recent Results (from the past 24 hour(s)) GLUCOSE, POC Collection Time: 04/22/20  4:36 PM  
Result Value Ref Range Glucose (POC) 75 70 - 110 mg/dL GLUCOSE, POC Collection Time: 04/22/20  8:08 PM  
Result Value Ref Range Glucose (POC) 92 70 - 110 mg/dL GLUCOSE, POC Collection Time: 04/22/20 11:22 PM  
Result Value Ref Range Glucose (POC) 100 70 - 110 mg/dL GLUCOSE, POC Collection Time: 04/23/20  5:22 AM  
Result Value Ref Range Glucose (POC) 100 70 - 110 mg/dL CBC W/O DIFF Collection Time: 04/23/20  5:30 AM  
Result Value Ref Range WBC 4.4 (L) 4.6 - 13.2 K/uL  
 RBC 5.16 4.70 - 5.50 M/uL  
 HGB 13.9 13.0 - 16.0 g/dL HCT 45.7 36.0 - 48.0 % MCV 88.6 74.0 - 97.0 FL  
 MCH 26.9 24.0 - 34.0 PG  
 MCHC 30.4 (L) 31.0 - 37.0 g/dL  
 RDW 14.8 (H) 11.6 - 14.5 % PLATELET 340 872 - 675 K/uL MPV 9.5 9.2 - 11.8 FL  
MAGNESIUM Collection Time: 04/23/20  5:30 AM  
Result Value Ref Range Magnesium 2.1 1.6 - 2.6 mg/dL METABOLIC PANEL, COMPREHENSIVE Collection Time: 04/23/20  5:30 AM  
Result Value Ref Range Sodium 144 136 - 145 mmol/L Potassium 4.3 3.5 - 5.5 mmol/L Chloride 103 100 - 111 mmol/L  
 CO2 37 (H) 21 - 32 mmol/L Anion gap 4 3.0 - 18 mmol/L Glucose 106 (H) 74 - 99 mg/dL BUN 18 7.0 - 18 MG/DL Creatinine 1.30 0.6 - 1.3 MG/DL  
 BUN/Creatinine ratio 14 12 - 20 GFR est AA >60 >60 ml/min/1.73m2 GFR est non-AA 59 (L) >60 ml/min/1.73m2 Calcium 8.4 (L) 8.5 - 10.1 MG/DL Bilirubin, total 1.0 0.2 - 1.0 MG/DL  
 ALT (SGPT) 42 16 - 61 U/L  
 AST (SGOT) 32 10 - 38 U/L Alk. phosphatase 61 45 - 117 U/L Protein, total 6.6 6.4 - 8.2 g/dL Albumin 2.9 (L) 3.4 - 5.0 g/dL Globulin 3.7 2.0 - 4.0 g/dL A-G Ratio 0.8 0.8 - 1.7 NT-PRO BNP Collection Time: 04/23/20  5:30 AM  
Result Value Ref Range NT pro- 0 - 450 PG/ML  
GLUCOSE, POC Collection Time: 04/23/20  5:57 AM  
Result Value Ref Range Glucose (POC) 100 70 - 110 mg/dL POC G3 Collection Time: 04/23/20  6:02 AM  
Result Value Ref Range Device: VENT    
 FIO2 (POC) 0.9 % pH (POC) 7.465 (H) 7.35 - 7.45    
 pCO2 (POC) 55.3 (H) 35.0 - 45.0 MMHG  
 pO2 (POC) 67 (L) 80 - 100 MMHG  
 HCO3 (POC) 39.7 (H) 22 - 26 MMOL/L  
 sO2 (POC) 93 92 - 97 % Base excess (POC) 16 mmol/L Mode ASSIST CONTROL Tidal volume 600 ml Set Rate 24 bpm  
 PEEP/CPAP (POC) 10 cmH2O  
 PIP (POC) 28 Allens test (POC) N/A Inspiratory Time 0.9 sec Total resp. rate 24 Site RIGHT RADIAL Specimen type (POC) ARTERIAL Performed by Yudelka Miner Volume control plus YES    
POC G3 Collection Time: 04/23/20  8:22 AM  
Result Value Ref Range Device: VENT    
 FIO2 (POC) 100 % pH (POC) 7.272 (L) 7.35 - 7.45    
 pCO2 (POC) 80.8 (H) 35.0 - 45.0 MMHG  
 pO2 (POC) 60 (L) 80 - 100 MMHG  
 HCO3 (POC) 37.3 (H) 22 - 26 MMOL/L  
 sO2 (POC) 85 (L) 92 - 97 % Base excess (POC) 10 mmol/L Mode ASSIST CONTROL Tidal volume 600 ml Set Rate 24 bpm  
 PEEP/CPAP (POC) 18 cmH2O Allens test (POC) YES Inspiratory Time 0.9 sec Total resp. rate 24 Site LEFT RADIAL Patient temp. 98.6 Specimen type (POC) ARTERIAL Performed by Rj Phillips CBC W/O DIFF Collection Time: 04/23/20  8:30 AM  
Result Value Ref Range WBC 5.3 4.6 - 13.2 K/uL  
 RBC 5.63 (H) 4.70 - 5.50 M/uL  
 HGB 15.3 13.0 - 16.0 g/dL HCT 50.8 (H) 36.0 - 48.0 % MCV 90.2 74.0 - 97.0 FL  
 MCH 27.2 24.0 - 34.0 PG  
 MCHC 30.1 (L) 31.0 - 37.0 g/dL  
 RDW 15.0 (H) 11.6 - 14.5 % PLATELET 103 969 - 914 K/uL MPV 10.3 9.2 - 11.8 FL  
MAGNESIUM Collection Time: 04/23/20  8:30 AM  
Result Value Ref Range Magnesium 2.2 1.6 - 2.6 mg/dL CALCIUM, IONIZED Collection Time: 04/23/20  8:30 AM  
Result Value Ref Range Ionized Calcium 1.05 (L) 1.12 - 1.32 MMOL/L  
PHOSPHORUS Collection Time: 04/23/20  8:30 AM  
Result Value Ref Range Phosphorus 4.9 2.5 - 4.9 MG/DL  
LACTIC ACID Collection Time: 04/23/20  8:30 AM  
Result Value Ref Range Lactic acid 4.1 (HH) 0.4 - 2.0 MMOL/L  
PTT Collection Time: 04/23/20  8:30 AM  
Result Value Ref Range aPTT 28.6 23.0 - 36.4 SEC  
POC G3 Collection Time: 04/23/20 12:10 PM  
Result Value Ref Range Device: VENT    
 FIO2 (POC) 100 % pH (POC) 7.317 (L) 7.35 - 7.45    
 pCO2 (POC) 73.2 (H) 35.0 - 45.0 MMHG  
 pO2 (POC) 76 (L) 80 - 100 MMHG  
 HCO3 (POC) 37.4 (H) 22 - 26 MMOL/L  
 sO2 (POC) 93 92 - 97 % Base excess (POC) 11 mmol/L Mode ASSIST CONTROL Tidal volume 600 ml Set Rate 24 bpm  
 PEEP/CPAP (POC) 18 cmH2O Allens test (POC) YES Inspiratory Time 0.9 sec Total resp. rate 25 Site DRAWN FROM ARTERIAL LINE Patient temp. 98.6 Specimen type (POC) ARTERIAL Performed by Clive Blakely Recent Labs  
  04/23/20 
1210 04/23/20 
2076 04/23/20 
0602 FIO2I 100 100 0.9 HCO3I 37.4* 37.3* 39.7* PCO2I 73.2* 80.8* 55.3* PHI 7.317* 7.272* 7.465* PO2I 76* 60* 67* All Micro Results Procedure Component Value Units Date/Time CULTURE, RESPIRATORY/SPUTUM/BRONCH Stone Carlin [[id number]] Collected:  04/22/20 1018 Order Status:  Completed Specimen:  Sputum from Tracheal Aspirate Updated:  04/22/20 3037 Special Requests: NO SPECIAL REQUESTS     
  GRAM STAIN FEW WBCS SEEN     
      
  OCCASIONAL EPITHELIAL CELLS SEEN  
     
      
  FEW GRAM POSITIVE COCCI IN CLUSTERS  
     
   RARE GRAM NEGATIVE RODS Culture result: PENDING Telemetry: AFIB 
 
 ECHO Imaging: 
[x]I have personally reviewed the patients chest radiographs images and report Results [place] [facility] [place] encounter on 04/21/20 XR CHEST PORT Narrative EXAM: CHEST RADIOGRAPH, SINGLE VIEW CLINICAL INDICATION/HISTORY: Shortness of breath, hypoxia, endotracheal tube 
placement COMPARISON: 4/22/2020 TECHNIQUE: Portable frontal view of the chest was obtained.  
 
_______________ FINDINGS: 
 
SUPPORT DEVICES:  
 >  Endotracheal tube tip is located approximately 5.3 cm above the miguel ángel, 
adequately positioned.  
  >  Enteric tube is adequately visualized at the level of thoracic inlet and 
just above the miguel ángel, slightly eccentric to the left but is not clearly 
visualized throughout the lower aspect of the left hemithorax, potentially 
artifactual given patient's body habitus and adjacent overlying global 
cardiomegaly. > Unchanged adequately positioned right jugular central venous catheter. HEART AND MEDIASTINUM: Cardiomediastinal silhouette appears unchanged, markedly 
enlarged. Tortuous and atherosclerotic thoracic aorta. Prominent central 
pulmonary vascular congestion. LUNGS AND PLEURAL SPACES: Diffusely increased interstitial markings and 
retrocardiac left lower lobe opacity, silhouetting medial left hemidiaphragm. Probable small pleural effusions. No pneumothorax. BONY THORAX AND SOFT TISSUES: No acute osseous abnormality. _______________ Impression IMPRESSION: 
 
1. Endotracheal tube is adequately positioned. Unchanged, adequately positioned 
right jugular central venous catheter. 2. Enteric tube is not clearly visualized below the level of the miguel ángel; 
indeterminate to what extent this represents artifact related to patient's body 
habitus and global cardiomegaly or instead of the enteric tube is proximal in 
position. 3. Unchanged cardiomegaly, central vascular congestion and interstitial 
pulmonary edema. Probable small pleural effusions. No results found for this or any previous visit. [x]See my orders for details My assessment, plan of care, findings, medications, side effects etc were discussed with: 
[x]nursing []PT/OT [x]respiratory therapy []Dr. Elmer Brandon []Patient [x]Total critical care time exclusive of procedures 65 minutes with complex decision making performed and > 50% time spent in face to face evaluation.  
 
Katy Duque MD

## 2020-04-23 NOTE — DIABETES MGMT
GLYCEMIC CONTROL & NUTRITION: 
 
 
- chart reviewed, no known h/o DM 
- NPO Solumedrol 40 mg Q 8 hours - No glycemic control needs identified at this time Recent Glucose Results:  
Lab Results Component Value Date/Time  (H) 04/23/2020 05:30 AM  
 GLUCPOC 100 04/23/2020 05:57 AM  
 GLUCPOC 100 04/23/2020 05:22 AM  
 GLUCPOC 100 04/22/2020 11:22 PM  
 
 
 
Yifan Cummins MS, RN, CDE Glycemic Control Team 
[phone] Pager [phone] (M-TH 8:00-4:30P) *After Hours pager [phone]

## 2020-04-23 NOTE — PROGRESS NOTES
Cardiology Progress Note Patient: Lilia Khan. Sex: male          DOA: 4/21/2020 YOB: 1975      Age:  53 y.o.        LOS:  LOS: 2 days Patient seen and examined, chart reviewed. Assessment/Plan Patient Active Problem List  
Diagnosis Code  Cellulitis of left lower leg L03. 116  
 Anemia D64.9  Morbid obesity (Formerly [facility]) E66.01  
 Dyspnea R06.00  Sleep apnea G47.30  Sinus tachycardia R00.0  BMI 70 and over, adult [facility]) N11.17  Chronic back pain M54.9, G89.29  
 Arthritis M19.90  Migraine G43.909  
 History of atrial flutter Z86.79  
 Limited mobility Z74.09  
 Smoking F17.200  Chronic renal insufficiency, stage II (mild) N18.2  CHF (congestive heart failure) (Formerly [facility]) I50.9  Acute diastolic CHF (congestive heart failure) (Formerly [facility]) I50.31  Chronic a-fib I48.20  Atrial flutter (Formerly [facility]) I48.92  
 Elevated troponin R79.89  
 Acute respiratory distress R06.03  
 Acute CHF (congestive heart failure) (Formerly [facility]) I50.9  Respiratory failure with hypoxia and hypercapnia (Formerly [facility]) J96.91, J96.92  
 Acute on chronic respiratory failure with hypoxia and hypercapnia (Formerly [facility]) J96.21, J96.22  
 Cardiac arrest (Formerly [facility]) I46.9 Acute on chronic systolic heart failure Abnormal troponin no evidence of active coronary syndrome due to demand ischemia. Paroxysmal atrial flutter Patient self extubated today and had PEA arrest. 
He had brief episode of A fib with RVR and was started On Amiodarone drip. Currently he is in Sinus rhythm. 
  
Echocardiogram done today revealed  
  
· Left Ventricle: Normal wall thickness and systolic function (ejection fraction normal). Mildly dilated left ventricle. The estimated ejection fraction is 45 - 50%. There is moderate (grade 2) left ventricular diastolic dysfunction E/e' Ratio = 17.09. Wall Scoring: The left ventricular wall motion is globally hypokinetic. · Right Ventricle: Not well visualized. Normal global systolic function. Moderately dilated right ventricle. · Right Atrium: Right atrium not well visualized. Moderately dilated right atrium. · IVC/Hepatic Veins: Dilated inferior vena cava. Mechanically ventilated; cannot use inferior caval vein diameter to estimate central venous pressure. 
  
 
Plan: 
 
Continue IV Amiodarone. Continue IV Lasix 40 mg twice a day Continue Aspirin and statin Strict I/O Continue Ventilatory support. Continue management as per hospital medicine Patient  Is on droplet plus isolation. COVID 19 is pending. Plan discussed with Mary Vines Subjective:  
 cc: 
Orally intubated REVIEW OF SYSTEMS:  
 
Can not obtain Objective:  
  
Visit Vitals BP (!) 153/94 Pulse 92 Temp 98.1 °F (36.7 °C) Resp 26 Ht 5' 10\" (1.778 m) Wt (!) 269.9 kg (595 lb) SpO2 92% BMI 85.37 kg/m² Body mass index is 85.37 kg/m². Physical Exam: 
General Appearance: Comfortable, not using accessory muscles of respiration. Orally intubated HEENT: AMMON. HEAD: Atraumatic NECK: No JVD, no thyroidomeglay. CAROTIDS: No bruit LUNGS: bilateral conducted sounds HEART: S1+S2 audible, no murmur, no pericardial rub. NEUROLOGICAL: Sedated. Medication: 
Current Facility-Administered Medications Medication Dose Route Frequency  amiodarone (NEXTERONE) 360 mg in dextrose 200 mL (1.8 mg/mL) infusion  0.5-1 mg/min IntraVENous TITRATE  calcium gluconate 2 g/100 mL sodium chloride (ISO-OSM)  2 g IntraVENous ONCE  
 vasopressin (VASOSTRICT) 20 Units in 0.9% sodium chloride 100 mL infusion  0-0.03 Units/min IntraVENous TITRATE  heparin 25,000 units in D5W 250 ml infusion  8-36 Units/kg/hr IntraVENous TITRATE  piperacillin-tazobactam (ZOSYN) 3.375 g in 0.9% sodium chloride (MBP/ADV) 100 mL MBP  3.375 g IntraVENous Q8H  Vancomycin - Pharmacokinetic Dosing  1 Each Other Rx Dosing/Monitoring  vancomycin (VANCOCIN) 1750 mg in  ml infusion  1,750 mg IntraVENous Q12H  pantoprazole (PROTONIX) 40 mg in 0.9% sodium chloride 10 mL injection  40 mg IntraVENous DAILY  albumin human 25% (BUMINATE) solution 25 g  25 g IntraVENous Q6H  
 fentaNYL (PF) 900 mcg/30 ml infusion soln  0-150 mcg/hr IntraVENous TITRATE  
 NOREPINephrine (LEVOPHED) 8 mg in 0.9% NS 250ml infusion  0.5-30 mcg/min IntraVENous TITRATE  midazolam in normal saline (VERSED) 1 mg/mL infusion  0-10 mg/hr IntraVENous TITRATE  doxycycline (VIBRAMYCIN) 100 mg in 0.9% sodium chloride (MBP/ADV) 100 mL MBP  100 mg IntraVENous Q12H  furosemide (LASIX) injection 40 mg  40 mg IntraVENous BID  aspirin chewable tablet 81 mg  81 mg Oral DAILY  atorvastatin (LIPITOR) tablet 40 mg  40 mg Oral DAILY  methylPREDNISolone (PF) (SOLU-MEDROL) injection 40 mg  40 mg IntraVENous Q8H  
 sodium chloride (NS) flush 5-40 mL  5-40 mL IntraVENous Q8H  
 sodium chloride (NS) flush 5-40 mL  5-40 mL IntraVENous PRN  
 [Held by provider] losartan (COZAAR) tablet 25 mg  25 mg Oral DAILY  [Held by provider] metoprolol succinate (TOPROL-XL) XL tablet 25 mg  25 mg Oral DAILY Facility-Administered Medications Ordered in Other Encounters Medication Dose Route Frequency  propofoL (DIPRIVAN) 10 mg/mL injection   IntraVENous PRN  
 ePHEDrine (PF) (MISTOLE) 10 mg/mL in NS syringe   IntraVENous PRN  
 PHENYLephrine (RUPERT-SYNEPHRINE) 100 mcg/mL in NS syringe   IntraVENous PRN Lab/Data Reviewed: 
 
  
Recent Labs  
  04/23/20 
0830 04/23/20 
0530 04/22/20 0354 WBC 5.3 4.4* 4.5* HGB 15.3 13.9 14.6 HCT 50.8* 45.7 50.7*  152 107* Recent Labs  
  04/23/20 
0530 04/22/20 
0354 04/21/20 
1830  140 141  
K 4.3 5.3 4.0  
 101 102 CO2 37* 39* 36* * 98 95 BUN 18 15 17 CREA 1.30 1.24 1.31* CA 8.4* 8.2* 8.0*  
 
 
39 minutes of critical care time spent in the direct evaluation and treatment of this high risk patient. The reason for providing this level of medical care for this critically ill patient was due a critical illness that impaired one or more vital organ systems such that there was a high probability of imminent or life threatening deterioration in the patients condition. This care involved high complexity decision making to assess, manipulate, and support vital system functions, to treat this degree vital organ system failure and to prevent further life threatening deterioration of the patients condition. Signed By: Leland Freeman MD   
 April 23, 2020

## 2020-04-23 NOTE — PROGRESS NOTES
Vancomycin - Pharmacy to Dose Consult provided for this 53y.o. year old ,male for indication of Pneumonia ( VAP ). Therapy day 1 Wt Readings from Last 1 Encounters:  
04/22/20 (!) 269.9 kg (595 lb) Ht Readings from Last 1 Encounters:  
04/22/20 177.8 cm (70\") Dosing Weight:  269.9 kg Additional Antibiotics:  Doxycycline, Zosyn Date:  4/23/20 Lab Results Component Value Date/Time Creatinine 1.30 04/23/2020 05:30 AM  
 
creatinine clearance:  >100 ml/min 
 
white blood cell count:  5.3 Will dose Vancomycin 1750 mg IV q12hrs. ( Previous dosing from 2016 and 2017  
 
ranged from 1250 mg IV q8hrs to 1500 mg IV q12hrs ) Trough level after 4-5 doses infused. Dose calculated to approximate a therapeutic trough of 15-20 mcg/mL. Pharmacy to follow daily and will make changes to dose and/or frequency based on clinical status. [address] [facility], [address] [facility]

## 2020-04-23 NOTE — PROCEDURES
Arterial line Placement Using Ultrasound Guidance Date :  4/23/2020 Pre / Post Op Dx : hypotension Procedure : placement of left radial, 20g, arterial line with ultrasound guidance. Surgeon : Uilses Yao Procedure Performed by : Denisha Lawson PA-C Anesthesia : 1% Lidocaine EBL : Minimal  
Complications : None Description :  
 The patient was properly identified and a time out was performed. The patient was then positioned supine. The area of the left wrist was prepped and draped in sterile fashion. This area was then anesthetized with approximately 5 ml of 1% lidocaine. A [employer] ultrasound was used to identify the left radial artery. Under direct ultrasound guidance, the left radial artery was accessed on the first stick via the Seldinger technique. A soft tipped J wire was inserted through the needle and advanced into the left radial artery without difficulty. The 20g Angiocath was then advanced over the guide wire into the left radial artery. The catheter was then sutured in place and was dressed with a Biopatch and Tegaderm dressing. The patient tolerated the procedure well. There were no immediate complications noted.

## 2020-04-23 NOTE — PROGRESS NOTES
0730 Assumed care of patient. . During report patient self- extubated. .. RRT called. .. anesthesia paged. .. see Daniel Kendall note above for details. Heddy  Heddy  Will continue to monitor 1120 Vascular at bedside for arterial line placement. .. Attempting to redress radial site and patent pulled arm; dislocating catheter. New line placed at 1350. Reassessment completed 1510 Vent settings adjustedd and patient assessed for respiratory changes [address] tube however unable to extract bile or any other gastric secretions. KUB ordered to verify placement per MD 
1900 Bedside and Verbal shift change report given to Judy Telles RN (oncoming nurse) by Ruben Melara (offgoing nurse). Report included the following information SBAR, Kardex and MAR.

## 2020-04-23 NOTE — PROGRESS NOTES
INITIAL NUTRITION ASSESSMENT 
  
RECOMMENDATIONS/PLAN:  
EN: Recc starting tube feeds when clinically able Recc Vital High Protein @ 85ml/hr to provide 1870kcal 164g PRO 1563ml H20 209g CHO 43g Fat Recc starting @ 10ml/hr an increasing by 5ml/hr Q6 until goal rate met Will defer Free H20 to MD 
Other: Order daily Phos and Mg labs Monitor labs/lytes, diet adv, skin integrity, wt, fluid status, BM 
 
REASON FOR ASSESSMENT:  
 
[x] ICU admission NUTRITION ASSESSMENT:  
Client History: 53 yrs old Male admitted with resp failure w/ hypoxia and hypercapnia-intubated in ICU, suspect COVID 19, hypotension, acute on chronic systolic CHF, elevated trop, morbid obesity. PMHx: arrhythmia, arthritis, CHF, chronic back pain, chronic renal insufficiency, hx of atrial flutter, HTN, limited mobility, migraine headache, morbid obesity, sleep apnea, smoking Cultural/Gnosticist Food Preferences: None Identified FOOD/NUTRITION HISTORY Diet History: SANDHYA pt intubated in ICU Food Allergies:  [x] NKFA Pertinent PTA Medications: lasix NUTRITION INTAKE Diet Order:  NPO Average PO Intake:      
No data found. Pertinent Medications:  [x] Reviewed; lasix, methylprednisolone, protonix, propfol Electrolyte Replacement Protocol: []K  []Mg  []PO4 Insulin:  [] SSI  [] Pre-meal   []  Basal   [] Drip  [] None Pt expected to meet estimated nutrient needs through next review:          []  Yes     [x] No; NPO does not meet estimated needs  ANTHROPOMETRICS Height: 5' 10\" (177.8 cm)       Weight: (!) 269.9 kg (595 lb) BMI: 85.4 kg/m^2  -  morbidly obese (Greater than or = to 40% BMI) Weight change: no wt loss per chart review Comparison to Reference Standards: IBW: 166 lbs      %IBW: 358%      AdjBW: 124.2 kg NUTRITION-FOCUSED PHYSICAL ASSESSMENT Skin: No PU   
GI: No BM 
 
BIOCHEMICAL DATA & MEDICAL TESTS Pertinent Labs:  [x] Reviewed; NUTRITION PRESCRIPTION 
 Calories: 1886kcal/day based on 25kcal/kg of IBW Protein: 113- 151 g/day based on 1.5-2.0 g/kg of IBW 
CHO: 236 g/day based on 50% of total energy Fluid: 1886 ml/day based on 1 kcal/ml NUTRITION DIAGNOSES:  
1. Inadequate oral intake related to intubation as evidence by need for nutrition support NUTRITION INTERVENTIONS:  
INTERVENTIONS:        GOALS: 
1. EN: Recc starting tube feeds when clinically able Recc Vital High Protein @ 85ml/hr to provide 1870kcal 164g PRO 1563ml H20 209g CHO 43g Fat Recc starting @ 10ml/hr an increasing by 5ml/hr Q6 until goal rate met Will defer Free H20 to MD 
Other: Order daily Phos and Mg labs 1. Start tube feeds by next review 4 days LEARNING NEEDS (Diet, Supplementation, Food/Nutrient-Drug Interaction):  
[] None Identified 
[] Inpatient education provided/documented   
[] Identified and patient:  [] Declined     [x] Was not appropriate/indicated NUTRITION MONITORING /EVALUATION:  
Monitor wt Monitor renal labs, electrolytes, fluid status 
 
[] Participated in Interdisciplinary Rounds 
[x] [address] [place] [address] Reviewed/Documented DISCHARGE NUTRITION RECOMMENDATIONS ADDRESSED:  
   [x] To be determined closer to discharge NUTRITION RISK:     [x]  At risk                     []  Not currently at risk Will follow-up per policy. Shabana Spencer 9

## 2020-04-23 NOTE — ANESTHESIA PROCEDURE NOTES
Emergent Intubation Performed by: Carla Lipscomb MD 
Authorized by: Carla Lipscomb MD  
 
Emergent Intubation: Location:  ICU Date/Time:  4/23/2020 7:52 AM 
Indications:  Airway compromise (Patient extubated himself. Need for reintubation. Respiratory failure.) Spontaneous Ventilation: present Level of Consciousness: sedated Preoxygenated: Yes Airway Documentation: Airway:  ETT - Cuffed Technique:  Intubating stylet Advanced Technique:  Bougie and Infante Insertion Site:  Oral 
Blade Size:  4 ETT size (mm):  8.0 Placement verified by: auscultation Attempts:  3 Difficult airway: Yes Patient had self extubated. Starting oxygen saturation 60%. His airway was anterior and edematous with a lot of secretions. Unable to advance insitu ETT through cords even with stylet. Removed previous ETT and intubated with bougie and Infante 4. Confirmed with glidescope blade. Patient coded during intubation. CPR was initiated and epinephrine was given. Patient recovered with placement of ETT.

## 2020-04-23 NOTE — PROGRESS NOTES
0732-Called to room by RT. Patient's vent alarming at this time. Martine RN entered room to assist RT. 0733-Noted patient managed to take Rt arm out of restraint and pulled out ET tube. 0734-Paged Anesthesia stat overhead. 0736-Pt rule out covid, repaged with COVID 19 stat intubation to ICU. Oriana Negron CRNA on unit and Intubation team arrived in ICU. Per protocol dressed in appropriate isolation PPE. 0744-CRNA Entered room and [place] RT was able to come out to dress in appropriate isolation PPE. 0745-Restarted Levophed drip at 2mcg/min at this time (CRNA gave propofol for sedation/intabion and BP decreased). 0748-All team members present to intubate at this time. CRNA attempted x1 with 8.0. Difficulty re-intubating. 0752-Used Bougie to place 8.0 ET tube at this time. Pt is beginning to move around, restarted Versed drip at previous rate of 5mg/hr. 0755-Pt agonal on vent at this time. 1MG Epi pushed at this time. 0759-HR dropped from 100 to 69. CODE BLUE CALLED.  
0800- HR 32 at this time. 1mg EPI pushed. Unable to obtain pulse. Compressions started by Jourdan Farooq RN. Code cart brought in room at this time. 0803-Vtach 140-170's at this time. EPI #2 -1mg given IVP. 0805-Pads placed on patient at this time and on Defibrillator. 0808-SVT on monitor 157. ROSS obtained at this time. Dr. Jacob Vargas in room at this time. 0815- Amio drip orders received from Dr. Jacob Vargas. Amio drip started at this time at 0.5mg/min at this time per Verbal orders per Dr. Jacob Vargas. 0835-Pt beginning to wake up after being intubated. Gave bolus Fentanyl 50mcg at this time per verbal orders per Dr. Jacob Vargas. *See titration for sedation/Pressors. 0854-Pt's spouse Socorro Noonan updated on patient's current status by Bailey Fitzgerald. 0900-Dr. Jacob Vargas in room and attempting A-Line. Unable to obtain at this time.  2 Gram Calcium gluconate given per Dr. Jennifer Hanks orders for frequent PVC's. Stat labs placed at this time. Orders placed for Vascular consult to place A-line. Xray called to bedside to check Et tube and Og tube placement. Primary RN aware of current care and relinquished care of patient.

## 2020-04-24 NOTE — CONSULTS
RENAL CONSULT 
2020 Patient:  Leonardo Harris :  1975 Gender:  male MRN #:  [id number] Consulting Physician:  Diana Xie DO, Assessment:   
)Principal Problem: 
  Respiratory failure with hypoxia and hypercapnia ([address] (2020) Active Problems: Morbid obesity ([address] (2016) Sleep apnea (2016) Chronic renal insufficiency, stage II (mild) () Chronic a-fib (10/15/2018) Acute CHF (congestive heart failure) ([address] (2020) Acute on chronic respiratory failure with hypoxia and hypercapnia ([address] (2020) Cardiac arrest ([address] (2020) CKD Plan: PT seems to be at his baseline or better GFR. His urine in the past has omega negative for protein or boold Urine sediment description could be due medication or solvent/grover Impossible to know intravascular volume given his weight, will try to diurese further while BP tolerates He most likely chronic respiratory acidosis with with renal compensation and now mixed disorder. 2 dose of Diamox. History of Present Illness: 
Leonardo Harris is a 53y.o. year old male with ongoing CHF, morbid obesity who has been admitted with atrial fib, intubated. Pt has hx of CHF. His baseline cr I sin 1.6 rang. Past Medical History:  
Diagnosis Date  Arrhythmia  Arthritis   
 knees  CHF (congestive heart failure) ([address]  Chronic back pain  Chronic renal insufficiency, stage II (mild)  History of atrial flutter Dx 2016 - anticoagulated by Benjamen Able  Hypertension 2015  Limited mobility  Migraine headache  Morbid obesity ([address]  Morbid obesity with body mass index of 60.0-69.9 in adult [facility])  Sleep apnea   
 uses c-pap instructed to bring day of surgery  Smoking   
 very passive / cigars only Past Surgical History:  
Procedure Laterality Date  CARDIAC SURG PROCEDURE UNLIST    
 cardioversion  ECHOCARDIOGRAM  2016 EF 60-65% (performed at THE [facility] - see cardiology tab) Family History Problem Relation Age of Onset  Obesity Mother  Liver Disease Sister No Known Allergies Current Facility-Administered Medications Medication Dose Route Frequency Provider Last Rate Last Dose  ELECTROLYTE REPLACEMENT PROTOCOL - Calcium   1 Each Other PRN Morgan Regalado MD      
 ELECTROLYTE REPLACEMENT PROTOCOL - Magnesium   1 Each Other PRN Morgan Regalado MD      
 ELECTROLYTE REPLACEMENT PROTOCOL - Potassium Standard Dosing   1 Each Other PRN Morgan Regalado MD      
 ELECTROLYTE REPLACEMENT PROTOCOL - Phosphorus  Standard Dosing  1 Each Other PRN Morgan Regalado MD      
 cisatracurium (NIMBEX) 100 mg in 0.9% sodium chloride 100 mL infusion  0-10 mcg/kg/min IntraVENous TITRATE Morgan Regalado MD      
 albuterol (PROVENTIL HFA, VENTOLIN HFA, PROAIR HFA) inhaler 2 Puff  2 Puff Inhalation TID RT Morgan Regalado MD      
 furosemide (LASIX) injection 60 mg  60 mg IntraVENous BID Kasandra Garsia R, DO      
 acetaZOLAMIDE (DIAMOX) tablet 250 mg  250 mg Oral BID Kasandra Garsia R, DO      
 heparin 25,000 units in D5W 250 ml infusion  8-36 Units/kg/hr IntraVENous TITRATE Morgan Regalado MD 24.3 mL/hr at 04/24/20 1109 9 Units/kg/hr at 04/24/20 1109  pantoprazole (PROTONIX) 40 mg in 0.9% sodium chloride 10 mL injection  40 mg IntraVENous DAILY Inderjit Coles MD   40 mg at 04/24/20 3573  albumin human 25% (BUMINATE) solution 25 g  25 g IntraVENous Q6H Inderjit Coles MD 0 mL/hr at 04/22/20 0555 25 g at 04/24/20 1117  
 fentaNYL (PF) 900 mcg/30 ml infusion soln  0-150 mcg/hr IntraVENous TITRATE Inderjit Coles MD 2.5 mL/hr at 04/24/20 1238 75 mcg/hr at 04/24/20 1238  NOREPINephrine (LEVOPHED) 8 mg in 0.9% NS 250ml infusion  0.5-30 mcg/min IntraVENous TITRATE Inderjit Coles MD   Stopped at 04/23/20 1907  midazolam in normal saline (VERSED) 1 mg/mL infusion  0-10 mg/hr IntraVENous TITRATE Morgan Regalado MD 8 mL/hr at 04/24/20 0802 8 mg/hr at 04/24/20 8817  aspirin chewable tablet 81 mg  81 mg Oral DAILY Morgan Regalado MD   Stopped at 04/23/20 0900  
 methylPREDNISolone (PF) (SOLU-MEDROL) injection 40 mg  40 mg IntraVENous Q8H Morgan Regalado MD   40 mg at 04/24/20 1356  sodium chloride (NS) flush 5-40 mL  5-40 mL IntraVENous Q8H Inderjit Coles MD   10 mL at 04/24/20 2467  sodium chloride (NS) flush 5-40 mL  5-40 mL IntraVENous PRN Inderjit Coles MD      
 [Held by provider] losartan (COZAAR) tablet 25 mg  25 mg Oral DAILY Inderjit Coles MD      
 [Held by provider] metoprolol succinate (TOPROL-XL) XL tablet 25 mg  25 mg Oral DAILY Inderjit Coles MD      
 
Facility-Administered Medications Ordered in Other Encounters Medication Dose Route Frequency Provider Last Rate Last Dose  propofoL (DIPRIVAN) 10 mg/mL injection   IntraVENous PRN Miriam Akers CRNA   300 mg at 04/22/20 7908  ePHEDrine (PF) (MISTOLE) 10 mg/mL in NS syringe   IntraVENous PRN Miriam Akers CRNA   10 mg at 04/22/20 1753  PHENYLephrine (RUPERT-SYNEPHRINE) 100 mcg/mL in NS syringe   IntraVENous PRN Miriam Akers CRNA   100 mcg at 04/22/20 2041 Review of Symptoms:  Unable to obtain Objective: 
Visit Vitals /81 Pulse (!) 52 Temp 98.2 °F (36.8 °C) Resp 20 Ht 5' 10\" (1.778 m) Wt (!) 269.9 kg (595 lb) SpO2 92% BMI 85.37 kg/m² PT in covid 19 isolation He is intubated Vitals stable Sedated Minima sedation Line and tubes noted Morbid obesity Lower venous stasis noted. Laboratory Data: 
Lab Results Component Value Date BUN 26 (H) 04/24/2020 BUN 18 04/23/2020 BUN 15 04/22/2020  04/24/2020  04/23/2020  04/22/2020 CO2 39 (H) 04/24/2020 CO2 37 (H) 04/23/2020 CO2 39 (H) 04/22/2020 Lab Results Component Value Date WBC 8.1 04/24/2020 HGB 13.7 04/24/2020 HCT 45.6 04/24/2020 Imaging have been reviewed: 
)Xr Abd (kub) Result Date: 4/22/2020 EXAM: XR ABD (KUB) CLINICAL INDICATION/HISTORY: ETT and OG placement COMPARISON: Chest radiograph same day TECHNIQUE: Supine AP view of the abdomen was obtained. _______________ FINDINGS: Enteric tube is not reliably visualized on this exam; indeterminate to what extent this is related to its potential high lying intrathoracic position versus or instead if the tube is simply obscured because of patient's obese body habitus. _______________ IMPRESSION: Enteric tube is not reliably visualized within the distal thoracic esophagus normal or distally within the left upper quadrant. Xr Chest [facility] Result Date: 4/23/2020 EXAM: CHEST RADIOGRAPH, SINGLE VIEW CLINICAL INDICATION/HISTORY: Shortness of breath, hypoxia, endotracheal tube placement COMPARISON: 4/22/2020 TECHNIQUE: Portable frontal view of the chest was obtained. _______________ FINDINGS: SUPPORT DEVICES:   >  Endotracheal tube tip is located approximately 5.3 cm above the miguel ángel, adequately positioned.   >  Enteric tube is adequately visualized at the level of thoracic inlet and just above the miguel ángel, slightly eccentric to the left but is not clearly visualized throughout the lower aspect of the left hemithorax, potentially artifactual given patient's body habitus and adjacent overlying global cardiomegaly. > Unchanged adequately positioned right jugular central venous catheter. HEART AND MEDIASTINUM: Cardiomediastinal silhouette appears unchanged, markedly enlarged. Tortuous and atherosclerotic thoracic aorta. Prominent central pulmonary vascular congestion. LUNGS AND PLEURAL SPACES: Diffusely increased interstitial markings and retrocardiac left lower lobe opacity, silhouetting medial left hemidiaphragm. Probable small pleural effusions. No pneumothorax.   BONY THORAX AND SOFT TISSUES: No acute osseous abnormality. _______________ IMPRESSION: 1. Endotracheal tube is adequately positioned. Unchanged, adequately positioned right jugular central venous catheter. 2. Enteric tube is not clearly visualized below the level of the miguel ángel; indeterminate to what extent this represents artifact related to patient's body habitus and global cardiomegaly or instead of the enteric tube is proximal in position. 3. Unchanged cardiomegaly, central vascular congestion and interstitial pulmonary edema. Probable small pleural effusions. Xr Chest [facility] Result Date: 4/22/2020 EXAM: XR CHEST PORT CLINICAL INDICATION/HISTORY: hypoxia -Additional: None COMPARISON: Earlier the same day TECHNIQUE: Portable frontal view of the chest _______________ FINDINGS: SUPPORT DEVICES: Interval placement of right IJV central venous catheter. Endotracheal and enteric tubes are unchanged. HEART AND MEDIASTINUM: Stable cardiomediastinal silhouette. LUNGS AND PLEURAL SPACES: Limited study, bilateral lower lung fields are excluded from field-of-view. Prominent central vasculature and interstitial edema. No pneumothorax _______________ IMPRESSION: Limited study. Interval placement of right IJV central venous catheter with tip at the SVC. No pneumothorax. Xr Chest [facility] Result Date: 4/22/2020 EXAM: CHEST RADIOGRAPH, SINGLE VIEW CLINICAL INDICATION/HISTORY: Endotracheal and orogastric tube placement COMPARISON: 4/21/2020 TECHNIQUE: Portable frontal view of the chest was obtained. _______________ FINDINGS: SUPPORT DEVICES: Vertebral placement of endotracheal tube with tip approximately 5.3 cm above the miguel ángel. Enteric tube is seen coursing through midline thoracic esophagus. HEART AND MEDIASTINUM: Cardiomediastinal silhouette appears unchanged, markedly enlarged. Tortuous and atherosclerotic thoracic aorta. No central vascular congestion.  LUNGS AND PLEURAL SPACES: Retrocardiac medial left lower lobe opacity silhouettes left hemidiaphragm. No confluent airspace opacity elsewhere throughout the lungs. No definite pleural effusion. No pneumothorax. BONY THORAX AND SOFT TISSUES: No acute osseous abnormality. _______________ IMPRESSION: 1. Adequate interval endotracheal tube placement. Enteric tube is better visualized on the concurrent abdominal radiographs. 2. Unchanged global cardiomegaly and retrocardiac left lower lobe atelectasis/infiltrate. Xr Chest [facility] Result Date: 4/21/2020 EXAM: CHEST RADIOGRAPH CLINICAL INDICATION/HISTORY: SOB   > Additional: None COMPARISON: 10/16/2018. TECHNIQUE: Portable frontal view of the chest _______________ FINDINGS: SUPPORT DEVICES: None. HEART AND MEDIASTINUM: Cardiac size remains enlarged. Pulmonary vasculature mediastinal structures are stable. LUNGS AND PLEURAL SPACES: Shallow inspiration. Cannot exclude a left retrocardiac opacity. No definite pleural effusion or pneumothorax. BONY THORAX AND SOFT TISSUES: Unremarkable. _______________ IMPRESSION: 1. Stable cardiomegaly with probable mild pulmonary congestion. 2. Cannot exclude left lower lobe infiltrate/atelectasis. 65 minutes of critical care time spent in the direct evaluation and treatment of this high risk patient. The reason for providing this level of medical care for this critically ill patient was due a critical illness that impaired one or more vital organ systems such that there was a high probability of imminent or life threatening deterioration in the patients condition.  This care involved high complexity decision making to assess, manipulate, and support vital system functions, to treat this degreee vital organ system failure and to prevent further life threatening deterioration of the patients condition. 
  
 
)Kasandra Garsia DO,

## 2020-04-24 NOTE — CONSULTS
[facility] 
                                          (A Division of [address] [place] [address]) Consultation Note Date of Admission: 4/21/2020    Date of Consultation: 4/24/2020 Referred by: Yefri Trevino MD 
 
Reason for Referral: TTRKA-12 concerns Current Antimicrobials: Prior Antimicrobials 1. Pip/tzb IV (4/23-) #1 1. Vanco IV (4/23) 2. CAX IV (4/22) 3. Doxy IV (4/22-23) Assessment: Plan:  
Acute Resp Failure - likely cardiogenic/hypoventilary obesity 4/24:  PCT 0.20ng/mL This is his disease; doubt COVID-19. Will back off the pip/tzb as it has a high-load of Na+/salt with each bag. His PCT is negative/normal. ->Pip/tzb #1 I stopped the pip/tzb. COVID-19 PUI I grade him LOW risk for COVID-19, but not zero; so agree with checking test given his co-morbidiities/presentation.   ->first test (-) 
 
IF second test returns likewise (-), he's done. I'll scut this result, and if (-) will sign off. CHF Morbid Obesity MARIA FERNANDA Microbiology:  4/24 - COVID-19 sent 4/22 - COVID-19 (-) Resp - scant Staphylococcus aureus Lines / Catheters: RIJ and peripherals HPI: 
CC:  Intubated/sedated Chart reviewed and patient examined at bedside. Mr Blayne Collins is a chronically ill AAM with multiple medical issues to include morbid obesity, CHF, MARIA FERNANDA/hypovetilary function, and HTN who ran out of furosamide (due to quarantine) last week and developed progressive lower body/scrotal anasarca to the point of progressive dsypnea prompting ER visit where he was eventually intubated the following day 4/22 for hypercapnia/hypoxic struggle. No f/s/c. No unusual travel. Active Hospital Problems Diagnosis Date Noted  Cardiac arrest ([address] 04/23/2020  Acute on chronic respiratory failure with hypoxia and hypercapnia ([address] 04/22/2020  Acute CHF (congestive heart failure) ([address] 04/21/2020  Respiratory failure with hypoxia and hypercapnia ([facility] 04/21/2020  Chronic a-fib 10/15/2018  Chronic renal insufficiency, stage II (mild)  Morbid obesity ([facility] 04/04/2016  Sleep apnea 04/04/2016 Past Medical History:  
Diagnosis Date  Arrhythmia  Arthritis   
 knees  CHF (congestive heart failure) ([facility]  Chronic back pain  Chronic renal insufficiency, stage II (mild)  History of atrial flutter Dx 6/2016 - anticoagulated by Carey Thakkar  Hypertension 2015  Limited mobility  Migraine headache  Morbid obesity ([facility]  Morbid obesity with body mass index of 60.0-69.9 in adult [facility])  Sleep apnea   
 uses c-pap instructed to bring day of surgery  Smoking   
 very passive / cigars only Past Surgical History:  
Procedure Laterality Date  CARDIAC SURG PROCEDURE UNLIST    
 cardioversion  ECHOCARDIOGRAM  04/2016 EF 60-65% (performed at [facility] - see cardiology tab) Family History Problem Relation Age of Onset  Obesity Mother  Liver Disease Sister Social History Socioeconomic History  Marital status:  Spouse name: Not on file  Number of children: Not on file  Years of education: Not on file  Highest education level: Not on file Occupational History  Not on file Social Needs  Financial resource strain: Not on file  Food insecurity Worry: Not on file Inability: Not on file  Transportation needs Medical: Not on file Non-medical: Not on file Tobacco Use  Smoking status: Former Smoker Types: Cigars  Smokeless tobacco: Never Used Substance and Sexual Activity  Alcohol use: No  
 Drug use: No  
 Sexual activity: Yes  
  Partners: Female Lifestyle  Physical activity Days per week: Not on file Minutes per session: Not on file  Stress: Not on file Relationships  Social connections Talks on phone: Not on file Gets together: Not on file Attends Yazdanism service: Not on file Active member of club or organization: Not on file Attends meetings of clubs or organizations: Not on file Relationship status: Not on file  Intimate partner violence Fear of current or ex partner: Not on file Emotionally abused: Not on file Physically abused: Not on file Forced sexual activity: Not on file Other Topics Concern  Not on file Social History Narrative  Not on file Allergies: 
Patient has no known allergies. .  
 
Medications: 
Current Facility-Administered Medications Medication Dose Route Frequency  ELECTROLYTE REPLACEMENT PROTOCOL - Calcium   1 Each Other PRN  
 ELECTROLYTE REPLACEMENT PROTOCOL - Magnesium   1 Each Other PRN  
 ELECTROLYTE REPLACEMENT PROTOCOL - Potassium Standard Dosing   1 Each Other PRN  
 ELECTROLYTE REPLACEMENT PROTOCOL - Phosphorus  Standard Dosing  1 Each Other PRN  
 cisatracurium (NIMBEX) 100 mg in 0.9% sodium chloride 100 mL infusion  0-10 mcg/kg/min IntraVENous TITRATE  albuterol (PROVENTIL HFA, VENTOLIN HFA, PROAIR HFA) inhaler 2 Puff  2 Puff Inhalation TID RT  
 heparin 25,000 units in D5W 250 ml infusion  8-36 Units/kg/hr IntraVENous TITRATE  pantoprazole (PROTONIX) 40 mg in 0.9% sodium chloride 10 mL injection  40 mg IntraVENous DAILY  albumin human 25% (BUMINATE) solution 25 g  25 g IntraVENous Q6H  
 fentaNYL (PF) 900 mcg/30 ml infusion soln  0-150 mcg/hr IntraVENous TITRATE  
 NOREPINephrine (LEVOPHED) 8 mg in 0.9% NS 250ml infusion  0.5-30 mcg/min IntraVENous TITRATE  midazolam in normal saline (VERSED) 1 mg/mL infusion  0-10 mg/hr IntraVENous TITRATE  furosemide (LASIX) injection 40 mg  40 mg IntraVENous BID  aspirin chewable tablet 81 mg  81 mg Oral DAILY  atorvastatin (LIPITOR) tablet 40 mg  40 mg Oral DAILY  methylPREDNISolone (PF) (SOLU-MEDROL) injection 40 mg  40 mg IntraVENous Q8H  
  sodium chloride (NS) flush 5-40 mL  5-40 mL IntraVENous Q8H  
 sodium chloride (NS) flush 5-40 mL  5-40 mL IntraVENous PRN  
 [Held by provider] losartan (COZAAR) tablet 25 mg  25 mg Oral DAILY  [Held by provider] metoprolol succinate (TOPROL-XL) XL tablet 25 mg  25 mg Oral DAILY Facility-Administered Medications Ordered in Other Encounters Medication Dose Route Frequency  propofoL (DIPRIVAN) 10 mg/mL injection   IntraVENous PRN  
 ePHEDrine (PF) (MISTOLE) 10 mg/mL in NS syringe   IntraVENous PRN  
 PHENYLephrine (RUPERT-SYNEPHRINE) 100 mcg/mL in NS syringe   IntraVENous PRN  
  
 
ROS: 
Review of systems not obtained due to patient factors. Physical Exam: 
Temp (24hrs), Av °F (36.7 °C), Min:97.3 °F (36.3 °C), Max:99 °F (37.2 °C) Visit Vitals /81 Pulse (!) 52 Temp 98.2 °F (36.8 °C) Resp 20 Ht 5' 10\" (1.778 m) Wt (!) 269.9 kg (595 lb) SpO2 92% BMI 85.37 kg/m² General: Well developed, well nourished 53 y.o.  male in no acute distress. ENT: ENT exam normal, no neck nodes or sinus tenderness Head: normocephalic, without obvious abnormality Mouth:  mucous membranes moist, pharynx normal without lesions Neck: supple, symmetrical, trachea midline Cardio:  regular rate and rhythm, S1, S2 normal, no murmur, click, rub or gallop Chest: inspection normal - no chest wall deformities or tenderness, respiratory effort normal 
Lungs: clear to auscultation, no wheezes or rales and unlabored breathing Abdomen: soft, non-tender. Bowel sounds normal. No masses, no organomegaly. Extremities:  edema 4+, anasarca extending to lower abdomen/scrotum Neuro: intubated/unresponsive/sedated Lab results: 
 
Chemistry Recent Labs  
  20 
0410 20 
0530 20 
0354 * 106* 98  144 140  
K 4.2 4.3 5.3  103 101 CO2 39* 37* 39* BUN 26* 18 15 CREA 1.58* 1.30 1.24  
CA 8.6 8.4* 8.2* AGAP 2* 4 0* BUCR 16 14 12 AP 52 61 70  
TP 7.0 6.6 7.8 ALB 3.4 2.9* 3.1*  
GLOB 3.6 3.7 4.7* AGRAT 0.9 0.8 0.7* CBC w/ Diff Recent Labs  
  04/24/20 
0410 04/23/20 
0830 04/23/20 
0530  04/21/20 
1830 WBC 8.1 5.3 4.4*   < > 4.5*  
RBC 5.09 5.63* 5.16   < > 5.00 HGB 13.7 15.3 13.9   < > 13.6 HCT 45.6 50.8* 45.7   < > 45.6  169 152   < > 164 GRANS 83*  --   --   --  58  
LYMPH 11*  --   --   --  25  
EOS 0  --   --   --  3  
 < > = values in this interval not displayed. Microbiology All Micro Results Procedure Component Value Units Date/Time CULTURE, RESPIRATORY/SPUTUM/BRONCH Ardean Sayer STAIN [[id number]]  (Abnormal) Collected:  04/22/20 1018 Order Status:  Completed Specimen:  Sputum from Tracheal Aspirate Updated:  04/24/20 1142 Special Requests: NO SPECIAL REQUESTS     
  GRAM STAIN FEW WBCS SEEN     
      
  OCCASIONAL EPITHELIAL CELLS SEEN  
     
      
  FEW GRAM POSITIVE COCCI IN CLUSTERS  
     
   RARE GRAM NEGATIVE RODS Culture result:    
  LIGHT STAPHYLOCOCCUS AUREUS SENSITIVITY TO FOLLOW  
     
      
  LIGHT NORMAL RESPIRATORY BRYN  
     
 CULTURE, BLOOD [[id number]] Order Status:  Sent Specimen:  Blood Akhil Pradhan MD 
Cell [phone] [facility] 4/24/2020  
12:55 PM

## 2020-04-24 NOTE — PROGRESS NOTES
Hospitalist Progress Note Patient: Rosi Ramos. MRN: [id number]  CSN: [id number] YOB: 1975  Age: 53 y.o. Sex: male DOA: 4/21/2020 LOS:  LOS: 3 days Assessment/Plan Patient Active Problem List  
Diagnosis Code  Cellulitis of left lower leg L03. 116  
 Anemia D64.9  Morbid obesity ([facility]) E66.01  
 Dyspnea R06.00  Sleep apnea G47.30  Sinus tachycardia R00.0  BMI 70 and over, adult [facility]) E59.49  Chronic back pain M54.9, G89.29  
 Arthritis M19.90  Migraine G43.909  
 History of atrial flutter Z86.79  
 Limited mobility Z74.09  
 Smoking F17.200  Chronic renal insufficiency, stage II (mild) N18.2  CHF (congestive heart failure) ([facility]) I50.9  Acute diastolic CHF (congestive heart failure) ([facility]) I50.31  Chronic a-fib I48.20  Atrial flutter ([facility]) I48.92  
 Elevated troponin R79.89  
 Acute respiratory distress R06.03  
 Acute CHF (congestive heart failure) ([facility]) I50.9  Respiratory failure with hypoxia and hypercapnia ([facility]) J96.91, J96.92  
 Acute on chronic respiratory failure with hypoxia and hypercapnia ([facility]) J96.21, J96.22  
 Cardiac arrest ([facility]) I46.9  
  
 
 
 
54 y/o male with hx of morbid obesity, MARIA FERNANDA, chronic afib on anticoagulation, and systolic CHF (EF 39-06%) is admitted with respiratory failure with hypoxia and hypercapnia, he required to be intubated. Patient self extubated this morning and went into PEA arrest, he was re intubated, difficult intubation. This morning pulling on tubing Discussed with Pulm may need Nimbex Resp - See vent orders, VAP bundle. HOB>30 degrees. Acute respiratory failure - with hypoxia and hypercapnia, follow ABGs. MARIA FERNANDA/OHS -  
 
ID - Follow up resp, cx. ANTIBIOTICS zosyn, doxycycline. COVID 19 pending results negative times one [facility] - Monitor HD. Hypotension - on pressors, wean as tolerated Chronic A-fib - monitor rate, on amiodarone drip. On heparin drip Acute on chronic systolic CHF -  
Continue with lasix IV. Echo with EF of 45-50% Elevated troponin -  
Cardiology following. Heme/onc - Follow H&H, plts. Renal - Trend BUN, Cr, follow I/O, daniel in place. Check and replace Mg, K, phos. Endocrine -  Follow FSG Neuro/ Pain/ Sedation - Fentanyl, versed prn. Sedation bundle. GI - NPO for now. Protonix IV Morbid obesity Prophylaxis - DVT: heparin drip, GI: protonix [id number] 
40 minutes of critical care time spent in the direct evaluation and treatment of this high risk patient. The reason for providing this level of medical care for this critically ill patient was due a critical illness that impaired one or more vital organ systems such that there was a high probability of imminent or life threatening deterioration in the patients condition. This care involved high complexity decision making to assess, manipulate, and support vital system functions, to treat this degreee vital organ system failure and to prevent further life threatening deterioration of the patients condition. Disposition : TBD Physical Exam: 
General: As above   
Unchanged from yesterday Vital signs/Intake and Output: 
Visit Vitals /73 Pulse (!) 49 Temp 97.7 °F (36.5 °C) Resp 24 Ht 5' 10\" (1.778 m) Wt (!) 269.9 kg (595 lb) SpO2 92% BMI 85.37 kg/m² Current Shift:  No intake/output data recorded. Last three shifts:  04/22 1901 - 04/24 0700 In: 3342.6 [I.V.:3342.6] Out: 6450 [[facility]:[id number]] Labs: Results:  
   
Chemistry Recent Labs  
  04/24/20 
0410 04/23/20 
0530 04/22/20 
0354 * 106* 98  144 140  
K 4.2 4.3 5.3  103 101 CO2 39* 37* 39* BUN 26* 18 15 CREA 1.58* 1.30 1.24  
CA 8.6 8.4* 8.2* AGAP 2* 4 0* BUCR 16 14 12 AP 52 61 70  
TP 7.0 6.6 7.8 ALB 3.4 2.9* 3.1*  
GLOB 3.6 3.7 4.7* AGRAT 0.9 0.8 0.7* CBC w/Diff Recent Labs  
  04/24/20 
0410 04/23/20 
0830 04/23/20 0530  04/21/20 
1830 WBC 8.1 5.3 4.4*   < > 4.5*  
RBC 5.09 5.63* 5.16   < > 5.00 HGB 13.7 15.3 13.9   < > 13.6 HCT 45.6 50.8* 45.7   < > 45.6  169 152   < > 164 GRANS 83*  --   --   --  58  
LYMPH 11*  --   --   --  25  
EOS 0  --   --   --  3  
 < > = values in this interval not displayed. Cardiac Enzymes Recent Labs  
  04/22/20 
1018 04/22/20 
0354 * 979* CKND1 1.1 1.0 Coagulation Recent Labs  
  04/24/20 
0125 04/23/20 
1730 APTT >180.0* 56.6* Lipid Panel No results found for: CHOL, CHOLPOCT, CHOLX, CHLST, CHOLV, 744050, HDL, HDLP, LDL, LDLC, DLDLP, 601027, VLDLC, VLDL, TGLX, TRIGL, TRIGP, TGLPOCT, CHHD, CHHDX  
BNP No results for input(s): BNPP in the last 72 hours. Liver Enzymes Recent Labs  
  04/24/20 
0410 TP 7.0 ALB 3.4 AP 52 SGOT 33 Thyroid Studies Lab Results Component Value Date/Time TSH 2.20 10/16/2018 05:53 AM  
    
Procedures/imaging: see electronic medical records for all procedures/Xrays and details which were not copied into this note but were reviewed prior to creation of Plan

## 2020-04-24 NOTE — PROGRESS NOTES
0700 Assumed care of patient and completed AM assessment. .. Patient vital sign changes noted and drips asjusted at 0900. Sandy Koch ... Discussed NG tube with critical care Md who advised discuss with  GI. 
1030 Lou Neumann and discussed OG concerns. Jayla Bc will evaluate. . 
1200 Reassessment completed and patient evaluated. .. Unable to place on paralytic due to heart rate at this time. . Will continue to monitor. 1330 NG tube placed via GI audible gurgling and gas sounds noted. ... Pt agitated but redirectable during procedure. .. Will continue to monitor 1600 Reassessment completed and ABG completed. Pt awake and confuse. ... no change in vital signs. .. Will continue to monitor 1900 Bedside and Verbal shift change report given to Keli (oncoming nurse) by Mario Early (offgoing nurse). Report included the following information SBAR and Kardex.

## 2020-04-24 NOTE — CONSULTS
[facility] Pulmonary, Critical Care, and Sleep Medicine Name: Collette Maizes. MRN: [id number] : 1975 Hospital: [facility] [place] Date: 2020 Pulmonary Critical Care Consult IMPRESSION:  
Patient Active Problem List  
Diagnosis Code  Cellulitis of left lower leg L03. 116  
 Anemia D64.9  Morbid obesity ([facility]) E66.01  
 Dyspnea R06.00  Sleep apnea G47.30  Sinus tachycardia R00.0  BMI 70 and over, adult [facility]) X11.91  Chronic back pain M54.9, G89.29  
 Arthritis M19.90  Migraine G43.909  
 History of atrial flutter Z86.79  
 Limited mobility Z74.09  
 Smoking F17.200  Chronic renal insufficiency, stage II (mild) N18.2  CHF (congestive heart failure) ([facility]ly [facility]) I50.9  Acute diastolic CHF (congestive heart failure) ([facility]) I50.31  Chronic a-fib I48.20  Atrial flutter ([facility]) I48.92  
 Elevated troponin R79.89  
 Acute respiratory distress R06.03  
 Acute CHF (congestive heart failure) ([facility]) I50.9  Respiratory failure with hypoxia and hypercapnia ([facility]) J96.91, J96.92  
 Acute on chronic respiratory failure with hypoxia and hypercapnia ([facility]) J96.21, J96.22  
 Cardiac arrest ([facility]) I46.9  
cardiopulmonary arrest in a setting of self extubation · Neuro: PRN pain medications, +/- sedation avoid propofol due to low HR. Versed and fentanyl. deep sedation due to to self extubation · CVS: hypotension and bradycardia sedation vs primary cardiac issues. Dc propofol, follow echo trop ecg cardiology consult add asa and continue xarelto  HD stable; Monitor CVP, Actively titrate vasopressors aim MAP >65mmHg, Check cardiac panel, ECHO results. Today 5 minutes PEA arrest in a setting of self extubation. Now stable on amiodarone and levophed · Resp: acute on chronic  hypercarbic hypoxemic respiratory failure. Initially CHF/pneumonia now ARDS picture.  Start ARDS protocol as tolerated but tried to extubate again today start 24 hsr Nimbex continue versed, fentanyl cannot use propofol due to bradycardia  aslo neuroleptic high risk due to cardiomyopathy. · Intubated on 4/22/2020 for hypercarbic and hypoxemic respiratory failure . On 4/23/202 at 7 AM seld extubated brioef cardiac arrest PEA/ re intubated. · Titrate FiO2/ supp O2 for SpO2 >90%; full ventilaotry support  try 500 once sedated  Ac Fio2 90% wean to 80 % today , PEEP 14  
·  CHF/morbid obesity too high risk to extubate very difficult reintubation will consult for tracheostomy · I/D:  ? Aspiration pneumonia check for COVID very unlikely  ( first test negative)  no plaquenil cardiac risks. Afebrile; aleukocytosis; Sepsis bundle per hospital protocol, f/u BxCx/UC, Sputum Cx's. Staph on abx  LA ordered- initial and repeat Q4hrs till normalized. ABX :ceftriaone doxy  Descalate ABX once Cx's finalize. RESP CULURE COVID 19  
· Hem/Onc: Daily CBC; H/H, and plts are stable. pvl negative but on Ac for afib change to heparin drip · Metabolic: Daily BMP; monitor e-lytes; replace PRN 
· Renal: Trend Renal indices; Diuresis, Clark to BSD,  LOY Dc vancomycin lower diuretics · Endocrine: POC Glucose q6; Check TSH level · GI: SUP, Trend LFTs, Zofran PRN for N/V  
· Musc/Skin: No acute issues, wound care · Fluids: NACL prn albumins prn · Code Status:FULL  
RECOMMENDATIONS:  
Neuro:  Very agitated , self extubated. Does not tolerate propofol bradycardia . ARDS will paralyise for 24 hrs  VERSED FENTANYL Respiratory :acute on chronic  hypercarbic and hypoxemic repsi failure. Initially  CHf/pneumonia now ARDs picture. Trial of ARDS protocol with nimbex  for 24 hsr  
full ventilatory support Ac TV  550 stable wean Fiop2 90   , PEEP 16 continue morbid obesity . Oxygenation worsened  after self extubation now gradually imporving. [facility].  Ventilated patients- aim to keep peak plateau pressure less than or equal to 30cm H2O. Titrate FiO2 for goal SPO2> 90% Morbid obesity will need tracheostomy when more stable VAP prevention bundle, head of the bed at 30' all times Daily sedation holiday and assessment for weaning with SBT as tolerated Sputum culture Continue bronchodilators, pulmonary hygiene care Steroids added Sepsis bundle per hospital protocol CVS:? CHF/ ? NSTEMI 
 xarelto change to Heparin drip,  Asa 
lipitor Fluids:prn albumins Monitor CVP Actively titrate vasopressors aim MAP >65mmHg levophed low dose Check cardiac panel, ECHO Diuresis ID Void x1 negative second pending Pneumonia resp staph on zosyn was on vacomycin  now on hold due to renal  
Antibiotic choice:ceftriaxone and doxy I will change to zosyn ? Aspiration Cultures drawn and will be followed. Lactic acid ordered- initial and repeat Q6hrs if elevated till normalized. Endo Glycemic control Renal LOY gentle diuresis GI Stress ulcer prophylaxis DVT prophylaxis AM labs Diet: DIET NPO Palliative care consult Vent bundle day 2 Will defer respective systems problem management to primary and other consultant and follow patient in ICU with primary and other medical team 
Further recommendations will be based on the patient's response to recommended treatment and results of the investigation ordered. Quality Care: PPI, DVT prophylaxis, HOB elevated, Infection control all reviewed and addressed. PAIN AND SEDATION: yes · Skin/Wound: yes · Nutritiyes · Prophylaxis: DVT and GI Prophylaxis reviewed. · Restraints: yes · PT/OT eval and treat: ·   
· Lines/Tubes: lines daniel  Ventilator ADVANCE DIRECTIVE: . Palliative FAMILY DISCUSSION: full Events and notes from last 24 hours reviewed. Care plan discussed with nursing Subjective/History:  
Angelica Phillips. has been seen and evaluated as respiratory failure.  
 
78-year-old male with morbid obesity, ex-smoker, history of asthma, history of severe obstructive sleep apnea on CPAP questionable component compliance, history of congestive heart failure with reduced ejection fraction atrial fibrillation atrial flutter on amiodarone, history of chronic renal insufficiency, cellulitis of lower extremities and anemia,. Patient was in his usual state of health. As per family did not have any fever or upper respiratory tract infections and no flulike symptoms Ms. zamora is Lasix not clear if also missed his CPAP presented with acute shortness of breath worsening edema of lower extremities He was found to be in acute hypercarbic hypoxemic respiratory failure initially had a trial of BiPAP but failed and then eventually was intubated he was hypotensive. Periodically and slightly bradycardic. Moderate amount of secretions per next of breath groundglass clinical presentation trial strongly suspicious for congestive heart failure but to be on the safe side we will check patient also for viral infection and bacterial pneumonia. Consider also aspiration event. Morbid obesity. Patient is compliant with Xarelto. We will do follow-up PVL and continue his anticoagulation The patient is critically ill and can not provide additional history due to Ventilated. 4/24/2020. Remains in the ICU critically ill on mechanical ventilation and low-dose pressors. Today in the morning patient again woke up and was trying to self extubate. Is in spite of heavy sedation with Versed and fentanyl. Patient is not able to tolerate propofol due to bradycardia. Amiodarone is on hold. Hemodynamically improving. Acute hypoxemic respiratory failure still severe will do 24 hours ARDS protocol as tolerated with possible additional nimbex ( paralysis) Respiratory culture staph but not MRSA DC vancomycin. Lower diuretics. Covid  x1- second test pending. 4/23/2020 Patient remains in ICU critically ill on mechanical ventilation and pressors. 7 AM today patient had brief PEA arrest at the time of self extubation reintubated pulse back. Walk After was following simple commands. Back on sedation. Required very high PEEP and high FiO2 to recover from hypoxemia responded now well improving. Low-dose pressors required continue deep sedation due to self extubation and morbidly obese consult ENT. Change ceftriaxone to Zosyn for possible aspiration event. Culture still pending. Continue treatment for congestive heart failure including diuresis change Xarelto to heparin Review of Systems: A comprehensive review of systems was negative except for that written in the HPI. [x]The patient is unable to give any meaningful history or review of systems because the patient is: 
[]Intubated [x]Sedated  
[]Unresponsive [x]Ventilated []The patient is critically ill on     
[x]Mechanical ventilation []Pressors []BiPAP [] Latest lactic acid:  
Lactic acid Date Value Ref Range Status 04/23/2020 4.1 (HH) 0.4 - 2.0 MMOL/L Final  
  Comment:  
  CALLED TO AND CORRECTLY REPEATED BY: 
Regina Collado RN ICU ON 4/23/2020 0916 TO 5077 
  
04/22/2020 1.8 0.4 - 2.0 MMOL/L Final  
06/09/2017 1.1 0.4 - 2.0 MMOL/L Final  
 
 
Completed IVF Resuscitation (30ml/kg) - yes  IVF choice: 0.9NS Suspected source/s of severe sepsis: Pneumonia Organ dysfunction: Yes Antibiotics: zosyn vanc was on doxy now off Completed physical exam: yes Past Medical History: 
Past Medical History:  
Diagnosis Date  Arrhythmia  Arthritis   
 knees  CHF (congestive heart failure) ([facility] [address]  Chronic back pain  Chronic renal insufficiency, stage II (mild)  History of atrial flutter Dx 6/2016 - anticoagulated by Hayden Quintero  Hypertension 2015  Limited mobility  Migraine headache  Morbid obesity ([address]  Morbid obesity with body mass index of 60.0-69.9 in adult [facility])  Sleep apnea uses c-pap instructed to bring day of surgery  Smoking   
 very passive / cigars only Past Surgical History: 
Past Surgical History:  
Procedure Laterality Date  CARDIAC SURG PROCEDURE UNLIST    
 cardioversion  ECHOCARDIOGRAM  04/2016 EF 60-65% (performed at [facility] - see cardiology tab) Medications: 
Prior to Admission medications Medication Sig Start Date End Date Taking? Authorizing Provider  
furosemide (LASIX) 40 mg tablet Take 1 Tab by mouth two (2) times a day. 4/12/16  Yes Addison Whaley MD  
 
 
Current Facility-Administered Medications Medication Dose Route Frequency  cisatracurium (NIMBEX) 100 mg in 0.9% sodium chloride 100 mL infusion  0-10 mcg/kg/min IntraVENous TITRATE  albuterol (PROVENTIL HFA, VENTOLIN HFA, PROAIR HFA) inhaler 2 Puff  2 Puff Inhalation TID RT  
 heparin 25,000 units in D5W 250 ml infusion  8-36 Units/kg/hr IntraVENous TITRATE  piperacillin-tazobactam (ZOSYN) 3.375 g in 0.9% sodium chloride (MBP/ADV) 100 mL MBP  3.375 g IntraVENous Q8H  
 pantoprazole (PROTONIX) 40 mg in 0.9% sodium chloride 10 mL injection  40 mg IntraVENous DAILY  albumin human 25% (BUMINATE) solution 25 g  25 g IntraVENous Q6H  
 fentaNYL (PF) 900 mcg/30 ml infusion soln  0-150 mcg/hr IntraVENous TITRATE  
 NOREPINephrine (LEVOPHED) 8 mg in 0.9% NS 250ml infusion  0.5-30 mcg/min IntraVENous TITRATE  midazolam in normal saline (VERSED) 1 mg/mL infusion  0-10 mg/hr IntraVENous TITRATE  furosemide (LASIX) injection 40 mg  40 mg IntraVENous BID  aspirin chewable tablet 81 mg  81 mg Oral DAILY  atorvastatin (LIPITOR) tablet 40 mg  40 mg Oral DAILY  methylPREDNISolone (PF) (SOLU-MEDROL) injection 40 mg  40 mg IntraVENous Q8H  
 sodium chloride (NS) flush 5-40 mL  5-40 mL IntraVENous Q8H  
 [Held by provider] losartan (COZAAR) tablet 25 mg  25 mg Oral DAILY  [Held by provider] metoprolol succinate (TOPROL-XL) XL tablet 25 mg  25 mg Oral DAILY Allergy: No Known Allergies Social History: 
Social History Tobacco Use  Smoking status: Former Smoker Types: Cigars  Smokeless tobacco: Never Used Substance Use Topics  Alcohol use: No  
 Drug use: No  
  
 
Family History: 
Family History Problem Relation Age of Onset  Obesity Mother  Liver Disease Sister Objective:  
Vital Signs:   
Blood pressure 138/81, pulse (!) 52, temperature 98.2 °F (36.8 °C), resp. rate 18, height 5' 10\" (1.778 m), weight (!) 269.9 kg (595 lb), SpO2 91 %. Body mass index is 85.37 kg/m². O2 Device: Endotracheal tube, Ventilator O2 Flow Rate (L/min): 12 l/min Temp (24hrs), Av °F (36.7 °C), Min:97.3 °F (36.3 °C), Max:99 °F (37.2 °C) Patient Vitals for the past 8 hrs: 
 Temp Pulse Resp BP SpO2  
20 1000 98.2 °F (36.8 °C) (!) 52 18 138/81 91 % 20 0930 98.1 °F (36.7 °C) (!) 48 19 143/83 91 % 20 0900 98.1 °F (36.7 °C) (!) 49 20 138/90 90 % 20 0830 97.9 °F (36.6 °C) (!) 58 20 142/80 91 % 20 0800 97.9 °F (36.6 °C) (!) 49 20 136/79 91 % 20 0730 97.9 °F (36.6 °C) (!) 49 20 137/70 92 % 20 0724  (!) 53 20  92 % 20 0700 97.7 °F (36.5 °C) (!) 49 24 121/73 92 % 20 0630 97.7 °F (36.5 °C) (!) 56 24 117/70   
20 0600 97.7 °F (36.5 °C) (!) 55 24 120/70 93 % 20 0530 97.7 °F (36.5 °C) (!) 55 24 121/73 94 % 20 0500 97.5 °F (36.4 °C) (!) 56 24 122/77 94 % 20 0430 97.5 °F (36.4 °C) (!) 56 24 118/73 93 % 20 0400 97.7 °F (36.5 °C) 62 24 125/76 93 % 20 0345 97.9 °F (36.6 °C) 62 24 125/78 90 % 20 0344 97.7 °F (36.5 °C) 64 24  (!) 88 % 20 0343 97.7 °F (36.5 °C) 67 24  (!) 86 % 20 0342 97.7 °F (36.5 °C) 80 17  (!) 83 % 20 0341 97.5 °F (36.4 °C) 80 21    
20 0340 97.5 °F (36.4 °C) 69 14    
20 0330 97.5 °F (36.4 °C) (!) 56 24 130/78 92 % 20 0300 97.5 °F (36.4 °C) (!) 55 24 121/75 92 % 04/24/20 0230 97.3 °F (36.3 °C) (!) 57 24 127/79 92 % Intake/Output:  
Last shift:      No intake/output data recorded. Last 3 shifts: 04/22 1901 - 04/24 0700 In: 3342.6 [I.V.:3342.6] Out: 6450 [ZBEOS:0646] Intake/Output Summary (Last 24 hours) at 4/24/2020 1022 Last data filed at 4/24/2020 6935 Gross per 24 hour Intake 2635.06 ml Output 3300 ml Net -664.94 ml Ventilator Settings: 
Mode Rate Tidal Volume Pressure FiO2 PEEP Assist control, VC+   600 ml    90 %(decreased) 14 cm H20 Peak airway pressure: 27 cm H2O Minute ventilation: 13.8 l/min ARDS network Guidelines: Lung protective strategy, Pl pressure goals 35less than or equal to 30. Physical Exam:intubated sedated General: Other, moderately ill, cooperative, uncooperative, no distress, appears stated age, moderately obese HEENT: Normal, ENT exam normal, no neck nodes or sinus tenderness Neck: No abnormally enlarged lymph nodes. or thyroid, supple Chest: normal 
Lungs: rales bilaterally, breathing normal , clear to auscultation bilaterally, diminished breath sounds R base, no tenderness/ rash Heart: ir Regular rate and rhythm Abdomen: non distended, bowel sounds normoactive, tympanic, no rebound tenderness, rigidity, rebound morbidly obesee Extremity: 3+ edema Capillary refill: normal 
Neuro: responds to voice, alert, oriented x3, affect appropriate, no focal neurological deficits, moves all extremities well, no involuntary movements, reflexes at knee and ankle intact Skin: Skin color, texture, turgor normal. No rashes or lesions or Skin color, texture, turgor normal 
 
Data:  
 
Recent Results (from the past 24 hour(s)) POC G3 Collection Time: 04/23/20 12:10 PM  
Result Value Ref Range Device: VENT    
 FIO2 (POC) 100 % pH (POC) 7.317 (L) 7.35 - 7.45    
 pCO2 (POC) 73.2 (H) 35.0 - 45.0 MMHG  
 pO2 (POC) 76 (L) 80 - 100 MMHG  
 HCO3 (POC) 37.4 (H) 22 - 26 MMOL/L  
 sO2 (POC) 93 92 - 97 % Base excess (POC) 11 mmol/L Mode ASSIST CONTROL Tidal volume 600 ml Set Rate 24 bpm  
 PEEP/CPAP (POC) 18 cmH2O Allens test (POC) YES Inspiratory Time 0.9 sec Total resp. rate 25 Site DRAWN FROM ARTERIAL LINE Patient temp. 98.6 Specimen type (POC) ARTERIAL Performed by Hayden Maldonado PTT Collection Time: 04/23/20  5:30 PM  
Result Value Ref Range aPTT 56.6 (H) 23.0 - 36.4 SEC  
PTT Collection Time: 04/24/20  1:25 AM  
Result Value Ref Range aPTT >180.0 (HH) 23.0 - 36.4 SEC METABOLIC PANEL, COMPREHENSIVE Collection Time: 04/24/20  4:10 AM  
Result Value Ref Range Sodium 144 136 - 145 mmol/L Potassium 4.2 3.5 - 5.5 mmol/L Chloride 103 100 - 111 mmol/L  
 CO2 39 (H) 21 - 32 mmol/L Anion gap 2 (L) 3.0 - 18 mmol/L Glucose 110 (H) 74 - 99 mg/dL BUN 26 (H) 7.0 - 18 MG/DL Creatinine 1.58 (H) 0.6 - 1.3 MG/DL  
 BUN/Creatinine ratio 16 12 - 20 GFR est AA 58 (L) >60 ml/min/1.73m2 GFR est non-AA 47 (L) >60 ml/min/1.73m2 Calcium 8.6 8.5 - 10.1 MG/DL Bilirubin, total 0.9 0.2 - 1.0 MG/DL  
 ALT (SGPT) 39 16 - 61 U/L  
 AST (SGOT) 33 10 - 38 U/L Alk. phosphatase 52 45 - 117 U/L Protein, total 7.0 6.4 - 8.2 g/dL Albumin 3.4 3.4 - 5.0 g/dL Globulin 3.6 2.0 - 4.0 g/dL A-G Ratio 0.9 0.8 - 1.7    
CBC WITH AUTOMATED DIFF Collection Time: 04/24/20  4:10 AM  
Result Value Ref Range WBC 8.1 4.6 - 13.2 K/uL  
 RBC 5.09 4.70 - 5.50 M/uL  
 HGB 13.7 13.0 - 16.0 g/dL HCT 45.6 36.0 - 48.0 % MCV 89.6 74.0 - 97.0 FL  
 MCH 26.9 24.0 - 34.0 PG  
 MCHC 30.0 (L) 31.0 - 37.0 g/dL  
 RDW 15.0 (H) 11.6 - 14.5 % PLATELET 363 511 - 782 K/uL MPV 9.7 9.2 - 11.8 FL  
 NEUTROPHILS 83 (H) 40 - 73 % LYMPHOCYTES 11 (L) 21 - 52 % MONOCYTES 6 3 - 10 % EOSINOPHILS 0 0 - 5 % BASOPHILS 0 0 - 2 %  
 ABS. NEUTROPHILS 6.8 1.8 - 8.0 K/UL  
 ABS. LYMPHOCYTES 0.9 0.9 - 3.6 K/UL  
 ABS. MONOCYTES 0.5 0.05 - 1.2 K/UL ABS. EOSINOPHILS 0.0 0.0 - 0.4 K/UL  
 ABS. BASOPHILS 0.0 0.0 - 0.1 K/UL  
 DF AUTOMATED MAGNESIUM Collection Time: 04/24/20  4:10 AM  
Result Value Ref Range Magnesium 2.1 1.6 - 2.6 mg/dL PHOSPHORUS Collection Time: 04/24/20  4:10 AM  
Result Value Ref Range Phosphorus 3.1 2.5 - 4.9 MG/DL  
POC G3 Collection Time: 04/24/20  5:14 AM  
Result Value Ref Range Device: VENT    
 FIO2 (POC) 1.0 %  
 pH (POC) 7.453 (H) 7.35 - 7.45    
 pCO2 (POC) 52.5 (H) 35.0 - 45.0 MMHG  
 pO2 (POC) 63 (L) 80 - 100 MMHG  
 HCO3 (POC) 36.7 (H) 22 - 26 MMOL/L  
 sO2 (POC) 92 92 - 97 % Base excess (POC) 13 mmol/L Mode ASSIST CONTROL Tidal volume 600 ml Set Rate 24 bpm  
 PEEP/CPAP (POC) 14 cmH2O Allens test (POC) N/A Inspiratory Time 0.9 sec Total resp. rate 24 Site DRAWN FROM ARTERIAL LINE Patient temp. 98.6 Specimen type (POC) ARTERIAL [facility] Volume control plus YES    
PTT Collection Time: 04/24/20  9:20 AM  
Result Value Ref Range aPTT 65.5 (H) 23.0 - 36.4 SEC Recent Labs  
  04/24/20 
0514 04/23/20 
1210 04/23/20 
3353 FIO2I 1.0 100 100 HCO3I 36.7* 37.4* 37.3*  
PCO2I 52.5* 73.2* 80.8* PHI 7.453* 7.317* 7.272* PO2I 63* 76* 60* All Micro Results Procedure Component Value Units Date/Time CULTURE, BLOOD [[id number]] Order Status:  Sent Specimen:  Blood CULTURE, RESPIRATORY/SPUTUM/BRONCH Edwyna Purvi STAIN [[id number]]  (Abnormal) Collected:  04/22/20 1018 Order Status:  Completed Specimen:  Sputum from Tracheal Aspirate Updated:  04/23/20 1431 Special Requests: NO SPECIAL REQUESTS     
  GRAM STAIN FEW WBCS SEEN     
      
  OCCASIONAL EPITHELIAL CELLS SEEN  
     
      
  FEW GRAM POSITIVE COCCI IN CLUSTERS  
     
   RARE GRAM NEGATIVE RODS Culture result:    
  LIGHT POSSIBLE STAPHYLOCOCCUS AUREUS  
     
      
  LIGHT NORMAL RESPIRATORY BRYN Telemetry: AFIB 
 
 ECHO Imaging: 
[x]I have personally reviewed the patients chest radiographs images and report Results from [facility] - [facility] Encounter encounter on 04/21/20 XR CHEST PORT Narrative EXAM: CHEST RADIOGRAPH, SINGLE VIEW CLINICAL INDICATION/HISTORY: Shortness of breath, hypoxia, endotracheal tube 
placement COMPARISON: 4/22/2020 TECHNIQUE: Portable frontal view of the chest was obtained.  
 
_______________ FINDINGS: 
 
SUPPORT DEVICES:  
  >  Endotracheal tube tip is located approximately 5.3 cm above the miguel ángel, 
adequately positioned.  
  >  Enteric tube is adequately visualized at the level of thoracic inlet and 
just above the miguel ángel, slightly eccentric to the left but is not clearly 
visualized throughout the lower aspect of the left hemithorax, potentially 
artifactual given patient's body habitus and adjacent overlying global 
cardiomegaly. > Unchanged adequately positioned right jugular central venous catheter. HEART AND MEDIASTINUM: Cardiomediastinal silhouette appears unchanged, markedly 
enlarged. Tortuous and atherosclerotic thoracic aorta. Prominent central 
pulmonary vascular congestion. LUNGS AND PLEURAL SPACES: Diffusely increased interstitial markings and 
retrocardiac left lower lobe opacity, silhouetting medial left hemidiaphragm. Probable small pleural effusions. No pneumothorax. BONY THORAX AND SOFT TISSUES: No acute osseous abnormality. _______________ Impression IMPRESSION: 
 
1. Endotracheal tube is adequately positioned. Unchanged, adequately positioned 
right jugular central venous catheter. 2. Enteric tube is not clearly visualized below the level of the miguel ángel; 
indeterminate to what extent this represents artifact related to patient's body 
habitus and global cardiomegaly or instead of the enteric tube is proximal in 
position. 3. Unchanged cardiomegaly, central vascular congestion and interstitial 
pulmonary edema. Probable small pleural effusions. No results found for this or any previous visit. [x]See my orders for details My assessment, plan of care, findings, medications, side effects etc were discussed with: 
[x]nursing []PT/OT [x]respiratory therapy []  
[x]family []Patient [x]Total critical care time exclusive of procedures 65 minutes with complex decision making performed and > 50% time spent in face to face evaluation.  
 
Nini Goode MD

## 2020-04-24 NOTE — PROGRESS NOTES
Chart reviewed pt remains in ICU on vent, palliative has been consulted, pt will remain on weekend, on-call cm will be available for immediate assistance thru [facility]  if needed.

## 2020-04-24 NOTE — PROGRESS NOTES
Cardiology Progress Note Patient: Aleyda Pradhan Sex: male          DOA: 4/21/2020 YOB: 1975      Age:  53 y.o.        LOS:  LOS: 3 days Patient seen and examined, chart reviewed. Assessment/Plan Patient Active Problem List  
Diagnosis Code  Cellulitis of left lower leg L03. 116  
 Anemia D64.9  Morbid obesity ([facility]) E66.01  
 Dyspnea R06.00  Sleep apnea G47.30  Sinus tachycardia R00.0  BMI 70 and over, adult [facility]) G92.67  Chronic back pain M54.9, G89.29  
 Arthritis M19.90  Migraine G43.909  
 History of atrial flutter Z86.79  
 Limited mobility Z74.09  
 Smoking F17.200  Chronic renal insufficiency, stage II (mild) N18.2  CHF (congestive heart failure) ([facility]) I50.9  Acute diastolic CHF (congestive heart failure) ([facility]) I50.31  Chronic a-fib I48.20  Atrial flutter ([facility]) I48.92  
 Elevated troponin R79.89  
 Acute respiratory distress R06.03  
 Acute CHF (congestive heart failure) ([facility]) I50.9  Respiratory failure with hypoxia and hypercapnia ([facility]) J96.91, J96.92  
 Acute on chronic respiratory failure with hypoxia and hypercapnia ([facility]) J96.21, J96.22  
 Cardiac arrest ([facility]) I46.9 Acute on chronic systolic heart failure Abnormal troponin today troponin jump to 3.31 - will trend the enzymes Paroxysmal atrial flutter Patient self extubated yesterday and had PEA arrest. 
He had brief episode of A fib with RVR and was started On Amiodarone drip. Currently he is in Sinus rhythm. Amiodarone discontinued due to bradycardia. 
  
Echocardiogram done today revealed  
  
· Left Ventricle: Normal wall thickness and systolic function (ejection fraction normal). Mildly dilated left ventricle. The estimated ejection fraction is 45 - 50%. There is moderate (grade 2) left ventricular diastolic dysfunction E/e' Ratio = 17.09. Wall Scoring: The left ventricular wall motion is globally hypokinetic. · Right Ventricle: Not well visualized. Normal global systolic function. Moderately dilated right ventricle. · Right Atrium: Right atrium not well visualized. Moderately dilated right atrium. · IVC/Hepatic Veins: Dilated inferior vena cava. Mechanically ventilated; cannot use inferior caval vein diameter to estimate central venous pressure. 
  
 
Plan: 
 
Repeat cardiac enzymes at 9:00 PM. Continue IV Lasix 60 mg twice a day Continue Aspirin and statin Continue IV heparin and titrate as per aPTT. Metoprolol on hold due to bradycardia. Strict I/O Continue Ventilatory support. Continue management as per hospital medicine Patient  Is on droplet plus isolation. COVID 19 is pending. Subjective:  
 cc: 
Orally intubated REVIEW OF SYSTEMS:  
 
Can not obtain Objective:  
  
Visit Vitals /80 Pulse (!) 52 Temp 98.2 °F (36.8 °C) Resp 16 Ht 5' 10\" (1.778 m) Wt (!) 269.9 kg (595 lb) SpO2 92% BMI 85.37 kg/m² Body mass index is 85.37 kg/m². Physical Exam: 
General Appearance: Comfortable, Morbidly obese, not using accessory muscles of respiration. Orally intubated HEENT: AMMON. HEAD: Atraumatic NECK: No JVD, no thyroidomeglay. CAROTIDS: No bruit LUNGS: bilateral conducted sounds HEART: S1+S2 audible, no murmur, no pericardial rub. NEUROLOGICAL: Sedated. Medication: 
Current Facility-Administered Medications Medication Dose Route Frequency  ELECTROLYTE REPLACEMENT PROTOCOL - Calcium   1 Each Other PRN  
 ELECTROLYTE REPLACEMENT PROTOCOL - Magnesium   1 Each Other PRN  
 ELECTROLYTE REPLACEMENT PROTOCOL - Potassium Standard Dosing   1 Each Other PRN  
 ELECTROLYTE REPLACEMENT PROTOCOL - Phosphorus  Standard Dosing  1 Each Other PRN  
 cisatracurium (NIMBEX) 100 mg in 0.9% sodium chloride 100 mL infusion  0-10 mcg/kg/min IntraVENous TITRATE  albuterol (PROVENTIL HFA, VENTOLIN HFA, PROAIR HFA) inhaler 2 Puff  2 Puff Inhalation TID RT  
  furosemide (LASIX) injection 60 mg  60 mg IntraVENous BID  acetaZOLAMIDE (DIAMOX) tablet 250 mg  250 mg Oral BID  heparin 25,000 units in D5W 250 ml infusion  8-36 Units/kg/hr IntraVENous TITRATE  pantoprazole (PROTONIX) 40 mg in 0.9% sodium chloride 10 mL injection  40 mg IntraVENous DAILY  albumin human 25% (BUMINATE) solution 25 g  25 g IntraVENous Q6H  
 fentaNYL (PF) 900 mcg/30 ml infusion soln  0-150 mcg/hr IntraVENous TITRATE  
 NOREPINephrine (LEVOPHED) 8 mg in 0.9% NS 250ml infusion  0.5-30 mcg/min IntraVENous TITRATE  midazolam in normal saline (VERSED) 1 mg/mL infusion  0-10 mg/hr IntraVENous TITRATE  aspirin chewable tablet 81 mg  81 mg Oral DAILY  methylPREDNISolone (PF) (SOLU-MEDROL) injection 40 mg  40 mg IntraVENous Q8H  
 sodium chloride (NS) flush 5-40 mL  5-40 mL IntraVENous Q8H  
 sodium chloride (NS) flush 5-40 mL  5-40 mL IntraVENous PRN  
 [Held by provider] losartan (COZAAR) tablet 25 mg  25 mg Oral DAILY  [Held by provider] metoprolol succinate (TOPROL-XL) XL tablet 25 mg  25 mg Oral DAILY Facility-Administered Medications Ordered in Other Encounters Medication Dose Route Frequency  propofoL (DIPRIVAN) 10 mg/mL injection   IntraVENous PRN  
 ePHEDrine (PF) (MISTOLE) 10 mg/mL in NS syringe   IntraVENous PRN  
 PHENYLephrine (RUPERT-SYNEPHRINE) 100 mcg/mL in NS syringe   IntraVENous PRN Lab/Data Reviewed: 
 
  
Recent Labs  
  04/24/20 
0410 04/23/20 
0830 04/23/20 
0530 WBC 8.1 5.3 4.4* HGB 13.7 15.3 13.9 HCT 45.6 50.8* 45.7  169 152 Recent Labs  
  04/24/20 
0410 04/23/20 
0530 04/22/20 
0354  144 140  
K 4.2 4.3 5.3  103 101 CO2 39* 37* 39* * 106* 98 BUN 26* 18 15 CREA 1.58* 1.30 1.24  
CA 8.6 8.4* 8.2*  
 
 
45 minutes of critical care time spent in the direct evaluation and treatment of this high risk patient.  The reason for providing this level of medical care for this critically ill patient was due a critical illness that impaired one or more vital organ systems such that there was a high probability of imminent or life threatening deterioration in the patients condition. This care involved high complexity decision making to assess, manipulate, and support vital system functions, to treat this degree vital organ system failure and to prevent further life threatening deterioration of the patients condition. Signed By: Joe Soni MD   
 April 24, 2020

## 2020-04-24 NOTE — CONSULTS
Octavio Elenauel Name:  Mary Coreas 
MR#:   [id number] :  1975 ACCOUNT #:  [de-identified] DATE OF SERVICE:  2020 HISTORY OF PRESENT ILLNESS:  This is a 47-year-old male, who was admitted on 2020 because of respiratory failure due to decompensated COPD with superimposed aspiration pneumonia, ARDS (?). The patient was intubated initially with difficulty that he self-extubated himself. On the , he had a brief cardiac arrest.  He required re-intubation. The patient had to be re-intubated with heavy sedation using Versed and fentanyl. There was difficulty inserting an orogastric tube, and I was asked to assist for this patient to give him his medication. PAST MEDICAL HISTORY:  Remarkable mostly for morbid obesity, he is almost 600 pounds. He has asthma; COPD; sleep apnea; congestive heart failure; atrial fibrillation with atrial flutter, on Xarelto; chronic renal disease; cellulitis of lower extremities; and anemia. Apparently, the patient had mostly rapidly progressive shortness of breath. The patient apparently used to be a smoker, but he quit long time ago. He does not abuse alcohol. ALLERGIES:  NO KNOWN DRUG ALLERGIES. MEDICATIONS AT HOME:  He was taking mostly Lasix 40 mg twice a day. PHYSICAL EXAMINATION: 
GENERAL:  Revealed a morbidly obese  male who is on the ventilator and appears to be uncomfortable. He is awake, combative. Both arms are attached. He is afebrile. VITAL SIGNS:  Pulse is about 48 per minute, blood pressure 143/83, breathing 19, saturation 91% on 90% oxygen. He is also on PEEP 14. HEENT:  The eyes are unremarkable. The pupils are equal and reactive to light. The sclerae are anicteric. The conjunctivae are pink. The oropharyngeal cavity, he is intubated. He has his own teeth. NECK:  Short and thick. LUNGS:  There are diminished breath sounds bilaterally, but no wheezing. HEART:  The cardiac rhythm is irregular. No obvious murmur. Heart sounds are distant. ABDOMEN:  Obese, rounded. Soft, nontender. It is slightly distended. EXTREMITIES:  Remarkable for bilateral leg pitting edema. He has a Clark catheter draining some urine. NEUROLOGIC:  The patient is awake combative but does appear to understand simple commands. He responds to voice commands. Moves all his four extremities. SKIN:  Grossly unremarkable. LABORATORY TESTS:  Hemoglobin 13.7, WBC 8.1, platelets 754, 08% neutrophils. Urinalysis negative. His INR is presently 65.5. Complete metabolic profile, we have a BUN of 26, creatinine 1.58. Normal LFT. Lactic acid 4.1. Troponin 0.09. NT-proBNP ? [place] Sputum smear shows wbc. COVID-19 on 04/22 was negative. The one from today is still pending. DIAGNOSTIC DATA:  Chest x-ray from yesterday with the right jugular central venous catheter. Enteric tube is not clearly visualized below the level of the miguel ángel. Cardiomegaly, central vascular congestion, and interstitial pulmonary edema with small pleural effusion. In conclusion, this is a 70-year-old male who has acute-on-chronic respiratory failure requiring high-dose oxygen on the ventilator, possibly he has ARDS. I was asked to insert an orogastric tube, but I chose to insert a nasogastric using a Campuzano #14. I used the right nostril, and I was able to advance it slowly, but surely into the stomach. I injected air and I was able to hear freely the presence of air in the stomach. The patient tolerated the procedure very well. He required to get some mild sedation because he was restless and hypoxemic. This tube is of small caliber. Unfortunately, we did not have a larger tube, such as 16 or 18. Therefore, all food administration has to be well crushed and well flushed to avoid clogging. This patient has morbid obesity.   He has atrial fibrillation, on anticoagulation. He has chronic kidney disease. From my side, I will see him as needed. Sincerely, Vivien SANDS MD 
 
 
[id number] 
D:  04/24/2020 12:32 
T:  04/24/2020 16:06 JOB #:  [id number] CC:   Mayte Ortega MD

## 2020-04-24 NOTE — PROGRESS NOTES
NUTRITION UPDATE 
 
-per nursing note not able to confirm placement of OG tube; no feeds started at this time; when able to start feeds recc below recommendations; currently day 3 NPO  
 
EN: Recc starting tube feeds when clinically able Recc Vital High Protein @ 85ml/hr to provide 1870kcal 164g PRO 1563ml H20 209g CHO 43g Fat Recc starting @ 10ml/hr an increasing by 5ml/hr Q6 until goal rate met Will defer Free H20 to MD Bruce Sarmiento, RD 
PAGER:  [phone]

## 2020-04-25 NOTE — CONSULTS
Octavio Boyle Name:  Clarence Wilcox 
MR#:   [id number] :  1975 ACCOUNT #:  [de-identified] DATE OF SERVICE:  2020 REASON FOR CONSULTATION: 
1. Morbid obesity. 2.  Ventilator dependency. HISTORY OF PRESENT ILLNESS:  The patient is a 66-year-old male with significant and profound morbid obesity with a history of tobacco use, history of asthma as well as history of severe COPD and sleep apnea, on CPAP. The patient furthermore has had a history of congestive heart failure, atrial fibrillation, atrial flutter, chronic renal insufficiency, cellulitis. The patient was in his usual state of health; however, found to have acute hypercarbic hypoxemia and respiratory failure, was brought to the emergency room, had a trial of BiPAP, was subsequently intubated after he was noted to be hypotensive. The patient has had a moderate amount of secretions. There was some question that he may have had aspiration as well. In any event, the patient was placed on mechanical ventilation with low-dose pressures; however, attempted to self extubate. The patient was given sedation; however, was successful in extubation of himself and subsequently underwent PEA, was reintubated with some marked difficult issues. EMT consultation is now requested for evaluation regarding the above, especially in regard to tracheotomy placement. The patient does have a significant history as listed above with a height of 5 feet 10 and a weight of 600 pounds. CURRENT MEDICATION USE:  Includes, 
1. Diamox. 2.  Ventolin. 3.  Aspirin. 4.  Heparin. 5.  Cozaar. 6.  Methylprednisolone. 7.  Versed. 8.  Levophed. 9.  Protonix. 10.  Enzo-Synephrine. 11.  Diprivan. ALLERGIES:  DENIED.  
 
PAST MEDICAL HISTORY:  Significant for acute congestive heart failure, chronic respiratory failure with hypoxia and hypercapnia, anemia, arthritis, atrial flutter, morbid obesity, cardiac arrest, congestive heart failure, chronic atrial fib, dyspnea, elevated troponin, sleep apnea and history of tobacco use. REVIEW OF SYSTEMS:  As above. PHYSICAL EXAMINATION: 
GENERAL:  Reveals a well-developed, well-nourished morbidly obese black male, elevated in bed with head approximately 60 degrees. On entering the room, the patient was noted to have some difficulties with significant gurgling in the mouth. The patient's ET tube was checked, noted to be at 25 cm. Observation of ventilator revealed patient not to be returning air. Please see below. HEENT:  The ear exam reveals cerumen in both ears. The intraoral exam reveals the patient's ET tube to be in place. The intranasal exam reveals some clear secretions bilaterally. NECK:  Reveals an IJ in patient's right neck; however, copiously bleeding. CHEST:  Morbid obesity, distant breath sounds. ABDOMEN:  Soft, nontender. EXTREMITIES:  Reveals marked lymphedema. The patient's ventilator was examined. The patient noted to be returning less and less air. Respiratory was urgently summoned. The patient was noted to have a leak. They were able to place a three-way stopcock on the ET tube valve which prevented some potential leak; however, this did recur. Anesthesia was subsequently consulted. Anesthesia made their way into the hospital.  I was requested for standby during the course of the re-intubation. The patient's x-ray was obtained. The patient's endotracheal tube was deep in the trachea. Hence, the patient's ET tube was changed over a bougie. Good air exchange noted. IMPRESSION: 
1. Morbid obesity, see below. 2.  Ventilator dependency. Plan: At the present time, the patient markedly ventilator dependent with a PEEP of 16, FiO2 of 70. At this point, I do not feel the patient is an optimal candidate for tracheotomy. The patient is presently being ruled out for COVID-19 as well.   At this point, we will step back. We will defer to Pulmonology and ICU team at this time. 3.  COVID-19 status. The patient's [id number] status pending at this point. 4.  Obstructive sleep apnea. Plan:  Starting to suspect patient with obstructive sleep apnea, probably patient is so large, I doubt whether CPAP is of benefit. Will probably benefit from a tracheotomy for the above. At this point, however, feel this may need to be avoided. 5.  Anticoagulation. Plan:  The patient on heparin, will need to have anticoagulation discontinued. Frankly, at this point, I feel the patient is medically unstable to avoid surgical intervention if at all possible. Thank you for consultation. Florinda Spears MD 
 
 
[id number] 
D:  04/25/2020 13:34 
T:  04/25/2020 16:33 
JOB #:  [id number]

## 2020-04-25 NOTE — PROGRESS NOTES
CALLED TO ASSESS PATIENT FOR LARGE CUFF LEAK. PATIENT NOT RECEIVING VOLUMES/AUDIBLE LEAK. PER DR. EPPS, ADVANCED ETT AND RESECURED @ 27LL. DR. Marivel Waller. CUFF LEAK PERSISTED. PATIENT DESATURATED. ADDED MORE AIR TO CUFF AND PLACED 3-WAY STOPCOCK ON . STILL HAS A LEAK BUT BETTER VOLUMES ACHIEVED. INCREASED FIO2 % FOR DESAT. DR. Angelito Waller HAS ORDERED STAT PORTABLE CHEST.  AWAITING RESULTS.

## 2020-04-25 NOTE — ADVANCED PRACTICE NURSE
Intubation Procedure Note Performed By:  Tony Breaux CRNA Assistant:  Edgar CHRISTIANSON Medications were: given all by ICU RN per Dr. Lucas Greene Direct Laryngoscopy with Infante #3. Tube exchange was performed with Bougie and confirmed with the Infante. Grade 1 view. A number: 8.0 cuffed ETT was placed to: 25 cm at the lips. Placement was evaluated by noting: bilateral, symmetric breath sounds, good end-tidal CO2 detector color change , no breath sounds over stomach and chest x-ray visualization. Attempts required: 1. Complications: none. An endotracheal tube introducer (bougie) was used to assist tube insertion. Dutch Diaz The procedure was tolerated well. Paged at 910. Arrived on Unit with all Covid precaution equipment at 915. Dr. Elyssa Albert and Lucas Greene at bedside and both wanted to confirm with a chest xray where tube was located prior to preforming a tube exchange. Patient at this time was achieving adequate tidal volumes and pulse ox saturation above 95%. 935-xray was taken and all parties were waiting results. Dr. Lucas Greene expressed that an MDA needs to be present because of the difficulty of last intubation. SAMMY was called and it was confirmed she was on her way in. Dr. Lucas Greene aware of timeframe and because patient was stable she agreed to wait. CRNA on stand-by incase of instablity of patients vital signs. Patient remained stable and monitored via monitors at desk. Dr. Elyssa Albert also at ICU desk in stand-by. SAMMY called when 3 minutes away and all ICU staff was made aware of timeframe. At that time patients saturations began to decline below 90% and CRNA got covid precaution equipment on, intubation equipment, and walked into ICU room #3. Dr. Joshua Jung directly behind. Dr. Lucas Greene ordered ICU RN to bolus fentanyl and paralytic medication. CRNA preformed tube exchange with MDA at bedside and to confirm placement the MDA viewed with Jones Velazquez. Once placement was confirmed and vitals were stable MDA and CRNA exited the negative pressure area and removed protective equipment. Disposition: ICU - intubated and hemodynamically stable. Signed By: Sean Obregon CRNA

## 2020-04-25 NOTE — PROGRESS NOTES
PATIENT WITH SPO2 83-84% ON FIO2 100%/PEEP 18. PER DR. EPPS, INCREASED PEEP TO 20. WILL OBTAIN ABG IN APPROXIMATELY 2 HRS.

## 2020-04-25 NOTE — PROGRESS NOTES
1900 - Bedside and Verbal shift change report given to Linda Saenz RN (oncoming nurse) by Jany Ireland RN (offgoing nurse). Report included the following information SBAR, Kardex, ED Summary, Procedure Summary, Intake/Output, MAR, Recent Results, Med Rec Status and Cardiac Rhythm Sinus Ryder Solano. 2000 - Shift assessment completed, patient intubated, sedated, responsive to voice; nods appropriately. 2040 - Patients vent alarming continuously, respiratory therapist assessed patient. ET tube dislodged, measuring at 22 (baseline is 25). 2050 - Nimbex infusion initiated with TOF.  
 
0000 - Reassessment completed, patient unresponsive due to paralytic at this time. 0400 - Reassessment completed, no changes to previous assessment. 0725 - Bedside and Verbal shift change report given to Jany Ireland RN (oncoming nurse) by Linda Saenz RN (offgoing nurse). Report included the following information SBAR, Kardex, ED Summary, Procedure Summary, Intake/Output, MAR, Recent Results, Med Rec Status and Cardiac Rhythm NSR.

## 2020-04-25 NOTE — PROGRESS NOTES
0830 Assumed care of patient. .. AM assessment and patient is bleeding from CVC site. ... Applied quikclot (x2) with multiple 4x4s. ...  ENT at bedside and patient began to lose tidal volumes. .. Paged respiratory, audible gurgling in upper airway. .. suctioned clear thin secretions. .. patient BP and HR elevated. .. Increased sedation and discussed with pulmonary care. ... [facility] orders for STAT CXR with possible reiuntubation 9472 Patient BP and heart rate elevated. .. not achieving tidal volume. .. RRT and RN at bedside. . Increased sedation and bolused 1025 Patient reintubated and heart rate and BP elevated. .. increased paralytic 1240   Patient BP 81/40 post vent changes. .. Restarted levophed. .Saats 80 changed O2 sensor ad Sats 92. Pati Dye SBP in 80s. .. increased levophed at   1241 
1600 Patient reassessment and TOF. .. no additional changes at this time

## 2020-04-25 NOTE — PROGRESS NOTES
Cardiology Progress Note Patient: Dean Darling. Sex: male          DOA: 4/21/2020 YOB: 1975      Age:  53 y.o.        LOS:  LOS: 4 days Patient seen and examined, chart reviewed. Assessment/Plan Patient Active Problem List  
Diagnosis Code  Cellulitis of left lower leg L03. 116  
 Anemia D64.9  Morbid obesity ([facility]) E66.01  
 Dyspnea R06.00  Sleep apnea G47.30  Sinus tachycardia R00.0  BMI 70 and over, adult [facility]) V36.27  Chronic back pain M54.9, G89.29  
 Arthritis M19.90  Migraine G43.909  
 History of atrial flutter Z86.79  
 Limited mobility Z74.09  
 Smoking F17.200  Chronic renal insufficiency, stage II (mild) N18.2  CHF (congestive heart failure) ([facility]) I50.9  Acute diastolic CHF (congestive heart failure) ([facility]) I50.31  Chronic a-fib I48.20  Atrial flutter ([facility]) I48.92  
 Elevated troponin R79.89  
 Acute respiratory distress R06.03  
 Acute CHF (congestive heart failure) ([facility]) I50.9  Respiratory failure with hypoxia and hypercapnia ([facility]) J96.91, J96.92  
 Acute on chronic respiratory failure with hypoxia and hypercapnia ([facility]) J96.21, J96.22  
 Cardiac arrest ([facility]) I46.9 Acute on chronic systolic heart failure Abnormal troponin today troponin jump to 3.31  
NSTEMI type II due to PEA arrest 
Paroxysmal atrial flutter Hypotension Patient self extubated and had PEA arrest. 
He had brief episode of A fib with RVR and was started On Amiodarone drip. Currently he is in Sinus rhythm. Amiodarone discontinued due to bradycardia. 
  
Echocardiogram  
  
· Left Ventricle: Normal wall thickness and systolic function (ejection fraction normal). Mildly dilated left ventricle. The estimated ejection fraction is 45 - 50%. There is moderate (grade 2) left ventricular diastolic dysfunction E/e' Ratio = 17.09. Wall Scoring: The left ventricular wall motion is globally hypokinetic. · Right Ventricle: Not well visualized. Normal global systolic function. Moderately dilated right ventricle. · Right Atrium: Right atrium not well visualized. Moderately dilated right atrium. · IVC/Hepatic Veins: Dilated inferior vena cava. Mechanically ventilated; cannot use inferior caval vein diameter to estimate central venous pressure. 
  
 
Plan: 
 
Hold Lasix due to hypotension Continue Aspirin Continue IV heparin and titrate as per aPTT. Continue IV Pressors Strict I/O Continue Ventilatory support. Continue management as per hospital medicine Patient is on droplet plus isolation. COVID 19 is pending. Plan discussed with Mary [place] Condition critical, prognosis guarded. Subjective:  
 cc: 
Orally intubated REVIEW OF SYSTEMS:  
 
Can not obtain Objective:  
  
Visit Vitals BP 99/56 Pulse 90 Temp 96.1 °F (35.6 °C) Resp 16 Ht 5' 10\" (1.778 m) Wt (!) 269.9 kg (595 lb) SpO2 91% BMI 85.37 kg/m² Body mass index is 85.37 kg/m². Physical Exam: 
General Appearance: Comfortable, Morbidly obese, not using accessory muscles of respiration. Orally intubated HEENT: AMMON. HEAD: Atraumatic NECK: No JVD, no thyroidomeglay. CAROTIDS: No bruit LUNGS: bilateral conducted sounds HEART: S1+S2 audible, no murmur, no pericardial rub. NEUROLOGICAL: Sedated. Medication: 
Current Facility-Administered Medications Medication Dose Route Frequency  vasopressin (VASOSTRICT) 20 Units in 0.9% sodium chloride 100 mL infusion  0-0.03 Units/min IntraVENous TITRATE  ELECTROLYTE REPLACEMENT PROTOCOL - Calcium   1 Each Other PRN  
 ELECTROLYTE REPLACEMENT PROTOCOL - Magnesium   1 Each Other PRN  
 ELECTROLYTE REPLACEMENT PROTOCOL - Potassium Standard Dosing   1 Each Other PRN  
 ELECTROLYTE REPLACEMENT PROTOCOL - Phosphorus  Standard Dosing  1 Each Other PRN  
 albuterol (PROVENTIL HFA, VENTOLIN HFA, PROAIR HFA) inhaler 2 Puff  2 Puff Inhalation TID RT  
  cisatracurium (NIMBEX) 100 mg in 0.9% sodium chloride 100 mL infusion  0-10 mcg/kg/min IntraVENous TITRATE  heparin 25,000 units in D5W 250 ml infusion  8-36 Units/kg/hr IntraVENous TITRATE  pantoprazole (PROTONIX) 40 mg in 0.9% sodium chloride 10 mL injection  40 mg IntraVENous DAILY  fentaNYL (PF) 900 mcg/30 ml infusion soln  0-150 mcg/hr IntraVENous TITRATE  
 NOREPINephrine (LEVOPHED) 8 mg in 0.9% NS 250ml infusion  0.5-30 mcg/min IntraVENous TITRATE  midazolam in normal saline (VERSED) 1 mg/mL infusion  0-10 mg/hr IntraVENous TITRATE  aspirin chewable tablet 81 mg  81 mg Oral DAILY  methylPREDNISolone (PF) (SOLU-MEDROL) injection 40 mg  40 mg IntraVENous Q8H  
 sodium chloride (NS) flush 5-40 mL  5-40 mL IntraVENous Q8H  
 sodium chloride (NS) flush 5-40 mL  5-40 mL IntraVENous PRN  
 [Held by provider] losartan (COZAAR) tablet 25 mg  25 mg Oral DAILY  [Held by provider] metoprolol succinate (TOPROL-XL) XL tablet 25 mg  25 mg Oral DAILY Facility-Administered Medications Ordered in Other Encounters Medication Dose Route Frequency  propofoL (DIPRIVAN) 10 mg/mL injection   IntraVENous PRN  
 ePHEDrine (PF) (MISTOLE) 10 mg/mL in NS syringe   IntraVENous PRN  
 PHENYLephrine (RPUERT-SYNEPHRINE) 100 mcg/mL in NS syringe   IntraVENous PRN Lab/Data Reviewed: 
 
  
Recent Labs  
  04/25/20 
0610 04/24/20 
0410 04/23/20 
0830 WBC 7.4 8.1 5.3 HGB 13.8 13.7 15.3 HCT 45.6 45.6 50.8*  162 169 Recent Labs  
  04/25/20 
0610 04/24/20 
0410 04/23/20 
0530 * 144 144  
K 4.0 4.2 4.3  103 103 CO2 36* 39* 37* * 110* 106* BUN 33* 26* 18  
CREA 1.44* 1.58* 1.30  
CA 8.7 8.6 8.4*  
 
 
45 minutes of critical care time spent in the direct evaluation and treatment of this high risk patient.  The reason for providing this level of medical care for this critically ill patient was due a critical illness that impaired one or more vital organ systems such that there was a high probability of imminent or life threatening deterioration in the patients condition. This care involved high complexity decision making to assess, manipulate, and support vital system functions, to treat this degree vital organ system failure and to prevent further life threatening deterioration of the patients condition. Signed By: Candice King MD   
 April 25, 2020

## 2020-04-25 NOTE — PROGRESS NOTES
Hospitalist Progress Note Patient: Smith Escamilla. MRN: [id number]  CSN: [id number] YOB: 1975  Age: 53 y.o. Sex: male DOA: 4/21/2020 LOS:  LOS: 4 days Assessment and Plan: 
 
Principal Problem: 
  Respiratory failure with hypoxia and hypercapnia ([address] (4/21/2020) Active Problems: Morbid obesity ([address] (4/4/2016) Sleep apnea (4/4/2016) Chronic renal insufficiency, stage II (mild) () Chronic a-fib (10/15/2018) Acute CHF (congestive heart failure) ([facility] (4/21/2020) Acute on chronic respiratory failure with hypoxia and hypercapnia ([address] (4/22/2020) Cardiac arrest ([address] (4/23/2020) 
 
 
 
  
Patient had to be paralyzed in order to keep on vent this am 
  
Resp - See vent orders, VAP bundle. HOB>30 degrees. Managed by pulmonary Acute respiratory failure - with hypoxia and hypercapnia, follow ABGs. MARIA FERNANDA/OHS -  
  
ID - Follow up resp, cx. ANTIBIOTICS zosyn, doxycycline. COVID 19 pending results negative times one  
  
[facility] - Monitor HD. Hypotension - on pressors, wean as tolerated 
  
Chronic A-fib - monitor rate, on amiodarone drip. On heparin drip 
  
Acute on chronic systolic CHF -  
Continue with lasix IV. Echo with EF of 45-50% Elevated troponin -  
Cardiology following. troponins had bumped up and then came down some 
  
Heme/onc - Follow H&H, plts.  
  
Renal - Trend BUN, Cr, follow I/O, daniel in place. Check and replace Mg, K, phos. 
 Renal following for CKD Endocrine -  Follow FSG 
  
Neuro/ Pain/ Sedation - Fentanyl, versed prn. Sedation bundle. 
  
GI - NPO for now. Protonix IV  
  
Morbid obesity 
  
Prophylaxis - DVT: heparin drip, GI: protonix 
  
 
 
Chief complaint: 52 y/o male with hx of morbid obesity, MARIA FERNANDA, chronic afib on anticoagulation, and systolic CHF (EF 40-42%) TW admitted with respiratory failure with hypoxia and hypercapnia, he required to be intubated. Subjective: Sedated and intubated a. Exam was limited by concerns of COVID. Review of systems: 
 
General: No fevers or chills. Cardiovascular: No chest pain or pressure. No palpitations. Pulmonary: No shortness of breath. Gastrointestinal: No nausea, vomiting. Objective: 
 
Vital signs/Intake and Output: 
Visit Vitals BP 99/56 Pulse 90 Temp 96.1 °F (35.6 °C) Resp 16 Ht 5' 10\" (1.778 m) Wt (!) 269.9 kg (595 lb) SpO2 91% BMI 85.37 kg/m² Current Shift:  No intake/output data recorded. Last three shifts:  04/23 1901 - 04/25 0700 In: 5855 [I.V.:3360] Out: 5800 [Urine:5800] Physical Exam: 
General: NAD, AAOx3. Non-toxic. HEENT: NC/AT. PERRLA, EOMI.  MMM. Lungs: Nml inspection. CTA B/L. No wheezing, rales or rhonchi. Heart:  S1S2 RRR,  PMI mid 5th IC space. No M/RG. Abdomen: Soft, NT/ND.  BS+. No peritoneal signs. Extremities: No C/C/E. Psych:   Nml affect. Neurologic:  2-12 intact. Strength 5/5 throughout. Sensation symmetrical. 
 
 
 
 
Labs: Results:  
   
Chemistry Recent Labs  
  04/25/20 
0610 04/24/20 
0410 04/23/20 
0530 * 110* 106* * 144 144  
K 4.0 4.2 4.3  103 103 CO2 36* 39* 37* BUN 33* 26* 18  
CREA 1.44* 1.58* 1.30  
CA 8.7 8.6 8.4* AGAP 6 2* 4 BUCR 23* 16 14 AP 47 52 61  
TP 7.0 7.0 6.6 ALB 3.4 3.4 2.9*  
GLOB 3.6 3.6 3.7 AGRAT 0.9 0.9 0.8 CBC w/Diff Recent Labs  
  04/25/20 
0610 04/24/20 
0410 04/23/20 
0830 WBC 7.4 8.1 5.3  
RBC 5.11 5.09 5.63* HGB 13.8 13.7 15.3 HCT 45.6 45.6 50.8*  162 169 GRANS 83* 83*  --   
LYMPH 9* 11*  --   
EOS 0 0  --   
  
Cardiac Enzymes Recent Labs  
  04/24/20 
2110 04/24/20 
1500 * 324* CKND1 1.3 1.9 Coagulation Recent Labs  
  04/25/20 
0210 04/24/20 
1823 APTT 60.7* 148.7* Lipid Panel No results found for: CHOL, CHOLPOCT, CHOLX, CHLST, CHOLV, 910617, HDL, HDLP, LDL, LDLC, DLDLP, 530732, VLDLC, VLDL, TGLX, TRIGL, TRIGP, TGLPOCT, CHHD, CHHDX BNP No results for input(s): BNPP in the last 72 hours. Liver Enzymes Recent Labs  
  04/25/20 
4723 TP 7.0 ALB 3.4 AP 47 SGOT 27 Thyroid Studies Lab Results Component Value Date/Time TSH 2.20 10/16/2018 05:53 AM  
    
Procedures/imaging: see electronic medical records for all procedures/Xrays and details which were not copied into this note but were reviewed prior to creation of Plan

## 2020-04-25 NOTE — CONSULTS
[facility] Pulmonary, Critical Care, and Sleep Medicine Name: Merilee Lesches. MRN: [id number] : 1975 Hospital: [facility] [place] Date: 2020 Pulmonary Critical Care Consult IMPRESSION:  
Patient Active Problem List  
Diagnosis Code  Cellulitis of left lower leg L03. 116  
 Anemia D64.9  Morbid obesity (Formerly [facility]) E66.01  
 Dyspnea R06.00  Sleep apnea G47.30  Sinus tachycardia R00.0  BMI 70 and over, adult [facility]) U90.22  Chronic back pain M54.9, G89.29  
 Arthritis M19.90  Migraine G43.909  
 History of atrial flutter Z86.79  
 Limited mobility Z74.09  
 Smoking F17.200  Chronic renal insufficiency, stage II (mild) N18.2  CHF (congestive heart failure) (Formerly [facility]) I50.9  Acute diastolic CHF (congestive heart failure) (Formerly [facility]) I50.31  Chronic a-fib I48.20  Atrial flutter (Formerly [facility]) I48.92  
 Elevated troponin R79.89  
 Acute respiratory distress R06.03  
 Acute CHF (congestive heart failure) (Formerly [facility]) I50.9  Respiratory failure with hypoxia and hypercapnia (Formerly [facility]) J96.91, J96.92  
 Acute on chronic respiratory failure with hypoxia and hypercapnia (Formerly [facility]) J96.21, J96.22  
 Cardiac arrest ([facility]ly [facility]) I46.9  
cardiopulmonary arrest in a setting of self extubation · Neuro: PRN pain medications, +/- sedation avoid propofol due to low HR. Versed and fentanyl. deep sedation due to to self extubation · CVS: Fibrillation on anticoagulation at home was on amiodarone now on hold due to bradycardia. hypotension and bradycardia sedation vs primary cardiac issues. Dc propofol, follow echo trop ecg cardiology consult add asa and continue xarelto  HD stable; Monitor CVP, Actively titrate vasopressors aim MAP >65mmHg, Check cardiac panel, ECHO results. Today 5 minutes PEA arrest in a setting of self extubation. Now stable on amiodarone and levophed · Resp: acute on chronic  hypercarbic hypoxemic respiratory failure. Severe ARDS on maximal ventilatory support. Volume control  · Currently on PEEP 20 and 100% of FiO2. · Today in the morning had a change of the ET tube by anesthesia due to leak. · Currently on Versed fentanyl and Nimbex added for full ventilatory control · Initially CHF/pneumonia now ARDS picture. Start ARDS protocol as tolerated but tried to extubate again today start 24 hsr Nimbex continue versed, fentanyl cannot use propofol due to bradycardia  aslo neuroleptic high risk due to cardiomyopathy. · Intubated on 4/22/2020 for hypercarbic and hypoxemic respiratory failure . On 4/23/202 at 7 AM seld extubated brioef cardiac arrest PEA/ re intubated. · Titrate FiO2/ supp O2 for SpO2 >90%; full ventilaotry support   sedated  Ac Fio2 100% wean to 900 % today , PEEP 20 ( if hypotensive will lower saturation now 91 % ·  CHF/morbid obesity too high risk to extubate very difficult reintubation will consult for tracheostomy · I/D:  ? Aspiration pneumonia check for COVID very   ( first test negative)  no plaquenil cardiac risks. Respiratory cultures were ending pending Afebrile; aleukocytosis; Sepsis bundle per hospital protocol, f/u BxCx/UC, Sputum Cx's. Staph on abx  LA ordered- initial and repeat Q4hrs till normalized. ABX :ceftriaone doxy  Descalate ABX once Cx's finalize. RESP CULURE COVID 19  
· Hem/Onc: Daily CBC; H/H, and plts are stable. pvl negative but on Ac for afib change to heparin drip · Metabolic: Daily BMP; monitor e-lytes; replace PRN 
· Renal: Trend Renal indices; Diuresis, Clark to BSD,  LOY Dc vancomycin lower diuretics · Endocrine: POC Glucose q6; Check TSH level · GI: SUP, Trend LFTs, Zofran PRN for N/V  
· Musc/Skin: No acute issues, wound care · Fluids: NACL prn albumins prn · Code Status:FULL  
RECOMMENDATIONS:  
Neuro:  Very agitated , self extubated. Does not tolerate propofol bradycardia . ARDS will paralysis  for 24 hrs  VERSED FENTANYL nimbex Respiratory :acute on chronic  hypercarbic and hypoxemic repsi failure. Initially  CHf/pneumonia now ARDs picture. Trial of ARDS protocol with nimbex  for 24 hsr  
full ventilatory support Ac TV  550 stable wean Fiop2    , PEEP 20 continue morbid obesity . Oxygenation worsened  after self extubation now gradually imporving. Mech. Ventilated patients- aim to keep peak plateau pressure less than or equal to 30cm H2O. Titrate FiO2 for goal SPO2> 90% Morbid obesity will need tracheostomy when more stable VAP prevention bundle, head of the bed at 30' all times Daily sedation holiday and assessment for weaning with SBT as tolerated Sputum culture Continue bronchodilators, pulmonary hygiene care Steroids added Sepsis bundle per hospital protocol CVS:? CHF/ ? NSTEMI amiodarone on hold due to bradycardia  
 xarelto change to Heparin drip,  Asa 
lipitor Fluids:prn albumins on diuretics Monitor CVP Actively titrate vasopressors aim MAP >65mmHg levophed low dose Check cardiac panel, ECHO Diuresis gentel was on high dose ID Void x1 negative second pending Pneumonia resp staph on zosyn was on vacomycin  now on hold due to renal  
Antibiotic choice:ceftriaxone and doxy I will change to zosyn ? Aspiration Cultures drawn and will be followed. Lactic acid ordered- initial and repeat Q6hrs if elevated till normalized. Endo Glycemic control Renal LOY gentle diuresis GI Stress ulcer prophylaxis DVT prophylaxis AM labs Diet: DIET NPO Palliative care consult Vent bundle day 2 Will defer respective systems problem management to primary and other consultant and follow patient in ICU with primary and other medical team 
Further recommendations will be based on the patient's response to recommended treatment and results of the investigation ordered. Quality Care: PPI, DVT prophylaxis, HOB elevated, Infection control all reviewed and addressed. PAIN AND SEDATION: yes · Skin/Wound: yes · Nutritiyes · Prophylaxis: DVT and GI Prophylaxis reviewed. · Restraints: yes · PT/OT eval and treat: ·   
· Lines/Tubes: lines daniel  Ventilator ADVANCE DIRECTIVE: . Palliative FAMILY DISCUSSION: full Events and notes from last 24 hours reviewed. Care plan discussed with nursing Subjective/History:  
Lilia Khan. has been seen and evaluated as respiratory failure. 66-year-old male with morbid obesity, ex-smoker, history of asthma, history of severe obstructive sleep apnea on CPAP questionable component compliance, history of congestive heart failure with reduced ejection fraction atrial fibrillation atrial flutter on amiodarone, history of chronic renal insufficiency, cellulitis of lower extremities and anemia,. Patient was in his usual state of health. As per family did not have any fever or upper respiratory tract infections and no flulike symptoms Ms. he is Lasix not clear if also missed his CPAP presented with acute shortness of breath worsening edema of lower extremities He was found to be in acute hypercarbic hypoxemic respiratory failure initially had a trial of BiPAP but failed and then eventually was intubated he was hypotensive. Periodically and slightly bradycardic. Moderate amount of secretions per next of breath groundglass clinical presentation trial strongly suspicious for congestive heart failure but to be on the safe side we will check patient also for viral infection and bacterial pneumonia. Consider also aspiration event. Morbid obesity. Patient is compliant with Xarelto. We will do follow-up PVL and continue his anticoagulation The patient is critically ill and can not provide additional history due to Ventilated. 4/25/2020. Patient requires pressors and ventilatory support critically ill multisystem organ failure Long discussion with wife today remains full code. Earlier today in the morning had very low tidal volumes. ET tube was placed properly however leak noticed. ET tube exchange with the help of anesthesia appreciate help. Patient section. Now properly place no leak. However requires very high ventilatory setting currently on ARDS protocol tidal volume 580 with a goal of lowering to 500 over next 24 hours. PEEP currently 20 goal to lowered to 15 over next 24 hours. FiO2 100% goal to low where to 80% over next 24 hours. Received diuresis did not really help with his oxygenation. Continue antibiotics. Second coronavirus test pending. Congestive heart failure treatment already applied. Amiodarone currently on hold due to low heart rate but might resume tomorrow. Patient is on full anticoagulation. Monitor for bleeding had some bleeding from central line otherwise tolerating well 4/24/2020 Remains in the ICU critically ill on mechanical ventilation and low-dose pressors. Today in the morning patient again woke up and was trying to self extubate. Is in spite of heavy sedation with Versed and fentanyl. Patient is not able to tolerate propofol due to bradycardia. Amiodarone is on hold. Hemodynamically improving. Acute hypoxemic respiratory failure still severe will do 24 hours ARDS protocol as tolerated with possible additional nimbex ( paralysis) Respiratory culture staph but not MRSA DC vancomycin. Lower diuretics. Covid  x1- second test pending. 4/23/2020 Patient remains in ICU critically ill on mechanical ventilation and pressors. 7 AM today patient had brief PEA arrest at the time of self extubation reintubated pulse back. Walk After was following simple commands. Back on sedation. Required very high PEEP and high FiO2 to recover from hypoxemia responded now well improving. Low-dose pressors required continue deep sedation due to self extubation and morbidly obese consult ENT. Change ceftriaxone to Zosyn for possible aspiration event. Culture still pending. Continue treatment for congestive heart failure including diuresis change Xarelto to heparin Review of Systems: A comprehensive review of systems was negative except for that written in the HPI. [x]The patient is unable to give any meaningful history or review of systems because the patient is: 
[]Intubated [x]Sedated  
[]Unresponsive [x]Ventilated []The patient is critically ill on     
[x]Mechanical ventilation []Pressors []BiPAP [] Latest lactic acid:  
Lactic acid Date Value Ref Range Status 04/23/2020 4.1 (HH) 0.4 - 2.0 MMOL/L Final  
  Comment:  
  CALLED TO AND CORRECTLY REPEATED BY: 
Cody Seo RN ICU ON 4/23/2020 0916 TO 5017 
  
04/22/2020 1.8 0.4 - 2.0 MMOL/L Final  
06/09/2017 1.1 0.4 - 2.0 MMOL/L Final  
 
 
Completed IVF Resuscitation (30ml/kg) - yes  IVF choice: 0.9NS Suspected source/s of severe sepsis: Pneumonia Organ dysfunction: Yes Antibiotics: zosyn vanc was on doxy now off Completed physical exam: yes Past Medical History: 
Past Medical History:  
Diagnosis Date  Arrhythmia  Arthritis   
 knees  CHF (congestive heart failure) ([address]  Chronic back pain  Chronic renal insufficiency, stage II (mild)  History of atrial flutter Dx 6/2016 - anticoagulated by Carey Thakkar  Hypertension 2015  Limited mobility  Migraine headache  Morbid obesity ([address]  Morbid obesity with body mass index of 60.0-69.9 in [facility])  Sleep apnea   
 uses c-pap instructed to bring day of surgery  Smoking   
 very passive / cigars only Past Surgical History: 
Past Surgical History:  
Procedure Laterality Date  CARDIAC SURG PROCEDURE UNLIST    
 cardioversion  ECHOCARDIOGRAM  04/2016 EF 60-65% (performed at THE [facility] - see cardiology tab) Medications: 
Prior to Admission medications Medication Sig Start Date End Date Taking? Authorizing Provider furosemide (LASIX) 40 mg tablet Take 1 Tab by mouth two (2) times a day. 16  Yes Addison Whaley MD  
 
 
Current Facility-Administered Medications Medication Dose Route Frequency  albuterol (PROVENTIL HFA, VENTOLIN HFA, PROAIR HFA) inhaler 2 Puff  2 Puff Inhalation TID RT  
 cisatracurium (NIMBEX) 100 mg in 0.9% sodium chloride 100 mL infusion  0-10 mcg/kg/min IntraVENous TITRATE  heparin 25,000 units in D5W 250 ml infusion  8-36 Units/kg/hr IntraVENous TITRATE  pantoprazole (PROTONIX) 40 mg in 0.9% sodium chloride 10 mL injection  40 mg IntraVENous DAILY  fentaNYL (PF) 900 mcg/30 ml infusion soln  0-150 mcg/hr IntraVENous TITRATE  
 NOREPINephrine (LEVOPHED) 8 mg in 0.9% NS 250ml infusion  0.5-30 mcg/min IntraVENous TITRATE  midazolam in normal saline (VERSED) 1 mg/mL infusion  0-10 mg/hr IntraVENous TITRATE  aspirin chewable tablet 81 mg  81 mg Oral DAILY  methylPREDNISolone (PF) (SOLU-MEDROL) injection 40 mg  40 mg IntraVENous Q8H  
 sodium chloride (NS) flush 5-40 mL  5-40 mL IntraVENous Q8H  
 [Held by provider] losartan (COZAAR) tablet 25 mg  25 mg Oral DAILY  [Held by provider] metoprolol succinate (TOPROL-XL) XL tablet 25 mg  25 mg Oral DAILY Allergy: No Known Allergies Social History: 
Social History Tobacco Use  Smoking status: Former Smoker Types: Cigars  Smokeless tobacco: Never Used Substance Use Topics  Alcohol use: No  
 Drug use: No  
  
 
Family History: 
Family History Problem Relation Age of Onset  Obesity Mother  Liver Disease Sister Objective:  
Vital Signs:   
Blood pressure 99/56, pulse 74, temperature 96.1 °F (35.6 °C), resp. rate 16, height 5' 10\" (1.778 m), weight (!) 269.9 kg (595 lb), SpO2 (!) 83 %. Body mass index is 85.37 kg/m². O2 Device: Endotracheal tube, Ventilator O2 Flow Rate (L/min): 12 l/min Temp (24hrs), Av.7 °F (36.5 °C), Min:96.1 °F (35.6 °C), Max:98.4 °F (36.9 °C) Patient Vitals for the past 8 hrs: 
 Temp Pulse Resp BP SpO2  
04/25/20 1230  74 16 99/56 (!) 83 % 04/25/20 1200  70 16 (!) 152/92 (!) 83 % 04/25/20 1130  71 16 143/86 (!) 86 % 04/25/20 1100  87 16 95/53 (!) 86 % 04/25/20 1030  98 16 121/68 (!) 83 % 04/25/20 1000 96.1 °F (35.6 °C) (!) 111  (!) 183/131 (!) 88 % 04/25/20 0930 97 °F (36.1 °C) 83 16 (!) 139/92 94 % 04/25/20 0900 96.8 °F (36 °C) 97 29 (!) 168/125 94 % 04/25/20 0830 97.2 °F (36.2 °C) 70 15 (!) 148/97 93 % 04/25/20 0808  (!) 57 16  94 % 04/25/20 0800 97.2 °F (36.2 °C) (!) 57 16 131/82 94 % 04/25/20 0730 97.2 °F (36.2 °C) (!) 59 16 137/78 93 % 04/25/20 0700 97.3 °F (36.3 °C) 65 16 146/76 93 % 04/25/20 0630 97.3 °F (36.3 °C) 69 16 157/82 92 % 04/25/20 0600 97.3 °F (36.3 °C) 78 16 151/86 91 % Intake/Output:  
Last shift:      No intake/output data recorded. Last 3 shifts: 04/23 1901 - 04/25 0700 In: 1476 [I.V.:3360] Out: 5800 [Urine:5800] Intake/Output Summary (Last 24 hours) at 4/25/2020 1351 Last data filed at 4/25/2020 0700 Gross per 24 hour Intake 1361.5 ml Output 4350 ml Net -2988.5 ml  
 
 
Ventilator Settings: 
Mode Rate Tidal Volume Pressure FiO2 PEEP Assist control, VC+   580 ml    100 % 18 cm H20(INCREASED EARLIER BY DR. Umberto Bo) Peak airway pressure: 36 cm H2O Minute ventilation: 10 l/min ARDS network Guidelines: Lung protective strategy, Pl pressure goals 35less than or equal to 30. Physical Exam:intubated sedated General: Other, moderately ill, cooperative, uncooperative, no distress, appears stated age, moderately obese HEENT: Normal, ENT exam normal, no neck nodes or sinus tenderness Neck: No abnormally enlarged lymph nodes. or thyroid, supple Chest: normal 
Lungs: rales bilaterally, breathing normal , clear to auscultation bilaterally, diminished breath sounds R base, no tenderness/ rash Heart: ir Regular rate and rhythm Abdomen: non distended, bowel sounds normoactive, tympanic, no rebound tenderness, rigidity, rebound morbidly obesee Extremity: 3+ edema Capillary refill: normal 
Neuro: responds to voice, alert, oriented x3, affect appropriate, no focal neurological deficits, moves all extremities well, no involuntary movements, reflexes at knee and ankle intact Skin: Skin color, texture, turgor normal. No rashes or lesions or Skin color, texture, turgor normal 
 
Data:  
 
Recent Results (from the past 24 hour(s)) CARDIAC PANEL,(CK, CKMB & TROPONIN) Collection Time: 04/24/20  3:00 PM  
Result Value Ref Range  (H) 39 - 308 U/L  
 CK - MB 6.1 (H) <3.6 ng/ml CK-MB Index 1.9 0.0 - 4.0 % Troponin-I, QT 3.31 (HH) 0.0 - 0.045 NG/ML  
PROTEIN/CREATININE RATIO, URINE Collection Time: 04/24/20  3:00 PM  
Result Value Ref Range Protein, urine random 18 (H) <11.9 mg/dL Creatinine, urine 153.00 (H) 30 - 125 mg/dL Protein/Creat. urine Ratio 0.1 CREATININE, UR, RANDOM Collection Time: 04/24/20  3:00 PM  
Result Value Ref Range Creatinine, urine 161.00 (H) 30 - 125 mg/dL SODIUM, UR, RANDOM Collection Time: 04/24/20  3:00 PM  
Result Value Ref Range Sodium,urine random 24 20 - 110 MMOL/L  
URINALYSIS W/MICROSCOPIC Collection Time: 04/24/20  3:00 PM  
Result Value Ref Range Color YELLOW Appearance CLOUDY Specific gravity 1.020 1.005 - 1.030    
 pH (UA) 5.5 5.0 - 8.0 Protein TRACE (A) NEG mg/dL Glucose Negative NEG mg/dL Ketone Negative NEG mg/dL Bilirubin Negative NEG Blood LARGE (A) NEG Urobilinogen 1.0 0.2 - 1.0 EU/dL Nitrites Negative NEG Leukocyte Esterase SMALL (A) NEG    
 WBC 2 to 5 0 - 5 /hpf  
 RBC 25 to 50 0 - 5 /hpf Epithelial cells FEW 0 - 5 /lpf Bacteria FEW (A) NEG /hpf POC G3 Collection Time: 04/24/20  4:11 PM  
Result Value Ref Range Device: VENT    
 FIO2 (POC) 80 %  pH (POC) 7.495 (H) 7.35 - 7.45    
 pCO2 (POC) 50.0 (H) 35.0 - 45.0 MMHG  
 pO2 (POC) 59 (L) 80 - 100 MMHG  
 HCO3 (POC) 38.6 (H) 22 - 26 MMOL/L  
 sO2 (POC) 92 92 - 97 % Base excess (POC) 15 mmol/L Mode ASSIST CONTROL Tidal volume 580 ml Set Rate 20 bpm  
 PEEP/CPAP (POC) 14 cmH2O Allens test (POC) N/A Inspiratory Time 0.9 sec Total resp. rate 20 Site DRAWN FROM ARTERIAL LINE Patient temp. 98.6 Specimen type (POC) ARTERIAL Performed by Arielle Stubbs PTT Collection Time: 04/24/20  6:23 PM  
Result Value Ref Range aPTT 148.7 (HH) 23.0 - 36.4 SEC CARDIAC PANEL,(CK, CKMB & TROPONIN) Collection Time: 04/24/20  9:10 PM  
Result Value Ref Range  (H) 39 - 308 U/L  
 CK - MB 4.6 (H) <3.6 ng/ml CK-MB Index 1.3 0.0 - 4.0 % Troponin-I, QT 2.43 (HH) 0.0 - 0.045 NG/ML  
PTT Collection Time: 04/25/20  2:10 AM  
Result Value Ref Range aPTT 60.7 (H) 23.0 - 36.4 SEC  
POC G3 Collection Time: 04/25/20  5:44 AM  
Result Value Ref Range Device: VENT    
 FIO2 (POC) 0.80 % pH (POC) 7.403 7.35 - 7.45    
 pCO2 (POC) 51.4 (H) 35.0 - 45.0 MMHG  
 pO2 (POC) 70 (L) 80 - 100 MMHG  
 HCO3 (POC) 32.3 (H) 22 - 26 MMOL/L  
 sO2 (POC) 94 92 - 97 % Base excess (POC) 7 mmol/L Mode ASSIST CONTROL Tidal volume 580 ml Set Rate 16 bpm  
 PEEP/CPAP (POC) 16 cmH2O  
 PIP (POC) 31 Allens test (POC) N/A Inspiratory Time 0.90 sec Total resp. rate 16 Site DRAWN FROM ARTERIAL LINE Patient temp. 36.3 Specimen type (POC) ARTERIAL Performed by Merlyn Katz Volume control plus YES    
METABOLIC PANEL, COMPREHENSIVE Collection Time: 04/25/20  6:10 AM  
Result Value Ref Range Sodium 147 (H) 136 - 145 mmol/L Potassium 4.0 3.5 - 5.5 mmol/L Chloride 105 100 - 111 mmol/L  
 CO2 36 (H) 21 - 32 mmol/L Anion gap 6 3.0 - 18 mmol/L Glucose 105 (H) 74 - 99 mg/dL BUN 33 (H) 7.0 - 18 MG/DL  Creatinine 1.44 (H) 0.6 - 1.3 MG/DL  
 BUN/Creatinine ratio 23 (H) 12 - 20 GFR est AA >60 >60 ml/min/1.73m2 GFR est non-AA 53 (L) >60 ml/min/1.73m2 Calcium 8.7 8.5 - 10.1 MG/DL Bilirubin, total 1.0 0.2 - 1.0 MG/DL  
 ALT (SGPT) 35 16 - 61 U/L  
 AST (SGOT) 27 10 - 38 U/L Alk. phosphatase 47 45 - 117 U/L Protein, total 7.0 6.4 - 8.2 g/dL Albumin 3.4 3.4 - 5.0 g/dL Globulin 3.6 2.0 - 4.0 g/dL A-G Ratio 0.9 0.8 - 1.7    
CBC WITH AUTOMATED DIFF Collection Time: 04/25/20  6:10 AM  
Result Value Ref Range WBC 7.4 4.6 - 13.2 K/uL  
 RBC 5.11 4.70 - 5.50 M/uL  
 HGB 13.8 13.0 - 16.0 g/dL HCT 45.6 36.0 - 48.0 % MCV 89.2 74.0 - 97.0 FL  
 MCH 27.0 24.0 - 34.0 PG  
 MCHC 30.3 (L) 31.0 - 37.0 g/dL  
 RDW 15.0 (H) 11.6 - 14.5 % PLATELET 907 787 - 556 K/uL MPV 9.6 9.2 - 11.8 FL  
 NEUTROPHILS 83 (H) 40 - 73 % LYMPHOCYTES 9 (L) 21 - 52 % MONOCYTES 8 3 - 10 % EOSINOPHILS 0 0 - 5 % BASOPHILS 0 0 - 2 %  
 ABS. NEUTROPHILS 6.2 1.8 - 8.0 K/UL  
 ABS. LYMPHOCYTES 0.6 (L) 0.9 - 3.6 K/UL  
 ABS. MONOCYTES 0.6 0.05 - 1.2 K/UL  
 ABS. EOSINOPHILS 0.0 0.0 - 0.4 K/UL  
 ABS. BASOPHILS 0.0 0.0 - 0.1 K/UL  
 DF AUTOMATED MAGNESIUM Collection Time: 04/25/20  6:10 AM  
Result Value Ref Range Magnesium 2.3 1.6 - 2.6 mg/dL PHOSPHORUS Collection Time: 04/25/20  6:10 AM  
Result Value Ref Range Phosphorus 4.4 2.5 - 4.9 MG/DL  
CALCIUM, IONIZED Collection Time: 04/25/20  6:10 AM  
Result Value Ref Range Ionized Calcium 1.09 (L) 1.12 - 1.32 MMOL/L  
POC G3 Collection Time: 04/25/20 11:17 AM  
Result Value Ref Range Device: VENT    
 FIO2 (POC) 100 % pH (POC) 7.350 7.35 - 7.45    
 pCO2 (POC) 69.1 (H) 35.0 - 45.0 MMHG  
 pO2 (POC) 61 (L) 80 - 100 MMHG  
 HCO3 (POC) 38.2 (H) 22 - 26 MMOL/L  
 sO2 (POC) 88 (L) 92 - 97 % Base excess (POC) 13 mmol/L Mode ASSIST CONTROL Tidal volume 580 ml Set Rate 16 bpm  
 PEEP/CPAP (POC) 16 cmH2O Allens test (POC) N/A Total resp.  rate 16    
 Site DRAWN FROM ARTERIAL LINE Specimen type (POC) ARTERIAL Performed by Kai Ramirez Volume control plus YES Recent Labs  
  04/25/20 
1117 04/25/20 
0544 04/24/20 
1611 FIO2I 100 0.80 80 HCO3I 38.2* 32.3* 38.6* PCO2I 69.1* 51.4* 50.0* PHI 7.350 7.403 7.495* PO2I 61* 70* 59* All Micro Results Procedure Component Value Units Date/Time CULTURE, RESPIRATORY/SPUTUM/BRONCH Weyman Prima STAIN [[id number]]  (Abnormal)  (Susceptibility) Collected:  04/22/20 1018 Order Status:  Completed Specimen:  Sputum from Tracheal Aspirate Updated:  04/25/20 4331 Special Requests: NO SPECIAL REQUESTS     
  GRAM STAIN FEW WBCS SEEN     
      
  OCCASIONAL EPITHELIAL CELLS SEEN  
     
      
  FEW GRAM POSITIVE COCCI IN CLUSTERS  
     
   RARE GRAM NEGATIVE RODS Culture result:    
  LIGHT STAPHYLOCOCCUS AUREUS  
     
      
  LIGHT NORMAL RESPIRATORY BRYN  
     
 CULTURE, BLOOD [[id number]] Collected:  04/24/20 1015 Order Status:  Canceled Specimen:  Blood Telemetry: AFIB 
 
 ECHO Imaging: 
[x]I have personally reviewed the patients chest radiographs images and report Results from [facility] Encounter encounter on 04/21/20 XR CHEST SNGL V  
 Narrative CLINICAL HISTORY:  Endotracheal tube placement/repositioning. COMPARISONS:  Chest x-ray, earlier the same day. TECHNIQUE:  single frontal view of the chest 
 
------------------------------------------ 
 
FINDINGS: 
 
Exam limited by patient large body habitus. Lungs:  Hazy opacity in the lung bases, more apparent than on the prior study. Upper lungs clear. Mediastinum: Marked cardiomegaly. Endotracheal tube has been repositioned, tip 
now 5.5 cm above the miguel ángel. Right transjugular venous catheter, tip near 
cavoatrial junction. Bones: No evidence of fracture or suspicious bone lesion. 
 
 
------------------------------------------  Impression IMPRESSION: 
 
 1. Hazy bilateral lower lobe opacity, likely a combination of pleural fluid and 
atelectasis, slightly more apparent than on recent comparison study. Pneumonia 
or aspiration not excluded. 2. Endotracheal tube has been repositioned, now in good position. No results found for this or any previous visit. [x]See my orders for details My assessment, plan of care, findings, medications, side effects etc were discussed with: 
[x]nursing []PT/OT [x]respiratory therapy []  
[x]family []Patient [x]Total critical care time exclusive of procedures 65 minutes with complex decision making performed and > 50% time spent in face to face evaluation.  
 
Sola Bradley MD

## 2020-04-25 NOTE — PROGRESS NOTES
Assessment:  
 
Principal Problem: 
  Respiratory failure with hypoxia and hypercapnia ([facility] [address] (4/21/2020) 
  Morbid obesity ([facility] [address] (4/4/2016) 
  
  Sleep apnea (4/4/2016) 
  
  Chronic renal insufficiency, stage II (mild) () 
  
  Chronic a-fib (10/15/2018) 
  Acute CHF (congestive heart failure) ([facility] [address] (4/21/2020) 
  Acute on chronic respiratory failure with hypoxia and hypercapnia (HCC) (4/22/2020) 
  
  Cardiac arrest ([facility] [address] (4/23/2020)   
  
CKD 
  
Plan: Today's events noted Hold diuretics due to hypotension May give volume back of BP lower D/w icu nurse and Dr. Quin Sam CC: CKD, hypotension, shock Interval History: Pt remains critically ill, condition is worse since yesterday Subjective:  
unresponsive Blood pressure 99/56, pulse 74, temperature 96.1 °F (35.6 °C), resp. rate 16, height 5' 10\" (1.778 m), weight (!) 269.9 kg (595 lb), SpO2 (!) 83 %. Obese Vent, lines and daniel noted Intake/Output Summary (Last 24 hours) at 4/25/2020 1301 Last data filed at 4/25/2020 0700 Gross per 24 hour Intake 1361.5 ml Output 4350 ml Net -2988.5 ml Recent Labs  
  04/25/20 
2344 WBC 7.4 Lab Results Component Value Date/Time Sodium 147 (H) 04/25/2020 06:10 AM  
 Potassium 4.0 04/25/2020 06:10 AM  
 Chloride 105 04/25/2020 06:10 AM  
 CO2 36 (H) 04/25/2020 06:10 AM  
 Anion gap 6 04/25/2020 06:10 AM  
 Glucose 105 (H) 04/25/2020 06:10 AM  
 BUN 33 (H) 04/25/2020 06:10 AM  
 Creatinine 1.44 (H) 04/25/2020 06:10 AM  
 BUN/Creatinine ratio 23 (H) 04/25/2020 06:10 AM  
 GFR est AA >60 04/25/2020 06:10 AM  
 GFR est non-AA 53 (L) 04/25/2020 06:10 AM  
 Calcium 8.7 04/25/2020 06:10 AM  
  
 
Current Facility-Administered Medications Medication Dose Route Frequency Provider Last Rate Last Dose  ELECTROLYTE REPLACEMENT PROTOCOL - Calcium   1 Each Other PRN Sage Sanchez MD      
 ELECTROLYTE REPLACEMENT PROTOCOL - Magnesium   1 Each Other PRN Samantha Gilliam MD      
 ELECTROLYTE REPLACEMENT PROTOCOL - Potassium Standard Dosing   1 Each Other PRN Samantha Gilliam MD      
 ELECTROLYTE REPLACEMENT PROTOCOL - Phosphorus  Standard Dosing  1 Each Other PRN Samantha Gilliam MD      
 albuterol (PROVENTIL HFA, VENTOLIN HFA, PROAIR HFA) inhaler 2 Puff  2 Puff Inhalation TID RT Samantha Gilliam MD   Stopped at 04/25/20 0800  cisatracurium (NIMBEX) 100 mg in 0.9% sodium chloride 100 mL infusion  0-10 mcg/kg/min IntraVENous TITRATE Samantha Gilliam MD 48.6 mL/hr at 04/25/20 1219 3 mcg/kg/min at 04/25/20 1219  
 heparin 25,000 units in D5W 250 ml infusion  8-36 Units/kg/hr IntraVENous TITRATE Samantha Gilliam MD 21.6 mL/hr at 04/25/20 0544 8 Units/kg/hr at 04/25/20 0544  pantoprazole (PROTONIX) 40 mg in 0.9% sodium chloride 10 mL injection  40 mg IntraVENous DAILY Shira Prado MD   40 mg at 04/25/20 0811  
 fentaNYL (PF) 900 mcg/30 ml infusion soln  0-150 mcg/hr IntraVENous TITRATE Evaristo TOURE MD 5 mL/hr at 04/25/20 1234 150 mcg/hr at 04/25/20 1234  
 NOREPINephrine (LEVOPHED) 8 mg in 0.9% NS 250ml infusion  0.5-30 mcg/min IntraVENous TITRATE Shira Prado MD 3.8 mL/hr at 04/25/20 1233 2 mcg/min at 04/25/20 1233  
 midazolam in normal saline (VERSED) 1 mg/mL infusion  0-10 mg/hr IntraVENous TITRATE Samantha Gilliam MD 10 mL/hr at 04/25/20 0935 10 mg/hr at 04/25/20 0935  aspirin chewable tablet 81 mg  81 mg Oral DAILY Samantha Gilliam MD   81 mg at 04/25/20 0811  
 methylPREDNISolone (PF) (SOLU-MEDROL) injection 40 mg  40 mg IntraVENous Q8H Samantha Gilliam MD   40 mg at 04/25/20 8164  sodium chloride (NS) flush 5-40 mL  5-40 mL IntraVENous Q8H Shira Prado MD   Stopped at 04/24/20 1400  
 sodium chloride (NS) flush 5-40 mL  5-40 mL IntraVENous PRN Shira Prado MD      
 [Held by provider] losartan (COZAAR) tablet 25 mg  25 mg Oral DAILY Nixon Burdick MD      
 [Held by provider] metoprolol succinate (TOPROL-XL) XL tablet 25 mg  25 mg Oral DAILY Nixon Burdick MD      
 
Facility-Administered Medications Ordered in Other Encounters Medication Dose Route Frequency Provider Last Rate Last Dose  propofoL (DIPRIVAN) 10 mg/mL injection   IntraVENous PRN Radha Merino, CRNA   300 mg at 04/22/20 8539  ePHEDrine (PF) (MISTOLE) 10 mg/mL in NS syringe   IntraVENous PRN Radha Merino, CRNA   10 mg at 04/22/20 2838  PHENYLephrine (RUPERT-SYNEPHRINE) 100 mcg/mL in NS syringe   IntraVENous PRN Radha Merino, CRNA   100 mcg at 04/22/20 0635  
 
65 minutes of critical care time spent in the direct evaluation and treatment of this high risk patient. The reason for providing this level of medical care for this critically ill patient was due a critical illness that impaired one or more vital organ systems such that there was a high probability of imminent or life threatening deterioration in the patients condition.  This care involved high complexity decision making to assess, manipulate, and support vital system functions, to treat this degreee vital organ system failure and to prevent further life threatening deterioration of the patients condition.

## 2020-04-25 NOTE — PROGRESS NOTES
PATIENT REQUIRED REINTUBATION DUE TO MASSIVE CUFF LEAK. #8.0 ETT INSERTED OVER BOUGIE BY ANESTHESIA AND SECURED AT 25 LL. BBS AUSCULTATED. PATIENT IS CURRENTLY ON AC/VC+ 16/580/100%/PEEP 16. WILL REPEAT ABG'S.

## 2020-04-26 NOTE — PROGRESS NOTES
ASSESSED PATIENT FOR SMALL AUDIBLE LEAK FROM CUFF ALTHOUGH PATIENT RECEIVING VOLUMES. RECHECKED PRESSURE/ADDED SMALL AMT AIR VIA CUFFALATOR. DISCUSSED WITH DR. Umberto Bo. DECREASED PEEP TO 18.

## 2020-04-26 NOTE — PROGRESS NOTES
Hospitalist Progress Note Patient: Shan Muñoz MRN: [id number]  CSN: [id number] YOB: 1975  Age: 53 y.o. Sex: male DOA: 4/21/2020 LOS:  LOS: 5 days Assessment and Plan: 
 
Principal Problem: 
  Respiratory failure with hypoxia and hypercapnia ([address] (4/21/2020) Active Problems: Morbid obesity ([address] (4/4/2016) Sleep apnea (4/4/2016) Chronic renal insufficiency, stage II (mild) () Chronic a-fib (10/15/2018) Acute CHF (congestive heart failure) ([facility] [address] (4/21/2020) Acute on chronic respiratory failure with hypoxia and hypercapnia ([address] (4/22/2020) Cardiac arrest ([address] (4/23/2020) Acute respiratory failure - with hypoxia and hypercapnia, follow ABGs. Managed by pulmonary MARI AFERNANDA/OHS -  
  
ID - Follow up resp, cx. ANTIBIOTICS zosyn, doxycycline. COVID 19 pending results negative times two and ruled out   
  
CVS - Hypotension - on pressors, wean as tolerated, almost off Levophed per nurse   
  
Chronic A-fib - monitor rate, on amiodarone drip.   
On heparin drip 
  
Acute on chronic systolic CHF -  
Holding Lasix and blood pressure is better but renal wanted them restarted today  If bp can tolerate Echo with EF of 45-50% Elevated troponin -  
Cardiology following. troponins had bumped up and then came down some, now even lower 
  
Heme/onc - Follow H&H, plts.  
  
Renal -  Dr. Ze Terrell following  Creatinine stable. Agapito l need free water flushes to add Endocrine -  Follow FSG 
  
Neuro/ Pain/ Sedation - Fentanyl, versed prn. Sedation bundle. Better since adjusted for real weight 
  
GI - NPO for now. Protonix IV  
  
Morbid obesity 
  
Prophylaxis - DVT: heparin drip, GI: protonix 
  
 
 
Chief complaint:  52 y/o male with hx of morbid obesity, MARIA FERNANDA, chronic afib on anticoagulation, and systolic CHF (EF 41-50%) [place] admitted with respiratory failure with hypoxia and hypercapnia, he required to be intubated. Subjective: Sedated and intubated Review of systems: 
 
General: No fevers or chills. Cardiovascular: No chest pain or pressure. No palpitations. Pulmonary: No shortness of breath. Gastrointestinal: No nausea, vomiting. Objective: 
 
Vital signs/Intake and Output: 
Visit Vitals /57 Pulse (!) 55 Temp 99.2 °F (37.3 °C) Resp 20 Ht 5' 10\" (1.778 m) Wt (!) 162.3 kg (357 lb 14.4 oz) SpO2 94% BMI 51.35 kg/m² Current Shift:  04/26 0701 - 04/26 1900 In: -  
Out: 9810 [QFZ:[id number]] Last three shifts:  04/24 1901 - 04/26 0700 In: 1812.7 [I.V.:1812.7] Out: 8023 [HEJEL:0214] Physical Exam: 
General: NAD, AAOx3. Non-toxic. HEENT: NC/AT. PERRLA, EOMI.  MMM. Lungs: Nml inspection. CTA B/L. No wheezing, rales or rhonchi. Heart:  S1S2 RRR,  PMI mid 5th IC space. No M/RG. Abdomen: Soft, NT/ND.  BS+. No peritoneal signs. Extremities: No C/C/E. Psych:   Nml affect. Neurologic:  2-12 intact. Strength 5/5 throughout. Sensation symmetrical. 
 
 
 
 
Labs: Results:  
   
Chemistry Recent Labs  
  04/26/20 
0515 04/25/20 
0631 04/24/20 
0410 * 105* 110* * 147* 144  
K 4.6 4.0 4.2  105 103 CO2 36* 36* 39* BUN 33* 33* 26* CREA 1.50* 1.44* 1.58* CA 8.3* 8.7 8.6 AGAP 4 6 2*  
BUCR 22* 23* 16  
AP 46 47 52  
TP 7.1 7.0 7.0 ALB 3.2* 3.4 3.4 GLOB 3.9 3.6 3.6 AGRAT 0.8 0.9 0.9  
  
CBC w/Diff Recent Labs  
  04/26/20 
0515 04/25/20 
0610 04/24/20 
0410 WBC 10.4 7.4 8.1  
RBC 5.26 5.11 5.09  
HGB 13.9 13.8 13.7 HCT 48.6* 45.6 45.6  155 162 GRANS 82* 83* 83* LYMPH 8* 9* 11* EOS 0 0 0 Cardiac Enzymes Recent Labs  
  04/26/20 
0515 04/24/20 
2110  347* CKND1 1.6 1.3 Coagulation Recent Labs  
  04/26/20 
1520 04/26/20 
0515 APTT 28.6 170.5* Lipid Panel No results found for: CHOL, CHOLPOCT, CHOLX, CHLST, CHOLV, 602595, HDL, HDLP, LDL, LDLC, DLDLP, [id number], VLDLC, VLDL, TGLX, TRIGL, TRIGP, TGLPOCT, CHHD, CHHDX BNP No results for input(s): BNPP in the last 72 hours. Liver Enzymes Recent Labs  
  04/26/20 
0515  
TP 7.1 ALB 3.2* AP 46 SGOT 24 Thyroid Studies Lab Results Component Value Date/Time TSH 2.20 10/16/2018 05:53 AM  
    
Procedures/imaging: see electronic medical records for all procedures/Xrays and details which were not copied into this note but were reviewed prior to creation of Plan

## 2020-04-26 NOTE — PROGRESS NOTES
1910 - Bedside and Verbal shift change report given to Silvano Patel RN (oncoming nurse) by Kamila Gutiérrez RN (offgoing nurse). Report included the following information SBAR, Kardex, ED Summary, Procedure Summary, Intake/Output, MAR, Recent Results, Med Rec Status and Cardiac Rhythm [facility]. 2000 - Shift assessment completed. Patient unresponsive; intubated, sedated, and paralyzed. TOF in process. 0000 - Reassessment completed, no changes to previous assessment. 0400 - Reassessment completed, no changes to previous assessment. 2830 - Bedside and Verbal shift change report given to Rosalinda Finch RN (oncoming nurse) by Silvano Patel RN (offgoing nurse). Report included the following information SBAR, Kardex, ED Summary, Procedure Summary, Intake/Output, MAR, Recent Results, Med Rec Status and Cardiac Rhythm [facility].

## 2020-04-26 NOTE — PROGRESS NOTES
Pulmonary, Critical Care, and Sleep Medicine Pulmonary Progress Note Name: Angelica Phillips. : 1975 MRN: [id number] Date: 2020 [x]I have reviewed the flowsheet and previous days notes. Events, vitals, medications and notes from last 24 hours reviewed. IMPRESSION:  
Patient Active Problem List  
Diagnosis Code  Acute on chronic respiratory failure with hypoxia and hypercapnia ([facility]) J96.21, J96.22  
 Acute on chronic systolic and diastolic CHF (congestive heart failure) ([facility]) I50.43 Aspiration pneumonitis possible with multiple patient initiated ET tube displacement events J69.0  Cardiac arrest ([facility] [address] I46.9 Staph Aureus in sputum culture A49.01  
 Elevated troponin, NSTEMI R79.89, I21.4  Chronic a-fib I48.20  Atrial flutter ([facility] [address] I48.92 Prolonged QTc  Chronic renal insufficiency, stage II (mild) N18.2  Smoking F17.200  Arthritis M19.90  Migraine G43.909  Chronic back pain M54.9, G89.29  
 Obstructive Sleep apnea G47.33  
 Morbid obesity ([facility]) E66.01  
  
PLAN:  
[facility]. Ventilated patients- aim to keep peak plateau pressure less than or equal to 30cm H2O. Titrate FiO2 for goal SPO2> 91% VAP prevention bundle, head of the bed at 30' all times Daily sedation and paralytic holiday when feasible and assessment for weaning with SBT as tolerated. Monitor TOF Continue bronchodilators, pulmonary hygiene care Steroid, taper today and further per clinical course Sepsis bundle per hospital protocol SARS-COV-2 negative x 2 Antibiotic choice: low threshold to start patient on antibiotics if febrile or leukocytosis. So far neither today. Patient may have aspirated since had multiple witnessed episodes when he dislodged his ET tube and or with ET tube cuff malfunctioned requiring exchange Monitor hemodynamics -stable and off of vasopressors since AM. May restart and actively titrate vasopressors aim SBP > 100 mm Hg Was on Amiodarone, has been off Continue Heparin per hospital protocol Monitor CBC, PTT, any signs of bleeding/hematoma per protocol PVL bl LE negative Free H2O per  mL Q4 for hypernatremia but would consider conservative volume strategy with current ventilator support. Appreciate renal input Hold off TF for now Glycemic control AM labs Central Line, Daniel and Vent Bundles will be followed, Vent Day 6 Full code. Prognosis guarded Will defer respective systems problem management to primary and other consultant and follow patient in ICU with primary and other medical team 
Quality Care: PPI, DVT prophylaxis, HOB elevated, Infection control all reviewed and addressed. PAIN AND SEDATION: Fentanyl, Versed, Nimbex · Skin/Wound: chronic venous stasis changes · Nutrition: NPO 
· Prophylaxis: DVT and GI Prophylaxis reviewed. · Restraints: to minimize interruption in medical care 
· PT/OT eval and treat: as needed when stable · Lines/Tubes: lines RT IJ 4/22/20; daniel 4/21/20, ET tube 4/25/20 ADVANCE DIRECTIVE: Full code DISCUSSION: spoke with RN, RT, ICU staff Events and notes from last 24 hours reviewed. Care plan discussed with nursing Subjective/History:  
78-year-old male with morbid obesity, ex-smoker, history of asthma, history of severe obstructive sleep apnea on CPAP, questionable component compliance, history of congestive heart failure with reduced ejection fraction atrial fibrillation atrial flutter on amiodarone, Xarelto, history of chronic renal insufficiency, cellulitis of lower extremities and anemia. As per family did not have any fever or upper respiratory tract infections and no flulike symptoms. On Lasix, CPAP not clear if missed. Presented with acute shortness of breath worsening edema of lower extremities.  He was found to be in acute hypercarbic hypoxemic respiratory failure initially had a trial of BiPAP but failed and then eventually was intubated. He was hypotensive. Moderate amount of secretions. Morbid obesity. Patient compliant with Xarelto. Patient self extubated and had PEA arrest. 
 
 
Subjective:  
Patient remained in ICU on ventilator; FiO2 dropped by RT to 75% at bedside Airway pressure with PP below 30 cm H2O Remained on sedation and paralytic with good ventilator synchrony Off of vasopressor and hemodynamically stable Good UO. Afebrile. NPO 
 
ROS: Review of systems not obtained due to patient factors. Latest lactic acid:  
Lactic acid Date Value Ref Range Status 2020 2.1 (HH) 0.4 - 2.0 MMOL/L Final  
  Comment:  
  CALLED TO AND CORRECTLY REPEATED BY: 
Mary Ann Mcgarry RN, ICU ON 2020 AT 0450 TO JDA 
  
2020 2.0 0.4 - 2.0 MMOL/L Final  
2020 4.1 (HH) 0.4 - 2.0 MMOL/L Final  
  Comment:  
  CALLED TO AND CORRECTLY REPEATED BY: 
Raven Bass RN ICU ON 2020 0916 TO 4436 Past Medical History: 
Past Medical History:  
Diagnosis Date  Arrhythmia  Arthritis   
 knees  CHF (congestive heart failure) ([facility] [address]  Chronic back pain  Chronic renal insufficiency, stage II (mild)  History of atrial flutter Dx 2016 - anticoagulated by Sasha Hernandez  Hypertension 2015  Limited mobility  Migraine headache  Morbid obesity ([facility] [address]  Morbid obesity with body mass index of 60.0-69.9 in adult [facility])  Sleep apnea   
 uses c-pap instructed to bring day of surgery  Smoking   
 very passive / cigars only Allergy: No Known Allergies Vital Signs:   
Blood pressure 115/64, pulse (!) 47, temperature 97.9 °F (36.6 °C), resp. rate 20, height 5' 10\" (1.778 m), weight (!) 173.4 kg (382 lb 4.8 oz), SpO2 93 %. Body mass index is 51.35 kg/m². O2 Device: Endotracheal tube O2 Flow Rate (L/min): 12 l/min Temp (24hrs), Av.7 °F (37.1 °C), Min:97.9 °F (36.6 °C), Max:99.2 °F (37.3 °C) Intake/Output:  
Last shift:      No intake/output data recorded. Last 3 shifts: 04/25 1901 - 04/27 0700 In: 455.3 [I.V.:455.3] Out: [address] [[id number]] Intake/Output Summary (Last 24 hours) at 4/27/2020 1049 Last data filed at 4/27/2020 0330 Gross per 24 hour Intake  Output 2250 ml Net -2250 ml Ventilator Settings: 
Mode Rate Tidal Volume Pressure FiO2 PEEP Assist control, VC+   580 ml    90 % 18 cm H20 Peak airway pressure: 31 cm H2O Minute ventilation: 13.7 l/min Pl pressure goals less than or equal to 30. Physical Exam: 
General: sedated, on paralytic, in no respiratory distress and acyanotic, appears older than stated age, morbidly obese, on ventilator HEENT: PERRL, fundi benign, orally intubatyed Neck: No abnormally enlarged lymph nodes or thyroid, supple Chest: normal, obese chest wall Lungs: moderate air entry, few rhonchi scattered bilaterally, breathing normal , normal percussion bilaterally, no tenderness/ rash Heart: Regular rate and rhythm, S1S2 present or without murmur or extra heart sounds Abdomen: obese, bowel sounds normoactive, tympanic, abdomen is soft without significant tenderness, masses, organomegaly or guarding, rigidity, rebound Extremity: 1+, pitting and bl LE edema; negative for cyanosis, clubbing Capillary refill: normal 
Neuro: sedated, on paralytic, no involuntary movements, exam limitations Skin: Skin color, texture, turgor fair. Skin dry, warm, non-diaphoretic DATA:  
Current Facility-Administered Medications Medication Dose Route Frequency  albumin human 25% (BUMINATE) solution 25 g  25 g IntraVENous Q6H  
 cisatracurium (NIMBEX) 100 mg in 0.9% sodium chloride 100 mL infusion  0-10 mcg/kg/min IntraVENous TITRATE  heparin 25,000 units in D5W 250 ml infusion  13.8-36 Units/kg/hr IntraVENous TITRATE  vasopressin (VASOSTRICT) 20 Units in 0.9% sodium chloride 100 mL infusion  0-0.03 Units/min IntraVENous TITRATE  ELECTROLYTE REPLACEMENT PROTOCOL - Calcium   1 Each Other PRN  
  ELECTROLYTE REPLACEMENT PROTOCOL - Magnesium   1 Each Other PRN  
 ELECTROLYTE REPLACEMENT PROTOCOL - Potassium Standard Dosing   1 Each Other PRN  
 ELECTROLYTE REPLACEMENT PROTOCOL - Phosphorus  Standard Dosing  1 Each Other PRN  pantoprazole (PROTONIX) 40 mg in 0.9% sodium chloride 10 mL injection  40 mg IntraVENous DAILY  fentaNYL (PF) 900 mcg/30 ml infusion soln  0-150 mcg/hr IntraVENous TITRATE  
 NOREPINephrine (LEVOPHED) 8 mg in 0.9% NS 250ml infusion  0.5-30 mcg/min IntraVENous TITRATE  midazolam in normal saline (VERSED) 1 mg/mL infusion  0-10 mg/hr IntraVENous TITRATE  aspirin chewable tablet 81 mg  81 mg Oral DAILY  methylPREDNISolone (PF) (SOLU-MEDROL) injection 40 mg  40 mg IntraVENous Q8H  
 sodium chloride (NS) flush 5-40 mL  5-40 mL IntraVENous Q8H  
 sodium chloride (NS) flush 5-40 mL  5-40 mL IntraVENous PRN  
 [Held by provider] losartan (COZAAR) tablet 25 mg  25 mg Oral DAILY  [Held by provider] metoprolol succinate (TOPROL-XL) XL tablet 25 mg  25 mg Oral DAILY Facility-Administered Medications Ordered in Other Encounters Medication Dose Route Frequency  propofoL (DIPRIVAN) 10 mg/mL injection   IntraVENous PRN  
 ePHEDrine (PF) (MISTOLE) 10 mg/mL in NS syringe   IntraVENous PRN  
 PHENYLephrine (RUPERT-SYNEPHRINE) 100 mcg/mL in NS syringe   IntraVENous PRN Telemetry: [x]Sinus []A-flutter []Paced []A-fib []Multiple PVCs  
              
4/22/20 ECHO · Left Ventricle: Normal wall thickness and systolic function (ejection fraction normal). Mildly dilated left ventricle. The estimated ejection fraction is 45 - 50%. There is moderate (grade 2) left ventricular diastolic dysfunction E/e' Ratio = 17.09. Wall Scoring: The left ventricular wall motion is globally hypokinetic. · Right Ventricle: Not well visualized. Normal global systolic function. Moderately dilated right ventricle. · Right Atrium: Right atrium not well visualized.  Moderately dilated right atrium. · IVC/Hepatic Veins: Dilated inferior vena cava. Mechanically ventilated; cannot use inferior caval vein diameter to estimate central venous pressure. Labs: 
Recent Results (from the past 24 hour(s)) PTT Collection Time: 04/26/20  3:20 PM  
Result Value Ref Range aPTT 28.6 23.0 - 36.4 SEC  
PTT Collection Time: 04/26/20 10:00 PM  
Result Value Ref Range aPTT 91.4 (H) 23.0 - 36.4 SEC GLUCOSE, POC Collection Time: 04/26/20 11:15 PM  
Result Value Ref Range Glucose (POC) 113 (H) 70 - 110 mg/dL LACTIC ACID Collection Time: 04/27/20 12:00 AM  
Result Value Ref Range Lactic acid 2.0 0.4 - 2.0 MMOL/L  
POC G3 Collection Time: 04/27/20  3:41 AM  
Result Value Ref Range Device: VENT    
 FIO2 (POC) 0.90 % pH (POC) 7.429 7.35 - 7.45    
 pCO2 (POC) 47.2 (H) 35.0 - 45.0 MMHG  
 pO2 (POC) 76 (L) 80 - 100 MMHG  
 HCO3 (POC) 31.2 (H) 22 - 26 MMOL/L  
 sO2 (POC) 95 92 - 97 % Base excess (POC) 7 mmol/L Mode ASSIST CONTROL Tidal volume 580 ml Set Rate 20 bpm  
 PEEP/CPAP (POC) 18 cmH2O  
 PIP (POC) 31 Allens test (POC) N/A Inspiratory Time 0.90 sec Total resp. rate 20 Site DRAWN FROM ARTERIAL LINE Patient temp. 98.6 Specimen type (POC) ARTERIAL Performed by [place] Volume control plus YES    
METABOLIC PANEL, COMPREHENSIVE Collection Time: 04/27/20  3:50 AM  
Result Value Ref Range Sodium 147 (H) 136 - 145 mmol/L Potassium 4.2 3.5 - 5.5 mmol/L Chloride 108 100 - 111 mmol/L  
 CO2 35 (H) 21 - 32 mmol/L Anion gap 4 3.0 - 18 mmol/L Glucose 114 (H) 74 - 99 mg/dL BUN 32 (H) 7.0 - 18 MG/DL Creatinine 1.16 0.6 - 1.3 MG/DL  
 BUN/Creatinine ratio 28 (H) 12 - 20 GFR est AA >60 >60 ml/min/1.73m2 GFR est non-AA >60 >60 ml/min/1.73m2 Calcium 8.3 (L) 8.5 - 10.1 MG/DL  Bilirubin, total 1.1 (H) 0.2 - 1.0 MG/DL  
 ALT (SGPT) 27 16 - 61 U/L  
 AST (SGOT) 11 10 - 38 U/L  
 Alk. phosphatase 41 (L) 45 - 117 U/L Protein, total 6.6 6.4 - 8.2 g/dL Albumin 2.8 (L) 3.4 - 5.0 g/dL Globulin 3.8 2.0 - 4.0 g/dL A-G Ratio 0.7 (L) 0.8 - 1.7    
CBC WITH AUTOMATED DIFF Collection Time: 04/27/20  3:50 AM  
Result Value Ref Range WBC 5.9 4.6 - 13.2 K/uL  
 RBC 4.76 4.70 - 5.50 M/uL  
 HGB 12.5 (L) 13.0 - 16.0 g/dL HCT 42.7 36.0 - 48.0 % MCV 89.7 74.0 - 97.0 FL  
 MCH 26.3 24.0 - 34.0 PG  
 MCHC 29.3 (L) 31.0 - 37.0 g/dL  
 RDW 14.8 (H) 11.6 - 14.5 % PLATELET 062 (L) 791 - 420 K/uL MPV 10.0 9.2 - 11.8 FL  
 NEUTROPHILS 77 (H) 40 - 73 % LYMPHOCYTES 12 (L) 21 - 52 % MONOCYTES 11 (H) 3 - 10 % EOSINOPHILS 0 0 - 5 % BASOPHILS 0 0 - 2 %  
 ABS. NEUTROPHILS 4.6 1.8 - 8.0 K/UL  
 ABS. LYMPHOCYTES 0.7 (L) 0.9 - 3.6 K/UL  
 ABS. MONOCYTES 0.6 0.05 - 1.2 K/UL  
 ABS. EOSINOPHILS 0.0 0.0 - 0.4 K/UL  
 ABS. BASOPHILS 0.0 0.0 - 0.1 K/UL  
 DF AUTOMATED MAGNESIUM Collection Time: 04/27/20  3:50 AM  
Result Value Ref Range Magnesium 2.6 1.6 - 2.6 mg/dL PTT Collection Time: 04/27/20  3:50 AM  
Result Value Ref Range aPTT 102.0 (H) 23.0 - 36.4 SEC PHOSPHORUS Collection Time: 04/27/20  3:50 AM  
Result Value Ref Range Phosphorus 2.6 2.5 - 4.9 MG/DL  
CALCIUM, IONIZED Collection Time: 04/27/20  3:50 AM  
Result Value Ref Range Ionized Calcium 1.18 1.12 - 1.32 MMOL/L  
LACTIC ACID Collection Time: 04/27/20  3:50 AM  
Result Value Ref Range Lactic acid 2.1 (HH) 0.4 - 2.0 MMOL/L  
TSH 3RD GENERATION Collection Time: 04/27/20  3:50 AM  
Result Value Ref Range TSH 1.45 0.36 - 3.74 uIU/mL GLUCOSE, POC Collection Time: 04/27/20  6:14 AM  
Result Value Ref Range Glucose (POC) 111 (H) 70 - 110 mg/dL Recent Labs  
  04/27/20 
0341 04/26/20 
1044 04/26/20 
0405 FIO2I 0.90 95 1.0  
HCO3I 31.2* 34.8* 33.3*  
PCO2I 47.2* 55.6* 68.9* PHI 7.429 7.405 7.294* PO2I 76* 75* 78* All Micro Results Procedure Component Value Units Date/Time CULTURE, RESPIRATORY/SPUTUM/BRONCH Dariana Geo STAIN [[id number]]  (Abnormal)  (Susceptibility) Collected:  04/22/20 1018 Order Status:  Completed Specimen:  Sputum from Tracheal Aspirate Updated:  04/25/20 8897 Special Requests: NO SPECIAL REQUESTS     
  GRAM STAIN FEW WBCS SEEN     
      
  OCCASIONAL EPITHELIAL CELLS SEEN  
     
      
  FEW GRAM POSITIVE COCCI IN CLUSTERS  
     
   RARE GRAM NEGATIVE RODS Culture result:    
  LIGHT STAPHYLOCOCCUS AUREUS  
     
      
  LIGHT NORMAL RESPIRATORY BRYN  
     
 CULTURE, BLOOD [[id number]] Collected:  04/24/20 1015 Order Status:  Canceled Specimen:  Blood Imaging: 
[x]I have personally reviewed the patients chest radiographs images and report 4/27/20 Results from [facility] [place] Encounter encounter on 04/21/20 XR CHEST PORT Narrative EXAM: XR CHEST PORT 
 
CLINICAL INDICATION/HISTORY: Intubated 
-Additional: None COMPARISON: 4/26/2020 TECHNIQUE: Portable frontal view of the chest 
 
_______________ FINDINGS: 
 
SUPPORT DEVICES: Endotracheal tube projects in stable position, estimated at 
approximately 4.9 cm above the miguel ángel. Right IJ approach central venous catheter 
with tip projecting over the SVC. HEART AND MEDIASTINUM: Stable appearing cardiac size and mediastinal contours. Enlarged appearing cardiac silhouette redemonstrated. LUNGS AND PLEURAL SPACES: Basilar areas of opacity and dense left retrocardiac 
opacity noted. No evidence of pneumothorax or large pleural effusion. BONY THORAX AND SOFT TISSUES: Unremarkable. 
 
_______________ Impression IMPRESSION: 
 
 
1. Endotracheal tube and right IJ approach central venous catheter in position 
as above. 2. Cardiomegaly and bibasilar areas of airspace disease/atelectasis without 
significant change No results found for this or any previous visit. [x]See my orders for details My assessment, plan of care, findings, medications, side effects etc were discussed with: 
[x]nursing []PT/OT [x]respiratory therapy []Dr. Kiley Knight []Patient [x]Total critical care time exclusive of procedures 39 minutes with complex decision making performed and > 50% time spent in face to face evaluation.  
 
Millicent Salamanca MD

## 2020-04-26 NOTE — CONSULTS
[facility] Pulmonary, Critical Care, and Sleep Medicine Name: Jason Castro MRN: [id number] : 1975 Hospital: [facility] [place] Date: 2020 Pulmonary Critical Care Consult IMPRESSION:  
Patient Active Problem List  
Diagnosis Code  Cellulitis of left lower leg L03. 116  
 Anemia D64.9  Morbid obesity ([facility]) E66.01  
 Dyspnea R06.00  Sleep apnea G47.30  Sinus tachycardia R00.0  BMI 70 and over, adult [facility]) Q83.46  Chronic back pain M54.9, G89.29  
 Arthritis M19.90  Migraine G43.909  
 History of atrial flutter Z86.79  
 Limited mobility Z74.09  
 Smoking F17.200  Chronic renal insufficiency, stage II (mild) N18.2  CHF (congestive heart failure) ([facility]) I50.9  Acute diastolic CHF (congestive heart failure) ([facility]) I50.31  Chronic a-fib I48.20  Atrial flutter ([facility]) I48.92  
 Elevated troponin R79.89  
 Acute respiratory distress R06.03  
 Acute CHF (congestive heart failure) ([facility]) I50.9  Respiratory failure with hypoxia and hypercapnia ([facility]) J96.91, J96.92  
 Acute on chronic respiratory failure with hypoxia and hypercapnia ([facility]) J96.21, J96.22  
 Cardiac arrest ([facility]) I46.9  
cardiopulmonary arrest in a setting of self extubation. · Neuro: PRN pain medications, +/- sedation avoid propofol due to low HR. Versed and fentanyl. deep sedation due to to self extubation . We added Nimbex for severe ARDS  
· CVS: Atrial Fibrillation/CHF Ef 40 %  on anticoagulation at home was on amiodarone now on hold due to bradycardia. hypotension and bradycardia sedation vs primary cardiac issues. Dc propofol, follow echo trop ecg cardiology consult add asa and continue heparin drip . HD stable; Monitor CVP, Actively titrate vasopressors aim MAP >65mmHg, Check cardiac panel, ECHO results. On 2020 had  5 minutes PEA arrest in a setting of self extubation. Now on low dose levophed due to sedation. · Resp: acute on chronic  hypercarbic hypoxemic respiratory failure. Severe ARDS on maximal ventilatory support. Volume control /PEEP20 Fio2 now 90 % · yesterday  morning had exchange of the ET tube by anesthesia due to leak. · Currently on Versed fentanyl and Nimbex added for full ventilatory control · Initially CHF/pneumonia now ARDS picture. Start ARDS protocol as tolerated but tried to extubate again today start 24 hsr Nimbex continue versed, fentanyl cannot use propofol due to bradycardia  aslo neuroleptic high risk due to cardiomyopathy. · Intubated on 4/22/2020 for hypercarbic and hypoxemic respiratory failure . On 4/23/202 at 7 AM seld extubated brioef cardiac arrest PEA/ re intubated. · Titrate FiO2/ supp O2 for SpO2 >90%; full ventilaotry support   sedated  Ac Fio2 100% wean to 900 % today , PEEP 20 ( if hypotensive will lower saturation now 91 % ·  CHF/morbid obesity too high risk to extubate very difficult reintubation will consult for tracheostomy · I/D:  ? Aspiration pneumonia check for COVID x2 negative ,   no plaquenil cardiac risks. Respiratory cultures were ending pending Afebrile; aleukocytosis; Sepsis bundle per hospital protocol, f/u BxCx/UC, Sputum Cx's. Staph on abx  LA ordered- initial and repeat Q4hrs till normalized. ABX :ceftriaone doxy  Descalate ABX once Cx's finalize. RESP CULURE COVID 19  
· Hem/Onc: Daily CBC; H/H, and plts are stable. pvl negative but on Ac for afib change to heparin drip · Metabolic: Daily BMP; monitor e-lytes; replace PRN 
· Renal: Trend Renal indices; Diuresis, Clark to BSD,  LOY Dc vancomycin lower diuretics · Endocrine: POC Glucose q6; Check TSH level · GI: SUP, Trend LFTs, Zofran PRN for N/V  
· Musc/Skin: No acute issues, wound care · Fluids: NACL prn albumins prn · Code Status:FULL  
RECOMMENDATIONS:  
Neuro:  Sedated now . Patient was Very agitated , self extubated.  Does not tolerate propofol bradycardia . ARDS will paralysis  for 24 hrs  VERSED FENTANYL nimbex . Deep sedation and restraiins due to cardiac arrest post self extubation. Respiratory :acute on chronic  hypercarbic and hypoxemic repsi failure. Initially  CHf/pneumonia now ARDs picture. Trial of ARDS protocol with nimbex  for 24 hsr  
full ventilatory support Ac TV  580 stable wean Fiop2  %  , PEEP 20 continue morbid obesity . Oxygenation worsened  after self extubation now gradually imporving. Mech. Ventilated patients- aim to keep peak plateau pressure less than or equal to 30cm H2O. Titrate FiO2 for goal SPO2> 90% Morbid obesity will need tracheostomy when more stable VAP prevention bundle, head of the bed at 30' all times 
nto stable for SBT Sputum culture so far negative Continue bronchodilators, pulmonary hygiene care Steroids added Sepsis bundle per hospital protocol CVS:? CHF/ ? NSTEMI amiodarone on hold due to bradycardia  
 xarelto change to Heparin drip,  Asa 
lipitor Fluids:prn albumins on diuretics Monitor CVP Actively titrate vasopressors aim MAP >65mmHg levophed low dose Check cardiac panel, ECHO Diuresis gentle ID covid negativex2 Pneumonia resp staph on zosyn was on vacomycin  now on hold due to renal  
Antibiotic choice:ceftriaxone and doxy I will change to zosyn ? Aspiration Cultures drawn and will be followed. Lactic acid ordered- initial and repeat Q6hrs if elevated till normalized. Endo Glycemic control Renal LOY gentle diuresis GI Stress ulcer prophylaxis DVT prophylaxis AM labs Diet: DIET NPO Palliative care consult Vent bundle day 2 Will defer respective systems problem management to primary and other consultant and follow patient in ICU with primary and other medical team 
Further recommendations will be based on the patient's response to recommended treatment and results of the investigation ordered. Quality Care: PPI, DVT prophylaxis, HOB elevated, Infection control all reviewed and addressed. PAIN AND SEDATION: yes · Skin/Wound: yes · Nutritiyes · Prophylaxis: DVT and GI Prophylaxis reviewed. · Restraints: yes · PT/OT eval and treat: ·   
· Lines/Tubes: lines daniel  Ventilator ADVANCE DIRECTIVE: . Palliative FAMILY DISCUSSION: full Events and notes from last 24 hours reviewed. Care plan discussed with nursing Subjective/History:  
Junito Stewart. has been seen and evaluated as respiratory failure. 47-year-old male with morbid obesity, ex-smoker, history of asthma, history of severe obstructive sleep apnea on CPAP questionable component compliance, history of congestive heart failure with reduced ejection fraction atrial fibrillation atrial flutter on amiodarone, history of chronic renal insufficiency, cellulitis of lower extremities and anemia,. Patient was in his usual state of health. As per family did not have any fever or upper respiratory tract infections and no flulike symptoms Ms. he is Lasix not clear if also missed his CPAP presented with acute shortness of breath worsening edema of lower extremities He was found to be in acute hypercarbic hypoxemic respiratory failure initially had a trial of BiPAP but failed and then eventually was intubated he was hypotensive. Periodically and slightly bradycardic. Moderate amount of secretions per next of breath groundglass clinical presentation trial strongly suspicious for congestive heart failure but to be on the safe side we will check patient also for viral infection and bacterial pneumonia. Consider also aspiration event. Morbid obesity. Patient is compliant with Xarelto. We will do follow-up PVL and continue his anticoagulation The patient is critically ill and can not provide additional history due to Ventilated. 4/25/2020.  
 
Remains in intensive care unit intubated on pressors critically ill with multisystem organ failure. Stable on the ventilator today but still a very high oxygen requirement. Good synchrony on Nimbex Versed and fentanyl. FiO2 90% from 100% PEEP still 20. Blood pressure stable. Heart rate in 50s off amiodarone drip in the past had difficult to control A. fib monitor closely. Volume status hard to assess during morbid obesity continue gentle diuresis 4/25/2020 Patient requires pressors and ventilatory support critically ill multisystem organ failure Long discussion with wife today remains full code. Earlier today in the morning had very low tidal volumes. ET tube was placed properly however leak noticed. ET tube exchange with the help of anesthesia appreciate help. Patient section. Now properly place no leak. However requires very high ventilatory setting currently on ARDS protocol tidal volume 580 with a goal of lowering to 500 over next 24 hours. PEEP currently 20 goal to lowered to 15 over next 24 hours. FiO2 100% goal to low where to 80% over next 24 hours. Received diuresis did not really help with his oxygenation. Continue antibiotics. Second coronavirus test pending. Congestive heart failure treatment already applied. Amiodarone currently on hold due to low heart rate but might resume tomorrow. Patient is on full anticoagulation. Monitor for bleeding had some bleeding from central line otherwise tolerating well 4/24/2020 Remains in the ICU critically ill on mechanical ventilation and low-dose pressors. Today in the morning patient again woke up and was trying to self extubate. Is in spite of heavy sedation with Versed and fentanyl. Patient is not able to tolerate propofol due to bradycardia. Amiodarone is on hold. Hemodynamically improving. Acute hypoxemic respiratory failure still severe will do 24 hours ARDS protocol as tolerated with possible additional nimbex ( paralysis) Respiratory culture staph but not MRSA DC vancomycin. Lower diuretics. Covid  x1- second test pending. 4/23/2020 Patient remains in ICU critically ill on mechanical ventilation and pressors. 7 AM today patient had brief PEA arrest at the time of self extubation reintubated pulse back. Walk After was following simple commands. Back on sedation. Required very high PEEP and high FiO2 to recover from hypoxemia responded now well improving. Low-dose pressors required continue deep sedation due to self extubation and morbidly obese consult ENT. Change ceftriaxone to Zosyn for possible aspiration event. Culture still pending. Continue treatment for congestive heart failure including diuresis change Xarelto to heparin Review of Systems: A comprehensive review of systems was negative except for that written in the HPI. [x]The patient is unable to give any meaningful history or review of systems because the patient is: 
[x]Intubated [x]Sedated  
[]Unresponsive [x]Ventilated []The patient is critically ill on     
[x]Mechanical ventilation []Pressors []BiPAP [] Latest lactic acid:  
Lactic acid Date Value Ref Range Status 04/23/2020 4.1 (HH) 0.4 - 2.0 MMOL/L Final  
  Comment:  
  CALLED TO AND CORRECTLY REPEATED BY: 
Raven Bass RN ICU ON 4/23/2020 0916 TO 5087 
  
04/22/2020 1.8 0.4 - 2.0 MMOL/L Final  
06/09/2017 1.1 0.4 - 2.0 MMOL/L Final  
 
 
Completed IVF Resuscitation (30ml/kg) - yes  IVF choice: 0.9NS Suspected source/s of severe sepsis: Pneumonia Organ dysfunction: Yes Antibiotics: zosyn vanc was on doxy now off Completed physical exam: yes Past Medical History: 
Past Medical History:  
Diagnosis Date  Arrhythmia  Arthritis   
 knees  CHF (congestive heart failure) ([facility] [address]  Chronic back pain  Chronic renal insufficiency, stage II (mild)  History of atrial flutter Dx 6/2016 - anticoagulated by Sasha Hernandez  Hypertension 2015  Limited mobility  Migraine headache  Morbid obesity ([address]  Morbid obesity with body mass index of 60.0-69.9 in adult [facility])  Sleep apnea   
 uses c-pap instructed to bring day of surgery  Smoking   
 very passive / cigars only Past Surgical History: 
Past Surgical History:  
Procedure Laterality Date  CARDIAC SURG PROCEDURE UNLIST    
 cardioversion  ECHOCARDIOGRAM  04/2016 EF 60-65% (performed at THE [facility] - see cardiology tab) Medications: 
Prior to Admission medications Medication Sig Start Date End Date Taking? Authorizing Provider  
furosemide (LASIX) 40 mg tablet Take 1 Tab by mouth two (2) times a day. 4/12/16  Yes Srikanth Sanchez MD  
 
 
Current Facility-Administered Medications Medication Dose Route Frequency  cisatracurium (NIMBEX) 100 mg in 0.9% sodium chloride 100 mL infusion  0-10 mcg/kg/min IntraVENous TITRATE  heparin 25,000 units in D5W 250 ml infusion  13.8-36 Units/kg/hr IntraVENous TITRATE  vasopressin (VASOSTRICT) 20 Units in 0.9% sodium chloride 100 mL infusion  0-0.03 Units/min IntraVENous TITRATE  pantoprazole (PROTONIX) 40 mg in 0.9% sodium chloride 10 mL injection  40 mg IntraVENous DAILY  fentaNYL (PF) 900 mcg/30 ml infusion soln  0-150 mcg/hr IntraVENous TITRATE  
 NOREPINephrine (LEVOPHED) 8 mg in 0.9% NS 250ml infusion  0.5-30 mcg/min IntraVENous TITRATE  midazolam in normal saline (VERSED) 1 mg/mL infusion  0-10 mg/hr IntraVENous TITRATE  aspirin chewable tablet 81 mg  81 mg Oral DAILY  methylPREDNISolone (PF) (SOLU-MEDROL) injection 40 mg  40 mg IntraVENous Q8H  
 sodium chloride (NS) flush 5-40 mL  5-40 mL IntraVENous Q8H  
 [Held by provider] losartan (COZAAR) tablet 25 mg  25 mg Oral DAILY  [Held by provider] metoprolol succinate (TOPROL-XL) XL tablet 25 mg  25 mg Oral DAILY Allergy: No Known Allergies Social History: 
Social History Tobacco Use  Smoking status: Former Smoker Types: Cigars  Smokeless tobacco: Never Used Substance Use Topics  Alcohol use: No  
 Drug use: No  
  
 
Family History: 
Family History Problem Relation Age of Onset  Obesity Mother  Liver Disease Sister Objective:  
Vital Signs:   
Blood pressure 121/66, pulse (!) 53, temperature 99.1 °F (37.3 °C), resp. rate 20, height 5' 10\" (1.778 m), weight (!) 162.3 kg (357 lb 14.4 oz), SpO2 95 %. Body mass index is 51.35 kg/m². O2 Device: Endotracheal tube, Ventilator O2 Flow Rate (L/min): 12 l/min Temp (24hrs), Av.4 °F (37.4 °C), Min:99.1 °F (37.3 °C), Max:99.6 °F (37.6 °C) Patient Vitals for the past 8 hrs: 
 Temp Pulse Resp BP SpO2  
20 1117  (!) 53 20  95 % 20 1030  (!) 54 20 121/66 93 % 20 1000  (!) 52 20 135/75 92 % 20 0930  (!) 52 20 144/80 92 % 20 0900  (!) 51 20 141/73 92 % 20 0830  (!) 52 20 135/76 92 % 20 0800 99.1 °F (37.3 °C) (!) 51 18 157/87 91 % 20 0733  (!) 53 18  92 % 20 0730  (!) 53 18 149/89 93 % 20 0700  (!) 50 18 149/85 92 % 20 0630  (!) 53 18 138/72 93 % 20 0600  (!) 55 18 136/77 92 % 20 0530  (!) 56 18 135/77 91 % 20 0500  (!) 56 16  91 % 20 0430  (!) 57 18 125/68 91 % 20 0407  60 18  92 % Intake/Output:  
Last shift:      No intake/output data recorded. Last 3 shifts:  1901 -  0700 In: .7 [I.V.:.7] Out: 9350 [[id number]] Intake/Output Summary (Last 24 hours) at 2020 1203 Last data filed at 2020 0600 Gross per 24 hour Intake 1148.36 ml Output 2925 ml Net -1776.64 ml Ventilator Settings: 
Mode Rate Tidal Volume Pressure FiO2 PEEP Assist control, VC+   580 ml    90 %(DECREASED) 20 cm H20 Peak airway pressure: 32 cm H2O Minute ventilation: 13.6 l/min ARDS network Guidelines: Lung protective strategy, Pl pressure goals 35less than or equal to 30. Physical Exam:intubated sedated General: Other, moderately ill, cooperative, uncooperative, no distress, appears stated age, moderately obese HEENT: Normal, ENT exam normal, no neck nodes or sinus tenderness Neck: No abnormally enlarged lymph nodes. or thyroid, supple Chest: normal 
Lungs: rales bilaterally, breathing normal , clear to auscultation bilaterally, diminished breath sounds R base, no tenderness/ rash Heart: ir Regular rate and rhythm Abdomen: non distended, bowel sounds normoactive, tympanic, no rebound tenderness, rigidity, rebound morbidly obesee Extremity: 3+ edema Capillary refill: normal 
Neuro: responds to voice, alert, oriented x3, affect appropriate, no focal neurological deficits, moves all extremities well, no involuntary movements, reflexes at knee and ankle intact Skin: Skin color, texture, turgor normal. No rashes or lesions or Skin color, texture, turgor normal 
 
Data:  
 
Recent Results (from the past 24 hour(s)) POC G3 Collection Time: 04/25/20  2:38 PM  
Result Value Ref Range Device: VENT    
 FIO2 (POC) 100 % pH (POC) 7.387 7.35 - 7.45    
 pCO2 (POC) 58.8 (H) 35.0 - 45.0 MMHG  
 pO2 (POC) 67 (L) 80 - 100 MMHG  
 HCO3 (POC) 35.4 (H) 22 - 26 MMOL/L  
 sO2 (POC) 92 92 - 97 % Base excess (POC) 10 mmol/L Mode ASSIST CONTROL Tidal volume 580 ml Set Rate 16 bpm  
 PEEP/CPAP (POC) 20 cmH2O Allens test (POC) N/A Total resp. rate 16 Site DRAWN FROM ARTERIAL LINE Specimen type (POC) ARTERIAL Performed by Mikal Rojas Volume control plus YES    
POC G3 Collection Time: 04/26/20  4:05 AM  
Result Value Ref Range Device: VENT    
 FIO2 (POC) 1.0 %  
 pH (POC) 7.294 (L) 7.35 - 7.45    
 pCO2 (POC) 68.9 (H) 35.0 - 45.0 MMHG  
 pO2 (POC) 78 (L) 80 - 100 MMHG  
 HCO3 (POC) 33.3 (H) 22 - 26 MMOL/L  
 sO2 (POC) 93 92 - 97 % Base excess (POC) 7 mmol/L Mode ASSIST CONTROL Tidal volume 580 ml  Set Rate 16 bpm  
 PEEP/CPAP (POC) 20 cmH2O  
 PIP (POC) 33 Allens test (POC) N/A Inspiratory Time 0.9 sec Total resp. rate 16 Site DRAWN FROM ARTERIAL LINE Patient temp. 99.5 Specimen type (POC) ARTERIAL Performed by Maude Wallace Volume control plus YES    
METABOLIC PANEL, COMPREHENSIVE Collection Time: 04/26/20  5:15 AM  
Result Value Ref Range Sodium 146 (H) 136 - 145 mmol/L Potassium 4.6 3.5 - 5.5 mmol/L Chloride 106 100 - 111 mmol/L  
 CO2 36 (H) 21 - 32 mmol/L Anion gap 4 3.0 - 18 mmol/L Glucose 107 (H) 74 - 99 mg/dL BUN 33 (H) 7.0 - 18 MG/DL Creatinine 1.50 (H) 0.6 - 1.3 MG/DL  
 BUN/Creatinine ratio 22 (H) 12 - 20 GFR est AA >60 >60 ml/min/1.73m2 GFR est non-AA 50 (L) >60 ml/min/1.73m2 Calcium 8.3 (L) 8.5 - 10.1 MG/DL Bilirubin, total 1.0 0.2 - 1.0 MG/DL  
 ALT (SGPT) 34 16 - 61 U/L  
 AST (SGOT) 24 10 - 38 U/L Alk. phosphatase 46 45 - 117 U/L Protein, total 7.1 6.4 - 8.2 g/dL Albumin 3.2 (L) 3.4 - 5.0 g/dL Globulin 3.9 2.0 - 4.0 g/dL A-G Ratio 0.8 0.8 - 1.7    
CBC WITH AUTOMATED DIFF Collection Time: 04/26/20  5:15 AM  
Result Value Ref Range WBC 10.4 4.6 - 13.2 K/uL  
 RBC 5.26 4.70 - 5.50 M/uL  
 HGB 13.9 13.0 - 16.0 g/dL HCT 48.6 (H) 36.0 - 48.0 % MCV 92.4 74.0 - 97.0 FL  
 MCH 26.4 24.0 - 34.0 PG  
 MCHC 28.6 (L) 31.0 - 37.0 g/dL  
 RDW 15.2 (H) 11.6 - 14.5 % PLATELET 400 102 - 306 K/uL MPV 10.3 9.2 - 11.8 FL  
 NEUTROPHILS 82 (H) 40 - 73 % LYMPHOCYTES 8 (L) 21 - 52 % MONOCYTES 10 3 - 10 % EOSINOPHILS 0 0 - 5 % BASOPHILS 0 0 - 2 %  
 ABS. NEUTROPHILS 8.5 (H) 1.8 - 8.0 K/UL  
 ABS. LYMPHOCYTES 0.9 0.9 - 3.6 K/UL  
 ABS. MONOCYTES 1.0 0.05 - 1.2 K/UL  
 ABS. EOSINOPHILS 0.0 0.0 - 0.4 K/UL  
 ABS. BASOPHILS 0.0 0.0 - 0.1 K/UL  
 DF AUTOMATED MAGNESIUM Collection Time: 04/26/20  5:15 AM  
Result Value Ref Range Magnesium 2.4 1.6 - 2.6 mg/dL PTT Collection Time: 04/26/20  5:15 AM  
Result Value Ref Range aPTT 170.5 (HH) 23.0 - 36.4 SEC PHOSPHORUS Collection Time: 04/26/20  5:15 AM  
Result Value Ref Range Phosphorus 4.7 2.5 - 4.9 MG/DL  
CALCIUM, IONIZED Collection Time: 04/26/20  5:15 AM  
Result Value Ref Range Ionized Calcium 1.12 1.12 - 1.32 MMOL/L  
NT-PRO BNP Collection Time: 04/26/20  5:15 AM  
Result Value Ref Range NT pro- (H) 0 - 450 PG/ML  
CARDIAC PANEL,(CK, CKMB & TROPONIN) Collection Time: 04/26/20  5:15 AM  
Result Value Ref Range  39 - 308 U/L  
 CK - MB 1.6 <3.6 ng/ml CK-MB Index 1.6 0.0 - 4.0 % Troponin-I, QT 0.47 (H) 0.0 - 0.045 NG/ML  
POC G3 Collection Time: 04/26/20 10:44 AM  
Result Value Ref Range Device: VENT    
 FIO2 (POC) 95 % pH (POC) 7.405 7.35 - 7.45    
 pCO2 (POC) 55.6 (H) 35.0 - 45.0 MMHG  
 pO2 (POC) 75 (L) 80 - 100 MMHG  
 HCO3 (POC) 34.8 (H) 22 - 26 MMOL/L  
 sO2 (POC) 95 92 - 97 % Base excess (POC) 10 mmol/L Mode ASSIST CONTROL Tidal volume 580 ml Set Rate 20 bpm  
 PEEP/CPAP (POC) 20 cmH2O Allens test (POC) N/A Total resp. rate 20 Site DRAWN FROM ARTERIAL LINE Specimen type (POC) ARTERIAL Performed by Mateo Sender Volume control plus YES Recent Labs  
  04/26/20 
1044 04/26/20 
0405 04/25/20 
1438 FIO2I 95 1.0 100 HCO3I 34.8* 33.3* 35.4* PCO2I 55.6* 68.9* 58.8* PHI 7.405 7.294* 7.387 PO2I 75* 78* 67* All Micro Results Procedure Component Value Units Date/Time CULTURE, RESPIRATORY/SPUTUM/BRONCH [place] STAIN [[id number]]  (Abnormal)  (Susceptibility) Collected:  04/22/20 1018 Order Status:  Completed Specimen:  Sputum from Tracheal Aspirate Updated:  04/25/20 5795 Special Requests: NO SPECIAL REQUESTS     
  GRAM STAIN FEW WBCS SEEN     
      
  OCCASIONAL EPITHELIAL CELLS SEEN  
     
      
  FEW GRAM POSITIVE COCCI IN CLUSTERS  
     
   RARE GRAM NEGATIVE RODS   Culture result:    
  LIGHT STAPHYLOCOCCUS AUREUS  
     
      
 LIGHT NORMAL RESPIRATORY BRYN  
     
 CULTURE, BLOOD [[id number]] Collected:  04/24/20 1015 Order Status:  Canceled Specimen:  Blood Telemetry: AFIB 
 
 ECHO Imaging: 
[x]I have personally reviewed the patients chest radiographs images and report Results from [facility] Encounter encounter on 04/21/20 XR CHEST SNGL V  
 Narrative CLINICAL HISTORY:  Endotracheal tube placement/repositioning. COMPARISONS:  Chest x-ray, earlier the same day. TECHNIQUE:  single frontal view of the chest 
 
------------------------------------------ 
 
FINDINGS: 
 
Exam limited by patient large body habitus. Lungs:  Hazy opacity in the lung bases, more apparent than on the prior study. Upper lungs clear. Mediastinum: Marked cardiomegaly. Endotracheal tube has been repositioned, tip 
now 5.5 cm above the miguel ángel. Right transjugular venous catheter, tip near 
cavoatrial junction. Bones: No evidence of fracture or suspicious bone lesion. 
 
 
------------------------------------------ Impression IMPRESSION: 
 
1. Hazy bilateral lower lobe opacity, likely a combination of pleural fluid and 
atelectasis, slightly more apparent than on recent comparison study. Pneumonia 
or aspiration not excluded. 2. Endotracheal tube has been repositioned, now in good position. No results found for this or any previous visit. [x]See my orders for details My assessment, plan of care, findings, medications, side effects etc were discussed with: 
[x]nursing []PT/OT [x]respiratory therapy []  
[x]family []Patient [x]Total critical care time exclusive of procedures 45 minutes with complex decision making performed and > 50% time spent in face to face evaluation.  
 
Rupert Felipe MD

## 2020-04-26 NOTE — PROGRESS NOTES
18- RN called to assess Et tube d/t leak. Air was added which caused increased leak (balloon visible at back of mouth). Et tube advanced to 27 at lips with no change, Et tube advanced to 30 at teeth, leak stopped. X ray obtained and placement appears to be at miguel ángel.  MD to assess this am.

## 2020-04-26 NOTE — PROGRESS NOTES
Assessment:  
 
Principal Problem: 
  Respiratory failure with hypoxia and hypercapnia ([facility] (4/21/2020)   
  Morbid obesity ([facility] (4/4/2016)   
  Sleep apnea (4/4/2016)   
  Chronic renal insufficiency, stage II (mild) () 
  
  Chronic a-fib (10/15/2018)   
  Acute CHF (congestive heart failure) ([facility] (4/21/2020)   
  Acute on chronic respiratory failure with hypoxia and hypercapnia ([facility] (4/22/2020)   
  Cardiac arrest ([facility] (4/23/2020)   
  
CKD 
  
Plan:   
 
Restart diuretics for volume removal as BP tolerates Need free water via NG tube. D/w Dr. José Miguel Ayers and ICU nurse 
 
  
  
CC: CKD, hypotension, shock Interval History: Pt remains critically ill 
  
  
Subjective:  
unresponsive 
  
 
 
 
 
Blood pressure 121/66, pulse (!) 53, temperature 99.1 °F (37.3 °C), resp. rate 20, height 5' 10\" (1.778 m), weight (!) 162.3 kg (357 lb 14.4 oz), SpO2 95 %. Obese Ventilated On Leva Bradycardic Dependant edema of thighs/sacrum Intake/Output Summary (Last 24 hours) at 4/26/2020 1250 Last data filed at 4/26/2020 0600 Gross per 24 hour Intake 1148.36 ml Output 2925 ml Net -1776.64 ml Recent Labs  
  04/26/20 
0515 WBC 10.4 Lab Results Component Value Date/Time Sodium 146 (H) 04/26/2020 05:15 AM  
 Potassium 4.6 04/26/2020 05:15 AM  
 Chloride 106 04/26/2020 05:15 AM  
 CO2 36 (H) 04/26/2020 05:15 AM  
 Anion gap 4 04/26/2020 05:15 AM  
 Glucose 107 (H) 04/26/2020 05:15 AM  
 BUN 33 (H) 04/26/2020 05:15 AM  
 Creatinine 1.50 (H) 04/26/2020 05:15 AM  
 BUN/Creatinine ratio 22 (H) 04/26/2020 05:15 AM  
 GFR est AA >60 04/26/2020 05:15 AM  
 GFR est non-AA 50 (L) 04/26/2020 05:15 AM  
 Calcium 8.3 (L) 04/26/2020 05:15 AM  
  
 
Current Facility-Administered Medications Medication Dose Route Frequency Provider Last Rate Last Dose  cisatracurium (NIMBEX) 100 mg in 0.9% sodium chloride 100 mL infusion  0-10 mcg/kg/min IntraVENous TITRATE Guillermo Malik MD 29.2 mL/hr at 04/26/20 0950 3 mcg/kg/min at 04/26/20 0950  heparin 25,000 units in D5W 250 ml infusion  13.8-36 Units/kg/hr IntraVENous TITRATE Morgan Regalado MD   Stopped at 04/26/20 0800  
 vasopressin (VASOSTRICT) 20 Units in 0.9% sodium chloride 100 mL infusion  0-0.03 Units/min IntraVENous TITRATE Morgan Regalado MD      
 ELECTROLYTE REPLACEMENT PROTOCOL - Calcium   1 Each Other PRN Morgan Regalado MD      
 ELECTROLYTE REPLACEMENT PROTOCOL - Magnesium   1 Each Other PRN Morgan Regalado MD      
 ELECTROLYTE REPLACEMENT PROTOCOL - Potassium Standard Dosing   1 Each Other PRN Morgan Regalado MD      
 ELECTROLYTE REPLACEMENT PROTOCOL - Phosphorus  Standard Dosing  1 Each Other PRN Morgan Regalado MD      
 pantoprazole (PROTONIX) 40 mg in 0.9% sodium chloride 10 mL injection  40 mg IntraVENous DAILY Inderjit Coles MD   40 mg at 04/26/20 0839  
 fentaNYL (PF) 900 mcg/30 ml infusion soln  0-150 mcg/hr IntraVENous TITRATE Inderjit Coles MD 4.2 mL/hr at 04/26/20 0845 125 mcg/hr at 04/26/20 0845  NOREPINephrine (LEVOPHED) 8 mg in 0.9% NS 250ml infusion  0.5-30 mcg/min IntraVENous TITRATE Inderjit Coles MD 3.8 mL/hr at 04/26/20 0951 2 mcg/min at 04/26/20 0951  
 midazolam in normal saline (VERSED) 1 mg/mL infusion  0-10 mg/hr IntraVENous TITRATE Morgan Regalado MD 6 mL/hr at 04/26/20 1008 6 mg/hr at 04/26/20 1008  aspirin chewable tablet 81 mg  81 mg Oral DAILY Morgan Regalado MD   81 mg at 04/26/20 0839  
 methylPREDNISolone (PF) (SOLU-MEDROL) injection 40 mg  40 mg IntraVENous Q8H Morgan Regalado MD   40 mg at 04/26/20 4157  sodium chloride (NS) flush 5-40 mL  5-40 mL IntraVENous Q8H Inderjit Coles MD   10 mL at 04/26/20 7484  sodium chloride (NS) flush 5-40 mL  5-40 mL IntraVENous PRN Inderjit Coles MD      
 [Held by provider] losartan (COZAAR) tablet 25 mg  25 mg Oral DAILY Inderjit Coles MD      
  [Held by provider] metoprolol succinate (TOPROL-XL) XL tablet 25 mg  25 mg Oral DAILY Imelda Roy MD      
 
Facility-Administered Medications Ordered in Other Encounters Medication Dose Route Frequency Provider Last Rate Last Dose  propofoL (DIPRIVAN) 10 mg/mL injection   IntraVENous PRN Gabriel Severance, CRNA   300 mg at 04/22/20 1471  ePHEDrine (PF) (MISTOLE) 10 mg/mL in NS syringe   IntraVENous PRN Reed Severance, CRNA   10 mg at 04/22/20 6354  PHENYLephrine (RUPERT-SYNEPHRINE) 100 mcg/mL in NS syringe   IntraVENous PRN Reed Severance, CRNA   100 mcg at 04/22/20 0635  
 
31 minutes of critical care time spent in the direct evaluation and treatment of this high risk patient. The reason for providing this level of medical care for this critically ill patient was due a critical illness that impaired one or more vital organ systems such that there was a high probability of imminent or life threatening deterioration in the patients condition.  This care involved high complexity decision making to assess, manipulate, and support vital system functions, to treat this degreee vital organ system failure and to prevent further life threatening deterioration of the patients condition.

## 2020-04-26 NOTE — PROGRESS NOTES
2203 - Bedside and Verbal shift change report received from Shy Fuentes RN. Report included the following information SBAR, Kardex, ED Summary, Procedure Summary, Intake/Output, MAR, Recent Results, Med Rec Status and Cardiac Rhythm NSR/Sinus Yamel Gaston. 1035  Discussed restraints with physician, continuing due to coding after self extubation yesterday.

## 2020-04-27 NOTE — PROGRESS NOTES
Cardiology Progress Note Patient: Shan Freeman. Sex: male          DOA: 4/21/2020 YOB: 1975      Age:  53 y.o.        LOS:  LOS: 6 days Patient seen and examined, chart reviewed. Assessment/Plan Patient Active Problem List  
Diagnosis Code  Cellulitis of left lower leg L03. 116  
 Anemia D64.9  Morbid obesity ([facility]) E66.01  
 Dyspnea R06.00  Sleep apnea G47.30  Sinus tachycardia R00.0  BMI 70 and over, adult [facility]) H46.78  Chronic back pain M54.9, G89.29  
 Arthritis M19.90  Migraine G43.909  
 History of atrial flutter Z86.79  
 Limited mobility Z74.09  
 Smoking F17.200  Chronic renal insufficiency, stage II (mild) N18.2  CHF (congestive heart failure) ([facility]) I50.9  Acute diastolic CHF (congestive heart failure) ([facility]) I50.31   Atrial flutter ([facility]) I48.92  
 Elevated troponin R79.89  
 Acute respiratory distress R06.03  
 Acute CHF (congestive heart failure) ([facility]) I50.9  Respiratory failure with hypoxia and hypercapnia ([facility]) J96.91, J96.92  
 Acute on chronic respiratory failure with hypoxia and hypercapnia ([facility]) J96.21, J96.22  
 Cardiac arrest ([facility]) I46.9 Acute on chronic systolic heart failure NSTEMI type II due to PEA arrest 
Paroxysmal atrial flutter Thrombocytopenia Patient self extubated and had PEA arrest. 
He had brief episode of A fib with RVR and was started On Amiodarone drip. Currently he is in Sinus rhythm. Amiodarone discontinued due to bradycardia. 
  
Echocardiogram  
  
· Left Ventricle: Normal wall thickness and systolic function (ejection fraction normal). Mildly dilated left ventricle. The estimated ejection fraction is 45 - 50%. There is moderate (grade 2) left ventricular diastolic dysfunction E/e' Ratio = 17.09. Wall Scoring: The left ventricular wall motion is globally hypokinetic. · Right Ventricle: Not well visualized. Normal global systolic function. Moderately dilated right ventricle. · Right Atrium: Right atrium not well visualized. Moderately dilated right atrium. · IVC/Hepatic Veins: Dilated inferior vena cava. Mechanically ventilated; cannot use inferior caval vein diameter to estimate central venous pressure. 
  
 
Plan: 
 
Continue Aspirin Continue IV heparin and titrate as per aPTT. Continue IV Pressors Repeat CBC with diff in view of thrombocytopenia Strict I/O Continue Ventilatory support. Continue management as per hospital medicine Plan discussed with patient's nurse. Condition critical, prognosis guarded. Subjective:  
 cc: 
Orally intubated REVIEW OF SYSTEMS:  
 
Can not obtain Objective:  
  
Visit Vitals /54 Pulse (!) 50 Temp 99.3 °F (37.4 °C) Resp 20 Ht 5' 10\" (1.778 m) Wt (!) 173.4 kg (382 lb 4.8 oz) SpO2 96% BMI 54.85 kg/m² Body mass index is 54.85 kg/m². Physical Exam: 
General Appearance: Comfortable, Morbidly obese, not using accessory muscles of respiration. Orally intubated HEENT: AMMON. HEAD: Atraumatic NECK: No JVD, no thyroidomeglay. CAROTIDS: No bruit LUNGS: bilateral conducted sounds HEART: S1+S2 audible, no murmur, no pericardial rub. NEUROLOGICAL: Sedated. Medication: 
Current Facility-Administered Medications Medication Dose Route Frequency  albumin human 25% (BUMINATE) solution 25 g  25 g IntraVENous Q6H  
 [START ON 4/28/2020] methylPREDNISolone (PF) (SOLU-MEDROL) injection 40 mg  40 mg IntraVENous Q12H  cisatracurium (NIMBEX) 100 mg in 0.9% sodium chloride 100 mL infusion  0-10 mcg/kg/min IntraVENous TITRATE  heparin 25,000 units in D5W 250 ml infusion  13.8-36 Units/kg/hr IntraVENous TITRATE  vasopressin (VASOSTRICT) 20 Units in 0.9% sodium chloride 100 mL infusion  0-0.03 Units/min IntraVENous TITRATE  ELECTROLYTE REPLACEMENT PROTOCOL - Calcium   1 Each Other PRN  
 ELECTROLYTE REPLACEMENT PROTOCOL - Magnesium   1 Each Other PRN  
  ELECTROLYTE REPLACEMENT PROTOCOL - Potassium Standard Dosing   1 Each Other PRN  
 ELECTROLYTE REPLACEMENT PROTOCOL - Phosphorus  Standard Dosing  1 Each Other PRN  pantoprazole (PROTONIX) 40 mg in 0.9% sodium chloride 10 mL injection  40 mg IntraVENous DAILY  fentaNYL (PF) 900 mcg/30 ml infusion soln  0-150 mcg/hr IntraVENous TITRATE  
 NOREPINephrine (LEVOPHED) 8 mg in 0.9% NS 250ml infusion  0.5-30 mcg/min IntraVENous TITRATE  midazolam in normal saline (VERSED) 1 mg/mL infusion  0-10 mg/hr IntraVENous TITRATE  aspirin chewable tablet 81 mg  81 mg Oral DAILY  sodium chloride (NS) flush 5-40 mL  5-40 mL IntraVENous Q8H  
 sodium chloride (NS) flush 5-40 mL  5-40 mL IntraVENous PRN  
 [Held by provider] losartan (COZAAR) tablet 25 mg  25 mg Oral DAILY  [Held by provider] metoprolol succinate (TOPROL-XL) XL tablet 25 mg  25 mg Oral DAILY Facility-Administered Medications Ordered in Other Encounters Medication Dose Route Frequency  propofoL (DIPRIVAN) 10 mg/mL injection   IntraVENous PRN  
 ePHEDrine (PF) (MISTOLE) 10 mg/mL in NS syringe   IntraVENous PRN  
 PHENYLephrine (RUPERT-SYNEPHRINE) 100 mcg/mL in NS syringe   IntraVENous PRN Lab/Data Reviewed: 
 
  
Recent Labs  
  04/27/20 
0350 04/26/20 
0515 04/25/20 
4548 WBC 5.9 10.4 7.4 HGB 12.5* 13.9 13.8 HCT 42.7 48.6* 45.6 * 191 155 Recent Labs  
  04/27/20 
0350 04/26/20 
0515 04/25/20 
3201 * 146* 147* K 4.2 4.6 4.0  
 106 105 CO2 35* 36* 36* * 107* 105* BUN 32* 33* 33* CREA 1.16 1.50* 1.44* CA 8.3* 8.3* 8.7  
 
 
39 minutes of critical care time spent in the direct evaluation and treatment of this high risk patient.  The reason for providing this level of medical care for this critically ill patient was due a critical illness that impaired one or more vital organ systems such that there was a high probability of imminent or life threatening deterioration in the patients condition. This care involved high complexity decision making to assess, manipulate, and support vital system functions, to treat this degree vital organ system failure and to prevent further life threatening deterioration of the patients condition. Signed By: Franky Gonsalves MD   
 April 27, 2020

## 2020-04-27 NOTE — PROGRESS NOTES
Chart reviewed, noted pt still intubated, sedated in ICU. Palliative care has been consulted and wife is pt decision maker. CM following for discharge needs. Following hospital course to determine direction of discharge plan. Pt could need inpt rehab, trach/vent weaning, NIV for home, etc.  
 
Care Management Interventions Palliative Care Criteria Met (RRAT>21 & CHF Dx)?: Yes 
Palliative Consult Recommended?: Yes Transition of Care Consult (CM Consult): Discharge Planning Current Support Network: Lives with Spouse

## 2020-04-27 NOTE — PROGRESS NOTES
Hospitalist Progress Note Patient: Sandra Thomas MRN: [id number]  [facility]: [id number] YOB: 1975  Age: 53 y.o. Sex: male DOA: 4/21/2020 LOS:  LOS: 6 days Assessment/Plan Patient Active Problem List  
Diagnosis Code  Cellulitis of left lower leg L03. 116  
 Anemia D64.9  Morbid obesity ([facility]) E66.01  
 Dyspnea R06.00  Sleep apnea G47.30  Sinus tachycardia R00.0  BMI 70 and over, adult [facility]) O91.38  Chronic back pain M54.9, G89.29  
 Arthritis M19.90  Migraine G43.909  
 History of atrial flutter Z86.79  
 Limited mobility Z74.09  
 Smoking F17.200  Chronic renal insufficiency, stage II (mild) N18.2  CHF (congestive heart failure) ([facility]) I50.9  Acute diastolic CHF (congestive heart failure) ([facility]) I50.31  Chronic a-fib I48.20  Atrial flutter ([facility]) I48.92  
 Elevated troponin R79.89  
 Acute respiratory distress R06.03  
 Acute CHF (congestive heart failure) ([facility]) I50.9  Respiratory failure with hypoxia and hypercapnia ([facility]) J96.91, J96.92  
 Acute on chronic respiratory failure with hypoxia and hypercapnia ([facility]) J96.21, J96.22  
 Cardiac arrest ([facility]) I46.9  
  
 
 
 
52 y/o male with hx of morbid obesity, MARIA FERNANDA, chronic afib on anticoagulation, and systolic CHF (EF 65-80%) is admitted with respiratory failure with hypoxia and hypercapnia, he required to be intubated. 04/23/2020 Patient self extubated this morning and went into PEA arrest, he was re intubated, difficult intubation. He is awake and post resuscitation, placed on sedation. Requiring high PEEP. 
 
04/24/2020 Patient again tried to self extubate in spite of sedation. 04/25/2020 ET tube leak, ET tube exchanged CRITICAL CARE PLAN Resp - See vent orders, VAP bundle. HOB>30 degrees. Acute respiratory failure - with hypoxia and hypercapnia, follow ABGs, CXR. 
MARIA FERNANDA/OHS - On solumedrol ID - Follow up resp, cx. ANTIBIOTICS off antibiotics Seen by ID 
 COVID 19 negative x 2 
 
[facility] - Monitor HD. Hypotension - on pressors, wean as tolerated Paroxysmal atrial flutter - monitor rate, was on amiodarone, now NSR. On heparin drip Acute on chronic systolic CHF -  
Lasix on hold due to hypotension Echo with EF of 45-50% Elevated troponin - NSTEMI type II due to PEA arrest 
Cardiology following. Heme/onc - Follow H&H, plts. Renal - Trend BUN, Cr, follow I/O, daniel in place. Check and replace Mg, K, phos. Hypernatremia - free water flushes Nephrology following. Endocrine -  Follow [facility] Neuro/ Pain/ Sedation - Fentanyl, versed prn. Sedation bundle. GI - NPO for now, OG tube, tube feeding. Morbid obesity Prophylaxis - DVT: heparin drip, GI: protonix Condition critical with MSOF. Discussed with Dr. Abril Vanessa. [id number] 
35 minutes of critical care time spent in the direct evaluation and treatment of this high risk patient. The reason for providing this level of medical care for this critically ill patient was due a critical illness that impaired one or more vital organ systems such that there was a high probability of imminent or life threatening deterioration in the patients condition. This care involved high complexity decision making to assess, manipulate, and support vital system functions, to treat this degreee vital organ system failure and to prevent further life threatening deterioration of the patients condition. Disposition : TBD Physical Exam: 
General: As above   
HEENT: NC, Atraumatic. PERRLA, anicteric sclerae. Lungs: CTA Bilaterally. Distant breath sounds secondary to body habitus. Heart:  S1 S2, No murmur, No Rubs, No Gallops Abdomen: Soft, Non distended, obese, Non tender.  +Bowel sounds, Extremities: LE edema Vital signs/Intake and Output: 
Visit Vitals /49 Pulse (!) 51 Temp 98.9 °F (37.2 °C) Resp 20 Ht 5' 10\" (1.778 m) Wt (!) 173.4 kg (382 lb 4.8 oz) SpO2 98% BMI 54.85 kg/m² Current Shift:  04/27 0701 - 04/27 1900 In: -  
Out: [place] Last three shifts:  04/25 1901 - 04/27 0700 In: 455.3 [I.V.:455.3] Out: [address] [[id number]] Labs: Results:  
   
Chemistry Recent Labs  
  04/27/20 
0350 04/26/20 
0515 04/25/20 
2064 * 107* 105* * 146* 147* K 4.2 4.6 4.0  
 106 105 CO2 35* 36* 36* BUN 32* 33* 33* CREA 1.16 1.50* 1.44* CA 8.3* 8.3* 8.7 AGAP 4 4 6 BUCR 28* 22* 23* AP 41* 46 47  
TP 6.6 7.1 7.0 ALB 2.8* 3.2* 3.4 GLOB 3.8 3.9 3.6 AGRAT 0.7* 0.8 0.9  
  
CBC w/Diff Recent Labs  
  04/27/20 
0350 04/26/20 
0515 04/25/20 
3610 WBC 5.9 10.4 7.4  
RBC 4.76 5.26 5.11 HGB 12.5* 13.9 13.8 HCT 42.7 48.6* 45.6 * 191 155 GRANS 77* 82* 83* LYMPH 12* 8* 9* EOS 0 0 0 Cardiac Enzymes Recent Labs  
  04/26/20 
0515 04/24/20 
2110  347* CKND1 1.6 1.3 Coagulation Recent Labs  
  04/27/20 
0350 04/26/20 
2200 APTT 102.0* 91.4* Lipid Panel No results found for: CHOL, CHOLPOCT, CHOLX, CHLST, CHOLV, 054360, HDL, HDLP, LDL, LDLC, DLDLP, 973486, VLDLC, VLDL, TGLX, TRIGL, TRIGP, TGLPOCT, CHHD, CHHDX  
BNP No results for input(s): BNPP in the last 72 hours. Liver Enzymes Recent Labs  
  04/27/20 
0350 TP 6.6 ALB 2.8* AP 41* SGOT 11 Thyroid Studies Lab Results Component Value Date/Time TSH 1.45 04/27/2020 03:50 AM  
    
Procedures/imaging: see electronic medical records for all procedures/Xrays and details which were not copied into this note but were reviewed prior to creation of Plan

## 2020-04-27 NOTE — PROGRESS NOTES
0710 Bedside and Verbal shift change report received from Leila Cabrales RN. Report included the following information SBAR, Kardex, Intake/Output, MAR, Recent Results, Med Rec Status, Cardiac Rhythm NSR/Kiran and Alarm Parameters .

## 2020-04-27 NOTE — PROGRESS NOTES
0115-Assisting in care of patient. Pt is currently moving head back and forth in bed. Nimbex rate decreased due to patient's bradycardia. Pt nodded yes when asked, Marcus Rodríguez you uncomfortable? \". Increased Fentanyl drip to 125mcg/hr. 0122-Pt continues to be restless in bed at this time. Restarted Versed drip at 1mg/hr/ Monitoring HR. 
 
0146-Paged Dr. Georgette Yañez regarding patient's HR and unable to obtain goal for Rass/Paralytic. 0150-Jerry Gaytan on unit and stated to continue and OK with HR at 45. If HR continues to be too low, will have to DC paralytic and possibly given sedation IVP as needed. Will continue to monitor.

## 2020-04-27 NOTE — ADT AUTH CERT NOTES
Patient Demographics Patient Name Bailee Delay. 70 Alberto Baig 
[id number] Sex Male  
1975 Address [place]  [address] Phone [phone] (Home) CSN:  
[id number] Admit Date: Admit Time Room Bed 2020  6:13  [[id number]] 01 [[id number]] Attending Providers Provider Pager From To Elton Regan DO  20 Renetta Peter MD  20 Emergency Contact(s) Name Relation Home Work Mobile Karen Benitez Spouse [phone] Utilization Reviews  
 
   
COVID 19 REVIEW / RESULT by Oliva Lazar RN  
 
   
Review Entered Review Status 2020 09:26 In Primary  
   
Criteria Review Is the illness suspected to be related to the Coronavirus (COVID-19)? Yes Has the member been tested for the COVID-19? Yes If Yes, what are the results of the COVID-19? Negative What is the severity of the members condition (i.e. Isolation, Ventilator use)? DROPLET PLUS  
   
Respiratory Failure GRG - Care Day 6 (2020) by Oliva Lazar RN  
 
   
Review Entered Review Status 2020 09:13 Completed  
   
Criteria Review Care Day: 6 Care Date: 2020 Level of Care: ICU Guideline Day 2 Level Of Care (X) ICU or ventilator-capable area 2020 09:13:22 EDT by Kimberley Pierson   
  ICU Clinical Status ( ) * Weaning assessment performed Interventions (X) Inpatient interventions continue 2020 09:13:22 EDT by Kimberley Pierson   
  NIMBEX DRIP, FENTANYL DRIP, HEPARIN DRIP, SOLUMEDROL 40 MG Q8H IV, VERSED DRIP, LEVOPHED DRIP, PROTONIX 40 MG QD IV   
* Milestone Additional Notes PULM CONSULT Neuro:  Sedated now . Patient was Very agitated , self extubated. Does not tolerate propofol bradycardia .  ARDS will paralysis  for 24 hrs  VERSED FENTANYL nimbex . Deep sedation and restraiins due to cardiac arrest post self extubation. Respiratory :acute on chronic  hypercarbic and hypoxemic repsi failure. Initially  CHf/pneumonia now ARDs picture. Trial of ARDS protocol with nimbex  for 24 hsr   
full ventilatory support Ac TV  580 stable wean Fiop2  %  , PEEP 20 continue morbid obesity . Oxygenation worsened  after self extubation now gradually imporving. Mech. Ventilated patients- aim to keep peak plateau pressure less than or equal to 30cm H2O. Titrate FiO2 for goal SPO2> 90% Morbid obesity will need tracheostomy when more stable VAP prevention bundle, head of the bed at 30' all times  
nto stable for SBT Sputum culture so far negative Continue bronchodilators, pulmonary hygiene care Steroids added Sepsis bundle per hospital protocol CVS:? CHF/ ? NSTEMI amiodarone on hold due to bradycardia   
 xarelto change to Heparin drip,  Asa  
lipitor Fluids:prn albumins on diuretics   
   
Monitor CVP Actively titrate vasopressors aim MAP >65mmHg levophed low dose Check cardiac panel, ECHO Diuresis gentle    
   
ID covid negativex2 Pneumonia resp staph on zosyn was on vacomycin  now on hold due to renal   
Antibiotic choice:ceftriaxone and doxy I will change to zosyn ? Aspiration Cultures drawn and will be followed. Lactic acid ordered- initial and repeat Q6hrs if elevated till normalized. Endo Glycemic control Renal LOY gentle diuresis GI Stress ulcer prophylaxis DVT prophylaxis AM labs Diet: DIET NPO Palliative care consult Vent bundle day 2 Will defer respective systems problem management to primary and other consultant and follow patient in ICU with primary and other medical team  
Further recommendations will be based on the patient's response to recommended treatment and results of the investigation ordered. Quality Care: PPI, DVT prophylaxis, HOB elevated, Infection control all reviewed and addressed. PAIN AND SEDATION: yes · Skin/Wound: yes · Nutritiyes · Prophylaxis: DVT and GI Prophylaxis reviewed. · Restraints: yes · PT/OT eval and treat: ·   
· Lines/Tubes: lines daniel  Ventilator ADVANCE DIRECTIVE: . Palliative NEPH NOTE  
unresponsive  
   
   
Plan:    
   
Restart diuretics for volume removal as BP tolerates Need free water via NG tube. D/w Dr. Momo Gutierrez and ICU nurse  
   
   
   
CC: CKD, hypotension, shock Interval History: Pt remains critically ill  
   
  
  
  
  
CARD NOTE Plan:  
   
Continue Aspirin Continue IV heparin and titrate as per aPTT. Continue IV Pressors Strict I/O Continue Ventilatory support. Continue management as per hospital medicine Plan discussed with patient's nurse. Condition critical, prognosis guarded.         
   
  
  
  
  
IM NOTE Sedated and intubated Physical Exam:  
General:         NAD, AAOx3. Non-toxic. HEENT:           NC/AT.  PERRLA, EOMI.  MMM. Lungs:            Nml inspection. CTA B/L. No wheezing, rales or rhonchi. Heart:              S1S2 RRR,  PMI mid 5th IC space. No M/RG. Abdomen:      Soft, NT/ND.  BS+. No peritoneal signs. Extremities:   No C/C/E. Psych:            Nml affect. Neurologic:    2-12 intact.  Strength 5/5 throughout.  Sensation symmetrical.  
  
Acute respiratory failure - with hypoxia and hypercapnia, follow ABGs.  Managed by pulmonary MARIA FERNANDA/OHS -   
   
ID - Follow up resp, cx. ANTIBIOTICS zosyn, doxycycline. COVID 19 pending results negative times two and ruled out    
   
CVS - Hypotension - on pressors, wean as tolerated, almost off Levophed per nurse    
   
Chronic A-fib - monitor rate, on amiodarone drip.    
On heparin drip  
   
Acute on chronic systolic CHF -   
Holding Lasix and blood pressure is better but renal wanted them restarted today  If bp can tolerate Echo with EF of 45-50%  
   
Elevated troponin -   
Cardiology following. troponins had bumped up and then came down some, now even lower  
   
 Heme/onc - Follow H&H, plts.   
   
Renal -  Dr. Delilah Delgado following  Creatinine stable. Agapito l need free water flushes to add   
   
Endocrine -  Follow FSG  
   
Neuro/ Pain/ Sedation - Fentanyl, versed prn. Sedation bundle. Better since adjusted for real weight  
   
GI - NPO for now. Protonix IV   
   
  
  
  
/65 TEMP 99.2 HR 40'S-50'S  
RR 20  
O2 93 ON VENT  
  
  
  
PTT 91.4, , CO2 36, BUN 33, CREAT 1.50, CA 8.3, , TROP 0.47 PH 7.294, PCO2 68.9, PO2 78, HCO3 33.3, O2 93 ON VENT

## 2020-04-27 NOTE — PROGRESS NOTES
Assessment:  
 
Principal Problem: 
  Respiratory failure with hypoxia and hypercapnia ([address] (4/21/2020)   
  Morbid obesity ([address] (4/4/2016)   
  Sleep apnea (4/4/2016)   
  Chronic renal insufficiency, stage II (mild) () 
  
  Chronic a-fib (10/15/2018)   
  Acute CHF (congestive heart failure) ([facility] (4/21/2020)   
  Acute on chronic respiratory failure with hypoxia and hypercapnia ([address] (4/22/2020)   
  Cardiac arrest ([address] (4/23/2020)   
  
CKD 
  
Plan:   
  
Restart diuretics for volume removal as BP tolerates Need free water via NG tube. 400 every 4 hrs D/w [facility] and ICU nurse 
  
  
  
CC: CKD, hypotension, shock Interval History: Pt remains critically ill 
  
  
Subjective:  
unresponsive Blood pressure 115/64, pulse (!) 47, temperature 97.9 °F (36.6 °C), resp. rate 20, height 5' 10\" (1.778 m), weight (!) 173.4 kg (382 lb 4.8 oz), SpO2 93 %. Obese Ventilated On Leva Bradycardic Dependant edema of thighs/sacrum 
  
 
Intake/Output Summary (Last 24 hours) at 4/27/2020 1014 Last data filed at 4/27/2020 0330 Gross per 24 hour Intake  Output 2250 ml Net -2250 ml Recent Labs  
  04/27/20 
0350 WBC 5.9 Lab Results Component Value Date/Time Sodium 147 (H) 04/27/2020 03:50 AM  
 Potassium 4.2 04/27/2020 03:50 AM  
 Chloride 108 04/27/2020 03:50 AM  
 CO2 35 (H) 04/27/2020 03:50 AM  
 Anion gap 4 04/27/2020 03:50 AM  
 Glucose 114 (H) 04/27/2020 03:50 AM  
 BUN 32 (H) 04/27/2020 03:50 AM  
 Creatinine 1.16 04/27/2020 03:50 AM  
 BUN/Creatinine ratio 28 (H) 04/27/2020 03:50 AM  
 GFR est AA >60 04/27/2020 03:50 AM  
 GFR est non-AA >60 04/27/2020 03:50 AM  
 Calcium 8.3 (L) 04/27/2020 03:50 AM  
  
 
Current Facility-Administered Medications Medication Dose Route Frequency Provider Last Rate Last Dose  albumin human 25% (BUMINATE) solution 25 g  25 g IntraVENous Q6H Bella Chan MD   25 g at 04/27/20 0530  cisatracurium (NIMBEX) 100 mg in 0.9% sodium chloride 100 mL infusion  0-10 mcg/kg/min IntraVENous TITRATE Brenda Rosa MD 19.5 mL/hr at 04/27/20 0722 2 mcg/kg/min at 04/27/20 5068  heparin 25,000 units in D5W 250 ml infusion  13.8-36 Units/kg/hr IntraVENous TITRATE Brenda Rosa MD 22.4 mL/hr at 04/27/20 0733 13.8 Units/kg/hr at 04/27/20 0733  
 vasopressin (VASOSTRICT) 20 Units in 0.9% sodium chloride 100 mL infusion  0-0.03 Units/min IntraVENous TITRATE Brenda Rosa MD      
 ELECTROLYTE REPLACEMENT PROTOCOL - Calcium   1 Each Other PRN Brenda Rosa MD      
 ELECTROLYTE REPLACEMENT PROTOCOL - Magnesium   1 Each Other PRN Brenda Rosa MD      
 ELECTROLYTE REPLACEMENT PROTOCOL - Potassium Standard Dosing   1 Each Other PRN Brenda Rosa MD      
 ELECTROLYTE REPLACEMENT PROTOCOL - Phosphorus  Standard Dosing  1 Each Other PRN Brenda Rosa MD      
 pantoprazole (PROTONIX) 40 mg in 0.9% sodium chloride 10 mL injection  40 mg IntraVENous DAILY Ally Garcia MD   40 mg at 04/27/20 0906  
 fentaNYL (PF) 900 mcg/30 ml infusion soln  0-150 mcg/hr IntraVENous TITRATE Ally Garcia MD 3.3 mL/hr at 04/27/20 0913 100 mcg/hr at 04/27/20 0913  NOREPINephrine (LEVOPHED) 8 mg in 0.9% NS 250ml infusion  0.5-30 mcg/min IntraVENous TITRATE Ally Garcia MD   Stopped at 04/27/20 0527  
 midazolam in normal saline (VERSED) 1 mg/mL infusion  0-10 mg/hr IntraVENous TITRATE Brenda Rosa MD 4 mL/hr at 04/27/20 0722 4 mg/hr at 04/27/20 5222  aspirin chewable tablet 81 mg  81 mg Oral DAILY Brneda Rosa MD   81 mg at 04/27/20 6494  methylPREDNISolone (PF) (SOLU-MEDROL) injection 40 mg  40 mg IntraVENous Q8H Brenda Rosa MD   40 mg at 04/27/20 0530  
 sodium chloride (NS) flush 5-40 mL  5-40 mL IntraVENous Q8H Ally Garcia MD   10 mL at 04/27/20 0540  sodium chloride (NS) flush 5-40 mL  5-40 mL IntraVENous PRN Hortensia Alan MD      
 [Held by provider] losartan (COZAAR) tablet 25 mg  25 mg Oral DAILY Hortensia Alan MD      
 [Held by provider] metoprolol succinate (TOPROL-XL) XL tablet 25 mg  25 mg Oral DAILY Hortensia Alan MD      
 
Facility-Administered Medications Ordered in Other Encounters Medication Dose Route Frequency Provider Last Rate Last Dose  propofoL (DIPRIVAN) 10 mg/mL injection   IntraVENous PRN Dilcia Donaldson, CRNA   300 mg at 04/22/20 0719  ePHEDrine (PF) (MISTOLE) 10 mg/mL in NS syringe   IntraVENous PRN Dilcia Donaldson, CRNA   10 mg at 04/22/20 8140  PHENYLephrine (RUPERT-SYNEPHRINE) 100 mcg/mL in NS syringe   IntraVENous PRN Dilcia Donaldson, CRNA   100 mcg at 04/22/20 2326  
 
45 minutes of critical care time spent in the direct evaluation and treatment of this high risk patient. The reason for providing this level of medical care for this critically ill patient was due a critical illness that impaired one or more vital organ systems such that there was a high probability of imminent or life threatening deterioration in the patients condition.  This care involved high complexity decision making to assess, manipulate, and support vital system functions, to treat this degreee vital organ system failure and to prevent further life threatening deterioration of the patients condition.

## 2020-04-27 NOTE — CONSULTS
[facility] 
                                             (A Division of [address] [place] [address]) Follow-up Note Date of Admission: 4/21/2020     Date of Note:  4/27/2020 Summary:   
 
Chart reviewed and patient examined at bedside. Mr Donnette Bence is a chronically ill AAM with multiple medical issues to include morbid obesity, CHF, MARIA FERNANDA/hypovetilary function, and HTN who ran out of furosamide (due to quarantine) last week and developed progressive lower body/scrotal anasarca to the point of progressive dsypnea prompting ER visit where he was eventually intubated the following day 4/22 for hypercapnia/hypoxic struggle. No f/s/c. No unusual travel. Interval History: 
CC:  Intubated/sedated Weekend events reviewed. Pt examined at bedside. Still intubated/sedated. No movement to verbal inquiries. Current Antimicrobials: Prior Antimicrobials  
none 1. Pip/tzb IV (4/23-24) 2. Vanco IV (4/23) 3. CAX IV (4/22) 4. Doxy IV (4/22-23) Assessment Plan:  
Acute Resp Failure - likely cardiogenic/hypoventilary obesity 4/24:  PCT 0.20ng/mL 
  
This remains his disease. Despite the recovery of MSSA from his phlegm, I don't think this is pathogenic (yet). Keep a weather eye out for it in the future days/weeks as long as he remains hospitalized/in ICU, as they could be pathogenic in future. No abx necessary at this time. -> no abx 
 
 
 
I'm bingo bugs, so I'm signing off. Second COVID-19 test returned (-). He doesn't have COVID-19. CHF Morbid Obesity MARIA FERNANDA Microbiology:                4/24 - COVID-19 (-) 
                                       4/22 - COVID-19 (-) Resp - scant MSSA 
  
  
Lines / Catheters:         RIJ and peripherals Patient Active Problem List  
Diagnosis Code  Cellulitis of left lower leg L03. 116  
 Anemia D64.9  Morbid obesity (HCC) E66.01  
 Dyspnea R06.00  Sleep apnea G47.30  Sinus tachycardia R00.0  BMI 70 and over, adult [facility]) J75.68  Chronic back pain M54.9, G89.29  
 Arthritis M19.90  Migraine G43.909  
 History of atrial flutter Z86.79  
 Limited mobility Z74.09  
 Smoking F17.200  Chronic renal insufficiency, stage II (mild) N18.2  CHF (congestive heart failure) ([facility]) I50.9  Acute diastolic CHF (congestive heart failure) ([facility]) I50.31  Chronic a-fib I48.20  Atrial flutter ([facility]) I48.92  
 Elevated troponin R79.89  
 Acute respiratory distress R06.03  
 Acute CHF (congestive heart failure) ([facility]) I50.9  Respiratory failure with hypoxia and hypercapnia ([facility]) J96.91, J96.92  
 Acute on chronic respiratory failure with hypoxia and hypercapnia ([facility]) J96.21, J96.22  
 Cardiac arrest ([facility]) I46.9 Current Facility-Administered Medications Medication Dose Route Frequency  albumin human 25% (BUMINATE) solution 25 g  25 g IntraVENous Q6H  
 cisatracurium (NIMBEX) 100 mg in 0.9% sodium chloride 100 mL infusion  0-10 mcg/kg/min IntraVENous TITRATE  heparin 25,000 units in D5W 250 ml infusion  13.8-36 Units/kg/hr IntraVENous TITRATE  vasopressin (VASOSTRICT) 20 Units in 0.9% sodium chloride 100 mL infusion  0-0.03 Units/min IntraVENous TITRATE  ELECTROLYTE REPLACEMENT PROTOCOL - Calcium   1 Each Other PRN  
 ELECTROLYTE REPLACEMENT PROTOCOL - Magnesium   1 Each Other PRN  
 ELECTROLYTE REPLACEMENT PROTOCOL - Potassium Standard Dosing   1 Each Other PRN  
 ELECTROLYTE REPLACEMENT PROTOCOL - Phosphorus  Standard Dosing  1 Each Other PRN  pantoprazole (PROTONIX) 40 mg in 0.9% sodium chloride 10 mL injection  40 mg IntraVENous DAILY  fentaNYL (PF) 900 mcg/30 ml infusion soln  0-150 mcg/hr IntraVENous TITRATE  
 NOREPINephrine (LEVOPHED) 8 mg in 0.9% NS 250ml infusion  0.5-30 mcg/min IntraVENous TITRATE  midazolam in normal saline (VERSED) 1 mg/mL infusion  0-10 mg/hr IntraVENous TITRATE  aspirin chewable tablet 81 mg  81 mg Oral DAILY  methylPREDNISolone (PF) (SOLU-MEDROL) injection 40 mg  40 mg IntraVENous Q8H  
 sodium chloride (NS) flush 5-40 mL  5-40 mL IntraVENous Q8H  
 sodium chloride (NS) flush 5-40 mL  5-40 mL IntraVENous PRN  
 [Held by provider] losartan (COZAAR) tablet 25 mg  25 mg Oral DAILY  [Held by provider] metoprolol succinate (TOPROL-XL) XL tablet 25 mg  25 mg Oral DAILY Facility-Administered Medications Ordered in Other Encounters Medication Dose Route Frequency  propofoL (DIPRIVAN) 10 mg/mL injection   IntraVENous PRN  
 ePHEDrine (PF) (MISTOLE) 10 mg/mL in NS syringe   IntraVENous PRN  
 PHENYLephrine (RUPERT-SYNEPHRINE) 100 mcg/mL in NS syringe   IntraVENous PRN Review of Systems - unobtainable due to patient factors Objective: 
Visit Vitals /64 Pulse (!) 47 Temp 97.9 °F (36.6 °C) Resp 20 Ht 5' 10\" (1.778 m) Wt (!) 173.4 kg (382 lb 4.8 oz) SpO2 93% BMI 54.85 kg/m² Temp (24hrs), Av.7 °F (37.1 °C), Min:97.9 °F (36.6 °C), Max:99.2 °F (37.3 °C) GEN: Obese AAM intubated/unresponsive HEENT: no icterus CHEST: CTA CVS:faint S1 and S2 
ABD: NT NABS 
EXT: less edema than Friday Lab results: 
 
Chemistry Recent Labs  
  20 
0350 20 
0515 20 
3584 * 107* 105* * 146* 147* K 4.2 4.6 4.0  
 106 105 CO2 35* 36* 36* BUN 32* 33* 33* CREA 1.16 1.50* 1.44* CA 8.3* 8.3* 8.7 AGAP 4 4 6 BUCR 28* 22* 23* AP 41* 46 47  
TP 6.6 7.1 7.0 ALB 2.8* 3.2* 3.4 GLOB 3.8 3.9 3.6 AGRAT 0.7* 0.8 0.9  
 
 
CBC w/ Diff Recent Labs  
  20 
0350 20 
0515 20 
7941 WBC 5.9 10.4 7.4  
RBC 4.76 5.26 5.11 HGB 12.5* 13.9 13.8 HCT 42.7 48.6* 45.6 * 191 155 GRANS 77* 82* 83* LYMPH 12* 8* 9* EOS 0 0 0 Microbiology All Micro Results Procedure Component Value Units Date/Time CULTURE, RESPIRATORY/SPUTUM/BRONCH Daly Ly STAIN [[id number]]  (Abnormal)  (Susceptibility) Collected:  04/22/20 1018 Order Status:  Completed Specimen:  Sputum from Tracheal Aspirate Updated:  04/25/20 6722 Special Requests: NO SPECIAL REQUESTS     
  GRAM STAIN FEW WBCS SEEN     
      
  OCCASIONAL EPITHELIAL CELLS SEEN  
     
      
  FEW GRAM POSITIVE COCCI IN CLUSTERS  
     
   RARE GRAM NEGATIVE RODS Culture result:    
  LIGHT STAPHYLOCOCCUS AUREUS  
     
      
  LIGHT NORMAL RESPIRATORY BRYN  
     
 CULTURE, BLOOD [[id number]] Collected:  04/24/20 1015 Order Status:  Canceled Specimen:  Blood Roe Hodge MD 
Cell [phone] [facility] 4/27/2020  
10:17 AM

## 2020-04-27 NOTE — PROGRESS NOTES
Cardiology Progress Note Patient: Suleman Trinidad. Sex: male          DOA: 4/21/2020 YOB: 1975      Age:  53 y.o.        LOS:  LOS: 5 days Patient seen and examined, chart reviewed. Assessment/Plan Patient Active Problem List  
Diagnosis Code  Cellulitis of left lower leg L03. 116  
 Anemia D64.9  Morbid obesity ([facility]) E66.01  
 Dyspnea R06.00  Sleep apnea G47.30  Sinus tachycardia R00.0  BMI 70 and over, adult [facility]) R93.23  Chronic back pain M54.9, G89.29  
 Arthritis M19.90  Migraine G43.909  
 History of atrial flutter Z86.79  
 Limited mobility Z74.09  
 Smoking F17.200  Chronic renal insufficiency, stage II (mild) N18.2  CHF (congestive heart failure) ([facility]) I50.9  Acute diastolic CHF (congestive heart failure) ([facility]) I50.31  Chronic a-fib I48.20  Atrial flutter ([facility]) I48.92  
 Elevated troponin R79.89  
 Acute respiratory distress R06.03  
 Acute CHF (congestive heart failure) ([facility]) I50.9  Respiratory failure with hypoxia and hypercapnia ([facility]) J96.91, J96.92  
 Acute on chronic respiratory failure with hypoxia and hypercapnia ([facility]) J96.21, J96.22  
 Cardiac arrest ([facility]) I46.9 Acute on chronic systolic heart failure Abnormal troponin today troponin jump to 3.31  
NSTEMI type II due to PEA arrest 
Paroxysmal atrial flutter Hypotension Patient self extubated and had PEA arrest. 
He had brief episode of A fib with RVR and was started On Amiodarone drip. Currently he is in Sinus rhythm. Amiodarone discontinued due to bradycardia. 
  
Echocardiogram  
  
· Left Ventricle: Normal wall thickness and systolic function (ejection fraction normal). Mildly dilated left ventricle. The estimated ejection fraction is 45 - 50%. There is moderate (grade 2) left ventricular diastolic dysfunction E/e' Ratio = 17.09. Wall Scoring: The left ventricular wall motion is globally hypokinetic. · Right Ventricle: Not well visualized. Normal global systolic function. Moderately dilated right ventricle. · Right Atrium: Right atrium not well visualized. Moderately dilated right atrium. · IVC/Hepatic Veins: Dilated inferior vena cava. Mechanically ventilated; cannot use inferior caval vein diameter to estimate central venous pressure. 
  
 
Plan: 
 
Continue Aspirin Continue IV heparin and titrate as per aPTT. Continue IV Pressors Strict I/O Continue Ventilatory support. Continue management as per hospital medicine Plan discussed with patient's nurse. Condition critical, prognosis guarded. Subjective:  
 cc: 
Orally intubated REVIEW OF SYSTEMS:  
 
Can not obtain Objective:  
  
Visit Vitals /58 Pulse (!) 49 Temp 99.1 °F (37.3 °C) Resp 20 Ht 5' 10\" (1.778 m) Wt (!) 162.3 kg (357 lb 14.4 oz) SpO2 94% BMI 51.35 kg/m² Body mass index is 51.35 kg/m². Physical Exam: 
General Appearance: Comfortable, Morbidly obese, not using accessory muscles of respiration. Orally intubated HEENT: AMMON. HEAD: Atraumatic NECK: No JVD, no thyroidomeglay. CAROTIDS: No bruit LUNGS: bilateral conducted sounds HEART: S1+S2 audible, no murmur, no pericardial rub. NEUROLOGICAL: Sedated. Medication: 
Current Facility-Administered Medications Medication Dose Route Frequency  cisatracurium (NIMBEX) 100 mg in 0.9% sodium chloride 100 mL infusion  0-10 mcg/kg/min IntraVENous TITRATE  heparin 25,000 units in D5W 250 ml infusion  13.8-36 Units/kg/hr IntraVENous TITRATE  vasopressin (VASOSTRICT) 20 Units in 0.9% sodium chloride 100 mL infusion  0-0.03 Units/min IntraVENous TITRATE  ELECTROLYTE REPLACEMENT PROTOCOL - Calcium   1 Each Other PRN  
 ELECTROLYTE REPLACEMENT PROTOCOL - Magnesium   1 Each Other PRN  
 ELECTROLYTE REPLACEMENT PROTOCOL - Potassium Standard Dosing   1 Each Other PRN  
  ELECTROLYTE REPLACEMENT PROTOCOL - Phosphorus  Standard Dosing  1 Each Other PRN  pantoprazole (PROTONIX) 40 mg in 0.9% sodium chloride 10 mL injection  40 mg IntraVENous DAILY  fentaNYL (PF) 900 mcg/30 ml infusion soln  0-150 mcg/hr IntraVENous TITRATE  
 NOREPINephrine (LEVOPHED) 8 mg in 0.9% NS 250ml infusion  0.5-30 mcg/min IntraVENous TITRATE  midazolam in normal saline (VERSED) 1 mg/mL infusion  0-10 mg/hr IntraVENous TITRATE  aspirin chewable tablet 81 mg  81 mg Oral DAILY  methylPREDNISolone (PF) (SOLU-MEDROL) injection 40 mg  40 mg IntraVENous Q8H  
 sodium chloride (NS) flush 5-40 mL  5-40 mL IntraVENous Q8H  
 sodium chloride (NS) flush 5-40 mL  5-40 mL IntraVENous PRN  
 [Held by provider] losartan (COZAAR) tablet 25 mg  25 mg Oral DAILY  [Held by provider] metoprolol succinate (TOPROL-XL) XL tablet 25 mg  25 mg Oral DAILY Facility-Administered Medications Ordered in Other Encounters Medication Dose Route Frequency  propofoL (DIPRIVAN) 10 mg/mL injection   IntraVENous PRN  
 ePHEDrine (PF) (MISTOLE) 10 mg/mL in NS syringe   IntraVENous PRN  
 PHENYLephrine (RUPERT-SYNEPHRINE) 100 mcg/mL in NS syringe   IntraVENous PRN Lab/Data Reviewed: 
 
  
Recent Labs  
  04/26/20 
0515 04/25/20 
4039 04/24/20 
0410 WBC 10.4 7.4 8.1 HGB 13.9 13.8 13.7 HCT 48.6* 45.6 45.6  155 162 Recent Labs  
  04/26/20 
0515 04/25/20 
0610 04/24/20 
0410 * 147* 144  
K 4.6 4.0 4.2  105 103 CO2 36* 36* 39* * 105* 110* BUN 33* 33* 26* CREA 1.50* 1.44* 1.58* CA 8.3* 8.7 8.6  
 
 
47 minutes of critical care time spent in the direct evaluation and treatment of this high risk patient.  The reason for providing this level of medical care for this critically ill patient was due a critical illness that impaired one or more vital organ systems such that there was a high probability of imminent or life threatening deterioration in the patients condition. This care involved high complexity decision making to assess, manipulate, and support vital system functions, to treat this degree vital organ system failure and to prevent further life threatening deterioration of the patients condition. Signed By: Tash Hwang MD   
 April 26, 2020

## 2020-04-27 NOTE — PROGRESS NOTES
1900: Bedside and Verbal shift change report given to Daryle Ewing, RN (oncoming nurse) by Zee Gamble RN (offgoing nurse). Report included the following information SBAR, Kardex, Procedure Summary, Intake/Output, MAR, Recent Results, Med Rec Status, Cardiac Rhythm NSR/Kiran and Alarm Parameters . 2000: Shift assessment completed. Restraints still in place while on paralytic per Dr. Rhoades Forward due to self extubation x3. 
 
0000: Reassessment completed. Pt became kiran and sedative and paralytic were decreased. Pt became restless and able to follow commands with slight increase in HR. 
 
0110: Pt became agitated and I asked if he was uncomfortable, he nodded yes. Fentanyl increased to 125mcg/h 
 
0120: Pt continues restlessness. Versed drip restarted - SEE MAR 
 
0145: Dr. Kendra Shultz paged concerning HR and inability to obtain RASS and Paralytic goal.  
 
0150: Dr. Kendra Shultz arrived to unit and at bedside. Advised to continue sedatives, HR of 45 is okay. If it continues to trend downward, paralytic will be DC'd and possible IVP sedation prn.  
 
0400. Reassessment completed. Pt resting, labs collected. 0447: Restarted Levophed in attempt to stabilize MAP and HR. HR increased to NSR however, MAP began to increase past goal of 65. Reduced Levophed, eventually dc'd due to steady increase in BP and MAP. 0600: Trending at MAP - 72; HR - 49 Physician aware. 0700: Bedside and Verbal shift change report given to RISHI Marsh RN (oncoming nurse) by Daryle Ewing, RN (offgoing nurse). Report included the following information SBAR, Kardex, Intake/Output, MAR, Recent Results, Med Rec Status, Cardiac Rhythm NSR/Kiran and Alarm Parameters .

## 2020-04-27 NOTE — PROGRESS NOTES
NUTRITION FOLLOW-UP 
 
RECOMMENDATIONS/PLAN:  
- EN: Recc starting tube feeds when clinically able Recc Vital High Protein @ 85ml/hr to provide 1870kcal 164g PRO 1563ml H20 209g CHO 43g Fat Recc starting @ 10ml/hr an increasing by 5ml/hr Q6 until goal rate met Will defer Free H20 to MD 
Monitor labs/lytes, tube feed tolerance, skin integrity, wt, fluid status, BM 
 
NUTRITION ASSESSMENT:  
Client Update: 53 yrs old Male with resp failure w/ hypoxia and hypercapnia-intubated in ICU, COVID 19 ruled out, hypotension, acute on chronic systolic CHF, elevated trop, morbid obesity, pt self extubated w/ NSTEMI due to PEA arrest, chronic a-fib, elevated trop-cardiology following . FOOD/NUTRITION INTAKE Diet Order:  NPO Food Allergies: NKFA/ Average PO Intake:     
No data found. Pertinent Medications:  [x] Reviewed; methylprednisolone, Electrolyte Replacement Protocol: [x]K [x]Mg [x]PO4 Insulin:  []SSI  []Pre-meal   []Basal    []Drip  []None Cultural/Anabaptism Food Preferences: None Identified BIOCHEMICAL DATA & MEDICAL TESTS Pertinent Labs:  [x] Reviewed; Na-147   ANTHROPOMETRICS Height: 5' 10\" (177.8 cm)       Weight: (!) 173.4 kg (382 lb 4.8 oz) BMI: 54.9 kg/m^2 morbidly obese (Greater than or = to 40% BMI) Adm Weight: 595 lbs                Weight change: wt loss related to the fact initial wt was estimated; current wt is based off the bed wt per nursing; wt loss not accurate Adjusted Body Weight: 124.2kg NUTRITION-FOCUSED PHYSICAL ASSESSMENT Skin:No PU   
GI: No BM 
 
NUTRITION PRESCRIPTION Calories: 1886kcal/day based on 25kcal/kg of IBW Protein: 113- 151 g/day based on 1.5-2.0 g/kg of IBW 
CHO: 236 g/day based on 50% of total energy Fluid: 1886 ml/day based on 1 kcal/ml NUTRITION DIAGNOSES:  
1. Inadequate oral intake related to intubation as evidence by need for nutrition support NUTRITION INTERVENTIONS:  
INTERVENTIONS:        GOALS: 
 1.EN: Recc starting tube feeds when clinically able Recc Vital High Protein @ 85ml/hr to provide 1870kcal 164g PRO 1563ml H20 209g CHO 43g Fat Recc starting @ 10ml/hr an increasing by 5ml/hr Q6 until goal rate met 1. Start tube feeds by next review  3 days LEARNING NEEDS (Diet, Supplementation, Food/Nutrient-Drug Interaction): 
 [] None Identified   [] Education provided/documented      Identified and patient: [] Declined   [x] Was not appropriate/indicated NUTRITION MONITORING /EVALUATION:  
Adjust EN/PN as appropriate Monitor wt Monitor renal labs, electrolytes, fluid status Previous Recommendations Implemented: N/A Previous Goals Met:  N/A - 
   
[] Participated in Interdisciplinary Rounds   
[x] Interdisciplinary Care Plan Reviewed DISCHARGE NUTRITION RECOMMENDATIONS ADDRESSED:  
  [x] To be determined closer to discharge NUTRITION RISK:           [x] At risk                        [] Not currently at risk Will follow-up per policy. Shabana Bravo 9

## 2020-04-28 NOTE — PROGRESS NOTES
Pulmonary, Critical Care, and Sleep Medicine Pulmonary Progress Note Name: Aleyda Rodriguez. : 1975 MRN: [id number] Date: 2020 [x]I have reviewed the flowsheet and previous days notes. Events, vitals, medications and notes from last 24 hours reviewed. IMPRESSION:  
Patient Active Problem List  
Diagnosis Code  Acute on chronic respiratory failure with hypoxia and hypercapnia ([facility]) J96.21, J96.22  
 Acute on chronic systolic and diastolic CHF (congestive heart failure) ([facility]) I50.43 Aspiration pneumonitis possible with multiple patient initiated ET tube displacement events J69.0  Cardiac arrest ([facility] [address] I46.9 Staph Aureus in sputum culture A49.01  
 Elevated troponin, NSTEMI R79.89, I21.4  Chronic a-fib I48.20  Atrial flutter ([facility] [address] I48.92 Prolonged QTc  Chronic renal insufficiency, stage II (mild) N18.2  Smoking F17.200  Arthritis M19.90  Migraine G43.909  Chronic back pain M54.9, G89.29  
 Obstructive Sleep apnea G47.33  
 Morbid obesity ([facility]) E66.01  
  
PLAN:  
[facility]. Ventilated patients- aim to keep peak plateau pressure less than or equal to 30cm H2O. Titrate FiO2 for goal SPO2> 91% CXR and ABG daily VAP prevention bundle, head of the bed at 30' all times Daily sedation and paralytic holiday when feasible and assessment for weaning with SBT as tolerated. Monitor TOF on Nimbex Continue bronchodilators, pulmonary hygiene care Steroid, taper today Sepsis bundle per hospital protocol SARS-COV-2 negative x 2 Antibiotic choice: low threshold to start patient on antibiotics if febrile or leukocytosis. Patient may have aspirated since had multiple witnessed episodes when he dislodged his ET tube and or with ET tube cuff malfunctioned requiring exchange Monitor hemodynamics -stable and off of vasopressors since 20 AM. May restart and actively titrate vasopressors aim SBP > 100 mm Hg as needed Was on Amiodarone, has been off Off of Heparin given bleeding around RT IJ insertion  CBC, PTT, any signs of bleeding/hematoma per protocol PVL bl LE negative Agree with Bumex 1 mg twice a day Stop free H2O per  mL Q4; consider conservative volume strategy with current ventilator support. Appreciate renal input Start TF at trickle rate Glycemic control AM labs Central Line, Daniel and Vent Bundles will be followed, Vent Day 7 Full code. Prognosis guarded Will defer respective systems problem management to primary and other consultant and follow patient in ICU with primary and other medical team 
Quality Care: PPI, DVT prophylaxis, HOB elevated, Infection control all reviewed and addressed. PAIN AND SEDATION: Fentanyl, Versed, Nimbex · Skin/Wound: chronic venous stasis changes · Nutrition: NPO 
· Prophylaxis: DVT and GI Prophylaxis reviewed. · Restraints: none while on paralytic; may place during weaning process or when off of paralytic 
· PT/OT eval and treat: as needed when stable · Lines/Tubes: lines RT IJ 4/22/20; daniel 4/21/20, ET tube 4/25/20 ADVANCE DIRECTIVE: Full code DISCUSSION: spoke with RN, RT, ICU staff Events and notes from last 24 hours reviewed. Care plan discussed with nursing Subjective/History:  
12-year-old male with morbid obesity, ex-smoker, history of asthma, history of severe obstructive sleep apnea on CPAP, questionable component compliance, history of congestive heart failure with reduced ejection fraction atrial fibrillation atrial flutter on amiodarone, Xarelto, history of chronic renal insufficiency, cellulitis of lower extremities and anemia. As per family did not have any fever or upper respiratory tract infections and no flulike symptoms. On Lasix, CPAP not clear if missed. Presented with acute shortness of breath worsening edema of lower extremities.  He was found to be in acute hypercarbic hypoxemic respiratory failure initially had a trial of BiPAP but failed and then eventually was intubated. He was hypotensive. Moderate amount of secretions. Morbid obesity. Patient compliant with Xarelto. Patient self extubated and had PEA arrest. 
 
 
Subjective:  
Patient remained in ICU on ventilator; FiO2 dropped by RT to 60% per ABG this AM 
Airway pressure with PP below 30 cm H2O Remained on sedation and paralytic with good ventilator synchrony Off of vasopressor and hemodynamically stable Fair UO. Afebrile. NPO Overnight bleeding around IJ insertion site and Heparin was stopped with improvement I was not contacted on anything for patient by staff overnight ROS: Review of systems not obtained due to patient factors. Latest lactic acid:  
Lactic acid Date Value Ref Range Status 04/28/2020 1.4 0.4 - 2.0 MMOL/L Final  
04/27/2020 2.1 (HH) 0.4 - 2.0 MMOL/L Final  
  Comment:  
  CALLED TO AND CORRECTLY REPEATED BY: 
Francheska Corado RN, ICU ON 04/27/2020 AT 0450 TO JDA 
  
04/27/2020 2.0 0.4 - 2.0 MMOL/L Final  
 
 
Past Medical History: 
Past Medical History:  
Diagnosis Date  Arrhythmia  Arthritis   
 knees  CHF (congestive heart failure) ([address]  Chronic back pain  Chronic renal insufficiency, stage II (mild)  History of atrial flutter Dx 6/2016 - anticoagulated by Kenyon Lilly  Hypertension 2015  Limited mobility  Migraine headache  Morbid obesity ([facility] [address]  Morbid obesity with body mass index of 60.0-69.9 in adult [facility])  Sleep apnea   
 uses c-pap instructed to bring day of surgery  Smoking   
 very passive / cigars only Allergy: No Known Allergies Vital Signs:   
Blood pressure 108/57, pulse (!) 56, temperature 99.2 °F (37.3 °C), resp. rate 20, height 5' 10\" (1.778 m), weight (!) 173.4 kg (382 lb 4.8 oz), SpO2 93 %. Body mass index is 54.85 kg/m². O2 Device: Endotracheal tube O2 Flow Rate (L/min): 12 l/min Temp (24hrs), Av.2 °F (37.3 °C), Min:98.9 °F (37.2 °C), Max:99.6 °F (37.6 °C) Intake/Output:  
Last shift:      No intake/output data recorded. Last 3 shifts:  1901 -  0700 In: 600 Out: 2550 [Urine:2550] Intake/Output Summary (Last 24 hours) at 2020 1000 Last data filed at 2020 7871 Gross per 24 hour Intake 600 ml Output 2000 ml Net -1400 ml Ventilator Settings: 
Mode Rate Tidal Volume Pressure FiO2 PEEP Assist control, VC+   580 ml    60 % 18 cm H20 Peak airway pressure: 30 cm H2O Minute ventilation: 13.2 l/min Pl pressure goals less than or equal to 30. Physical Exam: 
General: sedated, on paralytic, in no respiratory distress, appears older than stated age, morbidly obese, on ventilator HEENT: PERRL, fundi benign, orally intubatyed Neck: No abnormally enlarged lymph nodes or thyroid, supple; RT IJ site without active bleeding, small hematoma could not be ruled out Chest: normal, obese chest wall Lungs: moderate air entry, few rhonchi scattered bilaterally, normal percussion anterior chest bilaterally but limited, no tenderness/ rash Heart: Regular rate and rhythm, S1S2 present or without murmur or extra heart sounds Abdomen: obese, bowel sounds normoactive, tympanic, abdomen is soft without significant tenderness, masses, organomegaly or guarding, rigidity, rebound Extremity: 2+, pitting and bl LE and 3+ bl UE edema; negative for cyanosis, clubbing Capillary refill: normal 
Neuro: sedated, on paralytic, no involuntary movements, exam limitations Skin: Skin color, texture, turgor fair. Skin dry, warm, non-diaphoretic DATA:  
Current Facility-Administered Medications Medication Dose Route Frequency  methylPREDNISolone (PF) (SOLU-MEDROL) injection 30 mg  30 mg IntraVENous Q12H  
 albumin human 25% (BUMINATE) solution 25 g  25 g IntraVENous Q6H  
 cisatracurium (NIMBEX) 100 mg in 0.9% sodium chloride 100 mL infusion 0-10 mcg/kg/min IntraVENous TITRATE  heparin 25,000 units in D5W 250 ml infusion  13.8-36 Units/kg/hr IntraVENous TITRATE  vasopressin (VASOSTRICT) 20 Units in 0.9% sodium chloride 100 mL infusion  0-0.03 Units/min IntraVENous TITRATE  ELECTROLYTE REPLACEMENT PROTOCOL - Calcium   1 Each Other PRN  
 ELECTROLYTE REPLACEMENT PROTOCOL - Magnesium   1 Each Other PRN  
 ELECTROLYTE REPLACEMENT PROTOCOL - Potassium Standard Dosing   1 Each Other PRN  
 ELECTROLYTE REPLACEMENT PROTOCOL - Phosphorus  Standard Dosing  1 Each Other PRN  pantoprazole (PROTONIX) 40 mg in 0.9% sodium chloride 10 mL injection  40 mg IntraVENous DAILY  fentaNYL (PF) 900 mcg/30 ml infusion soln  0-150 mcg/hr IntraVENous TITRATE  
 NOREPINephrine (LEVOPHED) 8 mg in 0.9% NS 250ml infusion  0.5-30 mcg/min IntraVENous TITRATE  midazolam in normal saline (VERSED) 1 mg/mL infusion  0-10 mg/hr IntraVENous TITRATE  aspirin chewable tablet 81 mg  81 mg Oral DAILY  sodium chloride (NS) flush 5-40 mL  5-40 mL IntraVENous Q8H  
 sodium chloride (NS) flush 5-40 mL  5-40 mL IntraVENous PRN  
 [Held by provider] losartan (COZAAR) tablet 25 mg  25 mg Oral DAILY  [Held by provider] metoprolol succinate (TOPROL-XL) XL tablet 25 mg  25 mg Oral DAILY Facility-Administered Medications Ordered in Other Encounters Medication Dose Route Frequency  propofoL (DIPRIVAN) 10 mg/mL injection   IntraVENous PRN  
 ePHEDrine (PF) (MISTOLE) 10 mg/mL in NS syringe   IntraVENous PRN  
 PHENYLephrine (RUPERT-SYNEPHRINE) 100 mcg/mL in NS syringe   IntraVENous PRN Telemetry: [x]Sinus []A-flutter []Paced []A-fib []Multiple PVCs  
              
4/22/20 ECHO · Left Ventricle: Normal wall thickness and systolic function (ejection fraction normal). Mildly dilated left ventricle. The estimated ejection fraction is 45 - 50%. There is moderate (grade 2) left ventricular diastolic dysfunction E/e' Ratio = 17.09. Wall Scoring:  The left ventricular wall motion is globally hypokinetic. · Right Ventricle: Not well visualized. Normal global systolic function. Moderately dilated right ventricle. · Right Atrium: Right atrium not well visualized. Moderately dilated right atrium. · IVC/Hepatic Veins: Dilated inferior vena cava. Mechanically ventilated; cannot use inferior caval vein diameter to estimate central venous pressure. Labs: 
Recent Results (from the past 24 hour(s)) GLUCOSE, POC Collection Time: 04/27/20 11:31 AM  
Result Value Ref Range Glucose (POC) 102 70 - 110 mg/dL GLUCOSE, POC Collection Time: 04/27/20  6:10 PM  
Result Value Ref Range Glucose (POC) 101 70 - 110 mg/dL CBC WITH AUTOMATED DIFF Collection Time: 04/27/20  6:10 PM  
Result Value Ref Range WBC 6.7 4.6 - 13.2 K/uL  
 RBC 4.42 (L) 4.70 - 5.50 M/uL  
 HGB 11.8 (L) 13.0 - 16.0 g/dL HCT 39.5 36.0 - 48.0 % MCV 89.4 74.0 - 97.0 FL  
 MCH 26.7 24.0 - 34.0 PG  
 MCHC 29.9 (L) 31.0 - 37.0 g/dL  
 RDW 14.8 (H) 11.6 - 14.5 % PLATELET 96 (L) 884 - 420 K/uL MPV 10.2 9.2 - 11.8 FL  
 NEUTROPHILS 81 (H) 40 - 73 % LYMPHOCYTES 9 (L) 21 - 52 % MONOCYTES 10 3 - 10 % EOSINOPHILS 0 0 - 5 % BASOPHILS 0 0 - 2 %  
 ABS. NEUTROPHILS 5.4 1.8 - 8.0 K/UL  
 ABS. LYMPHOCYTES 0.6 (L) 0.9 - 3.6 K/UL  
 ABS. MONOCYTES 0.7 0.05 - 1.2 K/UL  
 ABS. EOSINOPHILS 0.0 0.0 - 0.4 K/UL  
 ABS. BASOPHILS 0.0 0.0 - 0.1 K/UL PLATELET COMMENTS PLATELET COUNT VERIFIED BY SMEAR REVIEW   
 RBC COMMENTS NORMOCYTIC, NORMOCHROMIC    
 DF AUTOMATED    
GLUCOSE, POC Collection Time: 04/27/20 11:12 PM  
Result Value Ref Range Glucose (POC) 103 70 - 110 mg/dL PTT Collection Time: 04/28/20  2:10 AM  
Result Value Ref Range aPTT 96.1 (H) 23.0 - 36.4 SEC  
CBC WITH AUTOMATED DIFF Collection Time: 04/28/20  2:10 AM  
Result Value Ref Range WBC 7.5 4.6 - 13.2 K/uL  
 RBC 4.40 (L) 4.70 - 5.50 M/uL  
 HGB 11.6 (L) 13.0 - 16.0 g/dL HCT 39.5 36.0 - 48.0 % MCV 89.8 74.0 - 97.0 FL  
 MCH 26.4 24.0 - 34.0 PG  
 MCHC 29.4 (L) 31.0 - 37.0 g/dL  
 RDW 14.8 (H) 11.6 - 14.5 % PLATELET 87 (L) 268 - 420 K/uL MPV 10.1 9.2 - 11.8 FL  
 NEUTROPHILS 78 (H) 40 - 73 % LYMPHOCYTES 11 (L) 21 - 52 % MONOCYTES 11 (H) 3 - 10 % EOSINOPHILS 0 0 - 5 % BASOPHILS 0 0 - 2 %  
 ABS. NEUTROPHILS 5.9 1.8 - 8.0 K/UL  
 ABS. LYMPHOCYTES 0.8 (L) 0.9 - 3.6 K/UL  
 ABS. MONOCYTES 0.8 0.05 - 1.2 K/UL  
 ABS. EOSINOPHILS 0.0 0.0 - 0.4 K/UL  
 ABS. BASOPHILS 0.0 0.0 - 0.1 K/UL  
 DF AUTOMATED    
POC G3 Collection Time: 04/28/20  3:53 AM  
Result Value Ref Range Device: VENT    
 FIO2 (POC) 0.65 % pH (POC) 7.385 7.35 - 7.45    
 pCO2 (POC) 57.1 (H) 35.0 - 45.0 MMHG  
 pO2 (POC) 71 (L) 80 - 100 MMHG  
 HCO3 (POC) 34.0 (H) 22 - 26 MMOL/L  
 sO2 (POC) 93 92 - 97 % Base excess (POC) 9 mmol/L Mode ASSIST CONTROL Tidal volume 580 ml Set Rate 20 bpm  
 PEEP/CPAP (POC) 18 cmH2O  
 PIP (POC) 30 Allens test (POC) N/A Inspiratory Time 0.90 sec Total resp. rate 20 Site DRAWN FROM ARTERIAL LINE Patient temp. 99.6 Specimen type (POC) ARTERIAL Performed by Carmen Craig Volume control plus YES    
GLUCOSE, POC Collection Time: 04/28/20  5:12 AM  
Result Value Ref Range Glucose (POC) 93 70 - 110 mg/dL METABOLIC PANEL, COMPREHENSIVE Collection Time: 04/28/20  5:30 AM  
Result Value Ref Range Sodium 146 (H) 136 - 145 mmol/L Potassium 4.3 3.5 - 5.5 mmol/L Chloride 107 100 - 111 mmol/L  
 CO2 36 (H) 21 - 32 mmol/L Anion gap 3 3.0 - 18 mmol/L Glucose 93 74 - 99 mg/dL BUN 37 (H) 7.0 - 18 MG/DL Creatinine 1.07 0.6 - 1.3 MG/DL  
 BUN/Creatinine ratio 35 (H) 12 - 20 GFR est AA >60 >60 ml/min/1.73m2 GFR est non-AA >60 >60 ml/min/1.73m2 Calcium 8.3 (L) 8.5 - 10.1 MG/DL Bilirubin, total 0.9 0.2 - 1.0 MG/DL  
 ALT (SGPT) 20 16 - 61 U/L  
 AST (SGOT) 11 10 - 38 U/L Alk.  phosphatase 35 (L) 45 - 117 U/L  
 Protein, total 6.1 (L) 6.4 - 8.2 g/dL Albumin 3.0 (L) 3.4 - 5.0 g/dL Globulin 3.1 2.0 - 4.0 g/dL A-G Ratio 1.0 0.8 - 1.7    
CBC WITH AUTOMATED DIFF Collection Time: 04/28/20  5:30 AM  
Result Value Ref Range WBC 7.2 4.6 - 13.2 K/uL  
 RBC 4.31 (L) 4.70 - 5.50 M/uL  
 HGB 11.3 (L) 13.0 - 16.0 g/dL HCT 38.6 36.0 - 48.0 % MCV 89.6 74.0 - 97.0 FL  
 MCH 26.2 24.0 - 34.0 PG  
 MCHC 29.3 (L) 31.0 - 37.0 g/dL  
 RDW 14.8 (H) 11.6 - 14.5 % PLATELET 94 (L) 522 - 420 K/uL MPV 10.8 9.2 - 11.8 FL  
 NEUTROPHILS 77 (H) 40 - 73 % LYMPHOCYTES 13 (L) 21 - 52 % MONOCYTES 10 3 - 10 % EOSINOPHILS 0 0 - 5 % BASOPHILS 0 0 - 2 %  
 ABS. NEUTROPHILS 5.6 1.8 - 8.0 K/UL  
 ABS. LYMPHOCYTES 0.9 0.9 - 3.6 K/UL  
 ABS. MONOCYTES 0.8 0.05 - 1.2 K/UL  
 ABS. EOSINOPHILS 0.0 0.0 - 0.4 K/UL  
 ABS. BASOPHILS 0.0 0.0 - 0.1 K/UL  
 DF AUTOMATED MAGNESIUM Collection Time: 04/28/20  5:30 AM  
Result Value Ref Range Magnesium 2.8 (H) 1.6 - 2.6 mg/dL PTT Collection Time: 04/28/20  5:30 AM  
Result Value Ref Range aPTT 29.0 23.0 - 36.4 SEC PHOSPHORUS Collection Time: 04/28/20  5:30 AM  
Result Value Ref Range Phosphorus 2.8 2.5 - 4.9 MG/DL  
CALCIUM, IONIZED Collection Time: 04/28/20  5:30 AM  
Result Value Ref Range Ionized Calcium 1.16 1.12 - 1.32 MMOL/L  
LACTIC ACID Collection Time: 04/28/20  8:00 AM  
Result Value Ref Range Lactic acid 1.4 0.4 - 2.0 MMOL/L Recent Labs  
  04/28/20 
0353 04/27/20 
0341 04/26/20 
1044 FIO2I 0.65 0.90 95 HCO3I 34.0* 31.2* 34.8* PCO2I 57.1* 47.2* 55.6* PHI 7.385 7.429 7.405 PO2I 71* 76* 75* All Micro Results Procedure Component Value Units Date/Time CULTURE, RESPIRATORY/SPUTUM/BRONCH Armando Loupe STAIN [[id number]]  (Abnormal)  (Susceptibility) Collected:  04/22/20 1018 Order Status:  Completed Specimen:  Sputum from Tracheal Aspirate Updated:  04/25/20 0066 Special Requests: NO SPECIAL REQUESTS GRAM STAIN FEW WBCS SEEN     
      
  OCCASIONAL EPITHELIAL CELLS SEEN  
     
      
  FEW GRAM POSITIVE COCCI IN CLUSTERS  
     
   RARE GRAM NEGATIVE RODS Culture result:    
  LIGHT STAPHYLOCOCCUS AUREUS  
     
      
  LIGHT NORMAL RESPIRATORY BRYN  
     
 CULTURE, BLOOD [[id number]] Collected:  04/24/20 1015 Order Status:  Canceled Specimen:  Blood Imaging: 
[x]I have personally reviewed the patients chest radiographs images and report 4/27/20 [facility]20 XR CHEST PORT Narrative EXAM: XR CHEST PORT 
 
CLINICAL INDICATION/HISTORY: Intubated 
-Additional: None COMPARISON: 4/26/2020 TECHNIQUE: Portable frontal view of the chest 
 
_______________ FINDINGS: 
 
SUPPORT DEVICES: Endotracheal tube projects in stable position, estimated at 
approximately 4.9 cm above the miguel ángel. Right IJ approach central venous catheter 
with tip projecting over the SVC. HEART AND MEDIASTINUM: Stable appearing cardiac size and mediastinal contours. Enlarged appearing cardiac silhouette redemonstrated. LUNGS AND PLEURAL SPACES: Basilar areas of opacity and dense left retrocardiac 
opacity noted. No evidence of pneumothorax or large pleural effusion. BONY THORAX AND SOFT TISSUES: Unremarkable. 
 
_______________ Impression IMPRESSION: 
 
 
1. Endotracheal tube and right IJ approach central venous catheter in position 
as above. 2. Cardiomegaly and bibasilar areas of airspace disease/atelectasis without 
significant change No results found for this or any previous visit. [x]See my orders for details My assessment, plan of care, findings, medications, side effects etc were discussed with: 
[x]nursing [x]Dietician  
[x]respiratory therapy [x]Dr. Gary Figures  
[]family []Patient [x]Total critical care time exclusive of procedures 39 minutes with complex decision making performed and > 50% time spent in face to face evaluation.  
 
Mari Atkinson MD

## 2020-04-28 NOTE — PROGRESS NOTES
0710 Bedside shift change report recieved from Jasmine Thomas RN. Report included the following information SBAR, Kardex, MAR and Recent Results.

## 2020-04-28 NOTE — PROGRESS NOTES
Hospitalist Progress Note Patient: Pascale Leary. MRN: [id number]  CSN: [id number] YOB: 1975  Age: 53 y.o. Sex: male DOA: 4/21/2020 LOS:  LOS: 7 days Assessment/Plan Patient Active Problem List  
Diagnosis Code  Cellulitis of left lower leg L03. 116  
 Anemia D64.9  Morbid obesity ([facility]) E66.01  
 Dyspnea R06.00  Sleep apnea G47.30  Sinus tachycardia R00.0  BMI 70 and over, adult [facility]) A66.76  Chronic back pain M54.9, G89.29  
 Arthritis M19.90  Migraine G43.909  
 History of atrial flutter Z86.79  
 Limited mobility Z74.09  
 Smoking F17.200  Chronic renal insufficiency, stage II (mild) N18.2  CHF (congestive heart failure) ([facility]) I50.9  Acute diastolic CHF (congestive heart failure) ([facility]) I50.31  Chronic a-fib I48.20  Atrial flutter ([facility]) I48.92  
 Elevated troponin R79.89  
 Acute respiratory distress R06.03  
 Acute CHF (congestive heart failure) ([facility]) I50.9  Respiratory failure with hypoxia and hypercapnia ([facility]) J96.91, J96.92  
 Acute on chronic respiratory failure with hypoxia and hypercapnia ([facility]) J96.21, J96.22  
 Cardiac arrest ([facility]) I46.9  
  
 
 
 
54 y/o male with hx of morbid obesity, MARIA FERNANDA, chronic afib on anticoagulation, and systolic CHF (EF 80-98%) is admitted with respiratory failure with hypoxia and hypercapnia, he required to be intubated. 04/23/2020 Patient self extubated this morning and went into PEA arrest, he was re intubated, difficult intubation. He is awake and post resuscitation, placed on sedation. Requiring high PEEP. 
 
04/24/2020 Patient again tried to self extubate in spite of sedation. 04/25/2020 ET tube leak, ET tube exchanged 04/27/2020 Had bleeding around IJ site, heparin stopped CRITICAL CARE PLAN Resp - See vent orders, VAP bundle. HOB>30 degrees. Acute respiratory failure - with hypoxia and hypercapnia, follow ABGs, CXR. 
MARIA FERNANDA/OHS - On solumedrol ID - Follow up resp, cx. ANTIBIOTICS off antibiotics Seen by ID 
COVID 19 negative x 2 
 
[facility] - Monitor HD. Hypotension - resolved, off pressors Paroxysmal atrial flutter - monitor rate, was on amiodarone, now NSR. Acute on chronic systolic CHF -  
Lasix on hold due to hypotension Echo with EF of 45-50% Elevated troponin - NSTEMI type II due to PEA arrest 
Cardiology following. Heme/onc - Follow H&H, plts. Renal - Trend BUN, Cr, follow I/O, daniel in place. Check and replace Mg, K, phos. Hypernatremia - improving Nephrology following. Endocrine -  Follow FSG Neuro/ Pain/ Sedation - Fentanyl, versed prn. Sedation bundle. GI - NPO for now, OG tube, tube feeding. Morbid obesity Prophylaxis - DVT: , GI: protonix Condition critical with MSOF. [id number] 
35 minutes of critical care time spent in the direct evaluation and treatment of this high risk patient. The reason for providing this level of medical care for this critically ill patient was due a critical illness that impaired one or more vital organ systems such that there was a high probability of imminent or life threatening deterioration in the patients condition. This care involved high complexity decision making to assess, manipulate, and support vital system functions, to treat this degreee vital organ system failure and to prevent further life threatening deterioration of the patients condition. Disposition : TBD Physical Exam: 
General: As above   
HEENT: NC, Atraumatic. PERRLA, anicteric sclerae. Lungs: CTA Bilaterally. Distant breath sounds secondary to body habitus. Heart:  S1 S2, No murmur, No Rubs, No Gallops Abdomen: Soft, Non distended, obese, Non tender.  +Bowel sounds, Extremities: LE edema Vital signs/Intake and Output: 
Visit Vitals /61 Pulse 75 Temp 99.5 °F (37.5 °C) Resp 20 Ht 5' 10\" (1.778 m) Wt (!) 173.4 kg (382 lb 4.8 oz) SpO2 99% BMI 54.85 kg/m² Current Shift:  04/28 0701 - 04/28 1900 In: -  
Out: 850 [Urine:850] Last three shifts:  04/26 1901 - 04/28 0700 In: 600 Out: 2550 [Urine:2550] Labs: Results:  
   
Chemistry Recent Labs  
  04/28/20 
0530 04/27/20 
0350 04/26/20 
0515 GLU 93 114* 107* * 147* 146*  
K 4.3 4.2 4.6  108 106 CO2 36* 35* 36* BUN 37* 32* 33* CREA 1.07 1.16 1.50* CA 8.3* 8.3* 8.3* AGAP 3 4 4 BUCR 35* 28* 22* AP 35* 41* 46  
TP 6.1* 6.6 7.1 ALB 3.0* 2.8* 3.2*  
GLOB 3.1 3.8 3.9 AGRAT 1.0 0.7* 0.8 CBC w/Diff Recent Labs  
  04/28/20 
0530 04/28/20 
0210 04/27/20 
1810 WBC 7.2 7.5 6.7  
RBC 4.31* 4.40* 4.42* HGB 11.3* 11.6* 11.8* HCT 38.6 39.5 39.5 PLT 94* 87* 96* GRANS 77* 78* 81* LYMPH 13* 11* 9* EOS 0 0 0 Cardiac Enzymes Recent Labs  
  04/26/20 
0515  CKND1 1.6 Coagulation Recent Labs  
  04/28/20 
0530 04/28/20 
0210 APTT 29.0 96.1* Lipid Panel No results found for: CHOL, CHOLPOCT, CHOLX, CHLST, CHOLV, 827074, HDL, HDLP, LDL, LDLC, DLDLP, 612759, VLDLC, VLDL, TGLX, TRIGL, TRIGP, TGLPOCT, CHHD, CHHDX  
BNP No results for input(s): BNPP in the last 72 hours. Liver Enzymes Recent Labs  
  04/28/20 
0530 TP 6.1* ALB 3.0* AP 35* SGOT 11 Thyroid Studies Lab Results Component Value Date/Time TSH 1.45 04/27/2020 03:50 AM  
    
Procedures/imaging: see electronic medical records for all procedures/Xrays and details which were not copied into this note but were reviewed prior to creation of Plan

## 2020-04-28 NOTE — PROGRESS NOTES
NUTRITION UPDATE 
 
- EN: per MD start trickle feeds of Vital High Protein @10ml/hr for 24-48 hours; per MD do not add free H20 flushes Will continue with trickle feeds for 24-48hours Kennedy Cross, RD 
PAGER:  [phone]

## 2020-04-29 NOTE — PROGRESS NOTES
Cardiology Progress Note Patient: Lilia Khan. Sex: male          DOA: 4/21/2020 YOB: 1975      Age:  53 y.o.        LOS:  LOS: 7 days Patient seen and examined, chart reviewed. Assessment/Plan Patient Active Problem List  
Diagnosis Code  Cellulitis of left lower leg L03. 116  
 Anemia D64.9  Morbid obesity ([facility]) E66.01  
 Dyspnea R06.00  Sleep apnea G47.30  Sinus tachycardia R00.0  BMI 70 and over, adult [facility]) R52.31  Chronic back pain M54.9, G89.29  
 Arthritis M19.90  Migraine G43.909  
 History of atrial flutter Z86.79  
 Limited mobility Z74.09  
 Smoking F17.200  Chronic renal insufficiency, stage II (mild) N18.2  CHF (congestive heart failure) ([facility]) I50.9  Acute diastolic CHF (congestive heart failure) ([facility]) I50.31   Atrial flutter ([facility]) I48.92  
 Elevated troponin R79.89  
 Acute respiratory distress R06.03  
 Acute CHF (congestive heart failure) ([facility]) I50.9  Respiratory failure with hypoxia and hypercapnia ([facility]) J96.91, J96.92  
 Acute on chronic respiratory failure with hypoxia and hypercapnia ([facility]) J96.21, J96.22  
 Cardiac arrest ([facility]) I46.9 Acute on chronic systolic heart failure NSTEMI type II due to PEA arrest 
Paroxysmal atrial flutter Thrombocytopenia Patient self extubated and had PEA arrest. 
He had brief episode of A fib with RVR and was started On Amiodarone drip. Currently he is in Sinus rhythm. Amiodarone discontinued due to bradycardia. 
  
Echocardiogram  
  
· Left Ventricle: Normal wall thickness and systolic function (ejection fraction normal). Mildly dilated left ventricle. The estimated ejection fraction is 45 - 50%. There is moderate (grade 2) left ventricular diastolic dysfunction E/e' Ratio = 17.09. Wall Scoring: The left ventricular wall motion is globally hypokinetic. · Right Ventricle: Not well visualized. Normal global systolic function. Moderately dilated right ventricle. · Right Atrium: Right atrium not well visualized. Moderately dilated right atrium. · IVC/Hepatic Veins: Dilated inferior vena cava. Mechanically ventilated; cannot use inferior caval vein diameter to estimate central venous pressure. 
  
IV heparin was discontinued due to thrombocytopenia and bleeding at central line site. Plan: 
 
Continue Aspirin Continue IV Bumex Strict I/O Continue Ventilatory support. Continue management as per hospital medicine Plan discussed with patient's nurse. Subjective:  
 cc: 
Orally intubated REVIEW OF SYSTEMS:  
 
Can not obtain Objective:  
  
Visit Vitals /56 Pulse (!) 55 Temp 99.1 °F (37.3 °C) Resp 20 Ht 5' 10\" (1.778 m) Wt (!) 173.4 kg (382 lb 4.8 oz) SpO2 96% BMI 54.85 kg/m² Body mass index is 54.85 kg/m². Physical Exam: 
General Appearance: Comfortable, Morbidly obese, not using accessory muscles of respiration. Orally intubated HEENT: AMMON. HEAD: Atraumatic NECK: No JVD, no thyroidomeglay. CAROTIDS: No bruit LUNGS: bilateral conducted sounds HEART: S1+S2 audible, no murmur, no pericardial rub. NEUROLOGICAL: Sedated. Medication: 
Current Facility-Administered Medications Medication Dose Route Frequency  methylPREDNISolone (PF) (SOLU-MEDROL) injection 30 mg  30 mg IntraVENous Q12H  chlorhexidine (PERIDEX) 0.12 % mouthwash 10 mL  10 mL Oral Q12H  
 albuterol-ipratropium (DUO-NEB) 2.5 MG-0.5 MG/3 ML  3 mL Nebulization Q6H PRN  
 bumetanide (BUMEX) injection 1 mg  1 mg IntraVENous BID  acetaminophen (TYLENOL) tablet 650 mg  650 mg Oral Q6H PRN  
 cisatracurium (NIMBEX) 100 mg in 0.9% sodium chloride 100 mL infusion  0-10 mcg/kg/min IntraVENous TITRATE  ELECTROLYTE REPLACEMENT PROTOCOL - Calcium   1 Each Other PRN  
 ELECTROLYTE REPLACEMENT PROTOCOL - Magnesium   1 Each Other PRN  
 ELECTROLYTE REPLACEMENT PROTOCOL - Potassium Standard Dosing   1 Each Other PRN  
 ELECTROLYTE REPLACEMENT PROTOCOL - Phosphorus  Standard Dosing  1 Each Other PRN  pantoprazole (PROTONIX) 40 mg in 0.9% sodium chloride 10 mL injection  40 mg IntraVENous DAILY  fentaNYL (PF) 900 mcg/30 ml infusion soln  0-150 mcg/hr IntraVENous TITRATE  midazolam in normal saline (VERSED) 1 mg/mL infusion  0-10 mg/hr IntraVENous TITRATE  aspirin chewable tablet 81 mg  81 mg Oral DAILY  sodium chloride (NS) flush 5-40 mL  5-40 mL IntraVENous Q8H  
 sodium chloride (NS) flush 5-40 mL  5-40 mL IntraVENous PRN  
 [Held by provider] losartan (COZAAR) tablet 25 mg  25 mg Oral DAILY  [Held by provider] metoprolol succinate (TOPROL-XL) XL tablet 25 mg  25 mg Oral DAILY Facility-Administered Medications Ordered in Other Encounters Medication Dose Route Frequency  propofoL (DIPRIVAN) 10 mg/mL injection   IntraVENous PRN  
 ePHEDrine (PF) (MISTOLE) 10 mg/mL in NS syringe   IntraVENous PRN  
 PHENYLephrine (RUPERT-SYNEPHRINE) 100 mcg/mL in NS syringe   IntraVENous PRN Lab/Data Reviewed: 
 
  
Recent Labs  
  04/28/20 
0530 04/28/20 
0210 04/27/20 
1810 WBC 7.2 7.5 6.7 HGB 11.3* 11.6* 11.8* HCT 38.6 39.5 39.5 PLT 94* 87* 96* Recent Labs  
  04/28/20 
0530 04/27/20 
0350 04/26/20 
0515 * 147* 146*  
K 4.3 4.2 4.6  108 106 CO2 36* 35* 36* GLU 93 114* 107* BUN 37* 32* 33* CREA 1.07 1.16 1.50* CA 8.3* 8.3* 8.3*  
 
 
41 minutes of critical care time spent in the direct evaluation and treatment of this high risk patient. The reason for providing this level of medical care for this critically ill patient was due a critical illness that impaired one or more vital organ systems such that there was a high probability of imminent or life threatening deterioration in the patients condition.  This care involved high complexity decision making to assess, manipulate, and support vital system functions, to treat this degree vital organ system failure and to prevent further life threatening deterioration of the patients condition. Signed By: Marika Kim MD   
 April 28, 2020

## 2020-04-29 NOTE — PROGRESS NOTES
Bedside shift change report received from [address]. Report included the following information SBAR, Kardex, Intake/Output, MAR, Recent Results, Med Rec Status and Cardiac Rhythm Sinus Cherylin Earnest and plan of care. Nimbex at 3mcg/kg/min. Fentanyl at 100mcg/hr. Versed at 5mg/hr. 0800: Shift assessment complete. See flowsheet. Train of four complete. See flowsheet. MD aware heart rate maintains in 40's. 
1000: Train of four complete. See flowsheet. 1200: Reassessment complete. See flowsheet. Train of four complete. See flowsheet. 1400: Train of four complete. See flowsheet. 1600: Reassessment complete. See flowsheet. Train of four complete. See flowsheet. 1800: Train of four complete. See flowsheet. Medications titrated throughout shift see MAR. Bedside shift change report given to [address]. Report included the following information SBAR, Kardex, Intake/Output, MAR, Recent Results, Med Rec Status and Cardiac Rhythm Sinus Cherylin Earnest and plan of care.

## 2020-04-29 NOTE — PROGRESS NOTES
1900- Received care and report from SAVANA Saunders RN. Intubated, pharmacologically paralyzed, Arterial line and PICC,  and daniel noted. Order from Dr. Lacretia Severin to Avoid any turning or cleaning to minimize movements noted. 2000- Completed shift assessment per flow sheet. TOF 6 with 2 twitches. Resting quietly with eyes closed. Will continue to monitor. 2200- TOF 7 with 1 twitch. 0000- Reassessment completed. TOF 7 with 1 twitch. Resting quietly with eyes closed. Will continue to monitor. 0200- TOF 7 with 2 twitches. 0400- Reassessment completed per flow sheet. TOF 6 with 2 twitches. Resting quietly with eyes closed. Bath completed to anterior portion of body. Will continue to monitor. 0600- TOF 6 with 2 twitches.

## 2020-04-29 NOTE — PROGRESS NOTES
Hospitalist Progress Note Patient: Sandra Thomas MRN: [id number]  [facility]: [id number] YOB: 1975  Age: 53 y.o. Sex: male DOA: 4/21/2020 LOS:  LOS: 8 days Assessment/Plan Patient Active Problem List  
Diagnosis Code  Cellulitis of left lower leg L03. 116  
 Anemia D64.9  Morbid obesity ([facility]) E66.01  
 Dyspnea R06.00  Sleep apnea G47.30  Sinus tachycardia R00.0  BMI 70 and over, adult [facility]) P09.50  Chronic back pain M54.9, G89.29  
 Arthritis M19.90  Migraine G43.909  
 History of atrial flutter Z86.79  
 Limited mobility Z74.09  
 Smoking F17.200  Chronic renal insufficiency, stage II (mild) N18.2  CHF (congestive heart failure) ([facility]) I50.9  Acute diastolic CHF (congestive heart failure) ([facility]) I50.31  Chronic a-fib I48.20  Atrial flutter ([facility]) I48.92  
 Elevated troponin R79.89  
 Acute respiratory distress R06.03  
 Acute CHF (congestive heart failure) ([facility]) I50.9  Respiratory failure with hypoxia and hypercapnia ([facility]) J96.91, J96.92  
 Acute on chronic respiratory failure with hypoxia and hypercapnia ([facility]) J96.21, J96.22  
 Cardiac arrest ([facility]) I46.9  
  
 
 
 
54 y/o male with hx of morbid obesity, MARIA FERNANDA, chronic afib on anticoagulation, and systolic CHF (EF 38-40%) is admitted with respiratory failure with hypoxia and hypercapnia, he required to be intubated. 04/23/2020 Patient self extubated this morning and went into PEA arrest, he was re intubated, difficult intubation. He is awake and post resuscitation, placed on sedation. Requiring high PEEP. 
 
04/24/2020 Patient again tried to self extubate in spite of sedation. 04/25/2020 ET tube leak, ET tube exchanged 04/27/2020 Had bleeding around IJ site, heparin stopped, H/H stable CRITICAL CARE PLAN Resp - See vent orders, VAP bundle. HOB>30 degrees. Acute respiratory failure - with hypoxia and hypercapnia, follow ABGs, CXR. 
MARIA FERNANDA/OHS - On solumedrol ID - Follow up resp, cx. ANTIBIOTICS off antibiotics Seen by ID 
COVID 19 negative x 2 
 
[facility] - [facility]. Hypotension - resolved, off pressors Paroxysmal atrial flutter - monitor rate, was on amiodarone. Has been bradycardic, toprol xl held Acute on chronic systolic CHF - On bumex Echo with EF of 45-50% Elevated troponin - NSTEMI type II due to PEA arrest 
Cardiology following. Heme/onc - Follow H&H, plts. Thrombocytopenia - improving Renal - Trend BUN, Cr, follow I/O, daniel in place. Check and replace Mg, K, phos. Hypernatremia - improving Nephrology following. Endocrine -  Follow [facility] Neuro/ Pain/ Sedation - Fentanyl, versed prn. Sedation bundle. GI - NPO for now, OG tube, tube feeding. Morbid obesity Prophylaxis - DVT: , GI: protonix Condition critical with MSOF. [id number] 
35 minutes of critical care time spent in the direct evaluation and treatment of this high risk patient. The reason for providing this level of medical care for this critically ill patient was due a critical illness that impaired one or more vital organ systems such that there was a high probability of imminent or life threatening deterioration in the patients condition. This care involved high complexity decision making to assess, manipulate, and support vital system functions, to treat this degreee vital organ system failure and to prevent further life threatening deterioration of the patients condition. Disposition : TBD Physical Exam: 
General: As above   
HEENT: NC, Atraumatic. PERRLA, anicteric sclerae. Lungs: CTA Bilaterally. Distant breath sounds secondary to body habitus. Heart:  S1 S2, No murmur, No Rubs, No Gallops Abdomen: Soft, Non distended, obese, Non tender.  +Bowel sounds, Extremities: LE edema Vital signs/Intake and Output: 
Visit Vitals /67 Pulse (!) 47 Temp 98.8 °F (37.1 °C) Resp 20 Ht 5' 10\" (1.778 m) Wt (!) 220 kg (485 lb) SpO2 93% BMI 69.59 kg/m² Current Shift:  04/29 0701 - 04/29 1900 In: 119.4 [I.V.:59.4] Out: 1400 [[facility]:[id number]] Last three shifts:  04/27 1901 - 04/29 0700 In: 4232.5 [I.V.:3542.5] [facility] 3531 [facility] Labs: Results:  
   
Chemistry Recent Labs  
  04/29/20 
0400 04/28/20 
0530 04/27/20 
0350 * 93 114*  146* 147* K 4.5 4.3 4.2  107 108 CO2 35* 36* 35* BUN 36* 37* 32* CREA 0.98 1.07 1.16  
CA 8.6 8.3* 8.3* AGAP 3 3 4 BUCR 37* 35* 28* AP 35* 35* 41* TP 6.5 6.1* 6.6 ALB 3.1* 3.0* 2.8*  
GLOB 3.4 3.1 3.8 AGRAT 0.9 1.0 0.7* CBC w/Diff Recent Labs  
  04/29/20 0400 04/28/20 
0530 04/28/20 
0210 WBC 6.8 7.2 7.5  
RBC 4.43* 4.31* 4.40* HGB 11.8* 11.3* 11.6* HCT 39.5 38.6 39.5 * 94* 87* GRANS 82* 77* 78* LYMPH 10* 13* 11* EOS 0 0 0 Cardiac Enzymes No results for input(s): CPK, CKND1, CONSTANZA in the last 72 hours. No lab exists for component: Ta Caitlin Coagulation Recent Labs  
  04/28/20 
0530 04/28/20 
0210 APTT 29.0 96.1* Lipid Panel No results found for: CHOL, CHOLPOCT, CHOLX, CHLST, CHOLV, [id number], HDL, HDLP, LDL, LDLC, DLDLP, [id number], VLDLC, VLDL, TGLX, TRIGL, TRIGP, TGLPOCT, CHHD, CHHDX  
BNP No results for input(s): BNPP in the last 72 hours. Liver Enzymes Recent Labs  
  04/29/20 
0400 TP 6.5 ALB 3.1* AP 35* SGOT 14 Thyroid Studies Lab Results Component Value Date/Time TSH 1.45 04/27/2020 03:50 AM  
    
Procedures/imaging: see electronic medical records for all procedures/Xrays and details which were not copied into this note but were reviewed prior to creation of Plan

## 2020-04-29 NOTE — PROGRESS NOTES
Pulmonary, Critical Care, and Sleep Medicine Pulmonary Progress Note Name: Mac Sanchez. : 1975 MRN: [id number] Date: 2020 [x]I have reviewed the flowsheet and previous days notes. Events, vitals, medications and notes from last 24 hours reviewed. IMPRESSION:  
Patient Active Problem List  
Diagnosis Code  Acute on chronic respiratory failure with hypoxia and hypercapnia ([facility]) J96.21, J96.22  
 Acute on chronic systolic and diastolic CHF (congestive heart failure) ([facility]) I50.43 Aspiration pneumonitis possible with multiple patient initiated ET tube displacement events J69.0  Cardiac arrest ([facility] [address] I46.9 Staph Aureus in sputum culture A49.01  
 Elevated troponin, NSTEMI R79.89, I21.4  Chronic a-fib I48.20  Atrial flutter ([facility] [address] I48.92 Prolonged QTc R94.31  
 Chronic renal insufficiency, stage II (mild) N18.2 Thrombocytopenia D69.6  Smoking F17.200  Arthritis M19.90  Migraine G43.909  Chronic back pain M54.9, G89.29  
 Obstructive Sleep apnea G47.33  
 Morbid obesity ([facility]) E66.01  
  
PLAN:  
[facility]. Ventilated patients- aim to keep peak plateau pressure less than or equal to 30cm H2O. Titrate FiO2 for goal SPO2> 91%. Titrating PEEP today as tolerated CXR and ABG daily VAP prevention bundle, head of the bed at 30' all times Daily sedation and paralytic holiday when feasible and assessment for weaning with SBT as tolerated. Monitor TOF on Nimbex Continue bronchodilators, pulmonary hygiene care Steroid, taper today Sepsis bundle per hospital protocol SARS-COV-2 negative x 2 Antibiotic choice: low threshold to start patient on Zosyn and Vancomycin if febrile or leukocytosis. Patient may have aspirated since had multiple witnessed episodes when he dislodged his ET tube and or with ET tube cuff malfunctioned requiring exchange Monitor hemodynamics -stable and off of vasopressors since 20 AM. May restart as needed and actively titrate vasopressors aim SBP > 100 mm Hg Was on Amiodarone, has been off Off of Heparin given bleeding around RT IJ insertion site. Awaiting HIT  CBC, PTT, any signs of bleeding/hematoma PVL bl LE negative Continue Bumex 1 mg twice a day Avoid free H2O; consider conservative fluid volume strategy with current ventilator support need. Appreciate renal input Continue TF at trickle rate Glycemic control AM labs Central Line, Daniel and Vent Bundles will be followed, Vent Day 8 Full code. Prognosis guarded Will defer respective systems problem management to primary and other consultant and follow patient in ICU with primary and other medical team 
Quality Care: PPI, DVT prophylaxis, HOB elevated, Infection control all reviewed and addressed. PAIN AND SEDATION: Fentanyl, Versed, Nimbex · Skin/Wound: chronic venous stasis changes · Nutrition: TF 
· Prophylaxis: DVT and GI Prophylaxis reviewed. · Restraints: none while on paralytic; may place during weaning process or when off of paralytic 
· PT/OT eval and treat: as needed when stable · Lines/Tubes: lines RT IJ 4/22/20; daniel 4/21/20, ET tube 4/25/20 ADVANCE DIRECTIVE: Full code DISCUSSION: spoke with RN, RT, ICU staff Events and notes from last 24 hours reviewed. Care plan discussed with nursing Subjective/History:  
25-year-old male with morbid obesity, ex-smoker, history of asthma, history of severe obstructive sleep apnea on CPAP, questionable component compliance, history of congestive heart failure with reduced ejection fraction atrial fibrillation atrial flutter on amiodarone, Xarelto, history of chronic renal insufficiency, cellulitis of lower extremities and anemia. As per family did not have any fever or upper respiratory tract infections and no flulike symptoms. On Lasix, CPAP not clear if missed.  Presented with acute shortness of breath worsening edema of lower extremities. He was found to be in acute hypercarbic hypoxemic respiratory failure initially had a trial of BiPAP but failed and then eventually was intubated. He was hypotensive. Moderate amount of secretions. Morbid obesity. Patient compliant with Xarelto. Patient self extubated and had PEA arrest. 
 
Subjective:  
04/29/20 Patient in ICU on ventilator; FiO2 and PEEP being actively adjusted by RT per SPO2 and ABG this AM 
Airway pressure with PP below 30 cm H2O; on sedation and paralytic with good ventilator synchrony Hemodynamically stable. Good UO with diuresis. Afebrile. Tolerating TF at low rate No further bleeding around IJ insertion or other sites and off of Heparin ROS: Review of systems not obtained due to patient factors. Latest lactic acid:  
Lactic acid Date Value Ref Range Status 04/28/2020 1.4 0.4 - 2.0 MMOL/L Final  
04/27/2020 2.1 (HH) 0.4 - 2.0 MMOL/L Final  
  Comment:  
  CALLED TO AND CORRECTLY REPEATED BY: 
Alvin Hernandez RN, ICU ON 04/27/2020 AT 0450 TO JDA 
  
04/27/2020 2.0 0.4 - 2.0 MMOL/L Final  
 
 
Past Medical History: 
Past Medical History:  
Diagnosis Date  Arrhythmia  Arthritis   
 knees  CHF (congestive heart failure) ([facility] [address]  Chronic back pain  Chronic renal insufficiency, stage II (mild)  History of atrial flutter Dx 6/2016 - anticoagulated by Erby Persons  Hypertension 2015  Limited mobility  Migraine headache  Morbid obesity ([facility] [address]  Morbid obesity with body mass index of 60.0-69.9 in adult [facility])  Sleep apnea   
 uses c-pap instructed to bring day of surgery  Smoking   
 very passive / cigars only Allergy: No Known Allergies Vital Signs:   
Blood pressure 120/67, pulse (!) 47, temperature 98.8 °F (37.1 °C), resp. rate 20, height 5' 10\" (1.778 m), weight (!) 220 kg (485 lb), SpO2 93 %. Body mass index is 69.59 kg/m². O2 Device: Endotracheal tube, Ventilator O2 Flow Rate (L/min): 12 l/min Temp (24hrs), Av °F (37.2 °C), Min:97.2 °F (36.2 °C), Max:99.6 °F (37.6 °C) Intake/Output:  
Last shift:       07 - 1900 In: 119.4 [I.V.:59.4] Out: 1400 [BLOBQ:9846] Last 3 shifts: 1901 -  07 In: 4232.5 [I.V.:3542.5] Out: 3287 [ZGHXB:6563] Intake/Output Summary (Last 24 hours) at 2020 1212 Last data filed at 2020 1148 Gross per 24 hour Intake 3751. 85 ml Output 3300 ml Net 451.85 ml Ventilator Settings: 
Mode Rate Tidal Volume Pressure FiO2 PEEP Assist control, VC+   580 ml    60 % 14 cm H20(increased) Peak airway pressure: 34 cm H2O Minute ventilation: 11.8 l/min Pl pressure goals less than or equal to 30. Physical Exam: 
General: sedated, on paralytic, in no distress, appears older than stated age, morbidly obese, on ventilator HEENT: PERRL, fundi benign, orally intubatyed Neck: No abnormally enlarged lymph nodes or thyroid, supple; RT IJ site without active bleeding, small hematoma could not be ruled out Chest: normal, obese chest wall Lungs: moderate air entry, few rhonchi scattered bilaterally, normal percussion anterior chest bilaterally but limited, no tenderness/ rash Heart: Regular rate and rhythm, S1S2 present or without murmur or extra heart sounds Abdomen: obese, bowel sounds normoactive, tympanic, soft without significant tenderness, masses, organomegaly or guarding, rigidity, rebound Extremity: 2-3+, pitting and bl LE and 3+ bl UE edema; negative for cyanosis, clubbing Capillary refill: normal 
Neuro: sedated, on paralytic, no involuntary movements, exam limitations Skin: Skin color, texture, turgor fair. Skin dry, warm, non-diaphoretic DATA:  
Current Facility-Administered Medications Medication Dose Route Frequency  bumetanide (BUMEX) injection 1 mg  1 mg IntraVENous TID  methylPREDNISolone (PF) (SOLU-MEDROL) injection 30 mg  30 mg IntraVENous Q12H  chlorhexidine (PERIDEX) 0.12 % mouthwash 10 mL  10 mL Oral Q12H  
 albuterol-ipratropium (DUO-NEB) 2.5 MG-0.5 MG/3 ML  3 mL Nebulization Q6H PRN  
 acetaminophen (TYLENOL) tablet 650 mg  650 mg Oral Q6H PRN  
 cisatracurium (NIMBEX) 100 mg in 0.9% sodium chloride 100 mL infusion  0-10 mcg/kg/min IntraVENous TITRATE  ELECTROLYTE REPLACEMENT PROTOCOL - Calcium   1 Each Other PRN  
 ELECTROLYTE REPLACEMENT PROTOCOL - Magnesium   1 Each Other PRN  
 ELECTROLYTE REPLACEMENT PROTOCOL - Potassium Standard Dosing   1 Each Other PRN  
 ELECTROLYTE REPLACEMENT PROTOCOL - Phosphorus  Standard Dosing  1 Each Other PRN  pantoprazole (PROTONIX) 40 mg in 0.9% sodium chloride 10 mL injection  40 mg IntraVENous DAILY  fentaNYL (PF) 900 mcg/30 ml infusion soln  0-150 mcg/hr IntraVENous TITRATE  midazolam in normal saline (VERSED) 1 mg/mL infusion  0-10 mg/hr IntraVENous TITRATE  aspirin chewable tablet 81 mg  81 mg Oral DAILY  sodium chloride (NS) flush 5-40 mL  5-40 mL IntraVENous Q8H  
 sodium chloride (NS) flush 5-40 mL  5-40 mL IntraVENous PRN  
 [Held by provider] losartan (COZAAR) tablet 25 mg  25 mg Oral DAILY  [Held by provider] metoprolol succinate (TOPROL-XL) XL tablet 25 mg  25 mg Oral DAILY Facility-Administered Medications Ordered in Other Encounters Medication Dose Route Frequency  propofoL (DIPRIVAN) 10 mg/mL injection   IntraVENous PRN  
 ePHEDrine (PF) (MISTOLE) 10 mg/mL in NS syringe   IntraVENous PRN  
 PHENYLephrine (RUPERT-SYNEPHRINE) 100 mcg/mL in NS syringe   IntraVENous PRN Telemetry: [x]Sinus []A-flutter []Paced []A-fib []Multiple PVCs  
              
4/22/20 ECHO · Left Ventricle: Normal wall thickness and systolic function (ejection fraction normal). Mildly dilated left ventricle. The estimated ejection fraction is 45 - 50%. There is moderate (grade 2) left ventricular diastolic dysfunction E/e' Ratio = 17.09. Wall Scoring:  The left ventricular wall motion is globally hypokinetic. · Right Ventricle: Not well visualized. Normal global systolic function. Moderately dilated right ventricle. · Right Atrium: Right atrium not well visualized. Moderately dilated right atrium. · IVC/Hepatic Veins: Dilated inferior vena cava. Mechanically ventilated; cannot use inferior caval vein diameter to estimate central venous pressure. Labs: 
Recent Results (from the past 24 hour(s)) GLUCOSE, POC Collection Time: 04/28/20  6:11 PM  
Result Value Ref Range Glucose (POC) 101 70 - 110 mg/dL GLUCOSE, POC Collection Time: 04/28/20 11:30 PM  
Result Value Ref Range Glucose (POC) 91 70 - 110 mg/dL METABOLIC PANEL, COMPREHENSIVE Collection Time: 04/29/20  4:00 AM  
Result Value Ref Range Sodium 145 136 - 145 mmol/L Potassium 4.5 3.5 - 5.5 mmol/L Chloride 107 100 - 111 mmol/L  
 CO2 35 (H) 21 - 32 mmol/L Anion gap 3 3.0 - 18 mmol/L Glucose 110 (H) 74 - 99 mg/dL BUN 36 (H) 7.0 - 18 MG/DL Creatinine 0.98 0.6 - 1.3 MG/DL  
 BUN/Creatinine ratio 37 (H) 12 - 20 GFR est AA >60 >60 ml/min/1.73m2 GFR est non-AA >60 >60 ml/min/1.73m2 Calcium 8.6 8.5 - 10.1 MG/DL Bilirubin, total 0.8 0.2 - 1.0 MG/DL  
 ALT (SGPT) 23 16 - 61 U/L  
 AST (SGOT) 14 10 - 38 U/L Alk. phosphatase 35 (L) 45 - 117 U/L Protein, total 6.5 6.4 - 8.2 g/dL Albumin 3.1 (L) 3.4 - 5.0 g/dL Globulin 3.4 2.0 - 4.0 g/dL A-G Ratio 0.9 0.8 - 1.7    
CBC WITH AUTOMATED DIFF Collection Time: 04/29/20  4:00 AM  
Result Value Ref Range WBC 6.8 4.6 - 13.2 K/uL  
 RBC 4.43 (L) 4.70 - 5.50 M/uL  
 HGB 11.8 (L) 13.0 - 16.0 g/dL HCT 39.5 36.0 - 48.0 % MCV 89.2 74.0 - 97.0 FL  
 MCH 26.6 24.0 - 34.0 PG  
 MCHC 29.9 (L) 31.0 - 37.0 g/dL  
 RDW 14.9 (H) 11.6 - 14.5 % PLATELET 204 (L) 990 - 420 K/uL MPV 10.3 9.2 - 11.8 FL  
 NEUTROPHILS 82 (H) 40 - 73 % LYMPHOCYTES 10 (L) 21 - 52 % MONOCYTES 8 3 - 10 % EOSINOPHILS 0 0 - 5 % BASOPHILS 0 0 - 2 %  
 ABS. NEUTROPHILS 5.5 1.8 - 8.0 K/UL  
 ABS. LYMPHOCYTES 0.7 (L) 0.9 - 3.6 K/UL  
 ABS. MONOCYTES 0.6 0.05 - 1.2 K/UL  
 ABS. EOSINOPHILS 0.0 0.0 - 0.4 K/UL  
 ABS. BASOPHILS 0.0 0.0 - 0.1 K/UL  
 DF AUTOMATED MAGNESIUM Collection Time: 04/29/20  4:00 AM  
Result Value Ref Range Magnesium 2.8 (H) 1.6 - 2.6 mg/dL PHOSPHORUS Collection Time: 04/29/20  4:00 AM  
Result Value Ref Range Phosphorus 2.7 2.5 - 4.9 MG/DL  
CALCIUM, IONIZED Collection Time: 04/29/20  4:00 AM  
Result Value Ref Range Ionized Calcium 1.17 1.12 - 1.32 MMOL/L  
POC G3 Collection Time: 04/29/20  4:54 AM  
Result Value Ref Range Device: VENT    
 FIO2 (POC) 0.65 % pH (POC) 7.398 7.35 - 7.45    
 pCO2 (POC) 52.9 (H) 35.0 - 45.0 MMHG  
 pO2 (POC) 76 (L) 80 - 100 MMHG  
 HCO3 (POC) 32.5 (H) 22 - 26 MMOL/L  
 sO2 (POC) 94 92 - 97 % Base excess (POC) 8 mmol/L Mode ASSIST CONTROL Tidal volume 580 ml Set Rate 20 bpm  
 PEEP/CPAP (POC) 18 cmH2O  
 PIP (POC) 32 Allens test (POC) N/A Inspiratory Time 0.9 sec Total resp. rate 20 Site DRAWN FROM ARTERIAL LINE Patient temp. 99.1 Specimen type (POC) ARTERIAL Performed by Inderjit Peña Volume control plus YES    
GLUCOSE, POC Collection Time: 04/29/20  5:25 AM  
Result Value Ref Range Glucose (POC) 111 (H) 70 - 110 mg/dL GLUCOSE, POC Collection Time: 04/29/20 11:45 AM  
Result Value Ref Range Glucose (POC) 102 70 - 110 mg/dL Recent Labs  
  04/29/20 
0454 04/28/20 
0353 04/27/20 
0341 FIO2I 0.65 0.65 0.90 HCO3I 32.5* 34.0* 31.2*  
PCO2I 52.9* 57.1* 47.2* PHI 7.398 7.385 7.429 PO2I 76* 71* 76* All Micro Results Procedure Component Value Units Date/Time CULTURE, RESPIRATORY/SPUTUM/BRONCH Hortensia Cordial STAIN [[id number]]  (Abnormal)  (Susceptibility) Collected:  04/22/20 1018 Order Status:  Completed Specimen:  Sputum from Tracheal Aspirate Updated:  04/25/20 8434 Special Requests: NO SPECIAL REQUESTS     
  GRAM STAIN FEW WBCS SEEN     
      
  OCCASIONAL EPITHELIAL CELLS SEEN  
     
      
  FEW GRAM POSITIVE COCCI IN CLUSTERS  
     
   RARE GRAM NEGATIVE RODS Culture result:    
  LIGHT STAPHYLOCOCCUS AUREUS  
     
      
  LIGHT NORMAL RESPIRATORY BRYN  
     
 CULTURE, BLOOD [[id number]] Collected:  04/24/20 1015 Order Status:  Canceled Specimen:  Blood Imaging: 
[x]I have personally reviewed the patients chest radiographs images and report 4/29/20 [facility]21/20 XR CHEST PORT Narrative EXAM: XR CHEST PORT 
 
CLINICAL INDICATION/HISTORY: Aspiration pneumonia, ET tube placement 
-Additional: None COMPARISON: 4/27/2020 TECHNIQUE: Portable frontal view of the chest 
 
_______________ FINDINGS: 
 
SUPPORT DEVICES: Right IJV central venous catheter unchanged. Endotracheal tube 
unchanged. HEART AND MEDIASTINUM: Cardiomegaly. LUNGS AND PLEURAL SPACES: Limited study secondary to body habitus and exclusion 
of the left costophrenic angle. Central vascular congestion with moderate 
bilateral effusions and pulmonary edema. No pneumothorax.  
 
_______________ Impression IMPRESSION: 
 
Limited study secondary to body habitus and exclusion of the left costophrenic 
angle. Central vascular congestion with moderate bilateral effusions and 
pulmonary edema. No pneumothorax. No results found for this or any previous visit. [x]See my orders for details My assessment, plan of care, findings, medications, side effects etc were discussed with: 
[x]nursing [x]Dietician  
[x]respiratory therapy [x]Dr. Claudia Atkinson  
[x]family - wife []Patient [x]Total critical care time exclusive of procedures 39 minutes with complex decision making performed and > 50% time spent in face to face evaluation.  
 
Nam Phillips MD

## 2020-04-29 NOTE — PROGRESS NOTES
I have been just notified by hospital regarding shortage of Versed and Fentanyl which this patient is on both Given prior events of self extubation and use of paralytic, alternatives needed Would start Fentanyl patch and PRN Ativan. RN encouraged to actively lower Versed and Fentanyl IV drip rate and use PRN for any break through Patient does not meet criteria for weaning at present. Renewed order for SCD Cayden Lewis MD

## 2020-04-29 NOTE — PROGRESS NOTES
NUTRITION UPDATE 
 
-tolerating trickle feeds-will hold at trickle feeds for another 24 hours Marlene Strickland, ALEJA 
PAGER:  [phone]

## 2020-04-29 NOTE — PROGRESS NOTES
Assessment:  
 
 
  Respiratory failure with hypoxia and hypercapnia ([facility] [address] (4/21/2020)   
  Morbid obesity ([facility] [address] (4/4/2016)   
  Sleep apnea (4/4/2016)   
  Chronic renal insufficiency, stage II (mild) () 
  
  Chronic a-fib (10/15/2018)   
  Acute CHF (congestive heart failure) ([facility] [address] (4/21/2020)   
  Acute on chronic respiratory failure with hypoxia and hypercapnia ([facility] [address] (4/22/2020)   
  Cardiac arrest ([facility] [address] (4/23/2020)   
  
CKD 
  
Plan:   
  
Increased diuretics for volume removal as BP tolerates D/w Michael Kenney and ICU nurse 
  
  
  
CC: CKD, hypotension, shock Interval History: Pt remains critically ill 
  
  
Subjective:  
unresponsive 
  
 
 
 
 
 
 
 
 
Blood pressure 121/67, pulse (!) 55, temperature 98.9 °F (37.2 °C), resp. rate 19, height 5' 10\" (1.778 m), weight (!) 220 kg (485 lb), SpO2 94 %. Obese Ventilated On Leva Bradycardic Dependant edema of thighs/sacrum 
  
  
 
Intake/Output Summary (Last 24 hours) at 4/29/2020 1000 Last data filed at 4/29/2020 8042 Gross per 24 hour Intake 3632.47 ml Output 3150 ml Net 482.47 ml Recent Labs  
  04/29/20 
0400 WBC 6.8 Lab Results Component Value Date/Time Sodium 145 04/29/2020 04:00 AM  
 Potassium 4.5 04/29/2020 04:00 AM  
 Chloride 107 04/29/2020 04:00 AM  
 CO2 35 (H) 04/29/2020 04:00 AM  
 Anion gap 3 04/29/2020 04:00 AM  
 Glucose 110 (H) 04/29/2020 04:00 AM  
 BUN 36 (H) 04/29/2020 04:00 AM  
 Creatinine 0.98 04/29/2020 04:00 AM  
 BUN/Creatinine ratio 37 (H) 04/29/2020 04:00 AM  
 GFR est AA >60 04/29/2020 04:00 AM  
 GFR est non-AA >60 04/29/2020 04:00 AM  
 Calcium 8.6 04/29/2020 04:00 AM  
  
 
Current Facility-Administered Medications Medication Dose Route Frequency Provider Last Rate Last Dose  bumetanide (BUMEX) injection 1 mg  1 mg IntraVENous TID Kasandra Garsia, DO      
 methylPREDNISolone (PF) (SOLU-MEDROL) injection 30 mg  30 mg IntraVENous Q12H Nitin GROSSMAN MD   30 mg at 04/29/20 6904  chlorhexidine (PERIDEX) 0.12 % mouthwash 10 mL  10 mL Oral Q12H Brian GROSSMAN MD   10 mL at 04/29/20 9306  albuterol-ipratropium (DUO-NEB) 2.5 MG-0.5 MG/3 ML  3 mL Nebulization Q6H PRN Brian GROSSMAN MD   3 mL at 04/28/20 1148  acetaminophen (TYLENOL) tablet 650 mg  650 mg Oral Q6H PRN Roshni Meadows MD      
 cisatracurium (NIMBEX) 100 mg in 0.9% sodium chloride 100 mL infusion  0-10 mcg/kg/min IntraVENous TITRATE Anna Armando MD 29.2 mL/hr at 04/29/20 0410 3 mcg/kg/min at 04/29/20 0410  ELECTROLYTE REPLACEMENT PROTOCOL - Calcium   1 Each Other PRN Anna Armando MD      
 ELECTROLYTE REPLACEMENT PROTOCOL - Magnesium   1 Each Other PRN Anna Armando MD      
 ELECTROLYTE REPLACEMENT PROTOCOL - Potassium Standard Dosing   1 Each Other PRN Anna Armando MD      
 ELECTROLYTE REPLACEMENT PROTOCOL - Phosphorus  Standard Dosing  1 Each Other PRN Anna Armando MD      
 pantoprazole (PROTONIX) 40 mg in 0.9% sodium chloride 10 mL injection  40 mg IntraVENous DAILY Fredy Rosario MD   40 mg at 04/29/20 8742  fentaNYL (PF) 900 mcg/30 ml infusion soln  0-150 mcg/hr IntraVENous TITRATE Fredy Rosario MD 3.3 mL/hr at 04/29/20 0644 100 mcg/hr at 04/29/20 0644  
 midazolam in normal saline (VERSED) 1 mg/mL infusion  0-10 mg/hr IntraVENous TITRATE Anna Armando MD 5 mL/hr at 04/29/20 0533 5 mg/hr at 04/29/20 5147  aspirin chewable tablet 81 mg  81 mg Oral DAILY Anna Armando MD   81 mg at 04/29/20 0900  
 sodium chloride (NS) flush 5-40 mL  5-40 mL IntraVENous Q8H Fredy Rosario MD   10 mL at 04/29/20 8203  sodium chloride (NS) flush 5-40 mL  5-40 mL IntraVENous PRN Fredy Rosario MD      
 [Held by provider] losartan (COZAAR) tablet 25 mg  25 mg Oral DAILY Fredy Rosario MD      
 [Held by provider] metoprolol succinate (TOPROL-XL) XL tablet 25 mg  25 mg Oral DAILY Fredy Rosario MD      
 
 Facility-Administered Medications Ordered in Other Encounters Medication Dose Route Frequency Provider Last Rate Last Dose  propofoL (DIPRIVAN) 10 mg/mL injection   IntraVENous PRN June Durán, CRNA   300 mg at 04/22/20 7708  ePHEDrine (PF) (MISTOLE) 10 mg/mL in NS syringe   IntraVENous PRN June Tioggy, CRNA   10 mg at 04/22/20 6068  PHENYLephrine (RUPERT-SYNEPHRINE) 100 mcg/mL in NS syringe   IntraVENous PRN June Tioggy, CRNA   100 mcg at 04/22/20 0635  
 
 
31 minutes of critical care time spent in the direct evaluation and treatment of this high risk patient. The reason for providing this level of medical care for this critically ill patient was due a critical illness that impaired one or more vital organ systems such that there was a high probability of imminent or life threatening deterioration in the patients condition.  This care involved high complexity decision making to assess, manipulate, and support vital system functions, to treat this degreee vital organ system failure and to prevent further life threatening deterioration of the patients condition.

## 2020-04-30 NOTE — PROGRESS NOTES
I called and spoke to the wife who was very glad to have us check in on her. I offered to put the phone up to his ear and let her talk to him. She was excited to do so. I used the room phone and called her back and set the phone up next to his ear. I gave her time alone and then spoke to her again. She may be interested in doing a Zoom visit tomorrow if it can be coordinated on both ends. [address] [facility], M.Div. Board Certified [employer] [phone] - Office

## 2020-04-30 NOTE — PROGRESS NOTES
Assessment:  
 
  Respiratory failure with hypoxia and hypercapnia ([facility] [address] (4/21/2020)   
  Morbid obesity ([facility] [address] (4/4/2016)   
  Sleep apnea (4/4/2016)   
  Chronic renal insufficiency, stage II (mild) () 
  
  Chronic a-fib (10/15/2018)   
  Acute CHF (congestive heart failure) ([facility] [address] (4/21/2020)   
  Acute on chronic respiratory failure with hypoxia and hypercapnia ([facility] [address] (4/22/2020)   
  Cardiac arrest ([facility] [address] (4/23/2020)   
  
CKD 
  
Plan:   
  
Pt is doing ok from renal stand pint at this point, will sign off, please contact me with any questions 
 
  
  
CC: CKD, hypotension, shock Interval History: Pt remains critically ill 
  
  
Subjective:  
unresponsive 
  
 
 
 
 
Blood pressure 116/68, pulse (!) 51, temperature 99.6 °F (37.6 °C), resp. rate 20, height 5' 10\" (1.778 m), weight (!) 220 kg (485 lb), SpO2 93 %. Obese Ventilated On Leva Bradycardic Dependant edema of thighs/sacrum 
  
  
 
 
Intake/Output Summary (Last 24 hours) at 4/30/2020 1135 Last data filed at 4/30/2020 7869 Gross per 24 hour Intake 1117.85 ml Output 4430 ml Net -3312.15 ml Recent Labs 04/30/20 
1100 WBC 7.8 Lab Results Component Value Date/Time Sodium 146 (H) 04/30/2020 04:00 AM  
 Potassium 4.6 04/30/2020 04:00 AM  
 Chloride 110 04/30/2020 04:00 AM  
 CO2 33 (H) 04/30/2020 04:00 AM  
 Anion gap 3 04/30/2020 04:00 AM  
 Glucose 117 (H) 04/30/2020 04:00 AM  
 BUN 40 (H) 04/30/2020 04:00 AM  
 Creatinine 0.95 04/30/2020 04:00 AM  
 BUN/Creatinine ratio 42 (H) 04/30/2020 04:00 AM  
 GFR est AA >60 04/30/2020 04:00 AM  
 GFR est non-AA >60 04/30/2020 04:00 AM  
 Calcium 8.2 (L) 04/30/2020 04:00 AM  
  
 
Current Facility-Administered Medications Medication Dose Route Frequency Provider Last Rate Last Dose  fentaNYL citrate (PF) injection 50 mcg  50 mcg IntraVENous Q3H PRN Hi Gillis MD   50 mcg at 04/30/20 6211  fentaNYL citrate (PF) 50 mcg/mL injection  argatroban 250 mg in 0.9% sodium chloride 250 mL (1000 mcg/mL) infusion  0.5-10 mcg/kg/min IntraVENous TITRATE Cosmo GROSSMAN MD      
 bumetanide [facility]) injection 1 mg  1 mg IntraVENous TID Kasandra Garsia R, DO   1 mg at 04/30/20 4996  LORazepam (ATIVAN) injection 1 mg  1 mg IntraVENous Q2H PRN Kyle Galarza MD      
 chlorhexidine (PERIDEX) 0.12 % mouthwash 10 mL  10 mL Oral Q12H Cosmo GROSSMAN MD   10 mL at 04/30/20 5168  albuterol-ipratropium (DUO-NEB) 2.5 MG-0.5 MG/3 ML  3 mL Nebulization Q6H PRN Cosmo GROSSMAN MD   3 mL at 04/28/20 1148  acetaminophen (TYLENOL) tablet 650 mg  650 mg Oral Q6H PRN Kyle Galarza MD      
 cisatracurium (NIMBEX) 100 mg in 0.9% sodium chloride 100 mL infusion  0-10 mcg/kg/min IntraVENous TITRATE Brenda Rosa MD 39 mL/hr at 04/30/20 1121 4 mcg/kg/min at 04/30/20 1121  ELECTROLYTE REPLACEMENT PROTOCOL - Calcium   1 Each Other PRN Brenda Rosa MD      
 ELECTROLYTE REPLACEMENT PROTOCOL - Magnesium   1 Each Other PRN Brenda Rosa MD      
 ELECTROLYTE REPLACEMENT PROTOCOL - Potassium Standard Dosing   1 Each Other PRN Brenda Rosa MD      
 ELECTROLYTE REPLACEMENT PROTOCOL - Phosphorus  Standard Dosing  1 Each Other PRN Brenda Rosa MD      
 pantoprazole (PROTONIX) 40 mg in 0.9% sodium chloride 10 mL injection  40 mg IntraVENous DAILY Ally Garcia MD   40 mg at 04/30/20 0936  
 fentaNYL (PF) 900 mcg/30 ml infusion soln  0-150 mcg/hr IntraVENous TITRATE Alyl Garcia MD 3.3 mL/hr at 04/30/20 0708 100 mcg/hr at 04/30/20 3961  midazolam in normal saline (VERSED) 1 mg/mL infusion  0-10 mg/hr IntraVENous TITRATE Brenda Rosa MD 9 mL/hr at 04/30/20 0707 9 mg/hr at 04/30/20 3535  aspirin chewable tablet 81 mg  81 mg Oral DAILY Brenda Rosa MD   81 mg at 04/30/20 6791  sodium chloride (NS) flush 5-40 mL  5-40 mL IntraVENous Q8H Ally Garcia MD   10 mL at 04/30/20 8586  sodium chloride (NS) flush 5-40 mL  5-40 mL IntraVENous PRN Fredy Rosario MD      
 [Held by provider] losartan (COZAAR) tablet 25 mg  25 mg Oral DAILY Fredy Rosario MD      
 [Held by provider] metoprolol succinate (TOPROL-XL) XL tablet 25 mg  25 mg Oral DAILY Fredy Rosario MD      
 
Facility-Administered Medications Ordered in Other Encounters Medication Dose Route Frequency Provider Last Rate Last Dose  propofoL (DIPRIVAN) 10 mg/mL injection   IntraVENous PRN Sherri Fabry, CRNA   300 mg at 04/22/20 9064  ePHEDrine (PF) (MISTOLE) 10 mg/mL in NS syringe   IntraVENous PRN Sherri Fabry, CRNA   10 mg at 04/22/20 6399  PHENYLephrine (RUPERT-SYNEPHRINE) 100 mcg/mL in NS syringe   IntraVENous PRN Sherri Fabry, CRNA   100 mcg at 04/22/20 9518

## 2020-04-30 NOTE — PROGRESS NOTES
NUTRITION FOLLOW-UP 
 
RECOMMENDATIONS/PLAN:  
- EN: Recc adv towards goal rate of tube feeds; pt tolerating trickle feeds per MD adv tube feeds to Vital High Protein @ 15ml/hr Once tolerating Recc Vital High Protein @ 85ml/hr to provide 1870kcal 164g PRO 1563ml H20 209g CHO 43g Fat Recc starting @ 10ml/hr an increasing by 5ml/hr Q6 until goal rate met Will defer Free H20 to MD 
Monitor labs/lytes, tube feed tolerance, skin integrity, wt, fluid status, BM 
 
NUTRITION ASSESSMENT:  
Client Update: 53 yrs old Male with resp failure w/ hypoxia and hypercapnia-intubated in ICU, COVID 19 ruled out, hypotension, acute on chronic systolic CHF, elevated trop, morbid obesity, pt self extubated w/ NSTEMI due to PEA arrest, chronic a-fib, elevated trop-cardiology following .     
   
 
FOOD/NUTRITION INTAKE Diet Order: Vital High Protein @ 10ml/hr Food Allergies: NKFA Average PO Intake:     
No data found. Pertinent Medications:  [x] Reviewed; methylprednisolone, protonix, propofol Electrolyte Replacement Protocol: [x]K [x]Mg [x]PO4 Insulin:  []SSI  []Pre-meal   []Basal    []Drip  []None Cultural/Baptism Food Preferences: None Identified BIOCHEMICAL DATA & MEDICAL TESTS Pertinent Labs:  [x] Reviewed; Na-146 Mg-2.8   ANTHROPOMETRICS Height: 5' 10\" (177.8 cm)       Weight: (!) 220 kg (485 lb) BMI: 69.6 kg/m^2 morbidly obese (Greater than or = to 40% BMI) Adm Weight: 595 lbs                Weight change: not sure at this time which is a correct adm weight; wt was stated originally then bed was no zero out before placing him on;  
Adjusted Body Weight: 124.2kg NUTRITION-FOCUSED PHYSICAL ASSESSMENT Skin: No PU    
GI: No BM 
 
NUTRITION PRESCRIPTION Calories: 1886kcal/day based on 25kcal/kg of IBW Protein: 113- 151 g/day based on 1.5-2.0 g/kg of IBW 
CHO: 236 g/day based on 50% of total energy Fluid: 1886 ml/day based on 1 kcal/ml    
 
NUTRITION DIAGNOSES:  
 1.Inadequate oral intake related to intubation as evidence by need for nutrition support  
  
 
NUTRITION INTERVENTIONS:  
INTERVENTIONS:        GOALS: 
1. EN: Recc adv towards goal rate of tube feeds; pt tolerating trickle feeds Recc Vital High Protein @ 85ml/hr to provide 1870kcal 164g PRO 1563ml H20 209g CHO 43g Fat Recc starting @ 10ml/hr an increasing by 5ml/hr Q6 until goal rate met Will defer Free H20 to MD 
 1. Begin to adv tube feeds towards goal rate  by next review 4 days LEARNING NEEDS (Diet, Supplementation, Food/Nutrient-Drug Interaction): 
 [] None Identified   [] Education provided/documented      Identified and patient: [] Declined   [x] Was not appropriate/indicated NUTRITION MONITORING /EVALUATION:  
Adjust EN/PN as appropriate Monitor wt Monitor renal labs, electrolytes, fluid status Previous Recommendations Implemented: yes Previous Goals Met:  yes -tube feeds started  
   
[] Participated in Interdisciplinary Rounds   
[x] [address] [place] [address] Reviewed DISCHARGE NUTRITION RECOMMENDATIONS ADDRESSED:  
   [x] To be determined closer to discharge NUTRITION RISK:           [x] At risk                        [] Not currently at risk Will follow-up per policy. Shabana Hargrove 9

## 2020-04-30 NOTE — PROGRESS NOTES
Pulmonary, Critical Care, and Sleep Medicine Pulmonary Progress Note Name: Arsh Shipley. : 1975 MRN: [id number] Date: 2020 [x]I have reviewed the flowsheet and previous days notes. Events, vitals, medications and notes from last 24 hours reviewed. IMPRESSION:  
Patient Active Problem List  
Diagnosis Code  Acute on chronic respiratory failure with hypoxia and hypercapnia ([facility]) J96.21, J96.22  
 Acute on chronic systolic and diastolic CHF (congestive heart failure) ([facility]) I50.43 Aspiration pneumonitis with multiple patient initiated ET tube displacement events J69.0  Cardiac arrest ([facility] [address] I46.9 Staph Aureus in sputum culture A49.01  
 Elevated troponin, NSTEMI R79.89, I21.4  Chronic a-fib I48.20  Atrial flutter ([facility] [address] I48.92 Prolonged QTc R94.31  
 Chronic renal insufficiency, stage II (mild) N18.2 Thrombocytopenia D69.6  Smoking F17.200  Arthritis M19.90  Migraine G43.909  Chronic back pain M54.9, G89.29  
 Obstructive Sleep apnea G47.33  
 Morbid obesity ([facility]) E66.01  
  
PLAN:  
[facility]. Ventilated patients- aim to keep peak plateau pressure less than or equal to 30cm H2O. Titrate FiO2 for goal SPO2> 91%. Overnight events related to agitations led to some increase in FiO2 and PEEP 
CXR and ABG daily VAP prevention bundle, head of the bed at 30' all times Daily sedation and paralytic holiday when feasible and assessment for weaning with SBT as tolerated. Monitor TOF on Nimbex. Patient still gets agitated through paralytic and sedation developing tolerance for them quickly. Continue bronchodilators, pulmonary hygiene care Steroid, taper today Sepsis bundle per hospital protocol SARS-COV-2 negative x 2 Antibiotic choice: low threshold to start patient on Zosyn and Vancomycin if febrile or leukocytosis.  Patient may have aspirated since had multiple witnessed episodes when he dislodged his ET tube and or with ET tube cuff malfunctioned requiring exchange Monitor hemodynamics -stable and off of vasopressors since 4/27/20 AM. May restart as needed and actively titrate vasopressors aim SBP > 100 mm Hg Was on Amiodarone, has been off. Remained in NSR Off of Heparin given bleeding around RT IJ insertion site. Awaiting HIT panel At higher risk for DVT/PE; SCDs difficult to keep on with leg size, Plt remained above 100 now and no appreciable bleeding or hematoma at RT IJ insertion site. Start Argatroban Monitor CBC, PTT, any signs of bleeding/hematoma PVL bl LE negative Continue Bumex 1 mg twice a day Avoid free H2O; consider conservative fluid volume strategy with current ventilator support need. Appreciate renal input Continue TF at trickle rate and increase to 15 mL/hr from 10 mL/hr 
Glycemic control AM labs Central Line, Daniel and Vent Bundles will be followed, Vent Day 9 Prognosis guarded. Patient at increased risk for prolonged ventilator support, prolonged hospitalization, nosocomial infection, cardiovascular collapse, DVT/PE and other. Spoke with wife on daily basis and updated her. Patient remained full code. Will defer respective systems problem management to primary and other consultant and follow patient in ICU with primary and other medical team 
Quality Care: PPI, DVT prophylaxis, HOB elevated, Infection control all reviewed and addressed. PAIN AND SEDATION: Fentanyl, Versed, Nimbex · Skin/Wound: chronic venous stasis changes · Nutrition: TF 
· Prophylaxis: DVT and GI Prophylaxis reviewed. · Restraints: none while on paralytic; may place during weaning process or when off of paralytic 
· PT/OT eval and treat: as needed when stable · Lines/Tubes: lines RT IJ 4/22/20; daniel 4/21/20, ET tube 4/25/20 ADVANCE DIRECTIVE: Full code DISCUSSION: spoke with RN, RT, ICU staff Events and notes from last 24 hours reviewed. Care plan discussed with nursing Subjective/History: 57-year-old male with morbid obesity, ex-smoker, history of asthma, history of severe obstructive sleep apnea on CPAP, questionable component compliance, history of congestive heart failure with reduced ejection fraction atrial fibrillation atrial flutter on amiodarone, Xarelto, history of chronic renal insufficiency, cellulitis of lower extremities and anemia. As per family did not have any fever or upper respiratory tract infections and no flulike symptoms. On Lasix, CPAP not clear if missed. Presented with acute shortness of breath worsening edema of lower extremities. He was found to be in acute hypercarbic hypoxemic respiratory failure initially had a trial of BiPAP but failed and then eventually was intubated. He was hypotensive. Moderate amount of secretions. Morbid obesity. Patient compliant with Xarelto. Patient self extubated and had PEA arrest. 
 
Subjective:  
04/30/20 Patient in ICU on ventilator; overnight breakthrough sedation and paralytic resulting in agitation and desaturation FiO2 and PEEP adjusted by RT per SPO2 support need Airway pressure with Plp below 30 cm H2O; on sedation and paralytic with good ventilator synchrony Hemodynamically stable; in NSR Good UO with diuresis. Afebrile. Tolerating TF at 10 mL/hr rate No further bleeding around IJ insertion or other sites and off of Heparin with plt > 100 ROS: Review of systems not obtained due to patient factors. Latest lactic acid:  
Lactic acid Date Value Ref Range Status 04/28/2020 1.4 0.4 - 2.0 MMOL/L Final  
04/27/2020 2.1 (HH) 0.4 - 2.0 MMOL/L Final  
  Comment:  
  CALLED TO AND CORRECTLY REPEATED BY: 
Evelin Gautam RN, ICU ON 04/27/2020 AT 0450 TO JDA 
  
04/27/2020 2.0 0.4 - 2.0 MMOL/L Final  
 
 
Past Medical History: 
Past Medical History:  
Diagnosis Date  Arrhythmia  Arthritis   
 knees  CHF (congestive heart failure) ([facility] [address]  Chronic back pain  Chronic renal insufficiency, stage II (mild)  History of atrial flutter Dx 2016 - anticoagulated by Monica Navarrete  Hypertension 2015  Limited mobility  Migraine headache  Morbid obesity ([facility] [address]  Morbid obesity with body mass index of 60.0-69.9 in adult [facility])  Sleep apnea   
 uses c-pap instructed to bring day of surgery  Smoking   
 very passive / cigars only Allergy: No Known Allergies Vital Signs:   
Blood pressure 116/68, pulse (!) 52, temperature 99.6 °F (37.6 °C), resp. rate 20, height 5' 10\" (1.778 m), weight (!) 220 kg (485 lb), SpO2 93 %. Body mass index is 69.59 kg/m². O2 Device: Endotracheal tube, Ventilator O2 Flow Rate (L/min): 12 l/min Temp (24hrs), Av.1 °F (37.3 °C), Min:98.8 °F (37.1 °C), Max:99.8 °F (37.7 °C) Intake/Output:  
Last shift:      No intake/output data recorded. Last 3 shifts:  1901 -  0700 In: 1785.4 [I.V.:1395.4] Out: Jay Tai [[id number]] Intake/Output Summary (Last 24 hours) at 2020 1031 Last data filed at 2020 4057 Gross per 24 hour Intake 1127.85 ml Output 4430 ml Net -3302.15 ml Ventilator Settings: 
Mode Rate Tidal Volume Pressure FiO2 PEEP Assist control, VC+   580 ml    75 % 14 cm H20(decreased) Peak airway pressure: 28 cm H2O Minute ventilation: 11.9 l/min Pl pressure goals less than or equal to 30. Physical Exam: 
General: sedated, on paralytic, appears older than stated age, morbidly obese, on ventilator HEENT: PERRL, fundi benign, orally intubatyed Neck: No abnormally enlarged lymph nodes or thyroid, supple; RT IJ site without active bleeding, no hematoma, exam limitation Chest: normal, obese chest wall Lungs: moderate air entry, few rhonchi scattered bilaterally, normal percussion anterior chest bilaterally but limited, no tenderness/ rash Heart: Regular rate and rhythm, S1S2 present or without murmur or extra heart sounds Abdomen: obese, bowel sounds normoactive, tympanic, soft without significant tenderness, masses, organomegaly or guarding, rigidity, rebound Extremity: 2-3+, pitting and bl LE and 2-3+ bl UE edema; negative for cyanosis, clubbing Capillary refill: normal 
Neuro: sedated, on paralytic, no involuntary movements, exam limitations Skin: Skin color, texture, turgor fair. Skin dry, warm, non-diaphoretic DATA:  
Current Facility-Administered Medications Medication Dose Route Frequency  fentaNYL citrate (PF) injection 50 mcg  50 mcg IntraVENous Q3H PRN  
 fentaNYL citrate (PF) 50 mcg/mL injection  argatroban 250 mg in 0.9% sodium chloride 250 mL (1000 mcg/mL) infusion  0.5-10 mcg/kg/min IntraVENous TITRATE  bumetanide (BUMEX) injection 1 mg  1 mg IntraVENous TID  methylPREDNISolone (PF) (SOLU-MEDROL) injection 20 mg  20 mg IntraVENous Q12H  
 LORazepam (ATIVAN) injection 1 mg  1 mg IntraVENous Q2H PRN  chlorhexidine (PERIDEX) 0.12 % mouthwash 10 mL  10 mL Oral Q12H  
 albuterol-ipratropium (DUO-NEB) 2.5 MG-0.5 MG/3 ML  3 mL Nebulization Q6H PRN  
 acetaminophen (TYLENOL) tablet 650 mg  650 mg Oral Q6H PRN  
 cisatracurium (NIMBEX) 100 mg in 0.9% sodium chloride 100 mL infusion  0-10 mcg/kg/min IntraVENous TITRATE  ELECTROLYTE REPLACEMENT PROTOCOL - Calcium   1 Each Other PRN  
 ELECTROLYTE REPLACEMENT PROTOCOL - Magnesium   1 Each Other PRN  
 ELECTROLYTE REPLACEMENT PROTOCOL - Potassium Standard Dosing   1 Each Other PRN  
 ELECTROLYTE REPLACEMENT PROTOCOL - Phosphorus  Standard Dosing  1 Each Other PRN  pantoprazole (PROTONIX) 40 mg in 0.9% sodium chloride 10 mL injection  40 mg IntraVENous DAILY  fentaNYL (PF) 900 mcg/30 ml infusion soln  0-150 mcg/hr IntraVENous TITRATE  midazolam in normal saline (VERSED) 1 mg/mL infusion  0-10 mg/hr IntraVENous TITRATE  aspirin chewable tablet 81 mg  81 mg Oral DAILY  sodium chloride (NS) flush 5-40 mL  5-40 mL IntraVENous Q8H  
 sodium chloride (NS) flush 5-40 mL  5-40 mL IntraVENous PRN  
  [Held by provider] losartan (COZAAR) tablet 25 mg  25 mg Oral DAILY  [Held by provider] metoprolol succinate (TOPROL-XL) XL tablet 25 mg  25 mg Oral DAILY Facility-Administered Medications Ordered in Other Encounters Medication Dose Route Frequency  propofoL (DIPRIVAN) 10 mg/mL injection   IntraVENous PRN  
 ePHEDrine (PF) (MISTOLE) 10 mg/mL in NS syringe   IntraVENous PRN  
 PHENYLephrine (RUPERT-SYNEPHRINE) 100 mcg/mL in NS syringe   IntraVENous PRN Telemetry: [x]Sinus []A-flutter []Paced []A-fib []Multiple PVCs  
              
4/22/20 ECHO · Left Ventricle: Normal wall thickness and systolic function (ejection fraction normal). Mildly dilated left ventricle. The estimated ejection fraction is 45 - 50%. There is moderate (grade 2) left ventricular diastolic dysfunction E/e' Ratio = 17.09. Wall Scoring: The left ventricular wall motion is globally hypokinetic. · Right Ventricle: Not well visualized. Normal global systolic function. Moderately dilated right ventricle. · Right Atrium: Right atrium not well visualized. Moderately dilated right atrium. · IVC/Hepatic Veins: Dilated inferior vena cava. Mechanically ventilated; cannot use inferior caval vein diameter to estimate central venous pressure. Labs: 
Recent Results (from the past 24 hour(s)) GLUCOSE, POC Collection Time: 04/29/20 11:45 AM  
Result Value Ref Range Glucose (POC) 102 70 - 110 mg/dL GLUCOSE, POC Collection Time: 04/29/20  5:39 PM  
Result Value Ref Range Glucose (POC) 113 (H) 70 - 110 mg/dL GLUCOSE, POC Collection Time: 04/29/20 11:55 PM  
Result Value Ref Range Glucose (POC) 104 70 - 110 mg/dL METABOLIC PANEL, COMPREHENSIVE Collection Time: 04/30/20  4:00 AM  
Result Value Ref Range Sodium 146 (H) 136 - 145 mmol/L Potassium 4.6 3.5 - 5.5 mmol/L Chloride 110 100 - 111 mmol/L  
 CO2 33 (H) 21 - 32 mmol/L Anion gap 3 3.0 - 18 mmol/L Glucose 117 (H) 74 - 99 mg/dL BUN 40 (H) 7.0 - 18 MG/DL Creatinine 0.95 0.6 - 1.3 MG/DL  
 BUN/Creatinine ratio 42 (H) 12 - 20 GFR est AA >60 >60 ml/min/1.73m2 GFR est non-AA >60 >60 ml/min/1.73m2 Calcium 8.2 (L) 8.5 - 10.1 MG/DL Bilirubin, total 0.8 0.2 - 1.0 MG/DL  
 ALT (SGPT) 24 16 - 61 U/L  
 AST (SGOT) 20 10 - 38 U/L Alk. phosphatase 39 (L) 45 - 117 U/L Protein, total 6.3 (L) 6.4 - 8.2 g/dL Albumin 3.0 (L) 3.4 - 5.0 g/dL Globulin 3.3 2.0 - 4.0 g/dL A-G Ratio 0.9 0.8 - 1.7    
CBC WITH AUTOMATED DIFF Collection Time: 04/30/20  4:00 AM  
Result Value Ref Range WBC 7.2 4.6 - 13.2 K/uL  
 RBC 4.69 (L) 4.70 - 5.50 M/uL  
 HGB 12.4 (L) 13.0 - 16.0 g/dL HCT 41.4 36.0 - 48.0 % MCV 88.3 74.0 - 97.0 FL  
 MCH 26.4 24.0 - 34.0 PG  
 MCHC 30.0 (L) 31.0 - 37.0 g/dL  
 RDW 15.0 (H) 11.6 - 14.5 % PLATELET 167 (L) 146 - 420 K/uL MPV 9.9 9.2 - 11.8 FL  
 NEUTROPHILS 83 (H) 40 - 73 % LYMPHOCYTES 8 (L) 21 - 52 % MONOCYTES 9 3 - 10 % EOSINOPHILS 0 0 - 5 % BASOPHILS 0 0 - 2 %  
 ABS. NEUTROPHILS 6.0 1.8 - 8.0 K/UL  
 ABS. LYMPHOCYTES 0.6 (L) 0.9 - 3.6 K/UL  
 ABS. MONOCYTES 0.6 0.05 - 1.2 K/UL  
 ABS. EOSINOPHILS 0.0 0.0 - 0.4 K/UL  
 ABS. BASOPHILS 0.0 0.0 - 0.1 K/UL  
 DF AUTOMATED MAGNESIUM Collection Time: 04/30/20  4:00 AM  
Result Value Ref Range Magnesium 2.8 (H) 1.6 - 2.6 mg/dL PHOSPHORUS Collection Time: 04/30/20  4:00 AM  
Result Value Ref Range Phosphorus 2.9 2.5 - 4.9 MG/DL  
CALCIUM, IONIZED Collection Time: 04/30/20  4:00 AM  
Result Value Ref Range Ionized Calcium 1.19 1.12 - 1.32 MMOL/L  
GLUCOSE, POC Collection Time: 04/30/20  4:46 AM  
Result Value Ref Range Glucose (POC) 100 70 - 110 mg/dL POC G3 Collection Time: 04/30/20  4:46 AM  
Result Value Ref Range Device: VENT    
 FIO2 (POC) 0.75 %  pH (POC) 7.406 7.35 - 7.45    
 pCO2 (POC) 51.5 (H) 35.0 - 45.0 MMHG  
 pO2 (POC) 73 (L) 80 - 100 MMHG  
 HCO3 (POC) 32.2 (H) 22 - 26 MMOL/L  
 sO2 (POC) 94 92 - 97 % Base excess (POC) 8 mmol/L Mode ASSIST CONTROL Tidal volume 580 ml Set Rate 20 bpm  
 PEEP/CPAP (POC) 18 cmH2O  
 PIP (POC) 31 Allens test (POC) N/A Inspiratory Time 0.9 sec Total resp. rate 20 Site DRAWN FROM ARTERIAL LINE Patient temp. 99.7 Specimen type (POC) ARTERIAL Performed by Yudelka Miner Volume control plus YES Recent Labs 04/30/20 
2417 04/29/20 
2716 04/28/20 
3333 FIO2I 0.75 0.65 0.65 HCO3I 32.2* 32.5* 34.0*  
PCO2I 51.5* 52.9* 57.1*  
PHI 7.406 7.398 7.385 PO2I 73* 76* 71* All Micro Results Procedure Component Value Units Date/Time CULTURE, RESPIRATORY/SPUTUM/BRONCH Sydell Jaclyn STAIN [[id number]]  (Abnormal)  (Susceptibility) Collected:  04/22/20 1018 Order Status:  Completed Specimen:  Sputum from Tracheal Aspirate Updated:  04/25/20 9821 Special Requests: NO SPECIAL REQUESTS     
  GRAM STAIN FEW WBCS SEEN     
      
  OCCASIONAL EPITHELIAL CELLS SEEN  
     
      
  FEW GRAM POSITIVE COCCI IN CLUSTERS  
     
   RARE GRAM NEGATIVE RODS Culture result:    
  LIGHT STAPHYLOCOCCUS AUREUS  
     
      
  LIGHT NORMAL RESPIRATORY BRYN  
     
 CULTURE, BLOOD [[id number]] Collected:  04/24/20 1015 Order Status:  Canceled Specimen:  Blood Imaging: 
[x]I have personally reviewed the patients chest radiographs images and report 4/30/20 Results from [facility] Encounter encounter on 04/21/20 XR CHEST PORT Narrative EXAM: XR CHEST PORT 
 
CLINICAL INDICATION/HISTORY: Aspiration pneumonia, ET tube placement 
-Additional: None COMPARISON: One day prior TECHNIQUE: Portable frontal view of the chest 
 
_______________ FINDINGS: 
 
SUPPORT DEVICES: Right IJV central venous catheter unchanged. Endotracheal tube 
unchanged. HEART AND MEDIASTINUM: Cardiomegaly. LUNGS AND PLEURAL SPACES: Limited study secondary to body habitus and exclusion of the left costophrenic angle. Central vascular congestion with moderate 
bilateral effusions and pulmonary edema. No pneumothorax.  
 
_______________ Impression IMPRESSION: 
 
Limited study secondary to body habitus and exclusion of the left costophrenic 
angle. Central vascular congestion with moderate bilateral effusions and 
pulmonary edema. No pneumothorax. No results found for this or any previous visit. [x]See my orders for details My assessment, plan of care, findings, medications, side effects etc were discussed with: 
[x]nursing [x]Dietician  
[x]respiratory therapy [x]Dr. Keesha Collier  
[x]family - wife []Patient [x]Total critical care time exclusive of procedures 39 minutes with complex decision making performed and > 50% time spent in face to face evaluation.  
 
Shanell Gao MD

## 2020-04-30 NOTE — PROGRESS NOTES
Chart reviewed pt remains in ICU for level of care for vent management, cm did speak with wife via phone conversation, cm explained cm role as d/c planner, wife very pleasant informed cm that prior to admission pt independent with care, uses walking cane and home c-pap machine, denies spouse using home oxygen, c-pap thru [phone] [place] [address], wife aware that cm will cont to follow case for safe d/c planning and follow up appointments if needed, cm contact information provided.

## 2020-04-30 NOTE — PROGRESS NOTES
1900- Received care and report from Rashi Mancini RN. Intubated, ventilator sedated, pharmacologically paralyzed, OG tube, bariatric bed, and daniel noted. 2000- Completed shift assessment per flow sheet. Resting quietly with eyes closed. Will continue to monitor. 0000- Reassessment completed per flow sheet. Resting quietly with eyes closed. Will continue to monitor. 0016- SPO2 down to 88-89, placed on 2 minutes of 100%, suctioned via ETT, suction mouth, cycled 100% O2, patient SPO2 went back to 88-89%, RT bedside and adjusted settings. 0037- /118, pulse 99, blinking eyes, quivering, moving eyebrows, when asked to squeeze my hand he moved his 3rd digit to my palm. Called Dr. Neo Sanchez to update, received telephone order for Fentanyl 50 mcg intravenous every 3 hours. See MAR. 
 
0209- BP/pulse increasing, quivering, moving head and eyebrows, blinking. Versed increased to 9 mg/hr, Nimbex increased to 4 mcg/kg/min. Will continue to monitor. 0400- Reassessment completed per flow sheet. Resting quietly with eyes closed. Will continue to monitor. 0700- Care and report given to [facility].

## 2020-04-30 NOTE — PROGRESS NOTES
Hospitalist Progress Note Patient: Collette Maizes. MRN: [id number]  CSN: [id number] YOB: 1975  Age: 53 y.o. Sex: male DOA: 4/21/2020 LOS:  LOS: 9 days Assessment/Plan Patient Active Problem List  
Diagnosis Code  Cellulitis of left lower leg L03. 116  
 Anemia D64.9  Morbid obesity ([facility]) E66.01  
 Dyspnea R06.00  Sleep apnea G47.30  Sinus tachycardia R00.0  BMI 70 and over, adult [facility]) Y98.32  Chronic back pain M54.9, G89.29  
 Arthritis M19.90  Migraine G43.909  
 History of atrial flutter Z86.79  
 Limited mobility Z74.09  
 Smoking F17.200  Chronic renal insufficiency, stage II (mild) N18.2  CHF (congestive heart failure) ([facility]) I50.9  Acute diastolic CHF (congestive heart failure) ([facility]) I50.31  Chronic a-fib I48.20  Atrial flutter ([facility]) I48.92  
 Elevated troponin R79.89  
 Acute respiratory distress R06.03  
 Acute CHF (congestive heart failure) ([facility]) I50.9  Respiratory failure with hypoxia and hypercapnia ([facility]) J96.91, J96.92  
 Acute on chronic respiratory failure with hypoxia and hypercapnia ([facility]) J96.21, J96.22  
 Cardiac arrest ([facility]) I46.9  
  
 
 
 
52 y/o male with hx of morbid obesity, MARIA FERNANDA, chronic afib on anticoagulation, and systolic CHF (EF 86-32%) is admitted with respiratory failure with hypoxia and hypercapnia, he required to be intubated. 04/23/2020 Patient self extubated this morning and went into PEA arrest, he was re intubated, difficult intubation. He is awake and post resuscitation, placed on sedation. Requiring high PEEP. 
 
04/24/2020 Patient again tried to self extubate in spite of sedation. 04/25/2020 ET tube leak, ET tube exchanged 04/27/2020 Had bleeding around IJ site, heparin stopped, H/H stable CRITICAL CARE PLAN Resp - See vent orders, VAP bundle. HOB>30 degrees. Acute respiratory failure - with hypoxia and hypercapnia, follow ABGs, CXR. 
MARIA FERNANDA/OHS - On solumedrol ID - Follow up resp, cx. ANTIBIOTICS off antibiotics Seen by ID 
COVID 19 negative x 2 
 
[facility] - Monitor HD. Hypotension - resolved, off pressors Paroxysmal atrial flutter - monitor rate, was on amiodarone. Has been bradycardic, toprol xl held Acute on chronic systolic CHF - On bumex Echo with EF of 45-50% Elevated troponin - NSTEMI type II due to PEA arrest 
Cardiology following. Heme/onc - Follow H&H, plts. Thrombocytopenia - improving, HIT panel pending. Started on argatroban, Renal - Trend BUN, Cr, follow I/O, daniel in place. Check and replace Mg, K, phos. Hypernatremia - improving Endocrine -  Follow [facility] Neuro/ Pain/ Sedation - Fentanyl, versed prn. Sedation bundle. GI - NPO for now, OG tube, tube feeding. Morbid obesity Prophylaxis - DVT: Argatroban, GI: protonix Condition critical with MSOF. [id number] 
35 minutes of critical care time spent in the direct evaluation and treatment of this high risk patient. The reason for providing this level of medical care for this critically ill patient was due a critical illness that impaired one or more vital organ systems such that there was a high probability of imminent or life threatening deterioration in the patients condition. This care involved high complexity decision making to assess, manipulate, and support vital system functions, to treat this degreee vital organ system failure and to prevent further life threatening deterioration of the patients condition. Disposition : TBD Physical Exam: 
General: As above   
HEENT: NC, Atraumatic. PERRLA, anicteric sclerae. Lungs: CTA Bilaterally. Distant breath sounds secondary to body habitus. Heart:  S1 S2, No murmur, No Rubs, No Gallops Abdomen: Soft, Non distended, obese, Non tender.  +Bowel sounds, Extremities: LE edema Vital signs/Intake and Output: 
Visit Vitals /65 Pulse (!) 49 Temp 99.6 °F (37.6 °C) Resp 20 Ht 5' 10\" (1.778 m) Wt (!) 220 kg (485 lb) SpO2 (!) 81% BMI 69.59 kg/m² Current Shift:  No intake/output data recorded. Last three shifts:  04/28 1901 - 04/30 0700 In: 1785.4 [I.V.:1395.4] Out: Sharyle Condon [[id number]] Labs: Results:  
   
Chemistry Recent Labs 04/30/20 1100 04/30/20 
0400 04/29/20 
0400 * 117* 110* * 146* 145  
K 4.4 4.6 4.5  
 110 107 CO2 34* 33* 35* BUN 41* 40* 36* CREA 1.08 0.95 0.98  
CA 8.4* 8.2* 8.6 AGAP 2* 3 3 BUCR 38* 42* 37* AP 38* 39* 35* TP 6.5 6.3* 6.5 ALB 2.9* 3.0* 3.1*  
GLOB 3.6 3.3 3.4 AGRAT 0.8 0.9 0.9  
  
CBC w/Diff Recent Labs 04/30/20 1100 04/30/20 
0400 04/29/20 
0400 WBC 7.8 7.2 6.8  
RBC 4.64* 4.69* 4.43* HGB 12.4* 12.4* 11.8* HCT 41.2 41.4 39.5 * 109* 105* GRANS 77* 83* 82* LYMPH 12* 8* 10* EOS 0 0 0 Cardiac Enzymes No results for input(s): CPK, CKND1, CONSTANZA in the last 72 hours. No lab exists for component: Hessie Salines Coagulation Recent Labs 04/30/20 1100 04/28/20 
0530 PTP 17.2*  --   
INR 1.4*  --   
APTT 28.4 29.0 Lipid Panel No results found for: CHOL, CHOLPOCT, CHOLX, CHLST, CHOLV, 184433, HDL, HDLP, LDL, LDLC, DLDLP, 957989, VLDLC, VLDL, TGLX, TRIGL, TRIGP, TGLPOCT, CHHD, CHHDX  
BNP No results for input(s): BNPP in the last 72 hours. Liver Enzymes Recent Labs 04/30/20 
1100 TP 6.5 ALB 2.9* AP 38* SGOT 15 Thyroid Studies Lab Results Component Value Date/Time TSH 1.45 04/27/2020 03:50 AM  
    
Procedures/imaging: see electronic medical records for all procedures/Xrays and details which were not copied into this note but were reviewed prior to creation of Plan

## 2020-04-30 NOTE — PROGRESS NOTES
Cardiology Progress Note Patient: Britni Rivas. Sex: male          DOA: 4/21/2020 YOB: 1975      Age:  53 y.o.        LOS:  LOS: 8 days Patient seen and examined, chart reviewed. Assessment/Plan Patient Active Problem List  
Diagnosis Code  Cellulitis of left lower leg L03. 116  
 Anemia D64.9  Morbid obesity ([facility]) E66.01  
 Dyspnea R06.00  Sleep apnea G47.30  Sinus tachycardia R00.0  BMI 70 and over, adult [facility]) Z92.91  Chronic back pain M54.9, G89.29  
 Arthritis M19.90  Migraine G43.909  
 History of atrial flutter Z86.79  
 Limited mobility Z74.09  
 Smoking F17.200  Chronic renal insufficiency, stage II (mild) N18.2  CHF (congestive heart failure) ([facility]) I50.9  Acute diastolic CHF (congestive heart failure) ([facility]) I50.31   Atrial flutter ([facility]) I48.92  
 Elevated troponin R79.89  
 Acute respiratory distress R06.03  
 Acute CHF (congestive heart failure) ([facility]) I50.9  Respiratory failure with hypoxia and hypercapnia ([facility]) J96.91, J96.92  
 Acute on chronic respiratory failure with hypoxia and hypercapnia ([facility]) J96.21, J96.22  
 Cardiac arrest ([facility]) I46.9 Acute on chronic systolic heart failure NSTEMI type II due to PEA arrest 
Paroxysmal atrial flutter Thrombocytopenia Patient self extubated and had PEA arrest. 
He had brief episode of A fib with RVR and was started On Amiodarone drip. Currently he is in Sinus rhythm. Amiodarone discontinued due to bradycardia. 
  
Echocardiogram  
  
· Left Ventricle: Normal wall thickness and systolic function (ejection fraction normal). Mildly dilated left ventricle. The estimated ejection fraction is 45 - 50%. There is moderate (grade 2) left ventricular diastolic dysfunction E/e' Ratio = 17.09. Wall Scoring: The left ventricular wall motion is globally hypokinetic. · Right Ventricle: Not well visualized. Normal global systolic function. Moderately dilated right ventricle. · Right Atrium: Right atrium not well visualized. Moderately dilated right atrium. · IVC/Hepatic Veins: Dilated inferior vena cava. Mechanically ventilated; cannot use inferior caval vein diameter to estimate central venous pressure. 
  
IV heparin was discontinued due to thrombocytopenia and bleeding at central line site. Plan: 
 
Continue Aspirin Continue IV Bumex 1 mg three times a day Strict I/O Monitor electrolytes and renal function Continue Ventilatory support. Continue management as per hospital medicine and pulmonary critical care Subjective:  
 cc: 
Orally intubated REVIEW OF SYSTEMS:  
 
Can not obtain Objective:  
  
Visit Vitals /59 Pulse (!) 54 Temp 98.9 °F (37.2 °C) Resp 20 Ht 5' 10\" (1.778 m) Wt (!) 220 kg (485 lb) SpO2 91% BMI 69.59 kg/m² Body mass index is 69.59 kg/m². Physical Exam: 
General Appearance: Comfortable, Morbidly obese, not using accessory muscles of respiration. Orally intubated HEENT: AMMON. HEAD: Atraumatic NECK: No JVD, no thyroidomeglay. CAROTIDS: No bruit LUNGS: bilateral conducted sounds HEART: S1+S2 audible, no murmur, no pericardial rub. NEUROLOGICAL: Sedated. Medication: 
Current Facility-Administered Medications Medication Dose Route Frequency  bumetanide (BUMEX) injection 1 mg  1 mg IntraVENous TID  methylPREDNISolone (PF) (SOLU-MEDROL) injection 20 mg  20 mg IntraVENous Q12H  
 LORazepam (ATIVAN) injection 1 mg  1 mg IntraVENous Q2H PRN  chlorhexidine (PERIDEX) 0.12 % mouthwash 10 mL  10 mL Oral Q12H  
 albuterol-ipratropium (DUO-NEB) 2.5 MG-0.5 MG/3 ML  3 mL Nebulization Q6H PRN  
 acetaminophen (TYLENOL) tablet 650 mg  650 mg Oral Q6H PRN  
 cisatracurium (NIMBEX) 100 mg in 0.9% sodium chloride 100 mL infusion  0-10 mcg/kg/min IntraVENous TITRATE  ELECTROLYTE REPLACEMENT PROTOCOL - Calcium   1 Each Other PRN  
  ELECTROLYTE REPLACEMENT PROTOCOL - Magnesium   1 Each Other PRN  
 ELECTROLYTE REPLACEMENT PROTOCOL - Potassium Standard Dosing   1 Each Other PRN  
 ELECTROLYTE REPLACEMENT PROTOCOL - Phosphorus  Standard Dosing  1 Each Other PRN  pantoprazole (PROTONIX) 40 mg in 0.9% sodium chloride 10 mL injection  40 mg IntraVENous DAILY  fentaNYL (PF) 900 mcg/30 ml infusion soln  0-150 mcg/hr IntraVENous TITRATE  midazolam in normal saline (VERSED) 1 mg/mL infusion  0-10 mg/hr IntraVENous TITRATE  aspirin chewable tablet 81 mg  81 mg Oral DAILY  sodium chloride (NS) flush 5-40 mL  5-40 mL IntraVENous Q8H  
 sodium chloride (NS) flush 5-40 mL  5-40 mL IntraVENous PRN  
 [Held by provider] losartan (COZAAR) tablet 25 mg  25 mg Oral DAILY  [Held by provider] metoprolol succinate (TOPROL-XL) XL tablet 25 mg  25 mg Oral DAILY Facility-Administered Medications Ordered in Other Encounters Medication Dose Route Frequency  propofoL (DIPRIVAN) 10 mg/mL injection   IntraVENous PRN  
 ePHEDrine (PF) (MISTOLE) 10 mg/mL in NS syringe   IntraVENous PRN  
 PHENYLephrine (RUPERT-SYNEPHRINE) 100 mcg/mL in NS syringe   IntraVENous PRN Lab/Data Reviewed: 
 
  
Recent Labs  
  04/29/20 
0400 04/28/20 
0530 04/28/20 
0210 WBC 6.8 7.2 7.5 HGB 11.8* 11.3* 11.6* HCT 39.5 38.6 39.5 * 94* 87* Recent Labs  
  04/29/20 
0400 04/28/20 
0530 04/27/20 
0350  146* 147* K 4.5 4.3 4.2  107 108 CO2 35* 36* 35* * 93 114* BUN 36* 37* 32* CREA 0.98 1.07 1.16  
CA 8.6 8.3* 8.3*  
 
 
35 minutes of critical care time spent in the direct evaluation and treatment of this high risk patient.  The reason for providing this level of medical care for this critically ill patient was due a critical illness that impaired one or more vital organ systems such that there was a high probability of imminent or life threatening deterioration in the patients condition. This care involved high complexity decision making to assess, manipulate, and support vital system functions, to treat this degree vital organ system failure and to prevent further life threatening deterioration of the patients condition. Signed By: Lis Vázquez MD   
 April 29, 2020

## 2020-05-01 NOTE — PROGRESS NOTES
aPTT result = 68.6 seconds @ 1630 on 04/30/2020 INR = 1.4 @ 1100 on 04/302020   therapeutic level  2 nd aPTT  thus 2 consecutive therapeutic levels   daily aPTT

## 2020-05-01 NOTE — PROGRESS NOTES
0307--SATS low throughout  night 90-92%. PEEP had been weaned to 14 during day. Increased back to 18 PEEP, as CXR 4/30 with bilateral pleural effusions and pulmonary edema. Will check ABG on 75% FIO2 and PEEP 18. 
 
0326-- ABG results 7.45, 46, 58, 8/32, 91%. FIO2 to 80% for O2 sat goal greater than or equal to 91%.

## 2020-05-01 NOTE — PROGRESS NOTES
Pulmonary, Critical Care, and Sleep Medicine Pulmonary Progress Note Name: Osiel Colbert : 1975 MRN: [id number] Date: 2020 [x]I have reviewed the flowsheet and previous days notes. Events, vitals, medications and notes from last 24 hours reviewed. IMPRESSION:  
Patient Active Problem List  
Diagnosis Code  Acute on chronic respiratory failure with hypoxia and hypercapnia ([facility]) J96.21, J96.22  
 Acute on chronic systolic and diastolic CHF (congestive heart failure) ([facility]) I50.43 Aspiration pneumonitis from multiple patient initiated ET tube displacement events J69.0  Cardiac arrest ([facility] [address] I46.9 Staph Aureus in sputum culture A49.01  
 Elevated troponin, NSTEMI R79.89, I21.4  Chronic a-fib I48.20  Atrial flutter ([facility] [address] I48.92 Prolonged QTc R94.31  
 Chronic renal insufficiency, stage II (mild) N18.2 Thrombocytopenia D69.6  Smoking F17.200  Arthritis M19.90  Migraine G43.909  Chronic back pain M54.9, G89.29  
 Obstructive Sleep apnea G47.33  
 Morbid obesity ([facility]) E66.01  
  
PLAN:  
[facility]. Ventilated patients- aim to keep peak plateau pressure less than or equal to 30cm H2O. Titrate FiO2 for goal SPO2> 91%. Titrated PEEP to 14 cwp yesterday but overnight desaturation required increase in FiO2 and PEEP 
CXR and ABG daily VAP prevention bundle, head of the bed at 30' all times Daily sedation and paralytic holiday when feasible and assessment for weaning with SBT as tolerated. Monitor TOF on Nimbex. Patient still gets agitated through paralytic and sedation developing tolerance for them quickly requiring ongoing paralytic Continue bronchodilators, pulmonary hygiene care Off of steroid SARS-COV-2 negative x 2 Antibiotic choice: low threshold to start patient on Zosyn and Vancomycin if febrile or leukocytosis.  Patient may have aspirated since had multiple witnessed episodes when he dislodged his ET tube and or with ET tube cuff malfunctioned requiring exchange Monitor hemodynamics -stable and off of vasopressors since 4/27/20 AM 
Was on Amiodarone, has been off. In NSR On Argatroban given thrombocytopenia. Off of Heparin given bleeding around RT IJ insertion site. No bleeding issues on therapeutic argatroban. HIT Ab negative; LEYDI pending At higher risk for DVT/PE; SCDs difficult to keep on with leg size, Plt remained above 100 and no appreciable bleeding or hematoma at RT IJ insertion  CBC, PT/INR, PTT, any signs of bleeding/hematoma PVL bl LE negative Continue Bumex 1 mg twice a day Will try low amount of free H2O given mild hypernatremia while considering conservative fluid volume strategy with current ventilator support need. Nephrology followed Continue TF at trickle rate at 15 mL/hr 
Glycemic control AM labs Central Line, A-line, Daniel and Vent Bundles will be followed, Vent Day 10 Prognosis guarded. Patient at increased risk for prolonged ventilator support, prolonged hospitalization, nosocomial infection, cardiovascular collapse, DVT/PE and other. Spoke with wife on daily basis and updated her. Patient remained full code. Will defer respective systems problem management to primary and other consultant and follow patient in ICU with primary and other medical team 
Quality Care: PPI, DVT prophylaxis, HOB elevated, Infection control all reviewed and addressed. PAIN AND SEDATION: Fentanyl, Versed, Nimbex · Skin/Wound: chronic venous stasis changes · Nutrition: TF 
· Prophylaxis: DVT and GI Prophylaxis reviewed. · Restraints: none while on paralytic; may place during weaning process or when off of paralytic 
· PT/OT eval and treat: as needed when stable · Lines/Tubes: lines RT IJ 4/22/20; A-line 4/23/20; daniel 4/21/20, ET tube 4/25/20 ADVANCE DIRECTIVE: Full code DISCUSSION: spoke with RN, RT, ICU staff Events and notes from last 24 hours reviewed. Care plan discussed with nursing Subjective/History:  
53-year-old male with morbid obesity, ex-smoker, history of asthma, history of severe obstructive sleep apnea on CPAP, questionable component compliance, history of congestive heart failure with reduced ejection fraction atrial fibrillation atrial flutter on amiodarone, Xarelto, history of chronic renal insufficiency, cellulitis of lower extremities and anemia. As per family did not have any fever or upper respiratory tract infections and no flulike symptoms. On Lasix, CPAP not clear if missed. Presented with acute shortness of breath worsening edema of lower extremities. He was found to be in acute hypercarbic hypoxemic respiratory failure initially had a trial of BiPAP but failed and then eventually was intubated. He was hypotensive. Moderate amount of secretions. Morbid obesity. Patient compliant with Xarelto. Patient self extubated and had PEA arrest. 
 
Subjective:  
05/01/20 Patient in ICU on ventilator; overnight desaturation requiring to increase PEEP and FIO2 from progress made during daytime Plp below 30 cm H2O; on sedation and paralytic with good ventilator synchrony this AM 
Hemodynamically stable; in NSR Tolerating Argatroban w/o any bleeding issues; Hgb stable Good UO with diuresis. Afebrile. Tolerating TF at 15 mL/hr rate No bleeding around IJ insertion or other sites ROS: Review of systems not obtained due to patient factors. Latest lactic acid:  
Lactic acid Date Value Ref Range Status 04/28/2020 1.4 0.4 - 2.0 MMOL/L Final  
04/27/2020 2.1 (HH) 0.4 - 2.0 MMOL/L Final  
  Comment:  
  CALLED TO AND CORRECTLY REPEATED BY: 
Emily Johnson RN, ICU ON 04/27/2020 AT 0450 TO JDA 
  
04/27/2020 2.0 0.4 - 2.0 MMOL/L Final  
 
 
Past Medical History: 
Past Medical History:  
Diagnosis Date  Arrhythmia  Arthritis   
 knees  CHF (congestive heart failure) ([facility] [address]  Chronic back pain  Chronic renal insufficiency, stage II (mild)  History of atrial flutter Dx 2016 - anticoagulated by Erby Persons  Hypertension 2015  Limited mobility  Migraine headache  Morbid obesity ([facility] [address]  Morbid obesity with body mass index of 60.0-69.9 in adult [facility])  Sleep apnea   
 uses c-pap instructed to bring day of surgery  Smoking   
 very passive / cigars only Allergy: No Known Allergies Vital Signs:   
Blood pressure 112/65, pulse 61, temperature 99.4 °F (37.4 °C), resp. rate 20, height 5' 10\" (1.778 m), weight (!) 220 kg (485 lb 0.2 oz), SpO2 91 %. Body mass index is 69.59 kg/m². O2 Device: (P) Endotracheal tube O2 Flow Rate (L/min): 12 l/min Temp (24hrs), Av °F (37.2 °C), Min:98.8 °F (37.1 °C), Max:99.4 °F (37.4 °C) Intake/Output:  
Last shift:      No intake/output data recorded. Last 3 shifts:  1901 -  0700 In: 1591.9 [I.V.:1286.9] Out: 4155 [ATLZW:7819] Intake/Output Summary (Last 24 hours) at 2020 0957 Last data filed at 2020 0000 Gross per 24 hour Intake 917.63 ml Output 1850 ml Net -932.37 ml Ventilator Settings: 
Mode Rate Tidal Volume Pressure FiO2 PEEP Assist control, VC+   580 ml    (P) 80 % 18 cm H20 Peak airway pressure: 32 cm H2O Minute ventilation: 11.5 l/min Pl pressure goals less than or equal to 30. Physical Exam: 
General: sedated, on paralytic, comfortable looking, appears older than stated age, morbidly obese, on ventilator HEENT: PERRL, fundi benign, orally intubatyed Neck: No abnormally enlarged lymph nodes or thyroid, supple; RT IJ site without active bleeding, no hematoma, exam limitation Chest: normal, obese chest wall Lungs: moderate air entry, few rhonchi scattered bilaterally, normal percussion anterior chest bilaterally but limited, no tenderness/ rash Heart: Regular rate and rhythm, S1S2 present or without murmur or extra heart sounds Abdomen: obese, bowel sounds normoactive, tympanic, soft without significant tenderness, masses, organomegaly or guarding, rigidity, rebound Extremity: 2+, pitting and bl LE and 2+ bl UE edema; negative for cyanosis, clubbing Capillary refill: normal 
Neuro: sedated, on paralytic, no involuntary movements, exam limitations Skin: Skin color, texture, turgor fair. Skin dry, warm, non-diaphoretic DATA:  
Current Facility-Administered Medications Medication Dose Route Frequency  fentaNYL citrate (PF) injection 50 mcg  50 mcg IntraVENous Q3H PRN  
 argatroban 250 mg in 0.9% sodium chloride 250 mL (1000 mcg/mL) infusion  0.5-10 mcg/kg/min IntraVENous TITRATE  bumetanide (BUMEX) injection 1 mg  1 mg IntraVENous BID  LORazepam (ATIVAN) injection 1 mg  1 mg IntraVENous Q2H PRN  chlorhexidine (PERIDEX) 0.12 % mouthwash 10 mL  10 mL Oral Q12H  
 albuterol-ipratropium (DUO-NEB) 2.5 MG-0.5 MG/3 ML  3 mL Nebulization Q6H PRN  
 acetaminophen (TYLENOL) tablet 650 mg  650 mg Oral Q6H PRN  
 cisatracurium (NIMBEX) 100 mg in 0.9% sodium chloride 100 mL infusion  0-10 mcg/kg/min IntraVENous TITRATE  ELECTROLYTE REPLACEMENT PROTOCOL - Calcium   1 Each Other PRN  
 ELECTROLYTE REPLACEMENT PROTOCOL - Magnesium   1 Each Other PRN  
 ELECTROLYTE REPLACEMENT PROTOCOL - Potassium Standard Dosing   1 Each Other PRN  
 ELECTROLYTE REPLACEMENT PROTOCOL - Phosphorus  Standard Dosing  1 Each Other PRN  pantoprazole (PROTONIX) 40 mg in 0.9% sodium chloride 10 mL injection  40 mg IntraVENous DAILY  fentaNYL (PF) 900 mcg/30 ml infusion soln  0-150 mcg/hr IntraVENous TITRATE  midazolam in normal saline (VERSED) 1 mg/mL infusion  0-10 mg/hr IntraVENous TITRATE  aspirin chewable tablet 81 mg  81 mg Oral DAILY  sodium chloride (NS) flush 5-40 mL  5-40 mL IntraVENous Q8H  
 sodium chloride (NS) flush 5-40 mL  5-40 mL IntraVENous PRN Facility-Administered Medications Ordered in Other Encounters Medication Dose Route Frequency  propofoL (DIPRIVAN) 10 mg/mL injection   IntraVENous PRN  
 ePHEDrine (PF) (MISTOLE) 10 mg/mL in NS syringe   IntraVENous PRN  
 PHENYLephrine (RUPERT-SYNEPHRINE) 100 mcg/mL in NS syringe   IntraVENous PRN Telemetry: [x]Sinus []A-flutter []Paced []A-fib []Multiple PVCs  
              
4/22/20 ECHO · Left Ventricle: Normal wall thickness and systolic function (ejection fraction normal). Mildly dilated left ventricle. The estimated ejection fraction is 45 - 50%. There is moderate (grade 2) left ventricular diastolic dysfunction E/e' Ratio = 17.09. Wall Scoring: The left ventricular wall motion is globally hypokinetic. · Right Ventricle: Not well visualized. Normal global systolic function. Moderately dilated right ventricle. · Right Atrium: Right atrium not well visualized. Moderately dilated right atrium. · IVC/Hepatic Veins: Dilated inferior vena cava. Mechanically ventilated; cannot use inferior caval vein diameter to estimate central venous pressure. Labs: 
Recent Results (from the past 24 hour(s)) PTT Collection Time: 04/30/20 11:00 AM  
Result Value Ref Range aPTT 28.4 23.0 - 36.4 SEC PROTHROMBIN TIME + INR Collection Time: 04/30/20 11:00 AM  
Result Value Ref Range Prothrombin time 17.2 (H) 11.5 - 15.2 sec INR 1.4 (H) 0.8 - 1.2 METABOLIC PANEL, COMPREHENSIVE Collection Time: 04/30/20 11:00 AM  
Result Value Ref Range Sodium 146 (H) 136 - 145 mmol/L Potassium 4.4 3.5 - 5.5 mmol/L Chloride 110 100 - 111 mmol/L  
 CO2 34 (H) 21 - 32 mmol/L Anion gap 2 (L) 3.0 - 18 mmol/L Glucose 114 (H) 74 - 99 mg/dL BUN 41 (H) 7.0 - 18 MG/DL Creatinine 1.08 0.6 - 1.3 MG/DL  
 BUN/Creatinine ratio 38 (H) 12 - 20 GFR est AA >60 >60 ml/min/1.73m2 GFR est non-AA >60 >60 ml/min/1.73m2 Calcium 8.4 (L) 8.5 - 10.1 MG/DL Bilirubin, total 0.8 0.2 - 1.0 MG/DL  
 ALT (SGPT) 26 16 - 61 U/L  
 AST (SGOT) 15 10 - 38 U/L Alk.  phosphatase 38 (L) 45 - 117 U/L  
 Protein, total 6.5 6.4 - 8.2 g/dL Albumin 2.9 (L) 3.4 - 5.0 g/dL Globulin 3.6 2.0 - 4.0 g/dL A-G Ratio 0.8 0.8 - 1.7    
CBC WITH AUTOMATED DIFF Collection Time: 04/30/20 11:00 AM  
Result Value Ref Range WBC 7.8 4.6 - 13.2 K/uL  
 RBC 4.64 (L) 4.70 - 5.50 M/uL  
 HGB 12.4 (L) 13.0 - 16.0 g/dL HCT 41.2 36.0 - 48.0 % MCV 88.8 74.0 - 97.0 FL  
 MCH 26.7 24.0 - 34.0 PG  
 MCHC 30.1 (L) 31.0 - 37.0 g/dL  
 RDW 15.1 (H) 11.6 - 14.5 % PLATELET 029 (L) 765 - 420 K/uL MPV 11.2 9.2 - 11.8 FL  
 NEUTROPHILS 77 (H) 40 - 73 % LYMPHOCYTES 12 (L) 21 - 52 % MONOCYTES 11 (H) 3 - 10 % EOSINOPHILS 0 0 - 5 % BASOPHILS 0 0 - 2 %  
 ABS. NEUTROPHILS 6.0 1.8 - 8.0 K/UL  
 ABS. LYMPHOCYTES 1.0 0.9 - 3.6 K/UL  
 ABS. MONOCYTES 0.8 0.05 - 1.2 K/UL  
 ABS. EOSINOPHILS 0.0 0.0 - 0.4 K/UL  
 ABS. BASOPHILS 0.0 0.0 - 0.1 K/UL  
 DF AUTOMATED    
GLUCOSE, POC Collection Time: 04/30/20 11:40 AM  
Result Value Ref Range Glucose (POC) 96 70 - 110 mg/dL PTT Collection Time: 04/30/20  2:30 PM  
Result Value Ref Range aPTT 63.2 (H) 23.0 - 36.4 SEC  
PTT Collection Time: 04/30/20  4:30 PM  
Result Value Ref Range aPTT 68.6 (H) 23.0 - 36.4 SEC GLUCOSE, POC Collection Time: 04/30/20  6:00 PM  
Result Value Ref Range Glucose (POC) 95 70 - 110 mg/dL GLUCOSE, POC Collection Time: 04/30/20 11:32 PM  
Result Value Ref Range Glucose (POC) 87 70 - 110 mg/dL POC G3 Collection Time: 05/01/20  3:26 AM  
Result Value Ref Range Device: VENT    
 FIO2 (POC) 0.75 % pH (POC) 7.451 (H) 7.35 - 7.45    
 pCO2 (POC) 46.0 (H) 35.0 - 45.0 MMHG  
 pO2 (POC) 58 (L) 80 - 100 MMHG  
 HCO3 (POC) 32.0 (H) 22 - 26 MMOL/L  
 sO2 (POC) 91 (L) 92 - 97 % Base excess (POC) 8 mmol/L Mode ASSIST CONTROL Tidal volume 586 ml Set Rate 20 bpm  
 PEEP/CPAP (POC) 18 cmH2O Allens test (POC) N/A Total resp. rate 20 Site DRAWN FROM ARTERIAL LINE Patient temp. 98.7 Specimen type (POC) ARTERIAL Performed by Sammi Verduzco METABOLIC PANEL, COMPREHENSIVE Collection Time: 05/01/20  4:00 AM  
Result Value Ref Range Sodium 148 (H) 136 - 145 mmol/L Potassium 4.2 3.5 - 5.5 mmol/L Chloride 111 100 - 111 mmol/L  
 CO2 32 21 - 32 mmol/L Anion gap 5 3.0 - 18 mmol/L Glucose 104 (H) 74 - 99 mg/dL BUN 46 (H) 7.0 - 18 MG/DL Creatinine 1.06 0.6 - 1.3 MG/DL  
 BUN/Creatinine ratio 43 (H) 12 - 20 GFR est AA >60 >60 ml/min/1.73m2 GFR est non-AA >60 >60 ml/min/1.73m2 Calcium 8.3 (L) 8.5 - 10.1 MG/DL Bilirubin, total 0.8 0.2 - 1.0 MG/DL  
 ALT (SGPT) 32 16 - 61 U/L  
 AST (SGOT) 20 10 - 38 U/L Alk. phosphatase 39 (L) 45 - 117 U/L Protein, total 6.3 (L) 6.4 - 8.2 g/dL Albumin 2.8 (L) 3.4 - 5.0 g/dL Globulin 3.5 2.0 - 4.0 g/dL A-G Ratio 0.8 0.8 - 1.7    
CBC WITH AUTOMATED DIFF Collection Time: 05/01/20  4:00 AM  
Result Value Ref Range WBC 8.4 4.6 - 13.2 K/uL  
 RBC 4.71 4.70 - 5.50 M/uL  
 HGB 12.4 (L) 13.0 - 16.0 g/dL HCT 41.9 36.0 - 48.0 % MCV 89.0 74.0 - 97.0 FL  
 MCH 26.3 24.0 - 34.0 PG  
 MCHC 29.6 (L) 31.0 - 37.0 g/dL  
 RDW 15.2 (H) 11.6 - 14.5 % PLATELET 976 (L) 776 - 420 K/uL MPV 10.6 9.2 - 11.8 FL  
 NEUTROPHILS 69 40 - 73 % LYMPHOCYTES 18 (L) 21 - 52 % MONOCYTES 13 (H) 3 - 10 % EOSINOPHILS 0 0 - 5 % BASOPHILS 0 0 - 2 %  
 ABS. NEUTROPHILS 5.8 1.8 - 8.0 K/UL  
 ABS. LYMPHOCYTES 1.5 0.9 - 3.6 K/UL  
 ABS. MONOCYTES 1.1 0.05 - 1.2 K/UL  
 ABS. EOSINOPHILS 0.0 0.0 - 0.4 K/UL  
 ABS. BASOPHILS 0.0 0.0 - 0.1 K/UL  
 DF AUTOMATED MAGNESIUM Collection Time: 05/01/20  4:00 AM  
Result Value Ref Range Magnesium 2.6 1.6 - 2.6 mg/dL PHOSPHORUS Collection Time: 05/01/20  4:00 AM  
Result Value Ref Range Phosphorus 3.0 2.5 - 4.9 MG/DL  
CALCIUM, IONIZED Collection Time: 05/01/20  4:00 AM  
Result Value Ref Range Ionized Calcium 1.18 1.12 - 1.32 MMOL/L  
PTT Collection Time: 05/01/20  4:00 AM  
Result Value Ref Range aPTT 68.5 (H) 23.0 - 36.4 SEC GLUCOSE, POC Collection Time: 05/01/20  5:15 AM  
Result Value Ref Range Glucose (POC) 91 70 - 110 mg/dL Recent Labs 05/01/20 
5787 04/30/20 
1051 04/29/20 
6446 FIO2I 0.75 0.75 0.65 HCO3I 32.0* 32.2* 32.5* PCO2I 46.0* 51.5* 52.9*  
PHI 7.451* 7.406 7.398 PO2I 58* 73* 76* All Micro Results Procedure Component Value Units Date/Time CULTURE, RESPIRATORY/SPUTUM/BRONCH [place]er Bane STAIN [[id number]]  (Abnormal)  (Susceptibility) Collected:  04/22/20 1018 Order Status:  Completed Specimen:  Sputum from Tracheal Aspirate Updated:  04/25/20 5328 Special Requests: NO SPECIAL REQUESTS     
  GRAM STAIN FEW WBCS SEEN     
      
  OCCASIONAL EPITHELIAL CELLS SEEN  
     
      
  FEW GRAM POSITIVE COCCI IN CLUSTERS  
     
   RARE GRAM NEGATIVE RODS Culture result:    
  LIGHT STAPHYLOCOCCUS AUREUS  
     
      
  LIGHT NORMAL RESPIRATORY BRYN  
     
 CULTURE, BLOOD [[id number]] Collected:  04/24/20 1015 Order Status:  Canceled Specimen:  Blood Imaging: 
[x]I have personally reviewed the patients chest radiographs images and report 5/1/20 [place] [facility] [place]21/20 XR CHEST PORT Narrative AP portable chest, 5/1/2020 at 0449 hours: 
 
INDICATION: Pneumonia. ET tube placement. Comparison 4/30/2020. Endotracheal tube is in the upper thoracic trachea below 
the clavicles. The study is lordotically positioned. Right internal jugular 
central line is in the upper SVC. Asymmetric right perihilar edema or 
infiltrate. Cardiomegaly. Mild interstitial perihilar changes on the left 
without new focal infiltrate. Impression IMPRESSION: 
Little change. Right lung edema or infiltrate. Endotracheal tube in good 
position. No results found for this or any previous visit. [x]See my orders for details My assessment, plan of care, findings, medications, side effects etc were discussed with: 
[x]nursing [x]Dietician  
[x]respiratory therapy []  
[x]family - wife []Patient [x]Total critical care time exclusive of procedures 36 minutes with complex decision making performed and > 50% time spent in face to face evaluation.  
 
Alyssa Fernandez MD

## 2020-05-01 NOTE — PROGRESS NOTES
Hospitalist Progress Note Patient: Gail Cardenas MRN: [id number]  [facility]: [id number] YOB: 1975  Age: 53 y.o. Sex: male DOA: 4/21/2020 LOS:  LOS: 10 days Assessment/Plan Patient Active Problem List  
Diagnosis Code  Cellulitis of left lower leg L03. 116  
 Anemia D64.9  Morbid obesity ([facility]) E66.01  
 Dyspnea R06.00  Sleep apnea G47.30  Sinus tachycardia R00.0  BMI 70 and over, adult [facility]) R34.97  Chronic back pain M54.9, G89.29  
 Arthritis M19.90  Migraine G43.909  
 History of atrial flutter Z86.79  
 Limited mobility Z74.09  
 Smoking F17.200  Chronic renal insufficiency, stage II (mild) N18.2  CHF (congestive heart failure) ([facility]) I50.9  Acute diastolic CHF (congestive heart failure) ([facility]) I50.31  Chronic a-fib I48.20  Atrial flutter ([facility]) I48.92  
 Elevated troponin R79.89  
 Acute respiratory distress R06.03  
 Acute CHF (congestive heart failure) ([facility]) I50.9  Respiratory failure with hypoxia and hypercapnia ([facility]) J96.91, J96.92  
 Acute on chronic respiratory failure with hypoxia and hypercapnia ([facility]) J96.21, J96.22  
 Cardiac arrest ([facility]) I46.9  
  
 
 
 
52 y/o male with hx of morbid obesity, MARIA FERNANDA, chronic afib on anticoagulation, and systolic CHF (EF 50-91%) is admitted with respiratory failure with hypoxia and hypercapnia, he required to be intubated. 04/23/2020 Patient self extubated this morning and went into PEA arrest, he was re intubated, difficult intubation. He is awake and post resuscitation, placed on sedation. Requiring high PEEP. 
 
04/24/2020 Patient again tried to self extubate in spite of sedation. 04/25/2020 ET tube leak, ET tube exchanged 04/27/2020 Had bleeding around IJ site, heparin stopped, H/H stable CRITICAL CARE PLAN Resp - See vent orders, VAP bundle. HOB>30 degrees. Acute respiratory failure - with hypoxia and hypercapnia, follow ABGs, CXR. 
MARIA FERNANDA/OHS - On solumedrol ID - Follow up resp, cx. ANTIBIOTICS off antibiotics Seen by ID 
COVID 19 negative x 2 
 
[facility] - Monitor HD. Hypotension - resolved, off pressors Paroxysmal atrial flutter - monitor rate, was on amiodarone. Has been bradycardic, toprol xl held Acute on chronic systolic CHF - On bumex Echo with EF of 45-50% Elevated troponin - NSTEMI type II due to PEA arrest 
Cardiology following. Heme/onc - Follow H&H, plts. Thrombocytopenia - improving, HIT neg. on argatroban, Renal - Trend BUN, Cr, follow I/O, daniel in place. Check and replace Mg, K, phos. Hypernatremia - free water flushes, improving Endocrine -  Follow [facility] Neuro/ Pain/ Sedation -  
nimbex. GI - NPO for now, OG tube, tube feeding. Morbid obesity Prophylaxis - DVT: Argatroban, GI: protonix [id number] 
35 minutes of critical care time spent in the direct evaluation and treatment of this high risk patient. The reason for providing this level of medical care for this critically ill patient was due a critical illness that impaired one or more vital organ systems such that there was a high probability of imminent or life threatening deterioration in the patients condition. This care involved high complexity decision making to assess, manipulate, and support vital system functions, to treat this degreee vital organ system failure and to prevent further life threatening deterioration of the patients condition. Disposition : TBD Physical Exam: 
General: As above   
HEENT: NC, Atraumatic. PERRLA, anicteric sclerae. Lungs: CTA Bilaterally. Distant breath sounds secondary to body habitus. Heart:  S1 S2, No murmur, No Rubs, No Gallops Abdomen: Soft, Non distended, obese, Non tender.  +Bowel sounds, Extremities: LE edema Vital signs/Intake and Output: 
Visit Vitals /65 Pulse (!) 59 Temp 99.6 °F (37.6 °C) Resp 20 Ht 5' 10\" (1.778 m) Wt (!) 220 kg (485 lb 0.2 oz) SpO2 92% BMI 69.59 kg/m² Current Shift:  05/01 0701 - 05/01 1900 In: -  
Out: 417 [HUHRS:280] Last three shifts:  04/29 1901 - 05/01 0700 In: 1591.9 [I.V.:1286.9] Out: 4155 [FFQSD:3785] Labs: Results:  
   
Chemistry Recent Labs 05/01/20 
0400 04/30/20 1100 04/30/20 
0400 * 114* 117* * 146* 146*  
K 4.2 4.4 4.6  110 110 CO2 32 34* 33* BUN 46* 41* 40* CREA 1.06 1.08 0.95  
CA 8.3* 8.4* 8.2* AGAP 5 2* 3 BUCR 43* 38* 42* AP 39* 38* 39* TP 6.3* 6.5 6.3* ALB 2.8* 2.9* 3.0*  
GLOB 3.5 3.6 3.3 AGRAT 0.8 0.8 0.9  
  
CBC w/Diff Recent Labs 05/01/20 0400 04/30/20 
1100 04/30/20 
0400 WBC 8.4 7.8 7.2 RBC 4.71 4.64* 4.69* HGB 12.4* 12.4* 12.4*  
HCT 41.9 41.2 41.4 * 122* 109* GRANS 69 77* 83* LYMPH 18* 12* 8*  
EOS 0 0 0 Cardiac Enzymes No results for input(s): CPK, CKND1, CONSTANZA in the last 72 hours. No lab exists for component: Panda Falk Coagulation Recent Labs 05/01/20 0400 04/30/20 
1630  04/30/20 
1100 PTP  --   --   --  17.2* INR  --   --   --  1.4* APTT 68.5* 68.6*   < > 28.4  
 < > = values in this interval not displayed. Lipid Panel No results found for: CHOL, CHOLPOCT, CHOLX, CHLST, CHOLV, 220291, HDL, HDLP, LDL, LDLC, DLDLP, 388403, VLDLC, VLDL, TGLX, TRIGL, TRIGP, TGLPOCT, CHHD, CHHDX  
BNP No results for input(s): BNPP in the last 72 hours. Liver Enzymes Recent Labs 05/01/20 
0400 TP 6.3* ALB 2.8* AP 39* SGOT 20 Thyroid Studies Lab Results Component Value Date/Time TSH 1.45 04/27/2020 03:50 AM  
    
Procedures/imaging: see electronic medical records for all procedures/Xrays and details which were not copied into this note but were reviewed prior to creation of Plan

## 2020-05-01 NOTE — PROGRESS NOTES
1908-Bedside verbal report received from Juventino Lara RN. Sbar, mar, labs, kardex and patient status verified. TOF and drips verified. While verifying drips noted rate in pump did not match rate on MAR. Weight on pump is set at 162.3kg and weight in chart 220kg (Rate on pump 19.5ml/hr and rate in [facility] states 26.4ml/hr). Current rate of 19.5ml/hr is therapeutic. Spoke to Pharmacist, Carlene Savage about discrepancy and per Pharmacist, keep at current therapeutic rate and document in [facility].  
 
2000-Assessment completed. No changes form off-going report. Remains sedated/paralyzed on vent. PROM performed on BUE and BLE.   
 
0000-Assessment completed. No changes. 0104-Partial CHG bath completed of front/sides. Unable to turn at this time, patient does not tolerate, MD aware. Mouth/ET tube care provided. 0250-Sats 88-89% at this time. Suctioned for moderate, pink-tinged, thick secretions. Sats remain same. Notified RT Destiny to assess patient. 0300-RT Destiny at bedside adjusting vent settings and performing ABG at this time. 0400-Assessment completed. Sat's improved with vent settings changes. Appears comfortable. PROM performed. 0715-Bedside verbal report given to Juventino Lara RN. Sbar, mar, labs,kardex and patient status reviewed. Drips verified.

## 2020-05-01 NOTE — PROGRESS NOTES
Yolette King MD requesting Free H20 flushes of 200ml Q 6 due to increaseing sodium levels; will hold at 15ml/hr and check over the weekend Kiley Younger RD 
PAGER:  [phone]

## 2020-05-01 NOTE — PROGRESS NOTES
Chart reviewed at this time pt remains in ICU on vent,cm will cont to remain available for d/c planning, on call weekend cm will be available for immediate needs,unit cm will resume case on Monday.

## 2020-05-01 NOTE — PROGRESS NOTES
Cardiology Progress Note Patient: Angelica Phillips. Sex: male          DOA: 4/21/2020 YOB: 1975      Age:  53 y.o.        LOS:  LOS: 9 days Patient seen and examined, chart reviewed. Assessment/Plan Patient Active Problem List  
Diagnosis Code  Cellulitis of left lower leg L03. 116  
 Anemia D64.9  Morbid obesity ([facility]) E66.01  
 Dyspnea R06.00  Sleep apnea G47.30  Sinus tachycardia R00.0  BMI 70 and over, adult [facility]) Q36.95  Chronic back pain M54.9, G89.29  
 Arthritis M19.90  Migraine G43.909  
 History of atrial flutter Z86.79  
 Limited mobility Z74.09  
 Smoking F17.200  Chronic renal insufficiency, stage II (mild) N18.2  CHF (congestive heart failure) ([facility]) I50.9  Acute diastolic CHF (congestive heart failure) ([facility]) I50.31   Atrial flutter ([facility]) I48.92  
 Elevated troponin R79.89  
 Acute respiratory distress R06.03  
 Acute CHF (congestive heart failure) ([facility]) I50.9  Respiratory failure with hypoxia and hypercapnia ([facility]) J96.91, J96.92  
 Acute on chronic respiratory failure with hypoxia and hypercapnia ([facility]) J96.21, J96.22  
 Cardiac arrest ([facility]) I46.9 Acute on chronic systolic heart failure NSTEMI type II due to PEA arrest 
Paroxysmal atrial flutter Thrombocytopenia Patient self extubated and had PEA arrest. 
He had brief episode of A fib with RVR and was started On Amiodarone drip. Currently he is in Sinus rhythm. Amiodarone discontinued due to bradycardia. 
  
Echocardiogram  
  
· Left Ventricle: Normal wall thickness and systolic function (ejection fraction normal). Mildly dilated left ventricle. The estimated ejection fraction is 45 - 50%. There is moderate (grade 2) left ventricular diastolic dysfunction E/e' Ratio = 17.09. Wall Scoring: The left ventricular wall motion is globally hypokinetic. · Right Ventricle: Not well visualized. Normal global systolic function. Moderately dilated right ventricle. · Right Atrium: Right atrium not well visualized. Moderately dilated right atrium. · IVC/Hepatic Veins: Dilated inferior vena cava. Mechanically ventilated; cannot use inferior caval vein diameter to estimate central venous pressure. 
  
IV heparin was discontinued due to thrombocytopenia and bleeding at central line site. Started on Argatroban. Plan: 
 
Continue Aspirin Change IV Bumex 1 mg twice a day Continue Argatroban 
Strict I/O Monitor electrolytes and renal function Continue Ventilatory support. Continue management as per hospital medicine and pulmonary critical care Plan discussed with Patient's nurse. Plan discussed with Urszula Morrison. Subjective:  
 cc: 
Orally intubated REVIEW OF SYSTEMS:  
 
Can not obtain Objective:  
  
Visit Vitals /60 Pulse (!) 49 Temp 98.9 °F (37.2 °C) Resp 20 Ht 5' 10\" (1.778 m) Wt (!) 220 kg (485 lb) SpO2 92% BMI 69.59 kg/m² Body mass index is 69.59 kg/m². Physical Exam: 
General Appearance: Comfortable, Morbidly obese, not using accessory muscles of respiration. Orally intubated HEENT: AMMON. HEAD: Atraumatic NECK: No JVD, no thyroidomeglay. CAROTIDS: No bruit LUNGS: bilateral conducted sounds HEART: S1+S2 audible, no murmur, no pericardial rub. NEUROLOGICAL: Sedated. Medication: 
Current Facility-Administered Medications Medication Dose Route Frequency  fentaNYL citrate (PF) injection 50 mcg  50 mcg IntraVENous Q3H PRN  
 argatroban 250 mg in 0.9% sodium chloride 250 mL (1000 mcg/mL) infusion  0.5-10 mcg/kg/min IntraVENous TITRATE  bumetanide (BUMEX) injection 1 mg  1 mg IntraVENous TID  LORazepam (ATIVAN) injection 1 mg  1 mg IntraVENous Q2H PRN  chlorhexidine (PERIDEX) 0.12 % mouthwash 10 mL  10 mL Oral Q12H  
 albuterol-ipratropium (DUO-NEB) 2.5 MG-0.5 MG/3 ML  3 mL Nebulization Q6H PRN  
 acetaminophen (TYLENOL) tablet 650 mg  650 mg Oral Q6H PRN  
  cisatracurium (NIMBEX) 100 mg in 0.9% sodium chloride 100 mL infusion  0-10 mcg/kg/min IntraVENous TITRATE  ELECTROLYTE REPLACEMENT PROTOCOL - Calcium   1 Each Other PRN  
 ELECTROLYTE REPLACEMENT PROTOCOL - Magnesium   1 Each Other PRN  
 ELECTROLYTE REPLACEMENT PROTOCOL - Potassium Standard Dosing   1 Each Other PRN  
 ELECTROLYTE REPLACEMENT PROTOCOL - Phosphorus  Standard Dosing  1 Each Other PRN  pantoprazole (PROTONIX) 40 mg in 0.9% sodium chloride 10 mL injection  40 mg IntraVENous DAILY  fentaNYL (PF) 900 mcg/30 ml infusion soln  0-150 mcg/hr IntraVENous TITRATE  midazolam in normal saline (VERSED) 1 mg/mL infusion  0-10 mg/hr IntraVENous TITRATE  aspirin chewable tablet 81 mg  81 mg Oral DAILY  sodium chloride (NS) flush 5-40 mL  5-40 mL IntraVENous Q8H  
 sodium chloride (NS) flush 5-40 mL  5-40 mL IntraVENous PRN Facility-Administered Medications Ordered in Other Encounters Medication Dose Route Frequency  propofoL (DIPRIVAN) 10 mg/mL injection   IntraVENous PRN  
 ePHEDrine (PF) (MISTOLE) 10 mg/mL in NS syringe   IntraVENous PRN  
 PHENYLephrine (RUPERT-SYNEPHRINE) 100 mcg/mL in NS syringe   IntraVENous PRN Lab/Data Reviewed: 
 
  
Recent Labs 04/30/20 
1100 04/30/20 
0400 04/29/20 
0400 WBC 7.8 7.2 6.8 HGB 12.4* 12.4* 11.8* HCT 41.2 41.4 39.5 * 109* 105* Recent Labs 04/30/20 
1100 04/30/20 
0400 04/29/20 
0400 * 146* 145  
K 4.4 4.6 4.5  
 110 107 CO2 34* 33* 35* * 117* 110* BUN 41* 40* 36* CREA 1.08 0.95 0.98  
CA 8.4* 8.2* 8.6  
 
 
38 minutes of critical care time spent in the direct evaluation and treatment of this high risk patient.  The reason for providing this level of medical care for this critically ill patient was due a critical illness that impaired one or more vital organ systems such that there was a high probability of imminent or life threatening deterioration in the patients condition. This care involved high complexity decision making to assess, manipulate, and support vital system functions, to treat this degree vital organ system failure and to prevent further life threatening deterioration of the patients condition. Signed By: Wyatt Perez MD   
 April 30, 2020

## 2020-05-02 NOTE — PROGRESS NOTES
Cardiology Progress Note Patient: Pascale Leary. Sex: male          DOA: 4/21/2020 YOB: 1975      Age:  53 y.o.        LOS:  LOS: 11 days Assessment/Plan Principal Problem: 
  Respiratory failure with hypoxia and hypercapnia ([address] (4/21/2020) Active Problems: Morbid obesity ([address] (4/4/2016) Sleep apnea (4/4/2016) BMI 70 and over, adult [facility]) () Chronic renal insufficiency, stage II (mild) () Chronic a-fib (10/15/2018) Acute CHF (congestive heart failure) ([facility] (4/21/2020) Acute on chronic respiratory failure with hypoxia and hypercapnia ([facility] (4/22/2020) Cardiac arrest ([address] (4/23/2020) Plan: Hypoxic Respiratory failure multifactorial 
CHF Continue with Bumex and anticoagulation and vent support Continue with current med Discussed with RNVanessa. Subjective:  
 cc: SOB 
intubated REVIEW OF SYSTEMS:  
 
Limited due to intubation Objective:  
  
Visit Vitals BP 92/46 Pulse 90 Temp 99.9 °F (37.7 °C) Resp 20 Ht 5' 10\" (1.778 m) Wt (!) 220 kg (485 lb 0.2 oz) SpO2 (!) 87% BMI 69.59 kg/m² Body mass index is 69.59 kg/m². Physical Exam: 
General Appearance: intubated NECK: No JVD, no thyroidomeglay. LUNGS: Clear bilaterally. HEART: S1+S2 audible, ABD: Non-tender, BS Audible EXT: chronic mild edema, and no cysnosis. VASCULAR EXAM: Pulses are intact. PSYCHIATRIC EXAM: limited due to intubation Medication: 
Current Facility-Administered Medications Medication Dose Route Frequency  piperacillin-tazobactam (ZOSYN) 3.375 g in 0.9% sodium chloride (MBP/ADV) 100 mL MBP  3.375 g IntraVENous Q6H  Vancomycin- Pharmacokinetic Dosing  1 Each Other Rx Dosing/Monitoring  vancomycin (VANCOCIN) 1750 mg in  ml infusion  1,750 mg IntraVENous Q8H  
 methylPREDNISolone (PF) (Solu-MEDROL) injection 125 mg  125 mg IntraVENous ONCE  
  [START ON 5/3/2020] methylPREDNISolone (PF) (SOLU-MEDROL) injection 80 mg  80 mg IntraVENous Q12H  
 fentaNYL citrate (PF) injection 50 mcg  50 mcg IntraVENous Q3H PRN  
 argatroban 250 mg in 0.9% sodium chloride 250 mL (1000 mcg/mL) infusion  0.5-10 mcg/kg/min IntraVENous TITRATE  bumetanide (BUMEX) injection 1 mg  1 mg IntraVENous BID  LORazepam (ATIVAN) injection 1 mg  1 mg IntraVENous Q2H PRN  chlorhexidine (PERIDEX) 0.12 % mouthwash 10 mL  10 mL Oral Q12H  
 albuterol-ipratropium (DUO-NEB) 2.5 MG-0.5 MG/3 ML  3 mL Nebulization Q6H PRN  
 acetaminophen (TYLENOL) tablet 650 mg  650 mg Oral Q6H PRN  
 cisatracurium (NIMBEX) 100 mg in 0.9% sodium chloride 100 mL infusion  0-10 mcg/kg/min IntraVENous TITRATE  ELECTROLYTE REPLACEMENT PROTOCOL - Calcium   1 Each Other PRN  
 ELECTROLYTE REPLACEMENT PROTOCOL - Magnesium   1 Each Other PRN  
 ELECTROLYTE REPLACEMENT PROTOCOL - Potassium Standard Dosing   1 Each Other PRN  
 ELECTROLYTE REPLACEMENT PROTOCOL - Phosphorus  Standard Dosing  1 Each Other PRN  pantoprazole (PROTONIX) 40 mg in 0.9% sodium chloride 10 mL injection  40 mg IntraVENous DAILY  fentaNYL (PF) 900 mcg/30 ml infusion soln  0-150 mcg/hr IntraVENous TITRATE  midazolam in normal saline (VERSED) 1 mg/mL infusion  0-10 mg/hr IntraVENous TITRATE  aspirin chewable tablet 81 mg  81 mg Oral DAILY  sodium chloride (NS) flush 5-40 mL  5-40 mL IntraVENous Q8H  
 sodium chloride (NS) flush 5-40 mL  5-40 mL IntraVENous PRN Facility-Administered Medications Ordered in Other Encounters Medication Dose Route Frequency  propofoL (DIPRIVAN) 10 mg/mL injection   IntraVENous PRN  
 ePHEDrine (PF) (MISTOLE) 10 mg/mL in NS syringe   IntraVENous PRN  
 PHENYLephrine (RUPERT-SYNEPHRINE) 100 mcg/mL in NS syringe   IntraVENous PRN Lab/Data Reviewed: 
Procedures/imaging: see electronic medical records for all procedures/Xrays and details which were not copied into this note but were reviewed prior to creation of Plan 
  
 
All lab results for the last 24 hours reviewed. Recent Labs 05/02/20 
0405 05/01/20 
0400 04/30/20 
1100 WBC 7.7 8.4 7.8 HGB 12.6* 12.4* 12.4*  
HCT 43.1 41.9 41.2 * 114* 122* Recent Labs 05/02/20 
0405 05/01/20 
0400 04/30/20 
1100 * 148* 146*  
K 4.3 4.2 4.4  
* 111 110 CO2 34* 32 34* GLU 90 104* 114* BUN 53* 46* 41* CREA 1.33* 1.06 1.08  
CA 8.2* 8.3* 8.4* Signed By: Saniya Shearer MD   
 May 2, 2020

## 2020-05-02 NOTE — PROGRESS NOTES
Shift change report given to Basia Scanlon (oncoming nurse) by Samantha Busch  (offgoing nurse). Report included the following information SBAR, Kardex, ED Summary, Intake/Output, MAR, Accordion, Recent Results, Med Rec Status, Cardiac Rhythm NSR and Quality Measures.

## 2020-05-02 NOTE — PROGRESS NOTES
Pulmonary, Critical Care, and Sleep Medicine Pulmonary Progress Note Name: Desire Monday. : 1975 MRN: [id number] Date: 2020 [x]I have reviewed the flowsheet and previous days notes. Events, vitals, medications and notes from last 24 hours reviewed. IMPRESSION:  
Patient Active Problem List  
Diagnosis Code  Acute on chronic respiratory failure with hypoxia and hypercapnia ([facility]) J96.21, J96.22  
 Acute on chronic systolic and diastolic CHF (congestive heart failure) ([facility]) I50.43 Aspiration pneumonitis from multiple patient initiated ET tube displacement events J69.0  Cardiac arrest ([facility] [address] I46.9 Staph Aureus in sputum culture A49.01  
 Elevated troponin, NSTEMI R79.89, I21.4  Chronic a-fib I48.20  Atrial flutter ([facility] [address] I48.92 Prolonged QTc R94.31  
 Chronic renal insufficiency, stage II (mild) N18.2 Thrombocytopenia D69.6  Smoking F17.200  Arthritis M19.90  Migraine G43.909  Chronic back pain M54.9, G89.29  
 Obstructive Sleep apnea G47.33  
 Morbid obesity ([facility]) E66.01  
  
PLAN:  
[facility]. Ventilated patients- aim to keep peak plateau pressure less than or equal to 30cm H2O. Titrate FiO2 for goal SPO2> 91%. PEEP to 18 cwp and desaturation with NG tube dislodgement required increase in FiO2 this AM. Concern for some aspiration around ET tube with vomiting and NGT dislodgement CXR and ABG daily VAP prevention bundle, head of the bed at 30' all times Daily sedation and paralytic holiday when feasible and assessment for weaning with SBT as tolerated. Monitor TOF on Nimbex. Patient off and on gets agitated through paralytic and sedation developing tolerance for them quickly requiring ongoing paralytic Continue bronchodilators, pulmonary hygiene care Off of steroid SARS-COV-2 negative x 2 Antibiotic choice: start Zosyn and Vancomycin with low grade fever.  Patient may have aspirated since had multiple witnessed episodes when he dislodged his ET tube and or with ET tube cuff malfunctioned requiring exchange Monitor hemodynamics -stable and off of vasopressors since 4/27/20 AM 
Was on Amiodarone, has been off. In NSR On Argatroban given thrombocytopenia. Off of Heparin given bleeding around RT IJ insertion site. No bleeding issues on therapeutic argatroban. HIT Ab negative; LEYDI pending At higher risk for DVT/PE; Hgb stable, Plt remained above 100 and no appreciable active bleeding or hematoma at RT IJ insertion  CBC, PT/INR, PTT, any signs of bleeding/hematoma PVL bl LE negative Continue Bumex 1 mg twice a day Will replace NGT and monitor residuals. May use low amount of free H2O given mild hypernatremia while considering conservative fluid volume strategy with current ventilator support need. Nephrology followed Hold off TF at trickle rate for now given vomiting episode Glycemic control. AM labs Central Line, A-line, Daniel and Vent Bundles will be followed, Vent Day 11 Prognosis guarded. Patient at increased risk for prolonged ventilator support, prolonged hospitalization, nosocomial infection, cardiovascular collapse, DVT/PE and other. Updating wife on daily basis and updated her. Patient remained full code. Will defer respective systems problem management to primary and other consultant and follow patient in ICU with primary and other medical team 
Quality Care: PPI, DVT prophylaxis, HOB elevated, Infection control all reviewed and addressed. PAIN AND SEDATION: Fentanyl, Versed, Nimbex · Skin/Wound: chronic venous stasis changes · Nutrition: TF 
· Prophylaxis: DVT and GI Prophylaxis reviewed. · Restraints: none while on paralytic; may place during weaning process or when off of paralytic 
· PT/OT eval and treat: as needed when stable · Lines/Tubes: lines RT IJ 4/22/20; A-line 4/23/20; daniel 4/21/20, ET tube 4/25/20 ADVANCE DIRECTIVE: Full code DISCUSSION: spoke with RN, RT, ICU staff Events and notes from last 24 hours reviewed. Care plan discussed with nursing Subjective/History:  
49-year-old male with morbid obesity, ex-smoker, history of asthma, history of severe obstructive sleep apnea on CPAP, questionable component compliance, history of congestive heart failure with reduced ejection fraction atrial fibrillation atrial flutter on amiodarone, Xarelto, history of chronic renal insufficiency, cellulitis of lower extremities and anemia. As per family did not have any fever or upper respiratory tract infections and no flulike symptoms. On Lasix, CPAP not clear if missed. Presented with acute shortness of breath worsening edema of lower extremities. He was found to be in acute hypercarbic hypoxemic respiratory failure initially had a trial of BiPAP but failed and then eventually was intubated. He was hypotensive. Moderate amount of secretions. Morbid obesity. Patient compliant with Xarelto. Patient self extubated and had PEA arrest. 
 
Subjective:  
05/02/20 Patient in ICU on ventilator; vomiting episode this AM with desaturation requiring to increase PEEP and FIO2 Plp below 30 cm H2O; on sedation and paralytic Hemodynamically stable; in NSR On Argatroban w/o any bleeding issues; Hgb stable Fair UO with diuresis. Low grade fever episode this AM.  
NG tube dislodged during vomiting episode; TF on hold No bleeding around IJ insertion or other sites I was not contacted for anything for the patient by staff overnight ROS: Review of systems not obtained due to patient factors. Latest lactic acid:  
Lactic acid Date Value Ref Range Status 04/28/2020 1.4 0.4 - 2.0 MMOL/L Final  
04/27/2020 2.1 (HH) 0.4 - 2.0 MMOL/L Final  
  Comment:  
  CALLED TO AND CORRECTLY REPEATED BY: 
Praful Brooke RN, ICU ON 04/27/2020 AT 0450 TO JDA 
  
04/27/2020 2.0 0.4 - 2.0 MMOL/L Final  
 
 
Past Medical History: 
Past Medical History:  
Diagnosis Date  Arrhythmia  Arthritis   
 knees  CHF (congestive heart failure) ([facility] [address]  Chronic back pain  Chronic renal insufficiency, stage II (mild)  History of atrial flutter Dx 2016 - anticoagulated by [place]  Hypertension 2015  Limited mobility  Migraine headache  Morbid obesity ([facility] [address]  Morbid obesity with body mass index of 60.0-69.9 in adult [facility])  Sleep apnea   
 uses c-pap instructed to bring day of surgery  Smoking   
 very passive / cigars only Allergy: No Known Allergies Vital Signs:   
Blood pressure 102/55, pulse 87, temperature (!) 100.5 °F (38.1 °C), resp. rate 20, height 5' 10\" (1.778 m), weight (!) 220 kg (485 lb 0.2 oz), SpO2 93 %. Body mass index is 69.59 kg/m². O2 Device: Endotracheal tube O2 Flow Rate (L/min): 12 l/min Temp (24hrs), Av.9 °F (37.7 °C), Min:99.6 °F (37.6 °C), Max:100.5 °F (38.1 °C) Intake/Output:  
Last shift:      No intake/output data recorded. Last 3 shifts:  1901 -  0700 In: 660 Out: 2125 [Urine:2125] Intake/Output Summary (Last 24 hours) at 2020 1029 Last data filed at 2020 0400 Gross per 24 hour Intake 600 ml Output 1700 ml Net -1100 ml Ventilator Settings: 
Mode Rate Tidal Volume Pressure FiO2 PEEP Assist control   580 ml    80 % 18 cm H20 Peak airway pressure: 32 cm H2O Minute ventilation: 11.7 l/min Pl pressure goals less than or equal to 30. Physical Exam: 
General: sedated, on paralytic, no respiratory distress, appears older than stated age, morbidly obese, on ventilator HEENT: PERRL, fundi benign, orally intubatyed Neck: No abnormally enlarged lymph nodes or thyroid, supple; RT IJ site without active bleeding, no hematoma, exam limitation Chest: normal, obese chest wall Lungs: moderate air entry, few rhonchi scattered bilaterally, normal percussion anterior chest bilaterally but limited, no tenderness/ rash Heart: Regular rate and rhythm, S1S2 present or without murmur or extra heart sounds Abdomen: obese, bowel sounds normoactive, tympanic, soft without significant tenderness, masses, organomegaly or guarding, rigidity, rebound Extremity: 2+, pitting and bl LE and 2+ bl UE edema; negative for cyanosis, clubbing Capillary refill: normal 
Neuro: sedated, on paralytic, no involuntary movements, exam limitations Skin: Skin color, texture, turgor fair. Skin dry, warm, non-diaphoretic DATA:  
Current Facility-Administered Medications Medication Dose Route Frequency  fentaNYL citrate (PF) injection 50 mcg  50 mcg IntraVENous Q3H PRN  
 argatroban 250 mg in 0.9% sodium chloride 250 mL (1000 mcg/mL) infusion  0.5-10 mcg/kg/min IntraVENous TITRATE  bumetanide (BUMEX) injection 1 mg  1 mg IntraVENous BID  LORazepam (ATIVAN) injection 1 mg  1 mg IntraVENous Q2H PRN  chlorhexidine (PERIDEX) 0.12 % mouthwash 10 mL  10 mL Oral Q12H  
 albuterol-ipratropium (DUO-NEB) 2.5 MG-0.5 MG/3 ML  3 mL Nebulization Q6H PRN  
 acetaminophen (TYLENOL) tablet 650 mg  650 mg Oral Q6H PRN  
 cisatracurium (NIMBEX) 100 mg in 0.9% sodium chloride 100 mL infusion  0-10 mcg/kg/min IntraVENous TITRATE  ELECTROLYTE REPLACEMENT PROTOCOL - Calcium   1 Each Other PRN  
 ELECTROLYTE REPLACEMENT PROTOCOL - Magnesium   1 Each Other PRN  
 ELECTROLYTE REPLACEMENT PROTOCOL - Potassium Standard Dosing   1 Each Other PRN  
 ELECTROLYTE REPLACEMENT PROTOCOL - Phosphorus  Standard Dosing  1 Each Other PRN  pantoprazole (PROTONIX) 40 mg in 0.9% sodium chloride 10 mL injection  40 mg IntraVENous DAILY  fentaNYL (PF) 900 mcg/30 ml infusion soln  0-150 mcg/hr IntraVENous TITRATE  midazolam in normal saline (VERSED) 1 mg/mL infusion  0-10 mg/hr IntraVENous TITRATE  aspirin chewable tablet 81 mg  81 mg Oral DAILY  sodium chloride (NS) flush 5-40 mL  5-40 mL IntraVENous Q8H  
 sodium chloride (NS) flush 5-40 mL  5-40 mL IntraVENous PRN  
 
 Facility-Administered Medications Ordered in Other Encounters Medication Dose Route Frequency  propofoL (DIPRIVAN) 10 mg/mL injection   IntraVENous PRN  
 ePHEDrine (PF) (MISTOLE) 10 mg/mL in NS syringe   IntraVENous PRN  
 PHENYLephrine (RUPERT-SYNEPHRINE) 100 mcg/mL in NS syringe   IntraVENous PRN Telemetry: [x]Sinus []A-flutter []Paced []A-fib []Multiple PVCs  
              
4/22/20 ECHO · Left Ventricle: Normal wall thickness and systolic function (ejection fraction normal). Mildly dilated left ventricle. The estimated ejection fraction is 45 - 50%. There is moderate (grade 2) left ventricular diastolic dysfunction E/e' Ratio = 17.09. Wall Scoring: The left ventricular wall motion is globally hypokinetic. · Right Ventricle: Not well visualized. Normal global systolic function. Moderately dilated right ventricle. · Right Atrium: Right atrium not well visualized. Moderately dilated right atrium. · IVC/Hepatic Veins: Dilated inferior vena cava. Mechanically ventilated; cannot use inferior caval vein diameter to estimate central venous pressure. Labs: 
Recent Results (from the past 24 hour(s)) GLUCOSE, POC Collection Time: 05/01/20 12:10 PM  
Result Value Ref Range Glucose (POC) 89 70 - 110 mg/dL GLUCOSE, POC Collection Time: 05/01/20  5:24 PM  
Result Value Ref Range Glucose (POC) 84 70 - 110 mg/dL GLUCOSE, POC Collection Time: 05/01/20 11:18 PM  
Result Value Ref Range Glucose (POC) 86 70 - 110 mg/dL METABOLIC PANEL, COMPREHENSIVE Collection Time: 05/02/20  4:05 AM  
Result Value Ref Range Sodium 148 (H) 136 - 145 mmol/L Potassium 4.3 3.5 - 5.5 mmol/L Chloride 112 (H) 100 - 111 mmol/L  
 CO2 34 (H) 21 - 32 mmol/L Anion gap 2 (L) 3.0 - 18 mmol/L Glucose 90 74 - 99 mg/dL BUN 53 (H) 7.0 - 18 MG/DL Creatinine 1.33 (H) 0.6 - 1.3 MG/DL  
 BUN/Creatinine ratio 40 (H) 12 - 20 GFR est AA >60 >60 ml/min/1.73m2 GFR est non-AA 58 (L) >60 ml/min/1.73m2 Calcium 8.2 (L) 8.5 - 10.1 MG/DL Bilirubin, total 0.9 0.2 - 1.0 MG/DL  
 ALT (SGPT) 30 16 - 61 U/L  
 AST (SGOT) 18 10 - 38 U/L Alk. phosphatase 44 (L) 45 - 117 U/L Protein, total 6.2 (L) 6.4 - 8.2 g/dL Albumin 2.7 (L) 3.4 - 5.0 g/dL Globulin 3.5 2.0 - 4.0 g/dL A-G Ratio 0.8 0.8 - 1.7    
CBC WITH AUTOMATED DIFF Collection Time: 05/02/20  4:05 AM  
Result Value Ref Range WBC 7.7 4.6 - 13.2 K/uL  
 RBC 4.76 4.70 - 5.50 M/uL  
 HGB 12.6 (L) 13.0 - 16.0 g/dL HCT 43.1 36.0 - 48.0 % MCV 90.5 74.0 - 97.0 FL  
 MCH 26.5 24.0 - 34.0 PG  
 MCHC 29.2 (L) 31.0 - 37.0 g/dL  
 RDW 15.4 (H) 11.6 - 14.5 % PLATELET 130 (L) 893 - 420 K/uL MPV 11.1 9.2 - 11.8 FL  
 NEUTROPHILS 64 40 - 73 % LYMPHOCYTES 18 (L) 21 - 52 % MONOCYTES 16 (H) 3 - 10 % EOSINOPHILS 2 0 - 5 % BASOPHILS 0 0 - 2 %  
 ABS. NEUTROPHILS 5.0 1.8 - 8.0 K/UL  
 ABS. LYMPHOCYTES 1.4 0.9 - 3.6 K/UL  
 ABS. MONOCYTES 1.2 0.05 - 1.2 K/UL  
 ABS. EOSINOPHILS 0.1 0.0 - 0.4 K/UL  
 ABS. BASOPHILS 0.0 0.0 - 0.1 K/UL  
 DF AUTOMATED MAGNESIUM Collection Time: 05/02/20  4:05 AM  
Result Value Ref Range Magnesium 2.7 (H) 1.6 - 2.6 mg/dL PHOSPHORUS Collection Time: 05/02/20  4:05 AM  
Result Value Ref Range Phosphorus 3.8 2.5 - 4.9 MG/DL  
CALCIUM, IONIZED Collection Time: 05/02/20  4:05 AM  
Result Value Ref Range Ionized Calcium 1.18 1.12 - 1.32 MMOL/L  
PTT Collection Time: 05/02/20  4:05 AM  
Result Value Ref Range aPTT 71.8 (H) 23.0 - 36.4 SEC PROTHROMBIN TIME + INR Collection Time: 05/02/20  4:05 AM  
Result Value Ref Range Prothrombin time 39.5 (H) 11.5 - 15.2 sec INR 4.1 (H) 0.8 - 1.2 GLUCOSE, POC Collection Time: 05/02/20  5:20 AM  
Result Value Ref Range Glucose (POC) 83 70 - 110 mg/dL POC G3 Collection Time: 05/02/20  6:20 AM  
Result Value Ref Range Device: VENT    
 FIO2 (POC) 80 %  pH (POC) 7.385 7.35 - 7.45    
 pCO2 (POC) 52.9 (H) 35.0 - 45.0 MMHG  
 pO2 (POC) 73 (L) 80 - 100 MMHG  
 HCO3 (POC) 31.5 (H) 22 - 26 MMOL/L  
 sO2 (POC) 93 92 - 97 % Base excess (POC) 7 mmol/L Mode ASSIST CONTROL Tidal volume 580 ml Set Rate 20 bpm  
 PEEP/CPAP (POC) 18 cmH2O Allens test (POC) YES Total resp. rate 20 Site DRAWN FROM ARTERIAL LINE Patient temp. 99.8 Specimen type (POC) ARTERIAL Performed by Todd Faith Volume control YES Volume control plus YES Recent Labs 05/02/20 
7559 05/01/20 
5015 04/30/20 
3446 FIO2I 80 0.75 0.75 HCO3I 31.5* 32.0* 32.2*  
PCO2I 52.9* 46.0* 51.5* PHI 7.385 7.451* 7.406 PO2I 73* 58* 73* All Micro Results Procedure Component Value Units Date/Time CULTURE, RESPIRATORY/SPUTUM/BRONCH María Mulch STAIN [[id number]]  (Abnormal)  (Susceptibility) Collected:  04/22/20 1018 Order Status:  Completed Specimen:  Sputum from Tracheal Aspirate Updated:  04/25/20 1118 Special Requests: NO SPECIAL REQUESTS     
  GRAM STAIN FEW WBCS SEEN     
      
  OCCASIONAL EPITHELIAL CELLS SEEN  
     
      
  FEW GRAM POSITIVE COCCI IN CLUSTERS  
     
   RARE GRAM NEGATIVE RODS Culture result:    
  LIGHT STAPHYLOCOCCUS AUREUS  
     
      
  LIGHT NORMAL RESPIRATORY BRYN  
     
 CULTURE, BLOOD [[id number]] Collected:  04/24/20 1015 Order Status:  Canceled Specimen:  Blood Imaging: 
[x]I have personally reviewed the patients chest radiographs images and report 5/2/20 Results from [facility] [place]counter encounter on 04/21/20 XR CHEST PORT Narrative EXAM: PORTABLE FRONTAL CHEST RADIOGRAPH 
 
CLINICAL INDICATION/HISTORY: Aspiration pneumonia. Intubation. COMPARISON: 5/1/2020 TECHNIQUE: Portable frontal view of the chest 
 
_______________ FINDINGS: 
 
SUPPORT DEVICES: Adequately positioned endotracheal tube and right IJ central 
venous catheter. HEART AND MEDIASTINUM: Rightward rotation. Stable cardiomediastinal silhouette, 
with enlargement of cardiac silhouette. LUNGS AND PLEURAL SPACES: Hazy opacification lower right thorax with obscuration 
of the diaphragm, similar or slightly increased from prior exam. 
 
BONY THORAX AND SOFT TISSUES: Unremarkable. 
 
_______________ Impression IMPRESSION: 
 
1. Adequately positioned endotracheal tube. 2. Right basilar opacification may be slightly increased from prior exam, likely 
some combination of effusion and lung consolidation (pneumonia/atelectasis). No results found for this or any previous visit. [x]See my orders for details My assessment, plan of care, findings, medications, side effects etc were discussed with: 
[x]nursing []Dietician  
[x]respiratory therapy []  
[x]family - wife []Patient [x]Total critical care time exclusive of procedures 39 minutes with complex decision making performed and > 50% time spent in face to face evaluation.  
 
Sushil Gomez MD

## 2020-05-02 NOTE — PROGRESS NOTES
PATIENT DESATURATING TO 87% ON PEEP 18/100%. INCREASED PEEP TO 20 PER REG, NP BUT PATIENT DESATURATED TO 86% AND BP DROPPED. PRN DUONEB GIVEN FOR WHEEZING.

## 2020-05-02 NOTE — PROGRESS NOTES
Otolaryngology head neck surgery Patient seen and examined Patient remains on vent however with a PEEP of 18 and a FiO2 of 80%. Strongly suspect patient would benefit from a tracheotomy both for his current situation and his longstanding issues of obstructive sleep apnea Time however patient's condition would not allow for tracheotomy hence will hold off with plans for this for the immediate future unless situation changes. Please contact me if and when patient ready for trach Atul Gonzales

## 2020-05-02 NOTE — PROGRESS NOTES
Cardiology Progress Note Patient: Vero Green. Sex: male          DOA: 4/21/2020 YOB: 1975      Age:  53 y.o.        LOS:  LOS: 10 days Patient seen and examined, chart reviewed. Assessment/Plan Patient Active Problem List  
Diagnosis Code  Cellulitis of left lower leg L03. 116  
 Anemia D64.9  Morbid obesity ([facility]) E66.01  
 Dyspnea R06.00  Sleep apnea G47.30  Sinus tachycardia R00.0  BMI 70 and over, adult [facility]) Z53.07  Chronic back pain M54.9, G89.29  
 Arthritis M19.90  Migraine G43.909  
 History of atrial flutter Z86.79  
 Limited mobility Z74.09  
 Smoking F17.200  Chronic renal insufficiency, stage II (mild) N18.2  CHF (congestive heart failure) ([facility]) I50.9  Acute diastolic CHF (congestive heart failure) ([facility]) I50.31   Atrial flutter ([facility]) I48.92  
 Elevated troponin R79.89  
 Acute respiratory distress R06.03  
 Acute CHF (congestive heart failure) ([facility]) I50.9  Respiratory failure with hypoxia and hypercapnia ([facility]) J96.91, J96.92  
 Acute on chronic respiratory failure with hypoxia and hypercapnia ([facility]) J96.21, J96.22  
 Cardiac arrest ([facility]) I46.9 Acute on chronic systolic heart failure NSTEMI type II due to PEA arrest 
Paroxysmal atrial flutter Thrombocytopenia Patient self extubated and had PEA arrest. 
He had brief episode of A fib with RVR and was started On Amiodarone drip. Currently he is in Sinus rhythm. Amiodarone discontinued due to bradycardia. 
  
Echocardiogram  
  
· Left Ventricle: Normal wall thickness and systolic function (ejection fraction normal). Mildly dilated left ventricle. The estimated ejection fraction is 45 - 50%. There is moderate (grade 2) left ventricular diastolic dysfunction E/e' Ratio = 17.09. Wall Scoring: The left ventricular wall motion is globally hypokinetic. · Right Ventricle: Not well visualized. Normal global systolic function. Moderately dilated right ventricle. · Right Atrium: Right atrium not well visualized. Moderately dilated right atrium. · IVC/Hepatic Veins: Dilated inferior vena cava. Mechanically ventilated; cannot use inferior caval vein diameter to estimate central venous pressure. 
  
IV heparin was discontinued due to thrombocytopenia and bleeding at central line site. Started on Argatroban. Plan: 
 
Continue Aspirin Change IV Bumex 1 mg twice a day Adjust Bumex as per volume status, I/O and Renal function Continue Argatroban 
Strict I/O Monitor electrolytes and renal function Continue Ventilatory support. Continue management as per hospital medicine and pulmonary critical care Plan discussed with Patient's nurse. Patient will be seen by  on weekend. Subjective:  
 cc: 
Orally intubated REVIEW OF SYSTEMS:  
 
Can not obtain Objective:  
  
Visit Vitals /62 Pulse 66 Temp 99.7 °F (37.6 °C) Resp 20 Ht 5' 10\" (1.778 m) Wt (!) 220 kg (485 lb 0.2 oz) SpO2 93% BMI 69.59 kg/m² Body mass index is 69.59 kg/m². Physical Exam: 
General Appearance: Comfortable, Morbidly obese, not using accessory muscles of respiration. Orally intubated HEENT: AMMON. HEAD: Atraumatic NECK: No JVD, no thyroidomeglay. CAROTIDS: No bruit LUNGS: bilateral conducted sounds HEART: S1+S2 audible, no murmur, no pericardial rub. NEUROLOGICAL: Sedated. Medication: 
Current Facility-Administered Medications Medication Dose Route Frequency  fentaNYL citrate (PF) injection 50 mcg  50 mcg IntraVENous Q3H PRN  
 argatroban 250 mg in 0.9% sodium chloride 250 mL (1000 mcg/mL) infusion  0.5-10 mcg/kg/min IntraVENous TITRATE  bumetanide (BUMEX) injection 1 mg  1 mg IntraVENous BID  LORazepam (ATIVAN) injection 1 mg  1 mg IntraVENous Q2H PRN  chlorhexidine (PERIDEX) 0.12 % mouthwash 10 mL  10 mL Oral Q12H  
 albuterol-ipratropium (DUO-NEB) 2.5 MG-0.5 MG/3 ML  3 mL Nebulization Q6H PRN  
 acetaminophen (TYLENOL) tablet 650 mg  650 mg Oral Q6H PRN  
 cisatracurium (NIMBEX) 100 mg in 0.9% sodium chloride 100 mL infusion  0-10 mcg/kg/min IntraVENous TITRATE  ELECTROLYTE REPLACEMENT PROTOCOL - Calcium   1 Each Other PRN  
 ELECTROLYTE REPLACEMENT PROTOCOL - Magnesium   1 Each Other PRN  
 ELECTROLYTE REPLACEMENT PROTOCOL - Potassium Standard Dosing   1 Each Other PRN  
 ELECTROLYTE REPLACEMENT PROTOCOL - Phosphorus  Standard Dosing  1 Each Other PRN  pantoprazole (PROTONIX) 40 mg in 0.9% sodium chloride 10 mL injection  40 mg IntraVENous DAILY  fentaNYL (PF) 900 mcg/30 ml infusion soln  0-150 mcg/hr IntraVENous TITRATE  midazolam in normal saline (VERSED) 1 mg/mL infusion  0-10 mg/hr IntraVENous TITRATE  aspirin chewable tablet 81 mg  81 mg Oral DAILY  sodium chloride (NS) flush 5-40 mL  5-40 mL IntraVENous Q8H  
 sodium chloride (NS) flush 5-40 mL  5-40 mL IntraVENous PRN Facility-Administered Medications Ordered in Other Encounters Medication Dose Route Frequency  propofoL (DIPRIVAN) 10 mg/mL injection   IntraVENous PRN  
 ePHEDrine (PF) (MISTOLE) 10 mg/mL in NS syringe   IntraVENous PRN  
 PHENYLephrine (RUPERT-SYNEPHRINE) 100 mcg/mL in NS syringe   IntraVENous PRN Lab/Data Reviewed: 
 
  
Recent Labs 05/01/20 
0400 04/30/20 1100 04/30/20 
0400 WBC 8.4 7.8 7.2 HGB 12.4* 12.4* 12.4*  
HCT 41.9 41.2 41.4 * 122* 109* Recent Labs 05/01/20 
0400 04/30/20 1100 04/30/20 
0400 * 146* 146*  
K 4.2 4.4 4.6  110 110 CO2 32 34* 33* * 114* 117* BUN 46* 41* 40* CREA 1.06 1.08 0.95  
CA 8.3* 8.4* 8.2*  
 
 
32 minutes of critical care time spent in the direct evaluation and treatment of this high risk patient.  The reason for providing this level of medical care for this critically ill patient was due a critical illness that impaired one or more vital organ systems such that there was a high probability of imminent or life threatening deterioration in the patients condition. This care involved high complexity decision making to assess, manipulate, and support vital system functions, to treat this degree vital organ system failure and to prevent further life threatening deterioration of the patients condition. Signed By: Sarah Coombs MD   
 May 1, 2020

## 2020-05-02 NOTE — PROGRESS NOTES
Addendum [5:25 pm- 6:10 pm] Call received about patient from Jayant Cox NP and DIANE Mendez separately and discussed patient's care. Apparently patient had breakthrough agitation from sedation, paralytic and desaturation Became tachycardiac to 100-110's, ST Remained hemodynamically stable RT's attempt at increasing PEEP not tolerated with some decrease in BP requiring to bring back to 18 cwp Patient received bolus of sedation and Nimbex with improve control of agitation and HR started to lower SPO2 87% on 100% FiO2 and PEEP 18 cwp; Ppl < 30 cwp Advised staff to avoid agitations and moving patient; minimize suctioning to reduce airways trauma No bleeding per ET tube per staff. NG tube was removed by staff. Prior XR could not confirm NGT position ABG order in 30 mins after patient's sedation optimal and synchrony with ventilator Closely monitor in ICU Aggie Curtis MD

## 2020-05-02 NOTE — PROGRESS NOTES
Nutrition Brief: 
 
Noted NGT removed upon nursing assessment. Restart enteral nutrition whenever clinically feasible, ? Vomiting per MD note. Defer to MD for when okay to restart enteral nutrition at this time. Would recc intiaiton of enteral nutrition at trickle rate when clinically feasible. Monitor Na as noted free water flushes started yesterday for elevated Na.  
 
Nancy Sarabia RDN

## 2020-05-02 NOTE — PROGRESS NOTES
Hospitalist Progress Note Patient: Haroon Bee. MRN: [id number]  CSN: [id number] YOB: 1975  Age: 53 y.o. Sex: male DOA: 4/21/2020 LOS:  LOS: 11 days Assessment/Plan Principal Problem: 
  Respiratory failure with hypoxia and hypercapnia ([facility] [address] (4/21/2020) Active Problems: Morbid obesity ([facility] (4/4/2016) Sleep apnea (4/4/2016) BMI 70 and over, adult [facility]) () Chronic renal insufficiency, stage II (mild) () Chronic a-fib (10/15/2018) Acute CHF (congestive heart failure) ([facility] [address] (4/21/2020) Acute on chronic respiratory failure with hypoxia and hypercapnia ([facility] [address] (4/22/2020) Cardiac arrest ([facility] [address] (4/23/2020) CC:   54 y/o male with hx of morbid obesity, MARIA FERNANDA, chronic afib on anticoagulation, and systolic CHF (EF 14-93%) TO admitted with respiratory failure with hypoxia and hypercapnia, he required to be intubated and in ICU 
        
CRITICAL CARE PLAN 
  
Resp - See vent orders, VAP bundle. HOB>30 degrees. Acute respiratory failure - with hypoxia and hypercapnia, follow ABGs, CXR. 
MARIA FERNANDA/OHS - On solumedrol 
  
ID - Follow up resp, cx. On Vanco and Zosyn Seen by ID 
COVID 19 negative x 2 
  
[facility] - Monitor HD. Hypotension - resolved, off pressors Paroxysmal atrial flutter - monitor rate, was on amiodarone. Has been bradycardic, toprol xl held Acute on chronic systolic CHF - On bumex Echo with EF of 45-50% Elevated troponin - NSTEMI type II due to PEA arrest 
Cardiology following. 
  
Heme/onc - Follow H&H, plts. Thrombocytopenia - improving, HIT neg. on argatroban,  
  
Renal - Trend BUN, Cr, follow I/O, daniel in place. Check and replace Mg, K, phos. Hypernatremia - free water flushes, improving 
  
Endocrine -  Follow FSG 
  
Neuro/ Pain/ Sedation -  
nimbex. 
  
GI - NPO for now, OG tube, tube feeding.  
  
Morbid obesity: Increased mortality and morbidity. 
  
Prophylaxis - DVT: Argatroban, GI: protonix Poor prognosis Remain ICU care 
  
CC:   52 y/o male with hx of morbid obesity, MARIA FERNANDA, chronic afib on anticoagulation, and systolic CHF (EF 13-45%) RD admitted with respiratory failure with hypoxia and hypercapnia, he required to be intubated. 
        
Subjective: Pt was seen and examined with the nurse in the morning round. Patient remained in ICU on ventilator; vomiting episode this AM with desaturation requiring to increase PEEP and FIO2 Febrile since this am, on sedation and paralytic Hemodynamically stable; in NSR On Argatroban w/o any bleeding issues; Hgb stable Fair UO with diuresis. Review of systems Unobtainable Objective:  
  
Visit Vitals /62 Pulse 70 Temp 100.3 °F (37.9 °C) Resp 20 Ht 5' 10\" (1.778 m) Wt (!) 220 kg (485 lb 0.2 oz) SpO2 93% BMI 69.59 kg/m² Physical Exam: 
 
Gen: NAD, on Vent Heent:  MMM, NC, AT. Cor: s1s2 RRR. No MRG. PMI mid 5th intercostal space. Resp:  Coarse BS b/l. Abd: Obese. NT ND.  BS positive. No rebound or guarding. No masses. Ext: 2+ edema or cyanosis. Intake and Output: 
Current Shift:  05/02 0701 - 05/02 1900 In: -  
Out: 2300 [[facility]1105] Last three shifts:  04/30 1901 - 05/02 0700 In: 660 Out: 2125 [Urine:2125] Labs: Results:  
   
Chemistry Recent Labs 05/02/20 
0405 05/01/20 
0400 04/30/20 
1100 GLU 90 104* 114* * 148* 146*  
K 4.3 4.2 4.4  
* 111 110 CO2 34* 32 34* BUN 53* 46* 41* CREA 1.33* 1.06 1.08  
CA 8.2* 8.3* 8.4* AGAP 2* 5 2*  
BUCR 40* 43* 38* AP 44* 39* 38*  
TP 6.2* 6.3* 6.5 ALB 2.7* 2.8* 2.9*  
GLOB 3.5 3.5 3.6 AGRAT 0.8 0.8 0.8 CBC w/Diff Recent Labs 05/02/20 
0405 05/01/20 
0400 04/30/20 
1100 WBC 7.7 8.4 7.8  
RBC 4.76 4.71 4.64* HGB 12.6* 12.4* 12.4*  
HCT 43.1 41.9 41.2 * 114* 122* GRANS 64 69 77* LYMPH 18* 18* 12* EOS 2 0 0 Cardiac Enzymes No results for input(s): CPK, CKND1, CONSTANZA in the last 72 hours. No lab exists for component: [place] Coagulation Recent Labs 05/02/20 
0405 05/01/20 
0400  04/30/20 
1100 PTP 39.5*  --   --  17.2* INR 4.1*  --   --  1.4* APTT 71.8* 68.5*   < > 28.4  
 < > = values in this interval not displayed. Lipid Panel No results found for: CHOL, CHOLPOCT, CHOLX, CHLST, CHOLV, 662993, HDL, HDLP, LDL, LDLC, DLDLP, 732683, VLDLC, VLDL, TGLX, TRIGL, TRIGP, TGLPOCT, CHHD, CHHDX  
BNP No results for input(s): BNPP in the last 72 hours. Liver Enzymes Recent Labs 05/02/20 0405 TP 6.2* ALB 2.7* AP 44* SGOT 18 Thyroid Studies Lab Results Component Value Date/Time TSH 1.45 04/27/2020 03:50 AM  
    
Procedures/imaging: see electronic medical records for all procedures/Xrays and details which were not copied into this note but were reviewed prior to creation of Plan Medications Reviewed Piyush Hernandez MD

## 2020-05-02 NOTE — PROGRESS NOTES
Bedside shift change report received from  Tamar Monreal (offgoing nurse). Report included the following information SBAR, Kardex, ED Summary, MAR, Accordion, Recent Results, Med Rec Status, Cardiac Rhythm NSR and Quality Measures. Infusions verified. Patient continues on ventilator for airway support. Train of four monitoring while patient is on Nimbex. 2 twitches observed during assessment   
 
0800 Assessment completed. Patient NG tube out upon assessment, feedings turned off stat, Patient central line dressing changed. Dressing was soiled. Patient wiped down anteriorly with CHG bath due to  Patient unable to tolerate being turned and extra stimulus. Patient arms repositioned,elevated  on pillows. Flacc Score 0. Dr Aidan Ryan aware, and he states  ok to place another NG tube. 1200 Reassessment completed, NG Reinserted, TOMASA at bedside, Dr Isidra Christine at bedside. Patient tolerated well. Reassessment completed, Patient with no change 1655 Patient sats 87% peep of 18, and F102 100%, suction patient, wheezing auscultated, respiratory at bedside,[place]  Nurse practitioner at bedside Patient  Received new orders 2 mg versed bolus, fentanyl 100mcg bolus, nibex bolus per order, and solumedrol per order per [facility] Postal, PAtient spo2 87. Order to d/c ng tube.

## 2020-05-03 NOTE — PROGRESS NOTES
Cardiology Progress Note Patient: Lonn Halsted. Sex: male          DOA: 4/21/2020 YOB: 1975      Age:  53 y.o.        LOS:  LOS: 12 days Assessment/Plan Principal Problem: 
  Respiratory failure with hypoxia and hypercapnia ([address] (4/21/2020) Active Problems: Morbid obesity ([address] (4/4/2016) Sleep apnea (4/4/2016) BMI 70 and over, adult [facility]) () Chronic renal insufficiency, stage II (mild) () Chronic a-fib (10/15/2018) Acute CHF (congestive heart failure) ([address] (4/21/2020) Acute on chronic respiratory failure with hypoxia and hypercapnia ([address] (4/22/2020) Cardiac arrest ([address] (4/23/2020) Plan: sinus rhythm Intubated requiring high PEEP 18/100% FiO2 SOB Respiratory failure Hypoxia CHF 
PAF Good urine out put. Continue with Bumex Continue with anticoagulation argatroban. Labs and med reviewed Dr. Stephanie Adair will follow patient from tomorrow. Discussed with Dr. Carlota Tomlin. Subjective:  
 cc: SOB Respiratory failure Hypoxia CHF REVIEW OF SYSTEMS:  
 
Limited due to intubation Objective:  
  
Visit Vitals /60 Pulse (!) 53 Temp 98.4 °F (36.9 °C) Resp 20 Ht 5' 10\" (1.778 m) Wt (!) 220 kg (485 lb 0.2 oz) SpO2 90% BMI 69.59 kg/m² Body mass index is 69.59 kg/m². Physical Exam: 
General Appearance: Comfortable, not using accessory muscles of respiration. NECK: No JVD, no thyroidomeglay. LUNGS: Clear bilaterally. HEART: S1+S2 audible, ABD: Non-tender, BS Audible EXT: 1+ edema, and no cysnosis. VASCULAR EXAM: Pulses are intact. PSYCHIATRIC EXAM: intubated Medication: 
Current Facility-Administered Medications Medication Dose Route Frequency  methylPREDNISolone (PF) (Solu-MEDROL) injection 80 mg  80 mg IntraVENous Q12H  piperacillin-tazobactam (ZOSYN) 3.375 g in 0.9% sodium chloride (MBP/ADV) 100 mL MBP  3.375 g IntraVENous Q6H  
  Vancomycin- Pharmacokinetic Dosing  1 Each Other Rx Dosing/Monitoring  vancomycin (VANCOCIN) 1750 mg in  ml infusion  1,750 mg IntraVENous Q8H  
 fentaNYL citrate (PF) injection 50 mcg  50 mcg IntraVENous Q3H PRN  
 argatroban 250 mg in 0.9% sodium chloride 250 mL (1000 mcg/mL) infusion  0.5-10 mcg/kg/min IntraVENous TITRATE  bumetanide (BUMEX) injection 1 mg  1 mg IntraVENous BID  LORazepam (ATIVAN) injection 1 mg  1 mg IntraVENous Q2H PRN  chlorhexidine (PERIDEX) 0.12 % mouthwash 10 mL  10 mL Oral Q12H  
 albuterol-ipratropium (DUO-NEB) 2.5 MG-0.5 MG/3 ML  3 mL Nebulization Q6H PRN  
 acetaminophen (TYLENOL) tablet 650 mg  650 mg Oral Q6H PRN  
 cisatracurium (NIMBEX) 100 mg in 0.9% sodium chloride 100 mL infusion  0-10 mcg/kg/min IntraVENous TITRATE  ELECTROLYTE REPLACEMENT PROTOCOL - Calcium   1 Each Other PRN  
 ELECTROLYTE REPLACEMENT PROTOCOL - Magnesium   1 Each Other PRN  
 ELECTROLYTE REPLACEMENT PROTOCOL - Potassium Standard Dosing   1 Each Other PRN  
 ELECTROLYTE REPLACEMENT PROTOCOL - Phosphorus  Standard Dosing  1 Each Other PRN  pantoprazole (PROTONIX) 40 mg in 0.9% sodium chloride 10 mL injection  40 mg IntraVENous DAILY  fentaNYL (PF) 900 mcg/30 ml infusion soln  0-150 mcg/hr IntraVENous TITRATE  midazolam in normal saline (VERSED) 1 mg/mL infusion  0-10 mg/hr IntraVENous TITRATE  aspirin chewable tablet 81 mg  81 mg Oral DAILY  sodium chloride (NS) flush 5-40 mL  5-40 mL IntraVENous Q8H  
 sodium chloride (NS) flush 5-40 mL  5-40 mL IntraVENous PRN Facility-Administered Medications Ordered in Other Encounters Medication Dose Route Frequency  propofoL (DIPRIVAN) 10 mg/mL injection   IntraVENous PRN  
 ePHEDrine (PF) (MISTOLE) 10 mg/mL in NS syringe   IntraVENous PRN  
 PHENYLephrine (RUPERT-SYNEPHRINE) 100 mcg/mL in NS syringe   IntraVENous PRN Lab/Data Reviewed: 
Procedures/imaging: see electronic medical records for all procedures/Xrays  
and details which were not copied into this note but were reviewed prior to creation of Plan 
  
 
All lab results for the last 24 hours reviewed. Recent Labs 05/03/20 
0430 05/02/20 
0405 05/01/20 
0400 WBC 9.3 7.7 8.4 HGB 12.6* 12.6* 12.4*  
HCT 42.8 43.1 41.9 * 118* 114* Recent Labs 05/03/20 
0430 05/02/20 
0405 05/01/20 
0400 * 148* 148* K 4.7 4.3 4.2 * 112* 111 CO2 32 34* 32 * 90 104* BUN 51* 53* 46* CREA 1.39* 1.33* 1.06  
CA 8.0* 8.2* 8.3* Signed By: Paulino Salinas MD   
 May 3, 2020

## 2020-05-03 NOTE — PROGRESS NOTES
Hospitalist Progress Note Patient: Jason Castro MRN: [id number]  CSN: [id number] YOB: 1975  Age: 53 y.o. Sex: male DOA: 4/21/2020 LOS:  LOS: 12 days Assessment/Plan Principal Problem: 
  Respiratory failure with hypoxia and hypercapnia ([facility] [address] (4/21/2020) Active Problems: Morbid obesity ([facility] [address] (4/4/2016) Sleep apnea (4/4/2016) BMI 70 and over, adult [facility]) () Chronic renal insufficiency, stage II (mild) () Chronic a-fib (10/15/2018) Acute CHF (congestive heart failure) ([facility] [address] (4/21/2020) Acute on chronic respiratory failure with hypoxia and hypercapnia ([facility] [address] (4/22/2020) [facility] (4/23/2020) CC:   54 y/o male with hx of morbid obesity, MARIA FERNANDA, chronic afib on anticoagulation, and systolic CHF (EF 66-54%) HI admitted with respiratory failure with hypoxia and hypercapnia, he required to be intubated and in ICU 
        
CRITICAL CARE PLAN 
  
Resp - See vent orders, VAP bundle. HOB>30 degrees. Intubated requiring high PEEP 18/100% FIO2 Acute respiratory failure - with hypoxia and hypercapnia, follow ABGs, CXR. 
MARIA FERNANDA/OHS - On solumedrol 
  
ID - Follow up resp, cx. On Vanco and Zosyn Seen by ID 
COVID 19 negative x 2 
  
[facility] - Monitor HD. Paroxysmal atrial flutter - monitor rate, was on amiodarone. Has been bradycardic, toprol xl held   
Acute on chronic systolic CHF - On bumex Echo with EF of 45-50% Elevated troponin - NSTEMI type II due to PEA arrest 
Cardiology following. 
  
Heme/onc - Follow H&H, plts. Thrombocytopenia - improving, HIT neg. on argatroban,  
  
Renal - Trend BUN, Cr, follow I/O, daniel in place. Check and replace Mg, K, phos. Hypernatremia - free water flushes 
  
Endocrine: Follow FSG 
  
Neuro/ Pain/ Sedation -  
nimbex. 
  
GI: NPO for now, OG tube, tube feeding.  
  
Morbid obesity: Increased mortality and morbidity. 
  
Prophylaxis - DVT: Argatroban, GI: protonix 
  
Poor prognosis   
Remain ICU care 
  
CC:   52 y/o male with hx of morbid obesity, MARIA FERNANDA, chronic afib on anticoagulation, and systolic CHF (EF 13-06%) JK admitted with respiratory failure with hypoxia and hypercapnia, he required to be intubated. 
        
Subjective:  
  
Pt was seen and examined with the nurse in the morning round.  
  
Patient remained in ICU on ventilator; episode of breakthrough agitation from sedation. Febrile since am on 05/02, on sedation and paralytic Hemodynamically stable; in NSR On Argatroban w/o any bleeding issues; Hgb stable Fair UO with diuresis.   
  
Review of systems Unobtainable 
  
 
 
Objective:  
  
Visit Vitals BP 95/48 Pulse (!) 56 Temp 100.1 °F (37.8 °C) Resp 20 Ht 5' 10\" (1.778 m) Wt (!) 220 kg (485 lb 0.2 oz) SpO2 91% BMI 69.59 kg/m² Physical Exam: 
 
Gen: NAD, on Vent Heent:  MMM, NC, AT. Cor: s1s2 RRR. No MRG. PMI mid 5th intercostal space. Resp:  Coarse BS b/l. Abd: Obese. NT ND.  BS positive. No rebound or guarding. No masses. Ext: 2+ edema or cyanosis. Intake and Output: 
Current Shift:  No intake/output data recorded. Last three shifts:  05/01 1901 - 05/03 0700 In: 600 Out: [place] [[id number]] Labs: Results:  
   
Chemistry Recent Labs 05/03/20 0430 05/02/20 
0405 05/01/20 
0400 * 90 104* * 148* 148* K 4.7 4.3 4.2 * 112* 111 CO2 32 34* 32 BUN 51* 53* 46* CREA 1.39* 1.33* 1.06  
CA 8.0* 8.2* 8.3* AGAP 5 2* 5 BUCR 37* 40* 43* AP 51 44* 39* TP 6.4 6.2* 6.3* ALB 2.6* 2.7* 2.8*  
GLOB 3.8 3.5 3.5 AGRAT 0.7* 0.8 0.8 CBC w/Diff Recent Labs 05/03/20 
0430 05/02/20 
0405 05/01/20 
0400 WBC 9.3 7.7 8.4  
RBC 4.71 4.76 4.71 HGB 12.6* 12.6* 12.4*  
HCT 42.8 43.1 41.9 * 118* 114* GRANS 93* 64 69 LYMPH 4* 18* 18* EOS 0 2 0 Cardiac Enzymes No results for input(s): CPK, CKND1, CONSTANZA in the last 72 hours. No lab exists for component: [place] Coagulation Recent Labs 05/03/20 
0430 05/02/20 
0405 PTP 45.3* 39.5* INR 4.9* 4.1* APTT 77.0* 71.8* Lipid Panel No results found for: CHOL, CHOLPOCT, CHOLX, CHLST, CHOLV, 766554, HDL, HDLP, LDL, LDLC, DLDLP, [id number], VLDLC, VLDL, TGLX, TRIGL, TRIGP, TGLPOCT, CHHD, CHHDX  
BNP No results for input(s): BNPP in the last 72 hours. Liver Enzymes Recent Labs 05/03/20 
0430 TP 6.4 ALB 2.6* AP 51 SGOT 23 Thyroid Studies Lab Results Component Value Date/Time TSH 1.45 04/27/2020 03:50 AM  
    
Procedures/imaging: see electronic medical records for all procedures/Xrays and details which were not copied into this note but were reviewed prior to creation of Plan Medications Reviewed Amy Abernathy MD

## 2020-05-03 NOTE — PROGRESS NOTES
Zosyn (Piperacillin/Tazobactam) Extended Infusion Pascale Paige, a 53 y.o. yo male, has been converted to an extended infusion of Zosyn while in the intensive care unit. Extended infusions will run over 4 hours (240 minutes). Recent Labs 05/03/20 
0430 05/02/20 
0405 05/01/20 
0400 CREA 1.39* 1.33* 1.06 Ht Readings from Last 1 Encounters:  
04/26/20 177.8 cm (70\") Wt Readings from Last 1 Encounters:  
05/01/20 (!) 220 kg (485 lb 0.2 oz) CrCl : Serum creatinine: 1.39 mg/dL (H) 05/03/20 0430 Estimated creatinine clearance: 123.8 mL/min (A) Renal adjustment of extended infusion of Zosyn 3.375 or 4.5 gm every 8 hours for CrCl >/= 20 ml/min 3.375 or 4.5 gm every 12 hours for CrCl < 20 ml/min, intermittent HD or PD Dose adjusted to Zosyn IV 4.5g q8h over 240 minutes to start @ 1900 5/3/20 Paulino Payne, PharmD 
[phone]

## 2020-05-03 NOTE — PROGRESS NOTES
Pulmonary, Critical Care, and Sleep Medicine Pulmonary Progress Note Name: Mac Sanchez. : 1975 MRN: [id number] Date: 5/3/2020 [x]I have reviewed the flowsheet and previous days notes. Events, vitals, medications and notes from last 24 hours reviewed. IMPRESSION:  
Patient Active Problem List  
Diagnosis Code  Acute on chronic respiratory failure with hypoxia and hypercapnia ([facility]) J96.21, J96.22  
 Acute on chronic systolic and diastolic CHF (congestive heart failure) ([facility]) I50.43 Aspiration pneumonitis from multiple patient initiated ET tube displacement events J69.0  Cardiac arrest ([facility] [address] I46.9 MSSA in sputum culture A49.01  
 Elevated troponin, NSTEMI R79.89, I21.4  Chronic a-fib I48.20  Atrial flutter ([facility] [address] I48.92 Prolonged QTc R94.31  
 Chronic renal insufficiency, stage II (mild) N18.2 Thrombocytopenia D69.6  Smoking F17.200  Arthritis M19.90  Migraine G43.909  Chronic back pain M54.9, G89.29  
 Obstructive Sleep apnea G47.33  
 Morbid obesity ([facility]) E66.01  
  
PLAN:  
[facility]. Ventilated patients- aim to keep peak plateau pressure less than or equal to 30cm H2O. Titrate FiO2 for goal SPO2> 91%. PEEP to 18 cwp and desaturation with mucus plug this AM that required suctioning aggressively with success. Yesterday concern for some aspiration around ET tube with vomiting and NGT dislodgement CXR and ABG daily VAP prevention bundle, head of the bed at 30' all times Daily sedation and paralytic holiday when feasible and assessment for weaning with SBT as tolerated. Monitor TOF on Nimbex. Patient off and on gets agitated through paralytic and sedation developing tolerance for them quickly requiring ongoing paralytic Continue bronchodilators and start Pulmozyme, pulmonary hygiene care Off of steroid SARS-COV-2 negative x 2 Antibiotic choice: Zosyn and Vancomycin started on 20 with low grade fever. Patient may have aspirated since had multiple witnessed episodes when he dislodged his ET tube and or with ET tube cuff malfunctioned requiring exchange Monitor hemodynamics -stable and off of vasopressors since 4/27/20 AM 
Was on Amiodarone, has been off. In NSR On Argatroban given thrombocytopenia. Off of Heparin given bleeding around RT IJ insertion site. No bleeding issues on therapeutic argatroban. HIT Ab negative; follow LEYDI- pending At higher risk for DVT/PE; Hgb stable, Plt remained above 100 and no appreciable active bleeding or hematoma at RT IJ insertion  CBC, PT/INR, PTT, any signs of bleeding/hematoma PVL bl LE negative Continue Bumex 1 mg twice a day; cardiology Dr. Karilyn Pallas agrees Replace NGT and monitor residuals. May use low amount of free H2O given mild hypernatremia while considering conservative fluid volume strategy with current ventilator support need. Nephrology follow back early next week per clinical course Glycemic control. AM labs Good distal circulation in both hands and A-line site stable without any hematoma or bleeding Central Line, A-line, Clark and Vent Bundles will be followed, Vent Day 12 Prognosis guarded. Discussed with wife that patient with limited reserve, at increased risk for prolonged ventilator support, prolonged hospitalization, nosocomial infection, cardiovascular collapse, DVT/PE, death and other. Updating wife on daily basis. Patient remained full code. Will defer respective systems problem management to primary and other consultant and follow patient in ICU with primary and other medical team 
Quality Care: PPI, DVT prophylaxis, HOB elevated, Infection control all reviewed and addressed. PAIN AND SEDATION: Fentanyl, Versed, Nimbex · Skin/Wound: chronic venous stasis changes · Nutrition: NPO 
· Prophylaxis: DVT and GI Prophylaxis reviewed.   
· Restraints: none while on paralytic; may place during weaning process or when off of paralytic 
· PT/OT eval and treat: as needed when stable · Lines/Tubes: lines RT IJ 4/22/20; A-line 4/23/20; daniel 4/21/20, ET tube 4/25/20 ADVANCE DIRECTIVE: Full code DISCUSSION: spoke with RN, RT, ICU staff Events and notes from last 24 hours reviewed. Care plan discussed with nursing Subjective/History:  
40-year-old male with morbid obesity, ex-smoker, history of asthma, history of severe obstructive sleep apnea on CPAP, questionable component compliance, history of congestive heart failure with reduced ejection fraction atrial fibrillation atrial flutter on amiodarone, Xarelto, history of chronic renal insufficiency, cellulitis of lower extremities and anemia. As per family did not have any fever or upper respiratory tract infections and no flulike symptoms. On Lasix, CPAP not clear if missed. Presented with acute shortness of breath worsening edema of lower extremities. He was found to be in acute hypercarbic hypoxemic respiratory failure initially had a trial of BiPAP but failed and then eventually was intubated. He was hypotensive. Moderate amount of secretions. Morbid obesity. Patient compliant with Xarelto. Patient self extubated and had PEA arrest. 
 
Subjective:  
05/03/20 Patient in ICU on ventilator; mucus plugs related desaturation episode improved with aggressive suctioning Plp 29 cm H2O; on sedation and paralytic Hemodynamically stable; NSR On Argatroban w/o any bleeding issues; Hgb stable Good UO with diuresis. Low grade fever this AM 
NG tube was removed yesterday after reinsertion during desaturation episode; no TF since y'day AM 
No bleeding around IJ insertion or other sites I was not contacted for anything for the patient by staff overnight ROS: Review of systems not obtained due to patient factors. Latest lactic acid:  
Lactic acid Date Value Ref Range Status 04/28/2020 1.4 0.4 - 2.0 MMOL/L Final  
04/27/2020 2.1 (HH) 0.4 - 2.0 MMOL/L Final  
 Comment:  
  CALLED TO AND CORRECTLY REPEATED BY: 
Alex Castillo RN, ICU ON 2020 AT 0450 TO JDA 
  
2020 2.0 0.4 - 2.0 MMOL/L Final  
 
 
Past Medical History: 
Past Medical History:  
Diagnosis Date  Arrhythmia  Arthritis   
 knees  CHF (congestive heart failure) ([facility] [address]  Chronic back pain  Chronic renal insufficiency, stage II (mild)  History of atrial flutter Dx 2016 - anticoagulated by Larry Gudino  Hypertension 2015  Limited mobility  Migraine headache  Morbid obesity ([facility] [address]  Morbid obesity with body mass index of 60.0-69.9 in adult [facility])  Sleep apnea   
 uses c-pap instructed to bring day of surgery  Smoking   
 very passive / cigars only Allergy: No Known Allergies Vital Signs:   
Blood pressure 113/60, pulse (!) 56, temperature 98.4 °F (36.9 °C), resp. rate 20, height 5' 10\" (1.778 m), weight (!) 220 kg (485 lb 0.2 oz), SpO2 90 %. Body mass index is 69.59 kg/m². O2 Device: Endotracheal tube O2 Flow Rate (L/min): 12 l/min Temp (24hrs), Av.6 °F (37.6 °C), Min:98.4 °F (36.9 °C), Max:100.2 °F (37.9 °C) Intake/Output:  
Last shift:      701 - 1900 In: -  
Out: 7654 [[id number]] Last 3 shifts: 1901 -  0700 In: 600 Out: Mihaela Prim [UHQGI:1385] Intake/Output Summary (Last 24 hours) at 5/3/2020 1341 Last data filed at 5/3/2020 1120 Gross per 24 hour Intake  Output 4950 ml Net -4950 ml Ventilator Settings: 
Mode Rate Tidal Volume Pressure FiO2 PEEP Assist control, VC+   580 ml    100 % 18 cm H20 Peak airway pressure: 35 cm H2O Minute ventilation: 11.7 l/min Pl pressure goals less than or equal to 30. Physical Exam: 
General: sedated, on paralytic, no respiratory distress, appears older than stated age, morbidly obese, on ventilator HEENT: PERRL, fundi benign, orally intubatyed Neck: thick short neck limiting exam, supple; RT IJ site without active bleeding, no hematoma, exam limitation Chest: normal, obese chest wall Lungs: moderate air entry, few rhonchi scattered bilaterally, normal percussion anterior chest bilaterally but limited, no tenderness/ rash Heart: Regular rate and rhythm, S1S2 present or without murmur or extra heart sounds Abdomen: obese, bowel sounds normoactive, tympanic, soft without significant tenderness, masses, organomegaly or guarding, rigidity, rebound Extremity: 2+, pitting and bl LE and 2+ bl UE edema; negative for cyanosis, clubbing Capillary refill: normal 
Neuro: sedated, on paralytic, no involuntary movements, exam limitations Skin: Skin color, texture, turgor fair. Skin dry, warm, non-diaphoretic DATA:  
Current Facility-Administered Medications Medication Dose Route Frequency  methylPREDNISolone (PF) (Solu-MEDROL) injection 80 mg  80 mg IntraVENous Q12H  piperacillin-tazobactam (ZOSYN) 3.375 g in 0.9% sodium chloride (MBP/ADV) 100 mL MBP  3.375 g IntraVENous Q6H  Vancomycin- Pharmacokinetic Dosing  1 Each Other Rx Dosing/Monitoring  vancomycin (VANCOCIN) 1750 mg in  ml infusion  1,750 mg IntraVENous Q8H  
 fentaNYL citrate (PF) injection 50 mcg  50 mcg IntraVENous Q3H PRN  
 argatroban 250 mg in 0.9% sodium chloride 250 mL (1000 mcg/mL) infusion  0.5-10 mcg/kg/min IntraVENous TITRATE  bumetanide (BUMEX) injection 1 mg  1 mg IntraVENous BID  LORazepam (ATIVAN) injection 1 mg  1 mg IntraVENous Q2H PRN  chlorhexidine (PERIDEX) 0.12 % mouthwash 10 mL  10 mL Oral Q12H  
 albuterol-ipratropium (DUO-NEB) 2.5 MG-0.5 MG/3 ML  3 mL Nebulization Q6H PRN  
 acetaminophen (TYLENOL) tablet 650 mg  650 mg Oral Q6H PRN  
 cisatracurium (NIMBEX) 100 mg in 0.9% sodium chloride 100 mL infusion  0-10 mcg/kg/min IntraVENous TITRATE  ELECTROLYTE REPLACEMENT PROTOCOL - Calcium   1 Each Other PRN  
 ELECTROLYTE REPLACEMENT PROTOCOL - Magnesium   1 Each Other PRN  
  ELECTROLYTE REPLACEMENT PROTOCOL - Potassium Standard Dosing   1 Each Other PRN  
 ELECTROLYTE REPLACEMENT PROTOCOL - Phosphorus  Standard Dosing  1 Each Other PRN  pantoprazole (PROTONIX) 40 mg in 0.9% sodium chloride 10 mL injection  40 mg IntraVENous DAILY  fentaNYL (PF) 900 mcg/30 ml infusion soln  0-150 mcg/hr IntraVENous TITRATE  midazolam in normal saline (VERSED) 1 mg/mL infusion  0-10 mg/hr IntraVENous TITRATE  aspirin chewable tablet 81 mg  81 mg Oral DAILY  sodium chloride (NS) flush 5-40 mL  5-40 mL IntraVENous Q8H  
 sodium chloride (NS) flush 5-40 mL  5-40 mL IntraVENous PRN Facility-Administered Medications Ordered in Other Encounters Medication Dose Route Frequency  propofoL (DIPRIVAN) 10 mg/mL injection   IntraVENous PRN  
 ePHEDrine (PF) (MISTOLE) 10 mg/mL in NS syringe   IntraVENous PRN  
 PHENYLephrine (RUPERT-SYNEPHRINE) 100 mcg/mL in NS syringe   IntraVENous PRN Telemetry: [x]Sinus []A-flutter []Paced []A-fib []Multiple PVCs  
              
4/22/20 ECHO · Left Ventricle: Normal wall thickness and systolic function (ejection fraction normal). Mildly dilated left ventricle. The estimated ejection fraction is 45 - 50%. There is moderate (grade 2) left ventricular diastolic dysfunction E/e' Ratio = 17.09. Wall Scoring: The left ventricular wall motion is globally hypokinetic. · Right Ventricle: Not well visualized. Normal global systolic function. Moderately dilated right ventricle. · Right Atrium: Right atrium not well visualized. Moderately dilated right atrium. · IVC/Hepatic Veins: Dilated inferior vena cava. Mechanically ventilated; cannot use inferior caval vein diameter to estimate central venous pressure. Labs: 
Recent Results (from the past 24 hour(s)) GLUCOSE, POC Collection Time: 05/02/20  5:29 PM  
Result Value Ref Range Glucose (POC) 86 70 - 110 mg/dL GLUCOSE, POC Collection Time: 05/02/20 11:31 PM  
Result Value Ref Range Glucose (POC) 97 70 - 110 mg/dL METABOLIC PANEL, COMPREHENSIVE Collection Time: 05/03/20  4:30 AM  
Result Value Ref Range Sodium 150 (H) 136 - 145 mmol/L Potassium 4.7 3.5 - 5.5 mmol/L Chloride 113 (H) 100 - 111 mmol/L  
 CO2 32 21 - 32 mmol/L Anion gap 5 3.0 - 18 mmol/L Glucose 117 (H) 74 - 99 mg/dL BUN 51 (H) 7.0 - 18 MG/DL Creatinine 1.39 (H) 0.6 - 1.3 MG/DL  
 BUN/Creatinine ratio 37 (H) 12 - 20 GFR est AA >60 >60 ml/min/1.73m2 GFR est non-AA 55 (L) >60 ml/min/1.73m2 Calcium 8.0 (L) 8.5 - 10.1 MG/DL Bilirubin, total 1.0 0.2 - 1.0 MG/DL  
 ALT (SGPT) 28 16 - 61 U/L  
 AST (SGOT) 23 10 - 38 U/L Alk. phosphatase 51 45 - 117 U/L Protein, total 6.4 6.4 - 8.2 g/dL Albumin 2.6 (L) 3.4 - 5.0 g/dL Globulin 3.8 2.0 - 4.0 g/dL A-G Ratio 0.7 (L) 0.8 - 1.7    
CBC WITH AUTOMATED DIFF Collection Time: 05/03/20  4:30 AM  
Result Value Ref Range WBC 9.3 4.6 - 13.2 K/uL  
 RBC 4.71 4.70 - 5.50 M/uL  
 HGB 12.6 (L) 13.0 - 16.0 g/dL HCT 42.8 36.0 - 48.0 % MCV 90.9 74.0 - 97.0 FL  
 MCH 26.8 24.0 - 34.0 PG  
 MCHC 29.4 (L) 31.0 - 37.0 g/dL  
 RDW 15.4 (H) 11.6 - 14.5 % PLATELET 655 (L) 843 - 420 K/uL MPV 11.6 9.2 - 11.8 FL  
 NEUTROPHILS 93 (H) 40 - 73 % LYMPHOCYTES 4 (L) 21 - 52 % MONOCYTES 3 3 - 10 % EOSINOPHILS 0 0 - 5 % BASOPHILS 0 0 - 2 %  
 ABS. NEUTROPHILS 8.6 (H) 1.8 - 8.0 K/UL  
 ABS. LYMPHOCYTES 0.4 (L) 0.9 - 3.6 K/UL  
 ABS. MONOCYTES 0.3 0.05 - 1.2 K/UL  
 ABS. EOSINOPHILS 0.0 0.0 - 0.4 K/UL  
 ABS. BASOPHILS 0.0 0.0 - 0.1 K/UL  
 DF AUTOMATED MAGNESIUM Collection Time: 05/03/20  4:30 AM  
Result Value Ref Range Magnesium 2.8 (H) 1.6 - 2.6 mg/dL PHOSPHORUS Collection Time: 05/03/20  4:30 AM  
Result Value Ref Range Phosphorus 3.8 2.5 - 4.9 MG/DL  
CALCIUM, IONIZED Collection Time: 05/03/20  4:30 AM  
Result Value Ref Range Ionized Calcium 1.15 1.12 - 1.32 MMOL/L  
PTT  Collection Time: 05/03/20  4:30 AM  
 Result Value Ref Range aPTT 77.0 (H) 23.0 - 36.4 SEC PROTHROMBIN TIME + INR Collection Time: 05/03/20  4:30 AM  
Result Value Ref Range Prothrombin time 45.3 (H) 11.5 - 15.2 sec INR 4.9 (H) 0.8 - 1.2 POC G3 Collection Time: 05/03/20  5:31 AM  
Result Value Ref Range Device: VENT    
 FIO2 (POC) 1.0 %  
 pH (POC) 7.399 7.35 - 7.45    
 pCO2 (POC) 44.0 35.0 - 45.0 MMHG  
 pO2 (POC) 67 (L) 80 - 100 MMHG  
 HCO3 (POC) 27.2 (H) 22 - 26 MMOL/L  
 sO2 (POC) 93 92 - 97 % Base excess (POC) 2 mmol/L Mode ASSIST CONTROL Tidal volume 580 ml Set Rate 20 bpm  
 PEEP/CPAP (POC) 18 cmH2O Allens test (POC) N/A Inspiratory Time 0.9 sec Total resp. rate 20 Site DRAWN FROM ARTERIAL LINE Patient temp. 98.6 Specimen type (POC) ARTERIAL Performed by Veneda Deutscher Volume control YES    
GLUCOSE, POC Collection Time: 05/03/20  5:44 AM  
Result Value Ref Range Glucose (POC) 104 70 - 110 mg/dL GLUCOSE, POC Collection Time: 05/03/20 11:56 AM  
Result Value Ref Range Glucose (POC) 105 70 - 110 mg/dL Recent Labs 05/03/20 
0737 05/02/20 
5685 05/01/20 
0208 FIO2I 1.0 80 0.75 HCO3I 27.2* 31.5* 32.0*  
PCO2I 44.0 52.9* 46.0*  
PHI 7.399 7.385 7.451* PO2I 67* 73* 58* All Micro Results Procedure Component Value Units Date/Time CULTURE, RESPIRATORY/SPUTUM/BRONCH [employer] Payer STAIN [[id number]]  (Abnormal)  (Susceptibility) Collected:  04/22/20 1018 Order Status:  Completed Specimen:  Sputum from Tracheal Aspirate Updated:  04/25/20 0403 Special Requests: NO SPECIAL REQUESTS     
  GRAM STAIN FEW WBCS SEEN     
      
  OCCASIONAL EPITHELIAL CELLS SEEN  
     
      
  FEW GRAM POSITIVE COCCI IN CLUSTERS  
     
   RARE GRAM NEGATIVE RODS Culture result:    
  LIGHT STAPHYLOCOCCUS AUREUS  
     
      
  LIGHT NORMAL RESPIRATORY BRYN  
     
 CULTURE, BLOOD [[id number]] Collected:  04/24/20 1015 Order Status:  Canceled Specimen:  Blood Imaging: 
[x]I have personally reviewed the patients chest radiographs images and report 5/3/20 Results from SHASHANK GALVEZ - DES Encounter encounter on 04/21/20 XR ABD (KUB) Narrative EXAM: SUPINE ABDOMINAL RADIOGRAPH 
 
CLINICAL INDICATION/HISTORY: ng placement 
-Additional: None COMPARISON: None TECHNIQUE: Single supine view of the abdomen. 
 
_______________ FINDINGS: 
 
BOWEL GAS PATTERN: Outside the field of view. BONES: Unremarkable. OTHER: Subtle linear density likely corresponding with reported nasogastric 
tube, possible tip seen in the inferior thorax distal esophagus region. Endotracheal tube noted which is above the miguel ángel. 
 
_______________ Impression IMPRESSION: 
 
Nondiagnostic exam for purposes of nasogastric tube positioning. It is suspected 
that the tip of the tube is in the distal esophagus, but exam quality is not 
adequate for confident localization. No results found for this or any previous visit. [x]See my orders for details My assessment, plan of care, findings, medications, side effects etc were discussed with: 
[x]nursing []Dietician  
[x]respiratory therapy []  
[x]family - wife []Patient [x]Total critical care time exclusive of procedures 39 minutes with complex decision making performed and > 50% time spent in face to face evaluation.  
 
Ashlyn Ayoub MD

## 2020-05-04 NOTE — PROGRESS NOTES
Pharmacy Dosing Services: vancomycin Consult for Vancomycin Dosing by Pharmacy by Dr. Holden Fields Consult provided for this 53y.o. year old male , for indication of aspiration PNA. Day of Therapy 3 Trough drawn yesterday resulted in trough level of 37.1, this is artificially inflated due to irregularities in dosing schedule. Last night's dose was held and this morning's dose pushed back to 11am.  Dosing to resume at the same dose of 1750mg with decrease in frequency from q8h to q12h. Dose calculated to approximate a therapeutic trough of 15-20 mcg/mL. Pt temp this am 99.6 F and WBC slight increase to 10.6. Pt also getting zosyn 4.5 gm q8h. Pharmacy to follow daily and will make changes to dose and/or frequency based on clinical status. Pharmacist JADE [facility] Ht Readings from Last 1 Encounters:  
04/26/20 177.8 cm (70\") Wt Readings from Last 1 Encounters:  
05/01/20 (!) 220 kg (485 lb 0.2 oz) Serum Creatinine Lab Results Component Value Date/Time Creatinine 1.26 05/04/2020 04:00 AM  
  
Creatinine Clearance Estimated Creatinine Clearance: 136.6 mL/min (based on SCr of 1.26 mg/dL). BUN Lab Results Component Value Date/Time BUN 52 (H) 05/04/2020 04:00 AM  
  
WBC Lab Results Component Value Date/Time WBC 10.6 05/04/2020 04:00 AM  
  
H/H Lab Results Component Value Date/Time HGB 12.3 (L) 05/04/2020 04:00 AM  
  
Platelets Lab Results Component Value Date/Time PLATELET 447 60/58/7026 04:00 AM  
  
Temp 99.6 °F (37.6 °C)

## 2020-05-04 NOTE — PROGRESS NOTES
Pulmonary, Critical Care, and Sleep Medicine Pulmonary Progress Note Name: Lazarus Mehta. : 1975 MRN: [id number] Date: 2020 [x]I have reviewed the flowsheet and previous days notes. Events, vitals, medications and notes from last 24 hours reviewed. Subjective/History:  
22-year-old male with morbid obesity, ex-smoker, history of asthma, history of severe obstructive sleep apnea on CPAP, questionable component compliance, history of congestive heart failure with reduced ejection fraction atrial fibrillation atrial flutter on amiodarone, Xarelto, history of chronic renal insufficiency, cellulitis of lower extremities and anemia. As per family did not have any fever or upper respiratory tract infections and no flulike symptoms. On Lasix, CPAP not clear if missed. Presented with acute shortness of breath worsening edema of lower extremities. He was found to be in acute hypercarbic hypoxemic respiratory failure initially had a trial of BiPAP but failed and then eventually was intubated. He was hypotensive. Moderate amount of secretions. Morbid obesity. Patient compliant with Xarelto. Patient self extubated and had PEA arrest. 
 
Subjective:  
20 Chart reviewed and d/w Dr Drake Timmons Patient intubated  for hypoxemic resp failure. Patient remains in icu; on vent support He is morbidly obese He is sedated and paralyzed; on VCV mod of vent; peep 18; fio2 100% Hx of desats on vent which improves with suctioning No bleeding issues Afebrile; BP stable; 
Tele-sinus natalia 40-50/min UOP good; overall fluid balance neg Drips: Argatroban, nimbex, fentanyl, versed ROS: Review of systems not obtained due to patient factors. Past Medical History: 
Past Medical History:  
Diagnosis Date  Arrhythmia  Arthritis   
 knees  CHF (congestive heart failure) ([facility] [address]  Chronic back pain  Chronic renal insufficiency, stage II (mild)  History of atrial flutter Dx 2016 - anticoagulated by [place]  Hypertension 2015  Limited mobility  Migraine headache  Morbid obesity ([facility] [address]  Morbid obesity with body mass index of 60.0-69.9 in adult [facility])  Sleep apnea   
 uses c-pap instructed to bring day of surgery  Smoking   
 very passive / cigars only Allergy: No Known Allergies Vital Signs:   
Blood pressure 130/68, pulse (!) 42, temperature 99.6 °F (37.6 °C), resp. rate 20, height 5' 10\" (1.778 m), weight (!) 220 kg (485 lb 0.2 oz), SpO2 90 %. Body mass index is 69.59 kg/m². O2 Device: Endotracheal tube O2 Flow Rate (L/min): 12 l/min Temp (24hrs), Av.7 °F (37.1 °C), Min:97.7 °F (36.5 °C), Max:99.6 °F (37.6 °C) Intake/Output:  
Last shift:       07 -  190 In: -  
Out: 575 [Urine:575] Last 3 shifts:  190 -  0700 In: -  
Out: 5450 [XORQA:0038] Intake/Output Summary (Last 24 hours) at 2020 1247 Last data filed at [address] [facility] per 24 hour Intake  Output 2875 ml Net -2875 ml Ventilator Settings: 
Mode Rate Tidal Volume Pressure FiO2 PEEP Pressure control   580 ml    100 % 14 cm H20 Peak airway pressure: 30 cm H2O Minute ventilation: 13 l/min Pl pressure goals less than or equal to 30. Physical Exam: 
General: sedated, on paralytic, obese male; no cyanosis; on ventilator HEENT: LIBBY, orally intubated; no bleeding; pupils not dilated, reactive Neck: thick short obese neck; RT IJ site without active bleeding Chest: normal, obese chest wall Lungs: moderate air entry, decreased BS bases; no wheezing or crackles; symmetrical chest expansion; obese chest 
Heart: S1S2 present or without murmur or extra heart sounds; JVD not elevated Abdomen: obese, bowel sounds normoactive, soft without significant tenderness; no organomegaly; no guarding or rigidity Extremity: 2+, pitting and bl LE and 2+ bl UE edema; negative for cyanosis, clubbing Capillary refill: normal 
Neuro: sedated, on paralytic, no involuntary movements, exam limitations Skin: Skin color, texture, turgor fair. Skin dry, warm, non-diaphoretic DATA:  
Current Facility-Administered Medications Medication Dose Route Frequency  methylPREDNISolone (PF) (Solu-MEDROL) injection 80 mg  80 mg IntraVENous Q12H  dornase alpha (PULMOZYME)2.5mg/2.5ml nebulizer solution  2.5 mg Nebulization BID RT  
 piperacillin-tazobactam (ZOSYN) 4.5 g in 0.9% sodium chloride (MBP/ADV) 100 mL MBP  4.5 g IntraVENous Q8H  
 vancomycin (VANCOCIN) 1750 mg in  ml infusion  1,750 mg IntraVENous Q12H  Vancomycin- Pharmacokinetic Dosing  1 Each Other Rx Dosing/Monitoring  fentaNYL citrate (PF) injection 50 mcg  50 mcg IntraVENous Q3H PRN  
 argatroban 250 mg in 0.9% sodium chloride 250 mL (1000 mcg/mL) infusion  0.5-10 mcg/kg/min IntraVENous TITRATE  bumetanide (BUMEX) injection 1 mg  1 mg IntraVENous BID  LORazepam (ATIVAN) injection 1 mg  1 mg IntraVENous Q2H PRN  chlorhexidine (PERIDEX) 0.12 % mouthwash 10 mL  10 mL Oral Q12H  
 albuterol-ipratropium (DUO-NEB) 2.5 MG-0.5 MG/3 ML  3 mL Nebulization Q6H PRN  
 acetaminophen (TYLENOL) tablet 650 mg  650 mg Oral Q6H PRN  
 cisatracurium (NIMBEX) 100 mg in 0.9% sodium chloride 100 mL infusion  0-10 mcg/kg/min IntraVENous TITRATE  ELECTROLYTE REPLACEMENT PROTOCOL - Calcium   1 Each Other PRN  
 ELECTROLYTE REPLACEMENT PROTOCOL - Magnesium   1 Each Other PRN  
 ELECTROLYTE REPLACEMENT PROTOCOL - Potassium Standard Dosing   1 Each Other PRN  
 ELECTROLYTE REPLACEMENT PROTOCOL - Phosphorus  Standard Dosing  1 Each Other PRN  pantoprazole (PROTONIX) 40 mg in 0.9% sodium chloride 10 mL injection  40 mg IntraVENous DAILY  fentaNYL (PF) 900 mcg/30 ml infusion soln  0-150 mcg/hr IntraVENous TITRATE  midazolam in normal saline (VERSED) 1 mg/mL infusion  0-10 mg/hr IntraVENous TITRATE  aspirin chewable tablet 81 mg  81 mg Oral DAILY  sodium chloride (NS) flush 5-40 mL  5-40 mL IntraVENous Q8H  
 sodium chloride (NS) flush 5-40 mL  5-40 mL IntraVENous PRN Facility-Administered Medications Ordered in Other Encounters Medication Dose Route Frequency  propofoL (DIPRIVAN) 10 mg/mL injection   IntraVENous PRN  
 ePHEDrine (PF) (MISTOLE) 10 mg/mL in NS syringe   IntraVENous PRN  
 PHENYLephrine (RUPERT-SYNEPHRINE) 100 mcg/mL in NS syringe   IntraVENous PRN Telemetry: [x]Sinus []A-flutter []Paced []A-fib []Multiple PVCs  
              
4/22/20 ECHO · Left Ventricle: Normal wall thickness and systolic function (ejection fraction normal). Mildly dilated left ventricle. The estimated ejection fraction is 45 - 50%. There is moderate (grade 2) left ventricular diastolic dysfunction E/e' Ratio = 17.09. Wall Scoring: The left ventricular wall motion is globally hypokinetic. · Right Ventricle: Not well visualized. Normal global systolic function. Moderately dilated right ventricle. · Right Atrium: Right atrium not well visualized. Moderately dilated right atrium. · IVC/Hepatic Veins: Dilated inferior vena cava. Mechanically ventilated; cannot use inferior caval vein diameter to estimate central venous pressure. Labs: 
Recent Results (from the past 24 hour(s)) GLUCOSE, POC Collection Time: 05/03/20  5:56 PM  
Result Value Ref Range Glucose (POC) 112 (H) 70 - 110 mg/dL Amada Osler Collection Time: 05/03/20  7:00 PM  
Result Value Ref Range Vancomycin,trough 37.1 (HH) 10.0 - 20.0 ug/mL Reported dose date: [id number] Reported dose time: 1334 Reported dose: VANCOMYCIN 1750 MG UNITS  
GLUCOSE, POC Collection Time: 05/03/20 11:05 PM  
Result Value Ref Range Glucose (POC) 116 (H) 70 - 110 mg/dL METABOLIC PANEL, COMPREHENSIVE Collection Time: 05/04/20  4:00 AM  
Result Value Ref Range  Sodium 152 (H) 136 - 145 mmol/L  
 Potassium 4.2 3.5 - 5.5 mmol/L Chloride 117 (H) 100 - 111 mmol/L  
 CO2 30 21 - 32 mmol/L Anion gap 5 3.0 - 18 mmol/L Glucose 124 (H) 74 - 99 mg/dL BUN 52 (H) 7.0 - 18 MG/DL Creatinine 1.26 0.6 - 1.3 MG/DL  
 BUN/Creatinine ratio 41 (H) 12 - 20 GFR est AA >60 >60 ml/min/1.73m2 GFR est non-AA >60 >60 ml/min/1.73m2 Calcium 7.9 (L) 8.5 - 10.1 MG/DL Bilirubin, total 0.8 0.2 - 1.0 MG/DL  
 ALT (SGPT) 29 16 - 61 U/L  
 AST (SGOT) 20 10 - 38 U/L Alk. phosphatase 47 45 - 117 U/L Protein, total 6.3 (L) 6.4 - 8.2 g/dL Albumin 2.6 (L) 3.4 - 5.0 g/dL Globulin 3.7 2.0 - 4.0 g/dL A-G Ratio 0.7 (L) 0.8 - 1.7    
CBC WITH AUTOMATED DIFF Collection Time: 05/04/20  4:00 AM  
Result Value Ref Range WBC 10.6 4.6 - 13.2 K/uL  
 RBC 4.64 (L) 4.70 - 5.50 M/uL  
 HGB 12.3 (L) 13.0 - 16.0 g/dL HCT 42.2 36.0 - 48.0 % MCV 90.9 74.0 - 97.0 FL  
 MCH 26.5 24.0 - 34.0 PG  
 MCHC 29.1 (L) 31.0 - 37.0 g/dL  
 RDW 15.4 (H) 11.6 - 14.5 % PLATELET 272 507 - 221 K/uL MPV 10.9 9.2 - 11.8 FL  
 NEUTROPHILS 90 (H) 40 - 73 % LYMPHOCYTES 5 (L) 21 - 52 % MONOCYTES 5 3 - 10 % EOSINOPHILS 0 0 - 5 % BASOPHILS 0 0 - 2 %  
 ABS. NEUTROPHILS 9.5 (H) 1.8 - 8.0 K/UL  
 ABS. LYMPHOCYTES 0.5 (L) 0.9 - 3.6 K/UL  
 ABS. MONOCYTES 0.6 0.05 - 1.2 K/UL  
 ABS. EOSINOPHILS 0.0 0.0 - 0.4 K/UL  
 ABS. BASOPHILS 0.0 0.0 - 0.1 K/UL  
 DF AUTOMATED MAGNESIUM Collection Time: 05/04/20  4:00 AM  
Result Value Ref Range Magnesium 2.7 (H) 1.6 - 2.6 mg/dL PHOSPHORUS Collection Time: 05/04/20  4:00 AM  
Result Value Ref Range Phosphorus 3.1 2.5 - 4.9 MG/DL  
CALCIUM, IONIZED Collection Time: 05/04/20  4:00 AM  
Result Value Ref Range Ionized Calcium 1.24 1.12 - 1.32 MMOL/L  
PTT Collection Time: 05/04/20  4:00 AM  
Result Value Ref Range aPTT 80.0 (H) 23.0 - 36.4 SEC PROTHROMBIN TIME + INR Collection Time: 05/04/20  4:00 AM  
Result Value Ref Range Prothrombin time 50.8 (H) 11.5 - 15.2 sec INR 5.6 (HH) 0.8 - 1.2 POC G3 Collection Time: 05/04/20  5:12 AM  
Result Value Ref Range Device: VENT    
 FIO2 (POC) 100 % pH (POC) 7.384 7.35 - 7.45    
 pCO2 (POC) 51.8 (H) 35.0 - 45.0 MMHG  
 pO2 (POC) 63 (L) 80 - 100 MMHG  
 HCO3 (POC) 30.9 (H) 22 - 26 MMOL/L  
 sO2 (POC) 91 (L) 92 - 97 % Base excess (POC) 6 mmol/L Mode ASSIST CONTROL Tidal volume 580 ml Set Rate 20 bpm  
 PEEP/CPAP (POC) 18 cmH2O Allens test (POC) N/A Total resp. rate 20 Site DRAWN FROM ARTERIAL LINE Specimen type (POC) ARTERIAL Performed by [facility] Lips Volume control YES Volume control plus YES    
GLUCOSE, POC Collection Time: 05/04/20  5:14 AM  
Result Value Ref Range Glucose (POC) 111 (H) 70 - 110 mg/dL POC G3 Collection Time: 05/04/20 10:11 AM  
Result Value Ref Range Device: VENT    
 FIO2 (POC) 100 % pH (POC) 7.378 7.35 - 7.45    
 pCO2 (POC) 50.7 (H) 35.0 - 45.0 MMHG  
 pO2 (POC) 66 (L) 80 - 100 MMHG  
 HCO3 (POC) 29.9 (H) 22 - 26 MMOL/L  
 sO2 (POC) 92 92 - 97 % Base excess (POC) 5 mmol/L Mode ASSIST CONTROL Set Rate 20 bpm  
 PEEP/CPAP (POC) 10 cmH2O  
 PIP (POC) 13 Allens test (POC) N/A Inspiratory Time 0.9 sec Total resp. rate 20 Site DRAWN FROM ARTERIAL LINE Specimen type (POC) ARTERIAL Performed by Luly Laundry Pressure control YES Recent Labs 05/04/20 
1011 05/04/20 
8054 05/03/20 
0531 FIO2I 100 100 1.0  
HCO3I 29.9* 30.9* 27.2*  
PCO2I 50.7* 51.8* 44.0 PHI 7.378 7.384 7.399 PO2I 66* 63* 67* All Micro Results Procedure Component Value Units Date/Time CULTURE, RESPIRATORY/SPUTUM/BRONCH Edwyna Purvi STAIN [[id number]]  (Abnormal)  (Susceptibility) Collected:  04/22/20 1018 Order Status:  Completed Specimen:  Sputum from Tracheal Aspirate Updated:  04/25/20 0243 Special Requests: NO SPECIAL REQUESTS GRAM STAIN FEW WBCS SEEN     
      
  OCCASIONAL EPITHELIAL CELLS SEEN  
     
      
  FEW GRAM POSITIVE COCCI IN CLUSTERS  
     
   RARE GRAM NEGATIVE RODS Culture result:    
  LIGHT STAPHYLOCOCCUS AUREUS  
     
      
  LIGHT NORMAL RESPIRATORY BRYN  
     
 CULTURE, BLOOD [[id number]] Collected:  04/24/20 1015 Order Status:  Canceled Specimen:  Blood Imaging: 
[x]I have personally reviewed the patients chest radiographs images and report AXR 5[facility]2 [facility] XR ABD (KUB) Narrative EXAM: SUPINE ABDOMINAL RADIOGRAPH 
 
CLINICAL INDICATION/HISTORY: ng placement 
-Additional: None COMPARISON: None TECHNIQUE: Single supine view of the abdomen. 
 
_______________ FINDINGS: 
 
BOWEL GAS PATTERN: Outside the field of view. BONES: Unremarkable. OTHER: Subtle linear density likely corresponding with reported nasogastric 
tube, possible tip seen in the inferior thorax distal esophagus region. Endotracheal tube noted which is above the miguel ángel. 
 
_______________ Impression IMPRESSION: 
 
Nondiagnostic exam for purposes of nasogastric tube positioning. It is suspected 
that the tip of the tube is in the distal esophagus, but exam quality is not 
adequate for confident localization. CXR 5/4 COMPARISON: Several prior exams, most recently May 3, 2020 TECHNIQUE: Portable frontal view of the chest 
FINDINGS: 
SUPPORT DEVICES: Endotracheal tube and right IJ approach central venous catheter 
both project in stable position. HEART AND MEDIASTINUM: Stable enlarged cardiac silhouette and mediastinal 
contours. LUNGS AND PLEURAL SPACES: Hazy lower lung opacities noted with mid to lower lung 
predominance, somewhat increased on the left from prior exam.. No evidence of 
pneumothorax or large pleural effusion. As previously, limited visualization due 
to technique and body habitus. BONY THORAX AND SOFT TISSUES: Unremarkable. IMPRESSION: 
1. Stable/adequate position of support devices. 2. Little interval change in the appearance of right mid to lower lung airspace 
disease/atelectasis and likely left basilar atelectasis. US LEs 4/22/2020 Interpretation Summary · No evidence of deep vein thrombosis in the right lower extremity veins assessed. · No evidence of deep vein thrombosis in the left lower extremity veins assessed. Echo 4/22 Result status: Edited Result - FINAL Addendum by Gwen Alvarez MD on Wed Apr 22, 2020  5:12 PM  
· Left Ventricle: Normal wall thickness and systolic function (ejection fraction normal). Mildly dilated left ventricle. The estimated ejection fraction is 45 - 50%. There is moderate (grade 2) left ventricular diastolic dysfunction E/e' Ratio = 17.09. Wall Scoring: The left ventricular wall motion is globally hypokinetic. · Right Ventricle: Not well visualized. Normal global systolic function. Moderately dilated right ventricle. · Right Atrium: Right atrium not well visualized. Moderately dilated right atrium. · IVC/Hepatic Veins: Dilated inferior vena cava. Mechanically ventilated; cannot use inferior caval vein diameter to estimate central venous pressure. IMPRESSION:  
Patient Active Problem List  
Diagnosis Code  Acute on chronic respiratory failure with hypoxia and hypercapnia ([facility]) J96.21, J96.22  
 Acute on chronic systolic and diastolic CHF (congestive heart failure) ([facility]) I50.43 Aspiration pneumonitis from multiple patient initiated ET tube displacement events J69.0  Cardiac arrest ([facility] [address] I46.9 MSSA in sputum culture A49.01  
 Elevated troponin, NSTEMI R79.89, I21.4  Chronic a-fib I48.20  Atrial flutter ([address] I48.92 Prolonged QTc R94.31  
 Chronic renal insufficiency, stage II (mild) N18.2 Thrombocytopenia D69.6  Smoking F17.200  Arthritis M19.90  Migraine G43.909  Chronic back pain M54.9, G89.29  
  Obstructive Sleep apnea G47.33  
 Morbid obesity (HCC) E66.01  
  
PLAN:  
RS - DAY 13 on vent; continue mech vent; requiring peep 18 on VCV mode; switch to PCV mode with P1 15 and peep 13; good TV; fio2 at 100% currently; wean fio2 for o2 sats >91%. Recent concerns for some aspiration around ET tube with vomiting, and hx of self extubations VAP prevention bundle, head of the bed at 30' all times Patient being paralyzed due to risks of self-extubation; he is bradycardic with stable BP; hence will try to wean off nimbex and/or fentanyl; continue versed infusion; follow TOF while on nimbex Vent and sedation bundles Continue bronchodilators prn; Pulmozyme, nebs bid; pulmonary hygiene care Wean off solumedrol; decreased to 40 mg q12 Dr Sue Trinidad in ENT on case for possible trach when fio2 and peep requirements are down CVS - sinus natalia; stable BP; EF 45-50%; dilated RV on echo; US legs neg; patient cannot have CTA chest due to obesity limitations. Stable BP not needing pressors. On Bumex bid; cardiology Dr Oilva Chacko on case. ID - SARS-COV-2 negative x 2 Antibiotic choice: Zosyn and Vancomycin started on 5/2/20 with low grade fever; MSSA in resp cxs from before; resend resp cxs today from tracheal aspirates; CXR shows obese body habitus with limitations; no worsening pneumonia. Check procalcitonin - was 0.20 on 4.24. Hem - On Argatroban given thrombocytopenia; HIT panel and LEYDI Ab NEG on 4/28. He is definitely high risk for PEs; CTA chest cannot be done due to limitations due to weight; would stop argatroban; once INR <2.0, start Eliquis 5 mg bid. Hb and Plts stable now. No bleeding issues. Vasc - PVL bl LE negative Renal - Continue Bumex 1 mg twice a day; watch renal fn and UOP.  [facility] on case. D5W at 50/hr started for high Na. GI - GI to place OGT due to difficulty Neurology: sedated and paralyzed with limitations Prognosis guarded. Update family after icu rounds. Full code. Will defer respective systems problem management to primary and other consultant and follow patient in ICU with primary and other medical team 
Quality Care: PPI, DVT prophylaxis, HOB elevated, Infection control all reviewed and addressed. PAIN AND SEDATION: Fentanyl, Versed, Nimbex · Skin/Wound: chronic venous stasis changes · Nutrition: NPO for now · Prophylaxis: DVT and GI Prophylaxis reviewed. · Restraints: prn 
· PT/OT eval and treat: as needed when stable · Lines/Tubes: lines RT IJ 4/22/20; A-line 4/23/20; daniel 4/21/20, ET tube 4/25/20 ADVANCE DIRECTIVE: Full code DISCUSSION: spoke with RN, RT, ICU staff Events and notes from last 24 hours reviewed. Care plan discussed with nursing   
 
 
[x]Total critical care time exclusive of procedures 42 minutes with complex decision making performed Ho Beatty MD

## 2020-05-04 NOTE — PROGRESS NOTES
Patient Name: Mendel Bourdon. Age: 53 y.o. Sex:  male YOB: 1975 Medical Record Number: [id number] Antimicrobial  Vancomycin Indication Aspiration pneumonia Dose / Interval  
 
 
 
 
Current Antimicrobial Therapy (168h ago, onward) Ordered     Start Stop 05/03/20 2058  vancomycin (VANCOCIN) 1750 mg in  ml infusion  1,750 mg,   IntraVENous,   EVERY 12 HOURS    
 05/04/20 1400 --  
 05/03/20 1448  piperacillin-tazobactam (ZOSYN) 4.5 g in 0.9% sodium chloride (MBP/ADV) 100 mL MBP  4.5 g,   IntraVENous,   EVERY 8 HOURS    
 05/03/20 1900 -- Last Level (if applicable) Lab Results Component Value Date/Time Reported dose: VANCOMYCIN 1750 MG 05/03/2020 07:00 PM  
 Reported dose time: 9213 05/03/2020 07:00 PM  
 Reported dose date: [id number] 05/03/2020 07:00 PM  
 
Vancomycin Lab Results Component Value Date/Time Vancomycin,trough 37.1 (HH) 05/03/2020 07:00 PM  
  
Gentamicin No results found for: GENP, GENT, GENR] Tobramycin No results found for: TOBP, TOBT, TOBR Amikacin No results found for: Loren Ortiz, KELL, Sarah Pickle Cultures (7 most recent) GRAM STAIN Date Value Ref Range Status 04/22/2020 FEW WBCS SEEN   Final  
04/22/2020 OCCASIONAL EPITHELIAL CELLS SEEN   Final  
04/22/2020 FEW GRAM POSITIVE COCCI IN CLUSTERS   Final  
04/22/2020 RARE GRAM NEGATIVE RODS   Final  
 
Culture result:  
Date Value Ref Range Status 04/22/2020 LIGHT STAPHYLOCOCCUS AUREUS (A)   Final  
04/22/2020 LIGHT NORMAL RESPIRATORY BRYN   Final  
06/09/2017    Final  
 <10,000 COLONIES/mL MIXED GRAM POSITIVE BRYN, PROBABLE SKIN/GENITAL CONTAMINATION. 06/09/2017 NO GROWTH 6 DAYS   Final  
04/11/2016 NO GROWTH 6 DAYS   Final  
04/11/2016 NO GROWTH 6 DAYS   Final  
04/07/2016    Final  
 Toxigenic C. difficile NEGATIVE                         C. difficile target DNA sequences are not detected. Renal Overview (72 hr) Recent Labs 20 
0400 BUN 51* 53* 46* CREA 1.39* 1.33* 1.06 CrCl (Current): Estimated Creatinine Clearance: 123.8 mL/min (A) (based on SCr of 1.39 mg/dL (H)). Lactic Acid (Sepsis Criteria: >2.0 mmol/L) Lab Results Component Value Date/Time Lactic acid 1.4 2020 08:00 AM  
 Lactic acid 2.1 (HH) 2020 03:50 AM  
 Lactic acid 2.0 2020 12:00 AM  
  
Procalcitonin (0.10-0.49 NG/ML) Lab Results Component Value Date/Time Procalcitonin 0.20 2020 04:10 AM  
  
Hepatic Overview (72 hr) Recent Labs 20 
040 ALT 28 30 32 SGOT 23 18 20 AP 51 44* 39* Temp (24-hr Max) Temp (24hrs), Av.9 °F (37.2 °C), Min:97.7 °F (36.5 °C), Max:100.1 °F (37.8 °C) Hematology Recent Labs 20 
0400 WBC 9.3 7.7 8.4 HGB 12.6* 12.6* 12.4*  
HCT 42.8 43.1 41.9 * 118* 114* GRANS 93* 64 69 ANEU 8.6* 5.0 5.8  
 
  
DOT  3 Notes  Vancomycin trough 37.1 @ 1900 on 2020 (Note: 1200 noon dose given 1.5 hrs late trough drawn 0.5 hr early ~ 2 hr early trough results)   new vancomycin dosing regimen as follows:  
 
Estimated Pharmacokinetic Parameters (based on one level) Vd: 154 L (0.7 L/kg) Andrea: 0.043 hr-1 (T1/2 = 16.1 hrs) Dosing Recommendations Vancomycin dose: 2000 mg IV Q12hrs (infused over 2 hrs) Estimated peak: 29.3 mcg/mL Estimated trough: 20.2 mcg/mL Estimated AUC:KIERAN: 603 mcg*hr/mL (assumed KIERAN 1 mcg/mL) A/P:  
1. Recommend vancomycin 2000 mg IV Q12hrs (9 mg/kg) starting at 1400 on 2020 2. Consider a vancomycin trough level prior to the 4th dose. 3. Please monitor renal function (urine output, BUN/SCr). Dose adjustments may be necessary with a significant change in renal function. Isatu Hurst [facility] Clinical Pharmacist 
[phone]

## 2020-05-04 NOTE — PROGRESS NOTES
NUTRITION FOLLOW-UP 
 
RECOMMENDATIONS/PLAN:  
- EN: Recc restarting trickle tube feeds of Vital High Protein @10ml/hr for 24-48 hours; when clinically able per MD 
Monitor labs/lytes, tube feed tolerance, skin integrity, wt, fluid status, BM, diet adv NUTRITION ASSESSMENT:  
Client Update: 53 yrs old Male with resp failure w/ hypoxia and hypercapnia-intubated in ICU, COVID 19 ruled out, hypotension, acute on chronic systolic CHF, elevated trop, morbid obesity, pt self extubated w/ NSTEMI due to PEA arrest, chronic a-fib, elevated trop-cardiology following. Per MD note pt vomited over weekend; NG tube was removed and replaced; no tube feeds in 24 hours; would recc restarting with trickle feeds when clinically possible    
 
FOOD/NUTRITION INTAKE Diet Order:  NPO Food Allergies: NKFA/ Average PO Intake:     
No data found. Pertinent Medications:  [x] Reviewed; methylprednisolone, protonix, propofol, Electrolyte Replacement Protocol: [x]K [x]Mg [x]PO4 Insulin:  []SSI  []Pre-meal   []Basal    []Drip  []None Cultural/Sikhism Food Preferences: None Identified BIOCHEMICAL DATA & MEDICAL TESTS Pertinent Labs:  [x] Reviewed; Na-152, Mg-2.7  ANTHROPOMETRICS Height: 5' 10\" (177.8 cm)       Weight: (!) 220 kg (485 lb 0.2 oz) BMI: 69.6 kg/m^2 morbidly obese (Greater than or = to 40% BMI) Adm Weight: 485 lbs                Weight change: 0lbs Adjusted Body Weight: 124.2kg NUTRITION-FOCUSED PHYSICAL ASSESSMENT Skin: WDL     
GI: +BM 5/3/20 NUTRITION PRESCRIPTION Calories: 1886kcal/day based on 25kcal/kg of IBW Protein: 113- 151 g/day based on 1.5-2.0 g/kg of IBW 
CHO: 236 g/day based on 50% of total energy Fluid: 1886 ml/day based on 1 kcal/ml      
 
NUTRITION DIAGNOSES:  
1.  Inadequate oral intake related to intubation as evidence by need for nutrition support  
  
 
NUTRITION INTERVENTIONS:  
INTERVENTIONS:        GOALS: 
 1.  EN: Recc restarting trickle tube feeds of Vital High Protein @10ml/hr for 24-48 hours; when clinically able per MD 1. Restart tube feeds by next review 3 days LEARNING NEEDS (Diet, Supplementation, Food/Nutrient-Drug Interaction): 
 [] None Identified   [] Education provided/documented      Identified and patient: [] Declined   [x] Was not appropriate/indicated NUTRITION MONITORING /EVALUATION:  
Adjust EN/PN as appropriate Monitor wt Monitor renal labs, electrolytes, fluid status Previous Recommendations Implemented: yes Previous Goals Met:  N/A - 
   
[] Participated in Interdisciplinary Rounds   
[x] Interdisciplinary Care Plan Reviewed DISCHARGE NUTRITION RECOMMENDATIONS ADDRESSED:  
  [x] To be determined closer to discharge NUTRITION RISK:           [x] At risk                        [] Not currently at risk Will follow-up per policy. Vini Linder 1

## 2020-05-04 NOTE — PROGRESS NOTES
RESPIRATORY NOTE: 
Pt has been switched to Pressure Control earlier today, titrating FIO2 slowly, 80% FIO2 at this time.

## 2020-05-04 NOTE — PROGRESS NOTES
Cardiology Progress Note Patient: Rosi Ramos. Sex: male          DOA: 4/21/2020 YOB: 1975      Age:  53 y.o.        LOS:  LOS: 13 days Patient seen and examined, chart reviewed. Assessment/Plan Patient Active Problem List  
Diagnosis Code  Cellulitis of left lower leg L03. 116  
 Anemia D64.9  Morbid obesity ([facility]) E66.01  
 Dyspnea R06.00  Sleep apnea G47.30  Sinus tachycardia R00.0  BMI 70 and over, adult [facility]) H82.74  Chronic back pain M54.9, G89.29  
 Arthritis M19.90  Migraine G43.909  
 History of atrial flutter Z86.79  
 Limited mobility Z74.09  
 Smoking F17.200  Chronic renal insufficiency, stage II (mild) N18.2  CHF (congestive heart failure) ([facility]) I50.9  Acute diastolic CHF (congestive heart failure) ([facility]) I50.31   Atrial flutter ([facility]) I48.92  
 Elevated troponin R79.89  
 Acute respiratory distress R06.03  
 Acute CHF (congestive heart failure) ([facility]) I50.9  Respiratory failure with hypoxia and hypercapnia ([facility]) J96.91, J96.92  
 Acute on chronic respiratory failure with hypoxia and hypercapnia ([facility]) J96.21, J96.22  
 Cardiac arrest ([facility]) I46.9 Acute on chronic systolic heart failure NSTEMI type II due to PEA arrest 
Paroxysmal atrial flutter Thrombocytopenia Hypernatremia Bradycardia Patient self extubated and had PEA arrest. 
He had brief episode of A fib with RVR and was started On Amiodarone drip. Currently he is in Sinus rhythm. Amiodarone discontinued due to bradycardia. 
  
Echocardiogram  
  
· Left Ventricle: Normal wall thickness and systolic function (ejection fraction normal). Mildly dilated left ventricle. The estimated ejection fraction is 45 - 50%. There is moderate (grade 2) left ventricular diastolic dysfunction E/e' Ratio = 17.09. Wall Scoring: The left ventricular wall motion is globally hypokinetic. · Right Ventricle: Not well visualized. Normal global systolic function. Moderately dilated right ventricle. · Right Atrium: Right atrium not well visualized. Moderately dilated right atrium. · IVC/Hepatic Veins: Dilated inferior vena cava. Mechanically ventilated; cannot use inferior caval vein diameter to estimate central venous pressure. 
  
IV heparin was discontinued due to thrombocytopenia and bleeding at central line site. Started on Argatroban. Telemetry reviewed: Heart rate 40-50 bpm Sinus bradycardia Plan: 
 
Continue Aspirin Change IV Bumex 1 mg once a day Adjust Bumex as per volume status, I/O and Renal function Strict I/O Avoid any AV debbie blocking agents Monitor electrolytes and renal function Continue Ventilatory support. Continue management as per hospital medicine and pulmonary critical care Plan discussed with Patient's nurse. Subjective:  
 cc: 
Orally intubated REVIEW OF SYSTEMS:  
 
Can not obtain Objective:  
  
Visit Vitals /81 Pulse (!) 50 Temp 98.4 °F (36.9 °C) Resp 20 Ht 5' 10\" (1.778 m) Wt (!) 220 kg (485 lb 0.2 oz) SpO2 91% BMI 69.59 kg/m² Body mass index is 69.59 kg/m². Physical Exam: 
General Appearance: Comfortable, Morbidly obese, not using accessory muscles of respiration. Orally intubated HEENT: AMMON. HEAD: Atraumatic NECK: No JVD, no thyroidomeglay. CAROTIDS: No bruit LUNGS: bilateral conducted sounds HEART: S1+S2 audible, no murmur, no pericardial rub. NEUROLOGICAL: Sedated. Medication: 
Current Facility-Administered Medications Medication Dose Route Frequency  dextrose 5% infusion  50 mL/hr IntraVENous CONTINUOUS  
 methylPREDNISolone (PF) (SOLU-MEDROL) injection 40 mg  40 mg IntraVENous Q12H  
 apixaban (ELIQUIS) tablet 5 mg  5 mg Oral BID  dornase alpha (PULMOZYME)2.5mg/2.5ml nebulizer solution  2.5 mg Nebulization BID RT  
  piperacillin-tazobactam (ZOSYN) 4.5 g in 0.9% sodium chloride (MBP/ADV) 100 mL MBP  4.5 g IntraVENous Q8H  
 vancomycin (VANCOCIN) 1750 mg in  ml infusion  1,750 mg IntraVENous Q12H  Vancomycin- Pharmacokinetic Dosing  1 Each Other Rx Dosing/Monitoring  fentaNYL citrate (PF) injection 50 mcg  50 mcg IntraVENous Q3H PRN  
 bumetanide (BUMEX) injection 1 mg  1 mg IntraVENous BID  LORazepam (ATIVAN) injection 1 mg  1 mg IntraVENous Q2H PRN  chlorhexidine (PERIDEX) 0.12 % mouthwash 10 mL  10 mL Oral Q12H  
 albuterol-ipratropium (DUO-NEB) 2.5 MG-0.5 MG/3 ML  3 mL Nebulization Q6H PRN  
 acetaminophen (TYLENOL) tablet 650 mg  650 mg Oral Q6H PRN  
 cisatracurium (NIMBEX) 100 mg in 0.9% sodium chloride 100 mL infusion  0-10 mcg/kg/min IntraVENous TITRATE  ELECTROLYTE REPLACEMENT PROTOCOL - Calcium   1 Each Other PRN  
 ELECTROLYTE REPLACEMENT PROTOCOL - Magnesium   1 Each Other PRN  
 ELECTROLYTE REPLACEMENT PROTOCOL - Potassium Standard Dosing   1 Each Other PRN  
 ELECTROLYTE REPLACEMENT PROTOCOL - Phosphorus  Standard Dosing  1 Each Other PRN  pantoprazole (PROTONIX) 40 mg in 0.9% sodium chloride 10 mL injection  40 mg IntraVENous DAILY  fentaNYL (PF) 900 mcg/30 ml infusion soln  0-150 mcg/hr IntraVENous TITRATE  midazolam in normal saline (VERSED) 1 mg/mL infusion  0-10 mg/hr IntraVENous TITRATE  aspirin chewable tablet 81 mg  81 mg Oral DAILY  sodium chloride (NS) flush 5-40 mL  5-40 mL IntraVENous Q8H  
 sodium chloride (NS) flush 5-40 mL  5-40 mL IntraVENous PRN Facility-Administered Medications Ordered in Other Encounters Medication Dose Route Frequency  propofoL (DIPRIVAN) 10 mg/mL injection   IntraVENous PRN  
 ePHEDrine (PF) (MISTOLE) 10 mg/mL in NS syringe   IntraVENous PRN  
 PHENYLephrine (RUPERT-SYNEPHRINE) 100 mcg/mL in NS syringe   IntraVENous PRN Lab/Data Reviewed: 
 
  
Recent Labs 05/04/20 
0400 05/03/20 
0430 05/02/20 
8756 WBC 10.6 9.3 7.7 HGB 12.3* 12.6* 12.6* HCT 42.2 42.8 43.1  130* 118* Recent Labs 05/04/20 
0400 05/03/20 
0430 05/02/20 
0405 * 150* 148* K 4.2 4.7 4.3 * 113* 112* CO2 30 32 34* * 117* 90  
BUN 52* 51* 53* CREA 1.26 1.39* 1.33* CA 7.9* 8.0* 8.2*  
 
 
39 minutes of critical care time spent in the direct evaluation and treatment of this high risk patient. The reason for providing this level of medical care for this critically ill patient was due a critical illness that impaired one or more vital organ systems such that there was a high probability of imminent or life threatening deterioration in the patients condition. This care involved high complexity decision making to assess, manipulate, and support vital system functions, to treat this degree vital organ system failure and to prevent further life threatening deterioration of the patients condition. Signed By: Leora Johnson MD   
 May 4, 2020

## 2020-05-04 NOTE — PROGRESS NOTES
Pulm/CC I have called and discussed with patient's wife; discussed that patient is remaining on the ventilator; oxygen requirements are high; patient is currently not stable for weaning/breathing trials or tracheostomy; paralytic and sedation was temporarily discontinued today, and patient woke up. I answered questions to satisfaction of family.  
 
Ulisses Burgess MD

## 2020-05-04 NOTE — PROGRESS NOTES
Hospitalist Progress Note Patient: Staci Owens. MRN: [id number]  [facility]: [id number] YOB: 1975  Age: 53 y.o. Sex: male DOA: 4/21/2020 LOS:  LOS: 13 days Assessment/Plan Patient Active Problem List  
Diagnosis Code  Cellulitis of left lower leg L03. 116  
 Anemia D64.9  Morbid obesity ([facility]) E66.01  
 Dyspnea R06.00  Sleep apnea G47.30  Sinus tachycardia R00.0  BMI 70 and over, adult [facility]) L95.28  Chronic back pain M54.9, G89.29  
 Arthritis M19.90  Migraine G43.909  
 History of atrial flutter Z86.79  
 Limited mobility Z74.09  
 Smoking F17.200  Chronic renal insufficiency, stage II (mild) N18.2  CHF (congestive heart failure) ([facility]) I50.9  Acute diastolic CHF (congestive heart failure) ([facility]) I50.31  Chronic a-fib I48.20  Atrial flutter ([facility]) I48.92  
 Elevated troponin R79.89  
 Acute respiratory distress R06.03  
 Acute CHF (congestive heart failure) ([facility]) I50.9  Respiratory failure with hypoxia and hypercapnia ([facility]) J96.91, J96.92  
 Acute on chronic respiratory failure with hypoxia and hypercapnia ([facility]) J96.21, J96.22  
 Cardiac arrest ([facility]) I46.9  
  
 
 
 
52 y/o male with hx of morbid obesity, MARIA FERNANDA, chronic afib on anticoagulation, and systolic CHF (EF 11-39%) is admitted with respiratory failure with hypoxia and hypercapnia, he required to be intubated. 04/23/2020 Patient self extubated this morning and went into PEA arrest, he was re intubated, difficult intubation. He is awake and post resuscitation, placed on sedation. Requiring high PEEP. 
 
04/24/2020 Patient again tried to self extubate in spite of sedation. 04/25/2020 ET tube leak, ET tube exchanged 04/27/2020 Had bleeding around IJ site, heparin stopped, H/H stable Now started on argatroban CRITICAL CARE PLAN Resp - See vent orders, VAP bundle. HOB>30 degrees. Acute respiratory failure - with hypoxia and hypercapnia, follow ABGs, CXR. MARIA FERNANDA/OHS - On solumedrol ID - Low grade fever 05/02 
resp cultures with growth of MSSA ANTIBIOTICS vancomycin, zosyn. Seen by ID 
COVID 19 negative x 2 
 
[facility] - Monitor HD. Hypotension - resolved, off pressors Paroxysmal atrial flutter - monitor rate, was on amiodarone. Has been bradycardic, toprol xl held. Acute on chronic systolic CHF - On bumex Echo with EF of 45-50% Elevated troponin - NSTEMI type II due to PEA arrest 
Cardiology following. Heme/onc - Follow H&H, plts. Thrombocytopenia - improving, HIT neg. on argatroban, Renal - Trend BUN, Cr, follow I/O, daniel in place. Check and replace Mg, K, phos. Hypernatremia - free water flushes, improving Endocrine -  Follow FS[facility] Neuro/ Pain/ Sedation -  
nimbex. GI - NPO for now, OG tube, tube feeding. Morbid obesity Prophylaxis - DVT: Argatroban, GI: protonix [id number] 
35 minutes of critical care time spent in the direct evaluation and treatment of this high risk patient. The reason for providing this level of medical care for this critically ill patient was due a critical illness that impaired one or more vital organ systems such that there was a high probability of imminent or life threatening deterioration in the patients condition. This care involved high complexity decision making to assess, manipulate, and support vital system functions, to treat this degreee vital organ system failure and to prevent further life threatening deterioration of the patients condition. Disposition : TBD Physical Exam: 
General: As above   
HEENT: NC, Atraumatic. PERRLA, anicteric sclerae. Lungs: CTA Bilaterally. Distant breath sounds secondary to body habitus. Heart:  S1 S2, No murmur, No Rubs, No Gallops Abdomen: Soft, Non distended, obese, Non tender.  +Bowel sounds, Extremities: LE edema Vital signs/Intake and Output: 
Visit Vitals /69 Pulse (!) 39 Temp 99.6 °F (37.6 °C) Resp 20 Ht 5' 10\" (1.778 m) Wt (!) 220 kg (485 lb 0.2 oz) SpO2 90% BMI 69.59 kg/m² Current Shift:  05/04 0701 - 05/04 1900 In: -  
Out: 575 [Urine:575] Last three shifts:  05/02 1901 - 05/04 0700 In: -  
Out: 5450 [TFMLH:1500] Labs: Results:  
   
Chemistry Recent Labs 05/04/20 0400 05/03/20 
0430 05/02/20 
0405 * 117* 90 * 150* 148* K 4.2 4.7 4.3 * 113* 112* CO2 30 32 34* BUN 52* 51* 53* CREA 1.26 1.39* 1.33* CA 7.9* 8.0* 8.2* AGAP 5 5 2*  
BUCR 41* 37* 40* AP 47 51 44*  
TP 6.3* 6.4 6.2* ALB 2.6* 2.6* 2.7*  
GLOB 3.7 3.8 3.5 AGRAT 0.7* 0.7* 0.8 CBC w/Diff Recent Labs 05/04/20 0400 05/03/20 0430 05/02/20 
0405 WBC 10.6 9.3 7.7 RBC 4.64* 4.71 4.76 HGB 12.3* 12.6* 12.6* HCT 42.2 42.8 43.1  130* 118* GRANS 90* 93* 64  
LYMPH 5* 4* 18* EOS 0 0 2 Cardiac Enzymes No results for input(s): CPK, CKND1, CONSTANZA in the last 72 hours. No lab exists for component: Panda Deya Coagulation Recent Labs 05/04/20 0400 05/03/20 0430 PTP 50.8* 45.3* INR 5.6* 4.9* APTT 80.0* 77.0* Lipid Panel No results found for: CHOL, CHOLPOCT, CHOLX, CHLST, CHOLV, 154315, HDL, HDLP, LDL, LDLC, DLDLP, 910884, VLDLC, VLDL, TGLX, TRIGL, TRIGP, TGLPOCT, CHHD, CHHDX  
BNP No results for input(s): BNPP in the last 72 hours. Liver Enzymes Recent Labs 05/04/20 
0400 TP 6.3* ALB 2.6* AP 47 SGOT 20 Thyroid Studies Lab Results Component Value Date/Time TSH 1.45 04/27/2020 03:50 AM  
    
Procedures/imaging: see electronic medical records for all procedures/Xrays and details which were not copied into this note but were reviewed prior to creation of Plan

## 2020-05-04 NOTE — PROGRESS NOTES
0510- Bedside and Verbal shift change report given to Yury Ramos RN (oncoming nurse) by Atul Joyner RN (offgoing nurse). Report included the following information SBAR, Kardex, MAR and Recent Results. 1650- Verbal orders to hold patient's Elequis dose today. 1730- Bedside and Verbal shift change report given to Atul Joyner RN (oncoming nurse) by Yury Ramos RN (offgoing nurse). Report included the following information SBAR, Kardex, MAR and Recent Results.

## 2020-05-05 NOTE — PROGRESS NOTES
0800 Asssumed care of he patient. .. intubated and sedated. .. vital signs being monitored. . pt on oral anticoagulation without enteric tube a this time. Paged GI for consult with tube placement due to anatomical barriers. .. Advised by specialist will try to place tube later today. . Will coontiinue to monitor

## 2020-05-05 NOTE — PROCEDURES
Interventional Radiology Brief Procedure Note Interventional Radiologist: Dread Castaneda MD 
 
Pre-operative Diagnosis: Needs long term IV access Post-operative Diagnosis: Same as pre-operative diagnosis Procedure(s) Performed:  PICC line placment Anesthesia:  Local 
 
Findings:  Informed consent. Ultrasound used to demonstrate patent upper extremity vein and to assist with veinous access. Double lumen 5 Fr PICC line placement over a wire and positioned. PICC line is ready to use. Please see final dictated report for full details. Complications: No immediate Estimated Blood Loss:  Minimal 
 
Specimens: None Dread Castaneda MD 
[facility] Radiology 5/5/2020

## 2020-05-05 NOTE — PROGRESS NOTES
Hospitalist Progress Note Patient: Osiel Colbert MRN: [id number]  CSN: [id number] YOB: 1975  Age: 53 y.o. Sex: male DOA: 4/21/2020 LOS:  LOS: 14 days Assessment/Plan Patient Active Problem List  
Diagnosis Code  Cellulitis of left lower leg L03. 116  
 Anemia D64.9  Morbid obesity ([facility]) E66.01  
 Dyspnea R06.00  Sleep apnea G47.30  Sinus tachycardia R00.0  BMI 70 and over, adult [facility]) P93.40  Chronic back pain M54.9, G89.29  
 Arthritis M19.90  Migraine G43.909  
 History of atrial flutter Z86.79  
 Limited mobility Z74.09  
 Smoking F17.200  Chronic renal insufficiency, stage II (mild) N18.2  CHF (congestive heart failure) ([facility]) I50.9  Acute diastolic CHF (congestive heart failure) ([facility]) I50.31  Chronic a-fib I48.20  Atrial flutter ([facility]) I48.92  
 Elevated troponin R79.89  
 Acute respiratory distress R06.03  
 Acute CHF (congestive heart failure) ([facility]) I50.9  Respiratory failure with hypoxia and hypercapnia ([facility]) J96.91, J96.92  
 Acute on chronic respiratory failure with hypoxia and hypercapnia ([facility]) J96.21, J96.22  
 Cardiac arrest ([facility]) I46.9  
  
 
 
 
52 y/o male with hx of morbid obesity, MARIA FERNANDA, chronic afib on anticoagulation, and systolic CHF (EF 38-03%) is admitted with respiratory failure with hypoxia and hypercapnia, he required to be intubated. 04/23/2020 Patient self extubated this morning and went into PEA arrest, he was re intubated, difficult intubation. He is awake and post resuscitation, placed on sedation. Requiring high PEEP. 
 
04/24/2020 Patient again tried to self extubate in spite of sedation. 04/25/2020 ET tube leak, ET tube exchanged 04/27/2020 Had bleeding around IJ site, heparin stopped, H/H stable Now started on argatroban CRITICAL CARE PLAN Resp - See vent orders, VAP bundle. HOB>30 degrees. Acute respiratory failure - with hypoxia and hypercapnia, follow ABGs, CXR. MARIA FERNANDA/OHS - On solumedrol ID - Low grade fever 05/02 
resp cultures with growth of MSSA ANTIBIOTICS vancomycin, zosyn. Seen by ID 
COVID 19 negative x 2 
 
[facility] - Monitor HD. Hypotension - resolved, off pressors Paroxysmal atrial flutter - monitor rate, was on amiodarone. Has been bradycardic, toprol xl held. Acute on chronic systolic CHF - On bumex Echo with EF of 45-50% Elevated troponin - NSTEMI type II due to PEA arrest 
Cardiology following. Heme/onc - Follow H&H, plts. Thrombocytopenia - improving, HIT neg. on argatroban, Renal - Trend BUN, Cr, follow I/O, daniel in place. Check and replace Mg, K, phos. Hypernatremia - free water flushes, improving Endocrine -  Follow FS[facility] Neuro/ Pain/ Sedation - Off paralytic GI - NPO for now, OG tube, tube feeding. Morbid obesity Prophylaxis - DVT: Argatroban, GI: protonix. [id number] 
35 minutes of critical care time spent in the direct evaluation and treatment of this high risk patient. The reason for providing this level of medical care for this critically ill patient was due a critical illness that impaired one or more vital organ systems such that there was a high probability of imminent or life threatening deterioration in the patients condition. This care involved high complexity decision making to assess, manipulate, and support vital system functions, to treat this degreee vital organ system failure and to prevent further life threatening deterioration of the patients condition. Disposition : TBD Physical Exam: 
General: As above   
HEENT: NC, Atraumatic. PERRLA, anicteric sclerae. Lungs: CTA Bilaterally. Distant breath sounds secondary to body habitus. Heart:  S1 S2, No murmur, No Rubs, No Gallops Abdomen: Soft, Non distended, obese, Non tender.  +Bowel sounds, Extremities: LE edema Vital signs/Intake and Output: 
Visit Vitals /63 Pulse (!) 47 Temp 98.3 °F (36.8 °C) Resp 16  
 Ht 5' 10\" (1.778 m) Wt (!) 220 kg (485 lb 0.2 oz) SpO2 (!) 89% BMI 69.59 kg/m² Current Shift:  05/05 0701 - 05/05 1900 In: -  
Out: 1150 [HFJVV:0509] Last three shifts:  05/03 1901 - 05/05 0700 In: 1422.5 [I.V.:1422.5] Out: 0321 [KJERT:7571] Labs: Results:  
   
Chemistry Recent Labs 05/05/20 0445 05/04/20 0400 05/03/20 
0430 * 124* 117* * 152* 150*  
K 3.9 4.2 4.7 * 117* 113* CO2 30 30 32 BUN 50* 52* 51* CREA 1.10 1.26 1.39* CA 8.0* 7.9* 8.0* AGAP 4 5 5 BUCR 45* 41* 37* AP 43* 47 51  
TP 5.9* 6.3* 6.4 ALB 2.5* 2.6* 2.6*  
GLOB 3.4 3.7 3.8 AGRAT 0.7* 0.7* 0.7* CBC w/Diff Recent Labs 05/05/20 0445 05/04/20 0400 05/03/20 
0430 WBC 11.9 10.6 9.3  
RBC 4.54* 4.64* 4.71 HGB 12.1* 12.3* 12.6* HCT 40.8 42.2 42.8  154 130* GRANS 86* 90* 93* LYMPH 5* 5* 4*  
EOS 0 0 0 Cardiac Enzymes No results for input(s): CPK, CKND1, CONSTANZA in the last 72 hours. No lab exists for component: Panda Deya Coagulation Recent Labs 05/05/20 0445 05/04/20 
1620 PTP 22.6* 29.9* INR 2.0* 2.9* APTT 32.0 51.4* Lipid Panel No results found for: CHOL, CHOLPOCT, CHOLX, CHLST, CHOLV, 540482, HDL, HDLP, LDL, LDLC, DLDLP, 768882, VLDLC, VLDL, TGLX, TRIGL, TRIGP, TGLPOCT, CHHD, CHHDX  
BNP No results for input(s): BNPP in the last 72 hours. Liver Enzymes Recent Labs 05/05/20 
0445  
TP 5.9* ALB 2.5* AP 43* SGOT 35 Thyroid Studies Lab Results Component Value Date/Time TSH 1.45 04/27/2020 03:50 AM  
    
Procedures/imaging: see electronic medical records for all procedures/Xrays and details which were not copied into this note but were reviewed prior to creation of Plan

## 2020-05-05 NOTE — PROGRESS NOTES
Pulmonary, Critical Care, and Sleep Medicine Pulmonary Progress Note Name: Merilee Lesches. : 1975 MRN: [id number] Date: 2020 [x]I have reviewed the flowsheet and previous days notes. Events, vitals, medications and notes from last 24 hours reviewed. Subjective/History:  
27-year-old male with morbid obesity, ex-smoker, history of asthma, history of severe obstructive sleep apnea on CPAP, questionable component compliance, history of congestive heart failure with reduced ejection fraction atrial fibrillation atrial flutter on amiodarone, Xarelto, history of chronic renal insufficiency, cellulitis of lower extremities and anemia. As per family did not have any fever or upper respiratory tract infections and no flulike symptoms. On Lasix, CPAP not clear if missed. Presented with acute shortness of breath worsening edema of lower extremities. He was found to be in acute hypercarbic hypoxemic respiratory failure initially had a trial of BiPAP but failed and then eventually was intubated. He was hypotensive. Moderate amount of secretions. Morbid obesity. Patient compliant with Xarelto. Patient self extubated and had PEA arrest. 
 
Subjective:  
20 Patient intubated  for hypoxemic resp failure. Patient remains in icu; on vent support He is morbidly obese He is sedated; off paralytic PCV mode of ventilation; FiO2 80%; PEEP 14 No bleeding issues Afebrile; BP stable; sinus natalia 40-50/minstable UOP good; overall fluid balance neg Drips: fentanyl, versed, D5W 
 
ROS: Review of systems not obtained due to patient factors. Past Medical History: 
Past Medical History:  
Diagnosis Date  Arrhythmia  Arthritis   
 knees  CHF (congestive heart failure) ([facility] [address]  Chronic back pain  Chronic renal insufficiency, stage II (mild)  History of atrial flutter Dx 2016 - anticoagulated by Duane Crane  Hypertension   Limited mobility  Migraine headache  Morbid obesity ([facility] [address]  Morbid obesity with body mass index of 60.0-69.9 in adult [facility])  Sleep apnea   
 uses c-pap instructed to bring day of surgery  Smoking   
 very passive / cigars only Allergy: No Known Allergies Vital Signs:   
Blood pressure 135/68, pulse (!) 44, temperature 98.3 °F (36.8 °C), resp. rate 20, height 5' 10\" (1.778 m), weight (!) 220 kg (485 lb 0.2 oz), SpO2 94 %. Body mass index is 69.59 kg/m². O2 Device: Endotracheal tube O2 Flow Rate (L/min): 12 l/min Temp (24hrs), Av °F (36.7 °C), Min:97.5 °F (36.4 °C), Max:98.4 °F (36.9 °C) Intake/Output:  
Last shift:       07 - 1900 In: -  
Out: 1150 [[id number]] Last 3 shifts: 1901 -  07 In: 1422.5 [I.V.:1422.5] Out: 0261 [NIOQW:7527] Intake/Output Summary (Last 24 hours) at 2020 1303 Last data filed at 2020 1118 Gross per 24 hour Intake 1422.46 ml Output 3050 ml Net -1627.54 ml Ventilator Settings: 
Mode Rate Tidal Volume Pressure FiO2 PEEP Assist control, Pressure control   580 ml    80 % 14 cm H20 Peak airway pressure: 31 cm H2O Minute ventilation: 13.4 l/min Physical Exam: 
General: sedated, arousable, obese male; no cyanosis; on ventilator HEENT: LIBBY, orally intubated; no bleeding; pupils not dilated, reactive Neck: thick short obese neck; RT IJ site without active bleeding Chest: normal, obese chest wall Lungs: moderate air entry, decreased BS bases; no wheezing or crackles; symmetrical chest expansion; obese chest 
Heart: S1S2 present or without murmur or extra heart sounds; JVD not elevated Abdomen: obese, bowel sounds normoactive, soft without significant tenderness; no organomegaly; no guarding or rigidity Extremity: 2+, pitting and bl LE and 2+ bl UE edema; negative for cyanosis, clubbing Capillary refill: normal 
Neuro: sedated, arousable, exam limitations Skin: Skin color, texture, turgor fair. Skin dry, warm, non-diaphoretic DATA:  
Current Facility-Administered Medications Medication Dose Route Frequency  dextrose 5% infusion  50 mL/hr IntraVENous CONTINUOUS  
 methylPREDNISolone (PF) (SOLU-MEDROL) injection 40 mg  40 mg IntraVENous Q12H  
 apixaban (ELIQUIS) tablet 5 mg  5 mg Oral BID  bumetanide (BUMEX) injection 1 mg  1 mg IntraVENous DAILY  dornase alpha (PULMOZYME)2.5mg/2.5ml nebulizer solution  2.5 mg Nebulization BID RT  
 piperacillin-tazobactam (ZOSYN) 4.5 g in 0.9% sodium chloride (MBP/ADV) 100 mL MBP  4.5 g IntraVENous Q8H  
 vancomycin (VANCOCIN) 1750 mg in  ml infusion  1,750 mg IntraVENous Q12H  Vancomycin- Pharmacokinetic Dosing  1 Each Other Rx Dosing/Monitoring  fentaNYL citrate (PF) injection 50 mcg  50 mcg IntraVENous Q3H PRN  
 LORazepam (ATIVAN) injection 1 mg  1 mg IntraVENous Q2H PRN  chlorhexidine (PERIDEX) 0.12 % mouthwash 10 mL  10 mL Oral Q12H  
 albuterol-ipratropium (DUO-NEB) 2.5 MG-0.5 MG/3 ML  3 mL Nebulization Q6H PRN  
 acetaminophen (TYLENOL) tablet 650 mg  650 mg Oral Q6H PRN  
 cisatracurium (NIMBEX) 100 mg in 0.9% sodium chloride 100 mL infusion  0-10 mcg/kg/min IntraVENous TITRATE  ELECTROLYTE REPLACEMENT PROTOCOL - Calcium   1 Each Other PRN  
 ELECTROLYTE REPLACEMENT PROTOCOL - Magnesium   1 Each Other PRN  
 ELECTROLYTE REPLACEMENT PROTOCOL - Potassium Standard Dosing   1 Each Other PRN  
 ELECTROLYTE REPLACEMENT PROTOCOL - Phosphorus  Standard Dosing  1 Each Other PRN  pantoprazole (PROTONIX) 40 mg in 0.9% sodium chloride 10 mL injection  40 mg IntraVENous DAILY  fentaNYL (PF) 900 mcg/30 ml infusion soln  0-150 mcg/hr IntraVENous TITRATE  midazolam in normal saline (VERSED) 1 mg/mL infusion  0-10 mg/hr IntraVENous TITRATE  aspirin chewable tablet 81 mg  81 mg Oral DAILY  sodium chloride (NS) flush 5-40 mL  5-40 mL IntraVENous Q8H  
  sodium chloride (NS) flush 5-40 mL  5-40 mL IntraVENous PRN Facility-Administered Medications Ordered in Other Encounters Medication Dose Route Frequency  propofoL (DIPRIVAN) 10 mg/mL injection   IntraVENous PRN  
 ePHEDrine (PF) (MISTOLE) 10 mg/mL in NS syringe   IntraVENous PRN  
 PHENYLephrine (RUPERT-SYNEPHRINE) 100 mcg/mL in NS syringe   IntraVENous PRN Telemetry: [x]Sinus []A-flutter []Paced []A-fib []Multiple PVCs  
              
4/22/20 ECHO · Left Ventricle: Normal wall thickness and systolic function (ejection fraction normal). Mildly dilated left ventricle. The estimated ejection fraction is 45 - 50%. There is moderate (grade 2) left ventricular diastolic dysfunction E/e' Ratio = 17.09. Wall Scoring: The left ventricular wall motion is globally hypokinetic. · Right Ventricle: Not well visualized. Normal global systolic function. Moderately dilated right ventricle. · Right Atrium: Right atrium not well visualized. Moderately dilated right atrium. · IVC/Hepatic Veins: Dilated inferior vena cava. Mechanically ventilated; cannot use inferior caval vein diameter to estimate central venous pressure. Labs: 
Recent Results (from the past 24 hour(s)) PROTHROMBIN TIME + INR Collection Time: 05/04/20  4:20 PM  
Result Value Ref Range Prothrombin time 29.9 (H) 11.5 - 15.2 sec INR 2.9 (H) 0.8 - 1.2 PTT Collection Time: 05/04/20  4:20 PM  
Result Value Ref Range aPTT 51.4 (H) 23.0 - 36.4 SEC PROCALCITONIN Collection Time: 05/04/20  4:20 PM  
Result Value Ref Range Procalcitonin <0.05 ng/mL GLUCOSE, POC Collection Time: 05/04/20 10:59 PM  
Result Value Ref Range Glucose (POC) 102 70 - 110 mg/dL METABOLIC PANEL, COMPREHENSIVE Collection Time: 05/05/20  4:45 AM  
Result Value Ref Range Sodium 153 (H) 136 - 145 mmol/L Potassium 3.9 3.5 - 5.5 mmol/L Chloride 119 (H) 100 - 111 mmol/L  
 CO2 30 21 - 32 mmol/L  Anion gap 4 3.0 - 18 mmol/L  
 Glucose 112 (H) 74 - 99 mg/dL BUN 50 (H) 7.0 - 18 MG/DL Creatinine 1.10 0.6 - 1.3 MG/DL  
 BUN/Creatinine ratio 45 (H) 12 - 20 GFR est AA >60 >60 ml/min/1.73m2 GFR est non-AA >60 >60 ml/min/1.73m2 Calcium 8.0 (L) 8.5 - 10.1 MG/DL Bilirubin, total 1.0 0.2 - 1.0 MG/DL  
 ALT (SGPT) 28 16 - 61 U/L  
 AST (SGOT) 35 10 - 38 U/L Alk. phosphatase 43 (L) 45 - 117 U/L Protein, total 5.9 (L) 6.4 - 8.2 g/dL Albumin 2.5 (L) 3.4 - 5.0 g/dL Globulin 3.4 2.0 - 4.0 g/dL A-G Ratio 0.7 (L) 0.8 - 1.7    
CBC WITH AUTOMATED DIFF Collection Time: 05/05/20  4:45 AM  
Result Value Ref Range WBC 11.9 4.6 - 13.2 K/uL  
 RBC 4.54 (L) 4.70 - 5.50 M/uL  
 HGB 12.1 (L) 13.0 - 16.0 g/dL HCT 40.8 36.0 - 48.0 % MCV 89.9 74.0 - 97.0 FL  
 MCH 26.7 24.0 - 34.0 PG  
 MCHC 29.7 (L) 31.0 - 37.0 g/dL  
 RDW 15.4 (H) 11.6 - 14.5 % PLATELET 645 321 - 339 K/uL MPV 10.8 9.2 - 11.8 FL  
 NEUTROPHILS 86 (H) 40 - 73 % LYMPHOCYTES 5 (L) 21 - 52 % MONOCYTES 9 3 - 10 % EOSINOPHILS 0 0 - 5 % BASOPHILS 0 0 - 2 %  
 ABS. NEUTROPHILS 10.2 (H) 1.8 - 8.0 K/UL  
 ABS. LYMPHOCYTES 0.6 (L) 0.9 - 3.6 K/UL  
 ABS. MONOCYTES 1.0 0.05 - 1.2 K/UL  
 ABS. EOSINOPHILS 0.0 0.0 - 0.4 K/UL  
 ABS. BASOPHILS 0.0 0.0 - 0.1 K/UL  
 DF AUTOMATED MAGNESIUM Collection Time: 05/05/20  4:45 AM  
Result Value Ref Range Magnesium 2.8 (H) 1.6 - 2.6 mg/dL PHOSPHORUS Collection Time: 05/05/20  4:45 AM  
Result Value Ref Range Phosphorus 2.0 (L) 2.5 - 4.9 MG/DL  
CALCIUM, IONIZED Collection Time: 05/05/20  4:45 AM  
Result Value Ref Range Ionized Calcium 1.17 1.12 - 1.32 MMOL/L  
PTT Collection Time: 05/05/20  4:45 AM  
Result Value Ref Range aPTT 32.0 23.0 - 36.4 SEC PROTHROMBIN TIME + INR Collection Time: 05/05/20  4:45 AM  
Result Value Ref Range Prothrombin time 22.6 (H) 11.5 - 15.2 sec INR 2.0 (H) 0.8 - 1.2 GLUCOSE, POC  Collection Time: 05/05/20  4:59 AM  
 Result Value Ref Range Glucose (POC) 98 70 - 110 mg/dL POC G3 Collection Time: 05/05/20  5:19 AM  
Result Value Ref Range Device: VENT    
 FIO2 (POC) 0.80 % pH (POC) 7.463 (H) 7.35 - 7.45    
 pCO2 (POC) 40.8 35.0 - 45.0 MMHG  
 pO2 (POC) 66 (L) 80 - 100 MMHG  
 HCO3 (POC) 29.3 (H) 22 - 26 MMOL/L  
 sO2 (POC) 94 92 - 97 % Base excess (POC) 5 mmol/L Mode ASSIST CONTROL Set Rate 20 bpm  
 PEEP/CPAP (POC) 14 cmH2O  
 PIP (POC) 29 Allens test (POC) N/A Inspiratory Time 0.90 sec Total resp. rate 20 Site DRAWN FROM ARTERIAL LINE Patient temp. 97.7 Specimen type (POC) ARTERIAL Performed by Niki Dorantes Pressure control YES    
GLUCOSE, POC Collection Time: 05/05/20 11:11 AM  
Result Value Ref Range Glucose (POC) 87 70 - 110 mg/dL Recent Labs 05/05/20 
1303 05/05/20 
0519 05/04/20 
1011 FIO2I 70 0.80 100 HCO3I 31.0* 29.3* 29.9*  
PCO2I 50.1* 40.8 50.7* PHI 7.399 7.463* 7.378 PO2I 58* 66* 66* All Micro Results Procedure Component Value Units Date/Time CULTURE, RESPIRATORY/SPUTUM/BRONCH Dariana Geo STAIN [[id number]] Order Status:  Sent Specimen:  Sputum from Tracheal Aspirate CULTURE, RESPIRATORY/SPUTUM/BRONCH Dariana Geo STAIN [[id number]]  (Abnormal)  (Susceptibility) Collected:  04/22/20 1018 Order Status:  Completed Specimen:  Sputum from Tracheal Aspirate Updated:  04/25/20 2316 Special Requests: NO SPECIAL REQUESTS     
  GRAM STAIN FEW WBCS SEEN     
      
  OCCASIONAL EPITHELIAL CELLS SEEN  
     
      
  FEW GRAM POSITIVE COCCI IN CLUSTERS  
     
   RARE GRAM NEGATIVE RODS Culture result:    
  LIGHT STAPHYLOCOCCUS AUREUS  
     
      
  LIGHT NORMAL RESPIRATORY BRYN  
     
 CULTURE, BLOOD [[id number]] Collected:  04/24/20 1015 Order Status:  Canceled Specimen:  Blood Imaging: 
[x]I have personally reviewed the patients chest radiographs images and report AXR 5/2 
 [facility] XR ABD (KUB) Narrative EXAM: SUPINE ABDOMINAL RADIOGRAPH 
 
CLINICAL INDICATION/HISTORY: ng placement 
-Additional: None COMPARISON: None TECHNIQUE: Single supine view of the abdomen. 
 
_______________ FINDINGS: 
 
BOWEL GAS PATTERN: Outside the field of view. BONES: Unremarkable. OTHER: Subtle linear density likely corresponding with reported nasogastric 
tube, possible tip seen in the inferior thorax distal esophagus region. Endotracheal tube noted which is above the miguel ángel. 
 
_______________ Impression IMPRESSION: 
 
Nondiagnostic exam for purposes of nasogastric tube positioning. It is suspected 
that the tip of the tube is in the distal esophagus, but exam quality is not 
adequate for confident localization. CXR 5/5 COMPARISON: 5/4/2020 TECHNIQUE: Portable frontal view of the chest 
FINDINGS: 
SUPPORT DEVICES: Endotracheal tube and right IJV central venous catheter 
unchanged. HEART AND MEDIASTINUM: Cardiomegaly. LUNGS AND PLEURAL SPACES: Limited secondary to body habitus. Right mid to lower 
lung zone opacification, unchanged. . Probable right pleural effusion. No large 
pneumothorax. IMPRESSION: 
Right mid to lower lungs on opacification, unchanged. . Probable right pleural 
effusion. US LEs 4/22/2020 Interpretation Summary · No evidence of deep vein thrombosis in the right lower extremity veins assessed. · No evidence of deep vein thrombosis in the left lower extremity veins assessed. Echo 4/22 Result status: Edited Result - FINAL Addendum by Donald De Guzman MD on Wed Apr 22, 2020  5:12 PM  
· Left Ventricle: Normal wall thickness and systolic function (ejection fraction normal). Mildly dilated left ventricle. The estimated ejection fraction is 45 - 50%. There is moderate (grade 2) left ventricular diastolic dysfunction E/e' Ratio = 17.09. Wall Scoring:  The left ventricular wall motion is globally hypokinetic. · Right Ventricle: Not well visualized. Normal global systolic function. Moderately dilated right ventricle. · Right Atrium: Right atrium not well visualized. Moderately dilated right atrium. · IVC/Hepatic Veins: Dilated inferior vena cava. Mechanically ventilated; cannot use inferior caval vein diameter to estimate central venous pressure. IMPRESSION:  
Patient Active Problem List  
Diagnosis Code  Acute on chronic respiratory failure with hypoxia and hypercapnia ([facility]) J96.21, J96.22  
 Acute on chronic systolic and diastolic CHF (congestive heart failure) ([facility]) I50.43 Aspiration pneumonitis from multiple patient initiated ET tube displacement events J69.0  Cardiac arrest ([facility] [address] I46.9 MSSA in sputum culture A49.01  
 Elevated troponin, NSTEMI R79.89, I21.4  Chronic a-fib I48.20  Atrial flutter ([facility] [address] I48.92 Prolonged QTc R94.31  
 Chronic renal insufficiency, stage II (mild) N18.2 Thrombocytopenia D69.6  Smoking F17.200  Arthritis M19.90  Migraine G43.909  Chronic back pain M54.9, G89.29  
 Obstructive Sleep apnea G47.33  
 Morbid obesity ([facility]) E66.01  
  
PLAN:  
RS - DAY 14 on vent; continue mech vent; PCV mode with P1 15 and peep decreased to 10; FiO2 decreased to 70%; ABG repeatedPaO2 58; tolerate PaO2 greater than 55, O2 sats greater than 88%; PCO2 50patient has baseline sleep apnea and obesity hypoventilation; wean fio2 for o2 sats >91%. Recent concerns for some aspiration around ET tube with vomiting, and hx of self extubations VAP prevention bundle, head of the bed at 30' all times Patient is off paralytic medication since 5/4/2020; sedation Versed and fentanylwean as tolerated; staff aware of prior self extubation; patient has bilateral soft wrist restraints. Vent and sedation bundles Continue bronchodilators prn; pulmonary hygiene care; chest x-ray limited by obesity; suspected right lower lobe consolidation/effusionremains stable; procalcitonin lowhence goes against bacterial pneumonia. Patient not a candidate for chest CT evaluation due to obesity limitations. Wean off solumedrol; decreased to 20 mg q12 Dr Joelle Sadler in ENT on case for possible trach when fio2 and peep requirements are down CVS - sinus natalia; stable BP; Dr. Marcus David on the case EF 45-50%; dilated RV on echo; US legs neg; patient cannot have CTA chest due to obesity limitations. Stable BP not needing pressors. On Bumex daily now. ID - SARS-COV-2 negative x 2 Antibiotic choice: Zosyn and Vancomycin started on 5/2/20 with low grade fever; MSSA in resp cxs from before; follow respiratory cultures from 5/4; procalcitonin less than 0.05; would stop IV vancomycin. Hem - On Argatroban given thrombocytopenia; HIT panel and LEYDI Ab NEG on 4/28. He is definitely high risk for PEs; CTA chest cannot be done due to limitations due to weight; stopped argatroban yesterday; INR 2.0 this a.m.; okay to start Eliquis 5 mg bid from evening. Hb and Plts stable now. No bleeding issues. Vasc - PVL bl LE negative Renal - Continue Bumex 1 mg daily; sodium remains elevated153; increased D5W at 75 cc an hour; watch renal fn and UOP. Dr Moy Freeman on case. GI - GI to place OGT due to difficulty Neurology: sedated with limitations; plan for sedation vacation once FiO2 and PEEP requirements have improved more. Prognosis guarded. Update family after icu rounds. Full code. Will defer respective systems problem management to primary and other consultant and follow patient in ICU with primary and other medical team 
Quality Care: PPI, DVT prophylaxis, HOB elevated, Infection control all reviewed and addressed. PAIN AND SEDATION: Fentanyl, Versed, Nimbex · Skin/Wound: chronic venous stasis changes · Nutrition: NPO for now · Prophylaxis: DVT and GI Prophylaxis reviewed.   
· Restraints: prn 
 · PT/OT eval and treat: as needed when stable · Lines/Tubes: lines RT IJ 4/22/20; A-line 4/23/20; daniel 4/21/20, ET tube 4/25/20 · Consulted IR for picc line placement ADVANCE DIRECTIVE: Full code DISCUSSION: spoke with RN, RT, ICU staff Events and notes from last 24 hours reviewed. Care plan discussed with nursing   
 
 
[x]Total critical care time exclusive of procedures 40 minutes with complex decision making performed Ana Rosa Brown MD

## 2020-05-05 NOTE — PROGRESS NOTES
This patient was admitted on April 21 for respiratory failure. He has been on the Vent since except for a brief extubation. A naso gastric tube was inserted by my self about 10 days ago. It was pulled out accidentally. I have been asked to place another one because it was difficult. The patient is on the vent but alert and understand command. I explained to him what I was going to do and why. Heddy  I used this time the left nostril. I introduced a Shankar tube # 14 Fr and advanced without difficulty or discomfort from the patient side and advance inside the stomach. It was confirmed very loud by the injection of air with the syringe . I asked the nurse to fix the tube to the tube and start tube feeding

## 2020-05-05 NOTE — PROGRESS NOTES
Chart reviewed pt remains on vent in ICU, GI consulted for peg tube placemen, cm will cont to review case for d/c planning. t

## 2020-05-06 NOTE — PROGRESS NOTES
0700  Bedside, Verbal and Written shift change report given to JAS Soto (oncoming nurse) by BETTYE Yu RN (offgoing nurse). Report included the following information SBAR, Kardex, Intake/Output and Recent Results  
 
0900  Dr. Urban Garner at bedside assessing pt. Orders received. 1200  Pt status unchanged. VSS  
1600  No changed. Family at bedside. 1830  Tube feeding started per order. Vital High P at 20cc/hr and Free water 200 Q4 hrs. 1915  Bedside, Verbal and Written shift change report given to BETTYE Yu RN (oncoming nurse) by JAS Soto (offgoing nurse). Report included the following information SBAR, Kardex, Intake/Output and Recent Results.

## 2020-05-06 NOTE — PROGRESS NOTES
Pulmonary, Critical Care, and Sleep Medicine Pulmonary Progress Note Name: Vero Green. : 1975 MRN: [id number] Date: 2020 [x]I have reviewed the flowsheet and previous days notes. Events, vitals, medications and notes from last 24 hours reviewed. Subjective/History:  
70-year-old male with morbid obesity, ex-smoker, history of asthma, history of severe obstructive sleep apnea on CPAP, questionable component compliance, history of congestive heart failure with reduced ejection fraction atrial fibrillation atrial flutter on amiodarone, Xarelto, history of chronic renal insufficiency, cellulitis of lower extremities and anemia. As per family did not have any fever or upper respiratory tract infections and no flulike symptoms. On Lasix, CPAP not clear if missed. Presented with acute shortness of breath worsening edema of lower extremities. He was found to be in acute hypercarbic hypoxemic respiratory failure initially had a trial of BiPAP but failed and then eventually was intubated. He was hypotensive. Moderate amount of secretions. Morbid obesity. Patient compliant with Xarelto. Patient self extubated and had PEA arrest. 
 
Subjective:  
20 Patient intubated  for hypoxemic resp failure. Patient remains in icu; on vent support He is morbidly obese PCV mode of ventilation; FiO2 increased overnight to 90%; PEEP 15- for desats. No bleeding issues Afebrile; BP stable; sinus rhythm UOP good; overall fluid balance neg Drips: fentanyl, versed, D5W 
 
ROS: Review of systems not obtained due to patient factors. Past Medical History: 
Past Medical History:  
Diagnosis Date  Arrhythmia  Arthritis   
 knees  CHF (congestive heart failure) ([facility] [address]  Chronic back pain  Chronic renal insufficiency, stage II (mild)  History of atrial flutter Dx 2016 - anticoagulated by Kiah Arroyo  Hypertension   Limited mobility  Migraine headache  Morbid obesity ([facility] [address]  Morbid obesity with body mass index of 60.0-69.9 in adult [facility])  Sleep apnea   
 uses c-pap instructed to bring day of surgery  Smoking   
 very passive / cigars only Allergy: No Known Allergies Vital Signs:   
Blood pressure 116/54, pulse (!) 52, temperature 98.7 °F (37.1 °C), resp. rate 17, height 5' 10\" (1.778 m), weight (!) 220 kg (485 lb 0.2 oz), SpO2 (!) 88 %. Body mass index is 69.59 kg/m². O2 Device: Endotracheal tube O2 Flow Rate (L/min): 12 l/min Temp (24hrs), Av.3 °F (36.8 °C), Min:97.2 °F (36.2 °C), Max:98.7 °F (37.1 °C) Intake/Output:  
Last shift:       07 -  1900 In: 100 [I.V.:100] Out: 2450 [Urine:2450] Last 3 shifts:  190 -  0700 In: 2364.5 [I.V.:2364.5] Out: 3900 [QZPEI:2470] Intake/Output Summary (Last 24 hours) at 2020 1451 Last data filed at 2020 1246 Gross per 24 hour Intake 100 ml Output 3850 ml Net -3750 ml Ventilator Settings: 
Mode Rate Tidal Volume Pressure FiO2 PEEP Pressure control, Assist control   580 ml    75 % 10 cm H20 Peak airway pressure: 30 cm H2O Minute ventilation: 11.8 l/min Physical Exam: 
General: sedated, arousable, obese male; no cyanosis; on ventilator HEENT: LIBBY, orally intubated; no bleeding; pupils not dilated, reactive Neck: thick short obese neck; RT IJ site without active bleeding Chest: normal, obese chest wall Lungs: moderate air entry, decreased BS bases; no wheezing or crackles; symmetrical chest expansion; obese chest 
Heart: S1S2 present or without murmur or extra heart sounds; JVD not elevated Abdomen: obese, bowel sounds normoactive, soft without significant tenderness; no organomegaly; no guarding or rigidity Extremity: 2+, pitting and bl LE and 2+ bl UE edema; negative for cyanosis, clubbing Capillary refill: normal 
Neuro: sedated, arousable, exam limitations Skin: Skin color, texture, turgor fair. Skin dry, warm, non-diaphoretic DATA:  
Current Facility-Administered Medications Medication Dose Route Frequency  methylPREDNISolone (PF) (SOLU-MEDROL) injection 20 mg  20 mg IntraVENous Q12H  
 heparin (porcine) 100 unit/mL injection 500 Units  500 Units InterCATHeter Q8H PRN  
 dextrose 5% infusion  75 mL/hr IntraVENous CONTINUOUS  
 apixaban (ELIQUIS) tablet 5 mg  5 mg Oral BID  bumetanide (BUMEX) injection 1 mg  1 mg IntraVENous DAILY  piperacillin-tazobactam (ZOSYN) 4.5 g in 0.9% sodium chloride (MBP/ADV) 100 mL MBP  4.5 g IntraVENous Q8H  
 fentaNYL citrate (PF) injection 50 mcg  50 mcg IntraVENous Q3H PRN  
 LORazepam (ATIVAN) injection 1 mg  1 mg IntraVENous Q2H PRN  chlorhexidine (PERIDEX) 0.12 % mouthwash 10 mL  10 mL Oral Q12H  
 albuterol-ipratropium (DUO-NEB) 2.5 MG-0.5 MG/3 ML  3 mL Nebulization Q6H PRN  
 acetaminophen (TYLENOL) tablet 650 mg  650 mg Oral Q6H PRN  
 ELECTROLYTE REPLACEMENT PROTOCOL - Calcium   1 Each Other PRN  
 ELECTROLYTE REPLACEMENT PROTOCOL - Magnesium   1 Each Other PRN  
 ELECTROLYTE REPLACEMENT PROTOCOL - Potassium Standard Dosing   1 Each Other PRN  
 ELECTROLYTE REPLACEMENT PROTOCOL - Phosphorus  Standard Dosing  1 Each Other PRN  pantoprazole (PROTONIX) 40 mg in 0.9% sodium chloride 10 mL injection  40 mg IntraVENous DAILY  fentaNYL (PF) 900 mcg/30 ml infusion soln  0-150 mcg/hr IntraVENous TITRATE  midazolam in normal saline (VERSED) 1 mg/mL infusion  0-10 mg/hr IntraVENous TITRATE  aspirin chewable tablet 81 mg  81 mg Oral DAILY  sodium chloride (NS) flush 5-40 mL  5-40 mL IntraVENous Q8H  
 sodium chloride (NS) flush 5-40 mL  5-40 mL IntraVENous PRN Facility-Administered Medications Ordered in Other Encounters Medication Dose Route Frequency  propofoL (DIPRIVAN) 10 mg/mL injection   IntraVENous PRN  
 ePHEDrine (PF) (MISTOLE) 10 mg/mL in NS syringe   IntraVENous PRN  
  PHENYLephrine (RUPERT-SYNEPHRINE) 100 mcg/mL in NS syringe   IntraVENous PRN Telemetry: [x]Sinus []A-flutter []Paced []A-fib []Multiple PVCs  
              
4/22/20 ECHO · Left Ventricle: Normal wall thickness and systolic function (ejection fraction normal). Mildly dilated left ventricle. The estimated ejection fraction is 45 - 50%. There is moderate (grade 2) left ventricular diastolic dysfunction E/e' Ratio = 17.09. Wall Scoring: The left ventricular wall motion is globally hypokinetic. · Right Ventricle: Not well visualized. Normal global systolic function. Moderately dilated right ventricle. · Right Atrium: Right atrium not well visualized. Moderately dilated right atrium. · IVC/Hepatic Veins: Dilated inferior vena cava. Mechanically ventilated; cannot use inferior caval vein diameter to estimate central venous pressure. Labs: 
Recent Results (from the past 24 hour(s)) GLUCOSE, POC Collection Time: 05/05/20  5:31 PM  
Result Value Ref Range Glucose (POC) 99 70 - 110 mg/dL GLUCOSE, POC Collection Time: 05/05/20 11:10 PM  
Result Value Ref Range Glucose (POC) 93 70 - 110 mg/dL METABOLIC PANEL, COMPREHENSIVE Collection Time: 05/06/20  4:55 AM  
Result Value Ref Range Sodium 152 (H) 136 - 145 mmol/L Potassium 4.3 3.5 - 5.5 mmol/L Chloride 119 (H) 100 - 111 mmol/L  
 CO2 30 21 - 32 mmol/L Anion gap 3 3.0 - 18 mmol/L Glucose 110 (H) 74 - 99 mg/dL BUN 50 (H) 7.0 - 18 MG/DL Creatinine 1.28 0.6 - 1.3 MG/DL  
 BUN/Creatinine ratio 39 (H) 12 - 20 GFR est AA >60 >60 ml/min/1.73m2 GFR est non-AA >60 >60 ml/min/1.73m2 Calcium 8.2 (L) 8.5 - 10.1 MG/DL Bilirubin, total 1.0 0.2 - 1.0 MG/DL  
 ALT (SGPT) 33 16 - 61 U/L  
 AST (SGOT) 43 (H) 10 - 38 U/L Alk. phosphatase 50 45 - 117 U/L Protein, total 6.3 (L) 6.4 - 8.2 g/dL Albumin 2.6 (L) 3.4 - 5.0 g/dL Globulin 3.7 2.0 - 4.0 g/dL  A-G Ratio 0.7 (L) 0.8 - 1.7    
CBC WITH AUTOMATED DIFF  
 Collection Time: 05/06/20  4:55 AM  
Result Value Ref Range WBC 11.6 4.6 - 13.2 K/uL  
 RBC 4.84 4.70 - 5.50 M/uL  
 HGB 12.9 (L) 13.0 - 16.0 g/dL HCT 43.9 36.0 - 48.0 % MCV 90.7 74.0 - 97.0 FL  
 MCH 26.7 24.0 - 34.0 PG  
 MCHC 29.4 (L) 31.0 - 37.0 g/dL  
 RDW 15.5 (H) 11.6 - 14.5 % PLATELET 405 631 - 505 K/uL MPV 11.6 9.2 - 11.8 FL  
 NEUTROPHILS 89 (H) 40 - 73 % LYMPHOCYTES 5 (L) 21 - 52 % MONOCYTES 6 3 - 10 % EOSINOPHILS 0 0 - 5 % BASOPHILS 0 0 - 2 %  
 ABS. NEUTROPHILS 10.4 (H) 1.8 - 8.0 K/UL  
 ABS. LYMPHOCYTES 0.5 (L) 0.9 - 3.6 K/UL  
 ABS. MONOCYTES 0.7 0.05 - 1.2 K/UL  
 ABS. EOSINOPHILS 0.0 0.0 - 0.4 K/UL  
 ABS. BASOPHILS 0.0 0.0 - 0.1 K/UL  
 DF AUTOMATED MAGNESIUM Collection Time: 05/06/20  4:55 AM  
Result Value Ref Range Magnesium 3.0 (H) 1.6 - 2.6 mg/dL PHOSPHORUS Collection Time: 05/06/20  4:55 AM  
Result Value Ref Range Phosphorus 3.4 2.5 - 4.9 MG/DL  
CALCIUM, IONIZED Collection Time: 05/06/20  4:55 AM  
Result Value Ref Range Ionized Calcium 1.16 1.12 - 1.32 MMOL/L  
PTT Collection Time: 05/06/20  4:55 AM  
Result Value Ref Range aPTT 30.4 23.0 - 36.4 SEC PROTHROMBIN TIME + INR Collection Time: 05/06/20  4:55 AM  
Result Value Ref Range Prothrombin time 22.6 (H) 11.5 - 15.2 sec INR 2.0 (H) 0.8 - 1.2 POC G3 Collection Time: 05/06/20  5:24 AM  
Result Value Ref Range Device: VENT    
 FIO2 (POC) 100 % pH (POC) 7.447 7.35 - 7.45    
 pCO2 (POC) 38.9 35.0 - 45.0 MMHG  
 pO2 (POC) 90 80 - 100 MMHG  
 HCO3 (POC) 26.8 (H) 22 - 26 MMOL/L  
 sO2 (POC) 97 92 - 97 % Base excess (POC) 3 mmol/L Mode ASSIST CONTROL Set Rate 16 bpm  
 PEEP/CPAP (POC) 15 cmH2O  
 PIP (POC) 15 Allens test (POC) N/A Total resp. rate 16 Site DRAWN FROM ARTERIAL LINE Specimen type (POC) ARTERIAL Performed by Shu Crandall   
 Pressure control YES Volume control YES    
POC G3  Collection Time: 05/06/20  9:26 AM  
 Result Value Ref Range Device: VENT    
 FIO2 (POC) 60 % pH (POC) 7.395 7.35 - 7.45    
 pCO2 (POC) 51.0 (H) 35.0 - 45.0 MMHG  
 pO2 (POC) 51 (L) 80 - 100 MMHG  
 HCO3 (POC) 31.3 (H) 22 - 26 MMOL/L  
 sO2 (POC) 85 (L) 92 - 97 % Base excess (POC) 6 mmol/L Mode ASSIST CONTROL Set Rate 16 bpm  
 PEEP/CPAP (POC) 8 cmH2O  
 PIP (POC) 17 Allens test (POC) N/A Inspiratory Time 0.9 sec Total resp. rate 19 Site DRAWN FROM ARTERIAL LINE Patient temp. 98.6 Specimen type (POC) ARTERIAL Performed by Tarun Pabon Pressure control YES    
GLUCOSE, POC Collection Time: 05/06/20 11:37 AM  
Result Value Ref Range Glucose (POC) 108 70 - 110 mg/dL Recent Labs 05/06/20 
3745 05/06/20 
7734 05/05/20 
1303 FIO2I 60 100 70 HCO3I 31.3* 26.8* 31.0*  
PCO2I 51.0* 38.9 50.1* PHI 7.395 7.447 7.399 PO2I 51* 90 58* All Micro Results Procedure Component Value Units Date/Time CULTURE, RESPIRATORY/SPUTUM/BRONCH [place] STAIN [[id number]] Order Status:  Sent Specimen:  Sputum from Tracheal Aspirate CULTURE, RESPIRATORY/SPUTUM/BRONCH [place] STAIN [[id number]]  (Abnormal)  (Susceptibility) Collected:  04/22/20 1018 Order Status:  Completed Specimen:  Sputum from Tracheal Aspirate Updated:  04/25/20 0902 Special Requests: NO SPECIAL REQUESTS     
  GRAM STAIN FEW WBCS SEEN     
      
  OCCASIONAL EPITHELIAL CELLS SEEN  
     
      
  FEW GRAM POSITIVE COCCI IN CLUSTERS  
     
   RARE GRAM NEGATIVE RODS Culture result:    
  LIGHT STAPHYLOCOCCUS AUREUS  
     
      
  LIGHT NORMAL RESPIRATORY BRYN  
     
 CULTURE, BLOOD [[id number]] Collected:  04/24/20 1015 Order Status:  Canceled Specimen:  Blood Imaging: 
[x]I have personally reviewed the patients chest radiographs images and report CXR 5/6 Results from [facility] Encounter encounter on 04/21/20 XR CHEST PORT  Narrative EXAM: XR CHEST PORT 
 
 CLINICAL INDICATION/HISTORY: Aspiration pneumonia, ET tube placement 
-Additional: None COMPARISON: Several prior exams, most recently May 5, 2020 TECHNIQUE: Portable frontal view of the chest 
 
_______________ FINDINGS: 
 
SUPPORT DEVICES: Endotracheal tube, right IJ central venous catheter, and left 
approach PICC line noted projecting in stable position. HEART AND MEDIASTINUM: Cardiac size and mediastinal contours appear stable. Cardiac silhouette is enlarged, as previously. LUNGS AND PLEURAL SPACES: Asymmetric airspace process within the right mid and 
lower lungs with slightly improved left lung base aeration. Potential right 
pleural effusion. No pneumothorax. BONY THORAX AND SOFT TISSUES: Unremarkable. 
 
_______________ Impression IMPRESSION: 
 
 
1. Adequate/stable position of support devices. 2. Little interval change in the appearance of right-sided airspace opacity, 
basilar changes of atelectasis and presumptively small right pleural effusion. US LEs 4/22/2020 Interpretation Summary · No evidence of deep vein thrombosis in the right lower extremity veins assessed. · No evidence of deep vein thrombosis in the left lower extremity veins assessed. Echo 4/22 Result status: Edited Result - FINAL Addendum by Shree Sanchez MD on Wed Apr 22, 2020  5:12 PM  
· Left Ventricle: Normal wall thickness and systolic function (ejection fraction normal). Mildly dilated left ventricle. The estimated ejection fraction is 45 - 50%. There is moderate (grade 2) left ventricular diastolic dysfunction E/e' Ratio = 17.09. Wall Scoring: The left ventricular wall motion is globally hypokinetic. · Right Ventricle: Not well visualized. Normal global systolic function. Moderately dilated right ventricle. · Right Atrium: Right atrium not well visualized. Moderately dilated right atrium. · IVC/Hepatic Veins: Dilated inferior vena cava.  Mechanically ventilated; cannot use inferior caval vein diameter to estimate central venous pressure. IMPRESSION:  
Patient Active Problem List  
Diagnosis Code  Acute on chronic respiratory failure with hypoxia and hypercapnia ([facility]) J96.21, J96.22  
 Acute on chronic systolic and diastolic CHF (congestive heart failure) ([facility]) I50.43 Aspiration pneumonitis from multiple patient initiated ET tube displacement events J69.0  Cardiac arrest ([facility] [address] I46.9 MSSA in sputum culture A49.01  
 Elevated troponin, NSTEMI R79.89, I21.4  Chronic a-fib I48.20  Atrial flutter ([facility] [address] I48.92 Prolonged QTc R94.31  
 Chronic renal insufficiency, stage II (mild) N18.2 Thrombocytopenia D69.6  Smoking F17.200  Arthritis M19.90  Migraine G43.909  Chronic back pain M54.9, G89.29  
 Obstructive Sleep apnea G47.33  
 Morbid obesity ([facility]) E66.01  
  
PLAN:  
RS - DAY 15 on vent; continue mech vent; PCV mode; patient has chronic obesity, MARIA FERNANDA and likely severe obesity hypoventilation; tolerate O2 sats 85% or above; would not recommend increasing PEEP, will be counterproductive in morbidly obese patients; currently driving pressure 17, PEEP 10, FiO2 75%. Recent concerns for some aspiration around ET tube with vomiting, and hx of self extubations VAP prevention bundle, head of the bed at 30' all times Patient is off paralytic medication since 5/4/2020; sedation Versed and fentanylwean as tolerated; staff aware of prior self extubation; patient has bilateral soft wrist restraints. Vent and sedation bundles Continue bronchodilators prn; pulmonary hygiene care; chest x-ray limited by obesity; suspected right lower lobe consolidation/effusionremains stable; procalcitonin lowhence goes against bacterial pneumonia. Patient not a candidate for chest CT evaluation due to obesity limitations. Wean off solumedrol; decreased to 20 mg q12; likely can discontinue tomorrow. Dr Amelia Joy in ENT on case for possible trach when fio2 and peep requirements are down CVS - sinus natalia; stable BP; Dr. Mago Wilcox on the case EF 45-50%; dilated RV on echo; US legs neg; patient cannot have CTA chest due to obesity limitations. Stable BP not needing pressors. On Bumex daily now. ID - SARS-COV-2 negative x 2 Antibiotic choice: Zosyn started on 5/2/20 with low grade fever; MSSA in resp cxs from before; follow respiratory cultures from 5/4; procalcitonin less than 0.05 Hem - HIT panel and LEYDI Ab NEG on 4/28. He is definitely high risk for PEs; CTA chest cannot be done due to limitations due to weight; stopped argatroban 5/4; INR 2.0 this a.m.; some bloody tracheal aspirates reportedlikely due to suction related; hold Eliquis tonight. Hb and Plts stable now. Vasc - PVL bl LE negative Renal - Continue Bumex 1 mg daily; sodium remains elevated152; increased D5W at 75 cc an hour; watch renal fn and UOP. I have added free water 200 mL every 4 hrs. Nephrology on case. GI - GI placed OG tube yesterdayappreciate Dr Angely Estrella assistance. Start tube feeding with free water. Position confirmed with KUB with contrast. 
Neurology: sedated with limitations; plan for sedation vacation once FiO2 and PEEP requirements have improved more; patient easily arousable at current level of sedation. Prognosis guarded. I have updated family today at [address], mother and brother. I discussed about medical management. I reviewed the comorbidities, and recommendations for tracheostomy once oxygenation is better. Full code. Will defer respective systems problem management to primary and other consultant and follow patient in ICU with primary and other medical team 
Quality Care: PPI, DVT prophylaxis, HOB elevated, Infection control all reviewed and addressed. PAIN AND SEDATION: Fentanyl, Versed · Skin/Wound: chronic venous stasis changes · Nutrition:  Start tube feeding · Prophylaxis: DVT and GI Prophylaxis reviewed. · Restraints: prn 
· PT/OT eval and treat: as needed when stable · Lines/Tubes: A-line 4/23/20; daniel 4/21/20, ET tube 4/25/20; PICC line left arm 5/5 ADVANCE DIRECTIVE: Full code DISCUSSION: spoke with RN, RT, ICU staff Events and notes from last 24 hours reviewed. Care plan discussed with nursing   
 
 
[x]Total critical care time exclusive of procedures 36 minutes with complex decision making performed Nadeem Hanna MD

## 2020-05-06 NOTE — PROGRESS NOTES
Cardiology Progress Note Patient: Osiel Garcias. Sex: male          DOA: 4/21/2020 YOB: 1975      Age:  53 y.o.        LOS:  LOS: 15 days Patient seen and examined, chart reviewed. Assessment/Plan Patient Active Problem List  
Diagnosis Code  Cellulitis of left lower leg L03. 116  
 Anemia D64.9  Morbid obesity ([facility]) E66.01  
 Dyspnea R06.00  Sleep apnea G47.30  Sinus tachycardia R00.0  BMI 70 and over, adult [facility]) B14.96  Chronic back pain M54.9, G89.29  
 Arthritis M19.90  Migraine G43.909  
 History of atrial flutter Z86.79  
 Limited mobility Z74.09  
 Smoking F17.200  Chronic renal insufficiency, stage II (mild) N18.2  CHF (congestive heart failure) ([facility]) I50.9  Acute diastolic CHF (congestive heart failure) ([facility]) I50.31   Atrial flutter ([facility]) I48.92  
 Elevated troponin R79.89  
 Acute respiratory distress R06.03  
 Acute CHF (congestive heart failure) ([facility]) I50.9  Respiratory failure with hypoxia and hypercapnia ([facility]) J96.91, J96.92  
 Acute on chronic respiratory failure with hypoxia and hypercapnia ([facility]) J96.21, J96.22  
 Cardiac arrest ([facility]) I46.9 Acute on chronic systolic heart failure NSTEMI type II due to PEA arrest 
Paroxysmal atrial flutter Thrombocytopenia Hypernatremia Bradycardia Patient self extubated and had PEA arrest. 
He had brief episode of A fib with RVR and was started On Amiodarone drip. Currently he is in Sinus rhythm. Amiodarone discontinued due to bradycardia. 
  
Echocardiogram  
  
· Left Ventricle: Normal wall thickness and systolic function (ejection fraction normal). Mildly dilated left ventricle. The estimated ejection fraction is 45 - 50%. There is moderate (grade 2) left ventricular diastolic dysfunction E/e' Ratio = 17.09. Wall Scoring: The left ventricular wall motion is globally hypokinetic. · Right Ventricle: Not well visualized. Normal global systolic function. Moderately dilated right ventricle. · Right Atrium: Right atrium not well visualized. Moderately dilated right atrium. · IVC/Hepatic Veins: Dilated inferior vena cava. Mechanically ventilated; cannot use inferior caval vein diameter to estimate central venous pressure. 
  
IV heparin was discontinued due to thrombocytopenia and bleeding at central line site. Started on Argatroban. Last 24 hr negative balance 1608 ml Plan: 
 
Continue Aspirin Continue IV Bumex 1 mg once a day Adjust Bumex as per volume status, I/O and Renal function Strict I/O Avoid any AV debbie blocking agents Monitor electrolytes and renal function Continue Ventilatory support. Continue management as per hospital medicine and pulmonary critical care Subjective:  
 cc: 
Orally intubated REVIEW OF SYSTEMS:  
 
Can not obtain Objective:  
  
Visit Vitals /77 Pulse 68 Temp 98.7 °F (37.1 °C) Resp 21 Ht 5' 10\" (1.778 m) Wt (!) 220 kg (485 lb 0.2 oz) SpO2 (!) 89% BMI 69.59 kg/m² Body mass index is 69.59 kg/m². Physical Exam: 
General Appearance: Comfortable, Morbidly obese, not using accessory muscles of respiration. Orally intubated HEENT: AMMON. HEAD: Atraumatic NECK: No JVD, no thyroidomeglay. CAROTIDS: No bruit LUNGS: bilateral conducted sounds HEART: S1+S2 audible, no murmur, no pericardial rub. NEUROLOGICAL: Sedated. Medication: 
Current Facility-Administered Medications Medication Dose Route Frequency  methylPREDNISolone (PF) (SOLU-MEDROL) injection 20 mg  20 mg IntraVENous Q12H  
 heparin (porcine) 100 unit/mL injection 500 Units  500 Units InterCATHeter Q8H PRN  
 dextrose 5% infusion  75 mL/hr IntraVENous CONTINUOUS  
 [Held by provider] apixaban (ELIQUIS) tablet 5 mg  5 mg Oral BID  bumetanide (BUMEX) injection 1 mg  1 mg IntraVENous DAILY  piperacillin-tazobactam (ZOSYN) 4.5 g in 0.9% sodium chloride (MBP/ADV) 100 mL MBP  4.5 g IntraVENous Q8H  
 fentaNYL citrate (PF) injection 50 mcg  50 mcg IntraVENous Q3H PRN  
 LORazepam (ATIVAN) injection 1 mg  1 mg IntraVENous Q2H PRN  chlorhexidine (PERIDEX) 0.12 % mouthwash 10 mL  10 mL Oral Q12H  
 albuterol-ipratropium (DUO-NEB) 2.5 MG-0.5 MG/3 ML  3 mL Nebulization Q6H PRN  
 acetaminophen (TYLENOL) tablet 650 mg  650 mg Oral Q6H PRN  
 ELECTROLYTE REPLACEMENT PROTOCOL - Calcium   1 Each Other PRN  
 ELECTROLYTE REPLACEMENT PROTOCOL - Magnesium   1 Each Other PRN  
 ELECTROLYTE REPLACEMENT PROTOCOL - Potassium Standard Dosing   1 Each Other PRN  
 ELECTROLYTE REPLACEMENT PROTOCOL - Phosphorus  Standard Dosing  1 Each Other PRN  pantoprazole (PROTONIX) 40 mg in 0.9% sodium chloride 10 mL injection  40 mg IntraVENous DAILY  fentaNYL (PF) 900 mcg/30 ml infusion soln  0-150 mcg/hr IntraVENous TITRATE  midazolam in normal saline (VERSED) 1 mg/mL infusion  0-10 mg/hr IntraVENous TITRATE  aspirin chewable tablet 81 mg  81 mg Oral DAILY  sodium chloride (NS) flush 5-40 mL  5-40 mL IntraVENous Q8H  
 sodium chloride (NS) flush 5-40 mL  5-40 mL IntraVENous PRN Facility-Administered Medications Ordered in Other Encounters Medication Dose Route Frequency  propofoL (DIPRIVAN) 10 mg/mL injection   IntraVENous PRN  
 ePHEDrine (PF) (MISTOLE) 10 mg/mL in NS syringe   IntraVENous PRN  
 PHENYLephrine (RUPERT-SYNEPHRINE) 100 mcg/mL in NS syringe   IntraVENous PRN Lab/Data Reviewed: 
 
  
Recent Labs 05/06/20 0455 05/05/20 0445 05/04/20 
0400 WBC 11.6 11.9 10.6 HGB 12.9* 12.1* 12.3*  
HCT 43.9 40.8 42.2  152 154 Recent Labs 05/06/20 0455 05/05/20 0445 05/04/20 
0400 * 153* 152* K 4.3 3.9 4.2 * 119* 117* CO2 30 30 30 * 112* 124* BUN 50* 50* 52* CREA 1.28 1.10 1.26  
CA 8.2* 8.0* 7.9*  
 
 
 
 Signed By: Candice King MD   
 May 6, 2020

## 2020-05-06 NOTE — PROGRESS NOTES
Palliative Medicine Consult [facility] [phone] Spoke with patient's wife this am in follow up to yesterdays discussion regarding visiting today. Mrs. Santana Ramirez provided list of family members who will be here today at 3 PM. 
Mrs. Santana Ramirez, Patient's mother Georges Hyman,  
Daughter, Addis Arriaza, 
 and  
EITHER patient's father Estefany Fonsecaor his brother Yenny Caballero. Mrs. Santana Ramirez states understanding this is one time visit for limited time this afternoon. She also states understanding of all restrictions regarding visiting. Only 4 people can come total. 
They must bring an ID. They must be over the age of 25. They must be fever free They must wear a mask They must have had no known COVID contacts for 2 weeks Persons must not have any active signs or symptoms There will be no videotaping or picture taking. If family leaves the room they must go to the car to wait.  
 
Charis Wilder RN

## 2020-05-06 NOTE — PROGRESS NOTES
Palliative Medicine Consult [facility] [phone] Family was able to come today for short visit with Mr. Rossana Duncan. Medical update provided by Dr. Shahram Magaña. Wife shared that she wants mr. Kim to receive a trach if not able to wean from vent. Family very appreciative for visit. They state understanding this visit was a one time occurrence due to COVID restrictions.

## 2020-05-06 NOTE — ADT AUTH CERT NOTES
Patient Demographics Patient Name Bailee Baig 
[id number] Sex Male  
1975 Address [place]  [address] Phone [phone] (Home) CSN:  
[id number] Admit Date: Admit Time Room Bed 2020  6:13  [06510] 01 [[id number]] Attending Providers Provider Pager From To Elton Regan DO  20 Renetta Peter MD  20 Emergency Contact(s) Name Relation Home Work Mobile Karen Benitez Spouse [phone] Utilization Reviews  
 
   
Respiratory Failure GRG - Care Day 14 (2020) by Bakari Blum RN  
 
   
Review Entered Review Status 2020 14:41 Completed  
   
Criteria Review Care Day: 14 Care Date: 2020 Level of Care: ICU Guideline Day 3 Level Of Care ( ) * Activity level acceptable   
(X) Floor to discharge 2020 14:38:27 EDT by Luis F Velazquez   
  ICU bed Clinical Status ( ) * Ventilation status appropriate ( ) * Airway status acceptable ( ) * Respiratory status acceptable ( ) * Stable chest findings ( ) * Neurologic status acceptable   
(X) * Temperature status acceptable 2020 14:38:27 EDT by Luis F Velazquez   
  99.6 58 20 [id number] 89% Vent   
( ) * No infection, or status acceptable ( ) * General Discharge Criteria met Interventions ( ) * No chest tube, or status acceptable   
(X) * Intake acceptable 2020 14:39:56 EDT by Luis F Velazquez   
  Bumex 1 mg iv bid, Nimbex gtt, D5 50 hr, Protonix 40 mg iv qd, Zosyn 4.5 g iv q8, Vanco 1750 mg iv q12,  Argatroban gtt, Solmuedrol 80 mg iv q12,   
( ) * No inpatient interventions needed 2020 14:41:55 EDT by Luis F Velazquez Subject: Additional Clinical Information   
  
rbc 4.64 hgb 12.3 inr 5.6 pt 50.8 ptt 80.0 na 152 chl 117 glu 124 bun 52 crit 1.26 bun ratio 40 ca 7.9 mag 2.7 pro 6.3 alb 2.6 ag ratio 0.7 blood gas pco2 51.8 hco3 30.9 so2 91 inr 5.6 IMPRESSION:   
     
     
1. Stable/adequate position of support devices. 2. Little interval change in the appearance of right mid to lower lung airspace   
disease/atelectasis and likely left basilar atelectasis * Milestone Additional Notes 5/4 Pulmonary: Subjective/History:  
47-year-old male with morbid obesity, ex-smoker, history of asthma, history of severe obstructive sleep apnea on CPAP, questionable component compliance, history of congestive heart failure with reduced ejection fraction atrial fibrillation atrial flutter on amiodarone, Xarelto, history of chronic renal insufficiency, cellulitis of lower extremities and anemia. As per family did not have any fever or upper respiratory tract infections and no flulike symptoms. On Lasix, CPAP not clear if missed. Presented with acute shortness of breath worsening edema of lower extremities. He was found to be in acute hypercarbic hypoxemic respiratory failure initially had a trial of BiPAP but failed and then eventually was intubated. He was hypotensive. Moderate amount of secretions. Morbid obesity. Patient compliant with Xarelto. Patient self extubated and had PEA arrest.  
   
Subjective:   
05/04/20 Chart reviewed and d/w Dr Yuan Brown Patient intubated 4/22 for hypoxemic resp failure. Patient remains in icu; on vent support He is morbidly obese He is sedated and paralyzed; on VCV mod of vent; peep 18; fio2 100% Hx of desats on vent which improves with suctioning No bleeding issues Afebrile; BP stable;  
Tele-sinus natalia 40-50/min UOP good; overall fluid balance neg  
   
Drips: Argatroban, nimbex, fentanyl, versed  
   
ROS: Review of systems not obtained due to patient factors. Physical Exam:  
General: sedated, on paralytic, obese male; no cyanosis; on ventilator HEENT: LIBBY, orally intubated; no bleeding; pupils not dilated, reactive Neck: thick short obese neck; RT IJ site without active bleeding Chest: normal, obese chest wall Lungs: moderate air entry, decreased BS bases; no wheezing or crackles; symmetrical chest expansion; obese chest  
Heart: S1S2 present or without murmur or extra heart sounds; JVD not elevated Abdomen: obese, bowel sounds normoactive, soft without significant tenderness; no organomegaly; no guarding or rigidity Extremity: 2+, pitting and bl LE and 2+ bl UE edema; negative for cyanosis, clubbing Capillary refill: normal  
Neuro: sedated, on paralytic, no involuntary movements, exam limitations Skin: Skin color, texture, turgor fair. Skin dry, warm, non-diaphoretic  
 IMPRESSION:  
· Patient Active Problem List  
Diagnosis Code · Acute on chronic respiratory failure with hypoxia and hypercapnia ([facility]) J96.21, J96.22  
· Acute on chronic systolic and diastolic CHF (congestive heart failure) ([facility]) I50.43  
  Aspiration pneumonitis from multiple patient initiated ET tube displacement events J69.0 · Cardiac arrest ([facility] [address] I46.9  
  MSSA in sputum culture A49.01  
· Elevated troponin, NSTEMI R79.89, I21.4 · Chronic a-fib I48.20 · Atrial flutter ([facility]) I48.92  
  Prolonged QTc R94.31  
· Chronic renal insufficiency, stage II (mild) N18.2  
  Thrombocytopenia D69.6 · Smoking F17.200 · Arthritis M19.90 · Migraine G43.909 · Chronic back pain M54.9, G89.29  
· Obstructive Sleep apnea G47.33  
· Morbid obesity ([facility]) E66.01  
  
PLAN:  
RS - DAY 13 on vent; continue mech vent; requiring peep 18 on VCV mode; switch to PCV mode with P1 15 and peep 13; good TV; fio2 at 100% currently; wean fio2 for o2 sats >91%. Recent concerns for some aspiration around ET tube with vomiting, and hx of self extubations VAP prevention bundle, head of the bed at 30' all times Patient being paralyzed due to risks of self-extubation; he is bradycardic with stable BP; hence will try to wean off nimbex and/or fentanyl; continue versed infusion; follow TOF while on nimbex Vent and sedation bundles Continue bronchodilators prn; Pulmozyme, nebs bid; pulmonary hygiene care Wean off solumedrol; decreased to 40 mg q12 Dr Elizabeth Ready in ENT on case for possible trach when fio2 and peep requirements are down CVS - sinus natalia; stable BP; EF 45-50%; dilated RV on echo; US legs neg; patient cannot have CTA chest due to obesity limitations. Stable BP not needing pressors.  On Bumex bid; cardiology Dr Keesha Collier on case. ID - SARS-COV-2 negative x 2 Antibiotic choice: Zosyn and Vancomycin started on 5/2/20 with low grade fever; MSSA in resp cxs from before; resend resp cxs today from tracheal aspirates; CXR shows obese body habitus with limitations; no worsening pneumonia. Check procalcitonin - was 0.20 on 4.24. Hem - On Argatroban given thrombocytopenia; HIT panel and LEYDI Ab NEG on 4/28. He is definitely high risk for PEs; CTA chest cannot be done due to limitations due to weight; would stop argatroban; once INR <2.0, start Eliquis 5 mg bid. Hb and Plts stable now. No bleeding issues. Vasc - PVL bl LE negative Renal - Continue Bumex 1 mg twice a day; watch renal fn and UOP. Dr Butch Leon on case. D5W at 50/hr started for high Na. GI - GI to place OGT due to difficulty Neurology: sedated and paralyzed with limitations  
   
Prognosis guarded. Update family after icu rounds. Full code. Will defer respective systems problem management to primary and other consultant and follow patient in ICU with primary and other medical team  
Quality Care: PPI, DVT prophylaxis, HOB elevated, Infection control all reviewed and addressed. PAIN AND SEDATION: Fentanyl, Versed, Nimbex · Skin/Wound: chronic venous stasis changes · Nutrition: NPO for now · Prophylaxis: DVT and GI Prophylaxis reviewed. · Restraints: prn  
· PT/OT eval and treat: as needed when stable · Lines/Tubes: lines RT IJ 4/22/20; A-line 4/23/20; daniel 4/21/20, ET tube 4/25/20 ADVANCE DIRECTIVE: Full code DISCUSSION: spoke with RN, RT, ICU staff Events and notes from last 24 hours reviewed. Care plan discussed with nursing   
  
  
   
Respiratory Failure GRG - Care Day 11 (5/1/2020) by Opal Painter RN  
 
   
Review Entered Review Status 5/1/2020 10:56 Completed  
   
Criteria Review Care Day: 11 Care Date: 5/1/2020 Level of Care: ICU Guideline Day 3 Level Of Care ( ) * Activity level acceptable   
(X) Floor to discharge 5/1/2020 10:55:08 EDT by Danielle Calvillo   
  ICU bed Clinical Status ( ) * Ventilation status appropriate ( ) * Airway status acceptable ( ) * Respiratory status acceptable ( ) * Stable chest findings ( ) * Neurologic status acceptable   
(X) * Temperature status acceptable 5/1/2020 10:55:08 EDT by Danielle Calvillo   
  99.4 54 20 88 104/63 Vent   
( ) * No infection, or status acceptable ( ) * General Discharge Criteria met Interventions ( ) * No chest tube, or status acceptable   
(X) * Intake acceptable 5/1/2020 10:55:08 EDT by Danielle Calvillo   
  Argatroban gtt, ASA 81 mg po qd, Bumex 1 mg iv bid, Nimbex gtt, Versed gtt, Protonix 40 mg iv qd,   
( ) * No inpatient interventions needed 5/1/2020 10:56:43 EDT by Danielle Calvillo Subject: Additional Clinical Information   
cxray Little change. Right lung edema or infiltrate. Endotracheal tube in good   
position. 5/1/2020 10:56:43 EDT by Danielle Calvillo Subject: Additional Clinical Information   
  
hgb 12.4 Plt 114 PTT 68.5 na 148 glu 104 bun 46 crit 1.06 bun ratio 43 ca 8.3 protein 6.3 alb 2.8 alk phos 39 glu 104   
blood gas ph 7.451 pco2 46.0 po2 58 hco3 32.0 so2 91   
  
* Milestone Additional Notes 5/1 Pulmonary: :  
· Patient Active Problem List  
Diagnosis Code · Acute on chronic respiratory failure with hypoxia and hypercapnia ([facility]) J96.21, J96.22  
· Acute on chronic systolic and diastolic CHF (congestive heart failure) ([facility]) I50.43  
  Aspiration pneumonitis from multiple patient initiated ET tube displacement events J69.0 · Cardiac arrest ([facility]) I46.9  
  Staph Aureus in sputum culture A49.01  
· Elevated troponin, NSTEMI R79.89, I21.4 · Chronic a-fib I48.20 · Atrial flutter ([facility]) I48.92  
  Prolonged QTc R94.31  
· Chronic renal insufficiency, stage II (mild) N18.2  
  Thrombocytopenia D69.6 · Smoking F17.200 · Arthritis M19.90 · Migraine G43.909 · Chronic back pain M54.9, G89.29  
· Obstructive Sleep apnea G47.33  
· Morbid obesity ([facility] [address] E66.01  
  
PLAN:  
[facility]. Ventilated patients- aim to keep peak plateau pressure less than or equal to 30cm H2O. Titrate FiO2 for goal SPO2> 91%. Titrated PEEP to 14 cwp yesterday but overnight desaturation required increase in FiO2 and PEEP  
CXR and ABG daily VAP prevention bundle, head of the bed at 30' all times Daily sedation and paralytic holiday when feasible and assessment for weaning with SBT as tolerated. Monitor TOF on Nimbex. Patient still gets agitated through paralytic and sedation developing tolerance for them quickly requiring ongoing paralytic Continue bronchodilators, pulmonary hygiene care Off of steroid SARS-COV-2 negative x 2 Antibiotic choice: low threshold to start patient on Zosyn and Vancomycin if febrile or leukocytosis. Patient may have aspirated since had multiple witnessed episodes when he dislodged his ET tube and or with ET tube cuff malfunctioned requiring exchange Monitor hemodynamics -stable and off of vasopressors since 4/27/20 AM  
Was on Amiodarone, has been off. In NSR On Argatroban given thrombocytopenia. Off of Heparin given bleeding around RT IJ insertion site. No bleeding issues on therapeutic argatroban. HIT Ab negative; LEYDI pending At higher risk for DVT/PE; SCDs difficult to keep on with leg size, Plt remained above 100 and no appreciable bleeding or hematoma at RT IJ insertion  CBC, PT/INR, PTT, any signs of bleeding/hematoma PVL bl LE negative Continue Bumex 1 mg twice a day Will try low amount of free H2O given mild hypernatremia while considering conservative fluid volume strategy with current ventilator support need. Nephrology followed Continue TF at trickle rate at 15 mL/hr  
Glycemic control AM labs Central Line, A-line, Daniel and Vent Bundles will be followed, Vent Day 10 Prognosis guarded. Patient at increased risk for prolonged ventilator support, prolonged hospitalization, nosocomial infection, cardiovascular collapse, DVT/PE and other. Spoke with wife on daily basis and updated her. Patient remained full code. Will defer respective systems problem management to primary and other consultant and follow patient in ICU with primary and other medical team  
Quality Care: PPI, DVT prophylaxis, HOB elevated, Infection control all reviewed and addressed. PAIN AND SEDATION: Fentanyl, Versed, Nimbex · Skin/Wound: chronic venous stasis changes · Nutrition: TF  
· Prophylaxis: DVT and GI Prophylaxis reviewed. · Restraints: none while on paralytic; may place during weaning process or when off of paralytic  
· PT/OT eval and treat: as needed when stable · Lines/Tubes: lines RT IJ 4/22/20; A-line 4/23/20; daniel 4/21/20, ET tube 4/25/20 ADVANCE DIRECTIVE: Full code DISCUSSION: spoke with RN, RT, ICU staff Events and notes from last 24 hours reviewed.  Care plan discussed with nursing   
   
    
Subjective/History:  
66-year-old male with morbid obesity, ex-smoker, history of asthma, history of severe obstructive sleep apnea on CPAP, questionable component compliance, history of congestive heart failure with reduced ejection fraction atrial fibrillation atrial flutter on amiodarone, Xarelto, history of chronic renal insufficiency, cellulitis of lower extremities and anemia. As per family did not have any fever or upper respiratory tract infections and no flulike symptoms. On Lasix, CPAP not clear if missed. Presented with acute shortness of breath worsening edema of lower extremities. He was found to be in acute hypercarbic hypoxemic respiratory failure initially had a trial of BiPAP but failed and then eventually was intubated. He was hypotensive. Moderate amount of secretions. Morbid obesity. Patient compliant with Xarelto. Patient self extubated and had PEA arrest.  
   
Subjective:   
05/01/20 Patient in ICU on ventilator; overnight desaturation requiring to increase PEEP and FIO2 from progress made during daytime Plp below 30 cm H2O; on sedation and paralytic with good ventilator synchrony this AM  
Hemodynamically stable; in NSR Tolerating Argatroban w/o any bleeding issues; Hgb stable Good UO with diuresis. Afebrile. Tolerating TF at 15 mL/hr rate No bleeding around IJ insertion or other sites  
   
   
ROS: Review of systems not obtained due to patient factors.  
   
Physical Exam:  
General: sedated, on paralytic, comfortable looking, appears older than stated age, morbidly obese, on ventilator HEENT: PERRL, fundi benign, orally intubatyed Neck: No abnormally enlarged lymph nodes or thyroid, supple; RT IJ site without active bleeding, no hematoma, exam limitation Chest: normal, obese chest wall Lungs: moderate air entry, few rhonchi scattered bilaterally, normal percussion anterior chest bilaterally but limited, no tenderness/ rash Heart: Regular rate and rhythm, S1S2 present or without murmur or extra heart sounds Abdomen: obese, bowel sounds normoactive, tympanic, soft without significant tenderness, masses, organomegaly or guarding, rigidity, rebound Extremity: 2+, pitting and bl LE and 2+ bl UE edema; negative for cyanosis, clubbing Capillary refill: normal  
Neuro: sedated, on paralytic, no involuntary movements, exam limitations Skin: Skin color, texture, turgor fair. Skin dry, warm, non-diaphoretic

## 2020-05-06 NOTE — PROGRESS NOTES
Hospitalist Progress Note Patient: Britni Rivas. MRN: [id number]  CSN: [id number] YOB: 1975  Age: 53 y.o. Sex: male DOA: 4/21/2020 LOS:  LOS: 15 days Assessment/Plan Patient Active Problem List  
Diagnosis Code  Cellulitis of left lower leg L03. 116  
 Anemia D64.9  Morbid obesity ([facility]) E66.01  
 Dyspnea R06.00  Sleep apnea G47.30  Sinus tachycardia R00.0  BMI 70 and over, adult [facility]) S74.49  Chronic back pain M54.9, G89.29  
 Arthritis M19.90  Migraine G43.909  
 History of atrial flutter Z86.79  
 Limited mobility Z74.09  
 Smoking F17.200  Chronic renal insufficiency, stage II (mild) N18.2  CHF (congestive heart failure) ([facility]) I50.9  Acute diastolic CHF (congestive heart failure) ([facility]) I50.31  Chronic a-fib I48.20  Atrial flutter ([facility]) I48.92  
 Elevated troponin R79.89  
 Acute respiratory distress R06.03  
 Acute CHF (congestive heart failure) ([facility]) I50.9  Respiratory failure with hypoxia and hypercapnia ([facility]) J96.91, J96.92  
 Acute on chronic respiratory failure with hypoxia and hypercapnia ([facility]) J96.21, J96.22  
 Cardiac arrest ([facility]) I46.9  
  
 
 
 
54 y/o male with hx of morbid obesity, MARIA FERNANDA, chronic afib on anticoagulation, and systolic CHF (EF 00-15%) is admitted with respiratory failure with hypoxia and hypercapnia, he required to be intubated. 04/23/2020 Patient self extubated this morning and went into PEA arrest, he was re intubated, difficult intubation. He is awake and post resuscitation, placed on sedation. Requiring high PEEP. 
 
04/24/2020 Patient again tried to self extubate in spite of sedation. 04/25/2020 ET tube leak, ET tube exchanged 04/27/2020 Had bleeding around IJ site, heparin stopped, H/H stable Now started on argatroban CRITICAL CARE PLAN Resp - See vent orders, VAP bundle. HOB>30 degrees. Acute respiratory failure - with hypoxia and hypercapnia, follow ABGs, CXR. MARIA FERNANDA/OHS - On solumedrol ID -  
resp cultures with growth of MSSA ANTIBIOTICS  zosyn. Seen by ID 
COVID 19 negative x 2 
 
[facility] - Monitor HD. Hypotension - resolved, off pressors Paroxysmal atrial flutter - monitor rate, was on amiodarone. Anticoagulation with eliquis Has been bradycardic, toprol xl held. Acute on chronic systolic CHF - On bumex Echo with EF of 45-50% Elevated troponin - NSTEMI type II due to PEA arrest 
Cardiology following. Heme/onc - Follow H&H, plts. Thrombocytopenia - improving, HIT neg. On eliquis Renal - Trend BUN, Cr, follow I/O, daniel in place. Check and replace Mg, K, phos. Hypernatremia - on D5W increase free water flushes, Endocrine -  Follow [facility] Neuro/ Pain/ Sedation - Off paralytic GI - NPO for now, NG tube, tube feeding. Morbid obesity Prophylaxis - DVT: Argatroban, GI: protonix. [id number] 
35 minutes of critical care time spent in the direct evaluation and treatment of this high risk patient. The reason for providing this level of medical care for this critically ill patient was due a critical illness that impaired one or more vital organ systems such that there was a high probability of imminent or life threatening deterioration in the patients condition. This care involved high complexity decision making to assess, manipulate, and support vital system functions, to treat this degreee vital organ system failure and to prevent further life threatening deterioration of the patients condition. Disposition : TBD Physical Exam: 
General: As above   
HEENT: NC, Atraumatic. PERRLA, anicteric sclerae. Lungs: CTA Bilaterally. Distant breath sounds secondary to body habitus. Heart:  S1 S2, No murmur, No Rubs, No Gallops Abdomen: Soft, Non distended, obese, Non tender.  +Bowel sounds, Extremities: LE edema Vital signs/Intake and Output: 
Visit Vitals /54 Pulse (!) 52 Temp 98.7 °F (37.1 °C) Resp 17  
 Ht 5' 10\" (1.778 m) Wt (!) 220 kg (485 lb 0.2 oz) SpO2 (!) 88% BMI 69.59 kg/m² Current Shift:  05/06 0701 - 05/06 1900 In: 100 [I.V.:100] Out: 2450 [Urine:2450] Last three shifts:  05/04 1901 - 05/06 0700 In: 2364.5 [I.V.:2364.5] Out: 3900 [GUXYT:8637] Labs: Results:  
   
Chemistry Recent Labs 05/06/20 0455 05/05/20 0445 05/04/20 
0400 * 112* 124* * 153* 152* K 4.3 3.9 4.2 * 119* 117* CO2 30 30 30 BUN 50* 50* 52* CREA 1.28 1.10 1.26  
CA 8.2* 8.0* 7.9* AGAP 3 4 5 BUCR 39* 45* 41* AP 50 43* 47  
TP 6.3* 5.9* 6.3* ALB 2.6* 2.5* 2.6*  
GLOB 3.7 3.4 3.7 AGRAT 0.7* 0.7* 0.7* CBC w/Diff Recent Labs 05/06/20 0455 05/05/20 0445 05/04/20 
0400 WBC 11.6 11.9 10.6 RBC 4.84 4.54* 4.64* HGB 12.9* 12.1* 12.3*  
HCT 43.9 40.8 42.2  152 154 GRANS 89* 86* 90* LYMPH 5* 5* 5* EOS 0 0 0 Cardiac Enzymes No results for input(s): CPK, CKND1, CONSTANZA in the last 72 hours. No lab exists for component: Aundra Hem Coagulation Recent Labs 05/06/20 0224 05/05/20 0445 PTP 22.6* 22.6* INR 2.0* 2.0* APTT 30.4 32.0 Lipid Panel No results found for: CHOL, CHOLPOCT, CHOLX, CHLST, CHOLV, 397775, HDL, HDLP, LDL, LDLC, DLDLP, 098902, VLDLC, VLDL, TGLX, TRIGL, TRIGP, TGLPOCT, CHHD, CHHDX  
BNP No results for input(s): BNPP in the last 72 hours. Liver Enzymes Recent Labs 05/06/20 
0455 TP 6.3* ALB 2.6* AP 50 SGOT 43* Thyroid Studies Lab Results Component Value Date/Time TSH 1.45 04/27/2020 03:50 AM  
    
Procedures/imaging: see electronic medical records for all procedures/Xrays and details which were not copied into this note but were reviewed prior to creation of Plan

## 2020-05-06 NOTE — PROGRESS NOTES
Patient resting quietly on ventilator support. Soft bilateral wrist restraints in place. Vital signs stable.

## 2020-05-06 NOTE — PROGRESS NOTES
Cardiology Progress Note Patient: Collette Maizes. Sex: male          DOA: 4/21/2020 YOB: 1975      Age:  53 y.o.        LOS:  LOS: 14 days Patient seen and examined, chart reviewed. Assessment/Plan Patient Active Problem List  
Diagnosis Code  Cellulitis of left lower leg L03. 116  
 Anemia D64.9  Morbid obesity ([facility]) E66.01  
 Dyspnea R06.00  Sleep apnea G47.30  Sinus tachycardia R00.0  BMI 70 and over, adult [facility]) E51.39  Chronic back pain M54.9, G89.29  
 Arthritis M19.90  Migraine G43.909  
 History of atrial flutter Z86.79  
 Limited mobility Z74.09  
 Smoking F17.200  Chronic renal insufficiency, stage II (mild) N18.2  CHF (congestive heart failure) ([facility]) I50.9  Acute diastolic CHF (congestive heart failure) ([facility]) I50.31   Atrial flutter ([facility]) I48.92  
 Elevated troponin R79.89  
 Acute respiratory distress R06.03  
 Acute CHF (congestive heart failure) ([facility]) I50.9  Respiratory failure with hypoxia and hypercapnia ([facility]) J96.91, J96.92  
 Acute on chronic respiratory failure with hypoxia and hypercapnia ([facility]) J96.21, J96.22  
 Cardiac arrest ([facility]) I46.9 Acute on chronic systolic heart failure NSTEMI type II due to PEA arrest 
Paroxysmal atrial flutter Thrombocytopenia Hypernatremia Bradycardia Patient self extubated and had PEA arrest. 
He had brief episode of A fib with RVR and was started On Amiodarone drip. Currently he is in Sinus rhythm. Amiodarone discontinued due to bradycardia. 
  
Echocardiogram  
  
· Left Ventricle: Normal wall thickness and systolic function (ejection fraction normal). Mildly dilated left ventricle. The estimated ejection fraction is 45 - 50%. There is moderate (grade 2) left ventricular diastolic dysfunction E/e' Ratio = 17.09. Wall Scoring: The left ventricular wall motion is globally hypokinetic. · Right Ventricle: Not well visualized. Normal global systolic function. Moderately dilated right ventricle. · Right Atrium: Right atrium not well visualized. Moderately dilated right atrium. · IVC/Hepatic Veins: Dilated inferior vena cava. Mechanically ventilated; cannot use inferior caval vein diameter to estimate central venous pressure. 
  
IV heparin was discontinued due to thrombocytopenia and bleeding at central line site. Started on Argatroban. Telemetry reviewed: Heart rate 40-50 bpm Sinus bradycardia Plan: 
 
Continue Aspirin Continue IV Bumex 1 mg once a day Adjust Bumex as per volume status, I/O and Renal function Strict I/O Avoid any AV debbie blocking agents Monitor electrolytes and renal function Continue Ventilatory support. Continue management as per hospital medicine and pulmonary critical care Subjective:  
 cc: 
Orally intubated REVIEW OF SYSTEMS:  
 
Can not obtain Objective:  
  
Visit Vitals /67 Pulse (!) 56 Temp 98.4 °F (36.9 °C) Resp 18 Ht 5' 10\" (1.778 m) Wt (!) 220 kg (485 lb 0.2 oz) SpO2 96% BMI 69.59 kg/m² Body mass index is 69.59 kg/m². Physical Exam: 
General Appearance: Comfortable, Morbidly obese, not using accessory muscles of respiration. Orally intubated HEENT: AMMON. HEAD: Atraumatic NECK: No JVD, no thyroidomeglay. CAROTIDS: No bruit LUNGS: bilateral conducted sounds HEART: S1+S2 audible, no murmur, no pericardial rub. NEUROLOGICAL: Sedated. Medication: 
Current Facility-Administered Medications Medication Dose Route Frequency  methylPREDNISolone (PF) (SOLU-MEDROL) injection 20 mg  20 mg IntraVENous Q12H  
 heparin (porcine) 100 unit/mL injection 500 Units  500 Units InterCATHeter Q8H PRN  
 dextrose 5% infusion  75 mL/hr IntraVENous CONTINUOUS  
 apixaban (ELIQUIS) tablet 5 mg  5 mg Oral BID  bumetanide (BUMEX) injection 1 mg  1 mg IntraVENous DAILY  piperacillin-tazobactam (ZOSYN) 4.5 g in 0.9% sodium chloride (MBP/ADV) 100 mL MBP  4.5 g IntraVENous Q8H  
 fentaNYL citrate (PF) injection 50 mcg  50 mcg IntraVENous Q3H PRN  
 LORazepam (ATIVAN) injection 1 mg  1 mg IntraVENous Q2H PRN  chlorhexidine (PERIDEX) 0.12 % mouthwash 10 mL  10 mL Oral Q12H  
 albuterol-ipratropium (DUO-NEB) 2.5 MG-0.5 MG/3 ML  3 mL Nebulization Q6H PRN  
 acetaminophen (TYLENOL) tablet 650 mg  650 mg Oral Q6H PRN  
 ELECTROLYTE REPLACEMENT PROTOCOL - Calcium   1 Each Other PRN  
 ELECTROLYTE REPLACEMENT PROTOCOL - Magnesium   1 Each Other PRN  
 ELECTROLYTE REPLACEMENT PROTOCOL - Potassium Standard Dosing   1 Each Other PRN  
 ELECTROLYTE REPLACEMENT PROTOCOL - Phosphorus  Standard Dosing  1 Each Other PRN  pantoprazole (PROTONIX) 40 mg in 0.9% sodium chloride 10 mL injection  40 mg IntraVENous DAILY  fentaNYL (PF) 900 mcg/30 ml infusion soln  0-150 mcg/hr IntraVENous TITRATE  midazolam in normal saline (VERSED) 1 mg/mL infusion  0-10 mg/hr IntraVENous TITRATE  aspirin chewable tablet 81 mg  81 mg Oral DAILY  sodium chloride (NS) flush 5-40 mL  5-40 mL IntraVENous Q8H  
 sodium chloride (NS) flush 5-40 mL  5-40 mL IntraVENous PRN Facility-Administered Medications Ordered in Other Encounters Medication Dose Route Frequency  propofoL (DIPRIVAN) 10 mg/mL injection   IntraVENous PRN  
 ePHEDrine (PF) (MISTOLE) 10 mg/mL in NS syringe   IntraVENous PRN  
 PHENYLephrine (RUPERT-SYNEPHRINE) 100 mcg/mL in NS syringe   IntraVENous PRN Lab/Data Reviewed: 
 
  
Recent Labs 05/05/20 0445 05/04/20 
0400 05/03/20 
0430 WBC 11.9 10.6 9.3 HGB 12.1* 12.3* 12.6* HCT 40.8 42.2 42.8  154 130* Recent Labs 05/05/20 0445 05/04/20 
0400 05/03/20 
0430 * 152* 150*  
K 3.9 4.2 4.7 * 117* 113* CO2 30 30 32 * 124* 117* BUN 50* 52* 51* CREA 1.10 1.26 1.39* CA 8.0* 7.9* 8.0*  
 
 
 
 Signed By: Avery Roberts MD   
 May 5, 2020

## 2020-05-07 NOTE — PROGRESS NOTES
Hospitalist Progress Note Patient: Justyn Avery MRN: [id number]  CSN: [id number] YOB: 1975  Age: 53 y.o. Sex: male DOA: 4/21/2020 LOS:  LOS: 16 days Assessment/Plan Patient Active Problem List  
Diagnosis Code  Cellulitis of left lower leg L03. 116  
 Anemia D64.9  Morbid obesity ([facility]) E66.01  
 Dyspnea R06.00  Sleep apnea G47.30  Sinus tachycardia R00.0  BMI 70 and over, adult [facility]) N02.52  Chronic back pain M54.9, G89.29  
 Arthritis M19.90  Migraine G43.909  
 History of atrial flutter Z86.79  
 Limited mobility Z74.09  
 Smoking F17.200  Chronic renal insufficiency, stage II (mild) N18.2  CHF (congestive heart failure) ([facility]) I50.9  Acute diastolic CHF (congestive heart failure) ([facility]) I50.31  Chronic a-fib I48.20  Atrial flutter ([facility]) I48.92  
 Elevated troponin R79.89  
 Acute respiratory distress R06.03  
 Acute CHF (congestive heart failure) ([facility]) I50.9  Respiratory failure with hypoxia and hypercapnia ([facility]) J96.91, J96.92  
 Acute on chronic respiratory failure with hypoxia and hypercapnia ([facility]) J96.21, J96.22  
 Cardiac arrest ([facility]) I46.9  
  
 
 
 
52 y/o male with hx of morbid obesity, MARIA FERNANDA, chronic afib on anticoagulation, and systolic CHF (EF 12-15%) is admitted with respiratory failure with hypoxia and hypercapnia, he required to be intubated. 04/23/2020 Patient self extubated this morning and went into PEA arrest, he was re intubated, difficult intubation. He is awake and post resuscitation, placed on sedation. Requiring high PEEP. 
 
04/24/2020 Patient again tried to self extubate in spite of sedation. 04/25/2020 ET tube leak, ET tube exchanged 04/27/2020 Had bleeding around IJ site, heparin stopped, H/H stable Now started on argatroban CRITICAL CARE PLAN Resp - See vent orders, VAP bundle. HOB>30 degrees. Acute respiratory failure - with hypoxia and hypercapnia, follow ABGs, CXR. MARIA FERNANDA/OHS - On solumedrol ID -  
resp cultures with growth of MSSA ANTIBIOTICS  zosyn. Seen by ID 
COVID 19 negative x 2 
 
[facility] - Monitor HD. Hypotension - resolved, off pressors Paroxysmal atrial flutter - monitor rate, was on amiodarone. Anticoagulation with eliquis Has been bradycardic, toprol xl held. Acute on chronic systolic CHF - On bumex Echo with EF of 45-50% Elevated troponin - NSTEMI type II due to PEA arrest 
Cardiology following. Heme/onc - Follow H&H, plts. Thrombocytopenia - improving, HIT neg. On eliquis Renal - Trend BUN, Cr, follow I/O, daniel in place. Check and replace Mg, K, phos. Hypernatremia - on D5W increase free water flushes, Endocrine -  Follow [facility] Neuro/ Pain/ Sedation - Off paralytic GI - NPO for now, NG tube, tube feeding. Morbid obesity Prophylaxis - DVT: Argatroban, GI: protonix. [id number] 
35 minutes of critical care time spent in the direct evaluation and treatment of this high risk patient. The reason for providing this level of medical care for this critically ill patient was due a critical illness that impaired one or more vital organ systems such that there was a high probability of imminent or life threatening deterioration in the patients condition. This care involved high complexity decision making to assess, manipulate, and support vital system functions, to treat this degreee vital organ system failure and to prevent further life threatening deterioration of the patients condition. Disposition : TBD Physical Exam: 
General: As above   
HEENT: NC, Atraumatic. PERRLA, anicteric sclerae. Lungs: CTA Bilaterally. Distant breath sounds secondary to body habitus. Heart:  S1 S2, No murmur, No Rubs, No Gallops Abdomen: Soft, Non distended, obese, Non tender.  +Bowel sounds, Extremities: LE edema Vital signs/Intake and Output: 
Visit Vitals /65 Pulse 63 Temp 98 °F (36.7 °C) Resp 16  
 Ht 5' 10\" (1.778 m) Wt (!) 237 kg (522 lb 7.8 oz) SpO2 93% BMI 74.97 kg/m² Current Shift:  05/07 0701 - 05/07 1900 In: -  
Out: 3950 [HGYZR:3251] Last three shifts:  05/05 1901 - 05/07 0700 In: 350.2 [I.V.:300.2] Out: 5300 [Urine:5300] Labs: Results:  
   
Chemistry Recent Labs 05/07/20 
5812 05/06/20 
0455 05/05/20 
3866 * 110* 112* * 152* 153* K 4.5 4.3 3.9 * 119* 119* CO2 33* 30 30 BUN 41* 50* 50* CREA 1.16 1.28 1.10 CA 7.8* 8.2* 8.0* AGAP 2* 3 4  
BUCR 35* 39* 45* AP 54 50 43* TP 5.8* 6.3* 5.9* ALB 2.4* 2.6* 2.5*  
GLOB 3.4 3.7 3.4 AGRAT 0.7* 0.7* 0.7* CBC w/Diff Recent Labs 05/07/20 
1716 05/06/20 
0455 05/05/20 
8087 WBC 10.7 11.6 11.9 RBC 4.83 4.84 4.54* HGB 12.8* 12.9* 12.1*  
HCT 43.7 43.9 40.8 * 139 152 GRANS 93* 89* 86* LYMPH 3* 5* 5* EOS 0 0 0 Cardiac Enzymes No results for input(s): CPK, CKND1, CONSTANZA in the last 72 hours. No lab exists for component: Cristina Letters Coagulation Recent Labs 05/07/20 
0350 05/06/20 
0455 PTP 23.3* 22.6* INR 2.1* 2.0* APTT 29.6 30.4 Lipid Panel No results found for: CHOL, CHOLPOCT, CHOLX, CHLST, CHOLV, 191829, HDL, HDLP, LDL, LDLC, DLDLP, 209439, VLDLC, VLDL, TGLX, TRIGL, TRIGP, TGLPOCT, CHHD, CHHDX  
BNP No results for input(s): BNPP in the last 72 hours. Liver Enzymes Recent Labs 05/07/20 
0350 TP 5.8* ALB 2.4* AP 54 SGOT 44* Thyroid Studies Lab Results Component Value Date/Time TSH 1.45 04/27/2020 03:50 AM  
    
Procedures/imaging: see electronic medical records for all procedures/Xrays and details which were not copied into this note but were reviewed prior to creation of Plan

## 2020-05-07 NOTE — PROGRESS NOTES
Pulmonary, Critical Care, and Sleep Medicine Pulmonary Progress Note Name: Merilee Lesches. : 1975 MRN: [id number] Date: 2020 [x]I have reviewed the flowsheet and previous days notes. Events, vitals, medications and notes from last 24 hours reviewed. Subjective/History:  
70-year-old male with morbid obesity, ex-smoker, history of asthma, history of severe obstructive sleep apnea on CPAP, questionable component compliance, history of congestive heart failure with reduced ejection fraction atrial fibrillation atrial flutter on amiodarone, Xarelto, history of chronic renal insufficiency, cellulitis of lower extremities and anemia. As per family did not have any fever or upper respiratory tract infections and no flulike symptoms. On Lasix, CPAP not clear if missed. Presented with acute shortness of breath worsening edema of lower extremities. He was found to be in acute hypercarbic hypoxemic respiratory failure initially had a trial of BiPAP but failed and then eventually was intubated. He was hypotensive. Moderate amount of secretions. Morbid obesity. Patient compliant with Xarelto. Patient self extubated and had PEA arrest. 
 
Subjective:  
20 Patient intubated  for hypoxemic resp failure. Patient remains in icu; on vent support He is morbidly obese; requiring high oxygen requirements since admission to the ICU. PCV mode of ventilation; frequent oxygen desaturations. Episodic pinkish/bloody aspirates from tracheal suctioning Afebrile; BP stable; sinus rhythm UOP good Morbidly obeseweight reported for 90 pounds. Drips: fentanyl, versed, D5W 
 
ROS: Review of systems not obtained due to patient factors. Past Medical History: 
Past Medical History:  
Diagnosis Date  Arrhythmia  Arthritis   
 knees  CHF (congestive heart failure) ([facility] [address]  Chronic back pain  Chronic renal insufficiency, stage II (mild)  History of atrial flutter Dx 2016 - anticoagulated by Gaylon Buerger  Hypertension 2015  Limited mobility  Migraine headache  Morbid obesity ([address]  Morbid obesity with body mass index of 60.0-69.9 in adult [facility])  Sleep apnea   
 uses c-pap instructed to bring day of surgery  Smoking   
 very passive / cigars only Allergy: No Known Allergies Vital Signs:   
Blood pressure 113/65, pulse (!) 46, temperature 98 °F (36.7 °C), resp. rate 16, height 5' 10\" (1.778 m), weight (!) 220 kg (485 lb 0.2 oz), SpO2 92 %. Body mass index is 69.59 kg/m². O2 Device: Ventilator O2 Flow Rate (L/min): 12 l/min Temp (24hrs), Av.6 °F (37 °C), Min:98 °F (36.7 °C), Max:99.1 °F (37.3 °C) Intake/Output:  
Last shift:      701 - 1900 In: -  
Out: 1500 [Urine:1500] Last 3 shifts: 1901 -  0700 In: 350.2 [I.V.:300.2] Out: 5300 [Urine:5300] Intake/Output Summary (Last 24 hours) at 2020 1405 Last data filed at 2020 1200 Gross per 24 hour Intake 250.19 ml Output 3500 ml Net -3249.81 ml Ventilator Settings: 
Mode Rate Tidal Volume Pressure FiO2 PEEP Assist control, Pressure control   580 ml    75 % 12 cm H20 Peak airway pressure: 31 cm H2O Minute ventilation: 11.7 l/min Physical Exam: 
General: sedated, arousable, obese male; no cyanosis; on ventilator HEENT: LIBBY, orally intubated; orogastric tube; no bleeding; pupils not dilated, reactive Neck: thick short obese neck Chest: normal, obese chest wall Lungs: moderate air entry, decreased BS bases; no wheezing or crackles; symmetrical chest expansion; obese chest 
Heart: S1S2 present or without murmur or extra heart sounds; JVD not elevated Abdomen: obese, bowel sounds normoactive, soft without significant tenderness; no organomegaly; no guarding or rigidity Extremity: 2+, pitting and bl LE and 2+ bl UE edema; negative for cyanosis, clubbing Capillary refill: normal 
 Neuro: sedated, arousable, exam limitations Skin: Skin color, texture, turgor fair. Skin dry, warm, non-diaphoretic DATA:  
Current Facility-Administered Medications Medication Dose Route Frequency  docusate sodium (COLACE) capsule 100 mg  100 mg Oral BID  polyethylene glycol (MIRALAX) packet 17 g  17 g Oral BID  methylPREDNISolone (PF) (SOLU-MEDROL) injection 20 mg  20 mg IntraVENous Q12H  
 heparin (porcine) 100 unit/mL injection 500 Units  500 Units InterCATHeter Q8H PRN  
 dextrose 5% infusion  75 mL/hr IntraVENous CONTINUOUS  
 apixaban (ELIQUIS) tablet 5 mg  5 mg Oral BID  bumetanide (BUMEX) injection 1 mg  1 mg IntraVENous DAILY  piperacillin-tazobactam (ZOSYN) 4.5 g in 0.9% sodium chloride (MBP/ADV) 100 mL MBP  4.5 g IntraVENous Q8H  
 fentaNYL citrate (PF) injection 50 mcg  50 mcg IntraVENous Q3H PRN  
 LORazepam (ATIVAN) injection 1 mg  1 mg IntraVENous Q2H PRN  chlorhexidine (PERIDEX) 0.12 % mouthwash 10 mL  10 mL Oral Q12H  
 albuterol-ipratropium (DUO-NEB) 2.5 MG-0.5 MG/3 ML  3 mL Nebulization Q6H PRN  
 acetaminophen (TYLENOL) tablet 650 mg  650 mg Oral Q6H PRN  
 ELECTROLYTE REPLACEMENT PROTOCOL - Calcium   1 Each Other PRN  
 ELECTROLYTE REPLACEMENT PROTOCOL - Magnesium   1 Each Other PRN  
 ELECTROLYTE REPLACEMENT PROTOCOL - Potassium Standard Dosing   1 Each Other PRN  
 ELECTROLYTE REPLACEMENT PROTOCOL - Phosphorus  Standard Dosing  1 Each Other PRN  pantoprazole (PROTONIX) 40 mg in 0.9% sodium chloride 10 mL injection  40 mg IntraVENous DAILY  fentaNYL (PF) 900 mcg/30 ml infusion soln  0-150 mcg/hr IntraVENous TITRATE  midazolam in normal saline (VERSED) 1 mg/mL infusion  0-10 mg/hr IntraVENous TITRATE  aspirin chewable tablet 81 mg  81 mg Oral DAILY  sodium chloride (NS) flush 5-40 mL  5-40 mL IntraVENous Q8H  
 sodium chloride (NS) flush 5-40 mL  5-40 mL IntraVENous PRN Facility-Administered Medications Ordered in Other Encounters Medication Dose Route Frequency  propofoL (DIPRIVAN) 10 mg/mL injection   IntraVENous PRN  
 ePHEDrine (PF) (MISTOLE) 10 mg/mL in NS syringe   IntraVENous PRN  
 PHENYLephrine (RUPERT-SYNEPHRINE) 100 mcg/mL in NS syringe   IntraVENous PRN Telemetry: [x]Sinus []A-flutter []Paced []A-fib []Multiple PVCs  
              
4/22/20 ECHO · Left Ventricle: Normal wall thickness and systolic function (ejection fraction normal). Mildly dilated left ventricle. The estimated ejection fraction is 45 - 50%. There is moderate (grade 2) left ventricular diastolic dysfunction E/e' Ratio = 17.09. Wall Scoring: The left ventricular wall motion is globally hypokinetic. · Right Ventricle: Not well visualized. Normal global systolic function. Moderately dilated right ventricle. · Right Atrium: Right atrium not well visualized. Moderately dilated right atrium. · IVC/Hepatic Veins: Dilated inferior vena cava. Mechanically ventilated; cannot use inferior caval vein diameter to estimate central venous pressure. Labs: 
Recent Results (from the past 24 hour(s)) GLUCOSE, POC Collection Time: 05/06/20  6:01 PM  
Result Value Ref Range Glucose (POC) 98 70 - 110 mg/dL METABOLIC PANEL, COMPREHENSIVE Collection Time: 05/07/20  3:50 AM  
Result Value Ref Range Sodium 153 (H) 136 - 145 mmol/L Potassium 4.5 3.5 - 5.5 mmol/L Chloride 118 (H) 100 - 111 mmol/L  
 CO2 33 (H) 21 - 32 mmol/L Anion gap 2 (L) 3.0 - 18 mmol/L Glucose 108 (H) 74 - 99 mg/dL BUN 41 (H) 7.0 - 18 MG/DL Creatinine 1.16 0.6 - 1.3 MG/DL  
 BUN/Creatinine ratio 35 (H) 12 - 20 GFR est AA >60 >60 ml/min/1.73m2 GFR est non-AA >60 >60 ml/min/1.73m2 Calcium 7.8 (L) 8.5 - 10.1 MG/DL Bilirubin, total 0.9 0.2 - 1.0 MG/DL  
 ALT (SGPT) 33 16 - 61 U/L  
 AST (SGOT) 44 (H) 10 - 38 U/L Alk. phosphatase 54 45 - 117 U/L Protein, total 5.8 (L) 6.4 - 8.2 g/dL Albumin 2.4 (L) 3.4 - 5.0 g/dL Globulin 3.4 2.0 - 4.0 g/dL A-G Ratio 0.7 (L) 0.8 - 1.7    
CBC WITH AUTOMATED DIFF Collection Time: 05/07/20  3:50 AM  
Result Value Ref Range WBC 10.7 4.6 - 13.2 K/uL  
 RBC 4.83 4.70 - 5.50 M/uL  
 HGB 12.8 (L) 13.0 - 16.0 g/dL HCT 43.7 36.0 - 48.0 % MCV 90.5 74.0 - 97.0 FL  
 MCH 26.5 24.0 - 34.0 PG  
 MCHC 29.3 (L) 31.0 - 37.0 g/dL  
 RDW 15.5 (H) 11.6 - 14.5 % PLATELET 598 (L) 446 - 420 K/uL MPV 11.8 9.2 - 11.8 FL  
 NEUTROPHILS 93 (H) 40 - 73 % LYMPHOCYTES 3 (L) 21 - 52 % MONOCYTES 4 3 - 10 % EOSINOPHILS 0 0 - 5 % BASOPHILS 0 0 - 2 %  
 ABS. NEUTROPHILS 9.9 (H) 1.8 - 8.0 K/UL  
 ABS. LYMPHOCYTES 0.4 (L) 0.9 - 3.6 K/UL  
 ABS. MONOCYTES 0.5 0.05 - 1.2 K/UL  
 ABS. EOSINOPHILS 0.0 0.0 - 0.4 K/UL  
 ABS. BASOPHILS 0.0 0.0 - 0.1 K/UL  
 DF AUTOMATED MAGNESIUM Collection Time: 05/07/20  3:50 AM  
Result Value Ref Range Magnesium 2.6 1.6 - 2.6 mg/dL PHOSPHORUS Collection Time: 05/07/20  3:50 AM  
Result Value Ref Range Phosphorus 3.0 2.5 - 4.9 MG/DL  
CALCIUM, IONIZED Collection Time: 05/07/20  3:50 AM  
Result Value Ref Range Ionized Calcium 1.19 1.12 - 1.32 MMOL/L  
PTT Collection Time: 05/07/20  3:50 AM  
Result Value Ref Range aPTT 29.6 23.0 - 36.4 SEC PROTHROMBIN TIME + INR Collection Time: 05/07/20  3:50 AM  
Result Value Ref Range Prothrombin time 23.3 (H) 11.5 - 15.2 sec INR 2.1 (H) 0.8 - 1.2 POC G3 Collection Time: 05/07/20  4:40 AM  
Result Value Ref Range Device: VENT    
 FIO2 (POC) 100 % pH (POC) 7.364 7.35 - 7.45    
 pCO2 (POC) 55.1 (H) 35.0 - 45.0 MMHG  
 pO2 (POC) 59 (L) 80 - 100 MMHG  
 HCO3 (POC) 31.4 (H) 22 - 26 MMOL/L  
 sO2 (POC) 89 (L) 92 - 97 % Base excess (POC) 6 mmol/L Mode ASSIST CONTROL Set Rate 16 bpm  
 PEEP/CPAP (POC) 10 cmH2O  
 PIP (POC) 17 Allens test (POC) N/A Total resp. rate 20 Site DRAWN FROM ARTERIAL LINE Specimen type (POC) ARTERIAL  Performed by Charbel Atkins   
 Pressure control YES    
 GLUCOSE, POC Collection Time: 05/07/20 11:12 AM  
Result Value Ref Range Glucose (POC) 90 70 - 110 mg/dL Recent Labs 05/07/20 
1901 05/06/20 
3665 05/06/20 
9413 FIO2I 100 60 100 HCO3I 31.4* 31.3* 26.8*  
PCO2I 55.1* 51.0* 38.9 PHI 7.364 7.395 7.447 PO2I 59* 51* 90 All Micro Results Procedure Component Value Units Date/Time CULTURE, RESPIRATORY/SPUTUM/BRONCH Marysol Romero [[id number]] Collected:  05/04/20 1315 Order Status:  Canceled Specimen:  Sputum from Tracheal Aspirate CULTURE, RESPIRATORY/SPUTUM/BRONCH Marian Pe[place] STAIN [[id number]]  (Abnormal)  (Susceptibility) Collected:  04/22/20 1018 Order Status:  Completed Specimen:  Sputum from Tracheal Aspirate Updated:  04/25/20 4902 Special Requests: NO SPECIAL REQUESTS     
  GRAM STAIN FEW WBCS SEEN     
      
  OCCASIONAL EPITHELIAL CELLS SEEN  
     
      
  FEW GRAM POSITIVE COCCI IN CLUSTERS  
     
   RARE GRAM NEGATIVE RODS Culture result:    
  LIGHT STAPHYLOCOCCUS AUREUS  
     
      
  LIGHT NORMAL RESPIRATORY BRYN  
     
 CULTURE, BLOOD [[id number]] Collected:  04/24/20 1015 Order Status:  Canceled Specimen:  Blood Imaging: 
[x]I have personally reviewed the patients chest radiographs images and report AXR 5/6 Results from [facility] encounter on 04/21/20 XR ABD (KUB) Narrative EXAM: SUPINE ABDOMINAL RADIOGRAPH 
 
CLINICAL INDICATION/HISTORY: eval for ileus 
-Additional: None COMPARISON: 5/6/2020 TECHNIQUE: Single supine view of the abdomen. 
 
_______________ Impression FINDINGS/IMPRESSION: 
 
BOWEL GAS PATTERN: Limited study secondary to body habitus. Enteric tube poorly 
visualized. Contrast injection demonstrates tip at the GE junction/proximal 
stomach. Oral contrast within the colon. Chest x-ray 1 view 5/7 COMPARISON: 5/6/2020 FINDINGS:  
Stable and satisfactory position endotracheal tube and left arm PICC line.  No 
 significant change in bilateral areas of airspace filling process with stable 
mildly enlarged cardiac silhouette with no definite pleural effusion or 
pneumothorax. IMPRESSION: 
1. Stable chest. 
 
  
US LEs 4/22/2020 Interpretation Summary · No evidence of deep vein thrombosis in the right lower extremity veins assessed. · No evidence of deep vein thrombosis in the left lower extremity veins assessed. Echo 4/22 Result status: Edited Result - FINAL Addendum by Lord Laura MD on Wed Apr 22, 2020  5:12 PM  
· Left Ventricle: Normal wall thickness and systolic function (ejection fraction normal). Mildly dilated left ventricle. The estimated ejection fraction is 45 - 50%. There is moderate (grade 2) left ventricular diastolic dysfunction E/e' Ratio = 17.09. Wall Scoring: The left ventricular wall motion is globally hypokinetic. · Right Ventricle: Not well visualized. Normal global systolic function. Moderately dilated right ventricle. · Right Atrium: Right atrium not well visualized. Moderately dilated right atrium. · IVC/Hepatic Veins: Dilated inferior vena cava. Mechanically ventilated; cannot use inferior caval vein diameter to estimate central venous pressure. IMPRESSION:  
Patient Active Problem List  
Diagnosis Code  Acute on chronic respiratory failure with hypoxia and hypercapnia ([facility]) J96.21, J96.22  
 Acute on chronic systolic and diastolic CHF (congestive heart failure) ([facility]) I50.43 Aspiration pneumonitis from multiple patient initiated ET tube displacement events J69.0  Cardiac arrest ([facility] [address] I46.9 MSSA in sputum culture A49.01  
 Elevated troponin, NSTEMI R79.89, I21.4  Chronic a-fib I48.20  Atrial flutter ([address] I48.92 Prolonged QTc R94.31  
 Chronic renal insufficiency, stage II (mild) N18.2 Thrombocytopenia D69.6  Smoking F17.200  Arthritis M19.90  Migraine G43.909  Chronic back pain M54.9, G89.29  
  Obstructive Sleep apnea G47.33  
 Morbid obesity ([facility]) E66.01  
  
PLAN:  
RS - DAY 16 on vent; continue mech vent; PCV mode; today, patient requiring PEEP of 12 and FiO2 100%. It appears that he goes into pulmonary edema which explains the episodic pinkish/bloody tracheal secretions with oxygen desaturations. Patient also has chronic obesity, MARIA FERNANDA and likely severe obesity hypoventilation; tolerate O2 sats 85% or above. Recent concerns for some aspiration around ET tube with vomiting, and hx of self extubations VAP prevention bundle, head of the bed at 30' all times Patient is off paralytic medication since 5/4/2020; sedation Versed and fentanylwean as tolerated; added Ativan prn to help decrease Versed needs; since patient risk of self extubation, he has bilateral soft wrist restraints. Vent and sedation bundles Continue bronchodilators prn; pulmonary hygiene care; chest x-ray limited by obesity; discussed with radiology techpatient weight and chest limitations for chest CT study. Okay to discontinue steroids. Dr Wilfred Anthony in ENT on case for possible trach when fio2 and peep requirements are down CVS - sinus natalia; stable BP; Dr. Aleah Connor on the case I would start patient on a Lasix drip; follow urine output, renal function and electrolytes including potassium, sodium. EF 45-50%; dilated RV on echo; US legs neg; stable BP not needing pressors. ID - SARS-COV-2 negative x 2 Antibiotic choice: Zosyn started on 5/2/20 with low grade fever; MSSA in resp cxs from before; follow respiratory cultures from 5/4; procalcitonin less than 0.05 Hem - HIT panel and LEYDI Ab NEG on 4/28. He is definitely high risk for PEs; CTA chest cannot be done due to limitations due to weight; stopped argatroban 5/4; INR 2.0 this a.m.; continue Eliquis 5 mg twice daily; watch for any karla bloody secretions from tracheal tube. Vasc - PVL bl LE negative Renal -start Lasix drip 8 mg/h, after bolusing 80 mg of Lasix; discontinue Bumex; sodium remains elevated153; D5W increased 100 cc an hour; resume free water 200 mL every 4 hrs. Nephrology Dr. Faviola Soria had signed off.   
GI - GI placed OG tube; good position confirmed with abdominal x-ray today as well; discussed with RN to resume tube feedings. . 
Neurology: sedated with limitations; patient follows simple commands, and moving extremities; sedation reviewed with staff daily; patient has history of self extubations this admission. Prognosis guarded. I have updated family on 5/6 at [address], mother and brother. I discussed about medical management. I reviewed the comorbidities, and recommendations for tracheostomy once oxygenation is better. Full code. Will defer respective systems problem management to primary and other consultant and follow patient in ICU with primary and other medical team 
Quality Care: PPI, DVT prophylaxis, HOB elevated, Infection control all reviewed and addressed. PAIN AND SEDATION: Fentanyl, Versed · Skin/Wound: chronic venous stasis changes · Nutrition:  Start tube feeding · Prophylaxis: DVT and GI Prophylaxis reviewed. · Restraints: prn 
· PT/OT eval and treat: as needed when stable · Lines/Tubes: A-line 4/23/20; daniel 4/21/20, ET tube 4/25/20; PICC line left arm 5/5 ADVANCE DIRECTIVE: Full code DISCUSSION: spoke with RN, RT, ICU staff Events and notes from last 24 hours reviewed. Care plan discussed with nursing   
 
 
[x]Total critical care time exclusive of procedures 39 minutes with complex decision making performed Tommy Romero MD

## 2020-05-07 NOTE — PROGRESS NOTES
RESPIRATORY NOTE: 
Pt had episode of O2 desaturation and Pressure cycling vent, RN says large amt of bloody secretions from ET tube, Lasix being given, sedation, increased FIO2 to 100% at this time.

## 2020-05-07 NOTE — PROGRESS NOTES
Patient resting quietly on ventilator support. Bilateral wrist restraints in use. No acute distress.

## 2020-05-07 NOTE — PROGRESS NOTES
NUTRITION FOLLOW-UP 
 
RECOMMENDATIONS/PLAN:  
- EN: Recc restarting tube feeds per MD of Vital High Protein @ 20ml/hr Recc starting at 5ml/hr an increasing by 5ml/hr Q6 until goal rate met Will defer Free H20 to MD 
Monitor labs/lytes, tube feed tolerance, skin integrity, wt, fluid status, BM 
 
NUTRITION ASSESSMENT:  
Client Update: 53 yrs old Male with  resp failure w/ hypoxia and hypercapnia-intubated in ICU, COVID 19 ruled out, hypotension, acute on chronic systolic CHF, elevated trop, morbid obesity, pt self extubated w/ NSTEMI due to PEA arrest, chronic a-fib, elevated trop-cardiology following; MD note pt vomited over weekend; NG tube was removed and replaced; nurse note last night reported tube feed like secretions in pt mouth; spoke w/ MD regarding tube feeds wants to restart tube feed with a goal of 20ml/hr; will start slow and adv slowly to monitor for tolerance FOOD/NUTRITION INTAKE Diet Order:  NPO Food Allergies: NKFA Average PO Intake:     
No data found. Pertinent Medications:  [x] Reviewed; colace, methylprednisolone, miralax, propofol, Electrolyte Replacement Protocol: [x]K [x]Mg [x]PO4 Insulin:  []SSI  []Pre-meal   []Basal    []Drip  []None Cultural/Yazdanism Food Preferences: None Identified BIOCHEMICAL DATA & MEDICAL TESTS Pertinent Labs:  [x] Reviewed; Na-153   ANTHROPOMETRICS Height: 5' 10\" (177.8 cm)       Weight: (!) 220 kg (485 lb 0.2 oz) BMI: 69.6 kg/m^2 morbidly obese (Greater than or = to 40% BMI) Adm Weight: 485 lbs                Weight change: 0lbs Adjusted Body Weight: 124.2kg NUTRITION-FOCUSED PHYSICAL ASSESSMENT Skin: No PU    
GI:No BM 
 
NUTRITION PRESCRIPTION Calories: 1886kcal/day based on 25kcal/kg of IBW Protein: 113- 151 g/day based on 1.5-2.0 g/kg of IBW 
CHO: 236 g/day based on 50% of total energy Fluid: 1886 ml/day based on 1 kcal/ml              
 
NUTRITION DIAGNOSES:  
 1. Inadequate oral intake related to intubation as evidence by need for nutrition support  
  
  
NUTRITION INTERVENTIONS:  
INTERVENTIONS:                                                                                         GOALS: 
1.   EN: Recc restarting tube feeds per MD of Vital High Protein @ 20ml/hr Recc starting at 5ml/hr an increasing by 5ml/hr Q6 until goal rate met Will defer Free H20 to MD 
 1. Restart tube feeds by next review 4 days  
  
 
LEARNING NEEDS (Diet, Supplementation, Food/Nutrient-Drug Interaction): 
 [] None Identified   [] Education provided/documented      Identified and patient: [] Declined   [x] Was not appropriate/indicated NUTRITION MONITORING /EVALUATION:  
Monitor wt Monitor renal labs, electrolytes, fluid status Previous Recommendations Implemented: no       
Previous Goals Met:  no -tube feeds remained on hold  
   
[] Participated in Interdisciplinary Rounds   
[x] [facility] Reviewed DISCHARGE NUTRITION RECOMMENDATIONS ADDRESSED:  
  [x] To be determined closer to discharge NUTRITION RISK:           [x] At risk                        [] Not currently at risk Will follow-up per policy. Vini Haile 1

## 2020-05-07 NOTE — PROGRESS NOTES
RESPIRATORY NOTE: 
Pt continues to have periods of O2 desaturation, on PEEP 12 per MD, titrating FIO2 as tolerated, will monitor.

## 2020-05-07 NOTE — PROGRESS NOTES
Patient SpO2 dropped into mid 70's, oral and endotracheal suctioning completed. Contents of mouth appear to be tube feed, bloody/tan secretions noted to inline suction. Dr. Lucia Atkinson notified, patient to be started on bowel regimen and tube feeds held.

## 2020-05-07 NOTE — PROGRESS NOTES
Chart reviewed noted family visited pt yesterday per palliative not and that wife wishes for trach if unable to wean from vent,cm will cont to review and remain available.

## 2020-05-08 NOTE — PROGRESS NOTES
NUTRITION UPDATE 
 
- tolerating current tube feeds; recc another day of trickle feeds before adv to goal  
 
Will have on call dietitian follow up with further recommendations this weekend Viki Stewart RD 
PAGER:  [phone]

## 2020-05-08 NOTE — PROGRESS NOTES
Hospitalist Progress Note Patient: Britni Rivas. MRN: [id number]  CSN: [id number] YOB: 1975  Age: 53 y.o. Sex: male DOA: 4/21/2020 LOS:  LOS: 17 days Assessment/Plan 1. Acute respiratory failure due to MOHS, sp pea arrest  
2. CHF EF 45% 3. Possible aspiration pneumonia w MSSA in sputum 4. Thromobcytopnia, stable, HIT panel neg 5. Hypernatremia Plan: 
- vent support per pulmonology 
- continue zosyn 
- lasix gtt 
- monitor counts 
- supportive care - appreciate all consultants Patient Active Problem List  
Diagnosis Code  Cellulitis of left lower leg L03. 116  
 Anemia D64.9  Morbid obesity (HCC) E66.01  
 Dyspnea R06.00  Sleep apnea G47.30  Sinus tachycardia R00.0  BMI 70 and over, adult [facility]) W20.48  Chronic back pain M54.9, G89.29  
 Arthritis M19.90  Migraine G43.909  
 History of atrial flutter Z86.79  
 Limited mobility Z74.09  
 Smoking F17.200  Chronic renal insufficiency, stage II (mild) N18.2  CHF (congestive heart failure) ([facility]) I50.9  Acute diastolic CHF (congestive heart failure) ([facility]) I50.31  Chronic a-fib I48.20  Atrial flutter ([facility]) I48.92  
 Elevated troponin R79.89  
 Acute respiratory distress R06.03  
 Acute CHF (congestive heart failure) ([facility]) I50.9  Respiratory failure with hypoxia and hypercapnia ([facility]) J96.91, J96.92  
 Acute on chronic respiratory failure with hypoxia and hypercapnia ([facility]) J96.21, J96.22  
 Cardiac arrest ([facility]) I46.9 Subjective:  
 cc: respiratory failulure No acute events overnight REVIEW OF SYSTEMS: 
Unable to obtain 2/2 sedated on vent. Objective:  
  
 
Vital signs/Intake and Output: 
Visit Vitals /83 Pulse 72 Temp 98.7 °F (37.1 °C) Resp 16 Ht 5' 10\" (1.778 m) Wt (!) 232.6 kg (512 lb 12.6 oz) SpO2 90% BMI 73.58 kg/m² Current Shift:  05/08 0701 - 05/08 1900 In: 418 [I.V.:418] Out: 800 [Urine:800] Last three shifts:  05/06 1901 - 05/08 0700 In: 6389.7 [I.V.:5639.7] Out: 57920 [OYBON:[id number]] Body mass index is 73.58 kg/m². Physical Exam: 
GEN: sedated on vent EYES: conjunctiva normal, lids with out lesions HEENT: MMM, No thyromegaly, no lymphadenopathy HEART: RRR +S1 +S2, no JVD, pulses 2+ distally LUNGS: coarse bilat, equal expansion ABDOMEN: + BS, soft NT/ND no organomegaly,  No rebound EXTREMITIES: No edema cyanosis, cap refill normal 
 SKIN: no rashes or skin breakdown, no nodules, normal turgor Current Facility-Administered Medications Medication Dose Route Frequency  furosemide (LASIX) 100 mg in 0.9% sodium chloride 100 mL infusion  8 mg/hr IntraVENous CONTINUOUS  
 LORazepam (ATIVAN) injection 2 mg  2 mg IntraVENous Q4H PRN  
 docusate sodium (COLACE) capsule 100 mg  100 mg Oral BID  polyethylene glycol (MIRALAX) packet 17 g  17 g Oral BID  heparin (porcine) 100 unit/mL injection 500 Units  500 Units InterCATHeter Q8H PRN  
 dextrose 5% infusion  100 mL/hr IntraVENous CONTINUOUS  
 [Held by provider] apixaban (ELIQUIS) tablet 5 mg  5 mg Oral BID  piperacillin-tazobactam (ZOSYN) 4.5 g in 0.9% sodium chloride (MBP/ADV) 100 mL MBP  4.5 g IntraVENous Q8H  
 fentaNYL citrate (PF) injection 50 mcg  50 mcg IntraVENous Q3H PRN  chlorhexidine (PERIDEX) 0.12 % mouthwash 10 mL  10 mL Oral Q12H  
 albuterol-ipratropium (DUO-NEB) 2.5 MG-0.5 MG/3 ML  3 mL Nebulization Q6H PRN  
 acetaminophen (TYLENOL) tablet 650 mg  650 mg Oral Q6H PRN  
 ELECTROLYTE REPLACEMENT PROTOCOL - Calcium   1 Each Other PRN  
 ELECTROLYTE REPLACEMENT PROTOCOL - Magnesium   1 Each Other PRN  
 ELECTROLYTE REPLACEMENT PROTOCOL - Potassium Standard Dosing   1 Each Other PRN  
 ELECTROLYTE REPLACEMENT PROTOCOL - Phosphorus  Standard Dosing  1 Each Other PRN  pantoprazole (PROTONIX) 40 mg in 0.9% sodium chloride 10 mL injection  40 mg IntraVENous DAILY  fentaNYL (PF) 900 mcg/30 ml infusion soln  0-200 mcg/hr IntraVENous TITRATE  midazolam in normal saline (VERSED) 1 mg/mL infusion  0-10 mg/hr IntraVENous TITRATE  aspirin chewable tablet 81 mg  81 mg Oral DAILY  sodium chloride (NS) flush 5-40 mL  5-40 mL IntraVENous Q8H  
 sodium chloride (NS) flush 5-40 mL  5-40 mL IntraVENous PRN Facility-Administered Medications Ordered in Other Encounters Medication Dose Route Frequency  propofoL (DIPRIVAN) 10 mg/mL injection   IntraVENous PRN  
 ePHEDrine (PF) (MISTOLE) 10 mg/mL in NS syringe   IntraVENous PRN  
 PHENYLephrine (RUPERT-SYNEPHRINE) 100 mcg/mL in NS syringe   IntraVENous PRN All the patient's labs over the past 24 hours were reviewed both during my initial daily workflow process and at the time notated as \"note time\" in [facility]. (It is not time stamped separately in this workflow.)  Select labs are listed below. Labs: Results:  
   
Chemistry Recent Labs 05/08/20 
7922 05/07/20 
2311 05/07/20 
0350 05/06/20 
8610 * 111* 108* 110* * 150* 153* 152* K 3.7 3.8 4.5 4.3 * 114* 118* 119* CO2 34* 34* 33* 30 BUN 39* 38* 41* 50* CREA 1.06 1.00 1.16 1.28  
CA 7.9* 8.0* 7.8* 8.2* AGAP 4 2* 2* 3 BUCR 37* 38* 35* 39* AP 53  --  54 50  
TP 5.7*  --  5.8* 6.3* ALB 2.3*  --  2.4* 2.6*  
GLOB 3.4  --  3.4 3.7 AGRAT 0.7*  --  0.7* 0.7* CBC w/Diff Recent Labs 05/08/20 
5767 05/07/20 
0350 05/06/20 
1964 WBC 10.7 10.7 11.6 RBC 4.87 4.83 4.84  
HGB 12.8* 12.8* 12.9*  
HCT 43.6 43.7 43.9 * 131* 139 GRANS 84* 93* 89* LYMPH 10* 3* 5* EOS 1 0 0 Coagulation Recent Labs 05/08/20 
9723 05/07/20 
0350 PTP 25.8* 23.3* INR 2.4* 2.1* APTT 34.0 29.6 Liver Enzymes Recent Labs 05/08/20 
3026 TP 5.7* ALB 2.3* AP 53 SGOT 48* Thyroid Studies Lab Results Component Value Date/Time  TSH 1.45 04/27/2020 03:50 AM  
    
 Procedures/imaging: see electronic medical records for all procedures/Xrays and details which were not copied into this note but were reviewed prior to creation of Plan Mary Ann Suárez DO Internal Medicine/Geriatrics

## 2020-05-08 NOTE — PROGRESS NOTES
0710- Bedside and Verbal shift change report given to Vida De Dios RN (oncoming nurse) by Laura Arias RN (offgoing nurse). Report included the following information SBAR, Kardex, MAR and Recent Results. 0800- Shift assessment complete. See flow sheet. 1905- Bedside and Verbal shift change report given to Ike Burt (oncoming nurse) by Vida De Dios RN (offgoing nurse). Report included the following information SBAR, Kardex, MAR and Recent Results.

## 2020-05-08 NOTE — PROGRESS NOTES
Cardiology Progress Note Patient: Len Dueñas. Sex: male          DOA: 4/21/2020 YOB: 1975      Age:  53 y.o.        LOS:  LOS: 16 days Patient seen and examined, chart reviewed. Assessment/Plan Patient Active Problem List  
Diagnosis Code  Cellulitis of left lower leg L03. 116  
 Anemia D64.9  Morbid obesity ([facility]) E66.01  
 Dyspnea R06.00  Sleep apnea G47.30  Sinus tachycardia R00.0  BMI 70 and over, adult [facility]) I90.49  Chronic back pain M54.9, G89.29  
 Arthritis M19.90  Migraine G43.909  
 History of atrial flutter Z86.79  
 Limited mobility Z74.09  
 Smoking F17.200  Chronic renal insufficiency, stage II (mild) N18.2  CHF (congestive heart failure) ([facility]) I50.9  Acute diastolic CHF (congestive heart failure) ([facility]) I50.31   Atrial flutter ([facility]) I48.92  
 Elevated troponin R79.89  
 Acute respiratory distress R06.03  
 Acute CHF (congestive heart failure) ([facility]) I50.9  Respiratory failure with hypoxia and hypercapnia ([facility]) J96.91, J96.92  
 Acute on chronic respiratory failure with hypoxia and hypercapnia ([facility]) J96.21, J96.22  
 Cardiac arrest ([facility]) I46.9 Acute on chronic systolic heart failure NSTEMI type II due to PEA arrest 
Paroxysmal atrial flutter Thrombocytopenia Hypernatremia Bradycardia Patient self extubated and had PEA arrest. 
He had brief episode of A fib with RVR and was started On Amiodarone drip. Currently he is in Sinus rhythm. Amiodarone discontinued due to bradycardia. 
  
Echocardiogram  
  
· Left Ventricle: Normal wall thickness and systolic function (ejection fraction normal). Mildly dilated left ventricle. The estimated ejection fraction is 45 - 50%. There is moderate (grade 2) left ventricular diastolic dysfunction E/e' Ratio = 17.09. Wall Scoring: The left ventricular wall motion is globally hypokinetic. · Right Ventricle: Not well visualized. Normal global systolic function. Moderately dilated right ventricle. · Right Atrium: Right atrium not well visualized. Moderately dilated right atrium. · IVC/Hepatic Veins: Dilated inferior vena cava. Mechanically ventilated; cannot use inferior caval vein diameter to estimate central venous pressure. 
  
IV heparin was discontinued due to thrombocytopenia and bleeding at central line site. Started on Argatroban. Patient had frequent oxygen desaturations and pinkish/bloody aspirate from tracheal suctioning. Plan: 
 
Continue Aspirin Continue Lasix drip Adjust Bumex as per volume status, I/O and Renal function Strict I/O Avoid any AV debbie blocking agents Monitor electrolytes and renal function Continue Ventilatory support. Continue management as per hospital medicine and pulmonary critical care Plan discussed with patient's nurse. Subjective:  
 cc: 
Orally intubated REVIEW OF SYSTEMS:  
 
Can not obtain Objective:  
  
Visit Vitals /70 Pulse (!) 53 Temp 97.3 °F (36.3 °C) Resp 16 Ht 5' 10\" (1.778 m) Wt (!) 237 kg (522 lb 7.8 oz) SpO2 95% BMI 74.97 kg/m² Body mass index is 74.97 kg/m². Physical Exam: 
General Appearance: Comfortable, Morbidly obese, not using accessory muscles of respiration. Orally intubated HEENT: AMMON. HEAD: Atraumatic NECK: No JVD, no thyroidomeglay. CAROTIDS: No bruit LUNGS: bilateral conducted sounds HEART: S1+S2 audible, no murmur, no pericardial rub. NEUROLOGICAL: Sedated. Medication: 
Current Facility-Administered Medications Medication Dose Route Frequency  furosemide (LASIX) 100 mg in 0.9% sodium chloride 100 mL infusion  8 mg/hr IntraVENous CONTINUOUS  
 LORazepam (ATIVAN) injection 2 mg  2 mg IntraVENous Q4H PRN  
 docusate sodium (COLACE) capsule 100 mg  100 mg Oral BID  polyethylene glycol (MIRALAX) packet 17 g  17 g Oral BID  
  heparin (porcine) 100 unit/mL injection 500 Units  500 Units InterCATHeter Q8H PRN  
 dextrose 5% infusion  100 mL/hr IntraVENous CONTINUOUS  
 [Held by provider] apixaban (ELIQUIS) tablet 5 mg  5 mg Oral BID  piperacillin-tazobactam (ZOSYN) 4.5 g in 0.9% sodium chloride (MBP/ADV) 100 mL MBP  4.5 g IntraVENous Q8H  
 fentaNYL citrate (PF) injection 50 mcg  50 mcg IntraVENous Q3H PRN  chlorhexidine (PERIDEX) 0.12 % mouthwash 10 mL  10 mL Oral Q12H  
 albuterol-ipratropium (DUO-NEB) 2.5 MG-0.5 MG/3 ML  3 mL Nebulization Q6H PRN  
 acetaminophen (TYLENOL) tablet 650 mg  650 mg Oral Q6H PRN  
 ELECTROLYTE REPLACEMENT PROTOCOL - Calcium   1 Each Other PRN  
 ELECTROLYTE REPLACEMENT PROTOCOL - Magnesium   1 Each Other PRN  
 ELECTROLYTE REPLACEMENT PROTOCOL - Potassium Standard Dosing   1 Each Other PRN  
 ELECTROLYTE REPLACEMENT PROTOCOL - Phosphorus  Standard Dosing  1 Each Other PRN  pantoprazole (PROTONIX) 40 mg in 0.9% sodium chloride 10 mL injection  40 mg IntraVENous DAILY  fentaNYL (PF) 900 mcg/30 ml infusion soln  0-200 mcg/hr IntraVENous TITRATE  midazolam in normal saline (VERSED) 1 mg/mL infusion  0-10 mg/hr IntraVENous TITRATE  aspirin chewable tablet 81 mg  81 mg Oral DAILY  sodium chloride (NS) flush 5-40 mL  5-40 mL IntraVENous Q8H  
 sodium chloride (NS) flush 5-40 mL  5-40 mL IntraVENous PRN Facility-Administered Medications Ordered in Other Encounters Medication Dose Route Frequency  propofoL (DIPRIVAN) 10 mg/mL injection   IntraVENous PRN  
 ePHEDrine (PF) (MISTOLE) 10 mg/mL in NS syringe   IntraVENous PRN  
 PHENYLephrine (RUPERT-SYNEPHRINE) 100 mcg/mL in NS syringe   IntraVENous PRN Lab/Data Reviewed: 
 
  
Recent Labs 05/07/20 
0227 05/06/20 
0455 05/05/20 
6456 WBC 10.7 11.6 11.9 HGB 12.8* 12.9* 12.1*  
HCT 43.7 43.9 40.8 * 139 152 Recent Labs 05/07/20 
7670 05/06/20 
0455 05/05/20 
6486 * 152* 153* K 4.5 4.3 3.9 * 119* 119* CO2 33* 30 30 * 110* 112* BUN 41* 50* 50* CREA 1.16 1.28 1.10 CA 7.8* 8.2* 8.0*  
 
39 minutes of critical care time spent in the direct evaluation and treatment of this high risk patient. The reason for providing this level of medical care for this critically ill patient was due a critical illness that impaired one or more vital organ systems such that there was a high probability of imminent or life threatening deterioration in the patients condition. This care involved high complexity decision making to assess, manipulate, and support vital system functions, to treat this degree vital organ system failure and to prevent further life threatening deterioration of the patients condition. Signed By: Marika Kim MD   
 May 7, 2020

## 2020-05-08 NOTE — PROGRESS NOTES
Pulmonary, Critical Care, and Sleep Medicine Pulmonary Progress Note Name: Len Dueñas. : 1975 MRN: [id number] Date: 2020 [x]I have reviewed the flowsheet and previous days notes. Events, vitals, medications and notes from last 24 hours reviewed. Subjective/History:  
78-year-old male with morbid obesity, ex-smoker, history of asthma, history of severe obstructive sleep apnea on CPAP, questionable component compliance, history of congestive heart failure with reduced ejection fraction atrial fibrillation atrial flutter on amiodarone, Xarelto, history of chronic renal insufficiency, cellulitis of lower extremities and anemia. As per family did not have any fever or upper respiratory tract infections and no flulike symptoms. On Lasix, CPAP not clear if missed. Presented with acute shortness of breath worsening edema of lower extremities. He was found to be in acute hypercarbic hypoxemic respiratory failure initially had a trial of BiPAP but failed and then eventually was intubated. He was hypotensive. Moderate amount of secretions. Morbid obesity. Patient compliant with Xarelto. Patient self extubated and had PEA arrest. 
 
Subjective:  
20 Patient intubated  for hypoxemic resp failure. Patient remains in icu; on vent support He is morbidly obese; requiring high oxygen requirements since admission to the ICU. PCV mode of ventilation; frequent oxygen desaturations-suctioned pinkish aspirates from airways Afebrile; BP stable; sinus rhythm/sinus bradycardia UOP 9.3 L yesterday with Lasix drip Morbidly obeseweight reported 490 pounds. Drips: fentanyl, versed, D5W, Lasix ROS: Review of systems not obtained due to patient factors. Past Medical History: 
Past Medical History:  
Diagnosis Date  Arrhythmia  Arthritis   
 knees  CHF (congestive heart failure) ([facility] [address]  Chronic back pain  Chronic renal insufficiency, stage II (mild)  History of atrial flutter Dx 2016 - anticoagulated by Ro Moya  Hypertension 2015  Limited mobility  Migraine headache  Morbid obesity ([facility] [address]  Morbid obesity with body mass index of 60.0-69.9 in adult [facility])  Sleep apnea   
 uses c-pap instructed to bring day of surgery  Smoking   
 very passive / cigars only Allergy: No Known Allergies Vital Signs:   
Blood pressure 130/83, pulse 72, temperature 98.7 °F (37.1 °C), resp. rate 16, height 5' 10\" (1.778 m), weight (!) 232.6 kg (512 lb 12.6 oz), SpO2 90 %. Body mass index is 73.58 kg/m². O2 Device: Endotracheal tube, Ventilator O2 Flow Rate (L/min): 12 l/min Temp (24hrs), Av.2 °F (36.8 °C), Min:97.3 °F (36.3 °C), Max:98.7 °F (37.1 °C) Intake/Output:  
Last shift:       07 -  1900 In: 418 [I.V.:418] Out: 1100 [Urine:1100] Last 3 shifts:  190 -  0700 In: 6389.7 [I.V.:5639.7] Out: 69695 [[id number]] Intake/Output Summary (Last 24 hours) at 2020 1341 Last data filed at 2020 1214 Gross per 24 hour Intake 6757.65 ml Output 8925 ml Net -2167.35 ml Ventilator Settings: 
Mode Rate Tidal Volume Pressure FiO2 PEEP Pressure control   580 ml    100 % 12 cm H20 Peak airway pressure: 32 cm H2O Minute ventilation: 13 l/min Physical Exam: 
General: sedated, arousable, obese male; no cyanosis; on ventilator HEENT: LIBBY, orally intubated; orogastric tube; no bleeding; pupils not dilated, reactive Neck: thick short obese neck Chest: normal, obese chest wall Lungs: moderate air entry, obese chest limits exam; decreased BS bases; symmetrical chest expansion Heart: S1S2 present or without murmur or extra heart sounds; JVD not elevated Abdomen: obese, bowel sounds normoactive, soft without significant tenderness; no organomegaly; no guarding or rigidity Extremity: 2+, pitting bilateral thigh and arm edema; negative for cyanosis, clubbing Capillary refill: normal 
Neuro: sedated, arousable, exam limitations Skin: Skin color, texture, turgor fair. Skin dry, warm, non-diaphoretic DATA:  
Current Facility-Administered Medications Medication Dose Route Frequency  fentaNYL (DURAGESIC) 50 mcg/hr patch 1 Patch  1 Patch TransDERmal Q72H  furosemide (LASIX) 100 mg in 0.9% sodium chloride 100 mL infusion  6 mg/hr IntraVENous CONTINUOUS  
 LORazepam (ATIVAN) injection 2 mg  2 mg IntraVENous Q4H PRN  
 docusate sodium (COLACE) capsule 100 mg  100 mg Oral BID  polyethylene glycol (MIRALAX) packet 17 g  17 g Oral BID  heparin (porcine) 100 unit/mL injection 500 Units  500 Units InterCATHeter Q8H PRN  
 dextrose 5% infusion  100 mL/hr IntraVENous CONTINUOUS  
 [Held by provider] apixaban (ELIQUIS) tablet 5 mg  5 mg Oral BID  piperacillin-tazobactam (ZOSYN) 4.5 g in 0.9% sodium chloride (MBP/ADV) 100 mL MBP  4.5 g IntraVENous Q8H  
 fentaNYL citrate (PF) injection 50 mcg  50 mcg IntraVENous Q3H PRN  chlorhexidine (PERIDEX) 0.12 % mouthwash 10 mL  10 mL Oral Q12H  
 albuterol-ipratropium (DUO-NEB) 2.5 MG-0.5 MG/3 ML  3 mL Nebulization Q6H PRN  
 acetaminophen (TYLENOL) tablet 650 mg  650 mg Oral Q6H PRN  
 ELECTROLYTE REPLACEMENT PROTOCOL - Calcium   1 Each Other PRN  
 ELECTROLYTE REPLACEMENT PROTOCOL - Magnesium   1 Each Other PRN  
 ELECTROLYTE REPLACEMENT PROTOCOL - Potassium Standard Dosing   1 Each Other PRN  
 ELECTROLYTE REPLACEMENT PROTOCOL - Phosphorus  Standard Dosing  1 Each Other PRN  pantoprazole (PROTONIX) 40 mg in 0.9% sodium chloride 10 mL injection  40 mg IntraVENous DAILY  fentaNYL (PF) 900 mcg/30 ml infusion soln  0-50 mcg/hr IntraVENous TITRATE  midazolam in normal saline (VERSED) 1 mg/mL infusion  0-5 mg/hr IntraVENous TITRATE  aspirin chewable tablet 81 mg  81 mg Oral DAILY  sodium chloride (NS) flush 5-40 mL  5-40 mL IntraVENous Q8H  
  sodium chloride (NS) flush 5-40 mL  5-40 mL IntraVENous PRN Facility-Administered Medications Ordered in Other Encounters Medication Dose Route Frequency  propofoL (DIPRIVAN) 10 mg/mL injection   IntraVENous PRN  
 ePHEDrine (PF) (MISTOLE) 10 mg/mL in NS syringe   IntraVENous PRN  
 PHENYLephrine (RUPERT-SYNEPHRINE) 100 mcg/mL in NS syringe   IntraVENous PRN Telemetry: [x]Sinus []A-flutter []Paced []A-fib []Multiple PVCs  
              
4/22/20 ECHO · Left Ventricle: Normal wall thickness and systolic function (ejection fraction normal). Mildly dilated left ventricle. The estimated ejection fraction is 45 - 50%. There is moderate (grade 2) left ventricular diastolic dysfunction E/e' Ratio = 17.09. Wall Scoring: The left ventricular wall motion is globally hypokinetic. · Right Ventricle: Not well visualized. Normal global systolic function. Moderately dilated right ventricle. · Right Atrium: Right atrium not well visualized. Moderately dilated right atrium. · IVC/Hepatic Veins: Dilated inferior vena cava. Mechanically ventilated; cannot use inferior caval vein diameter to estimate central venous pressure. Labs: 
Recent Results (from the past 24 hour(s)) GLUCOSE, POC Collection Time: 05/07/20  6:07 PM  
Result Value Ref Range Glucose (POC) 94 70 - 110 mg/dL METABOLIC PANEL, BASIC Collection Time: 05/07/20 11:11 PM  
Result Value Ref Range Sodium 150 (H) 136 - 145 mmol/L Potassium 3.8 3.5 - 5.5 mmol/L Chloride 114 (H) 100 - 111 mmol/L  
 CO2 34 (H) 21 - 32 mmol/L Anion gap 2 (L) 3.0 - 18 mmol/L Glucose 111 (H) 74 - 99 mg/dL BUN 38 (H) 7.0 - 18 MG/DL Creatinine 1.00 0.6 - 1.3 MG/DL  
 BUN/Creatinine ratio 38 (H) 12 - 20 GFR est AA >60 >60 ml/min/1.73m2 GFR est non-AA >60 >60 ml/min/1.73m2 Calcium 8.0 (L) 8.5 - 10.1 MG/DL MAGNESIUM Collection Time: 05/07/20 11:11 PM  
Result Value Ref Range Magnesium 2.3 1.6 - 2.6 mg/dL PHOSPHORUS  
 Collection Time: 05/07/20 11:11 PM  
Result Value Ref Range Phosphorus 3.3 2.5 - 4.9 MG/DL  
GLUCOSE, POC Collection Time: 05/07/20 11:28 PM  
Result Value Ref Range Glucose (POC) 93 70 - 110 mg/dL GLUCOSE, POC Collection Time: 05/08/20  5:20 AM  
Result Value Ref Range Glucose (POC) 95 70 - 110 mg/dL POC G3 Collection Time: 05/08/20  5:48 AM  
Result Value Ref Range Device: VENT    
 FIO2 (POC) 1.0 %  
 pH (POC) 7.407 7.35 - 7.45    
 pCO2 (POC) 57.5 (H) 35.0 - 45.0 MMHG  
 pO2 (POC) 68 (L) 80 - 100 MMHG  
 HCO3 (POC) 36.2 (H) 22 - 26 MMOL/L  
 sO2 (POC) 93 92 - 97 % Base excess (POC) 11 mmol/L Mode ASSIST CONTROL Set Rate 16 bpm  
 PEEP/CPAP (POC) 12 cmH2O  
 PIP (POC) 18 Allens test (POC) N/A Inspiratory Time 0.90 sec Total resp. rate 17 Site DRAWN FROM ARTERIAL LINE Patient temp. 98.3 Specimen type (POC) ARTERIAL Performed by [facility] Pressure control YES    
METABOLIC PANEL, COMPREHENSIVE Collection Time: 05/08/20  6:13 AM  
Result Value Ref Range Sodium 151 (H) 136 - 145 mmol/L Potassium 3.7 3.5 - 5.5 mmol/L Chloride 113 (H) 100 - 111 mmol/L  
 CO2 34 (H) 21 - 32 mmol/L Anion gap 4 3.0 - 18 mmol/L Glucose 104 (H) 74 - 99 mg/dL BUN 39 (H) 7.0 - 18 MG/DL Creatinine 1.06 0.6 - 1.3 MG/DL  
 BUN/Creatinine ratio 37 (H) 12 - 20 GFR est AA >60 >60 ml/min/1.73m2 GFR est non-AA >60 >60 ml/min/1.73m2 Calcium 7.9 (L) 8.5 - 10.1 MG/DL Bilirubin, total 1.0 0.2 - 1.0 MG/DL  
 ALT (SGPT) 45 16 - 61 U/L  
 AST (SGOT) 48 (H) 10 - 38 U/L Alk. phosphatase 53 45 - 117 U/L Protein, total 5.7 (L) 6.4 - 8.2 g/dL Albumin 2.3 (L) 3.4 - 5.0 g/dL Globulin 3.4 2.0 - 4.0 g/dL A-G Ratio 0.7 (L) 0.8 - 1.7    
CBC WITH AUTOMATED DIFF Collection Time: 05/08/20  6:13 AM  
Result Value Ref Range WBC 10.7 4.6 - 13.2 K/uL  
 RBC 4.87 4.70 - 5.50 M/uL  
 HGB 12.8 (L) 13.0 - 16.0 g/dL HCT 43.6 36.0 - 48.0 % MCV 89.5 74.0 - 97.0 FL  
 MCH 26.3 24.0 - 34.0 PG  
 MCHC 29.4 (L) 31.0 - 37.0 g/dL  
 RDW 15.2 (H) 11.6 - 14.5 % PLATELET 809 (L) 802 - 420 K/uL MPV 11.5 9.2 - 11.8 FL  
 NEUTROPHILS 84 (H) 40 - 73 % LYMPHOCYTES 10 (L) 21 - 52 % MONOCYTES 5 3 - 10 % EOSINOPHILS 1 0 - 5 % BASOPHILS 0 0 - 2 %  
 ABS. NEUTROPHILS 9.0 (H) 1.8 - 8.0 K/UL  
 ABS. LYMPHOCYTES 1.1 0.9 - 3.6 K/UL  
 ABS. MONOCYTES 0.6 0.05 - 1.2 K/UL  
 ABS. EOSINOPHILS 0.1 0.0 - 0.4 K/UL  
 ABS. BASOPHILS 0.0 0.0 - 0.1 K/UL  
 DF AUTOMATED MAGNESIUM Collection Time: 05/08/20  6:13 AM  
Result Value Ref Range Magnesium 2.1 1.6 - 2.6 mg/dL PHOSPHORUS Collection Time: 05/08/20  6:13 AM  
Result Value Ref Range Phosphorus 2.9 2.5 - 4.9 MG/DL  
CALCIUM, IONIZED Collection Time: 05/08/20  6:13 AM  
Result Value Ref Range Ionized Calcium 1.14 1.12 - 1.32 MMOL/L  
PTT Collection Time: 05/08/20  6:13 AM  
Result Value Ref Range aPTT 34.0 23.0 - 36.4 SEC PROTHROMBIN TIME + INR Collection Time: 05/08/20  6:13 AM  
Result Value Ref Range Prothrombin time 25.8 (H) 11.5 - 15.2 sec INR 2.4 (H) 0.8 - 1.2 GLUCOSE, POC Collection Time: 05/08/20 12:12 PM  
Result Value Ref Range Glucose (POC) 99 70 - 110 mg/dL Recent Labs 05/08/20 
8315 05/07/20 
0440 05/06/20 
6049 FIO2I 1.0 100 60 HCO3I 36.2* 31.4* 31.3*  
PCO2I 57.5* 55.1* 51.0*  
PHI 7.407 7.364 7.395 PO2I 68* 59* 51* All Micro Results Procedure Component Value Units Date/Time CULTURE, RESPIRATORY/SPUTUM/BRONCH Clois Moros [[id number]] Collected:  05/04/20 1315 Order Status:  Canceled Specimen:  Sputum from Tracheal Aspirate CULTURE, RESPIRATORY/SPUTUM/BRONCH Daly Ly STAIN [[id number]]  (Abnormal)  (Susceptibility) Collected:  04/22/20 1018 Order Status:  Completed Specimen:  Sputum from Tracheal Aspirate Updated:  04/25/20 6499   Special Requests: NO SPECIAL REQUESTS     
  GRAM STAIN FEW WBCS SEEN     
 OCCASIONAL EPITHELIAL CELLS SEEN  
     
      
  FEW GRAM POSITIVE COCCI IN CLUSTERS  
     
   RARE GRAM NEGATIVE RODS Culture result:    
  LIGHT STAPHYLOCOCCUS AUREUS  
     
      
  LIGHT NORMAL RESPIRATORY BRYN  
     
 CULTURE, BLOOD [[id number]] Collected:  04/24/20 1015 Order Status:  Canceled Specimen:  Blood Imaging: 
[x]I have personally reviewed the patients chest radiographs images and report AXR 5/7 Results from SHASHANK GALVEZ [facility] Encounter encounter on 04/21/20 XR ABD (KUB) Narrative EXAM: SUPINE ABDOMINAL RADIOGRAPH 
 
CLINICAL INDICATION/HISTORY: eval for ileus 
-Additional: None COMPARISON: 5/6/2020 TECHNIQUE: Single supine view of the abdomen. 
 
_______________ Impression FINDINGS/IMPRESSION: 
 
BOWEL GAS PATTERN: Limited study secondary to body habitus. Enteric tube poorly 
visualized. Contrast injection demonstrates tip at the GE junction/proximal 
stomach. Oral contrast within the colon. Chest x-ray 1 view 5/8 Comparison 5/7/2020. Endotracheal tube just below the clavicles but well above the miguel ángel. Extensive 
bilateral edema or infiltrate most prominent in the upper lobes. Perhaps some 
increase in right basilar density with partial obscuration of the right 
hemidiaphragm in the interval. Persistent retrocardiac opacification with 
obscuration of the left hemidiaphragm. The heart is stable and top normal. Left 
arm PICC line tip is at the cavoatrial junction in good position. IMPRESSION: 
Extensive bilateral edema or infiltrate, most prominent in the upper lobes, 
certainly potentially in part aspiration. Some increase in right basilar density 
perhaps, increasing focal edema and/or pleural effusion/atelectasis. No 
additional change. US LEs 4/22/2020 Interpretation Summary · No evidence of deep vein thrombosis in the right lower extremity veins assessed.  
· No evidence of deep vein thrombosis in the left lower extremity veins assessed. Echo 4/22 Result status: Edited Result - FINAL Addendum by Rebekah Burkett MD on Wed Apr 22, 2020  5:12 PM  
· Left Ventricle: Normal wall thickness and systolic function (ejection fraction normal). Mildly dilated left ventricle. The estimated ejection fraction is 45 - 50%. There is moderate (grade 2) left ventricular diastolic dysfunction E/e' Ratio = 17.09. Wall Scoring: The left ventricular wall motion is globally hypokinetic. · Right Ventricle: Not well visualized. Normal global systolic function. Moderately dilated right ventricle. · Right Atrium: Right atrium not well visualized. Moderately dilated right atrium. · IVC/Hepatic Veins: Dilated inferior vena cava. Mechanically ventilated; cannot use inferior caval vein diameter to estimate central venous pressure. IMPRESSION:  
Patient Active Problem List  
Diagnosis Code  Acute on chronic respiratory failure with hypoxia and hypercapnia ([facility]) J96.21, J96.22  
 Acute on chronic systolic and diastolic CHF (congestive heart failure) ([facility]) I50.43 Aspiration pneumonitis from multiple patient initiated ET tube displacement events J69.0  Cardiac arrest ([facility] [address] I46.9 MSSA in sputum culture A49.01  
 Elevated troponin, NSTEMI R79.89, I21.4  Chronic a-fib I48.20  Atrial flutter ([address] I48.92 Prolonged QTc R94.31  
 Chronic renal insufficiency, stage II (mild) N18.2 Thrombocytopenia D69.6  Smoking F17.200  Arthritis M19.90  Migraine G43.909  Chronic back pain M54.9, G89.29  
 Obstructive Sleep apnea G47.33  
 Morbid obesity ([facility]) E66.01  
  
PLAN:  
RS - DAY 17 on vent; continue mech vent; PCV mode; driving pressure 18, PEEP 12;  FiO2 remains between %; frequent thin secretions/pinkish to clear from respiratory tract suggestive of pulmonary edema; patient on Lasix drip; chest x-ray limitations due to body habitus, but shows persistent bilateral interstitial-like infiltratescould be ALI/ARDS from aspiration pneumonitis; will check ESR; recent procalcitonin negative, would send 1 more today. Alternatepatient could have shunting since his O2 sats does not normalize with 100% FiO2. I will discuss with cardiology today limitations of echocardiogram due to body habitus again. Patient also has chronic obesity, MARIA FERNANDA and likely severe obesity hypoventilation; tolerate O2 sats 85% or above. Patient cannot have CT chest due to body weight limitations. Recent concerns for some aspiration around ET tube with vomiting, and hx of self extubations VAP prevention bundle, head of the bed at 30' all times Patient is off paralytic medication since 5/4/2020 Sedation- Versed and fentanylwean as tolerated; added Ativan prn, and fentanyl patch to help decrease Versed needs; since patient risk of self extubation, he has bilateral soft wrist restraints. Vent and sedation bundles Continue bronchodilators prn; pulmonary hygiene care. Dr Malvin Siemens in ENT on case for possible trach when fio2 and peep requirements are down [facility] - Dr. Eugena Schlatter on the case; blood pressure stable, but low normaldecrease Lasix drip to 6 mg/h; follow urine output, renal function and electrolytes including potassium, sodium. EF 45-50%; dilated RV on echo; US legs neg; stable BP not needing pressors. ID - SARS-COV-2 negative x 2 Antibiotic choice: Zosyn started on 5/2/20 with low grade fevercomplete 10 days antibiotic; MSSA in resp cxs from before; lab reporting no respiratory cultures from 5/4 ordered one for today; procalcitonin less than 0.05 few days agorecheck today. Hem - HIT panel and LEYDI Ab NEG on 4/28. He is definitely high risk for PEs; CTA chest cannot be done due to limitations due to weight; stopped argatroban 5/4; INR 2.4 this a.m.; resumed Eliquis 5 mg twice daily; watch for any karla bloody secretions from tracheal tube. Vasc - PVL bl LE negative Renal -Lasix drip 6 mg/h; creatinine stable; potassium stable; sodium 151 continue D5W 100 cc/h; free water 200 mL every 4 hrs. Nephrology Dr. Ranjan Wallace had signed off.   
GI - GI placed OG tube; good position confirmed with abdominal x-ray; patient on tube feedings Neurology: sedated with limitations; patient follows simple commands, and moving extremities Prognosis guarded. Plan to update family later today. I have updated family on 5/6 at [address], mother and brother. I discussed about medical management. I reviewed the comorbidities, and recommendations for tracheostomy once oxygenation is better. Full code. Will defer respective systems problem management to primary and other consultant and follow patient in ICU with primary and other medical team 
Quality Care: PPI, DVT prophylaxis, HOB elevated, Infection control all reviewed and addressed. PAIN AND SEDATION: Fentanyl, Versed · Skin/Wound: chronic venous stasis changes · Nutrition:  Start tube feeding · Prophylaxis: DVT and GI Prophylaxis reviewed. · Restraints: prn 
· PT/OT eval and treat: as needed when stable · Lines/Tubes: A-line 4/23/20; daniel 4/21/20, ET tube 4/25/20; PICC line left arm 5/5 ADVANCE DIRECTIVE: Full code DISCUSSION: spoke with RN, RT, ICU staff Events and notes from last 24 hours reviewed. Care plan discussed with nursing   
 
 
[x]Total critical care time exclusive of procedures 35 minutes with complex decision making performed Eugene Young MD

## 2020-05-08 NOTE — PROGRESS NOTES
1900- Received care and report from Izzy Carrera RN. Intubated, sedated, ventilator, soft restraints to bilateral extremities, OG tube with tube feeding set up.  
 
2000- Completed shift assessment per flow sheets. Resting quietly with eyes closed. Will continue to monitor. 0000- Completed reassessment per flow sheets. Resting quietly with eyes closed. Will continue to monitor. 0400- Completed reassessment per flow sheets. Resting quietly with eyes closed. Will continue to monitor. 0700-  Report and care given to OCTAVIO Olmstead RN.

## 2020-05-08 NOTE — PROGRESS NOTES
Chart reviewed pt remains in ICU on vent and tube feeds, per chart plan is for trach once 02 improves,weekend cm available needs thru  [facility]  if needed.

## 2020-05-09 NOTE — PROGRESS NOTES
Patient remains sedated on ventilator support. No acute distress, vital signs stable. BUE soft wrist restraints in use

## 2020-05-09 NOTE — PROGRESS NOTES
Hospitalist Progress Note Patient: Suleman Trinidad. MRN: [id number]  CSN: [id number] YOB: 1975  Age: 53 y.o. Sex: male DOA: 4/21/2020 LOS:  LOS: 18 days Assessment/Plan 1. Acute respiratory failure due to obesity hypoventilation syndrome sp pea arrest  
2. CHF EF 45% 3. Possible aspiration pneumonia w MSSA in sputum 4. Thromobcytopnia, stable, HIT panel neg 5. Hypernatremia- improving 
  
Plan: 
- vent support per pulmonology 
- continue zosyn 
- lasix gtt 
- monitor counts 
- supportive care - appreciate all consultants Patient Active Problem List  
Diagnosis Code  Cellulitis of left lower leg L03. 116  
 Anemia D64.9  Morbid obesity ([facility]) E66.01  
 Dyspnea R06.00  Sleep apnea G47.30  Sinus tachycardia R00.0  BMI 70 and over, adult [facility]) F49.56  Chronic back pain M54.9, G89.29  
 Arthritis M19.90  Migraine G43.909  
 History of atrial flutter Z86.79  
 Limited mobility Z74.09  
 Smoking F17.200  Chronic renal insufficiency, stage II (mild) N18.2  CHF (congestive heart failure) ([facility]) I50.9  Acute diastolic CHF (congestive heart failure) ([facility]) I50.31  Chronic a-fib I48.20  Atrial flutter ([facility]) I48.92  
 Elevated troponin R79.89  
 Acute respiratory distress R06.03  
 Acute CHF (congestive heart failure) ([facility]) I50.9  Respiratory failure with hypoxia and hypercapnia ([facility]) J96.91, J96.92  
 Acute on chronic respiratory failure with hypoxia and hypercapnia ([facility]) J96.21, J96.22  
 Cardiac arrest ([facility]) I46.9 Subjective:  
 cc: resp failure No acute events overnight REVIEW OF SYSTEMS: 
Unable to obtian 2/2 sedated on vent. Objective:  
  
 
Vital signs/Intake and Output: 
Visit Vitals /59 Pulse (!) 101 Temp 100.3 °F (37.9 °C) Resp 21 Ht 5' 10\" (1.778 m) Wt (!) 232.2 kg (511 lb 14.5 oz) SpO2 90% BMI 73.45 kg/m² Current Shift:  05/09 0701 - 05/09 1900 In: -  
Out: 575 [Urine:575] Last three shifts:  05/07 1901 - 05/09 0700 In: 5355.8 [I.V.:3215.8] Out: 6100 [Urine:6100] Body mass index is 73.45 kg/m². Physical Exam: 
GEN: sedated on vent EYES: conjunctiva normal, lids with out lesions HEENT: MMM, No thyromegaly, no lymphadenopathy HEART: RRR +S1 +S2, no JVD, pulses 2+ distally LUNGS: coarse bilat, equal expansion ABDOMEN: + BS, soft NT/ND no organomegaly,  No rebound EXTREMITIES: No edema cyanosis, cap refill normal 
 SKIN: no rashes or skin breakdown, no nodules, normal turgor Current Facility-Administered Medications Medication Dose Route Frequency  potassium, sodium phosphates (NEUTRA-PHOS) packet 2 Packet  2 Packet Oral Q2H  
 fentaNYL (DURAGESIC) 50 mcg/hr patch 1 Patch  1 Patch TransDERmal Q72H  furosemide (LASIX) 100 mg in 0.9% sodium chloride 100 mL infusion  6 mg/hr IntraVENous CONTINUOUS  
 LORazepam (ATIVAN) injection 2 mg  2 mg IntraVENous Q4H PRN  
 docusate sodium (COLACE) capsule 100 mg  100 mg Oral BID  polyethylene glycol (MIRALAX) packet 17 g  17 g Oral BID  heparin (porcine) 100 unit/mL injection 500 Units  500 Units InterCATHeter Q8H PRN  
 dextrose 5% infusion  100 mL/hr IntraVENous CONTINUOUS  
 apixaban (ELIQUIS) tablet 5 mg  5 mg Oral BID  piperacillin-tazobactam (ZOSYN) 4.5 g in 0.9% sodium chloride (MBP/ADV) 100 mL MBP  4.5 g IntraVENous Q8H  
 fentaNYL citrate (PF) injection 50 mcg  50 mcg IntraVENous Q3H PRN  chlorhexidine (PERIDEX) 0.12 % mouthwash 10 mL  10 mL Oral Q12H  
 albuterol-ipratropium (DUO-NEB) 2.5 MG-0.5 MG/3 ML  3 mL Nebulization Q6H PRN  
 acetaminophen (TYLENOL) tablet 650 mg  650 mg Oral Q6H PRN  
 ELECTROLYTE REPLACEMENT PROTOCOL - Calcium   1 Each Other PRN  
 ELECTROLYTE REPLACEMENT PROTOCOL - Magnesium   1 Each Other PRN  
 ELECTROLYTE REPLACEMENT PROTOCOL - Potassium Standard Dosing   1 Each Other PRN  
  ELECTROLYTE REPLACEMENT PROTOCOL - Phosphorus  Standard Dosing  1 Each Other PRN  pantoprazole (PROTONIX) 40 mg in 0.9% sodium chloride 10 mL injection  40 mg IntraVENous DAILY  fentaNYL (PF) 900 mcg/30 ml infusion soln  0-50 mcg/hr IntraVENous TITRATE  midazolam in normal saline (VERSED) 1 mg/mL infusion  0-5 mg/hr IntraVENous TITRATE  aspirin chewable tablet 81 mg  81 mg Oral DAILY  sodium chloride (NS) flush 5-40 mL  5-40 mL IntraVENous Q8H  
 sodium chloride (NS) flush 5-40 mL  5-40 mL IntraVENous PRN Facility-Administered Medications Ordered in Other Encounters Medication Dose Route Frequency  propofoL (DIPRIVAN) 10 mg/mL injection   IntraVENous PRN  
 ePHEDrine (PF) (MISTOLE) 10 mg/mL in NS syringe   IntraVENous PRN  
 PHENYLephrine (RUPERT-SYNEPHRINE) 100 mcg/mL in NS syringe   IntraVENous PRN All the patient's labs over the past 24 hours were reviewed both during my initial daily workflow process and at the time notated as \"note time\" in [address] [place] [address]. (It is not time stamped separately in this workflow.)  Select labs are listed below. Labs: Results:  
   
Chemistry Recent Labs 05/09/20 
0330 05/08/20 
1630 05/08/20 
9197  05/07/20 
0350 GLU 99 97 104*   < > 108* * 151* 151*   < > 153* K 3.6 3.6 3.7   < > 4.5  110 113*   < > 118* CO2 35* 38* 34*   < > 33* BUN 35* 41* 39*   < > 41* CREA 1.13 1.10 1.06   < > 1.16  
CA 7.9* 7.8* 7.9*   < > 7.8* AGAP 5 3 4   < > 2*  
BUCR 31* 37* 37*   < > 35* AP 63  --  53  --  54  
TP 5.9*  --  5.7*  --  5.8* ALB 2.5*  --  2.3*  --  2.4*  
GLOB 3.4  --  3.4  --  3.4 AGRAT 0.7*  --  0.7*  --  0.7*  
 < > = values in this interval not displayed. CBC w/Diff Recent Labs 05/09/20 
0330 05/08/20 
8243 05/07/20 
0350 WBC 13.5* 10.7 10.7 RBC 4.92 4.87 4.83  
HGB 13.1 12.8* 12.8* HCT 44.0 43.6 43.7  126* 131* GRANS 87* 84* 93* LYMPH 6* 10* 3* EOS 1 1 0  
  
   
 Coagulation Recent Labs 05/09/20 0330 05/08/20 
5012 PTP 23.7* 25.8* INR 2.2* 2.4* APTT 35.8 34.0 Liver Enzymes Recent Labs 05/09/20 
0330 TP 5.9* ALB 2.5* AP 63 SGOT 52* Thyroid Studies Lab Results Component Value Date/Time TSH 1.45 04/27/2020 03:50 AM  
    
Procedures/imaging: see electronic medical records for all procedures/Xrays and details which were not copied into this note but were reviewed prior to creation of Plan Sheyla Murrieta DO Internal Medicine/Geriatrics

## 2020-05-09 NOTE — PROGRESS NOTES
0700- Bedside and Verbal shift change report given to Haja Vela RN (oncoming nurse) by Karen Madrid RN (offgoing nurse). Report included the following information SBAR, Kardex, MAR and Recent Results. 0800-  Shift assessment complete. Patient responds to voice and follows commands. Bloody ET tube secretions. Morning Elequis held. Patient has elevated HR this morning. PRN ativan administered. 1910- Bedside and Verbal shift change report given to Shona Card RN (oncoming nurse) by Haja Vela RN (offgoing nurse). Report included the following information SBAR, Kardex, MAR and Recent Results.

## 2020-05-09 NOTE — PROGRESS NOTES
Pulmonary, Critical Care, and Sleep Medicine Pulmonary Progress Note Name: Je Alvarado. : 1975 MRN: [id number] Date: 2020 [x]I have reviewed the flowsheet and previous days notes. Events, vitals, medications and notes from last 24 hours reviewed. Subjective/History:  
49-year-old male with morbid obesity, ex-smoker, history of asthma, history of severe obstructive sleep apnea on CPAP, questionable component compliance, history of congestive heart failure with reduced ejection fraction atrial fibrillation atrial flutter on amiodarone, Xarelto, history of chronic renal insufficiency, cellulitis of lower extremities and anemia. As per family did not have any fever or upper respiratory tract infections and no flulike symptoms. On Lasix, CPAP not clear if missed. Presented with acute shortness of breath worsening edema of lower extremities. He was found to be in acute hypercarbic hypoxemic respiratory failure initially had a trial of BiPAP but failed and then eventually was intubated. He was hypotensive. Moderate amount of secretions. Morbid obesity. Patient compliant with Xarelto. Patient self extubated and had PEA arrest. 
 
Subjective:  
20 Patient intubated  for hypoxemic resp failure. Patient remains in icu; on vent support He is morbidly obese; requiring high oxygen requirements since admission to the ICU. PCV mode of ventilation; frequent oxygen desaturations-suctioned pinkish aspirates from airways He remains afebrile; BP stable; sinus rhythm UOP 2.2 L yesterday with Lasix drip Morbidly obeseweight reported 490 pounds. Drips: fentanyl, versed, D5W, Lasix ROS: Review of systems not obtained due to patient factors. Past Medical History: 
Past Medical History:  
Diagnosis Date  Arrhythmia  Arthritis   
 knees  CHF (congestive heart failure) ([facility] [address]  Chronic back pain  Chronic renal insufficiency, stage II (mild)  History of atrial flutter Dx 2016 - anticoagulated by Roc Price  Hypertension 2015  Limited mobility  Migraine headache  Morbid obesity ([facility] [address]  Morbid obesity with body mass index of 60.0-69.9 in adult [facility])  Sleep apnea   
 uses c-pap instructed to bring day of surgery  Smoking   
 very passive / cigars only Allergy: No Known Allergies Vital Signs:   
Blood pressure 118/57, pulse 89, temperature 99.8 °F (37.7 °C), resp. rate 23, height 5' 10\" (1.778 m), weight (!) 232.2 kg (511 lb 14.5 oz), SpO2 95 %. Body mass index is 73.45 kg/m². O2 Device: Endotracheal tube O2 Flow Rate (L/min): 12 l/min Temp (24hrs), Av.3 °F (37.4 °C), Min:98.4 °F (36.9 °C), Max:100.3 °F (37.9 °C) Intake/Output:  
Last shift:       07 -  190 In: -  
Out: 575 [Urine:575] Last 3 shifts:  190 -  0700 In: 5355.8 [I.V.:3215.8] Out: 6100 [Urine:6100] Intake/Output Summary (Last 24 hours) at 2020 1407 Last data filed at 2020 0800 Gross per 24 hour Intake 2074.87 ml Output 1750 ml Net 324.87 ml Ventilator Settings: 
Mode Rate Tidal Volume Pressure FiO2 PEEP Pressure control   580 ml    100 % 10 cm H20 Peak airway pressure: 31 cm H2O Minute ventilation: 15.1 l/min Physical Exam: 
General: sedated, arousable, obese male; no cyanosis; on ventilator HEENT: LIBBY, orally intubated; orogastric tube; no bleeding; pupils not dilated, reactive Neck: thick short obese neck Chest: normal, obese chest wall Lungs: moderate air entry, obese chest limits exam; decreased BS bases; symmetrical chest expansion Heart: S1S2 present or without murmur or extra heart sounds; JVD not elevated Abdomen: obese, bowel sounds normoactive, soft without significant tenderness; no organomegaly; no guarding or rigidity Extremity: 2+, pitting bilateral thigh and arm edema; negative for cyanosis, clubbing Capillary refill: normal 
 Neuro: sedated, arousable, exam limitations Skin: Skin color, texture, turgor fair. Skin dry, warm, non-diaphoretic DATA:  
Current Facility-Administered Medications Medication Dose Route Frequency  fentaNYL (DURAGESIC) 50 mcg/hr patch 1 Patch  1 Patch TransDERmal Q72H  furosemide (LASIX) 100 mg in 0.9% sodium chloride 100 mL infusion  6 mg/hr IntraVENous CONTINUOUS  
 LORazepam (ATIVAN) injection 2 mg  2 mg IntraVENous Q4H PRN  
 docusate sodium (COLACE) capsule 100 mg  100 mg Oral BID  polyethylene glycol (MIRALAX) packet 17 g  17 g Oral BID  heparin (porcine) 100 unit/mL injection 500 Units  500 Units InterCATHeter Q8H PRN  
 dextrose 5% infusion  100 mL/hr IntraVENous CONTINUOUS  
 apixaban (ELIQUIS) tablet 5 mg  5 mg Oral BID  piperacillin-tazobactam (ZOSYN) 4.5 g in 0.9% sodium chloride (MBP/ADV) 100 mL MBP  4.5 g IntraVENous Q8H  
 fentaNYL citrate (PF) injection 50 mcg  50 mcg IntraVENous Q3H PRN  chlorhexidine (PERIDEX) 0.12 % mouthwash 10 mL  10 mL Oral Q12H  
 albuterol-ipratropium (DUO-NEB) 2.5 MG-0.5 MG/3 ML  3 mL Nebulization Q6H PRN  
 acetaminophen (TYLENOL) tablet 650 mg  650 mg Oral Q6H PRN  
 ELECTROLYTE REPLACEMENT PROTOCOL - Calcium   1 Each Other PRN  
 ELECTROLYTE REPLACEMENT PROTOCOL - Magnesium   1 Each Other PRN  
 ELECTROLYTE REPLACEMENT PROTOCOL - Potassium Standard Dosing   1 Each Other PRN  
 ELECTROLYTE REPLACEMENT PROTOCOL - Phosphorus  Standard Dosing  1 Each Other PRN  pantoprazole (PROTONIX) 40 mg in 0.9% sodium chloride 10 mL injection  40 mg IntraVENous DAILY  fentaNYL (PF) 900 mcg/30 ml infusion soln  0-50 mcg/hr IntraVENous TITRATE  midazolam in normal saline (VERSED) 1 mg/mL infusion  0-5 mg/hr IntraVENous TITRATE  aspirin chewable tablet 81 mg  81 mg Oral DAILY  sodium chloride (NS) flush 5-40 mL  5-40 mL IntraVENous Q8H  
 sodium chloride (NS) flush 5-40 mL  5-40 mL IntraVENous PRN  
 
 Facility-Administered Medications Ordered in Other Encounters Medication Dose Route Frequency  propofoL (DIPRIVAN) 10 mg/mL injection   IntraVENous PRN  
 ePHEDrine (PF) (MISTOLE) 10 mg/mL in NS syringe   IntraVENous PRN  
 PHENYLephrine (RUPERT-SYNEPHRINE) 100 mcg/mL in NS syringe   IntraVENous PRN Telemetry: [x]Sinus []A-flutter []Paced []A-fib []Multiple PVCs  
              
4/22/20 ECHO · Left Ventricle: Normal wall thickness and systolic function (ejection fraction normal). Mildly dilated left ventricle. The estimated ejection fraction is 45 - 50%. There is moderate (grade 2) left ventricular diastolic dysfunction E/e' Ratio = 17.09. Wall Scoring: The left ventricular wall motion is globally hypokinetic. · Right Ventricle: Not well visualized. Normal global systolic function. Moderately dilated right ventricle. · Right Atrium: Right atrium not well visualized. Moderately dilated right atrium. · IVC/Hepatic Veins: Dilated inferior vena cava. Mechanically ventilated; cannot use inferior caval vein diameter to estimate central venous pressure. Labs: 
Recent Results (from the past 24 hour(s)) METABOLIC PANEL, BASIC Collection Time: 05/08/20  4:30 PM  
Result Value Ref Range Sodium 151 (H) 136 - 145 mmol/L Potassium 3.6 3.5 - 5.5 mmol/L Chloride 110 100 - 111 mmol/L  
 CO2 38 (H) 21 - 32 mmol/L Anion gap 3 3.0 - 18 mmol/L Glucose 97 74 - 99 mg/dL BUN 41 (H) 7.0 - 18 MG/DL Creatinine 1.10 0.6 - 1.3 MG/DL  
 BUN/Creatinine ratio 37 (H) 12 - 20 GFR est AA >60 >60 ml/min/1.73m2 GFR est non-AA >60 >60 ml/min/1.73m2 Calcium 7.8 (L) 8.5 - 10.1 MG/DL MAGNESIUM Collection Time: 05/08/20  4:30 PM  
Result Value Ref Range Magnesium 1.9 1.6 - 2.6 mg/dL PHOSPHORUS Collection Time: 05/08/20  4:30 PM  
Result Value Ref Range Phosphorus 2.5 2.5 - 4.9 MG/DL  
SED RATE (ESR) Collection Time: 05/08/20  4:30 PM  
Result Value Ref Range Sed rate, automated 3 0 - 15 mm/hr PROCALCITONIN Collection Time: 05/08/20  4:30 PM  
Result Value Ref Range Procalcitonin 0.06 ng/mL CULTURE, RESPIRATORY/SPUTUM/BRONCH W GRAM STAIN Collection Time: 05/08/20  4:30 PM  
Result Value Ref Range Special Requests: NO SPECIAL REQUESTS    
 GRAM STAIN RARE WBCS SEEN    
 GRAM STAIN RARE EPITHELIAL CELLS SEEN    
 GRAM STAIN NO ORGANISMS SEEN Culture result: PENDING   
GLUCOSE, POC Collection Time: 05/08/20 11:45 PM  
Result Value Ref Range Glucose (POC) 89 70 - 110 mg/dL METABOLIC PANEL, COMPREHENSIVE Collection Time: 05/09/20  3:30 AM  
Result Value Ref Range Sodium 149 (H) 136 - 145 mmol/L Potassium 3.6 3.5 - 5.5 mmol/L Chloride 109 100 - 111 mmol/L  
 CO2 35 (H) 21 - 32 mmol/L Anion gap 5 3.0 - 18 mmol/L Glucose 99 74 - 99 mg/dL BUN 35 (H) 7.0 - 18 MG/DL Creatinine 1.13 0.6 - 1.3 MG/DL  
 BUN/Creatinine ratio 31 (H) 12 - 20 GFR est AA >60 >60 ml/min/1.73m2 GFR est non-AA >60 >60 ml/min/1.73m2 Calcium 7.9 (L) 8.5 - 10.1 MG/DL Bilirubin, total 1.3 (H) 0.2 - 1.0 MG/DL  
 ALT (SGPT) 42 16 - 61 U/L  
 AST (SGOT) 52 (H) 10 - 38 U/L Alk. phosphatase 63 45 - 117 U/L Protein, total 5.9 (L) 6.4 - 8.2 g/dL Albumin 2.5 (L) 3.4 - 5.0 g/dL Globulin 3.4 2.0 - 4.0 g/dL A-G Ratio 0.7 (L) 0.8 - 1.7    
CBC WITH AUTOMATED DIFF Collection Time: 05/09/20  3:30 AM  
Result Value Ref Range WBC 13.5 (H) 4.6 - 13.2 K/uL  
 RBC 4.92 4.70 - 5.50 M/uL  
 HGB 13.1 13.0 - 16.0 g/dL HCT 44.0 36.0 - 48.0 % MCV 89.4 74.0 - 97.0 FL  
 MCH 26.6 24.0 - 34.0 PG  
 MCHC 29.8 (L) 31.0 - 37.0 g/dL  
 RDW 15.7 (H) 11.6 - 14.5 % PLATELET 332 367 - 460 K/uL MPV 11.2 9.2 - 11.8 FL  
 NEUTROPHILS 87 (H) 40 - 73 % LYMPHOCYTES 6 (L) 21 - 52 % MONOCYTES 6 3 - 10 % EOSINOPHILS 1 0 - 5 % BASOPHILS 0 0 - 2 %  
 ABS. NEUTROPHILS 11.7 (H) 1.8 - 8.0 K/UL  
 ABS. LYMPHOCYTES 0.8 (L) 0.9 - 3.6 K/UL ABS. MONOCYTES 0.8 0.05 - 1.2 K/UL  
 ABS. EOSINOPHILS 0.1 0.0 - 0.4 K/UL  
 ABS. BASOPHILS 0.0 0.0 - 0.1 K/UL  
 DF AUTOMATED MAGNESIUM Collection Time: 05/09/20  3:30 AM  
Result Value Ref Range Magnesium 1.9 1.6 - 2.6 mg/dL PHOSPHORUS Collection Time: 05/09/20  3:30 AM  
Result Value Ref Range Phosphorus 2.2 (L) 2.5 - 4.9 MG/DL  
CALCIUM, IONIZED Collection Time: 05/09/20  3:30 AM  
Result Value Ref Range Ionized Calcium 1.12 1.12 - 1.32 MMOL/L  
PTT Collection Time: 05/09/20  3:30 AM  
Result Value Ref Range aPTT 35.8 23.0 - 36.4 SEC PROTHROMBIN TIME + INR Collection Time: 05/09/20  3:30 AM  
Result Value Ref Range Prothrombin time 23.7 (H) 11.5 - 15.2 sec INR 2.2 (H) 0.8 - 1.2 POC G3 Collection Time: 05/09/20  4:37 AM  
Result Value Ref Range Device: VENT    
 FIO2 (POC) 1.0 %  
 pH (POC) 7.434 7.35 - 7.45    
 pCO2 (POC) 49.5 (H) 35.0 - 45.0 MMHG  
 pO2 (POC) 55 (L) 80 - 100 MMHG  
 HCO3 (POC) 33.1 (H) 22 - 26 MMOL/L  
 sO2 (POC) 88 (L) 92 - 97 % Base excess (POC) 9 mmol/L Mode ASSIST CONTROL Set Rate 16 bpm  
 PEEP/CPAP (POC) 10 cmH2O  
 PIP (POC) 18 Allens test (POC) N/A Inspiratory Time 0.9 sec Total resp. rate 33 Site DRAWN FROM ARTERIAL LINE Patient temp. 99.1 Specimen type (POC) ARTERIAL Performed by Tara Schmitz Pressure control YES Recent Labs 05/09/20 
8698 05/08/20 
5741 05/07/20 
0440 FIO2I 1.0 1.0 100 HCO3I 33.1* 36.2* 31.4* PCO2I 49.5* 57.5* 55.1* PHI 7.434 7.407 7.364 PO2I 55* 68* 59* All Micro Results Procedure Component Value Units Date/Time CULTURE, RESPIRATORY/SPUTUM/BRONCH Dania Lin [[id number]] Collected:  05/08/20 1630 Order Status:  Completed Specimen:  Sputum from Tracheal Aspirate Updated:  05/08/20 3364   Special Requests: NO SPECIAL REQUESTS     
  GRAM STAIN RARE WBCS SEEN     
      
  RARE EPITHELIAL CELLS SEEN  
     
 NO ORGANISMS SEEN Culture result: PENDING  
 CULTURE, RESPIRATORY/SPUTUM/BRONCH Sylvester Suresh [[id number]] Collected:  05/04/20 1315 Order Status:  Canceled Specimen:  Sputum from Tracheal Aspirate CULTURE, RESPIRATORY/SPUTUM/BRONCH Coit Lute STAIN [[id number]]  (Abnormal)  (Susceptibility) Collected:  04/22/20 1018 Order Status:  Completed Specimen:  Sputum from Tracheal Aspirate Updated:  04/25/20 6392 Special Requests: NO SPECIAL REQUESTS     
  GRAM STAIN FEW WBCS SEEN     
      
  OCCASIONAL EPITHELIAL CELLS SEEN  
     
      
  FEW GRAM POSITIVE COCCI IN CLUSTERS  
     
   RARE GRAM NEGATIVE RODS Culture result:    
  LIGHT STAPHYLOCOCCUS AUREUS  
     
      
  LIGHT NORMAL RESPIRATORY BRYN  
     
 CULTURE, BLOOD [[id number]] Collected:  04/24/20 1015 Order Status:  Canceled Specimen:  Blood Imaging: 
[x]I have personally reviewed the patients chest radiographs images and report AXR 5/7 Results from [facility] [place] encounter on 04/21/20 XR ABD (KUB) Narrative EXAM: SUPINE ABDOMINAL RADIOGRAPH 
 
CLINICAL INDICATION/HISTORY: eval for ileus 
-Additional: None COMPARISON: 5/6/2020 TECHNIQUE: Single supine view of the abdomen. 
 
_______________ Impression FINDINGS/IMPRESSION: 
 
BOWEL GAS PATTERN: Limited study secondary to body habitus. Enteric tube poorly 
visualized. Contrast injection demonstrates tip at the GE junction/proximal 
stomach. Oral contrast within the colon. Chest x-ray 1 view 5/9 COMPARISON: 5/8/2020 TECHNIQUE: Portable frontal view of the chest was obtained. FINDINGS: 
SUPPORT DEVICES: ETT is 7.0 cm above the miguel ángel, PICC tip from a left arm 
approach projects at the distal SVC. HEART AND MEDIASTINUM: Cardiac silhouette is top normal in size.   
LUNGS AND PLEURAL SPACES: Extensive bilateral alveolar densities are present 
throughout both lungs, slightly more dense on the left than on the right, 
 without significant changes. Left costophrenic angle is cut off, the right 
costophrenic angle is blunted. BONY THORAX AND SOFT TISSUES: No acute osseous abnormality. IMPRESSION: 
Diffuse bilateral left greater than right pneumonia versus pulmonary edema. Small right pleural effusion. No acute changes. US LEs 4/22/2020 Interpretation Summary · No evidence of deep vein thrombosis in the right lower extremity veins assessed. · No evidence of deep vein thrombosis in the left lower extremity veins assessed. Echo 4/22 Result status: Edited Result - FINAL Addendum by Carl Marmolejo MD on Wed Apr 22, 2020  5:12 PM  
· Left Ventricle: Normal wall thickness and systolic function (ejection fraction normal). Mildly dilated left ventricle. The estimated ejection fraction is 45 - 50%. There is moderate (grade 2) left ventricular diastolic dysfunction E/e' Ratio = 17.09. Wall Scoring: The left ventricular wall motion is globally hypokinetic. · Right Ventricle: Not well visualized. Normal global systolic function. Moderately dilated right ventricle. · Right Atrium: Right atrium not well visualized. Moderately dilated right atrium. · IVC/Hepatic Veins: Dilated inferior vena cava. Mechanically ventilated; cannot use inferior caval vein diameter to estimate central venous pressure. IMPRESSION:  
Patient Active Problem List  
Diagnosis Code  Acute on chronic respiratory failure with hypoxia and hypercapnia ([facility]) J96.21, J96.22  
 Acute on chronic systolic and diastolic CHF (congestive heart failure) ([facility]) I50.43 Aspiration pneumonitis from multiple patient initiated ET tube displacement events J69.0  Cardiac arrest ([facility] [address] I46.9 MSSA in sputum culture A49.01  
 Elevated troponin, NSTEMI R79.89, I21.4  Chronic a-fib I48.20  Atrial flutter ([address] I48.92 Prolonged QTc R94.31  
 Chronic renal insufficiency, stage II (mild) N18.2 Thrombocytopenia D69.6  Smoking F17.200  Arthritis M19.90  Migraine G43.909  Chronic back pain M54.9, G89.29  
 Obstructive Sleep apnea G47.33  
 Morbid obesity (HCC) E66.01  
  
PLAN:  
RS - DAY 18 on vent; continue mech vent; PCV mode; driving pressure 18, PEEP 12; FiO2 remains between %; frequent thin secretions/pinkish to clear from respiratory tract suggestive of pulmonary edema. Continue Lasix drip; chest x-ray limitations due to body habitus, but shows persistent bilateral interstitial-like infiltratescould be ALI/ARDS from aspiration pneumonitis; ESR only 3goes against any significant acute lung injury, certainly does not look like high-dose steroids will help.  ill check ESR; recent procalcitonin negative, repeat 0.06 on 5/8 which is low. I suspect that patient could have shunting since his O2 sats does not normalize with 100% FiO2, and he has risk factors for chronic right heart failure. I have discussed with Dr. Oliva Chacko yesterday, and that could be plans for HU early next week. Patient also has chronic obesity, MARIA FERNANDA and likely severe obesity hypoventilation; tolerate O2 sats 85% or above. Patient cannot have CT chest due to body weight limitations. Recent concerns for some aspiration around ET tube with vomiting, and hx of self extubations VAP prevention bundle, head of the bed at 30' all times Patient is off paralytic medication since 5/4/2020 Sedation- Versed and fentanylwean as tolerated; added Ativan prn, and fentanyl patch to help decrease Versed needs; since patient risk of self extubation, he has bilateral soft wrist restraints. Vent and sedation bundles Continue bronchodilators prn; pulmonary hygiene care. Dr Sue Trinidad in ENT on case for possible trach when fio2 and peep requirements are down [facility] - Dr. Oliva Chacko on the case; blood pressure stable, continue Lasix drip 6 mg/h; follow urine output, renal function and electrolytes including potassium, sodium. EF 45-50%; dilated RV on echo; US legs neg; stable BP not needing pressors. ID - SARS-COV-2 negative x 2 Antibiotic choice: Zosyn started on 5/2/20 with low grade fevercomplete 10 days antibiotic; MSSA in resp cxs from before; respiratory cultures 5/8 so far no growth. Procalcitonin repeat is negative. Hem - HIT panel and LEYDI Ab NEG on 4/28. He is definitely high risk for PEs; CTA chest cannot be done due to limitations due to weight; stopped argatroban 5/4; INR 2.2 this a.m.; Eliquis 5 mg twice daily; watch for any karla bloody secretions from tracheal tube. Vasc - PVL bl LE negative Renal -Lasix drip 6 mg/h; creatinine stable; potassium stable; sodium 149D5W decreased to 75 cc/h; free water 200 mL every 4 hrs. Nephrology Dr. Patricia Trejo had signed off.   
GI - GI placed OG tube; good position confirmed with abdominal x-ray; patient on tube feedings Neurology: sedated with limitations; patient follows simple commands, and moving extremities Prognosis guarded. Patient chronically vent dependent. Due to high FiO2 requirements tracheostomy has not happened. I have updated family on 5/6 at [address], mother and brother. I discussed about medical management. I reviewed the comorbidities, and recommendations for tracheostomy once oxygenation is better. Full code. Will defer respective systems problem management to primary and other consultant and follow patient in ICU with primary and other medical team 
Quality Care: PPI, DVT prophylaxis, HOB elevated, Infection control all reviewed and addressed. PAIN AND SEDATION: Fentanyl, Versed · Skin/Wound: chronic venous stasis changes · Nutrition:  Start tube feeding · Prophylaxis: DVT and GI Prophylaxis reviewed. · Restraints: prn 
· PT/OT eval and treat: as needed when stable · Lines/Tubes: A-line 4/23/20; daniel 4/21/20, ET tube 4/25/20; PICC line left arm 5/5 ADVANCE DIRECTIVE: Full code DISCUSSION: spoke with RN, RT, ICU staff Events and notes from last 24 hours reviewed. Care plan discussed with nursing High complexity review and management Lakhwinder Corbett MD

## 2020-05-09 NOTE — PROGRESS NOTES
NUTRITION UPDATE 
 
- Continue enteral feeds of VItal High Protein via NGT: Recommend advance by 5 mL q6 hours to reach new goal rate 65 mL/hr continuously - To provide total 1430 kcal (1838 kcal/day with D5 kcal), 125 g protein, 160 g CHO, 1195 mL water, and >100% RDI's for micronutrients - Free water per MD 
- Propofol d/c'd, advance to new goal rate - Continues with D5 at 100 ml/hr for total 408 kcal/day 
- Continue to monitor TF tolerance closely and adjust prn to meet goals Kayleigh Cheng RD 
PAGER:  [phone]

## 2020-05-09 NOTE — PROGRESS NOTES
Cardiology Progress Note Patient: Dean Darling. Sex: male          DOA: 4/21/2020 YOB: 1975      Age:  53 y.o.        LOS:  LOS: 17 days Patient seen and examined, chart reviewed. Assessment/Plan Patient Active Problem List  
Diagnosis Code  Cellulitis of left lower leg L03. 116  
 Anemia D64.9  Morbid obesity ([facility]) E66.01  
 Dyspnea R06.00  Sleep apnea G47.30  Sinus tachycardia R00.0  BMI 70 and over, adult [facility]) P32.06  Chronic back pain M54.9, G89.29  
 Arthritis M19.90  Migraine G43.909  
 History of atrial flutter Z86.79  
 Limited mobility Z74.09  
 Smoking F17.200  Chronic renal insufficiency, stage II (mild) N18.2  CHF (congestive heart failure) ([facility]) I50.9  Acute diastolic CHF (congestive heart failure) ([facility]) I50.31   Atrial flutter ([facility]) I48.92  
 Elevated troponin R79.89  
 Acute respiratory distress R06.03  
 Acute CHF (congestive heart failure) ([facility]) I50.9  Respiratory failure with hypoxia and hypercapnia ([facility]) J96.91, J96.92  
 Acute on chronic respiratory failure with hypoxia and hypercapnia ([facility]) J96.21, J96.22  
 Cardiac arrest ([facility]) I46.9 Acute on chronic systolic heart failure NSTEMI type II due to PEA arrest 
Paroxysmal atrial flutter Thrombocytopenia Hypernatremia Bradycardia Patient self extubated and had PEA arrest. 
He had brief episode of A fib with RVR and was started On Amiodarone drip. Currently he is in Sinus rhythm. Amiodarone discontinued due to bradycardia. 
  
Echocardiogram  
  
· Left Ventricle: Normal wall thickness and systolic function (ejection fraction normal). Mildly dilated left ventricle. The estimated ejection fraction is 45 - 50%. There is moderate (grade 2) left ventricular diastolic dysfunction E/e' Ratio = 17.09. Wall Scoring: The left ventricular wall motion is globally hypokinetic. · Right Ventricle: Not well visualized. Normal global systolic function. Moderately dilated right ventricle. · Right Atrium: Right atrium not well visualized. Moderately dilated right atrium. · IVC/Hepatic Veins: Dilated inferior vena cava. Mechanically ventilated; cannot use inferior caval vein diameter to estimate central venous pressure. 
  
IV heparin was discontinued due to thrombocytopenia and bleeding at central line site. Started on Argatroban. Patient had frequent oxygen desaturations and pinkish/bloody aspirate from tracheal suctioning. Last 24 hour 3640 ml negative balance Plan: 
 
Continue Aspirin Continue Lasix drip Strict I/O Avoid any AV debbie blocking agents Monitor electrolytes and renal function Continue Ventilatory support. Continue management as per hospital medicine and pulmonary critical care Plan discussed with  Patient will be seen by  on weekend. Subjective:  
 cc: 
Orally intubated REVIEW OF SYSTEMS:  
 
Can not obtain Objective:  
  
Visit Vitals BP 95/54 Pulse 62 Temp 99.1 °F (37.3 °C) Resp 15 Ht 5' 10\" (1.778 m) Wt (!) 232.6 kg (512 lb 12.6 oz) SpO2 91% BMI 73.58 kg/m² Body mass index is 73.58 kg/m². Physical Exam: 
General Appearance: Comfortable, Morbidly obese, not using accessory muscles of respiration. Orally intubated HEENT: AMMON. HEAD: Atraumatic NECK: No JVD, no thyroidomeglay. CAROTIDS: No bruit LUNGS: bilateral conducted sounds HEART: S1+S2 audible, no murmur, no pericardial rub. NEUROLOGICAL: Sedated. Medication: 
Current Facility-Administered Medications Medication Dose Route Frequency  fentaNYL (DURAGESIC) 50 mcg/hr patch 1 Patch  1 Patch TransDERmal Q72H  furosemide (LASIX) 100 mg in 0.9% sodium chloride 100 mL infusion  6 mg/hr IntraVENous CONTINUOUS  
 LORazepam (ATIVAN) injection 2 mg  2 mg IntraVENous Q4H PRN  
  docusate sodium (COLACE) capsule 100 mg  100 mg Oral BID  polyethylene glycol (MIRALAX) packet 17 g  17 g Oral BID  heparin (porcine) 100 unit/mL injection 500 Units  500 Units InterCATHeter Q8H PRN  
 dextrose 5% infusion  100 mL/hr IntraVENous CONTINUOUS  
 apixaban (ELIQUIS) tablet 5 mg  5 mg Oral BID  piperacillin-tazobactam (ZOSYN) 4.5 g in 0.9% sodium chloride (MBP/ADV) 100 mL MBP  4.5 g IntraVENous Q8H  
 fentaNYL citrate (PF) injection 50 mcg  50 mcg IntraVENous Q3H PRN  chlorhexidine (PERIDEX) 0.12 % mouthwash 10 mL  10 mL Oral Q12H  
 albuterol-ipratropium (DUO-NEB) 2.5 MG-0.5 MG/3 ML  3 mL Nebulization Q6H PRN  
 acetaminophen (TYLENOL) tablet 650 mg  650 mg Oral Q6H PRN  
 ELECTROLYTE REPLACEMENT PROTOCOL - Calcium   1 Each Other PRN  
 ELECTROLYTE REPLACEMENT PROTOCOL - Magnesium   1 Each Other PRN  
 ELECTROLYTE REPLACEMENT PROTOCOL - Potassium Standard Dosing   1 Each Other PRN  
 ELECTROLYTE REPLACEMENT PROTOCOL - Phosphorus  Standard Dosing  1 Each Other PRN  pantoprazole (PROTONIX) 40 mg in 0.9% sodium chloride 10 mL injection  40 mg IntraVENous DAILY  fentaNYL (PF) 900 mcg/30 ml infusion soln  0-50 mcg/hr IntraVENous TITRATE  midazolam in normal saline (VERSED) 1 mg/mL infusion  0-5 mg/hr IntraVENous TITRATE  aspirin chewable tablet 81 mg  81 mg Oral DAILY  sodium chloride (NS) flush 5-40 mL  5-40 mL IntraVENous Q8H  
 sodium chloride (NS) flush 5-40 mL  5-40 mL IntraVENous PRN Facility-Administered Medications Ordered in Other Encounters Medication Dose Route Frequency  propofoL (DIPRIVAN) 10 mg/mL injection   IntraVENous PRN  
 ePHEDrine (PF) (MISTOLE) 10 mg/mL in NS syringe   IntraVENous PRN  
 PHENYLephrine (RUPERT-SYNEPHRINE) 100 mcg/mL in NS syringe   IntraVENous PRN Lab/Data Reviewed: 
 
  
Recent Labs 05/08/20 
0568 05/07/20 
0350 05/06/20 
4110 WBC 10.7 10.7 11.6 HGB 12.8* 12.8* 12.9*  
HCT 43.6 43.7 43.9 * 131* 139 Recent Labs 05/08/20 
1630 05/08/20 
2882 05/07/20 
2311 * 151* 150*  
K 3.6 3.7 3.8  113* 114* CO2 38* 34* 34* GLU 97 104* 111* BUN 41* 39* 38* CREA 1.10 1.06 1.00  
CA 7.8* 7.9* 8.0*  
 
41 minutes of critical care time spent in the direct evaluation and treatment of this high risk patient. The reason for providing this level of medical care for this critically ill patient was due a critical illness that impaired one or more vital organ systems such that there was a high probability of imminent or life threatening deterioration in the patients condition. This care involved high complexity decision making to assess, manipulate, and support vital system functions, to treat this degree vital organ system failure and to prevent further life threatening deterioration of the patients condition. Signed By: Chu Peña MD   
 May 8, 2020

## 2020-05-09 NOTE — PROGRESS NOTES
Cardiology Progress Note Patient: Lilia Khan. Sex: male          DOA: 4/21/2020 YOB: 1975      Age:  53 y.o.        LOS:  LOS: 18 days Assessment/Plan Principal Problem: 
  Respiratory failure with hypoxia and hypercapnia ([address] (4/21/2020) Active Problems: Morbid obesity ([address] (4/4/2016) Sleep apnea (4/4/2016) BMI 70 and over, adult [facility]) () Chronic renal insufficiency, stage II (mild) () Chronic a-fib (10/15/2018) Acute CHF (congestive heart failure) ([facility] (4/21/2020) Acute on chronic respiratory failure with hypoxia and hypercapnia ([facility] (4/22/2020) Cardiac arrest ([address] (4/23/2020) Plan: Intubated Cardiac tele stable SR Continue with Lasix infusion and Eliquis 5 mg  BID Discussed with Dr. Ran Hedrick. Pt will need HU to exclude intracardiac shunt. Subjective:  
 cc: SOB Respiratory failure REVIEW OF SYSTEMS:  
 
Limited due to intubation. Objective:  
  
Visit Vitals /59 Pulse 91 Temp 99.6 °F (37.6 °C) Resp 23 Ht 5' 10\" (1.778 m) Wt (!) 232.2 kg (511 lb 14.5 oz) SpO2 93% BMI 73.45 kg/m² Body mass index is 73.45 kg/m². Physical Exam: 
General Appearance: intubated NECK: No JVD, no thyroidomeglay. LUNGS: Clear bilaterally. HEART: S1+S2 audible, ABD: Non-tender, BS Audible EXT: No edema, and no cysnosis. VASCULAR EXAM: Pulses are intact. PSYCHIATRIC EXAM: Mood is appropriate. Medication: 
Current Facility-Administered Medications Medication Dose Route Frequency  potassium, sodium phosphates (NEUTRA-PHOS) packet 2 Packet  2 Packet Oral Q2H  
 fentaNYL (DURAGESIC) 50 mcg/hr patch 1 Patch  1 Patch TransDERmal Q72H  furosemide (LASIX) 100 mg in 0.9% sodium chloride 100 mL infusion  6 mg/hr IntraVENous CONTINUOUS  
 LORazepam (ATIVAN) injection 2 mg  2 mg IntraVENous Q4H PRN  
  docusate sodium (COLACE) capsule 100 mg  100 mg Oral BID  polyethylene glycol (MIRALAX) packet 17 g  17 g Oral BID  heparin (porcine) 100 unit/mL injection 500 Units  500 Units InterCATHeter Q8H PRN  
 dextrose 5% infusion  100 mL/hr IntraVENous CONTINUOUS  
 apixaban (ELIQUIS) tablet 5 mg  5 mg Oral BID  piperacillin-tazobactam (ZOSYN) 4.5 g in 0.9% sodium chloride (MBP/ADV) 100 mL MBP  4.5 g IntraVENous Q8H  
 fentaNYL citrate (PF) injection 50 mcg  50 mcg IntraVENous Q3H PRN  chlorhexidine (PERIDEX) 0.12 % mouthwash 10 mL  10 mL Oral Q12H  
 albuterol-ipratropium (DUO-NEB) 2.5 MG-0.5 MG/3 ML  3 mL Nebulization Q6H PRN  
 acetaminophen (TYLENOL) tablet 650 mg  650 mg Oral Q6H PRN  
 ELECTROLYTE REPLACEMENT PROTOCOL - Calcium   1 Each Other PRN  
 ELECTROLYTE REPLACEMENT PROTOCOL - Magnesium   1 Each Other PRN  
 ELECTROLYTE REPLACEMENT PROTOCOL - Potassium Standard Dosing   1 Each Other PRN  
 ELECTROLYTE REPLACEMENT PROTOCOL - Phosphorus  Standard Dosing  1 Each Other PRN  pantoprazole (PROTONIX) 40 mg in 0.9% sodium chloride 10 mL injection  40 mg IntraVENous DAILY  fentaNYL (PF) 900 mcg/30 ml infusion soln  0-50 mcg/hr IntraVENous TITRATE  midazolam in normal saline (VERSED) 1 mg/mL infusion  0-5 mg/hr IntraVENous TITRATE  aspirin chewable tablet 81 mg  81 mg Oral DAILY  sodium chloride (NS) flush 5-40 mL  5-40 mL IntraVENous Q8H  
 sodium chloride (NS) flush 5-40 mL  5-40 mL IntraVENous PRN Facility-Administered Medications Ordered in Other Encounters Medication Dose Route Frequency  propofoL (DIPRIVAN) 10 mg/mL injection   IntraVENous PRN  
 ePHEDrine (PF) (MISTOLE) 10 mg/mL in NS syringe   IntraVENous PRN  
 PHENYLephrine (RUPERT-SYNEPHRINE) 100 mcg/mL in NS syringe   IntraVENous PRN Lab/Data Reviewed: 
Procedures/imaging: see electronic medical records for all procedures/Xrays and details which were not copied into this note but were reviewed prior to creation of Plan 
  
 
All lab results for the last 24 hours reviewed. Recent Labs 05/09/20 
0330 05/08/20 
2537 05/07/20 
0350 WBC 13.5* 10.7 10.7 HGB 13.1 12.8* 12.8* HCT 44.0 43.6 43.7  126* 131* Recent Labs 05/09/20 
0330 05/08/20 
1630 05/08/20 
3909 * 151* 151*  
K 3.6 3.6 3.7  110 113* CO2 35* 38* 34* GLU 99 97 104* BUN 35* 41* 39* CREA 1.13 1.10 1.06  
CA 7.9* 7.8* 7.9* Signed By: Elizabeth Fabian MD   
 May 9, 2020

## 2020-05-10 NOTE — PROGRESS NOTES
Hospitalist Progress Note Patient: Sandra Thomas MRN: [id number]  CSN: [id number] YOB: 1975  Age: 53 y.o. Sex: male DOA: 4/21/2020 LOS:  LOS: 19 days Assessment/Plan 1. Acute respiratory failure due to obesity hypoventilation syndrome sp pea arrest  
2. CHF EF 45% 3. Possible aspiration pneumonia w MSSA in sputum 4. Thromobcytopnia, stable, HIT panel neg 5. Hypernatremia- improving Plan: 
- vent per pulmonology 
- for HU next week 
- trach when oxygen requirements improve 
- on lasix gtt 
- continue anbitioics 
- eliquis Patient Active Problem List  
Diagnosis Code  Cellulitis of left lower leg L03. 116  
 Anemia D64.9  Morbid obesity ([facility]) E66.01  
 Dyspnea R06.00  Sleep apnea G47.30  Sinus tachycardia R00.0  BMI 70 and over, adult [facility]) C30.44  Chronic back pain M54.9, G89.29  
 Arthritis M19.90  Migraine G43.909  
 History of atrial flutter Z86.79  
 Limited mobility Z74.09  
 Smoking F17.200  Chronic renal insufficiency, stage II (mild) N18.2  CHF (congestive heart failure) ([facility]) I50.9  Acute diastolic CHF (congestive heart failure) ([facility]) I50.31  Chronic a-fib I48.20  Atrial flutter ([facility]) I48.92  
 Elevated troponin R79.89  
 Acute respiratory distress R06.03  
 Acute CHF (congestive heart failure) ([facility]) I50.9  Respiratory failure with hypoxia and hypercapnia ([facility]) J96.91, J96.92  
 Acute on chronic respiratory failure with hypoxia and hypercapnia ([facility]) J96.21, J96.22  
 Cardiac arrest ([facility]) I46.9 Subjective:  
 cc: respiratory failure Pt remains intubated and sedated on vent Loose stool noted overnight x 2 post FMS REVIEW OF SYSTEMS: 
Unable to obtain 2/2 sedated on vent Objective:  
  
 
Vital signs/Intake and Output: 
Visit Vitals BP 97/62 Pulse 79 Temp 100 °F (37.8 °C) Resp 18 Ht 5' 10\" (1.778 m) Wt (!) 232.2 kg (511 lb 14.5 oz) SpO2 97% BMI 73.45 kg/m² Current Shift:  No intake/output data recorded. Last three shifts:  05/08 1901 - 05/10 0700 In: 4896.7 [I.V.:2556.7] Out: 1561 [[id number]] Body mass index is 73.45 kg/m². Physical Exam: 
GEN:morbidly obese EYES: conjunctiva normal, lids with out lesions HEENT: MMM, No thyromegaly, no lymphadenopathy HEART: RRR +S1 +S2, no JVD, pulses 2+ distally LUNGS:coarse bilat, equal expansion, no wheezing ABDOMEN: + BS, soft NT/ND no organomegaly,  No rebound EXTREMITIES: No edema cyanosis, cap refill normal 
 SKIN: no rashes or skin breakdown, no nodules, normal turgor Current Facility-Administered Medications Medication Dose Route Frequency  fentaNYL (DURAGESIC) 50 mcg/hr patch 1 Patch  1 Patch TransDERmal Q72H  furosemide (LASIX) 100 mg in 0.9% sodium chloride 100 mL infusion  6 mg/hr IntraVENous CONTINUOUS  
 LORazepam (ATIVAN) injection 2 mg  2 mg IntraVENous Q4H PRN  
 docusate sodium (COLACE) capsule 100 mg  100 mg Oral BID  polyethylene glycol (MIRALAX) packet 17 g  17 g Oral BID  heparin (porcine) 100 unit/mL injection 500 Units  500 Units InterCATHeter Q8H PRN  
 dextrose 5% infusion  75 mL/hr IntraVENous CONTINUOUS  
 apixaban (ELIQUIS) tablet 5 mg  5 mg Oral BID  piperacillin-tazobactam (ZOSYN) 4.5 g in 0.9% sodium chloride (MBP/ADV) 100 mL MBP  4.5 g IntraVENous Q8H  
 fentaNYL citrate (PF) injection 50 mcg  50 mcg IntraVENous Q3H PRN  chlorhexidine (PERIDEX) 0.12 % mouthwash 10 mL  10 mL Oral Q12H  
 albuterol-ipratropium (DUO-NEB) 2.5 MG-0.5 MG/3 ML  3 mL Nebulization Q6H PRN  
 acetaminophen (TYLENOL) tablet 650 mg  650 mg Oral Q6H PRN  
 ELECTROLYTE REPLACEMENT PROTOCOL - Calcium   1 Each Other PRN  
 ELECTROLYTE REPLACEMENT PROTOCOL - Magnesium   1 Each Other PRN  
 ELECTROLYTE REPLACEMENT PROTOCOL - Potassium Standard Dosing   1 Each Other PRN  
 ELECTROLYTE REPLACEMENT PROTOCOL - Phosphorus  Standard Dosing  1 Each Other PRN  
  pantoprazole (PROTONIX) 40 mg in 0.9% sodium chloride 10 mL injection  40 mg IntraVENous DAILY  fentaNYL (PF) 900 mcg/30 ml infusion soln  0-50 mcg/hr IntraVENous TITRATE  midazolam in normal saline (VERSED) 1 mg/mL infusion  0-5 mg/hr IntraVENous TITRATE  aspirin chewable tablet 81 mg  81 mg Oral DAILY  sodium chloride (NS) flush 5-40 mL  5-40 mL IntraVENous Q8H  
 sodium chloride (NS) flush 5-40 mL  5-40 mL IntraVENous PRN Facility-Administered Medications Ordered in Other Encounters Medication Dose Route Frequency  propofoL (DIPRIVAN) 10 mg/mL injection   IntraVENous PRN  
 ePHEDrine (PF) (MISTOLE) 10 mg/mL in NS syringe   IntraVENous PRN  
 PHENYLephrine (RUPERT-SYNEPHRINE) 100 mcg/mL in NS syringe   IntraVENous PRN All the patient's labs over the past 24 hours were reviewed both during my initial daily workflow process and at the time notated as \"note time\" in [facility]. (It is not time stamped separately in this workflow.)  Select labs are listed below. Labs: Results:  
   
Chemistry Recent Labs 05/09/20 
2324 05/09/20 
1530 05/09/20 
0330  05/08/20 
5350 * 95 99   < > 104* * 150* 149*   < > 151*  
K 3.8 3.8 3.6   < > 3.7  110 109   < > 113* CO2 36* 36* 35*   < > 34* BUN 28* 31* 35*   < > 39* CREA 1.02 1.10 1.13   < > 1.06  
CA 7.5* 7.7* 7.9*   < > 7.9* AGAP 3 4 5   < > 4  
BUCR 27* 28* 31*   < > 37* AP  --   --  63  --  53  
TP  --   --  5.9*  --  5.7* ALB  --   --  2.5*  --  2.3*  
GLOB  --   --  3.4  --  3.4 AGRAT  --   --  0.7*  --  0.7*  
 < > = values in this interval not displayed. CBC w/Diff Recent Labs 05/09/20 
0330 05/08/20 
7471 WBC 13.5* 10.7 RBC 4.92 4.87  
HGB 13.1 12.8* HCT 44.0 43.6  126* GRANS 87* 84* LYMPH 6* 10* EOS 1 1 Coagulation Recent Labs 05/10/20 
0740 05/09/20 
0330 PTP 24.4* 23.7* INR 2.2* 2.2* APTT 43.9* 35.8 Liver Enzymes Recent Labs 05/09/20 
0330 TP 5.9* ALB 2.5* AP 63 SGOT 52* Thyroid Studies Lab Results Component Value Date/Time TSH 1.45 04/27/2020 03:50 AM  
    
Procedures/imaging: see electronic medical records for all procedures/Xrays and details which were not copied into this note but were reviewed prior to creation of Plan Imaging personally re Vicki Singleton, DO Internal Medicine/Geriatrics

## 2020-05-10 NOTE — PROGRESS NOTES
Pulmonary, Critical Care, and Sleep Medicine Pulmonary Progress Note Name: Shan Freeman. : 1975 MRN: [id number] Date: 5/10/2020 [x]I have reviewed the flowsheet and previous days notes. Events, vitals, medications and notes from last 24 hours reviewed. Subjective/History:  
68-year-old male with morbid obesity, ex-smoker, history of asthma, history of severe obstructive sleep apnea on CPAP, questionable component compliance, history of congestive heart failure with reduced ejection fraction atrial fibrillation atrial flutter on amiodarone, Xarelto, history of chronic renal insufficiency, cellulitis of lower extremities and anemia. As per family did not have any fever or upper respiratory tract infections and no flulike symptoms. On Lasix, CPAP not clear if missed. Presented with acute shortness of breath worsening edema of lower extremities. He was found to be in acute hypercarbic hypoxemic respiratory failure initially had a trial of BiPAP but failed and then eventually was intubated. He was hypotensive. Moderate amount of secretions. Morbid obesity. Patient compliant with Xarelto. Patient self extubated and had PEA arrest. 
 
Subjective:  
05/10/20 Patient intubated  for hypoxemic resp failure. Patient remains in icu, orotracheally intubated; on vent support He is morbidly obese; requiring high oxygen requirements since admission to the ICU. PCV mode of ventilation; frequent oxygen desaturations-suctioned pinkish aspirates/bloody secretions from airways Tolerating tube feeds He remains afebrile; BP low normal; telemetrysinus rhythm UOP 2.7 L yesterday with Lasix drip Morbidly obeseweight reported 490 pounds. Drips: fentanyl, versed, D5W, Lasix ROS: Review of systems not obtained due to patient factors. Past Medical History: 
Past Medical History:  
Diagnosis Date  Arrhythmia  Arthritis   
 knees  CHF (congestive heart failure) ([facility] [address]  Chronic back pain  Chronic renal insufficiency, stage II (mild)  History of atrial flutter Dx 2016 - anticoagulated by Larry Gudino  Hypertension 2015  Limited mobility  Migraine headache  Morbid obesity ([address]  Morbid obesity with body mass index of 60.0-69.9 in adult [facility])  Sleep apnea   
 uses c-pap instructed to bring day of surgery  Smoking   
 very passive / cigars only Allergy: No Known Allergies Vital Signs:   
Blood pressure 97/58, pulse 76, temperature 100 °F (37.8 °C), resp. rate 19, height 5' 10\" (1.778 m), weight (!) 232.2 kg (511 lb 14.5 oz), SpO2 95 %. Body mass index is 73.45 kg/m². O2 Device: Endotracheal tube O2 Flow Rate (L/min): 12 l/min Temp (24hrs), Av.6 °F (37.6 °C), Min:98.5 °F (36.9 °C), Max:100.1 °F (37.8 °C) Intake/Output:  
Last shift:      No intake/output data recorded. Last 3 shifts:  1901 - 05/10 0700 In: 4896.7 [I.V.:2556.7] Out: 4673 [ICEET:7758] Intake/Output Summary (Last 24 hours) at 5/10/2020 1106 Last data filed at 5/10/2020 1408 Gross per 24 hour Intake 4096.74 ml Output 2175 ml Net 1921.74 ml Ventilator Settings: 
Mode Rate Tidal Volume Pressure FiO2 PEEP Assist control, VC+   580 ml    85 % 15 cm H20 Peak airway pressure: 32 cm H2O Minute ventilation: 13.2 l/min Physical Exam: 
General: sedated, arousable, obese male; no cyanosis; on ventilator HEENT: LIBBY, orally intubated; orogastric tube; no bleeding; pupils not dilated, reactive Neck: thick short obese neck Chest: normal, obese chest wall Lungs: moderate air entry, obese chest limits exam; decreased BS bases; symmetrical chest expansion Heart: S1S2 present or without murmur or extra heart sounds; JVD not elevated Abdomen: obese, bowel sounds normoactive, soft without significant tenderness; no organomegaly; no guarding or rigidity Extremity: 2+, pitting bilateral thigh and arm edema; negative for cyanosis, clubbing Capillary refill: normal 
Neuro: sedated, arousable, exam limitations Skin: Skin color, texture, turgor fair. Skin dry, warm, non-diaphoretic DATA:  
Current Facility-Administered Medications Medication Dose Route Frequency  apixaban (ELIQUIS) tablet 2.5 mg  2.5 mg Oral BID  fentaNYL (DURAGESIC) 50 mcg/hr patch 1 Patch  1 Patch TransDERmal Q72H  furosemide (LASIX) 100 mg in 0.9% sodium chloride 100 mL infusion  8 mg/hr IntraVENous CONTINUOUS  
 LORazepam (ATIVAN) injection 2 mg  2 mg IntraVENous Q4H PRN  
 docusate sodium (COLACE) capsule 100 mg  100 mg Oral BID  polyethylene glycol (MIRALAX) packet 17 g  17 g Oral BID  heparin (porcine) 100 unit/mL injection 500 Units  500 Units InterCATHeter Q8H PRN  
 dextrose 5% infusion  75 mL/hr IntraVENous CONTINUOUS  piperacillin-tazobactam (ZOSYN) 4.5 g in 0.9% sodium chloride (MBP/ADV) 100 mL MBP  4.5 g IntraVENous Q8H  
 fentaNYL citrate (PF) injection 50 mcg  50 mcg IntraVENous Q3H PRN  chlorhexidine (PERIDEX) 0.12 % mouthwash 10 mL  10 mL Oral Q12H  
 albuterol-ipratropium (DUO-NEB) 2.5 MG-0.5 MG/3 ML  3 mL Nebulization Q6H PRN  
 acetaminophen (TYLENOL) tablet 650 mg  650 mg Oral Q6H PRN  
 ELECTROLYTE REPLACEMENT PROTOCOL - Calcium   1 Each Other PRN  
 ELECTROLYTE REPLACEMENT PROTOCOL - Magnesium   1 Each Other PRN  
 ELECTROLYTE REPLACEMENT PROTOCOL - Potassium Standard Dosing   1 Each Other PRN  
 ELECTROLYTE REPLACEMENT PROTOCOL - Phosphorus  Standard Dosing  1 Each Other PRN  pantoprazole (PROTONIX) 40 mg in 0.9% sodium chloride 10 mL injection  40 mg IntraVENous DAILY  fentaNYL (PF) 900 mcg/30 ml infusion soln  0-50 mcg/hr IntraVENous TITRATE  midazolam in normal saline (VERSED) 1 mg/mL infusion  0-5 mg/hr IntraVENous TITRATE  aspirin chewable tablet 81 mg  81 mg Oral DAILY  sodium chloride (NS) flush 5-40 mL  5-40 mL IntraVENous Q8H  
  sodium chloride (NS) flush 5-40 mL  5-40 mL IntraVENous PRN Facility-Administered Medications Ordered in Other Encounters Medication Dose Route Frequency  propofoL (DIPRIVAN) 10 mg/mL injection   IntraVENous PRN  
 ePHEDrine (PF) (MISTOLE) 10 mg/mL in NS syringe   IntraVENous PRN  
 PHENYLephrine (RUPERT-SYNEPHRINE) 100 mcg/mL in NS syringe   IntraVENous PRN Telemetry: [x]Sinus []A-flutter []Paced []A-fib []Multiple PVCs  
              
4/22/20 ECHO · Left Ventricle: Normal wall thickness and systolic function (ejection fraction normal). Mildly dilated left ventricle. The estimated ejection fraction is 45 - 50%. There is moderate (grade 2) left ventricular diastolic dysfunction E/e' Ratio = 17.09. Wall Scoring: The left ventricular wall motion is globally hypokinetic. · Right Ventricle: Not well visualized. Normal global systolic function. Moderately dilated right ventricle. · Right Atrium: Right atrium not well visualized. Moderately dilated right atrium. · IVC/Hepatic Veins: Dilated inferior vena cava. Mechanically ventilated; cannot use inferior caval vein diameter to estimate central venous pressure. Labs: 
Recent Results (from the past 24 hour(s)) METABOLIC PANEL, BASIC Collection Time: 05/09/20  3:30 PM  
Result Value Ref Range Sodium 150 (H) 136 - 145 mmol/L Potassium 3.8 3.5 - 5.5 mmol/L Chloride 110 100 - 111 mmol/L  
 CO2 36 (H) 21 - 32 mmol/L Anion gap 4 3.0 - 18 mmol/L Glucose 95 74 - 99 mg/dL BUN 31 (H) 7.0 - 18 MG/DL Creatinine 1.10 0.6 - 1.3 MG/DL  
 BUN/Creatinine ratio 28 (H) 12 - 20 GFR est AA >60 >60 ml/min/1.73m2 GFR est non-AA >60 >60 ml/min/1.73m2 Calcium 7.7 (L) 8.5 - 10.1 MG/DL MAGNESIUM Collection Time: 05/09/20  3:30 PM  
Result Value Ref Range Magnesium 1.9 1.6 - 2.6 mg/dL PHOSPHORUS Collection Time: 05/09/20  3:30 PM  
Result Value Ref Range  Phosphorus 2.5 2.5 - 4.9 MG/DL  
GLUCOSE, POC  
 Collection Time: 05/09/20 11:05 PM  
Result Value Ref Range Glucose (POC) 94 70 - 110 mg/dL METABOLIC PANEL, BASIC Collection Time: 05/09/20 11:24 PM  
Result Value Ref Range Sodium 149 (H) 136 - 145 mmol/L Potassium 3.8 3.5 - 5.5 mmol/L Chloride 110 100 - 111 mmol/L  
 CO2 36 (H) 21 - 32 mmol/L Anion gap 3 3.0 - 18 mmol/L Glucose 105 (H) 74 - 99 mg/dL BUN 28 (H) 7.0 - 18 MG/DL Creatinine 1.02 0.6 - 1.3 MG/DL  
 BUN/Creatinine ratio 27 (H) 12 - 20 GFR est AA >60 >60 ml/min/1.73m2 GFR est non-AA >60 >60 ml/min/1.73m2 Calcium 7.5 (L) 8.5 - 10.1 MG/DL MAGNESIUM Collection Time: 05/09/20 11:24 PM  
Result Value Ref Range Magnesium 1.8 1.6 - 2.6 mg/dL PHOSPHORUS Collection Time: 05/09/20 11:24 PM  
Result Value Ref Range Phosphorus 2.9 2.5 - 4.9 MG/DL  
POC G3 Collection Time: 05/10/20  5:01 AM  
Result Value Ref Range Device: VENT    
 FIO2 (POC) 1.0 %  
 pH (POC) 7.450 7.35 - 7.45    
 pCO2 (POC) 51.7 (H) 35.0 - 45.0 MMHG  
 pO2 (POC) 75 (L) 80 - 100 MMHG  
 HCO3 (POC) 35.9 (H) 22 - 26 MMOL/L  
 sO2 (POC) 95 92 - 97 % Base excess (POC) 12 mmol/L Mode ASSIST CONTROL Tidal volume 829 ml Set Rate 16 bpm  
 PEEP/CPAP (POC) 15 cmH2O  
 PIP (POC) 35 Allens test (POC) N/A Inspiratory Time 0.9 sec Total resp. rate 16 Site DRAWN FROM ARTERIAL LINE Patient temp. 98.8 Specimen type (POC) ARTERIAL Performed by Edgar Rush Pressure control YES    
GLUCOSE, POC Collection Time: 05/10/20  5:43 AM  
Result Value Ref Range Glucose (POC) 99 70 - 110 mg/dL METABOLIC PANEL, COMPREHENSIVE Collection Time: 05/10/20  7:40 AM  
Result Value Ref Range Sodium 147 (H) 136 - 145 mmol/L Potassium 3.7 3.5 - 5.5 mmol/L Chloride 108 100 - 111 mmol/L  
 CO2 35 (H) 21 - 32 mmol/L Anion gap 4 3.0 - 18 mmol/L Glucose 101 (H) 74 - 99 mg/dL BUN 27 (H) 7.0 - 18 MG/DL  Creatinine 1.00 0.6 - 1.3 MG/DL  
 BUN/Creatinine ratio 27 (H) 12 - 20 GFR est AA >60 >60 ml/min/1.73m2 GFR est non-AA >60 >60 ml/min/1.73m2 Calcium 7.5 (L) 8.5 - 10.1 MG/DL Bilirubin, total 1.3 (H) 0.2 - 1.0 MG/DL  
 ALT (SGPT) 32 16 - 61 U/L  
 AST (SGOT) 49 (H) 10 - 38 U/L Alk. phosphatase 58 45 - 117 U/L Protein, total 5.2 (L) 6.4 - 8.2 g/dL Albumin 2.1 (L) 3.4 - 5.0 g/dL Globulin 3.1 2.0 - 4.0 g/dL A-G Ratio 0.7 (L) 0.8 - 1.7    
CBC WITH AUTOMATED DIFF Collection Time: 05/10/20  7:40 AM  
Result Value Ref Range WBC 10.5 4.6 - 13.2 K/uL  
 RBC 4.21 (L) 4.70 - 5.50 M/uL  
 HGB 11.3 (L) 13.0 - 16.0 g/dL HCT 37.5 36.0 - 48.0 % MCV 89.1 74.0 - 97.0 FL  
 MCH 26.8 24.0 - 34.0 PG  
 MCHC 30.1 (L) 31.0 - 37.0 g/dL  
 RDW 16.2 (H) 11.6 - 14.5 % PLATELET 905 956 - 138 K/uL MPV 11.8 9.2 - 11.8 FL  
 NEUTROPHILS 82 (H) 40 - 73 % LYMPHOCYTES 9 (L) 21 - 52 % MONOCYTES 7 3 - 10 % EOSINOPHILS 2 0 - 5 % BASOPHILS 0 0 - 2 %  
 ABS. NEUTROPHILS 8.6 (H) 1.8 - 8.0 K/UL  
 ABS. LYMPHOCYTES 1.0 0.9 - 3.6 K/UL  
 ABS. MONOCYTES 0.7 0.05 - 1.2 K/UL  
 ABS. EOSINOPHILS 0.2 0.0 - 0.4 K/UL  
 ABS. BASOPHILS 0.0 0.0 - 0.1 K/UL  
 DF AUTOMATED MAGNESIUM Collection Time: 05/10/20  7:40 AM  
Result Value Ref Range Magnesium 1.9 1.6 - 2.6 mg/dL PHOSPHORUS Collection Time: 05/10/20  7:40 AM  
Result Value Ref Range Phosphorus 2.7 2.5 - 4.9 MG/DL  
PTT Collection Time: 05/10/20  7:40 AM  
Result Value Ref Range aPTT 43.9 (H) 23.0 - 36.4 SEC PROTHROMBIN TIME + INR Collection Time: 05/10/20  7:40 AM  
Result Value Ref Range Prothrombin time 24.4 (H) 11.5 - 15.2 sec INR 2.2 (H) 0.8 - 1.2 CALCIUM, IONIZED Collection Time: 05/10/20  7:50 AM  
Result Value Ref Range Ionized Calcium 1.10 (L) 1.12 - 1.32 MMOL/L Recent Labs 05/10/20 
0501 05/09/20 
6224 05/08/20 
8775 FIO2I 1.0 1.0 1.0  
HCO3I 35.9* 33.1* 36.2*  
PCO2I 51.7* 49.5* 57.5* PHI 7.450 7.434 7. [address] [facility] [address] 75* 55* 68* All Micro Results Procedure Component Value Units Date/Time CULTURE, RESPIRATORY/SPUTUM/BRONCH Sharlot Alexandre [[id number]] Collected:  05/08/20 1630 Order Status:  Completed Specimen:  Sputum from Tracheal Aspirate Updated:  05/08/20 6323 Special Requests: NO SPECIAL REQUESTS     
  GRAM STAIN RARE WBCS SEEN     
      
  RARE EPITHELIAL CELLS SEEN  
     
   NO ORGANISMS SEEN Culture result: PENDING  
 CULTURE, RESPIRATORY/SPUTUM/BRONCH Sharlot Alexandre [[id number]] Collected:  05/04/20 1315 Order Status:  Canceled Specimen:  Sputum from Tracheal Aspirate CULTURE, RESPIRATORY/SPUTUM/BRONCH Worth Sprout STAIN [[id number]]  (Abnormal)  (Susceptibility) Collected:  04/22/20 1018 Order Status:  Completed Specimen:  Sputum from Tracheal Aspirate Updated:  04/25/20 6885 Special Requests: NO SPECIAL REQUESTS     
  GRAM STAIN FEW WBCS SEEN     
      
  OCCASIONAL EPITHELIAL CELLS SEEN  
     
      
  FEW GRAM POSITIVE COCCI IN CLUSTERS  
     
   RARE GRAM NEGATIVE RODS Culture result:    
  LIGHT STAPHYLOCOCCUS AUREUS  
     
      
  LIGHT NORMAL RESPIRATORY BRYN  
     
 CULTURE, BLOOD [[id number]] Collected:  04/24/20 1015 Order Status:  Canceled Specimen:  Blood Imaging: 
[x]I have personally reviewed the patients chest radiographs images and report AXR 5/7 Results from [facility] [place] [place] Encounter encounter on 04/21/20 XR ABD (KUB) Narrative EXAM: SUPINE ABDOMINAL RADIOGRAPH 
 
CLINICAL INDICATION/HISTORY: eval for ileus 
-Additional: None COMPARISON: 5/6/2020 TECHNIQUE: Single supine view of the abdomen. 
 
_______________ Impression FINDINGS/IMPRESSION: 
 
BOWEL GAS PATTERN: Limited study secondary to body habitus. Enteric tube poorly 
visualized. Contrast injection demonstrates tip at the GE junction/proximal 
stomach. Oral contrast within the colon. Chest x-ray 1 view 5/10 COMPARISON: 5/9/2020 Chest x-ray on my review shows good ET tube position; limitations due to severe obesity There appears to be bilateral diffuse groundglass infiltrates, more worse in the left lung Overall, seeing improved aeration on today's film. US LEs 4/22/2020 Interpretation Summary · No evidence of deep vein thrombosis in the right lower extremity veins assessed. · No evidence of deep vein thrombosis in the left lower extremity veins assessed. Echo 4/22 Result status: Edited Result - FINAL Addendum by Nikunj Fall MD on Wed Apr 22, 2020  5:12 PM  
· Left Ventricle: Normal wall thickness and systolic function (ejection fraction normal). Mildly dilated left ventricle. The estimated ejection fraction is 45 - 50%. There is moderate (grade 2) left ventricular diastolic dysfunction E/e' Ratio = 17.09. Wall Scoring: The left ventricular wall motion is globally hypokinetic. · Right Ventricle: Not well visualized. Normal global systolic function. Moderately dilated right ventricle. · Right Atrium: Right atrium not well visualized. Moderately dilated right atrium. · IVC/Hepatic Veins: Dilated inferior vena cava. Mechanically ventilated; cannot use inferior caval vein diameter to estimate central venous pressure. IMPRESSION:  
Patient Active Problem List  
Diagnosis Code  Acute on chronic respiratory failure with hypoxia and hypercapnia ([facility]) J96.21, J96.22  
 Acute on chronic systolic and diastolic CHF (congestive heart failure) ([facility]) I50.43 Aspiration pneumonitis from multiple patient initiated ET tube displacement events J69.0  Cardiac arrest ([facility] [address] I46.9 MSSA in sputum culture A49.01  
 Elevated troponin, NSTEMI R79.89, I21.4  Chronic a-fib I48.20  Atrial flutter ([address] I48.92 Prolonged QTc R94.31  
 Chronic renal insufficiency, stage II (mild) N18.2 Thrombocytopenia D69.6  Smoking F17.200  Arthritis M19.90  Migraine G43.909  Chronic back pain M54.9, G89.29  
 Obstructive Sleep apnea G47.33  
 Morbid obesity (HCC) E66.01  
  
PLAN:  
RS - DAY 19 on vent; remains orotracheally intubated. Continue mech vent; PCV mode; PEEP 15; FiO2 85%. The patient has frequent thin secretions/pinkish to clear from respiratory tract suggestive of pulmonary edema. Continue Lasix drip; chest x-ray limitations due to body habitus, but shows persistent bilateral interstitial-like infiltratescould be ALI/ARDS from aspiration pneumonitis; ESR only 3goes against any significant acute lung injury; certainly does not look like high-dose steroids will help; recent procalcitonin negative, repeat 0.06 on 5/8 which is low. I suspect that patient could have shunting since his O2 sats does not normalize with 100% FiO2, and he has risk factors for chronic right heart failure. I have discussed with Dr. Shelly Goodwin on Friday, and there could be plans for HU early next week. Patient also has chronic obesity, MARIA FERNANDA and likely severe obesity hypoventilation; tolerate O2 sats 85% or above. Patient cannot have CT chest due to body weight limitations. Recent concerns for some aspiration around ET tube with vomiting, and hx of self extubations VAP prevention bundle, head of the bed at 30' all times Patient is off paralytic medication since 5/4/2020 Sedation- Versed and fentanylwean as tolerated; added Ativan prn, and fentanyl patch to help decrease Versed needs; since patient risk of self extubation, he has bilateral soft wrist restraints. Vent and sedation bundles Continue bronchodilators prn; pulmonary hygiene care. Dr Joi Patterson in ENT on case for possible trach when fio2 and peep requirements are down [facility] -cardiology on the case; blood pressure stable; increase Lasix drip to 8 mg/h; follow urine output, renal function and electrolytes including potassium, sodium. EF 45-50%; dilated RV on echo; US legs neg; stable BP not needing pressors. ID - SARS-COV-2 negative x 2 Antibiotic choice: Zosyn started on 5/2/20 with low grade fevercomplete 10 days antibiotic; MSSA in resp cxs from before; respiratory cultures 5/8 so far no growth. Procalcitonin repeat is negative. Hem - HIT panel and LEYDI Ab NEG on 4/28. He is definitely high risk for PEs; CTA chest cannot be done due to limitations due to weight; stopped argatroban 5/4; INR 2.2 this a.m. [suspect fatty liver/GASTELUM]; Eliquis decreased 2.5 mg twice daily due to reports of bloody secretions last evening from the ET tube. Vasc - PVL bl LE negative Renal -Lasix drip increased to 8 mg/h; BUN and creatinine stable; potassium stable; sodium 147D5W 75 cc/h; free water 200 mL every 4 hrs. Nephrology Dr. Moy Freeman had signed off.   
GI - GI placed OG tube; good position confirmed with abdominal x-ray using Gastrografin; patient on tube feedings; LFTs remain normal 
Neurology: sedated with limitations; patient follows simple commands, and moving extremities Prognosis guarded. Patient chronically vent dependent. Due to high FiO2 requirements tracheostomy has not happened. I have updated family on 5/6 at [address], mother and brother. I discussed about medical management. I reviewed the comorbidities, and recommendations for tracheostomy once oxygenation is better. Full code. Will defer respective systems problem management to primary and other consultant and follow patient in ICU with primary and other medical team 
Quality Care: PPI, DVT prophylaxis, HOB elevated, Infection control all reviewed and addressed. PAIN AND SEDATION: Fentanyl, Versed · Skin/Wound: chronic venous stasis changes · Nutrition:  Start tube feeding · Prophylaxis: DVT and GI Prophylaxis reviewed. · Restraints: prn 
· PT/OT eval and treat: as needed when stable · Lines/Tubes: A-line 4/23/20; daniel 4/21/20, ET tube 4/25/20; PICC line left arm 5/5; DC art line today. ADVANCE DIRECTIVE: Full code DISCUSSION: spoke with RN, RT, ICU staff Events and notes from last 24 hours reviewed. Care plan discussed with nursing High complexity review and management Elvis Honeycutt MD

## 2020-05-10 NOTE — PROGRESS NOTES
0715-Bedside verbal report received from Nick Moreno RN. Sbar, mar, labs,kardex and patient status reviewed. Assumed care of patient. 0800-Assessment completed. No changes from off-going report. Per Dr. Madeline Yoon, decreased Versed drip to 3mg/hr to see patient follow commands. Lasix drip increased to 8mg/hr at this time per Dr. Madeline Yoon. Urine is dark adonay at this time. Pt is currently on 5mg Eliquis PO at this time, was held in PM for bleeding. Dr. Madeline Yoon decreased dose to 2.5mg/hr and to give at this time. 1200-Assessment completed. PROM performed on all extremities. Pt minimally able to move extremities. Discussed Restraints with Dr. Madeline Yoon. Per Dr. Madeline Yoon, continue restraints due to patient's history with x3 reintubations. Feed set changed. Remains with no residuals. Urine beginning to clear, yellow straw at this time with sediment. Tolerating increased lasix drip. Discussed with Dr. Madeline Yoon about orders to remove A-Line. Blood is slow to return from picc line for lab draws and difficult stick for ABG's. Dr. Madeline Yoon stated to keep A-Line for now, and will discuss placing new line with Dr. Bazan Forward. 1400-Rechecked electrolyte per protocol. 1445-Mg 1.6, Ca 1.10, & K 3.5-See replacements. Le Pain amount of blood tinged secretions in vent circuits. Frances, RT made aware and in room to change circuits. 1600-Assessment completed. Pt more alert and VSS at this time. Pt was able to mouth word, \"Where\". Oriented to time and place. Pt nodded yes that he would like to see his wife. Called wife Pankaj Watts and set up a [employer]. 1614-Zoom meeting with wife at this time. Pt smiled and tracked wife on monitor. Nodded appropriately to wife's questions. Wife appreciated Zoom meeting. 1908-Bedside verbal report given to JAS Franco Rn. Sbar, mar, labs, kardex and patient status reviewed. Relinquished care of patient.

## 2020-05-10 NOTE — PROGRESS NOTES
1940- Report and care received, assessment completed per flow sheet. Intubated, sedated. 2030-  updated regarding large amount of karla red blood from ETT. Orders received to discontinue eliquis. 2033- Agitated, restless, ativan administered per orders. RT in to see. 2100- NAD, resting quietly. 2159- Restless, grimacing, fentanyl administered per orders. 2230- NAD, resting quietly. 0000- Reassessment completed and without change. 0140- Incontinent of a second loose, watery, large stool. MD updated, orders received for an FMS. FMS placed without difficulty. 0400- Reassessment completed and without change.

## 2020-05-10 NOTE — PROGRESS NOTES
Pulm/CC I have called and discussed with patient's wife; discussed that patient remains on the ventilator; PEEP requirements are high; FiO2 down to 85% today; patient cannot have tracheostomy till oxygen requirements come down; discussed that occasionally has pinkish secretions/bloody secretions from the airways which is likely a combination of pulmonary edema and being on blood thinners; blood thinners important for risk of blood clots; discussed using Lasix drip to get rid of excess water, and see if oxygen requirements improve; also discussed that cardiology may consider a special echocardiogram called HU to evaluate for shunting; patient being mildly sedated for comfort. I answered questions to satisfaction of family.  
 
Carrie Smith MD

## 2020-05-10 NOTE — PROGRESS NOTES
Cardiology Progress Note Patient: Gail Vera. Sex: male          DOA: 4/21/2020 YOB: 1975      Age:  53 y.o.        LOS:  LOS: 19 days Assessment/Plan Principal Problem: 
  Respiratory failure with hypoxia and hypercapnia ([address] (4/21/2020) Active Problems: Morbid obesity ([address] (4/4/2016) Sleep apnea (4/4/2016) BMI 70 and over, adult [facility]) () Chronic renal insufficiency, stage II (mild) () Chronic a-fib (10/15/2018) Acute CHF (congestive heart failure) ([address] (4/21/2020) Acute on chronic respiratory failure with hypoxia and hypercapnia ([address] (4/22/2020) Cardiac arrest ([address] (4/23/2020) Plan: 
Rhythm is sinus Mild bleeding in ET tube and Eliquis was decreased from 5 mg po BID to 2.5 mg BID Continue with lasix drip Dr. Marcin Johnson will see him tomorrow then will consider HU 
 
 
 CXR reviewed Diffuse bilateral asymmetrical left greater than right edema versus pneumonia 
versus ARDS without change. Small right effusion, cannot exclude a small left 
pleural effusion. Subjective:  
 cc: SOB 
hypoxia REVIEW OF SYSTEMS:  
 
intubated Objective:  
  
Visit Vitals /60 Pulse 83 Temp 99.4 °F (37.4 °C) Resp 27 Ht 5' 10\" (1.778 m) Wt (!) 229.8 kg (506 lb 9.9 oz) SpO2 91% BMI 72.69 kg/m² Body mass index is 72.69 kg/m². Physical Exam: 
General Appearance: Comfortable, intubated NECK: No JVD, no thyroidomeglay. LUNGS: bilateral air entry positive. HEART: S1+S2 audible, ABD: Non-tender, BS Audible EXT: No edema, and no cysnosis. VASCULAR EXAM: Pulses are intact. PSYCHIATRIC EXAM: intubated Medication: 
Current Facility-Administered Medications Medication Dose Route Frequency  apixaban (ELIQUIS) tablet 2.5 mg  2.5 mg Oral BID  calcium gluconate 2 g/100 mL sodium chloride (ISO-OSM)  2 g IntraVENous ONCE  
  magnesium sulfate 2 g/50 ml IVPB (premix or compounded)  2 g IntraVENous ONCE  potassium bicarb-citric acid (EFFER-K) tablet 40 mEq  40 mEq Oral ONCE  
 fentaNYL (DURAGESIC) 50 mcg/hr patch 1 Patch  1 Patch TransDERmal Q72H  furosemide (LASIX) 100 mg in 0.9% sodium chloride 100 mL infusion  8 mg/hr IntraVENous CONTINUOUS  
 LORazepam (ATIVAN) injection 2 mg  2 mg IntraVENous Q4H PRN  
 docusate sodium (COLACE) capsule 100 mg  100 mg Oral BID  polyethylene glycol (MIRALAX) packet 17 g  17 g Oral BID  heparin (porcine) 100 unit/mL injection 500 Units  500 Units InterCATHeter Q8H PRN  
 dextrose 5% infusion  75 mL/hr IntraVENous CONTINUOUS  piperacillin-tazobactam (ZOSYN) 4.5 g in 0.9% sodium chloride (MBP/ADV) 100 mL MBP  4.5 g IntraVENous Q8H  
 fentaNYL citrate (PF) injection 50 mcg  50 mcg IntraVENous Q3H PRN  chlorhexidine (PERIDEX) 0.12 % mouthwash 10 mL  10 mL Oral Q12H  
 albuterol-ipratropium (DUO-NEB) 2.5 MG-0.5 MG/3 ML  3 mL Nebulization Q6H PRN  
 acetaminophen (TYLENOL) tablet 650 mg  650 mg Oral Q6H PRN  
 ELECTROLYTE REPLACEMENT PROTOCOL - Calcium   1 Each Other PRN  
 ELECTROLYTE REPLACEMENT PROTOCOL - Magnesium   1 Each Other PRN  
 ELECTROLYTE REPLACEMENT PROTOCOL - Potassium Standard Dosing   1 Each Other PRN  
 ELECTROLYTE REPLACEMENT PROTOCOL - Phosphorus  Standard Dosing  1 Each Other PRN  pantoprazole (PROTONIX) 40 mg in 0.9% sodium chloride 10 mL injection  40 mg IntraVENous DAILY  fentaNYL (PF) 900 mcg/30 ml infusion soln  0-50 mcg/hr IntraVENous TITRATE  midazolam in normal saline (VERSED) 1 mg/mL infusion  0-5 mg/hr IntraVENous TITRATE  aspirin chewable tablet 81 mg  81 mg Oral DAILY  sodium chloride (NS) flush 5-40 mL  5-40 mL IntraVENous Q8H  
 sodium chloride (NS) flush 5-40 mL  5-40 mL IntraVENous PRN Facility-Administered Medications Ordered in Other Encounters Medication Dose Route Frequency  propofoL (DIPRIVAN) 10 mg/mL injection   IntraVENous PRN  
 ePHEDrine (PF) (MISTOLE) 10 mg/mL in NS syringe   IntraVENous PRN  
 PHENYLephrine (RUPERT-SYNEPHRINE) 100 mcg/mL in NS syringe   IntraVENous PRN Lab/Data Reviewed: 
Procedures/imaging: see electronic medical records for all procedures/Xrays  
and details which were not copied into this note but were reviewed prior to creation of Plan 
  
 
All lab results for the last 24 hours reviewed. Recent Labs 05/10/20 
0740 05/09/20 
0330 05/08/20 
5640 WBC 10.5 13.5* 10.7 HGB 11.3* 13.1 12.8* HCT 37.5 44.0 43.6  139 126* Recent Labs 05/10/20 
1400 05/10/20 
0740 05/09/20 
2324 * 147* 149*  
K 3.5 3.7 3.8  108 110 CO2 35* 35* 36* * 101* 105* BUN 28* 27* 28* CREA 0.93 1.00 1.02  
CA 7.8* 7.5* 7.5* Signed By: Brady Fletcher MD   
 May 10, 2020

## 2020-05-11 NOTE — PROGRESS NOTES
0700- Bedside and Verbal shift change report given to Alexei Driver RN (oncoming nurse) by Eduarda Berg RN (offgoing nurse). Report included the following information SBAR, Kardex, MAR and Recent Results. 1115- Lasix gtt stopped due to hypotension. Fentanyl gtt decreased to 25mcg/hr 1135- Patient BP 86/40.  250cc bolus administered. 1140- Updated cardiology. He will speak to Dr. Keli Prajapati in reference to HU. 
 
1145- BP 87/42. Additional 250cc bolus administered. 1213- BP 82/40. Versed stopped. 1230- Updated Dr. Keli Prajapati 1800- Potassium recheck timed for 2000. 
 
1915- Bedside and Verbal shift change report given to Kayla Mojica RN (oncoming nurse) by Alexei Driver RN (offgoing nurse). Report included the following information SBAR, Kardex, MAR and Recent Results.

## 2020-05-11 NOTE — ADT AUTH CERT NOTES
Patient Demographics Patient Name Marjorie Baig 
[id number] Sex Male  
1975 [facility] [address] Phone [phone] (Home) CSN:  
[id number] Admit Date: Admit Time Room Bed 2020  6:13  [94039] 01 [50208] Attending Providers Provider Pager From To Evon Hilliard DO  20 Aga Vanessa MD  20 Emergency Contact(s) Name Relation Home Work Mobile Isidra Obregon Spouse [phone] Utilization Reviews  
 
   
Respiratory Failure GRG - Care Day  (2020) by Lissette Richards RN  
 
   
Review Entered Review Status 2020 14:26 Completed  
   
Criteria Review Care Day: 21 Care Date: 2020 Level of Care: ICU Guideline Day 3 Level Of Care ( ) * Activity level acceptable   
(X) Floor to discharge 2020 14:22:59 EDT by Mayelin Olvera   
  ICU bed Clinical Status ( ) * Ventilation status appropriate ( ) * Airway status acceptable ( ) * Respiratory status acceptable ( ) * Stable chest findings ( ) * Neurologic status acceptable   
(X) * Temperature status acceptable 2020 14:22:59 EDT by Mayelin Olvera   
  100.6 73 16 95 105/55 Vent   
( ) * No infection, or status acceptable ( ) * General Discharge Criteria met Interventions ( ) * No chest tube, or status acceptable   
(X) * Intake acceptable 2020 14:24:47 EDT by Mayelin Olvera   
  ASA 81 mg po qd, D5 75 hr, Duragesic 50 mcg q72, Lasix 8 mg hr, Ativan 2 mg IV q4 prn x1, Versed gtt, Protonix 40 mg iv qd, Zosyn 4.5 g IV q8, Ca glu 2g iv once, K 20 meq po now, K 20 meq po once,  mol bolus,   
( ) * No inpatient interventions needed 2020 14:26:42 EDT by Mayelin Olvera Subject: Additional Clinical Information   
  
rbc 3.93 h/h 10.4/34.9 Plt 150 inr 2.0 pt 22.4 ptt 44.0 na 147 co2 36 bun 28 bun raito 27 ca 8.1 bili 1.3 protein 5.3 alb 2.0 ag ratio 0.6 ast 42   
blood gas pco2 52.6 po2 65 hco3 35.1 inr 2.0   
cxray IMPRESSION:   
     
Limited study secondary to body habitus. Extensive bilateral alveolar   
infiltrates, left greater than right, unchanged. Probable bilateral effusions. * Milestone Additional Notes 5/11 Pulmonary: Subjective/History:  
49-year-old male with morbid obesity, ex-smoker, history of asthma, history of severe obstructive sleep apnea on CPAP, questionable component compliance, history of congestive heart failure with reduced ejection fraction atrial fibrillation atrial flutter on amiodarone, Xarelto, history of chronic renal insufficiency, cellulitis of lower extremities and anemia. As per family did not have any fever or upper respiratory tract infections and no flulike symptoms. On Lasix, CPAP not clear if missed. Presented with acute shortness of breath worsening edema of lower extremities. He was found to be in acute hypercarbic hypoxemic respiratory failure initially had a trial of BiPAP but failed and then eventually was intubated. He was hypotensive. Moderate amount of secretions. Morbid obesity. Patient compliant with Xarelto. Patient self extubated and had PEA arrest.  
   
Subjective:   
05/11/20 Patient intubated 4/22 for hypoxemic resp failure. Remains in ICU, orally intubated, on ventilator. Sedated. Temperature max 100.8 Diuresing well on Lasix drip with improvement in FiO2 to 75%. Tolerating tube feed. Sodium elevated, increased free water through tube feed. He is morbidly obese; requiring high oxygen requirements since admission to the ICU. Morbidly obese-weight reported 490 pounds.  
   
Drips: fentanyl, versed, D5W, Lasix Physical Exam:  
General: sedated, arousable, obese male; no cyanosis; on ventilator HEENT: LIBBY, orally intubated; orogastric tube; no bleeding; pupils not dilated, reactive Neck: thick short obese neck Chest: normal, obese chest wall Lungs: moderate air entry, obese chest limits exam; decreased BS bases; symmetrical chest expansion Heart: S1S2 present or without murmur or extra heart sounds; JVD not elevated Abdomen: obese, bowel sounds normoactive, soft without significant tenderness; no organomegaly; no guarding or rigidity Extremity: 2+, pitting bilateral thigh/leg and arm edema; negative for cyanosis, clubbing Capillary refill: normal  
Neuro: sedated, arousable, exam limitations Skin: Skin color, texture, turgor fair. Skin dry, warm, non-diaphoretic  
 IMPRESSION:  
· Patient Active Problem List  
Diagnosis Code · Acute on chronic respiratory failure with hypoxia and hypercapnia ([facility]) J96.21, J96.22  
· Acute on chronic systolic and diastolic CHF (congestive heart failure) ([facility]) I50.43  
  Aspiration pneumonitis from multiple patient initiated ET tube displacement events J69.0 · Cardiac arrest ([facility] I46.9  
  MSSA in sputum culture A49.01  
· Elevated troponin, NSTEMI R79.89, I21.4 · Chronic a-fib I48.20 · Atrial flutter ([facility]) I48.92  
  Prolonged QTc R94.31  
· Chronic renal insufficiency, stage II (mild) N18.2  
  Thrombocytopenia D69.6 · Smoking F17.200 · Arthritis M19.90 · Migraine G43.909 · Chronic back pain M54.9, G89.29  
· Obstructive Sleep apnea G47.33  
· Morbid obesity ([facility]) E66.01  
  
PLAN:  
RS - DAY 20 on vent; remains orotracheally intubated. Continue mech vent; PEEP 15; FiO2 75 %. The patient has frequent thin secretions/pinkish to clear from respiratory tract suggestive of pulmonary edema.  Secretions improved since diuresing well with Lasix. CXR unchanged persistent bilateral interstitial infiltrates, could be AL I/ARDS from aspiration pneumonitis.  Low ESR goes against AL I.  Recent pro calcitonin negative.  Does not look like high-dose steroid will help.   
Continue Lasix drip and monitor I's and O's.  Monitor creatinine, electrolytes. I suspect that patient could have shunting since his O2 sats does not normalize with 100% FiO2, and he has risk factors for chronic right heart failure.  Dr. Emily Truong to consider HU.    
Patient also has chronic obesity, MARIA FERNANDA and likely severe obesity hypoventilation; tolerate O2 sats 85% or above. Patient cannot have CT chest due to body weight limitations. Recent concerns for some aspiration around ET tube with vomiting, and hx of self extubations VAP prevention bundle, head of the bed at 30' all times Patient is off paralytic medication since 5/4/2020 Sedation- Versed and fentanyl-wean as tolerated; continue Ativan prn, and fentanyl patch to help decrease Versed needs; since patient risk of self extubation, he has bilateral soft wrist restraints. Vent and sedation bundles Continue bronchodilators prn; pulmonary hygiene care. Dr Ramon Webster in ENT on case for possible trach when fio2 and peep requirements are down [facility] -cardiology on the case; blood pressure stable; continue with increased dose of Lasix drip to 8 mg/h; follow urine output, renal function and electrolytes including potassium, sodium. EF 45-50%; dilated RV on echo; US legs neg; stable BP not needing pressors. Cardiology to consider HU.  See above for detail. ID - SARS-COV-2 negative x 2 Antibiotic choice: Zosyn started on 5/2/20 with low grade fever-complete 10 days antibiotic; MSSA in resp cxs from before; respiratory cultures 5/8- so far no growth.  Procalcitonin repeat is negative. No leukocytosis. Hem - HIT panel and LEYDI Ab NEG on 4/28. He is definitely high risk for PEs; CTA chest cannot be done due to limitations due to weight; stopped argatroban 5/4; INR 2 [suspect fatty liver/GASTELUM]; continue with decreased Eliquis 2.5 mg twice daily due to reports of bloody secretions last weekend from the ET tube. Vasc - PVL bl LE negative Renal -continue Lasix drip increased to 8 mg/h; BUN and creatinine stable; potassium stable; sodium elevated, continue D5W 75 cc/h; increase free water 200 mL every 4 hrs.  Nephrology Dr. Travon Gómez had signed off.    
GI - GI placed OG tube; good position confirmed with abdominal x-ray using Gastrografin; patient on tube feedings; LFTs remain normal  
Neurology: sedated with limitations; patient follows simple commands, and moving extremities  
   
Prognosis guarded.  Patient chronically vent dependent.  Due to high FiO2 requirements tracheostomy has not happened.  
   
Overall poor prognosis. Full code.   
   
Will defer respective systems problem management to primary and other consultant and follow patient in ICU with primary and other medical team  
Quality Care: PPI, DVT prophylaxis, HOB elevated, Infection control all reviewed and addressed. PAIN AND SEDATION: Fentanyl, Versed · Skin/Wound: chronic venous stasis changes · Nutrition:  Start tube feeding · Prophylaxis: DVT and GI Prophylaxis reviewed. · Restraints: prn  
· PT/OT eval and treat: as needed when stable · Lines/Tubes: A-line 4/23/20; daniel 4/21/20, ET tube 4/25/20; PICC line left arm 5/5  
   
ADVANCE DIRECTIVE: Full code DISCUSSION: spoke with RN, RT, ICU staff, MDR Events and notes from last 24 hours reviewed. Care plan discussed with nursing Total CC time: 37 minutes.  
  
  
   
Respiratory Failure GRG - Care Day 17 (5/7/2020) by Ofelia Tobias RN  
 
   
Review Entered Review Status 5/7/2020 15:15 Completed  
   
Criteria Review Care Day: 17 Care Date: 5/7/2020 Level of Care: ICU Guideline Day 3 Level Of Care ( ) * Activity level acceptable   
(X) Floor to discharge 5/7/2020 15:13:05 EDT by Charlotte Mora   
  ICU bed Clinical Status ( ) * Ventilation status appropriate ( ) * Airway status acceptable ( ) * Respiratory status acceptable ( ) * Stable chest findings ( ) * Neurologic status acceptable   
(X) * Temperature status acceptable 5/7/2020 15:13:05 EDT by Brianna Umanzor   
  11 05 86 61 127/76 Vent   
( ) * No infection, or status acceptable ( ) * General Discharge Criteria met Interventions ( ) * No chest tube, or status acceptable   
(X) * Intake acceptable 5/7/2020 15:13:05 EDT by Brianna Umanzor   
  d5 75 hr, Protonix 40 mg iv qd, Zosyn 4.5 g iv q8, Lasix 80 mg iv now Bumex 1 mg iv qd, Solumedrol 20 mg iv q12,   
( ) * No inpatient interventions needed 5/7/2020 15:14:56 EDT by Brianna Umanzor Subject: Additional Clinical Information   
  
hgb 12.8 plt 131 inr 2.1 PT 23.3 na 153 chl 118 co2 33 anion gap 2 glu 108 bun 41 bun ratio 35 ca 7.8 pro 5.8 alb 2.4 ag ratio 0.7 ast 44   
blood gas pco2 55.1 po2 59 hco3 31.4 so2 89   
  
5/7/2020 15:14:56 EDT by Brianna Umanzor Subject: Additional Clinical Information   
kub BOWEL GAS PATTERN: Limited study secondary to body habitus. Enteric tube poorly   
visualized. Contrast injection demonstrates tip at the GE junction/proximal   
stomach. Oral contrast within the colon. * Milestone Additional Notes 5/7 05/07/20 Patient intubated 4/22 for hypoxemic resp failure. Patient remains in icu; on vent support He is morbidly obese; requiring high oxygen requirements since admission to the ICU. PCV mode of ventilation; frequent oxygen desaturations. Episodic pinkish/bloody aspirates from tracheal suctioning Afebrile; BP stable; sinus rhythm UOP good Morbidly obese-weight reported for 90 pounds.  
   
Drips: fentanyl, versed, D5W  
   
ROS: Review of systems not obtained due to patient factors. Physical Exam:  
General: sedated, arousable, obese male; no cyanosis; on ventilator HEENT: LIBBY, orally intubated; orogastric tube; no bleeding; pupils not dilated, reactive Neck: thick short obese neck Chest: normal, obese chest wall Lungs: moderate air entry, decreased BS bases; no wheezing or crackles; symmetrical chest expansion; obese chest  
 Heart: S1S2 present or without murmur or extra heart sounds; JVD not elevated Abdomen: obese, bowel sounds normoactive, soft without significant tenderness; no organomegaly; no guarding or rigidity Extremity: 2+, pitting and bl LE and 2+ bl UE edema; negative for cyanosis, clubbing Capillary refill: normal  
Neuro: sedated, arousable, exam limitations Skin: Skin color, texture, turgor fair. Skin dry, warm, non-diaphoretic  
 IMPRESSION:  
· Patient Active Problem List  
Diagnosis Code · Acute on chronic respiratory failure with hypoxia and hypercapnia ([facility]) J96.21, J96.22  
· Acute on chronic systolic and diastolic CHF (congestive heart failure) ([facility]) I50.43  
  Aspiration pneumonitis from multiple patient initiated ET tube displacement events J69.0 · Cardiac arrest ([facility] [address] I46.9  
  MSSA in sputum culture A49.01  
· Elevated troponin, NSTEMI R79.89, I21.4 · Chronic a-fib I48.20 · Atrial flutter ([facility]) I48.92  
  Prolonged QTc R94.31  
· Chronic renal insufficiency, stage II (mild) N18.2  
  Thrombocytopenia D69.6 · Smoking F17.200 · Arthritis M19.90 · Migraine G43.909 · Chronic back pain M54.9, G89.29  
· Obstructive Sleep apnea G47.33  
· Morbid obesity ([facility]) E66.01  
  
PLAN:  
RS - DAY 16 on vent; continue mech vent; PCV mode; today, patient requiring PEEP of 12 and FiO2 100%.  It appears that he goes into pulmonary edema which explains the episodic pinkish/bloody tracheal secretions with oxygen desaturations.  Patient also has chronic obesity, MARIA FERNANDA and likely severe obesity hypoventilation; tolerate O2 sats 85% or above. Recent concerns for some aspiration around ET tube with vomiting, and hx of self extubations VAP prevention bundle, head of the bed at 30' all times Patient is off paralytic medication since 5/4/2020; sedation Versed and fentanyl-wean as tolerated; added Ativan prn to help decrease Versed needs; since patient risk of self extubation, he has bilateral soft wrist restraints. Vent and sedation bundles Continue bronchodilators prn; pulmonary hygiene care; chest x-ray limited by obesity; discussed with radiology tech-patient weight and chest limitations for chest CT study. Okay to discontinue steroids.    
Dr Ramon Webster in ENT on case for possible trach when fio2 and peep requirements are down CVS - sinus natalia; stable BP; Dr. Emily Truong on the case I would start patient on a Lasix drip; follow urine output, renal function and electrolytes including potassium, sodium. EF 45-50%; dilated RV on echo; US legs neg; stable BP not needing pressors. ID - SARS-COV-2 negative x 2 Antibiotic choice: Zosyn started on 5/2/20 with low grade fever; MSSA in resp cxs from before; follow respiratory cultures from 5/4; procalcitonin less than 0.05 Hem - HIT panel and LEYDI Ab NEG on 4/28. He is definitely high risk for PEs; CTA chest cannot be done due to limitations due to weight; stopped argatroban 5/4; INR 2.0 this a.m.; continue Eliquis 5 mg twice daily; watch for any karla bloody secretions from tracheal tube. Vasc - PVL bl LE negative Renal -start Lasix drip 8 mg/h, after bolusing 80 mg of Lasix; discontinue Bumex; sodium remains elevated-153; D5W increased 100 cc an hour; resume free water 200 mL every 4 hrs.  Nephrology Dr. Barba Schilder had signed off.    
GI - GI placed OG tube; good position confirmed with abdominal x-ray today as well; discussed with RN to resume tube feedings. .  
Neurology: sedated with limitations; patient follows simple commands, and moving extremities; sedation reviewed with staff daily; patient has history of self extubations this admission.    
   
Prognosis guarded.  [facility] mother and brother[facility] Ivanna Metcalf discussed about medical management.  I reviewed the comorbidities, and recommendations for tracheostomy once oxygenation is better.     
Full code.    
Will defer respective systems problem management to primary and other consultant and follow patient in ICU with primary and other medical team  
Quality Care: PPI, DVT prophylaxis, HOB elevated, Infection control all reviewed and addressed. PAIN AND SEDATION: Fentanyl, Versed · Skin/Wound: chronic venous stasis changes · Nutrition:  Start tube feeding · Prophylaxis: DVT and GI Prophylaxis reviewed. · Restraints: prn  
· PT/OT eval and treat: as needed when stable · Lines/Tubes: A-line 4/23/20; daniel 4/21/20, ET tube 4/25/20; PICC line left arm 5/5 ADVANCE DIRECTIVE: Full code DISCUSSION: spoke with RN, RT, ICU staff Events and notes from last 24 hours reviewed. Care plan discussed with nursing

## 2020-05-11 NOTE — PROGRESS NOTES
Pulmonary, Critical Care, and Sleep Medicine Pulmonary Progress Note Name: Fatou Zuñiga. : 1975 MRN: [id number] Date: 2020 [x]I have reviewed the flowsheet and previous days notes. Events, vitals, medications and notes from last 24 hours reviewed. Subjective/History:  
68-year-old male with morbid obesity, ex-smoker, history of asthma, history of severe obstructive sleep apnea on CPAP, questionable component compliance, history of congestive heart failure with reduced ejection fraction atrial fibrillation atrial flutter on amiodarone, Xarelto, history of chronic renal insufficiency, cellulitis of lower extremities and anemia. As per family did not have any fever or upper respiratory tract infections and no flulike symptoms. On Lasix, CPAP not clear if missed. Presented with acute shortness of breath worsening edema of lower extremities. He was found to be in acute hypercarbic hypoxemic respiratory failure initially had a trial of BiPAP but failed and then eventually was intubated. He was hypotensive. Moderate amount of secretions. Morbid obesity. Patient compliant with Xarelto. Patient self extubated and had PEA arrest. 
 
Subjective:  
20 Patient intubated  for hypoxemic resp failure. Remains in ICU, orally intubated, on ventilator. Sedated. Temperature max 100.8 Diuresing well on Lasix drip with improvement in FiO2 to 75%. Tolerating tube feed. Sodium elevated, increased free water through tube feed. He is morbidly obese; requiring high oxygen requirements since admission to the ICU. Morbidly obeseweight reported 490 pounds. Drips: fentanyl, versed, D5W, Lasix Intake/Output Summary (Last 24 hours) at 2020 1122 Last data filed at 2020 5802 Gross per 24 hour Intake 2247.44 ml Output 2800 ml Net -552.56 ml  
 
 
ROS: Review of systems not obtained due to patient factors. Past Medical History: 
Past Medical History: Diagnosis Date  Arrhythmia  Arthritis   
 knees  CHF (congestive heart failure) ([facility] [address]  Chronic back pain  Chronic renal insufficiency, stage II (mild)  History of atrial flutter Dx 2016 - anticoagulated by Damon Martines  Hypertension 2015  Limited mobility  Migraine headache  Morbid obesity ([facility] [address]  Morbid obesity with body mass index of 60.0-69.9 in adult [facility])  Sleep apnea   
 uses c-pap instructed to bring day of surgery  Smoking   
 very passive / cigars only Allergy: No Known Allergies Vital Signs:   
Blood pressure 119/59, pulse 85, temperature 99.1 °F (37.3 °C), resp. rate 29, height 5' 10\" (1.778 m), weight (!) 229.8 kg (506 lb 9.9 oz), SpO2 95 %. Body mass index is 72.69 kg/m². O2 Device: Endotracheal tube, Ventilator O2 Flow Rate (L/min): 12 l/min Temp (24hrs), Av.7 °F (37.6 °C), Min:99.1 °F (37.3 °C), Max:100.8 °F (38.2 °C) Intake/Output:  
Last shift:       07 - 1900 In: -  
Out: 450 [Urine:450] Last 3 shifts: 1901 -  07 In: 4392.6 [I.V.:2542.6] Out: 9672 [ZNRFU:2571] Intake/Output Summary (Last 24 hours) at 2020 1120 Last data filed at 2020 0907 Gross per 24 hour Intake 2247.44 ml Output 2800 ml Net -552.56 ml Ventilator Settings: 
Mode Rate Tidal Volume Pressure FiO2 PEEP Assist control, Pressure support   580 ml    75 % 15 cm H20 Peak airway pressure: 32 cm H2O Minute ventilation: 11.8 l/min Physical Exam: 
General: sedated, arousable, obese male; no cyanosis; on ventilator HEENT: LIBBY, orally intubated; orogastric tube; no bleeding; pupils not dilated, reactive Neck: thick short obese neck Chest: normal, obese chest wall Lungs: moderate air entry, obese chest limits exam; decreased BS bases; symmetrical chest expansion Heart: S1S2 present or without murmur or extra heart sounds; JVD not elevated Abdomen: obese, bowel sounds normoactive, soft without significant tenderness; no organomegaly; no guarding or rigidity Extremity: 2+, pitting bilateral thigh/leg and arm edema; negative for cyanosis, clubbing Capillary refill: normal 
Neuro: sedated, arousable, exam limitations Skin: Skin color, texture, turgor fair. Skin dry, warm, non-diaphoretic DATA:  
Current Facility-Administered Medications Medication Dose Route Frequency  apixaban (ELIQUIS) tablet 2.5 mg  2.5 mg Oral BID  scopolamine (TRANSDERM-SCOP) 1 mg over 3 days 1 Patch  1 Patch TransDERmal Q72H  fentaNYL (DURAGESIC) 50 mcg/hr patch 1 Patch  1 Patch TransDERmal Q72H  furosemide (LASIX) 100 mg in 0.9% sodium chloride 100 mL infusion  8 mg/hr IntraVENous CONTINUOUS  
 LORazepam (ATIVAN) injection 2 mg  2 mg IntraVENous Q4H PRN  
 docusate sodium (COLACE) capsule 100 mg  100 mg Oral BID  polyethylene glycol (MIRALAX) packet 17 g  17 g Oral BID  heparin (porcine) 100 unit/mL injection 500 Units  500 Units InterCATHeter Q8H PRN  
 dextrose 5% infusion  75 mL/hr IntraVENous CONTINUOUS  piperacillin-tazobactam (ZOSYN) 4.5 g in 0.9% sodium chloride (MBP/ADV) 100 mL MBP  4.5 g IntraVENous Q8H  
 fentaNYL citrate (PF) injection 50 mcg  50 mcg IntraVENous Q3H PRN  chlorhexidine (PERIDEX) 0.12 % mouthwash 10 mL  10 mL Oral Q12H  
 albuterol-ipratropium (DUO-NEB) 2.5 MG-0.5 MG/3 ML  3 mL Nebulization Q6H PRN  
 acetaminophen (TYLENOL) tablet 650 mg  650 mg Oral Q6H PRN  
 ELECTROLYTE REPLACEMENT PROTOCOL - Calcium   1 Each Other PRN  
 ELECTROLYTE REPLACEMENT PROTOCOL - Magnesium   1 Each Other PRN  
 ELECTROLYTE REPLACEMENT PROTOCOL - Potassium Standard Dosing   1 Each Other PRN  
 ELECTROLYTE REPLACEMENT PROTOCOL - Phosphorus  Standard Dosing  1 Each Other PRN  pantoprazole (PROTONIX) 40 mg in 0.9% sodium chloride 10 mL injection  40 mg IntraVENous DAILY  fentaNYL (PF) 900 mcg/30 ml infusion soln  0-50 mcg/hr IntraVENous TITRATE  midazolam in normal saline (VERSED) 1 mg/mL infusion  0-5 mg/hr IntraVENous TITRATE  aspirin chewable tablet 81 mg  81 mg Oral DAILY  sodium chloride (NS) flush 5-40 mL  5-40 mL IntraVENous Q8H  
 sodium chloride (NS) flush 5-40 mL  5-40 mL IntraVENous PRN Facility-Administered Medications Ordered in Other Encounters Medication Dose Route Frequency  propofoL (DIPRIVAN) 10 mg/mL injection   IntraVENous PRN  
 ePHEDrine (PF) (MISTOLE) 10 mg/mL in NS syringe   IntraVENous PRN  
 PHENYLephrine (RUPERT-SYNEPHRINE) 100 mcg/mL in NS syringe   IntraVENous PRN Telemetry: [x]Sinus []A-flutter []Paced []A-fib []Multiple PVCs  
              
4/22/20 ECHO · Left Ventricle: Normal wall thickness and systolic function (ejection fraction normal). Mildly dilated left ventricle. The estimated ejection fraction is 45 - 50%. There is moderate (grade 2) left ventricular diastolic dysfunction E/e' Ratio = 17.09. Wall Scoring: The left ventricular wall motion is globally hypokinetic. · Right Ventricle: Not well visualized. Normal global systolic function. Moderately dilated right ventricle. · Right Atrium: Right atrium not well visualized. Moderately dilated right atrium. · IVC/Hepatic Veins: Dilated inferior vena cava. Mechanically ventilated; cannot use inferior caval vein diameter to estimate central venous pressure. Labs: 
Recent Results (from the past 24 hour(s)) GLUCOSE, POC Collection Time: 05/10/20 11:43 AM  
Result Value Ref Range Glucose (POC) 98 70 - 110 mg/dL METABOLIC PANEL, BASIC Collection Time: 05/10/20  2:00 PM  
Result Value Ref Range Sodium 149 (H) 136 - 145 mmol/L Potassium 3.5 3.5 - 5.5 mmol/L Chloride 109 100 - 111 mmol/L  
 CO2 35 (H) 21 - 32 mmol/L Anion gap 5 3.0 - 18 mmol/L Glucose 106 (H) 74 - 99 mg/dL BUN 28 (H) 7.0 - 18 MG/DL  Creatinine 0.93 0.6 - 1.3 MG/DL  
 BUN/Creatinine ratio 30 (H) 12 - 20 GFR est AA >60 >60 ml/min/1.73m2 GFR est non-AA >60 >60 ml/min/1.73m2 Calcium 7.8 (L) 8.5 - 10.1 MG/DL MAGNESIUM Collection Time: 05/10/20  2:00 PM  
Result Value Ref Range Magnesium 1.6 1.6 - 2.6 mg/dL PHOSPHORUS Collection Time: 05/10/20  2:00 PM  
Result Value Ref Range Phosphorus 2.7 2.5 - 4.9 MG/DL  
CALCIUM, IONIZED Collection Time: 05/10/20  2:00 PM  
Result Value Ref Range Ionized Calcium 1.10 (L) 1.12 - 1.32 MMOL/L  
GLUCOSE, POC Collection Time: 05/10/20  5:31 PM  
Result Value Ref Range Glucose (POC) 90 70 - 110 mg/dL GLUCOSE, POC Collection Time: 05/10/20 10:59 PM  
Result Value Ref Range Glucose (POC) 103 70 - 110 mg/dL METABOLIC PANEL, BASIC Collection Time: 05/10/20 11:52 PM  
Result Value Ref Range Sodium 147 (H) 136 - 145 mmol/L Potassium 3.7 3.5 - 5.5 mmol/L Chloride 107 100 - 111 mmol/L  
 CO2 35 (H) 21 - 32 mmol/L Anion gap 5 3.0 - 18 mmol/L Glucose 102 (H) 74 - 99 mg/dL BUN 26 (H) 7.0 - 18 MG/DL Creatinine 0.98 0.6 - 1.3 MG/DL  
 BUN/Creatinine ratio 27 (H) 12 - 20 GFR est AA >60 >60 ml/min/1.73m2 GFR est non-AA >60 >60 ml/min/1.73m2 Calcium 7.8 (L) 8.5 - 10.1 MG/DL MAGNESIUM Collection Time: 05/10/20 11:52 PM  
Result Value Ref Range Magnesium 2.0 1.6 - 2.6 mg/dL PHOSPHORUS Collection Time: 05/10/20 11:52 PM  
Result Value Ref Range Phosphorus 2.3 (L) 2.5 - 4.9 MG/DL  
CALCIUM, IONIZED Collection Time: 05/10/20 11:52 PM  
Result Value Ref Range Ionized Calcium 1.10 (L) 1.12 - 1.32 MMOL/L  
GLUCOSE, POC Collection Time: 05/11/20  5:09 AM  
Result Value Ref Range Glucose (POC) 96 70 - 110 mg/dL POC G3 Collection Time: 05/11/20  5:14 AM  
Result Value Ref Range Device: VENT    
 FIO2 (POC) 0.75 %  pH (POC) 7.435 7.35 - 7.45    
 pCO2 (POC) 52.6 (H) 35.0 - 45.0 MMHG  
 pO2 (POC) 65 (L) 80 - 100 MMHG  
 HCO3 (POC) 35.1 (H) 22 - 26 MMOL/L  
 sO2 (POC) 92 92 - 97 % Base excess (POC) 11 mmol/L Mode ASSIST CONTROL Set Rate 16 bpm  
 PEEP/CPAP (POC) 15 cmH2O  
 PIP (POC) 15 Allens test (POC) N/A Inspiratory Time 0.9 sec Total resp. rate 18 Site DRAWN FROM ARTERIAL LINE Patient temp. 99.9 Specimen type (POC) ARTERIAL Performed by Carlos Vazquez Pressure control YES    
METABOLIC PANEL, COMPREHENSIVE Collection Time: 05/11/20  5:51 AM  
Result Value Ref Range Sodium 147 (H) 136 - 145 mmol/L Potassium 3.7 3.5 - 5.5 mmol/L Chloride 107 100 - 111 mmol/L  
 CO2 36 (H) 21 - 32 mmol/L Anion gap 4 3.0 - 18 mmol/L Glucose 99 74 - 99 mg/dL BUN 28 (H) 7.0 - 18 MG/DL Creatinine 1.03 0.6 - 1.3 MG/DL  
 BUN/Creatinine ratio 27 (H) 12 - 20 GFR est AA >60 >60 ml/min/1.73m2 GFR est non-AA >60 >60 ml/min/1.73m2 Calcium 8.1 (L) 8.5 - 10.1 MG/DL Bilirubin, total 1.3 (H) 0.2 - 1.0 MG/DL  
 ALT (SGPT) 30 16 - 61 U/L  
 AST (SGOT) 42 (H) 10 - 38 U/L Alk. phosphatase 60 45 - 117 U/L Protein, total 5.3 (L) 6.4 - 8.2 g/dL Albumin 2.0 (L) 3.4 - 5.0 g/dL Globulin 3.3 2.0 - 4.0 g/dL A-G Ratio 0.6 (L) 0.8 - 1.7    
CBC WITH AUTOMATED DIFF Collection Time: 05/11/20  5:51 AM  
Result Value Ref Range WBC 8.7 4.6 - 13.2 K/uL  
 RBC 3.93 (L) 4.70 - 5.50 M/uL  
 HGB 10.4 (L) 13.0 - 16.0 g/dL HCT 34.9 (L) 36.0 - 48.0 % MCV 88.8 74.0 - 97.0 FL  
 MCH 26.5 24.0 - 34.0 PG  
 MCHC 29.8 (L) 31.0 - 37.0 g/dL  
 RDW 16.5 (H) 11.6 - 14.5 % PLATELET 687 377 - 672 K/uL MPV 11.8 9.2 - 11.8 FL  
 NEUTROPHILS 79 (H) 40 - 73 % LYMPHOCYTES 11 (L) 21 - 52 % MONOCYTES 7 3 - 10 % EOSINOPHILS 3 0 - 5 % BASOPHILS 0 0 - 2 %  
 ABS. NEUTROPHILS 6.9 1.8 - 8.0 K/UL  
 ABS. LYMPHOCYTES 1.0 0.9 - 3.6 K/UL  
 ABS. MONOCYTES 0.7 0.05 - 1.2 K/UL  
 ABS. EOSINOPHILS 0.2 0.0 - 0.4 K/UL  
 ABS. BASOPHILS 0.0 0.0 - 0.1 K/UL  
 DF AUTOMATED MAGNESIUM  Collection Time: 05/11/20  5:51 AM  
 Result Value Ref Range Magnesium 2.1 1.6 - 2.6 mg/dL PHOSPHORUS Collection Time: 05/11/20  5:51 AM  
Result Value Ref Range Phosphorus 2.7 2.5 - 4.9 MG/DL  
CALCIUM, IONIZED Collection Time: 05/11/20  5:51 AM  
Result Value Ref Range Ionized Calcium 1.14 1.12 - 1.32 MMOL/L  
PTT Collection Time: 05/11/20  5:51 AM  
Result Value Ref Range aPTT 44.0 (H) 23.0 - 36.4 SEC PROTHROMBIN TIME + INR Collection Time: 05/11/20  5:51 AM  
Result Value Ref Range Prothrombin time 22.4 (H) 11.5 - 15.2 sec INR 2.0 (H) 0.8 - 1.2 Recent Labs 05/11/20 
1300 05/10/20 
0501 05/09/20 
6573 FIO2I 0.75 1.0 1.0  
HCO3I 35.1* 35.9* 33.1*  
PCO2I 52.6* 51.7* 49.5* PHI 7.435 7.450 7.434 PO2I 65* 75* 55* All Micro Results Procedure Component Value Units Date/Time CULTURE, RESPIRATORY/SPUTUM/BRONCH Moriah Pucker STAIN [[id number]]  (Abnormal) Collected:  05/08/20 1630 Order Status:  Completed Specimen:  Sputum from Tracheal Aspirate Updated:  05/10/20 1214 Special Requests: NO SPECIAL REQUESTS     
  GRAM STAIN RARE WBCS SEEN     
      
  RARE EPITHELIAL CELLS SEEN  
     
   NO ORGANISMS SEEN Culture result: RARE YEAST     
      
  NO NORMAL RESPIRATORY BRYN ISOLATED  
     
 CULTURE, RESPIRATORY/SPUTUM/BRONCH W Esther Larsen [[id number]] Collected:  05/04/20 1315 Order Status:  Canceled Specimen:  Sputum from Tracheal Aspirate CULTURE, RESPIRATORY/SPUTUM/BRONCH Moriah Pucker STAIN [[id number]]  (Abnormal)  (Susceptibility) Collected:  04/22/20 1018 Order Status:  Completed Specimen:  Sputum from Tracheal Aspirate Updated:  04/25/20 1016 Special Requests: NO SPECIAL REQUESTS     
  GRAM STAIN FEW WBCS SEEN     
      
  OCCASIONAL EPITHELIAL CELLS SEEN  
     
      
  FEW GRAM POSITIVE COCCI IN CLUSTERS  
     
   RARE GRAM NEGATIVE RODS Culture result:    
  LIGHT STAPHYLOCOCCUS AUREUS  
     
      
  LIGHT NORMAL RESPIRATORY BRYN CULTURE, BLOOD [[id number]] Collected:  04/24/20 1015 Order Status:  Canceled Specimen:  Blood Imaging: 
[x]I have personally reviewed the patients chest radiographs images and report AXR 5/7 Results from SHASHANK GALVEZ - DES Encounter encounter on 04/21/20 XR CHEST PORT Narrative EXAM: XR CHEST PORT 
 
CLINICAL INDICATION/HISTORY: Aspiration pneumonia, ET tube placement 
-Additional: None COMPARISON: None TECHNIQUE: Portable frontal view of the chest 
 
_______________ FINDINGS: 
 
SUPPORT DEVICES: Endotracheal tube unchanged. Left upper extremity PICC 
unchanged. Bula Dayhoff HEART AND MEDIASTINUM: Cardiomegaly. LUNGS AND PLEURAL SPACES: Limited study secondary to body habitus. Extensive 
bilateral alveolar infiltrates, left greater than right, unchanged. Probable 
bilateral effusions. _______________ Impression IMPRESSION: 
 
Limited study secondary to body habitus. Extensive bilateral alveolar 
infiltrates, left greater than right, unchanged. Probable bilateral effusions. Chest x-ray 1 view 5/10 COMPARISON: 5/9/2020 Chest x-ray on my review shows good ET tube position; limitations due to severe obesity There appears to be bilateral diffuse groundglass infiltrates, more worse in the left lung Overall, seeing improved aeration on today's film. US LEs 4/22/2020 Interpretation Summary · No evidence of deep vein thrombosis in the right lower extremity veins assessed. · No evidence of deep vein thrombosis in the left lower extremity veins assessed. Echo 4/22 Result status: Edited Result - FINAL Addendum by Jackson Hood MD on Wed Apr 22, 2020  5:12 PM  
· Left Ventricle: Normal wall thickness and systolic function (ejection fraction normal). Mildly dilated left ventricle. The estimated ejection fraction is 45 - 50%. There is moderate (grade 2) left ventricular diastolic dysfunction E/e' Ratio = 17.09. Wall Scoring:  The left ventricular wall motion is globally hypokinetic. · Right Ventricle: Not well visualized. Normal global systolic function. Moderately dilated right ventricle. · Right Atrium: Right atrium not well visualized. Moderately dilated right atrium. · IVC/Hepatic Veins: Dilated inferior vena cava. Mechanically ventilated; cannot use inferior caval vein diameter to estimate central venous pressure. IMPRESSION:  
Patient Active Problem List  
Diagnosis Code  Acute on chronic respiratory failure with hypoxia and hypercapnia ([facility]) J96.21, J96.22  
 Acute on chronic systolic and diastolic CHF (congestive heart failure) ([facility]) I50.43 Aspiration pneumonitis from multiple patient initiated ET tube displacement events J69.0  Cardiac arrest ([facility] [address] I46.9 MSSA in sputum culture A49.01  
 Elevated troponin, NSTEMI R79.89, I21.4  Chronic a-fib I48.20  Atrial flutter ([facility] [address] I48.92 Prolonged QTc R94.31  
 Chronic renal insufficiency, stage II (mild) N18.2 Thrombocytopenia D69.6  Smoking F17.200  Arthritis M19.90  Migraine G43.909  Chronic back pain M54.9, G89.29  
 Obstructive Sleep apnea G47.33  
 Morbid obesity ([facility]) E66.01  
  
PLAN:  
RS - DAY 20 on vent; remains orotracheally intubated. Continue mech vent; PEEP 15; FiO2 75 %. The patient has frequent thin secretions/pinkish to clear from respiratory tract suggestive of pulmonary edema. Secretions improved since diuresing well with Lasix. CXR unchanged persistent bilateral interstitial infiltrates, could be AL I/ARDS from aspiration pneumonitis. Low ESR goes against AL I. Recent pro calcitonin negative. Does not look like high-dose steroid will help. Continue Lasix drip and monitor I's and O's. Monitor creatinine, electrolytes. I suspect that patient could have shunting since his O2 sats does not normalize with 100% FiO2, and he has risk factors for chronic right heart failure. Dr. Clinton Cancer to consider HU. Patient also has chronic obesity, MARIA FERNANDA and likely severe obesity hypoventilation; tolerate O2 sats 85% or above. Patient cannot have CT chest due to body weight limitations. Recent concerns for some aspiration around ET tube with vomiting, and hx of self extubations VAP prevention bundle, head of the bed at 30' all times Patient is off paralytic medication since 5/4/2020 Sedation- Versed and fentanylwean as tolerated; continue Ativan prn, and fentanyl patch to help decrease Versed needs; since patient risk of self extubation, he has bilateral soft wrist restraints. Vent and sedation bundles Continue bronchodilators prn; pulmonary hygiene care. Dr Amelia Joy in ENT on case for possible trach when fio2 and peep requirements are down [facility] -cardiology on the case; blood pressure stable; continue with increased dose of Lasix drip to 8 mg/h; follow urine output, renal function and electrolytes including potassium, sodium. EF 45-50%; dilated RV on echo; US legs neg; stable BP not needing pressors. Cardiology to consider HU. See above for detail. ID - SARS-COV-2 negative x 2 Antibiotic choice: Zosyn started on 5/2/20 with low grade fevercomplete 10 days antibiotic; MSSA in resp cxs from before; respiratory cultures 5/8 so far no growth. Procalcitonin repeat is negative. No leukocytosis. Hem - HIT panel and LEYDI Ab NEG on 4/28. He is definitely high risk for PEs; CTA chest cannot be done due to limitations due to weight; stopped argatroban 5/4; INR 2 [suspect fatty liver/GASTELUM]; continue with decreased Eliquis 2.5 mg twice daily due to reports of bloody secretions last weekend from the ET tube. Vasc - PVL bl LE negative Renal -continue Lasix drip increased to 8 mg/h; BUN and creatinine stable; potassium stable; sodium elevated, continue D5W 75 cc/h; increase free water 200 mL every 4 hrs.   Nephrology Dr. Ranjan Wallace had signed off.   
GI - GI placed OG tube; good position confirmed with abdominal x-ray using Gastrografin; patient on tube feedings; LFTs remain normal 
Neurology: sedated with limitations; patient follows simple commands, and moving extremities Prognosis guarded. Patient chronically vent dependent. Due to high FiO2 requirements tracheostomy has not happened. Overall poor prognosis. Full code. Will defer respective systems problem management to primary and other consultant and follow patient in ICU with primary and other medical team 
Quality Care: PPI, DVT prophylaxis, HOB elevated, Infection control all reviewed and addressed. PAIN AND SEDATION: Fentanyl, Versed · Skin/Wound: chronic venous stasis changes · Nutrition:  Start tube feeding · Prophylaxis: DVT and GI Prophylaxis reviewed. · Restraints: prn 
· PT/OT eval and treat: as needed when stable · Lines/Tubes: A-line 4/23/20; daniel 4/21/20, ET tube 4/25/20; PICC line left arm 5/5 ADVANCE DIRECTIVE: Full code DISCUSSION: spoke with RN, RT, ICU staff, MDR Events and notes from last 24 hours reviewed. Care plan discussed with nursing Total CC time: 37 minutes. High complexity review and management Eileen Valverde MD  
5/11/2020

## 2020-05-11 NOTE — PROGRESS NOTES
1930- Report and care received, assessment completed per flow sheet. Intubated and sedated, NAD, calm. 2300- Reassessment completed and without change, NAD. 0400- Reassessment completed and without change.

## 2020-05-11 NOTE — PROGRESS NOTES
Hospitalist Progress Note-critical care note Patient: Collette Maizes. MRN: [id number]  CSN: [id number] YOB: 1975  Age: 53 y.o. Sex: male DOA: 4/21/2020 LOS:  LOS: 20 days Chief complaint: cardiac arrest, respiratory failure, CHF, chronic A. fib CKD morbid obesity Assessment/Plan Hospital Problems  Date Reviewed: 5/2/2020 Codes Class Noted POA Cardiac arrest [facility]) ICD-10-CM: I46.9 ICD-9-CM: 427.5  4/23/2020 Unknown Acute on chronic respiratory failure with hypoxia and hypercapnia (HCC) ICD-10-CM: J96.21, J96.22 
ICD-9-CM: 518.84, 786.09, 799.02  4/22/2020 Yes Acute CHF (congestive heart failure) (HCC) ICD-10-CM: I50.9 ICD-9-CM: 428.0  4/21/2020 Yes * (Principal) Respiratory failure with hypoxia and hypercapnia (HCC) ICD-10-CM: J96.91, J96.92 
ICD-9-CM: 518.81  4/21/2020 Yes Chronic a-fib ICD-10-CM: I48.20 ICD-9-CM: 427.31  10/15/2018 Yes Chronic renal insufficiency, stage II (mild) ICD-10-CM: N18.2 ICD-9-CM: 218. 2  Unknown Yes  
   
 BMI 70 and over, adult [facility]) ICD-10-CM: D69.25 ICD-9-CM: V85.45  Unknown Yes Morbid obesity ([address] ICD-10-CM: E66.01 
ICD-9-CM: 278.01  4/4/2016 Yes Sleep apnea ICD-10-CM: G47.30 ICD-9-CM: 780.57  4/4/2016 Yes 54 y/o male with hx of morbid obesity, AMRIA FERNANDA, chronic afib on anticoagulation, and systolic CHF (EF 98-24%) is admitted with respiratory failure with hypoxia and hypercapnia requiring BiPAP as well as acute CHF exacerbation-now on vent CRITICAL CARE PLAN 
   
Resp Acute respiratory respiratory failure with hypoxia and hypercapnia On vent, continue vent bundle, follow-up per intensivist 
Still on 75% oxygen, PEEP 15 Continue wean, need trach while vent setting better HOB>30 degrees. Trach care. Bronchodilators. MARIA FERNANDA 
  
Aspiration pneumonia on Zosyn CVS  
Cardiac arrest, follow-up with cardiologist 
Avoid beta-blocker due to bradycardia Chronic A. Fib, Eliquis was decreased due to bloody secretion from ET tube Follow-up with cardiology On aspirin Acute on chronic systolic heart failure On Lasix drip, continue monitor renal function and electrolytes. 
  
ID on Zosyn Heme/onc Follow H&H, plts. INR. 
  
Renal  
Hypernatremia-improving , on D5 Trend BUN, Cr, follow I/O, daniel in place. Check and replace Mg, K, phos. icu electrolytes replacement protocol Neuro: On sedation 
  
Endocrine - Follow FSG, get TSH 
  
  
GI -tube feeding,  
  
Prophylaxis - DVT: SCDs, GI: protonix Morbid obesity Rn: stable 30 minutes of critical care time spent in the direct evaluation and treatment of this high risk patient. The reason for providing this level of medical care for this critically ill patient was due a critical illness that impaired one or more vital organ systems such that there was a high probability of imminent or life threatening deterioration in the patients condition. This care involved high complexity decision making to assess, manipulate, and support vital system functions, to treat this degreee vital organ system failure and to prevent further life threatening deterioration of the patients condition. Disposition :tbd, Review of systems: 
unable to obtain due to intubation Vital signs/Intake and Output: 
Visit Vitals /59 Pulse 85 Temp 99.1 °F (37.3 °C) Resp 29 Ht 5' 10\" (1.778 m) Wt (!) 229.8 kg (506 lb 9.9 oz) SpO2 95% BMI 72.69 kg/m² Current Shift:  No intake/output data recorded. Last three shifts:   1901 -  0700 In: 4392.6 [I.V.:2542.6] Out: 9241 [[facility]] Physical Exam: 
General: Intubated HEENT: NC, Atraumatic. PERRLA, anicteric sclerae. ET tube noted Lungs: Courses breathing sound Heart:  Regular  rhythm,  No murmur, No Rubs, No Gallops Abdomen: Soft, Non distended, Non tender. +Bowel sounds, Daniel noted Extremities: No c/c, edema Psych:  calm Neurologic:  Unable to obtain due to sedation Labs: Results:  
   
Chemistry Recent Labs 05/11/20 
0551 05/10/20 
2352 05/10/20 
1400 05/10/20 
0740  05/09/20 
0330 GLU 99 102* 106* 101*   < > 99 * 147* 149* 147*   < > 149*  
K 3.7 3.7 3.5 3.7   < > 3.6  107 109 108   < > 109 CO2 36* 35* 35* 35*   < > 35* BUN 28* 26* 28* 27*   < > 35* CREA 1.03 0.98 0.93 1.00   < > 1.13  
CA 8.1* 7.8* 7.8* 7.5*   < > 7.9* AGAP 4 5 5 4   < > 5  
BUCR 27* 27* 30* 27*   < > 31* AP 60  --   --  58  --  63  
TP 5.3*  --   --  5.2*  --  5.9* ALB 2.0*  --   --  2.1*  --  2.5*  
GLOB 3.3  --   --  3.1  --  3.4 AGRAT 0.6*  --   --  0.7*  --  0.7*  
 < > = values in this interval not displayed. CBC w/Diff Recent Labs 05/11/20 
0551 05/10/20 
0740 05/09/20 
0330 WBC 8.7 10.5 13.5*  
RBC 3.93* 4.21* 4.92  
HGB 10.4* 11.3* 13.1 HCT 34.9* 37.5 44.0  
 141 139 GRANS 79* 82* 87* LYMPH 11* 9* 6*  
EOS 3 2 1 Cardiac Enzymes No results for input(s): CPK, CKND1, CONSTANZA in the last 72 hours. No lab exists for component: Oscar Marcelino Coagulation Recent Labs 05/11/20 
5488 05/10/20 
0740 PTP 22.4* 24.4* INR 2.0* 2.2* APTT 44.0* 43.9* Lipid Panel No results found for: CHOL, CHOLPOCT, CHOLX, CHLST, CHOLV, 166251, HDL, HDLP, LDL, LDLC, DLDLP, 082877, VLDLC, VLDL, TGLX, TRIGL, TRIGP, TGLPOCT, CHHD, CHHDX  
BNP No results for input(s): BNPP in the last 72 hours. Liver Enzymes Recent Labs 05/11/20 
6236 TP 5.3* ALB 2.0* AP 60 SGOT 42* Thyroid Studies Lab Results Component Value Date/Time TSH 1.45 04/27/2020 03:50 AM  
    
Procedures/imaging: see electronic medical records for all procedures/Xrays and details which were not copied into this note but were reviewed prior to creation of Plan Xr Chest Sng[facility] V Result Date: 4/25/2020 CLINICAL HISTORY:  Endotracheal tube placement/repositioning.  COMPARISONS: Chest x-ray, earlier the same day. TECHNIQUE:  single frontal view of the chest ------------------------------------------ FINDINGS: Exam limited by patient large body habitus. Lungs:  Hazy opacity in the lung bases, more apparent than on the prior study. Upper lungs clear. Mediastinum: Marked cardiomegaly. Endotracheal tube has been repositioned, tip now 5.5 cm above the miguel ángel. Right transjugular venous catheter, tip near cavoatrial junction. Bones: No evidence of fracture or suspicious bone lesion. ------------------------------------------ 
 
IMPRESSION: 1. Hazy bilateral lower lobe opacity, likely a combination of pleural fluid and atelectasis, slightly more apparent than on recent comparison study. Pneumonia or aspiration not excluded. 2. Endotracheal tube has been repositioned, now in good position. Xr Abd (kub) Result Date: 5/7/2020 EXAM: SUPINE ABDOMINAL RADIOGRAPH CLINICAL INDICATION/HISTORY: eval for ileus -Additional: None COMPARISON: 5/6/2020 TECHNIQUE: Single supine view of the abdomen. _______________ FINDINGS/IMPRESSION: BOWEL GAS PATTERN: Limited study secondary to body habitus. Enteric tube poorly visualized. Contrast injection demonstrates tip at the GE junction/proximal stomach. Oral contrast within the colon. Xr Abd (kub) Result Date: 5/6/2020 Abdomen, single view COMPARISON: May 2, 2020 INDICATION: Nasogastric tube placement FINDINGS: Supine AP view the abdomen obtained before and after administration of contrast per the nasogastric tube. Anatomic detail is markedly limited by the patient's body habitus with collimation of the majority of the right lateral abdomen. Radiopaque tubing noted below the diaphragm, the tip of which is indistinct. Contrast opacifies the stomach. Impression: 1. Detail limited examination. Nasogastric tube is below the diaphragm.  Although the exact tip position is unclear on the provided images, opacification of gastric rugal folds is compatible with tube position in the stomach. Xr Abd (kub) Result Date: 5/3/2020 EXAM: SUPINE ABDOMINAL RADIOGRAPH CLINICAL INDICATION/HISTORY: ng placement -Additional: None COMPARISON: None TECHNIQUE: Single supine view of the abdomen. _______________ FINDINGS: BOWEL GAS PATTERN: Outside the field of view. BONES: Unremarkable. OTHER: Subtle linear density likely corresponding with reported nasogastric tube, possible tip seen in the inferior thorax distal esophagus region. Endotracheal tube noted which is above the miguel ángel. _______________ IMPRESSION: Nondiagnostic exam for purposes of nasogastric tube positioning. It is suspected that the tip of the tube is in the distal esophagus, but exam quality is not adequate for confident localization. Xr Abd (kub) Result Date: 4/22/2020 EXAM: XR ABD (KUB) CLINICAL INDICATION/HISTORY: ETT and OG placement COMPARISON: Chest radiograph same day TECHNIQUE: Supine AP view of the abdomen was obtained. _______________ FINDINGS: Enteric tube is not reliably visualized on this exam; indeterminate to what extent this is related to its potential high lying intrathoracic position versus or instead if the tube is simply obscured because of patient's obese body habitus. _______________ IMPRESSION: Enteric tube is not reliably visualized within the distal thoracic esophagus normal or distally within the left upper quadrant. Xr Chest [facility] Result Date: 5/10/2020 EXAM: CHEST RADIOGRAPH, SINGLE VIEW CLINICAL INDICATION/HISTORY: Aspiration pneumonia, ET tube placement      <Additional:  None. COMPARISON: 5/9/2020 TECHNIQUE: Portable frontal view of the chest was obtained. _______________ FINDINGS: SUPPORT DEVICES: ETT is 5.9 cm above the miguel ángel. PICC from a left arm approach projects at the [facility]. HEART AND MEDIASTINUM: Cardiac silhouette is top normal in size.  LUNGS AND PLEURAL SPACES: Extensive bilateral alveolar densities are present throughout both lungs, slightly more dense on the left than on the right without significant changes. Left costophrenic angle is blunted versus obscured. The right costophrenic angle is blunted. No pneumothorax. BONY THORAX AND SOFT TISSUES: No acute osseous abnormality. _______________ IMPRESSION: Diffuse bilateral asymmetrical left greater than right edema versus pneumonia versus ARDS without change. Small right effusion, cannot exclude a small left pleural effusion. Xr Chest [facility] Result Date: 5/9/2020 EXAM: CHEST RADIOGRAPH, SINGLE VIEW CLINICAL INDICATION/HISTORY: Aspiration pneumonia, ET tube placement      <Additional:  None. COMPARISON: 5/8/2020 TECHNIQUE: Portable frontal view of the chest was obtained. _______________ FINDINGS: SUPPORT DEVICES: ETT is 7.0 cm above the miguel ángel, PICC tip from a left arm approach projects at the distal SVC. HEART AND MEDIASTINUM: Cardiac silhouette is top normal in size. LUNGS AND PLEURAL SPACES: Extensive bilateral alveolar densities are present throughout both lungs, slightly more dense on the left than on the right, without significant changes. Left costophrenic angle is cut off, the right costophrenic angle is blunted. BONY THORAX AND SOFT TISSUES: No acute osseous abnormality. _______________ IMPRESSION: Diffuse bilateral left greater than right pneumonia versus pulmonary edema. Small right pleural effusion. No acute changes. Xr Chest [facility] Result Date: 5/8/2020 AP portable chest, 5/8/2020 at 0500 hours: INDICATION: Shortness of breath. Intubated. Comparison 5/7/2020. Endotracheal tube just below the clavicles but well above the miguel ángel. Extensive bilateral edema or infiltrate most prominent in the upper lobes.  Perhaps some increase in right basilar density with partial obscuration of the right hemidiaphragm in the interval. Persistent retrocardiac opacification with obscuration of the left hemidiaphragm. The heart is stable and top normal. Left arm PICC line tip is at the cavoatrial junction in good position. IMPRESSION: Extensive bilateral edema or infiltrate, most prominent in the upper lobes, certainly potentially in part aspiration. Some increase in right basilar density perhaps, increasing focal edema and/or pleural effusion/atelectasis. No additional change. Xr Chest [facility] Result Date: 5/7/2020 EXAM: Portable frontal view of the chest. CLINICAL INDICATION/HISTORY: Aspiration pneumonia, follow-up intubation COMPARISON: 5/6/2020 _______________ FINDINGS: Stable and satisfactory position endotracheal tube and left arm PICC line. No significant change in bilateral areas of airspace filling process with stable mildly enlarged cardiac silhouette with no definite pleural effusion or pneumothorax. _______________ IMPRESSION: 1. Stable chest. 
 
Xr Chest [facility] Result Date: 5/6/2020 EXAM: XR CHEST PORT CLINICAL INDICATION/HISTORY: Aspiration pneumonia, ET tube placement -Additional: None COMPARISON: Several prior exams, most recently May 5, 2020 TECHNIQUE: Portable frontal view of the chest _______________ FINDINGS: SUPPORT DEVICES: Endotracheal tube, right IJ central venous catheter, and left approach PICC line noted projecting in stable position. HEART AND MEDIASTINUM: Cardiac size and mediastinal contours appear stable. Cardiac silhouette is enlarged, as previously. LUNGS AND PLEURAL SPACES: Asymmetric airspace process within the right mid and lower lungs with slightly improved left lung base aeration. Potential right pleural effusion. No pneumothorax. BONY THORAX AND SOFT TISSUES: Unremarkable. _______________ IMPRESSION: 1. Adequate/stable position of support devices. 2. Little interval change in the appearance of right-sided airspace opacity, basilar changes of atelectasis and presumptively small right pleural effusion. Xr Chest [facility] Result Date: 5/5/2020 EXAM: XR CHEST PORT CLINICAL INDICATION/HISTORY: Aspiration pneumonia, ET tube placement -Additional: None COMPARISON: 5/4/2020 TECHNIQUE: Portable frontal view of the chest _______________ FINDINGS: SUPPORT DEVICES: Endotracheal tube and right IJV central venous catheter unchanged. HEART AND MEDIASTINUM: Cardiomegaly. LUNGS AND PLEURAL SPACES: Limited secondary to body habitus. Right mid to lower lung zone opacification, unchanged. . Probable right pleural effusion. No large pneumothorax. _______________ IMPRESSION: Right mid to lower lungs on opacification, unchanged. . Probable right pleural effusion. Xr Chest [facility] Result Date: 5/4/2020 EXAM: XR CHEST PORT CLINICAL INDICATION/HISTORY: Aspiration pneumonia, ET tube placement -Additional: None COMPARISON: Several prior exams, most recently May 3, 2020 TECHNIQUE: Portable frontal view of the chest _______________ FINDINGS: SUPPORT DEVICES: Endotracheal tube and right IJ approach central venous catheter both project in stable position. HEART AND MEDIASTINUM: Stable enlarged cardiac silhouette and mediastinal contours. LUNGS AND PLEURAL SPACES: Hazy lower lung opacities noted with mid to lower lung predominance, somewhat increased on the left from prior exam.. No evidence of pneumothorax or large pleural effusion. As previously, limited visualization due to technique and body habitus. BONY THORAX AND SOFT TISSUES: Unremarkable. _______________ IMPRESSION: 1. Stable/adequate position of support devices. 2. Little interval change in the appearance of right mid to lower lung airspace disease/atelectasis and likely left basilar atelectasis. Xr Chest [facility] Result Date: 5/3/2020 EXAM: PORTABLE FRONTAL CHEST RADIOGRAPH CLINICAL INDICATION/HISTORY: Intubation. COMPARISON: 5/2/2020 TECHNIQUE: Portable frontal view of the chest _______________ FINDINGS: SUPPORT DEVICES: Adequately positioned endotracheal tube and right IJ central venous catheter.  HEART AND MEDIASTINUM: Rotated to the right. Moderately enlarged cardiomediastinal silhouette appears similar to prior. LUNGS AND PLEURAL SPACES: Homogeneous opacification right mid/lower thorax similar if not increasingly dense in the interval. Relatively clear appearance of left lung. Overall limited visualization due to technique and body habitus. BONY THORAX AND SOFT TISSUES: Unremarkable. _______________ IMPRESSION: 1. Adequately positioned endotracheal tube. 2. Right thoracic opacification similar if not slightly increased in density in the interval, likely some combination of pleural effusion and lung consolidation (atelectasis/pneumonia). Xr Chest [facility] Result Date: 5/2/2020 EXAM: PORTABLE FRONTAL CHEST RADIOGRAPH CLINICAL INDICATION/HISTORY: Aspiration pneumonia. Intubation. COMPARISON: 5/1/2020 TECHNIQUE: Portable frontal view of the chest _______________ FINDINGS: SUPPORT DEVICES: Adequately positioned endotracheal tube and right IJ central venous catheter. HEART AND MEDIASTINUM: Rightward rotation. Stable cardiomediastinal silhouette, with enlargement of cardiac silhouette. LUNGS AND PLEURAL SPACES: Hazy opacification lower right thorax with obscuration of the diaphragm, similar or slightly increased from prior exam. BONY THORAX AND SOFT TISSUES: Unremarkable. _______________ IMPRESSION: 1. Adequately positioned endotracheal tube. 2. Right basilar opacification may be slightly increased from prior exam, likely some combination of effusion and lung consolidation (pneumonia/atelectasis). Xr Chest [facility] Result Date: 5/1/2020 AP portable chest, 5/1/2020 at 0449 hours: INDICATION: Pneumonia. ET tube placement. Comparison 4/30/2020. Endotracheal tube is in the upper thoracic trachea below the clavicles. The study is lordotically positioned. Right internal jugular central line is in the upper SVC. Asymmetric right perihilar edema or infiltrate. Cardiomegaly.  Mild interstitial perihilar changes on the left without new focal infiltrate. IMPRESSION: Little change. Right lung edema or infiltrate. Endotracheal tube in good position. Xr Chest [facility] Result Date: 4/30/2020 EXAM: XR CHEST PORT CLINICAL INDICATION/HISTORY: Aspiration pneumonia, ET tube placement -Additional: None COMPARISON: One day prior TECHNIQUE: Portable frontal view of the chest _______________ FINDINGS: SUPPORT DEVICES: Right IJV central venous catheter unchanged. Endotracheal tube unchanged. HEART AND MEDIASTINUM: Cardiomegaly. LUNGS AND PLEURAL SPACES: Limited study secondary to body habitus and exclusion of the left costophrenic angle. Central vascular congestion with moderate bilateral effusions and pulmonary edema. No pneumothorax. _______________ IMPRESSION: Limited study secondary to body habitus and exclusion of the left costophrenic angle. Central vascular congestion with moderate bilateral effusions and pulmonary edema. No pneumothorax. Xr Chest [facility] Result Date: 4/29/2020 EXAM: XR CHEST PORT CLINICAL INDICATION/HISTORY: Aspiration pneumonia, ET tube placement -Additional: None COMPARISON: 4/27/2020 TECHNIQUE: Portable frontal view of the chest _______________ FINDINGS: SUPPORT DEVICES: Right IJV central venous catheter unchanged. Endotracheal tube unchanged. HEART AND MEDIASTINUM: Cardiomegaly. LUNGS AND PLEURAL SPACES: Limited study secondary to body habitus and exclusion of the left costophrenic angle. Central vascular congestion with moderate bilateral effusions and pulmonary edema. No pneumothorax. _______________ IMPRESSION: Limited study secondary to body habitus and exclusion of the left costophrenic angle. Central vascular congestion with moderate bilateral effusions and pulmonary edema. No pneumothorax. Xr Chest [facility] Result Date: 4/27/2020 EXAM: XR CHEST PORT CLINICAL INDICATION/HISTORY: Intubated -Additional: None COMPARISON: 4/26/2020 TECHNIQUE: Portable frontal view of the chest _______________ FINDINGS: SUPPORT DEVICES: Endotracheal tube projects in stable position, estimated at approximately 4.9 cm above the miguel ángel. Right IJ approach central venous catheter with tip projecting over the SVC. HEART AND MEDIASTINUM: Stable appearing cardiac size and mediastinal contours. Enlarged appearing cardiac silhouette redemonstrated. LUNGS AND PLEURAL SPACES: Basilar areas of opacity and dense left retrocardiac opacity noted. No evidence of pneumothorax or large pleural effusion. BONY THORAX AND SOFT TISSUES: Unremarkable. _______________ IMPRESSION: 1. Endotracheal tube and right IJ approach central venous catheter in position as above. 2. Cardiomegaly and bibasilar areas of airspace disease/atelectasis without significant change Xr [facility] Result Date: 4/26/2020 
--------------------------------------------------------------------------- <<<<<<<<<           [facility]           >>>>>>>>> --------------------------------------------------------------------------- CLINICAL HISTORY:  Hypoxia. COMPARISON EXAMINATIONS:  April 25, 2020. ---  SINGLE FRONTAL VIEW OF THE CHEST  --- Portions of the lower lung fields are not imaged. The cardiomediastinal silhouette is grossly stable. There is an endotracheal tube seen in good position below the clavicles and above the miguel ángel. A right central line is noted with tip at the level of the upper SVC. There are bilateral mid to lower lung field opacities. No significant osseous abnormalities are identified.  -------------- Impression: -------------- Limited exam. Endotracheal tube and right central line in place. Bilateral mid to lower lung field opacities possibly pleural fluid with atelectasis and/or infiltrates. Similar findings were seen previously. Xr Chest [facility] Result Date: 4/25/2020 CLINICAL HISTORY:  Endotracheal tube placement COMPARISONS:  Chest x-ray, earlier the same day TECHNIQUE:  single frontal view of the chest ------------------------------------------ FINDINGS: Lungs:  Lungs slightly suboptimally assessed due to patient large body habitus. Hypoinflation with mild bandlike opacity in the left lung base, likely atelectasis. Left basilar consolidation not excluded. Mediastinum: Right transjugular venous catheter, tip near cavoatrial junction. High position of endotracheal tube, with tip approximately 12 cm above the miguel ángel. Cardiomegaly. Bones: No evidence of fracture or suspicious bone lesion. ------------------------------------------ 
 
IMPRESSION: 1. Endotracheal tube has been pulled back in the interval and is now high in position. Recommend advancement. 2. Lungs slightly suboptimally assessed due to body habitus, though left basilar consolidation not excluded, not appreciably changed. Xr [facility] Result Date: 4/25/2020 CLINICAL HISTORY:  As above. COMPARISONS:  Chest x-ray 4/24/2020 TECHNIQUE:  single frontal view of the chest ------------------------------------------ FINDINGS: Lungs:  Mild increase in bilateral pulmonary interstitial markings. Hazy opacity in the lung bases, likely combination of atelectasis and pleural fluid. Small to moderate-sized pleural effusions very likely present. Mediastinum: Marked cardiomegaly. Endotracheal tube tip approximately 6.7 cm above the miguel ángel. Right-sided central venous catheter, tip near cavoatrial junction. Bones: No evidence of fracture or suspicious bone lesion. ------------------------------------------ 
 
IMPRESSION: 1. Bilateral pleural effusions with bibasilar pulmonary atelectasis. 2. Probable mild pulmonary edema. Xr Chest [facility] Result Date: 4/24/2020 EXAM: XR CHEST PORT CLINICAL INDICATION/HISTORY: hypoxia -Additional: None COMPARISON: one day prior TECHNIQUE: Portable frontal view of the chest _______________ FINDINGS: SUPPORT DEVICES: Right IJV central venous catheter tip in the proximal SVC. Endotracheal tube unchanged. HEART AND MEDIASTINUM: Cardiomegaly. LUNGS AND PLEURAL SPACES: Central venous vascular congestion and interstitial infiltrates. Probable small bilateral effusions. _______________ IMPRESSION: Central venous vascular congestion and interstitial infiltrates. Probable small bilateral effusions. Xr Chest [facility] Result Date: 4/23/2020 EXAM: CHEST RADIOGRAPH, SINGLE VIEW CLINICAL INDICATION/HISTORY: Shortness of breath, hypoxia, endotracheal tube placement COMPARISON: 4/22/2020 TECHNIQUE: Portable frontal view of the chest was obtained. _______________ FINDINGS: SUPPORT DEVICES:   >  Endotracheal tube tip is located approximately 5.3 cm above the miguel ángel, adequately positioned.   >  Enteric tube is adequately visualized at the level of thoracic inlet and just above the miguel ángel, slightly eccentric to the left but is not clearly visualized throughout the lower aspect of the left hemithorax, potentially artifactual given patient's body habitus and adjacent overlying global cardiomegaly. > Unchanged adequately positioned right jugular central venous catheter. HEART AND MEDIASTINUM: Cardiomediastinal silhouette appears unchanged, markedly enlarged. Tortuous and atherosclerotic thoracic aorta. Prominent central pulmonary vascular congestion. LUNGS AND PLEURAL SPACES: Diffusely increased interstitial markings and retrocardiac left lower lobe opacity, silhouetting medial left hemidiaphragm. Probable small pleural effusions. No pneumothorax. BONY THORAX AND SOFT TISSUES: No acute osseous abnormality. _______________ IMPRESSION: 1. Endotracheal tube is adequately positioned. Unchanged, adequately positioned right jugular central venous catheter.  2. Enteric tube is not clearly visualized below the level of the miguel ángel; indeterminate to what extent this represents artifact related to patient's body habitus and global cardiomegaly or instead of the enteric tube is proximal in position. 3. Unchanged cardiomegaly, central vascular congestion and interstitial pulmonary edema. Probable small pleural effusions. Xr Chest [facility] Result Date: 4/22/2020 EXAM: XR CHEST PORT CLINICAL INDICATION/HISTORY: hypoxia -Additional: None COMPARISON: Earlier the same day TECHNIQUE: Portable frontal view of the chest _______________ FINDINGS: SUPPORT DEVICES: Interval placement of right IJV central venous catheter. Endotracheal and enteric tubes are unchanged. HEART AND MEDIASTINUM: Stable cardiomediastinal silhouette. LUNGS AND PLEURAL SPACES: Limited study, bilateral lower lung fields are excluded from field-of-view. Prominent central vasculature and interstitial edema. No pneumothorax _______________ IMPRESSION: Limited study. Interval placement of right IJV central venous catheter with tip at the SVC. No pneumothorax. Xr Chest [facility] Result Date: 4/22/2020 EXAM: CHEST RADIOGRAPH, SINGLE VIEW CLINICAL INDICATION/HISTORY: Endotracheal and orogastric tube placement COMPARISON: 4/21/2020 TECHNIQUE: Portable frontal view of the chest was obtained. _______________ FINDINGS: SUPPORT DEVICES: Vertebral placement of endotracheal tube with tip approximately 5.3 cm above the miguel ángel. Enteric tube is seen coursing through midline thoracic esophagus. HEART AND MEDIASTINUM: Cardiomediastinal silhouette appears unchanged, markedly enlarged. Tortuous and atherosclerotic thoracic aorta. No central vascular congestion. LUNGS AND PLEURAL SPACES: Retrocardiac medial left lower lobe opacity silhouettes left hemidiaphragm. No confluent airspace opacity elsewhere throughout the lungs. No definite pleural effusion. No pneumothorax. BONY THORAX AND SOFT TISSUES: No acute osseous abnormality. _______________ IMPRESSION: 1. Adequate interval endotracheal tube placement. Enteric tube is better visualized on the concurrent abdominal radiographs. 2. Unchanged global cardiomegaly and retrocardiac left lower lobe atelectasis/infiltrate. Xr Chest [facility] Result Date: 4/21/2020 EXAM: CHEST RADIOGRAPH CLINICAL INDICATION/HISTORY: SOB   > Additional: None COMPARISON: 10/16/2018. TECHNIQUE: Portable frontal view of the chest _______________ FINDINGS: SUPPORT DEVICES: None. HEART AND MEDIASTINUM: Cardiac size remains enlarged. Pulmonary vasculature mediastinal structures are stable. LUNGS AND PLEURAL SPACES: Shallow inspiration. Cannot exclude a left retrocardiac opacity. No definite pleural effusion or pneumothorax. BONY THORAX AND SOFT TISSUES: Unremarkable. _______________ IMPRESSION: 1. Stable cardiomegaly with probable mild pulmonary congestion. 2. Cannot exclude left lower lobe infiltrate/atelectasis. Xr Abd Port  1 V Result Date: 4/24/2020 EXAM: SUPINE ABDOMINAL RADIOGRAPH CLINICAL INDICATION/HISTORY: OG tube placement -Additional: None COMPARISON: 4/22/2014 TECHNIQUE: Single supine view of the abdomen. _______________ FINDINGS: BOWEL GAS PATTERN: Limited study secondary to body habitus. Nonvisualization of enteric tube along the expected course of the digestive tract. Crystal Sonali BONES: Unremarkable. OTHER: None. _______________ IMPRESSION: Limited study secondary to body habitus. Nonvisualization of enteric tube along the expected course of the digestive tract. Crystal Sonali Ir Guide Insert Picc Over 5 Yrs Result Date: 5/7/2020 PROCEDURE: PICC placement ATTENDING: Garfield Rider M.D. INDICATION: Long-term central venous access. COMPLICATIONS: None. PROCEDURE: Informed consent was obtained. Physician lead timeout was performed. The arm was prepped and draped in sterile fashion.  The procedure was performed following standard maximal barrier technique which includes, cap, mask, sterile gown, sterile gloves, sterile full body drape, hand hygiene with alcohol foam, and 2% chlorhexidine for cutaneous antisepsis. Sterile ultrasound gel and a sterile cover for the US probe was used. After successfully identifying a patent left cephalic vein, real-time ultrasound guidance demonstrated patent vein with normal waveforms. Then, real-time ultrasound guidance was used to puncture the vein. Permanent recording was created for the patient's record. Guidewire was passed centrally, portable radiograph was performed to confirm tip position. A peel-away sheath was inserted over the guidewire. The intravascular distance was measured. The PICC measures 55 cm. The double lumen PICC was placed into the vein with its tip in the brachiocephalic/SVC junction, confirmed with post placement portable radiograph. Both ports aspirated and flushed easily. The PICC was secured to the skin, a sterile dressing including Biopatch was applied. IMPRESSION: Dual lumen left PICC placement, ready for immediate use.   
 
 
Emily Muniz MD

## 2020-05-11 NOTE — PROGRESS NOTES
NUTRITION FOLLOW-UP 
 
RECOMMENDATIONS/PLAN:  
- EN: Recc adv tube feeds to goal rate of  Vital High Protein @ 85ml/hr to provide 1870kcal 164g PRO 1563ml H20 209g CHO 43g Fat Recc starting @ 20ml/hr an increasing by 5ml/hr Q6 until goal rate met Will defer Free H20 to MD 
Monitor labs/lytes, tube feed tolerance, skin integrity, wt, fluid status, BM 
NUTRITION ASSESSMENT:  
Client Update: 53 yrs old Male with resp failure w/ hypoxia and hypercapnia-intubated in ICU, COVID 19 ruled out, hypotension, acute on chronic systolic CHF, elevated trop, morbid obesity, pt self extubated w/ NSTEMI due to PEA arrest, chronic a-fib, elevated trop-cardiology following; possible HU today w/ cardiology; per MD frequent oxygen desaturations-suctioned pinkish aspirates/bloody secretions from airways FOOD/NUTRITION INTAKE Diet Order:  Vital High Protein @ 20ml/hr Food Allergies: NKFA/ Average PO Intake:     
No data found. Pertinent Medications:  [x] Reviewed; mistole, lasix, protonix, miralax, propofol Electrolyte Replacement Protocol: [x]K [x]Mg [x]PO4 Insulin:  []SSI  []Pre-meal   []Basal    []Drip  []None Cultural/[employer] Food Preferences: None Identified BIOCHEMICAL DATA & MEDICAL TESTS Pertinent Labs:  [x] Reviewed; Na-147  ANTHROPOMETRICS Height: 5' 10\" (177.8 cm)       Weight: (!) 229.8 kg (506 lb 9.9 oz) BMI: 72.7 kg/m^2 morbidly obese (Greater than or = to 40% BMI) Adm Weight: 485 lbs                Weight change: +21lbs Adjusted Body Weight: 124.2kg NUTRITION-FOCUSED PHYSICAL ASSESSMENT Skin: No PU    
GI: +BM 5/10/20 NUTRITION PRESCRIPTION Calories: 1886kcal/day based on 25kcal/kg of IBW Protein: 113- 151 g/day based on 1.5-2.0 g/kg of IBW 
CHO: 236 g/day based on 50% of total energy Fluid: 1886 ml/day based on 1 kcal/ml    
 
NUTRITION DIAGNOSES:  
1.  Inadequate oral intake related to intubation as evidence by need for nutrition support  
 
NUTRITION INTERVENTIONS:  
 INTERVENTIONS:        GOALS: 
1.- EN: Recc adv tube feeds to goal rate of  Vital High Protein @ 85ml/hr to provide 1870kcal 164g PRO 1563ml H20 209g CHO 43g Fat Recc starting @ 20ml/hr an increasing by 5ml/hr Q6 until goal rate met Will defer Free H20 to MD 1. Begin to adv tube feeds to goal by next review 3 days LEARNING NEEDS (Diet, Supplementation, Food/Nutrient-Drug Interaction): 
 [] None Identified   [] Education provided/documented      Identified and patient: [] Declined   [x] Was not appropriate/indicated NUTRITION MONITORING /EVALUATION:  
Adjust EN/PN as appropriate Monitor wt Monitor renal labs, electrolytes, fluid status Monitor for additional supplement needs Previous Recommendations Implemented: yes Previous Goals Met:  yes - 
   
[] Participated in Interdisciplinary Rounds   
[x] Interdisciplinary Care Plan Reviewed DISCHARGE NUTRITION RECOMMENDATIONS ADDRESSED:  
   [x] To be determined closer to discharge NUTRITION RISK:           [x] At risk                        [] Not currently at risk Will follow-up per policy. Shabana Harmon 9

## 2020-05-11 NOTE — PROGRESS NOTES
Chart reviewed pt remains in ICU on vent and tube feeds, per chart plan is for trach once 02 improves and HU this week. Pt is not medically stable at this time to transition from an acute care setting. CM to continue to follow and assist. 
 
Care Management Interventions Palliative Care Criteria Met (RRAT>21 & CHF Dx)?: Yes 
Palliative Consult Recommended?: Yes Transition of Care Consult (CM Consult): Discharge Planning Current Support Network: Lives with Spouse

## 2020-05-12 NOTE — PROGRESS NOTES
0720 Report from [address] [place] [address], patient remains on versed for sedation, ETT/vent in place, TF infusing, scheduled for Q6 BMP for lasix gtt, electrolyte replacement completed, PO potassium given, will recheck labs per protocol, plan for possible HU today with Dr Celso Carlton 
0800 Tube feeding advanced to 50 ml/hr, will follow dietary recommendations for advancement, assessment complete, FiO2 was decreased to 65% by RT Hattie 
1200 Reassessment, Dr Celso Carlton setting up at bedside for HU, this RN will be present for med administration, TF held at this time 1425 KUB after HU, NGT was slightly displaced during procedure, replaced and KUB was ordered to verify placement, results NGT was not visualized due to patient's body habitus, per Dr Jeffrey Caro proceed with care and resume tube feeds/meds through NGT 
1600 Reassessment, patient temp 100.9, will lower ac in room and give tylenol per prn order

## 2020-05-12 NOTE — PROGRESS NOTES
Pulmonary, Critical Care, and Sleep Medicine Pulmonary Progress Note Name: Cathy Diez. : 1975 MRN: [id number] Date: 2020 [x]I have reviewed the flowsheet and previous days notes. Events, vitals, medications and notes from last 24 hours reviewed. Subjective/History:  
40-year-old male with morbid obesity, ex-smoker, history of asthma, history of severe obstructive sleep apnea on CPAP, questionable component compliance, history of congestive heart failure with reduced ejection fraction atrial fibrillation atrial flutter on amiodarone, Xarelto, history of chronic renal insufficiency, cellulitis of lower extremities and anemia. As per family did not have any fever or upper respiratory tract infections and no flulike symptoms. On Lasix, CPAP not clear if missed. Presented with acute shortness of breath worsening edema of lower extremities. He was found to be in acute hypercarbic hypoxemic respiratory failure initially had a trial of BiPAP but failed and then eventually was intubated. He was hypotensive. Moderate amount of secretions. Morbid obesity. Patient compliant with Xarelto. Patient self extubated and had PEA arrest. 
 
Subjective:  
20 Patient intubated  for hypoxemic resp failure. Remains in ICU, orally intubated, on ventilator. Sedated, but opens eyes. Temperature max 100.8 Lasix drip was held intermittently since yesterday due to hypotension, required albumin bolus. Now back on Lasix drip at 2 mg/hour. Diuresing well on Lasix drip with improvement in FiO2 to 65%. Tolerating tube feed. Sodium elevated, continue D5 and free water through tube feed. He is morbidly obese; requiring high oxygen requirements since admission to the ICU. Morbidly obeseweight reported 490 pounds. Drips: fentanyl, versed, D5W, Lasix Intake/Output Summary (Last 24 hours) at 2020 1009 Last data filed at 2020 0700 Gross per 24 hour Intake 3636.37 ml  
 Output 1350 ml Net 2286.37 ml  
 
 
ROS: Review of systems not obtained due to patient factors. Past Medical History: 
Past Medical History:  
Diagnosis Date  Arrhythmia  Arthritis   
 knees  CHF (congestive heart failure) ([facility] [address]  Chronic back pain  Chronic renal insufficiency, stage II (mild)  History of atrial flutter Dx 2016 - anticoagulated by Roc Price  Hypertension 2015  Limited mobility  Migraine headache  Morbid obesity ([facility] [address]  Morbid obesity with body mass index of 60.0-69.9 in adult [facility])  Sleep apnea   
 uses c-pap instructed to bring day of surgery  Smoking   
 very passive / cigars only Allergy: No Known Allergies Vital Signs:   
Blood pressure 131/58, pulse 76, temperature 98.5 °F (36.9 °C), resp. rate 24, height 5' 10\" (1.778 m), weight (!) 233 kg (513 lb 10.8 oz), SpO2 98 %. Body mass index is 73.7 kg/m². O2 Device: Tracheostomy, Ventilator O2 Flow Rate (L/min): 12 l/min Temp (24hrs), Av.9 °F (37.2 °C), Min:97.6 °F (36.4 °C), Max:100.6 °F (38.1 °C) Intake/Output:  
Last shift:      No intake/output data recorded. Last 3 shifts: 05/10 190 -  0700 In: 5099.3 [I.V.:2899.3] Out: 2900 [Urine:2900] Intake/Output Summary (Last 24 hours) at 2020 1009 Last data filed at 2020 0700 Gross per 24 hour Intake 3636.37 ml Output 1350 ml Net 2286.37 ml Ventilator Settings: 
Mode Rate Tidal Volume Pressure FiO2 PEEP Pressure control   580 ml    65 % 15 cm H20 Peak airway pressure: 32 cm H2O Minute ventilation: 12.8 l/min Physical Exam: 
General: sedated, arousable, obese male; no cyanosis; on ventilator HEENT: LIBBY, orally intubated; orogastric tube; no bleeding; pupils not dilated, reactive Neck: thick short obese neck Chest: normal, obese chest wall Lungs: moderate air entry, obese chest limits exam; decreased BS bases; symmetrical chest expansion Heart: S1S2 present or without murmur or extra heart sounds; JVD not elevated Abdomen: obese, bowel sounds normoactive, soft without significant tenderness; no organomegaly; no guarding or rigidity Extremity: 2+, pitting bilateral thigh/leg and arm edema; negative for cyanosis, clubbing Capillary refill: normal 
Neuro: sedated, arousable, exam limitations Skin: Skin color, texture, turgor fair. Skin dry, warm, non-diaphoretic DATA:  
Current Facility-Administered Medications Medication Dose Route Frequency  albumin human 25% (BUMINATE) solution 25 g  25 g IntraVENous Q6H  
 apixaban (ELIQUIS) tablet 2.5 mg  2.5 mg Oral BID  scopolamine (TRANSDERM-SCOP) 1 mg over 3 days 1 Patch  1 Patch TransDERmal Q72H  fentaNYL (DURAGESIC) 50 mcg/hr patch 1 Patch  1 Patch TransDERmal Q72H  furosemide (LASIX) 100 mg in 0.9% sodium chloride 100 mL infusion  2 mg/hr IntraVENous CONTINUOUS  
 LORazepam (ATIVAN) injection 2 mg  2 mg IntraVENous Q4H PRN  
 docusate sodium (COLACE) capsule 100 mg  100 mg Oral BID  polyethylene glycol (MIRALAX) packet 17 g  17 g Oral BID  heparin (porcine) 100 unit/mL injection 500 Units  500 Units InterCATHeter Q8H PRN  
 dextrose 5% infusion  75 mL/hr IntraVENous CONTINUOUS  piperacillin-tazobactam (ZOSYN) 4.5 g in 0.9% sodium chloride (MBP/ADV) 100 mL MBP  4.5 g IntraVENous Q8H  
 fentaNYL citrate (PF) injection 50 mcg  50 mcg IntraVENous Q3H PRN  chlorhexidine (PERIDEX) 0.12 % mouthwash 10 mL  10 mL Oral Q12H  
 albuterol-ipratropium (DUO-NEB) 2.5 MG-0.5 MG/3 ML  3 mL Nebulization Q6H PRN  
 acetaminophen (TYLENOL) tablet 650 mg  650 mg Oral Q6H PRN  
 ELECTROLYTE REPLACEMENT PROTOCOL - Calcium   1 Each Other PRN  
 ELECTROLYTE REPLACEMENT PROTOCOL - Magnesium   1 Each Other PRN  
 ELECTROLYTE REPLACEMENT PROTOCOL - Potassium Standard Dosing   1 Each Other PRN  
 ELECTROLYTE REPLACEMENT PROTOCOL - Phosphorus  Standard Dosing  1 Each Other PRN  
  pantoprazole (PROTONIX) 40 mg in 0.9% sodium chloride 10 mL injection  40 mg IntraVENous DAILY  fentaNYL (PF) 900 mcg/30 ml infusion soln  0-50 mcg/hr IntraVENous TITRATE  midazolam in normal saline (VERSED) 1 mg/mL infusion  0-5 mg/hr IntraVENous TITRATE  aspirin chewable tablet 81 mg  81 mg Oral DAILY  sodium chloride (NS) flush 5-40 mL  5-40 mL IntraVENous Q8H  
 sodium chloride (NS) flush 5-40 mL  5-40 mL IntraVENous PRN Facility-Administered Medications Ordered in Other Encounters Medication Dose Route Frequency  propofoL (DIPRIVAN) 10 mg/mL injection   IntraVENous PRN  
 ePHEDrine (PF) (MISTOLE) 10 mg/mL in NS syringe   IntraVENous PRN  
 PHENYLephrine (RUPERT-SYNEPHRINE) 100 mcg/mL in NS syringe   IntraVENous PRN Telemetry: [x]Sinus []A-flutter []Paced []A-fib []Multiple PVCs  
              
4/22/20 ECHO · Left Ventricle: Normal wall thickness and systolic function (ejection fraction normal). Mildly dilated left ventricle. The estimated ejection fraction is 45 - 50%. There is moderate (grade 2) left ventricular diastolic dysfunction E/e' Ratio = 17.09. Wall Scoring: The left ventricular wall motion is globally hypokinetic. · Right Ventricle: Not well visualized. Normal global systolic function. Moderately dilated right ventricle. · Right Atrium: Right atrium not well visualized. Moderately dilated right atrium. · IVC/Hepatic Veins: Dilated inferior vena cava. Mechanically ventilated; cannot use inferior caval vein diameter to estimate central venous pressure. Labs: 
Recent Results (from the past 24 hour(s)) GLUCOSE, POC Collection Time: 05/11/20 11:45 AM  
Result Value Ref Range Glucose (POC) 96 70 - 110 mg/dL METABOLIC PANEL, BASIC Collection Time: 05/11/20 12:00 PM  
Result Value Ref Range Sodium 147 (H) 136 - 145 mmol/L Potassium 3.6 3.5 - 5.5 mmol/L Chloride 108 100 - 111 mmol/L  
 CO2 36 (H) 21 - 32 mmol/L  Anion gap 3 3.0 - 18 mmol/L  
 Glucose 92 74 - 99 mg/dL BUN 27 (H) 7.0 - 18 MG/DL Creatinine 0.97 0.6 - 1.3 MG/DL  
 BUN/Creatinine ratio 28 (H) 12 - 20 GFR est AA >60 >60 ml/min/1.73m2 GFR est non-AA >60 >60 ml/min/1.73m2 Calcium 7.6 (L) 8.5 - 10.1 MG/DL MAGNESIUM Collection Time: 05/11/20 12:00 PM  
Result Value Ref Range Magnesium 2.0 1.6 - 2.6 mg/dL PHOSPHORUS Collection Time: 05/11/20 12:00 PM  
Result Value Ref Range Phosphorus 2.7 2.5 - 4.9 MG/DL  
GLUCOSE, POC Collection Time: 05/11/20  6:19 PM  
Result Value Ref Range Glucose (POC) 107 70 - 110 mg/dL METABOLIC PANEL, BASIC Collection Time: 05/11/20  9:33 PM  
Result Value Ref Range Sodium 146 (H) 136 - 145 mmol/L Potassium 3.5 3.5 - 5.5 mmol/L Chloride 107 100 - 111 mmol/L  
 CO2 36 (H) 21 - 32 mmol/L Anion gap 3 3.0 - 18 mmol/L Glucose 101 (H) 74 - 99 mg/dL BUN 26 (H) 7.0 - 18 MG/DL Creatinine 0.84 0.6 - 1.3 MG/DL  
 BUN/Creatinine ratio 31 (H) 12 - 20 GFR est AA >60 >60 ml/min/1.73m2 GFR est non-AA >60 >60 ml/min/1.73m2 Calcium 7.6 (L) 8.5 - 10.1 MG/DL MAGNESIUM Collection Time: 05/11/20  9:33 PM  
Result Value Ref Range Magnesium 2.0 1.6 - 2.6 mg/dL PHOSPHORUS Collection Time: 05/11/20  9:33 PM  
Result Value Ref Range Phosphorus 2.7 2.5 - 4.9 MG/DL  
GLUCOSE, POC Collection Time: 05/11/20 11:47 PM  
Result Value Ref Range Glucose (POC) 100 70 - 110 mg/dL METABOLIC PANEL, COMPREHENSIVE Collection Time: 05/12/20  3:16 AM  
Result Value Ref Range Sodium 146 (H) 136 - 145 mmol/L Potassium 3.6 3.5 - 5.5 mmol/L Chloride 107 100 - 111 mmol/L  
 CO2 36 (H) 21 - 32 mmol/L Anion gap 3 3.0 - 18 mmol/L Glucose 101 (H) 74 - 99 mg/dL BUN 24 (H) 7.0 - 18 MG/DL Creatinine 0.76 0.6 - 1.3 MG/DL  
 BUN/Creatinine ratio 32 (H) 12 - 20 GFR est AA >60 >60 ml/min/1.73m2 GFR est non-AA >60 >60 ml/min/1.73m2  Calcium 7.7 (L) 8.5 - 10.1 MG/DL  
 Bilirubin, total 1.3 (H) 0.2 - 1.0 MG/DL  
 ALT (SGPT) 27 16 - 61 U/L  
 AST (SGOT) 34 10 - 38 U/L Alk. phosphatase 55 45 - 117 U/L Protein, total 5.5 (L) 6.4 - 8.2 g/dL Albumin 2.1 (L) 3.4 - 5.0 g/dL Globulin 3.4 2.0 - 4.0 g/dL A-G Ratio 0.6 (L) 0.8 - 1.7    
CBC WITH AUTOMATED DIFF Collection Time: 05/12/20  3:16 AM  
Result Value Ref Range WBC 7.4 4.6 - 13.2 K/uL  
 RBC 3.78 (L) 4.70 - 5.50 M/uL  
 HGB 10.0 (L) 13.0 - 16.0 g/dL HCT 33.6 (L) 36.0 - 48.0 % MCV 88.9 74.0 - 97.0 FL  
 MCH 26.5 24.0 - 34.0 PG  
 MCHC 29.8 (L) 31.0 - 37.0 g/dL  
 RDW 16.9 (H) 11.6 - 14.5 % PLATELET 656 046 - 829 K/uL MPV 11.5 9.2 - 11.8 FL  
 NEUTROPHILS 78 (H) 40 - 73 % LYMPHOCYTES 12 (L) 21 - 52 % MONOCYTES 7 3 - 10 % EOSINOPHILS 3 0 - 5 % BASOPHILS 0 0 - 2 %  
 ABS. NEUTROPHILS 5.7 1.8 - 8.0 K/UL  
 ABS. LYMPHOCYTES 0.9 0.9 - 3.6 K/UL  
 ABS. MONOCYTES 0.5 0.05 - 1.2 K/UL  
 ABS. EOSINOPHILS 0.3 0.0 - 0.4 K/UL  
 ABS. BASOPHILS 0.0 0.0 - 0.1 K/UL  
 DF AUTOMATED MAGNESIUM Collection Time: 05/12/20  3:16 AM  
Result Value Ref Range Magnesium 2.0 1.6 - 2.6 mg/dL PHOSPHORUS Collection Time: 05/12/20  3:16 AM  
Result Value Ref Range Phosphorus 2.5 2.5 - 4.9 MG/DL  
CALCIUM, IONIZED Collection Time: 05/12/20  3:16 AM  
Result Value Ref Range Ionized Calcium 1.13 1.12 - 1.32 MMOL/L  
PTT Collection Time: 05/12/20  3:16 AM  
Result Value Ref Range aPTT 40.4 (H) 23.0 - 36.4 SEC PROTHROMBIN TIME + INR Collection Time: 05/12/20  3:16 AM  
Result Value Ref Range Prothrombin time 19.9 (H) 11.5 - 15.2 sec INR 1.7 (H) 0.8 - 1.2 POC G3 Collection Time: 05/12/20  5:42 AM  
Result Value Ref Range Device: VENT    
 FIO2 (POC) 0.75 % pH (POC) 7.426 7.35 - 7.45    
 pCO2 (POC) 58.5 (H) 35.0 - 45.0 MMHG  
 pO2 (POC) 78 (L) 80 - 100 MMHG  
 HCO3 (POC) 38.4 (H) 22 - 26 MMOL/L  
 sO2 (POC) 95 92 - 97 %  Base excess (POC) 14 mmol/L  
 Mode ASSIST CONTROL Tidal volume 490 ml Set Rate 16 bpm  
 PEEP/CPAP (POC) 15 cmH2O  
 PIP (POC) 31 Allens test (POC) N/A Inspiratory Time 0.90 sec Total resp. rate 26 Site DRAWN FROM ARTERIAL LINE Patient temp. 98.8 Specimen type (POC) ARTERIAL Performed by Casmalia Pole Pressure control YES    
GLUCOSE, POC Collection Time: 05/12/20  5:47 AM  
Result Value Ref Range Glucose (POC) 100 70 - 110 mg/dL Recent Labs 05/12/20 
3649 05/11/20 
6003 05/10/20 
0501 FIO2I 0.75 0.75 1.0  
HCO3I 38.4* 35.1* 35.9*  
PCO2I 58.5* 52.6* 51.7* PHI 7.426 7.435 7.450 PO2I 78* 65* 75* All Micro Results Procedure Component Value Units Date/Time CULTURE, RESPIRATORY/SPUTUM/BRONCH [place] STAIN [[id number]]  (Abnormal) Collected:  05/08/20 1630 Order Status:  Completed Specimen:  Sputum from Tracheal Aspirate Updated:  05/10/20 1214 Special Requests: NO SPECIAL REQUESTS     
  GRAM STAIN RARE WBCS SEEN     
      
  RARE EPITHELIAL CELLS SEEN  
     
   NO ORGANISMS SEEN Culture result: RARE YEAST     
      
  NO NORMAL RESPIRATORY BRYN ISOLATED  
     
 CULTURE, RESPIRATORY/SPUTUM/BRONCH YE [place] [[id number]] Collected:  05/04/20 1315 Order Status:  Canceled Specimen:  Sputum from Tracheal Aspirate CULTURE, RESPIRATORY/SPUTUM/BRONCH [place] STAIN [[id number]]  (Abnormal)  (Susceptibility) Collected:  04/22/20 1018 Order Status:  Completed Specimen:  Sputum from Tracheal Aspirate Updated:  04/25/20 9501 Special Requests: NO SPECIAL REQUESTS     
  GRAM STAIN FEW WBCS SEEN     
      
  OCCASIONAL EPITHELIAL CELLS SEEN  
     
      
  FEW GRAM POSITIVE COCCI IN CLUSTERS  
     
   RARE GRAM NEGATIVE RODS Culture result:    
  LIGHT STAPHYLOCOCCUS AUREUS  
     
      
  LIGHT NORMAL RESPIRATORY BRYN  
     
 CULTURE, BLOOD [[id number]] Collected:  04/24/20 1015 Order Status:  Canceled Specimen:  Blood Imaging: 
[x]I have personally reviewed the patients chest radiographs images and report AXR 5/7 [facility] XR CHEST PORT Narrative EXAM: XR CHEST PORT 
 
CLINICAL INDICATION/HISTORY: Aspiration pneumonia, ET tube placement 
-Additional: None COMPARISON: 5/11/2020 TECHNIQUE: Portable frontal view of the chest 
 
_______________ FINDINGS: 
 
SUPPORT DEVICES: Endotracheal tube unchanged. Left upper extremity PICC 
unchanged. Arthur Confer HEART AND MEDIASTINUM: Cardiomegaly. LUNGS AND PLEURAL SPACES: Limited study secondary to body habitus. Extensive 
bilateral alveolar infiltrates, left greater than right, unchanged. Probable 
bilateral effusions. _______________ Impression IMPRESSION: 
 
Limited study secondary to body habitus. Extensive bilateral alveolar 
infiltrates, left greater than right, unchanged. Probable bilateral effusions. Chest x-ray 1 view 5/10 COMPARISON: 5/9/2020 Chest x-ray on my review shows good ET tube position; limitations due to severe obesity There appears to be bilateral diffuse groundglass infiltrates, more worse in the left lung Overall, seeing improved aeration on today's film. US LEs 4/22/2020 Interpretation Summary · No evidence of deep vein thrombosis in the right lower extremity veins assessed. · No evidence of deep vein thrombosis in the left lower extremity veins assessed. Echo 4/22 Result status: Edited Result - FINAL Addendum by Daryl Love MD on Wed Apr 22, 2020  5:12 PM  
· Left Ventricle: Normal wall thickness and systolic function (ejection fraction normal). Mildly dilated left ventricle. The estimated ejection fraction is 45 - 50%. There is moderate (grade 2) left ventricular diastolic dysfunction E/e' Ratio = 17.09. Wall Scoring: The left ventricular wall motion is globally hypokinetic. · Right Ventricle: Not well visualized. Normal global systolic function. Moderately dilated right ventricle. · Right Atrium: Right atrium not well visualized. Moderately dilated right atrium. · IVC/Hepatic Veins: Dilated inferior vena cava. Mechanically ventilated; cannot use inferior caval vein diameter to estimate central venous pressure. IMPRESSION:  
Patient Active Problem List  
Diagnosis Code  Acute on chronic respiratory failure with hypoxia and hypercapnia ([facility]) J96.21, J96.22  
 Acute on chronic systolic and diastolic CHF (congestive heart failure) ([facility]) I50.43 Aspiration pneumonitis from multiple patient initiated ET tube displacement events J69.0  Cardiac arrest ([facility] [address] I46.9 MSSA in sputum culture A49.01  
 Elevated troponin, NSTEMI R79.89, I21.4  Chronic a-fib I48.20  Atrial flutter ([facility] [address] I48.92 Prolonged QTc R94.31  
 Chronic renal insufficiency, stage II (mild) N18.2 Thrombocytopenia D69.6  Smoking F17.200  Arthritis M19.90  Migraine G43.909  Chronic back pain M54.9, G89.29  
 Obstructive Sleep apnea G47.33  
 Morbid obesity ([facility]) E66.01  
  
PLAN:  
RS -intubated, on ventilator since 4/25/2020. Continue mech vent; still on high PEEP 15; FiO2 65 %. The patient has frequent thin secretions/pinkish to clear from respiratory tract suggestive of pulmonary edema. Secretions improved since diuresing well with Lasix. CXR unchanged persistent bilateral interstitial infiltrates, could be ALI/ARDS from aspiration pneumonitis. Low ESR goes against ALI. Recent pro calcitonin negative. Does not look like high-dose steroid will help. Hypotension required Albumin bolus on 5/11/2020 and Lasix drip was held. Lasix was restarted last night, now at Lasix 2 mg/h. Diuresing well with Lasix with stable creatinine. Potassium being replaced. I suspect that patient could have shunting since his O2 sats does not normalize with 100% FiO2, and he has risk factors for chronic right heart failure. Dr. Rubina Andres to consider HU, likely today. Patient also has chronic obesity, MARIA FERNANDA and likely severe obesity hypoventilation; tolerate O2 sats 85% or above. Patient cannot have CT chest due to body weight limitations. Recent concerns for some aspiration around ET tube with vomiting, and hx of self extubations VAP prevention bundle, head of the bed at 30' all times Patient is off paralytic medication since 5/4/2020 Sedation- Versed and fentanylwean as tolerated; continue Ativan prn, and fentanyl patch to help decrease Versed needs; since patient risk of self extubation, he has bilateral soft wrist restraints. Vent and sedation bundles Continue bronchodilators prn; pulmonary hygiene care. Dr Asia Cesar in ENT on case for possible trach when fio2 and peep requirements are down [facility] -cardiology on the case; blood pressure stable; continue with Lasix drip as above; follow urine output, renal function and electrolytes including potassium, sodium. EF 45-50%; dilated RV on echo; US legs neg; stable BP not needing pressors. Cardiology to consider HU. See above for detail. ID - SARS-COV-2 negative x 2 Antibiotic choice: Zosyn started on 5/2/20 with low grade fevercomplete 10 days antibiotic; MSSA in resp cxs from before; respiratory cultures 5/8 so far no growth. Procalcitonin repeat is negative. No leukocytosis. Hem - HIT panel and LEYDI Ab NEG on 4/28. He is definitely high risk for PEs; CTA chest cannot be done due to limitations due to weight; stopped argatroban 5/4; INR 2 [suspect fatty liver/GASTELUM]; continue with decreased Eliquis 2.5 mg twice daily due to bloody secretions from the ET tube. Vasc - PVL bl LE negative Renal -continue Lasix drip as above; monitor BUN and creatinine stable; potassium being replaced; sodium elevated, continue D5W 75 cc/h; increase free water 200 mL every 4 hrs.   Nephrology Dr. Ritika Oliveira had signed off.   
GI - GI placed OG tube; good position confirmed with abdominal x-ray using Gastrografin; patient on tube feedings; LFTs remain normal 
Neurology: sedated with limitations; patient follows simple commands, and moving extremities Prognosis guarded. Patient chronically vent dependent. Due to high FiO2 requirements tracheostomy has not happened. Overall poor prognosis. Full code. Will defer respective systems problem management to primary and other consultant and follow patient in ICU with primary and other medical team 
Quality Care: PPI, DVT prophylaxis, HOB elevated, Infection control all reviewed and addressed. PAIN AND SEDATION: Fentanyl, Versed · Skin/Wound: chronic venous stasis changes · Nutrition:  Start tube feeding · Prophylaxis: DVT and GI Prophylaxis reviewed. · Restraints: prn 
· PT/OT eval and treat: as needed when stable · Lines/Tubes: A-line 4/23/20; daniel 4/21/20, ET tube 4/25/20; PICC line left arm 5/5 ADVANCE DIRECTIVE: Full code DISCUSSION: spoke with RN, RT, ICU staff, MDR Events and notes from last 24 hours reviewed. Care plan discussed with nursing Total CC time: 34 minutes. High complexity review and management Nara Figueroa MD  
5/12/2020

## 2020-05-12 NOTE — CONSULTS
Palliative Medicine Consult DR. [facility]: [phone] [facility]: [phone] [facility]: [phone] Patient Name: Lazarus Coronel YOB: 1975 Date of Initial Consult: 5/12/2020 Reason for Consult: care decisions Requesting Provider:  Sivakumar Collier MD 
Primary Care Physician: Kemi Segal MD 
  
 SUMMARY:  
Lazarus Coronel is a 53 y.o. male with a past history of morbid obesity, asthma, severe obstructive sleep apnea on CPAP, CHF, a-fib, CKD, cellulitis who was admitted on 4/21/2020 from home with a diagnosis of acute hypercarbic hypoxemic respiratory failure. Current medical issues leading to Palliative Medicine involvement include: CHF, respiratory failure on ventilator and goals of care. PALLIATIVE DIAGNOSES:  
1. Advanced care decisions 2. Acute hypercarbic hypoxemic respiratory failure 3. Acute on chronic CHF 4. Morbid obesity 5. Debility PLAN:  
1. Advanced care decisions: No AMD has been known to be completed in past. Palliative care team has been following peripherally to provide support initially due to COVID 19 rule out. Provided emotional support to wife via telephone conferences. Care decisions made with conversations between intensivist and wife to have trach and PEG placed. Tracheostomy has been delayed secondary to high FiO2 and PEEP requirements. I met with patient today at bedside. He remains intubated but is alert and able to communicate with head nods. Patient indicates he does not like the ETT but he is supportive of the idea of tracheostomy with hole in his throat for attachment to vent. He indicates he has been told in past that due to vent dependence without the ETT he would die. Wife strongly indicated to palliative care team in previous conversation that she wants to continue full code and all aggressive measures.  She remains hopeful for full recovery and return home to his family. She further stated \"I am optimistic but at peace with whatever comes. He will be with the Lord either way\". GOALS OF CARE ESTABLISHED: FULL CODE with FULL INTERVENTIONS. 2. Acute hypercarbic hypoxemic respiratory failure: presented to ED with increasing shortness of breath. Room air sat 79%. Initially started on oxygen per NC to NRB mask then BiPAP. ABG with worsening acidosis and increased CO2 despite lasix and BiPAP resulted in intubation on 4/22/2020. Self extubated on 4/23/2020 resulted in cardiac arrest/PEA and reintubation. Per notes patient with multiple witnessed episodes when he dislodged ETT and/or with cuff malfunction requiring exchange. Now vent D21. Has consult with ENT for trach. 3. Acute on chronic CHF: upon presentation to ED c/o testicular swelling, lower extremity swelling and shortness of breath as above. Stated he had missed lasix X 3 days per ER notes. BNP 1740. Diuresis with lasix initiated. Cardiology following and planning HU. 4. Morbid obesity: Admission weight 595 pounds. On specialty bed. 5. Debility: secondary to #2,3,4 above and prolonged intubation. 6. Initial consult note routed to primary continuity provider 7. Communicated plan of care with: Palliative IDT 
 
GOALS OF CARE: 
Patient/Health Care Proxy Stated Goals: Prolong life TREATMENT PREFERENCES:  
Code Status: Full Code Advance Care Planning: 
Advance Care Planning 4/22/2020 Patient's Healthcare Decision Maker is: Legal Next of Kin Primary Decision Maker Name -  
Primary Decision Maker Phone Number -  
Primary Decision Maker Relationship to Patient -  
Confirm Advance Directive None Patient Would Like to Complete Advance Directive Unable Medical Interventions: Full interventions Artificially Administered Nutrition: Feeding tube long-term, if indicated Other: As far as possible, the palliative care team has discussed with patient/health care proxy about goals of care/treatment preferences for patient. HISTORY:  
 
History obtained from: chart CHIEF COMPLAINT: dislike ETT 
 
HPI/SUBJECTIVE: The patient is:  
[x] Verbal and participatory; limited by intubation [] Non-participatory due to:  
Denies pain, nausea and shortness of breath. Clinical Pain Assessment (nonverbal scale for nonverbal patients): Clinical Pain Assessment Severity: 0 Activity (Movement): Lying quietly, normal position Duration: for how long has pt been experiencing pain (e.g., 2 days, 1 month, years) Frequency: how often pain is an issue (e.g., several times per day, once every few days, constant) FUNCTIONAL ASSESSMENT:  
 
Palliative Performance Scale (PPS): PPS: 10 
 
ECOG 
ECOG Status : Completely disabled PSYCHOSOCIAL/SPIRITUAL SCREENING:  
  
Any spiritual / Christian concerns: 
[] Yes /  [x] No 
 
Caregiver Burnout: 
[] Yes /  [] No /  [x] No Caregiver Present Anticipatory grief assessment:  
[x] Normal  / [] Maladaptive REVIEW OF SYSTEMS:  
 
Positive and pertinent negative findings in ROS are noted above in HPI. The following systems were [x] reviewed / [] unable to be reviewed as noted in HPI Other findings are noted below. Systems: constitutional, ears/nose/mouth/throat, respiratory, gastrointestinal, genitourinary, musculoskeletal, integumentary, neurologic, psychiatric, endocrine. Positive findings noted below. Modified ESAS Completed by: provider Drowsiness: 10(Sedated on ventilator) Pain: 0 Nausea: 0 Dyspnea: 0 Stool Occurrence(s): 1 PHYSICAL EXAM:  
 
Wt Readings from Last 3 Encounters:  
05/11/20 (!) 233 kg (513 lb 10.8 oz) 10/22/18 (!) 221.6 kg (488 lb 8.6 oz)  
11/13/17 (!) 213.6 kg (471 lb) Blood pressure 134/59, pulse 77, temperature 98.5 °F (36.9 °C), resp. rate 22, height 5' 10\" (1.778 m), weight (!) 233 kg (513 lb 10.8 oz), SpO2 99 %. Pain: Pain Scale 1: Adult Nonverbal Pain Scale Pain Intensity 1: 0 Constitutional: Middle aged, morbidly obese male, lying in bed in no apparent distress; restrained. Eyes: pupils equal, anicteric ENMT: no nasal discharge, dry mucous membranes Cardiovascular: distal pulses intact; anasarca Respiratory: breathing unlabored, symmetric, intubated. Gastrointestinal: soft non-tender Musculoskeletal: no deformity, no tenderness to palpation; restrained. Skin: warm, dry Neurologic: following commands, moving all extremities HISTORY:  
 
Principal Problem: 
  Respiratory failure with hypoxia and hypercapnia ([address] (4/21/2020) Active Problems: Morbid obesity ([address] (4/4/2016) Sleep apnea (4/4/2016) BMI 70 and over, adult [facility]) () Chronic renal insufficiency, stage II (mild) () Chronic a-fib (10/15/2018) Acute CHF (congestive heart failure) ([address] (4/21/2020) Acute on chronic respiratory failure with hypoxia and hypercapnia ([address] (4/22/2020) Cardiac arrest ([address] (4/23/2020) Past Medical History:  
Diagnosis Date  Arrhythmia  Arthritis   
 knees  CHF (congestive heart failure) ([address]  Chronic back pain  Chronic renal insufficiency, stage II (mild)  History of atrial flutter Dx 6/2016 - anticoagulated by Kelton Samaniego  Hypertension 2015  Limited mobility  Migraine headache  Morbid obesity ([address]  Morbid obesity with body mass index of 60.0-69.9 in adult [facility])  Sleep apnea   
 uses c-pap instructed to bring day of surgery  Smoking   
 very passive / cigars only Past Surgical History:  
Procedure Laterality Date  CARDIAC SURG PROCEDURE UNLIST    
 cardioversion  ECHOCARDIOGRAM  04/2016 EF 60-65% (performed at [facility] - see cardiology tab) Family History Problem Relation Age of Onset  Obesity Mother  Liver Disease Sister History reviewed, no pertinent family history. Social History Tobacco Use  Smoking status: Former Smoker Types: Cigars  Smokeless tobacco: Never Used Substance Use Topics  Alcohol use: No  
 
No Known Allergies Current Facility-Administered Medications Medication Dose Route Frequency  albumin human 25% (BUMINATE) solution 25 g  25 g IntraVENous Q6H  
 apixaban (ELIQUIS) tablet 2.5 mg  2.5 mg Oral BID  scopolamine (TRANSDERM-SCOP) 1 mg over 3 days 1 Patch  1 Patch TransDERmal Q72H  fentaNYL (DURAGESIC) 50 mcg/hr patch 1 Patch  1 Patch TransDERmal Q72H  furosemide (LASIX) 100 mg in 0.9% sodium chloride 100 mL infusion  2 mg/hr IntraVENous CONTINUOUS  
 LORazepam (ATIVAN) injection 2 mg  2 mg IntraVENous Q4H PRN  
 docusate sodium (COLACE) capsule 100 mg  100 mg Oral BID  polyethylene glycol (MIRALAX) packet 17 g  17 g Oral BID  heparin (porcine) 100 unit/mL injection 500 Units  500 Units InterCATHeter Q8H PRN  
 dextrose 5% infusion  75 mL/hr IntraVENous CONTINUOUS  piperacillin-tazobactam (ZOSYN) 4.5 g in 0.9% sodium chloride (MBP/ADV) 100 mL MBP  4.5 g IntraVENous Q8H  
 fentaNYL citrate (PF) injection 50 mcg  50 mcg IntraVENous Q3H PRN  chlorhexidine (PERIDEX) 0.12 % mouthwash 10 mL  10 mL Oral Q12H  
 albuterol-ipratropium (DUO-NEB) 2.5 MG-0.5 MG/3 ML  3 mL Nebulization Q6H PRN  
 acetaminophen (TYLENOL) tablet 650 mg  650 mg Oral Q6H PRN  
 ELECTROLYTE REPLACEMENT PROTOCOL - Calcium   1 Each Other PRN  
 ELECTROLYTE REPLACEMENT PROTOCOL - Magnesium   1 Each Other PRN  
 ELECTROLYTE REPLACEMENT PROTOCOL - Potassium Standard Dosing   1 Each Other PRN  
 ELECTROLYTE REPLACEMENT PROTOCOL - Phosphorus  Standard Dosing  1 Each Other PRN  pantoprazole (PROTONIX) 40 mg in 0.9% sodium chloride 10 mL injection  40 mg IntraVENous DAILY  fentaNYL (PF) 900 mcg/30 ml infusion soln  0-50 mcg/hr IntraVENous TITRATE  midazolam in normal saline (VERSED) 1 mg/mL infusion  0-5 mg/hr IntraVENous TITRATE  aspirin chewable tablet 81 mg  81 mg Oral DAILY  sodium chloride (NS) flush 5-40 mL  5-40 mL IntraVENous Q8H  
 sodium chloride (NS) flush 5-40 mL  5-40 mL IntraVENous PRN Facility-Administered Medications Ordered in Other Encounters Medication Dose Route Frequency  propofoL (DIPRIVAN) 10 mg/mL injection   IntraVENous PRN  
 ePHEDrine (PF) (MISTOLE) 10 mg/mL in NS syringe   IntraVENous PRN  
 PHENYLephrine (RUPERT-SYNEPHRINE) 100 mcg/mL in NS syringe   IntraVENous PRN  
 
 
 LAB AND IMAGING FINDINGS:  
 
Lab Results Component Value Date/Time WBC 7.4 05/12/2020 03:16 AM  
 HGB 10.0 (L) 05/12/2020 03:16 AM  
 PLATELET 857 43/42/0047 03:16 AM  
 
Lab Results Component Value Date/Time Sodium 145 05/12/2020 10:00 AM  
 Potassium 3.7 05/12/2020 10:00 AM  
 Chloride 105 05/12/2020 10:00 AM  
 CO2 36 (H) 05/12/2020 10:00 AM  
 BUN 23 (H) 05/12/2020 10:00 AM  
 Creatinine 0.78 05/12/2020 10:00 AM  
 Calcium 7.8 (L) 05/12/2020 10:00 AM  
 Magnesium 1.9 05/12/2020 10:00 AM  
 Phosphorus 2.2 (L) 05/12/2020 10:00 AM  
  
Lab Results Component Value Date/Time AST (SGOT) 34 05/12/2020 03:16 AM  
 Alk. phosphatase 55 05/12/2020 03:16 AM  
 Protein, total 5.5 (L) 05/12/2020 03:16 AM  
 Albumin 2.1 (L) 05/12/2020 03:16 AM  
 Globulin 3.4 05/12/2020 03:16 AM  
 
Lab Results Component Value Date/Time INR 1.7 (H) 05/12/2020 03:16 AM  
 Prothrombin time 19.9 (H) 05/12/2020 03:16 AM  
 aPTT 40.4 (H) 05/12/2020 03:16 AM  
  
Lab Results Component Value Date/Time Ferritin 26 04/22/2020 10:18 AM  
  
No results found for: PH, PCO2, PO2 No components found for: [place] Lab Results Component Value Date/Time  04/26/2020 05:15 AM  
 CK - MB 1.6 04/26/2020 05:15 AM  
  
 
   
 
Total time: 50 minutes Counseling / coordination time, spent as noted above > 50% counseling / coordination: 40 minutes with patient, family, MD, RN 
 
 Prolonged service was provided for  []30 min   []75 min in face to face time in the presence of the patient, spent as noted above. Time Start:  
Time End:  
Note: this can only be billed with 38291 (initial) or 11350 (follow up). If multiple start / stop times, list each separately.

## 2020-05-12 NOTE — PROGRESS NOTES
NUTRITION UPDATE 
 
- EN: Recc adv tube feeds to goal rate of  Vital High Protein @ 85ml/hr to provide 1870kcal 164g PRO 1563ml H20 209g CHO 43g Fat Recc starting @ 20ml/hr an increasing by 5ml/hr Q6 until goal rate met Will defer Free H20 to MD Hesham Marcano, RD 
PAGER:  [phone]

## 2020-05-12 NOTE — PROGRESS NOTES
Hospitalist Progress Note Patient: Hilliard Meigs. MRN: [id number]  [facility]: [id number] YOB: 1975  Age: 53 y.o. Sex: male DOA: 4/21/2020 LOS:  LOS: 21 days Assessment/Plan Patient Active Problem List  
Diagnosis Code  Cellulitis of left lower leg L03. 116  
 Anemia D64.9  Morbid obesity ([facility]) E66.01  
 Dyspnea R06.00  Sleep apnea G47.30  Sinus tachycardia R00.0  BMI 70 and over, adult [facility]) G40.12  Chronic back pain M54.9, G89.29  
 Arthritis M19.90  Migraine G43.909  
 History of atrial flutter Z86.79  
 Limited mobility Z74.09  
 Smoking F17.200  Chronic renal insufficiency, stage II (mild) N18.2  CHF (congestive heart failure) ([facility]) I50.9  Acute diastolic CHF (congestive heart failure) ([facility]) I50.31  Chronic a-fib I48.20  Atrial flutter ([facility]) I48.92  
 Elevated troponin R79.89  
 Acute respiratory distress R06.03  
 Acute CHF (congestive heart failure) ([facility]) I50.9  Respiratory failure with hypoxia and hypercapnia ([facility]) J96.91, J96.92  
 Acute on chronic respiratory failure with hypoxia and hypercapnia ([facility]) J96.21, J96.22  
 Cardiac arrest ([facility]) I46.9  Advanced care planning/counseling discussion Z67.80  
  
 
 
 
46 y/o male with hx of morbid obesity, MARIA FERNANDA, chronic afib on anticoagulation, and systolic CHF (EF 55-82%) is admitted with respiratory failure with hypoxia and hypercapnia, he required to be intubated. 04/23/2020 Patient self extubated this morning and went into PEA arrest, he was re intubated, difficult intubation. He is awake and post resuscitation, placed on sedation. Requiring high PEEP. 
 
04/24/2020 Patient again tried to self extubate in spite of sedation. 04/25/2020 ET tube leak, ET tube exchanged 04/27/2020 Had bleeding around IJ site, heparin stopped, H/H stable CRITICAL CARE PLAN Resp - See vent orders, VAP bundle. HOB>30 degrees. Acute respiratory failure - with hypoxia and hypercapnia, follow ABGs, CXR. 
MARIA FERNANDA/OHS - On solumedrol Plan for trach ID -  
resp cultures with growth of MSSA ANTIBIOTICS  Off antibiotics. Seen by ID 
COVID 19 negative x 2 
 
[facility] - Monitor HD. Hypotension - resolved, off pressors Paroxysmal atrial flutter - monitor rate, was on amiodarone. Has been bradycardic, toprol xl held. eliquis on hold Acute on chronic systolic CHF - On lasix drip Echo with EF of 45-50% Elevated troponin - NSTEMI type II due to PEA arrest 
HU with no intracardiac shunt Heme/onc - Follow H&H, plts. Thrombocytopenia - improving, HIT neg. 
eliquis on hold Renal - Trend BUN, Cr, follow I/O, daniel in place. Check and replace Mg, K, phos. Hypernatremia - resolved, Endocrine -  Follow FSG Neuro/ Pain/ Sedation - Off paralytic GI - NPO for now, NG tube, tube feeding. Morbid obesity Prophylaxis - DVT: SCD, GI: protonix. 1120 - 1155 
35 minutes of critical care time spent in the direct evaluation and treatment of this high risk patient. The reason for providing this level of medical care for this critically ill patient was due a critical illness that impaired one or more vital organ systems such that there was a high probability of imminent or life threatening deterioration in the patients condition. This care involved high complexity decision making to assess, manipulate, and support vital system functions, to treat this degreee vital organ system failure and to prevent further life threatening deterioration of the patients condition. Disposition : TBD Physical Exam: 
General: As above   
HEENT: NC, Atraumatic. PERRLA, anicteric sclerae. Lungs: CTA Bilaterally. Distant breath sounds secondary to body habitus. Heart:  S1 S2, No murmur, No Rubs, No Gallops Abdomen: Soft, Non distended, obese, Non tender.  +Bowel sounds, Extremities: LE edema Vital signs/Intake and Output: Visit Vitals /59 Pulse 73 Temp 99.7 °F (37.6 °C) Resp 24 Ht 5' 10\" (1.778 m) Wt (!) 232.7 kg (513 lb) SpO2 97% BMI 73.61 kg/m² Current Shift:  05/12 0701 - 05/12 1900 In: 424 [I.V.:424] Out: - Last three shifts:  05/10 1901 - 05/12 0700 In: 5099.3 [I.V.:2899.3] Out: 2900 [Urine:2900] Labs: Results:  
   
Chemistry Recent Labs 05/12/20 
1000 05/12/20 
0316 05/11/20 
2133  05/11/20 
0551  05/10/20 
0740 * 101* 101*   < > 99   < > 101*  146* 146*   < > 147*   < > 147* K 3.7 3.6 3.5   < > 3.7   < > 3.7  107 107   < > 107   < > 108 CO2 36* 36* 36*   < > 36*   < > 35* BUN 23* 24* 26*   < > 28*   < > 27* CREA 0.78 0.76 0.84   < > 1.03   < > 1.00  
CA 7.8* 7.7* 7.6*   < > 8.1*   < > 7.5* AGAP 4 3 3   < > 4   < > 4  
BUCR 29* 32* 31*   < > 27*   < > 27* AP  --  55  --   --  60  --  58  
TP  --  5.5*  --   --  5.3*  --  5.2* ALB  --  2.1*  --   --  2.0*  --  2.1*  
GLOB  --  3.4  --   --  3.3  --  3.1 AGRAT  --  0.6*  --   --  0.6*  --  0.7*  
 < > = values in this interval not displayed. CBC w/Diff Recent Labs 05/12/20 
5605 05/11/20 
3416 05/10/20 
0740 WBC 7.4 8.7 10.5 RBC 3.78* 3.93* 4.21* HGB 10.0* 10.4* 11.3* HCT 33.6* 34.9* 37.5  150 141 GRANS 78* 79* 82* LYMPH 12* 11* 9* EOS 3 3 2 Cardiac Enzymes No results for input(s): CPK, CKND1, CONSTANZA in the last 72 hours. No lab exists for component: Sudeep Guadalupe Coagulation Recent Labs 05/12/20 
7817 05/11/20 
6071 PTP 19.9* 22.4* INR 1.7* 2.0* APTT 40.4* 44.0* Lipid Panel No results found for: CHOL, CHOLPOCT, CHOLX, CHLST, CHOLV, 026059, HDL, HDLP, LDL, LDLC, DLDLP, 348885, VLDLC, VLDL, TGLX, TRIGL, TRIGP, TGLPOCT, CHHD, CHHDX  
BNP No results for input(s): BNPP in the last 72 hours. Liver Enzymes Recent Labs 05/12/20 
5525 TP 5.5* ALB 2.1* AP 55 SGOT 34 Thyroid Studies Lab Results Component Value Date/Time TSH 1.45 04/27/2020 03:50 AM  
    
Procedures/imaging: see electronic medical records for all procedures/Xrays and details which were not copied into this note but were reviewed prior to creation of Plan

## 2020-05-12 NOTE — H&P
Date of Surgery Update: 
Osiel Colbert was seen and examined. History and physical has been reviewed. The patient has been examined. There have been no significant clinical changes since the completion of the originally dated History and Physical. 
 
 
Patient assessed and is candidate for moderate sedation.    
 
Signed By: Joellen Ramos MD   
 May 12, 2020 12:12 PM

## 2020-05-12 NOTE — PROGRESS NOTES
HU completed, reviewed with Dr. Tatianna Reno: grade 1 DD. No RV pressure or volume overload. Negative intracardiac or intrapulmonary shunting, bubble study negative. LVEF about 45%. No significant valvular disease. No pulmonary hypertension. Doubt any cardiac causes of persistent severe persistent hypoxia. With ETT bloody secretions, he could have pulmonary hemorrhage. CT or CTA can't be done due to his weight. Will start solumedrol and c/w lasix drip. Hold Eliquis for now. SCD on. Check KUB to make sure NGT remains in good position. D/w RN, RT, Dr. Tatianna Reno.   
 
Norma Judd MD 5/12/2020 12:41 PM

## 2020-05-12 NOTE — PROGRESS NOTES
Cardiology Progress Note Patient: Desire Monday. Sex: male          DOA: 4/21/2020 YOB: 1975      Age:  53 y.o.        LOS:  LOS: 21 days Patient seen and examined, chart reviewed. Assessment/Plan Patient Active Problem List  
Diagnosis Code  Cellulitis of left lower leg L03. 116  
 Anemia D64.9  Morbid obesity ([facility]) E66.01  
 Dyspnea R06.00  Sleep apnea G47.30  Sinus tachycardia R00.0  BMI 70 and over, adult [facility]) D81.58  Chronic back pain M54.9, G89.29  
 Arthritis M19.90  Migraine G43.909  
 History of atrial flutter Z86.79  
 Limited mobility Z74.09  
 Smoking F17.200  Chronic renal insufficiency, stage II (mild) N18.2  CHF (congestive heart failure) ([facility]) I50.9  Acute diastolic CHF (congestive heart failure) ([facility]) I50.31  Chronic a-fib I48.20  Atrial flutter ([facility]) I48.92  
 Elevated troponin R79.89  
 Acute respiratory distress R06.03  
 Acute CHF (congestive heart failure) ([facility]) I50.9  Respiratory failure with hypoxia and hypercapnia ([facility]) J96.91, J96.92  
 Acute on chronic respiratory failure with hypoxia and hypercapnia ([facility]) J96.21, J96.22  
 Cardiac arrest ([facility]) I46.9  Advanced care planning/counseling discussion Z71.89 Acute on chronic systolic heart failure NSTEMI type II due to PEA arrest 
Paroxysmal atrial flutter Thrombocytopenia Hypernatremia Bradycardia  
  
Patient self extubated and had PEA arrest. 
He had brief episode of A fib with RVR and was started On Amiodarone drip. Currently he is in Sinus rhythm. 
  
Amiodarone discontinued due to bradycardia. 
  
Echocardiogram  
  
· Left Ventricle: Normal wall thickness and systolic function (ejection fraction normal). Mildly dilated left ventricle. The estimated ejection fraction is 45 - 50%. There is moderate (grade 2) left ventricular diastolic dysfunction E/e' Ratio = 17.09. Wall Scoring:  The left ventricular wall motion is globally hypokinetic. · Right Ventricle: Not well visualized. Normal global systolic function. Moderately dilated right ventricle. · Right Atrium: Right atrium not well visualized. Moderately dilated right atrium. · IVC/Hepatic Veins: Dilated inferior vena cava. Mechanically ventilated; cannot use inferior caval vein diameter to estimate central venous pressure. 
  
IV heparin was discontinued due to thrombocytopenia and bleeding at central line site. Started on Argatroban.  
  
Patient had frequent oxygen desaturations and pinkish/bloody aspirate from tracheal suctioning.  
  
Chest x ray revealed Limited study secondary to body habitus. Extensive bilateral alveolar infiltrates, left greater than right, unchanged. Probable bilateral effusions. 
  
Plan: 
  
Patient was having issue with oxygenation and there is a possibility of shunting. Today oxygenation has some what improvement but still require significant FiO2 Continue Eliquis Continue Lasix drip Strict I/O Avoid any AV debbie blocking agents Monitor electrolytes and renal function Continue Ventilatory support. Continue management as per hospital medicine and pulmonary critical care Plan discussed with Danyell Willis. Discuss with patient's wife about HU. All risks, benefits and alternatives explained to patient's wife and she verbally understood. She agreed for procedure. Schedule HU to look for shunting as transthoracic echocardiogram is very sub optimal to look for any shunt. Further recommendation follow after HU. Subjective:  
 cc: 
Orally intubated REVIEW OF SYSTEMS:  
 
Can not obtain Objective:  
  
Visit Vitals /59 Pulse 73 Temp 99.7 °F (37.6 °C) Resp 24 Ht 5' 10\" (1.778 m) Wt (!) 233 kg (513 lb 10.8 oz) SpO2 97% BMI 73.70 kg/m² Body mass index is 73.7 kg/m².  
 
Physical Exam: 
General Appearance: Comfortable, Morbidly obese, not using accessory muscles of respiration. Orally intubated HEENT: AMMON. HEAD: Atraumatic NECK: No JVD, no thyroidomeglay. CAROTIDS: No bruit LUNGS: bilateral conducted sounds HEART: S1+S2 audible, no murmur, no pericardial rub. NEUROLOGICAL: Sedated. Medication: 
Current Facility-Administered Medications Medication Dose Route Frequency  0.9% NaCl bacteriostatic (NORMAL SALINE) 0.9 % injection  midazolam (PF) (VERSED) 1 mg/mL injection  midazolam (PF) (VERSED) 1 mg/mL injection  albumin human 25% (BUMINATE) solution 25 g  25 g IntraVENous Q6H  
 potassium, sodium phosphates (NEUTRA-PHOS) packet 2 Packet  2 Packet Oral Q2H  
 potassium bicarb-citric acid (EFFER-K) tablet 20 mEq  20 mEq Oral ONCE  
 0.9% NaCl bacteriostatic (NORMAL SALINE) 0.9 % injection 15 mL  15 mL IntraVENous ONCE  
 apixaban (ELIQUIS) tablet 2.5 mg  2.5 mg Oral BID  scopolamine (TRANSDERM-SCOP) 1 mg over 3 days 1 Patch  1 Patch TransDERmal Q72H  fentaNYL (DURAGESIC) 50 mcg/hr patch 1 Patch  1 Patch TransDERmal Q72H  furosemide (LASIX) 100 mg in 0.9% sodium chloride 100 mL infusion  2 mg/hr IntraVENous CONTINUOUS  
 LORazepam (ATIVAN) injection 2 mg  2 mg IntraVENous Q4H PRN  
 docusate sodium (COLACE) capsule 100 mg  100 mg Oral BID  polyethylene glycol (MIRALAX) packet 17 g  17 g Oral BID  heparin (porcine) 100 unit/mL injection 500 Units  500 Units InterCATHeter Q8H PRN  
 dextrose 5% infusion  75 mL/hr IntraVENous CONTINUOUS  piperacillin-tazobactam (ZOSYN) 4.5 g in 0.9% sodium chloride (MBP/ADV) 100 mL MBP  4.5 g IntraVENous Q8H  
 fentaNYL citrate (PF) injection 50 mcg  50 mcg IntraVENous Q3H PRN  chlorhexidine (PERIDEX) 0.12 % mouthwash 10 mL  10 mL Oral Q12H  
 albuterol-ipratropium (DUO-NEB) 2.5 MG-0.5 MG/3 ML  3 mL Nebulization Q6H PRN  
 acetaminophen (TYLENOL) tablet 650 mg  650 mg Oral Q6H PRN  
 ELECTROLYTE REPLACEMENT PROTOCOL - Calcium   1 Each Other PRN  
  ELECTROLYTE REPLACEMENT PROTOCOL - Magnesium   1 Each Other PRN  
 ELECTROLYTE REPLACEMENT PROTOCOL - Potassium Standard Dosing   1 Each Other PRN  
 ELECTROLYTE REPLACEMENT PROTOCOL - Phosphorus  Standard Dosing  1 Each Other PRN  pantoprazole (PROTONIX) 40 mg in 0.9% sodium chloride 10 mL injection  40 mg IntraVENous DAILY  fentaNYL (PF) 900 mcg/30 ml infusion soln  0-50 mcg/hr IntraVENous TITRATE  midazolam in normal saline (VERSED) 1 mg/mL infusion  0-5 mg/hr IntraVENous TITRATE  aspirin chewable tablet 81 mg  81 mg Oral DAILY  sodium chloride (NS) flush 5-40 mL  5-40 mL IntraVENous Q8H  
 sodium chloride (NS) flush 5-40 mL  5-40 mL IntraVENous PRN Facility-Administered Medications Ordered in Other Encounters Medication Dose Route Frequency  propofoL (DIPRIVAN) 10 mg/mL injection   IntraVENous PRN  
 ePHEDrine (PF) (MISTOLE) 10 mg/mL in NS syringe   IntraVENous PRN  
 PHENYLephrine (RUPERT-SYNEPHRINE) 100 mcg/mL in NS syringe   IntraVENous PRN Lab/Data Reviewed: 
 
  
Recent Labs 05/12/20 
7555 05/11/20 
4250 05/10/20 
0740 WBC 7.4 8.7 10.5 HGB 10.0* 10.4* 11.3* HCT 33.6* 34.9* 37.5  150 141 Recent Labs 05/12/20 
1000 05/12/20 
0316 05/11/20 
2133  146* 146*  
K 3.7 3.6 3.5  107 107 CO2 36* 36* 36* * 101* 101* BUN 23* 24* 26* CREA 0.78 0.76 0.84 CA 7.8* 7.7* 7.6* Signed By: Yumiko Jimenez MD   
 May 12, 2020

## 2020-05-12 NOTE — PROCEDURES
HU done with strict aseptic precaution. Patient is sedated, intubated and on Mechanical ventilator. LVEF 45% Negative bubble study. No right to left shunt seen with bubble study. Patient tolerated procedure. Follow full HU report. Findings discussed with . Called patient's wife and test result discussed with her. Continue management as per hospital medicine.

## 2020-05-12 NOTE — PROGRESS NOTES
1915: Bedside and Verbal shift change report given to Camilla Cantrell RN (oncoming nurse) by Amy Olmos RN (offgoing nurse). Report included the following information SBAR, Kardex, Intake/Output, MAR, Recent Results, Med Rec Status, Cardiac Rhythm NSR and Alarm Parameters . 2000: Shift assessment completed 
 
0000: Reassessment completed. Pt attempted to write and became frustrated and wept. Curlex given to squeeze for strengtheningexercises. Pt nodded to words of encouragement and stop crying. 0400: Reassessment performed and pt attempted writing again and showed improvement. 0425: Paged Dr. Candace Dawkins concerning increased BP and adonay-colored urine. 1589: Dr. Candace Dawkins returned call, orders received to restart Lasix gtt at 6mg/h, assess and advance up to 8mg/h per original order. 5041: Paged Dr. Candace Dawkins concerning decreasing BP. 8549: Dr. Candace Dawkins returned call and orders received for Lasix gtt rate change to 2mg/h 
 
0708: Stopped Lasix gtt due to SBP of 87 
 
0720: Bedside and Verbal shift change report given to Maude Rodriguez RN (oncoming nurse) by Camilla Cantrell RN (offgoing nurse). Report included the following information SBAR, Kardex, Intake/Output, MAR, Recent Results, Med Rec Status, Cardiac Rhythm NSR and Alarm Parameters .

## 2020-05-12 NOTE — PROGRESS NOTES
Chart reviewed pt remains on vent in ICU for level of care, HU procedure done today, at this time pt still requiring high 02 requirements cm will cont to review and remain available.

## 2020-05-13 PROBLEM — I21.4 NSTEMI (NON-ST ELEVATED MYOCARDIAL INFARCTION) (HCC): Status: ACTIVE | Noted: 2020-01-01

## 2020-05-13 NOTE — WOUND CARE
IP WOUND CONSULT Claude MEDICAL RECORD NUMBER:  [id number] AGE: 53 y.o. GENDER: male  : 1975 TODAY'S DATE:  2020 GENERAL  
 
[] Follow-up [x] New Consult Claude. is a 53 y.o. male referred by:  
[] Physician 
[x] Nursing 
[] Other: PAST MEDICAL HISTORY Past Medical History:  
Diagnosis Date  Arrhythmia  Arthritis   
 knees  CHF (congestive heart failure) ([facility]  Chronic back pain  Chronic renal insufficiency, stage II (mild)  History of atrial flutter Dx 2016 - anticoagulated by Antonia Thorpe  Hypertension 2015  Limited mobility  Migraine headache  Morbid obesity ([facility]  Morbid obesity with body mass index of 60.0-69.9 in [facility])  Sleep apnea   
 uses c-pap instructed to bring day of surgery  Smoking   
 very passive / cigars only PAST SURGICAL HISTORY Past Surgical History:  
Procedure Laterality Date  CARDIAC SURG PROCEDURE UNLIST    
 cardioversion  ECHOCARDIOGRAM  2016 EF 60-65% (performed at [facility] - see cardiology tab) FAMILY HISTORY Family History Problem Relation Age of Onset  Obesity Mother  Liver Disease Sister SOCIAL HISTORY Social History Tobacco Use  Smoking status: Former Smoker Types: Cigars  Smokeless tobacco: Never Used Substance Use Topics  Alcohol use: No  
 Drug use: No  
 
 
ALLERGIES No Known Allergies MEDICATIONS No current facility-administered medications on file prior to encounter. Current Outpatient Medications on File Prior to Encounter Medication Sig Dispense Refill  furosemide (LASIX) 40 mg tablet Take 1 Tab by mouth two (2) times a day. 60 Tab 1 Wt Readings from Last 3 Encounters:  
20 (!) 232 kg (511 lb 7.5 oz) 10/22/18 (!) 221.6 kg (488 lb 8.6 oz)  
17 (!) 213.6 kg (471 lb) [unfilled] Visit Vitals /62 Pulse 63 Temp 98.7 °F (37.1 °C) Resp 18  5' 10\" (1.778 m) Wt (!) 232 kg (511 lb 7.5 oz) SpO2 95% BMI 73.39 kg/m² ASSESSMENT Ulcer Identification: 
Ulcer Type: small blisters from fluid overload Contributing Factors: edema, decreased mobility, shear force, incontinence of stool, incontinence of urine, obesity and decreased tissue oxygenation Patient with discolored areas that appear to be bruising on lateral side of right foot. Wound Thigh Proximal;Right;Posterior Large open blistered area right upper posterior thigh.  05/10/20 (Active) Dressing Status Clean, dry, and intact 5/13/2020 10:47 AM  
Dressing Type Absorptive; Foam 5/13/2020 10:47 AM  
Wound Length (cm) 4.5 cm 5/13/2020 10:47 AM  
Wound Width (cm) 3 cm 5/13/2020 10:47 AM  
Wound Depth (cm) 0.1 cm 5/13/2020 10:47 AM  
Wound Surface Area (cm^2) 13.5 cm^2 5/13/2020 10:47 AM  
Wound Volume (cm^3) 1.35 cm^3 5/13/2020 10:47 AM  
Condition of [place] 5/13/2020 10:47 AM  
Tissue Type Percent Pink 90 5/13/2020 10:47 AM  
Tissue Type Percent Yellow 10 5/13/2020 10:47 AM  
Drainage Amount Scant 5/13/2020 10:47 AM  
Drainage Color Serous 5/13/2020 10:47 AM  
Wound Odor None 5/13/2020 10:47 AM  
Christianne-wound Assessment Intact 5/13/2020 10:47 AM  
Dressing Changed Changed/New 5/13/2020 10:47 AM  
Procedure Tolerated Well 5/13/2020 10:47 AM  
Number of days: 3 PLAN Skin Care & Pressure Relief Recommendations Minimize layers of linen Pads under patient to optimize support surface Turn/reposition approximately every 2 hours Manage incontinence Promote continence; Skin Protective lotion/cream to buttocks and sacrum daily and as needed with incontinence care Offload heels pillows Physician/Provider notified: Yes Orders: Keep open areas covered with optifoam borders Teaching completed with:  
[] Patient          
[] Family member      
[] Caregiver [x] Nursing 
[] Other Patient/Caregiver Teaching: Level of patient/caregiver understanding able to:  
[] Indicates understanding       [] Needs reinforcement 
[] Unsuccessful      [] Verbal Understanding 
[] Demonstrated understanding       [x] No evidence of learning 
[] Refused teaching         [] N/A Electronically signed by Camden Pulliam RN on 5/13/2020 at 10:53 AM

## 2020-05-13 NOTE — PROGRESS NOTES
Pulmonary, Critical Care, and Sleep Medicine Pulmonary Progress Note Name: Len Dueñas. : 1975 MRN: [id number] Date: 2020 [x]I have reviewed the flowsheet and previous days notes. Events, vitals, medications and notes from last 24 hours reviewed. Subjective/History:  
68-year-old male with morbid obesity, ex-smoker, history of asthma, history of severe obstructive sleep apnea on CPAP, questionable component compliance, history of congestive heart failure with reduced ejection fraction atrial fibrillation atrial flutter on amiodarone, Xarelto, history of chronic renal insufficiency, cellulitis of lower extremities and anemia. As per family did not have any fever or upper respiratory tract infections and no flulike symptoms. On Lasix, CPAP not clear if missed. Presented with acute shortness of breath worsening edema of lower extremities. He was found to be in acute hypercarbic hypoxemic respiratory failure initially had a trial of BiPAP but failed and then eventually was intubated. He was hypotensive. Moderate amount of secretions. Morbid obesity. Patient compliant with Xarelto. Patient self extubated and had PEA arrest. 
 
Subjective:  
20 Patient intubated  for hypoxemic resp failure. Remains in ICU, orally intubated, on ventilator. Sedated, but opens eyes and follows simple commands. Temperature max 100.9 No more hypotension while on Lasix drip 2 mg/h. Not diuresing well on Lasix lower dose and so will increase Lasix. Tolerating tube feed. HU was unremarkable except LVEF 45 to 50% and grade 1 DD. No pulmonary hypertension or right heart failure. Bubble study negative. ETT secretions improved since started on steroids on 2020. Hypernatremia resolved. Persistent leg edema. Drips: versed, Lasix Intake/Output Summary (Last 24 hours) at 2020 1045 Last data filed at 2020 1032 Gross per 24 hour Intake 3896.88 ml Output 2865 ml Net 1031.88 ml ROS: Review of systems not obtained due to patient factors. Past Medical History: 
Past Medical History:  
Diagnosis Date  Arrhythmia  Arthritis   
 knees  CHF (congestive heart failure) ([facility]  Chronic back pain  Chronic renal insufficiency, stage II (mild)  History of atrial flutter Dx 2016 - anticoagulated by Kenyon Lilly  Hypertension   Limited mobility  Migraine headache  Morbid obesity ([facility]  Morbid obesity with body mass index of 60.0-69.9 in adult [facility])  Sleep apnea   
 uses c-pap instructed to bring day of surgery  Smoking   
 very passive / cigars only Allergy: No Known Allergies Vital Signs:   
Blood pressure 129/62, pulse 63, temperature 98.3 °F (36.8 °C), resp. rate 18, height 5' 10\" (1.778 m), weight (!) 232 kg (511 lb 7.5 oz), SpO2 95 %. Body mass index is 73.39 kg/m². O2 Device: Endotracheal tube O2 Flow Rate (L/min): 16 l/min Temp (24hrs), Av °F (37.2 °C), Min:98.3 °F (36.8 °C), Max:100.9 °F (38.3 °C) Intake/Output:  
Last shift:       07 - 1900 In: -  
Out: 525 [Urine:525] Last 3 shifts:  190 -  0700 In: 4876.9 [I.V.:2537.9] Out: 3140 [Urine:2890; Drains:250] Intake/Output Summary (Last 24 hours) at 2020 1045 Last data filed at 2020 1032 Gross per 24 hour Intake 3896.88 ml Output 2865 ml Net 1031.88 ml Ventilator Settings: 
Mode Rate Tidal Volume Pressure FiO2 PEEP Pressure control   580 ml    70 % 15 cm H20 Peak airway pressure: 31 cm H2O Minute ventilation: 11.2 l/min Physical Exam: 
General: sedated, arousable, obese male; no cyanosis; on ventilator HEENT: LIBBY, orally intubated; orogastric tube; no bleeding; pupils not dilated, reactive Neck: thick short obese neck Chest: normal, obese chest wall Lungs: moderate air entry, obese chest limits exam; decreased BS bases; symmetrical chest expansion Heart: S1S2 present or without murmur or extra heart sounds; JVD not elevated Abdomen: obese, bowel sounds normoactive, soft without significant tenderness; no organomegaly; no guarding or rigidity Extremity: 2+, pitting bilateral thigh/leg and arm edema; negative for cyanosis, clubbing Capillary refill: normal 
Neuro: sedated, arousable, exam limitations Skin: Skin color, texture, turgor fair. Skin dry, warm, non-diaphoretic DATA:  
Current Facility-Administered Medications Medication Dose Route Frequency  calcium gluconate 2 g/100 mL sodium chloride (ISO-OSM)  2 g IntraVENous ONCE  
 albumin human 25% (BUMINATE) solution 25 g  25 g IntraVENous Q6H  
 methylPREDNISolone (PF) (SOLU-MEDROL) injection 40 mg  40 mg IntraVENous Q8H  
 [Held by provider] apixaban (ELIQUIS) tablet 2.5 mg  2.5 mg Oral BID  scopolamine (TRANSDERM-SCOP) 1 mg over 3 days 1 Patch  1 Patch TransDERmal Q72H  fentaNYL (DURAGESIC) 50 mcg/hr patch 1 Patch  1 Patch TransDERmal Q72H  furosemide (LASIX) 100 mg in 0.9% sodium chloride 100 mL infusion  4 mg/hr IntraVENous CONTINUOUS  
 LORazepam (ATIVAN) injection 2 mg  2 mg IntraVENous Q4H PRN  
 docusate sodium (COLACE) capsule 100 mg  100 mg Oral BID  polyethylene glycol (MIRALAX) packet 17 g  17 g Oral BID  heparin (porcine) 100 unit/mL injection 500 Units  500 Units InterCATHeter Q8H PRN  
 fentaNYL citrate (PF) injection 50 mcg  50 mcg IntraVENous Q3H PRN  chlorhexidine (PERIDEX) 0.12 % mouthwash 10 mL  10 mL Oral Q12H  
 albuterol-ipratropium (DUO-NEB) 2.5 MG-0.5 MG/3 ML  3 mL Nebulization Q6H PRN  
 acetaminophen (TYLENOL) tablet 650 mg  650 mg Oral Q6H PRN  
 ELECTROLYTE REPLACEMENT PROTOCOL - Calcium   1 Each Other PRN  
 ELECTROLYTE REPLACEMENT PROTOCOL - Magnesium   1 Each Other PRN  
 ELECTROLYTE REPLACEMENT PROTOCOL - Potassium Standard Dosing   1 Each Other PRN  
 ELECTROLYTE REPLACEMENT PROTOCOL - Phosphorus  Standard Dosing  1 Each Other PRN  
  pantoprazole (PROTONIX) 40 mg in 0.9% sodium chloride 10 mL injection  40 mg IntraVENous DAILY  midazolam in normal saline (VERSED) 1 mg/mL infusion  0-5 mg/hr IntraVENous TITRATE  aspirin chewable tablet 81 mg  81 mg Oral DAILY  sodium chloride (NS) flush 5-40 mL  5-40 mL IntraVENous Q8H  
 sodium chloride (NS) flush 5-40 mL  5-40 mL IntraVENous PRN Facility-Administered Medications Ordered in Other Encounters Medication Dose Route Frequency  propofoL (DIPRIVAN) 10 mg/mL injection   IntraVENous PRN  
 ePHEDrine (PF) (MISTOLE) 10 mg/mL in NS syringe   IntraVENous PRN  
 PHENYLephrine (RUPERT-SYNEPHRINE) 100 mcg/mL in NS syringe   IntraVENous PRN Telemetry: [x]Sinus []A-flutter []Paced []A-fib []Multiple PVCs Labs: 
Recent Results (from the past 24 hour(s)) GLUCOSE, POC Collection Time: 05/12/20 11:54 AM  
Result Value Ref Range Glucose (POC) 100 70 - 110 mg/dL METABOLIC PANEL, BASIC Collection Time: 05/12/20  4:00 PM  
Result Value Ref Range Sodium 145 136 - 145 mmol/L Potassium 4.4 3.5 - 5.5 mmol/L Chloride 106 100 - 111 mmol/L  
 CO2 37 (H) 21 - 32 mmol/L Anion gap 2 (L) 3.0 - 18 mmol/L Glucose 100 (H) 74 - 99 mg/dL BUN 20 (H) 7.0 - 18 MG/DL Creatinine 0.79 0.6 - 1.3 MG/DL  
 BUN/Creatinine ratio 25 (H) 12 - 20 GFR est AA >60 >60 ml/min/1.73m2 GFR est non-AA >60 >60 ml/min/1.73m2 Calcium 7.9 (L) 8.5 - 10.1 MG/DL MAGNESIUM Collection Time: 05/12/20  4:00 PM  
Result Value Ref Range Magnesium 1.9 1.6 - 2.6 mg/dL PHOSPHORUS Collection Time: 05/12/20  4:00 PM  
Result Value Ref Range Phosphorus 2.3 (L) 2.5 - 4.9 MG/DL  
GLUCOSE, POC Collection Time: 05/12/20  5:41 PM  
Result Value Ref Range Glucose (POC) 110 70 - 110 mg/dL METABOLIC PANEL, BASIC Collection Time: 05/12/20 11:00 PM  
Result Value Ref Range Sodium 144 136 - 145 mmol/L Potassium 4.2 3.5 - 5.5 mmol/L  Chloride 106 100 - 111 mmol/L  
 CO2 35 (H) 21 - 32 mmol/L Anion gap 3 3.0 - 18 mmol/L Glucose 141 (H) 74 - 99 mg/dL BUN 22 (H) 7.0 - 18 MG/DL Creatinine 0.79 0.6 - 1.3 MG/DL  
 BUN/Creatinine ratio 28 (H) 12 - 20 GFR est AA >60 >60 ml/min/1.73m2 GFR est non-AA >60 >60 ml/min/1.73m2 Calcium 7.8 (L) 8.5 - 10.1 MG/DL MAGNESIUM Collection Time: 05/12/20 11:00 PM  
Result Value Ref Range Magnesium 1.9 1.6 - 2.6 mg/dL PHOSPHORUS Collection Time: 05/12/20 11:00 PM  
Result Value Ref Range Phosphorus 2.9 2.5 - 4.9 MG/DL  
GLUCOSE, POC Collection Time: 05/12/20 11:40 PM  
Result Value Ref Range Glucose (POC) 140 (H) 70 - 110 mg/dL METABOLIC PANEL, COMPREHENSIVE Collection Time: 05/13/20  4:02 AM  
Result Value Ref Range Sodium 143 136 - 145 mmol/L Potassium 4.3 3.5 - 5.5 mmol/L Chloride 105 100 - 111 mmol/L  
 CO2 35 (H) 21 - 32 mmol/L Anion gap 3 3.0 - 18 mmol/L Glucose 131 (H) 74 - 99 mg/dL BUN 21 (H) 7.0 - 18 MG/DL Creatinine 0.72 0.6 - 1.3 MG/DL  
 BUN/Creatinine ratio 29 (H) 12 - 20 GFR est AA >60 >60 ml/min/1.73m2 GFR est non-AA >60 >60 ml/min/1.73m2 Calcium 7.9 (L) 8.5 - 10.1 MG/DL Bilirubin, total 1.5 (H) 0.2 - 1.0 MG/DL  
 ALT (SGPT) 25 16 - 61 U/L  
 AST (SGOT) 29 10 - 38 U/L Alk. phosphatase 56 45 - 117 U/L Protein, total 5.9 (L) 6.4 - 8.2 g/dL Albumin 2.6 (L) 3.4 - 5.0 g/dL Globulin 3.3 2.0 - 4.0 g/dL A-G Ratio 0.8 0.8 - 1.7    
CBC WITH AUTOMATED DIFF Collection Time: 05/13/20  4:02 AM  
Result Value Ref Range WBC 6.5 4.6 - 13.2 K/uL  
 RBC 3.75 (L) 4.70 - 5.50 M/uL HGB 9.8 (L) 13.0 - 16.0 g/dL HCT 33.0 (L) 36.0 - 48.0 % MCV 88.0 74.0 - 97.0 FL  
 MCH 26.1 24.0 - 34.0 PG  
 MCHC 29.7 (L) 31.0 - 37.0 g/dL  
 RDW 16.4 (H) 11.6 - 14.5 % PLATELET 404 910 - 502 K/uL MPV 11.1 9.2 - 11.8 FL  
 NEUTROPHILS 89 (H) 42 - 75 % BAND NEUTROPHILS 4 0 - 5 % LYMPHOCYTES 5 (L) 20 - 51 % MONOCYTES 2 2 - 9 % EOSINOPHILS 0 0 - 5 % BASOPHILS 0 0 - 3 %  
 ABS. NEUTROPHILS 5.8 1.8 - 8.0 K/UL  
 ABS. LYMPHOCYTES 0.3 (L) 0.8 - 3.5 K/UL  
 ABS. MONOCYTES 0.1 0 - 1.0 K/UL  
 ABS. EOSINOPHILS 0.0 0.0 - 0.4 K/UL  
 ABS. BASOPHILS 0.0 0.0 - 0.1 K/UL  
 RBC COMMENTS ANISOCYTOSIS 1+ 
    
 RBC COMMENTS     
  POLYCHROMASIA SLIGHT 
HYPOCHROMIA SLIGHT 
SCHISTOCYTES 
OCCASIONAL 
OVALOCYTES 1+ 
  
 DF MANUAL MAGNESIUM Collection Time: 05/13/20  4:02 AM  
Result Value Ref Range Magnesium 2.0 1.6 - 2.6 mg/dL CALCIUM, IONIZED Collection Time: 05/13/20  4:02 AM  
Result Value Ref Range Ionized Calcium 1.09 (L) 1.12 - 1.32 MMOL/L  
PTT Collection Time: 05/13/20  4:02 AM  
Result Value Ref Range aPTT 35.3 23.0 - 36.4 SEC PROTHROMBIN TIME + INR Collection Time: 05/13/20  4:02 AM  
Result Value Ref Range Prothrombin time 18.0 (H) 11.5 - 15.2 sec INR 1.5 (H) 0.8 - 1.2 PHOSPHORUS Collection Time: 05/13/20  4:02 AM  
Result Value Ref Range Phosphorus 2.9 2.5 - 4.9 MG/DL  
POC G3 Collection Time: 05/13/20  5:25 AM  
Result Value Ref Range Device: VENT    
 FIO2 (POC) 0.7 % pH (POC) 7.430 7.35 - 7.45    
 pCO2 (POC) 51.3 (H) 35.0 - 45.0 MMHG  
 pO2 (POC) 83 80 - 100 MMHG  
 HCO3 (POC) 34.1 (H) 22 - 26 MMOL/L  
 sO2 (POC) 96 92 - 97 % Base excess (POC) 10 mmol/L Mode ASSIST CONTROL Set Rate 16 bpm  
 PEEP/CPAP (POC) 15 cmH2O  
 PIP (POC) 15 Allens test (POC) N/A Inspiratory Time 0.9 sec Total resp. rate 22 Site DRAWN FROM ARTERIAL LINE Patient temp. 37.0 Specimen type (POC) ARTERIAL Performed by Inderjit Peña Pressure control YES    
GLUCOSE, POC Collection Time: 05/13/20  5:44 AM  
Result Value Ref Range Glucose (POC) 122 (H) 70 - 110 mg/dL Recent Labs 05/13/20 
5218 05/12/20 
0542 05/11/20 
5938 FIO2I 0.7 0.75 0.75 HCO3I 34.1* 38.4* 35.1* PCO2I 51.3* 58.5* 52.6* PHI 7.430 7.426 7.435 PO2I 83 78* 65* All Micro Results Procedure Component Value Units Date/Time CULTURE, RESPIRATORY/SPUTUM/BRONCH Myrle Loach STAIN [[id number]]  (Abnormal) Collected:  05/08/20 1630 Order Status:  Completed Specimen:  Sputum from Tracheal Aspirate Updated:  05/10/20 1214 Special Requests: NO SPECIAL REQUESTS     
  GRAM STAIN RARE WBCS SEEN     
      
  RARE EPITHELIAL CELLS SEEN  
     
   NO ORGANISMS SEEN Culture result: RARE YEAST     
      
  NO NORMAL RESPIRATORY BRYN ISOLATED  
     
 CULTURE, RESPIRATORY/SPUTUM/BRONCH W Wesley Fallon [[id number]] Collected:  05/04/20 1315 Order Status:  Canceled Specimen:  Sputum from Tracheal Aspirate CULTURE, RESPIRATORY/SPUTUM/BRONCH Myrle Loach STAIN [[id number]]  (Abnormal)  (Susceptibility) Collected:  04/22/20 1018 Order Status:  Completed Specimen:  Sputum from Tracheal Aspirate Updated:  04/25/20 5401 Special Requests: NO SPECIAL REQUESTS     
  GRAM STAIN FEW WBCS SEEN     
      
  OCCASIONAL EPITHELIAL CELLS SEEN  
     
      
  FEW GRAM POSITIVE COCCI IN CLUSTERS  
     
   RARE GRAM NEGATIVE RODS Culture result:    
  LIGHT STAPHYLOCOCCUS AUREUS  
     
      
  LIGHT NORMAL RESPIRATORY BRYN  
     
 CULTURE, BLOOD [[id number]] Collected:  04/24/20 1015 Order Status:  Canceled Specimen:  Blood Imaging: 
[x]I have personally reviewed the patients chest radiographs images and report AXR 5/7 Results from [facility] Encounter encounter on 04/21/20 XR ABD (KUB) Narrative EXAM: SUPINE ABDOMINAL RADIOGRAPH 
 
CLINICAL INDICATION/HISTORY: NGT placement post procedure 
-Additional: None COMPARISON: 5/7/2020 TECHNIQUE: Single supine view of the abdomen. 
 
_______________ FINDINGS: 
 
BOWEL GAS PATTERN: Markedly limited study secondary to body habitus. No definite 
enteric tube visualized. Overall paucity of bowel gas. 
 
_______________ Impression IMPRESSION: 
 
Markedly limited study secondary to body habitus. No definite enteric tube visualized. Overall paucity of bowel gas. Chest x-ray 1 view 5/10 COMPARISON: 5/9/2020 Chest x-ray on my review shows good ET tube position; limitations due to severe obesity There appears to be bilateral diffuse groundglass infiltrates, more worse in the left lung Overall, seeing improved aeration on today's film. US LEs 4/22/2020 Interpretation Summary · No evidence of deep vein thrombosis in the right lower extremity veins assessed. · No evidence of deep vein thrombosis in the left lower extremity veins assessed. Echo 4/22 Result status: Edited Result - FINAL Addendum by Matt Ortiz MD on Wed Apr 22, 2020  5:12 PM  
· Left Ventricle: Normal wall thickness and systolic function (ejection fraction normal). Mildly dilated left ventricle. The estimated ejection fraction is 45 - 50%. There is moderate (grade 2) left ventricular diastolic dysfunction E/e' Ratio = 17.09. Wall Scoring: The left ventricular wall motion is globally hypokinetic. · Right Ventricle: Not well visualized. Normal global systolic function. Moderately dilated right ventricle. · Right Atrium: Right atrium not well visualized. Moderately dilated right atrium. · IVC/Hepatic Veins: Dilated inferior vena cava. Mechanically ventilated; cannot use inferior caval vein diameter to estimate central venous pressure. HU 5/12/20: 
Result status: Final result · Image quality: good. The procedure, risks and alternatives were explained. Informed consent was obtained. . Topical anesthetic not used. . Sedation was achieved by conscious sedation. 4 mg Versed was administered. 100 mcg of Fentanyl was administered. No complications. HU probe was removed. Estimated blood loss: 0 mL. Color flow Doppler was performed and pulse wave and/or continuous wave Doppler was performed. Saline contrast was given to evaluate for intracardiac shunt. No shunt seen. · Left Ventricle: Normal cavity size and wall thickness. Mild systolic dysfunction. The estimated ejection fraction is 45 - 50%. Visually measured ejection fraction. Abnormal left ventricular septal motion. There is mild (grade 1) left ventricular diastolic dysfunction. Wall Scoring: The left ventricular wall motion is globally hypokinetic. · Interatrial Septum: Agitated saline contrast study was performed. There was no shunting at baseline or with Valsalva. No atrial septal defect present. IMPRESSION:  
Patient Active Problem List  
Diagnosis Code  Acute on chronic respiratory failure with hypoxia and hypercapnia ([facility]) J96.21, J96.22  
 Acute on chronic systolic and diastolic CHF (congestive heart failure) ([facility]) I50.43 Aspiration pneumonitis from multiple patient initiated ET tube displacement events J69.0  Cardiac arrest ([facility] I46.9 MSSA in sputum culture A49.01  
 Elevated troponin, NSTEMI R79.89, I21.4  Chronic a-fib I48.20  Atrial flutter ([facility] I48.92 Prolonged QTc R94.31  
 Chronic renal insufficiency, stage II (mild) N18.2 Thrombocytopenia D69.6  Smoking F17.200  Arthritis M19.90  Migraine G43.909  Chronic back pain M54.9, G89.29  
 Obstructive Sleep apnea G47.33  
 Morbid obesity ([facility]) E66.01  
  
PLAN:  
RS -intubated, on ventilator since 4/25/2020. Continue mech vent; still on high PEEP 15; FiO2 65 %. HU negative for any cardiac causes for severe hypoxemia or shunting. Discussed with cardiologist, he could have pulmonary hemorrhage knowing bloody/pinkish ETT secretions and so started on Solu-Medrol 40 mg IV every 8 hour since 5/12/2020. ETT secretions improved. Now able to tolerate Lasix well with no hypotension. I's and O's not improving on Lasix 2 mg/h drip. Increase Lasix drip to 4 mg/h for an hour, if blood pressure tolerates then increase to 6 mg/h with the goal of 8 mg/h. Monitor I's and O's, renal functions, electrolytes and replace per ICU protocol. Continue albumin. Morbidly obese and suspect to have MARIA FERNANDA/OHS with chronic hypercapnic respiratory failure. Patient cannot have CT chest due to body weight limitations. Recent concerns for some aspiration around ET tube with vomiting, and hx of self extubations VAP prevention bundle, head of the bed at 30' all times Patient is off paralytic medication since 5/4/2020 Sedation- Versed drip. Off fentanyl drip. On fentanyl patch. Vent and sedation bundles Continue bronchodilators prn; pulmonary hygiene care. Dr Osmani Rowe in ENT on case: Need to reconsult when FiO2 decreases to reasonable range for trach placement. CVS - 
HU unremarkable for any cause for persistent hypoxemia, bubble study negative. Only grade 1 diastolic dysfunction with LVEF 45 to 50%. No pulmonary hypertension. US legs negative for DVT. Continue Lasix drip as above. Dr. Barclay Alexandre on board. ID - SARS-COV-2 negative x 2 Antibiotic choice: Zosyn started on 5/2/20 with low grade completed on 5/12/2020 10 days antibiotic MSSA in resp cxs from before; respiratory cultures 5/8 so far no growth. Procalcitonin repeat is negative. No leukocytosis, but low-grade fever yesterday, improved. Hem - HIT panel and LEYDI Ab NEG on 4/28. He is definitely high risk for PEs; CTA chest cannot be done due to limitations due to weight; stopped argatroban 5/4; INR 2 [suspect fatty liver/GASTELUM]; hold due to bloody secretions from the ET tube. Vasc - PVL bl LE negative Renal -continue Lasix drip as above; monitor BUN and creatinine stable; potassium being replaced. Hypernatremia resolved, DC D5. Continue free water through NGT. Nephrology Dr. Gary Figures had signed off.   
GI - GI placed OG tube; good position confirmed with abdominal x-ray using Gastrografin; patient on tube feedings; LFTs remain normal 
Neurology: sedated with limitations; patient follows simple commands, and moving extremities Prognosis guarded. Patient chronically vent dependent. Due to high FiO2 requirements tracheostomy has not happened. Overall poor prognosis. Full code. Will defer respective systems problem management to primary and other consultant and follow patient in ICU with primary and other medical team 
Quality Care: PPI, DVT prophylaxis, HOB elevated, Infection control all reviewed and addressed. PAIN AND SEDATION: As above · Skin/Wound:  Blister on foot, does not appear pressure sore. Continue local care per wound care consult. · Nutrition:  Start tube feeding · Prophylaxis: DVT and GI Prophylaxis reviewed. · Restraints: prn 
· PT/OT eval and treat: as needed when stable · Lines/Tubes: A-line 4/23/20; daniel 4/21/20, ET tube 4/25/20; PICC line left arm 5/5 ADVANCE DIRECTIVE: Full code DISCUSSION: spoke with RN, RT, ICU staff, MDR Events and notes from last 24 hours reviewed. Care plan discussed with nursing Total CC time: 33 minutes. High complexity review and management Simran Solomon MD  
5/13/2020

## 2020-05-13 NOTE — PROGRESS NOTES
Hospitalist Progress Note-critical care note Patient: Jason Castro MRN: [id number]  CSN: [id number] YOB: 1975  Age: 53 y.o. Sex: male DOA: 4/21/2020 LOS:  LOS: 22 days Chief complaint: cardiac arrest, respiratory failure, CHF, chronic A. fib CKD morbid obesity Assessment/Plan Hospital Problems  Date Reviewed: 5/2/2020 Codes Class Noted POA  
 NSTEMI (non-ST elevated myocardial infarction) ([facility] ICD-10-CM: I21.4 ICD-9-CM: 410.70  5/13/2020 Unknown Advanced care planning/counseling discussion ICD-10-CM: Z71.89 ICD-9-CM: V65.49  Unknown Unknown Cardiac arrest [facility]) ICD-10-CM: I46.9 ICD-9-CM: 427.5  4/23/2020 Unknown Acute on chronic respiratory failure with hypoxia and hypercapnia (HCC) ICD-10-CM: J96.21, J96.22 
ICD-9-CM: 518.84, 786.09, 799.02  4/22/2020 Yes Acute CHF (congestive heart failure) (HCC) ICD-10-CM: I50.9 ICD-9-CM: 428.0  4/21/2020 Yes * (Principal) Respiratory failure with hypoxia and hypercapnia (HCC) ICD-10-CM: J96.91, J96.92 
ICD-9-CM: 518.81  4/21/2020 Yes Chronic a-fib ICD-10-CM: I48.20 ICD-9-CM: 427.31  10/15/2018 Yes Chronic renal insufficiency, stage II (mild) ICD-10-CM: N18.2 ICD-9-CM: 288. 2  Unknown Yes  
   
 BMI 70 and over, adult [facility]) ICD-10-CM: T69.87 ICD-9-CM: V85.45  Unknown Yes Morbid obesity ([facility] ICD-10-CM: E66.01 
ICD-9-CM: 278.01  4/4/2016 Yes Sleep apnea ICD-10-CM: G47.30 ICD-9-CM: 780.57  4/4/2016 Yes 54 y/o male with hx of morbid obesity, MARIA FERNANDA, chronic afib on anticoagulation, and systolic CHF (EF 40-32%) is admitted with respiratory failure with hypoxia and hypercapnia requiring BiPAP as well as acute CHF exacerbation-now on vent CRITICAL CARE PLAN 
   
Resp Acute respiratory respiratory failure with hypoxia and hypercapnia On vent and wean , need trach when vent setting permit Still on 70 % oxygen, PEEP 15 AM  
   
 Aspiration pneumonia on Zosyn 
 
jac [facility]  
 
Cardiac arrest-PEA after self extubated  
follow-up with cardiologist 
Avoid beta-blocker due to bradycardia HU done on 5/12, no shunt noted Chronic A. Fib, Eliquis was decreased -now hold due to bloody secretion from ET tube Follow-up with cardiology On aspirin Acute on chronic systolic heart failure On Lasix drip, continue monitor renal function and electrolytes. 
  
nstemi On aspirin, bbb was hold due to natalia Dr. Celso Carlton f/u ID on Zosyn Heme/onc Follow H&H, plts. INR. H/h trending down, eliquis was hold , continue monitoring  
  
Renal  
Hypernatremia-resolved Trend BUN, Cr, follow I/O, daniel in place. Continue icu electrolytes replacement protocol Neuro: On sedation 
  
Endocrine - Follow FSG,  
  
  
GI -tube feeding,  
  
Prophylaxis - DVT: SCDs, GI: protonix Morbid obesity Rn: stable 30 minutes of critical care time spent in the direct evaluation and treatment of this high risk patient. The reason for providing this level of medical care for this critically ill patient was due a critical illness that impaired one or more vital organ systems such that there was a high probability of imminent or life threatening deterioration in the patients condition. This care involved high complexity decision making to assess, manipulate, and support vital system functions, to treat this degreee vital organ system failure and to prevent further life threatening deterioration of the patients condition. Disposition :tbd, Review of systems: 
unable to obtain due to intubation Vital signs/Intake and Output: 
Visit Vitals /62 Pulse (!) 54 Temp 98.7 °F (37.1 °C) Resp 23 Ht 5' 10\" (1.778 m) Wt (!) 232 kg (511 lb 7.5 oz) SpO2 98% BMI 73.39 kg/m² Current Shift:  05/13 0701 - 05/13 1900 In: -  
Out: 525 [Urine:525] Last three shifts:  05/11 1901 - 05/13 0700 In: 4876.9 [I.V.:2537.9] Out: 3140 [Urine:2890; Drains:250] Physical Exam: 
General: Intubated HEENT: NC, Atraumatic. PERRLA, anicteric sclerae. ET tube noted Lungs: Courses breathing sound Heart:  Regular  rhythm,  No murmur, No Rubs, No Gallops Abdomen: Soft, Non distended, Non tender. +Bowel sounds, Clark noted Extremities: No c/c, edema Psych:             calm on sedation Neurologic:  Unable to obtain due to sedation Labs: Results:  
   
Chemistry Recent Labs 05/13/20 
0402 05/12/20 
2300 05/12/20 
1600  05/12/20 
0316  05/11/20 
0551 * 141* 100*   < > 101*   < > 99  144 145   < > 146*   < > 147* K 4.3 4.2 4.4   < > 3.6   < > 3.7  106 106   < > 107   < > 107 CO2 35* 35* 37*   < > 36*   < > 36* BUN 21* 22* 20*   < > 24*   < > 28* CREA 0.72 0.79 0.79   < > 0.76   < > 1.03  
CA 7.9* 7.8* 7.9*   < > 7.7*   < > 8.1* AGAP 3 3 2*   < > 3   < > 4  
BUCR 29* 28* 25*   < > 32*   < > 27* AP 56  --   --   --  55  --  60  
TP 5.9*  --   --   --  5.5*  --  5.3* ALB 2.6*  --   --   --  2.1*  --  2.0*  
GLOB 3.3  --   --   --  3.4  --  3.3 AGRAT 0.8  --   --   --  0.6*  --  0.6*  
 < > = values in this interval not displayed. CBC w/Diff Recent Labs 05/13/20 
7562 05/12/20 
8054 05/11/20 
9271 WBC 6.5 7.4 8.7  
RBC 3.75* 3.78* 3.93* HGB 9.8* 10.0* 10.4* HCT 33.0* 33.6* 34.9*  
 171 150 GRANS 89* 78* 79* LYMPH 5* 12* 11* EOS 0 3 3 Cardiac Enzymes No results for input(s): CPK, CKND1, CONSTANZA in the last 72 hours. No lab exists for component: Bladimir Innocent Coagulation Recent Labs 05/13/20 0402 05/12/20 
1911 PTP 18.0* 19.9* INR 1.5* 1.7* APTT 35.3 40.4* Lipid Panel No results found for: CHOL, CHOLPOCT, CHOLX, CHLST, CHOLV, 643862, HDL, HDLP, LDL, LDLC, DLDLP, 419864, VLDLC, VLDL, TGLX, TRIGL, TRIGP, TGLPOCT, CHHD, CHHDX  
BNP No results for input(s): BNPP in the last 72 hours. Liver Enzymes Recent Labs 05/13/20 0402 TP 5.9*  
 ALB 2.6* AP 56 SGOT 29 Thyroid Studies Lab Results Component Value Date/Time TSH 1.45 04/27/2020 03:50 AM  
    
Procedures/imaging: see electronic medical records for all procedures/Xrays and details which were not copied into this note but were reviewed prior to creation of Plan Xr Chest Sng[facility] V Result Date: 4/25/2020 CLINICAL HISTORY:  Endotracheal tube placement/repositioning. COMPARISONS:  Chest x-ray, earlier the same day. TECHNIQUE:  single frontal view of the chest ------------------------------------------ FINDINGS: Exam limited by patient large body habitus. Lungs:  Hazy opacity in the lung bases, more apparent than on the prior study. Upper lungs clear. Mediastinum: Marked cardiomegaly. Endotracheal tube has been repositioned, tip now 5.5 cm above the miguel ángel. Right transjugular venous catheter, tip near cavoatrial junction. Bones: No evidence of fracture or suspicious bone lesion. ------------------------------------------ 
 
IMPRESSION: 1. Hazy bilateral lower lobe opacity, likely a combination of pleural fluid and atelectasis, slightly more apparent than on recent comparison study. Pneumonia or aspiration not excluded. 2. Endotracheal tube has been repositioned, now in good position. Xr Abd (kub) Result Date: 5/7/2020 EXAM: SUPINE ABDOMINAL RADIOGRAPH CLINICAL INDICATION/HISTORY: eval for ileus -Additional: None COMPARISON: 5/6/2020 TECHNIQUE: Single supine view of the abdomen. _______________ FINDINGS/IMPRESSION: BOWEL GAS PATTERN: Limited study secondary to body habitus. Enteric tube poorly visualized. Contrast injection demonstrates tip at the GE junction/proximal stomach. Oral contrast within the colon. Xr Abd (kub) Result Date: 5/6/2020 Abdomen, single view COMPARISON: May 2, 2020 INDICATION: Nasogastric tube placement FINDINGS: Supine AP view the abdomen obtained before and after administration of contrast per the nasogastric tube.  Anatomic detail is markedly limited by the patient's body habitus with collimation of the majority of the right lateral abdomen. Radiopaque tubing noted below the diaphragm, the tip of which is indistinct. Contrast opacifies the stomach. Impression: 1. Detail limited examination. Nasogastric tube is below the diaphragm. Although the exact tip position is unclear on the provided images, opacification of gastric rugal folds is compatible with tube position in the stomach. Xr Abd (kub) Result Date: 5/3/2020 EXAM: SUPINE ABDOMINAL RADIOGRAPH CLINICAL INDICATION/HISTORY: ng placement -Additional: None COMPARISON: None TECHNIQUE: Single supine view of the abdomen. _______________ FINDINGS: BOWEL GAS PATTERN: Outside the field of view. BONES: Unremarkable. OTHER: Subtle linear density likely corresponding with reported nasogastric tube, possible tip seen in the inferior thorax distal esophagus region. Endotracheal tube noted which is above the miguel ángel. _______________ IMPRESSION: Nondiagnostic exam for purposes of nasogastric tube positioning. It is suspected that the tip of the tube is in the distal esophagus, but exam quality is not adequate for confident localization. Xr Abd (kub) Result Date: 4/22/2020 EXAM: XR ABD (KUB) CLINICAL INDICATION/HISTORY: ETT and OG placement COMPARISON: Chest radiograph same day TECHNIQUE: Supine AP view of the abdomen was obtained. _______________ FINDINGS: Enteric tube is not reliably visualized on this exam; indeterminate to what extent this is related to its potential high lying intrathoracic position versus or instead if the tube is simply obscured because of patient's obese body habitus. _______________ IMPRESSION: Enteric tube is not reliably visualized within the distal thoracic esophagus normal or distally within the left upper quadrant. Xr Chest [facility] Result Date: 5/10/2020 EXAM: CHEST RADIOGRAPH, SINGLE VIEW CLINICAL INDICATION/HISTORY: Aspiration pneumonia, ET tube placement      <Additional:  None. COMPARISON: 5/9/2020 TECHNIQUE: Portable frontal view of the chest was obtained. _______________ FINDINGS: SUPPORT DEVICES: ETT is 5.9 cm above the miguel ángel. PICC from a left arm approach projects at the [place] [facility]. HEART AND MEDIASTINUM: Cardiac silhouette is top normal in size. LUNGS AND PLEURAL SPACES: Extensive bilateral alveolar densities are present throughout both lungs, slightly more dense on the left than on the right without significant changes. Left costophrenic angle is blunted versus obscured. The right costophrenic angle is blunted. No pneumothorax. BONY THORAX AND SOFT TISSUES: No acute osseous abnormality. _______________ IMPRESSION: Diffuse bilateral asymmetrical left greater than right edema versus pneumonia versus ARDS without change. Small right effusion, cannot exclude a small left pleural effusion. Xr Chest [facility] Result Date: 5/9/2020 EXAM: CHEST RADIOGRAPH, SINGLE VIEW CLINICAL INDICATION/HISTORY: Aspiration pneumonia, ET tube placement      <Additional:  None. COMPARISON: 5/8/2020 TECHNIQUE: Portable frontal view of the chest was obtained. _______________ FINDINGS: SUPPORT DEVICES: ETT is 7.0 cm above the miguel ángel, PICC tip from a left arm approach projects at the distal SVC. HEART AND MEDIASTINUM: Cardiac silhouette is top normal in size. LUNGS AND PLEURAL SPACES: Extensive bilateral alveolar densities are present throughout both lungs, slightly more dense on the left than on the right, without significant changes. Left costophrenic angle is cut off, the right costophrenic angle is blunted. BONY THORAX AND SOFT TISSUES: No acute osseous abnormality. _______________ IMPRESSION: Diffuse bilateral left greater than right pneumonia versus pulmonary edema. Small right pleural effusion. No acute changes. Xr Chest [facility] Result Date: 5/8/2020 AP portable chest, 5/8/2020 at 0500 hours: INDICATION: Shortness of breath. Intubated. Comparison 5/7/2020. Endotracheal tube just below the clavicles but well above the miguel ángel. Extensive bilateral edema or infiltrate most prominent in the upper lobes. Perhaps some increase in right basilar density with partial obscuration of the right hemidiaphragm in the interval. Persistent retrocardiac opacification with obscuration of the left hemidiaphragm. The heart is stable and top normal. Left arm PICC line tip is at the cavoatrial junction in good position. IMPRESSION: Extensive bilateral edema or infiltrate, most prominent in the upper lobes, certainly potentially in part aspiration. Some increase in right basilar density perhaps, increasing focal edema and/or pleural effusion/atelectasis. No additional change. Xr Chest [facility] Result Date: 5/7/2020 EXAM: Portable frontal view of the chest. CLINICAL INDICATION/HISTORY: Aspiration pneumonia, follow-up intubation COMPARISON: 5/6/2020 _______________ FINDINGS: Stable and satisfactory position endotracheal tube and left arm PICC line. No significant change in bilateral areas of airspace filling process with stable mildly enlarged cardiac silhouette with no definite pleural effusion or pneumothorax. _______________ IMPRESSION: 1. Stable chest. 
 
Xr Chest [facility] Result Date: 5/6/2020 EXAM: XR CHEST PORT CLINICAL INDICATION/HISTORY: Aspiration pneumonia, ET tube placement -Additional: None COMPARISON: Several prior exams, most recently May 5, 2020 TECHNIQUE: Portable frontal view of the chest _______________ FINDINGS: SUPPORT DEVICES: Endotracheal tube, right IJ central venous catheter, and left approach PICC line noted projecting in stable position. HEART AND MEDIASTINUM: Cardiac size and mediastinal contours appear stable. Cardiac silhouette is enlarged, as previously. LUNGS AND PLEURAL SPACES: Asymmetric airspace process within the right mid and lower lungs with slightly improved left lung base aeration.  Potential right pleural effusion. No pneumothorax. BONY THORAX AND SOFT TISSUES: Unremarkable. _______________ IMPRESSION: 1. Adequate/stable position of support devices. 2. Little interval change in the appearance of right-sided airspace opacity, basilar changes of atelectasis and presumptively small right pleural effusion. Xr Chest [facility] Result Date: 5/5/2020 EXAM: XR CHEST PORT CLINICAL INDICATION/HISTORY: Aspiration pneumonia, ET tube placement -Additional: None COMPARISON: 5/4/2020 TECHNIQUE: Portable frontal view of the chest _______________ FINDINGS: SUPPORT DEVICES: Endotracheal tube and right IJV central venous catheter unchanged. HEART AND MEDIASTINUM: Cardiomegaly. LUNGS AND PLEURAL SPACES: Limited secondary to body habitus. Right mid to lower lung zone opacification, unchanged. . Probable right pleural effusion. No large pneumothorax. _______________ IMPRESSION: Right mid to lower lungs on opacification, unchanged. . Probable right pleural effusion. Xr Chest [facility] Result Date: 5/4/2020 EXAM: XR CHEST PORT CLINICAL INDICATION/HISTORY: Aspiration pneumonia, ET tube placement -Additional: None COMPARISON: Several prior exams, most recently May 3, 2020 TECHNIQUE: Portable frontal view of the chest _______________ FINDINGS: SUPPORT DEVICES: Endotracheal tube and right IJ approach central venous catheter both project in stable position. HEART AND MEDIASTINUM: Stable enlarged cardiac silhouette and mediastinal contours. LUNGS AND PLEURAL SPACES: Hazy lower lung opacities noted with mid to lower lung predominance, somewhat increased on the left from prior exam.. No evidence of pneumothorax or large pleural effusion. As previously, limited visualization due to technique and body habitus. BONY THORAX AND SOFT TISSUES: Unremarkable. _______________ IMPRESSION: 1. Stable/adequate position of support devices.  2. Little interval change in the appearance of right mid to lower lung airspace disease/atelectasis and likely left basilar atelectasis. Xr Chest [facility] Result Date: 5/3/2020 EXAM: PORTABLE FRONTAL CHEST RADIOGRAPH CLINICAL INDICATION/HISTORY: Intubation. COMPARISON: 5/2/2020 TECHNIQUE: Portable frontal view of the chest _______________ FINDINGS: SUPPORT DEVICES: Adequately positioned endotracheal tube and right IJ central venous catheter. HEART AND MEDIASTINUM: Rotated to the right. Moderately enlarged cardiomediastinal silhouette appears similar to prior. LUNGS AND PLEURAL SPACES: Homogeneous opacification right mid/lower thorax similar if not increasingly dense in the interval. Relatively clear appearance of left lung. Overall limited visualization due to technique and body habitus. BONY THORAX AND SOFT TISSUES: Unremarkable. _______________ IMPRESSION: 1. Adequately positioned endotracheal tube. 2. Right thoracic opacification similar if not slightly increased in density in the interval, likely some combination of pleural effusion and lung consolidation (atelectasis/pneumonia). Xr Chest [facility] Result Date: 5/2/2020 EXAM: PORTABLE FRONTAL CHEST RADIOGRAPH CLINICAL INDICATION/HISTORY: Aspiration pneumonia. Intubation. COMPARISON: 5/1/2020 TECHNIQUE: Portable frontal view of the chest _______________ FINDINGS: SUPPORT DEVICES: Adequately positioned endotracheal tube and right IJ central venous catheter. HEART AND MEDIASTINUM: Rightward rotation. Stable cardiomediastinal silhouette, with enlargement of cardiac silhouette. LUNGS AND PLEURAL SPACES: Hazy opacification lower right thorax with obscuration of the diaphragm, similar or slightly increased from prior exam. BONY THORAX AND SOFT TISSUES: Unremarkable. _______________ IMPRESSION: 1. Adequately positioned endotracheal tube. 2. Right basilar opacification may be slightly increased from prior exam, likely some combination of effusion and lung consolidation (pneumonia/atelectasis). Xr Chest [facility] Result Date: 5/1/2020 AP portable chest, 5/1/2020 at 0449 hours: INDICATION: Pneumonia. ET tube placement. Comparison 4/30/2020. Endotracheal tube is in the upper thoracic trachea below the clavicles. The study is lordotically positioned. Right internal jugular central line is in the upper SVC. Asymmetric right perihilar edema or infiltrate. Cardiomegaly. Mild interstitial perihilar changes on the left without new focal infiltrate. IMPRESSION: Little change. Right lung edema or infiltrate. Endotracheal tube in good position. Xr Chest [facility] Result Date: 4/30/2020 EXAM: XR CHEST PORT CLINICAL INDICATION/HISTORY: Aspiration pneumonia, ET tube placement -Additional: None COMPARISON: One day prior TECHNIQUE: Portable frontal view of the chest _______________ FINDINGS: SUPPORT DEVICES: Right IJV central venous catheter unchanged. Endotracheal tube unchanged. HEART AND MEDIASTINUM: Cardiomegaly. LUNGS AND PLEURAL SPACES: Limited study secondary to body habitus and exclusion of the left costophrenic angle. Central vascular congestion with moderate bilateral effusions and pulmonary edema. No pneumothorax. _______________ IMPRESSION: Limited study secondary to body habitus and exclusion of the left costophrenic angle. Central vascular congestion with moderate bilateral effusions and pulmonary edema. No pneumothorax. Xr Chest [facility] Result Date: 4/29/2020 EXAM: XR CHEST PORT CLINICAL INDICATION/HISTORY: Aspiration pneumonia, ET tube placement -Additional: None COMPARISON: 4/27/2020 TECHNIQUE: Portable frontal view of the chest _______________ FINDINGS: SUPPORT DEVICES: Right IJV central venous catheter unchanged. Endotracheal tube unchanged. HEART AND MEDIASTINUM: Cardiomegaly. LUNGS AND PLEURAL SPACES: Limited study secondary to body habitus and exclusion of the left costophrenic angle. Central vascular congestion with moderate bilateral effusions and pulmonary edema. No pneumothorax. _______________ IMPRESSION: Limited study secondary to body habitus and exclusion of the left costophrenic angle. Central vascular congestion with moderate bilateral effusions and pulmonary edema. No pneumothorax. Xr Chest [facility] Result Date: 4/27/2020 EXAM: XR CHEST PORT CLINICAL INDICATION/HISTORY: Intubated -Additional: None COMPARISON: 4/26/2020 TECHNIQUE: Portable frontal view of the chest _______________ FINDINGS: SUPPORT DEVICES: Endotracheal tube projects in stable position, estimated at approximately 4.9 cm above the miguel ángel. Right IJ approach central venous catheter with tip projecting over the SVC. HEART AND MEDIASTINUM: Stable appearing cardiac size and mediastinal contours. Enlarged appearing cardiac silhouette redemonstrated. LUNGS AND PLEURAL SPACES: Basilar areas of opacity and dense left retrocardiac opacity noted. No evidence of pneumothorax or large pleural effusion. BONY THORAX AND SOFT TISSUES: Unremarkable. _______________ IMPRESSION: 1. Endotracheal tube and right IJ approach central venous catheter in position as above. 2. Cardiomegaly and bibasilar areas of airspace disease/atelectasis without significant change Xr Chest [facility] Result Date: 4/26/2020 
--------------------------------------------------------------------------- <<<<<<<<<           [facility]           >>>>>>>>> --------------------------------------------------------------------------- CLINICAL HISTORY:  Hypoxia. COMPARISON EXAMINATIONS:  April 25, 2020. ---  SINGLE FRONTAL VIEW OF THE CHEST  --- Portions of the lower lung fields are not imaged. The cardiomediastinal silhouette is grossly stable. There is an endotracheal tube seen in good position below the clavicles and above the miguel ángel. A right central line is noted with tip at the level of the upper SVC. There are bilateral mid to lower lung field opacities.  No significant osseous abnormalities are identified.  -------------- 
 
 Impression: -------------- Limited exam. Endotracheal tube and right central line in place. Bilateral mid to lower lung field opacities possibly pleural fluid with atelectasis and/or infiltrates. Similar findings were seen previously. Xr [facility] Result Date: 4/25/2020 CLINICAL HISTORY:  Endotracheal tube placement COMPARISONS:  Chest x-ray, earlier the same day TECHNIQUE:  single frontal view of the chest ------------------------------------------ FINDINGS: Lungs:  Lungs slightly suboptimally assessed due to patient large body habitus. Hypoinflation with mild bandlike opacity in the left lung base, likely atelectasis. Left basilar consolidation not excluded. Mediastinum: Right transjugular venous catheter, tip near cavoatrial junction. High position of endotracheal tube, with tip approximately 12 cm above the miguel ángel. Cardiomegaly. Bones: No evidence of fracture or suspicious bone lesion. ------------------------------------------ 
 
IMPRESSION: 1. Endotracheal tube has been pulled back in the interval and is now high in position. Recommend advancement. 2. Lungs slightly suboptimally assessed due to body habitus, though left basilar consolidation not excluded, not appreciably changed. Xr [facility] Result Date: 4/25/2020 CLINICAL HISTORY:  As above. COMPARISONS:  Chest x-ray 4/24/2020 TECHNIQUE:  single frontal view of the chest ------------------------------------------ FINDINGS: Lungs:  Mild increase in bilateral pulmonary interstitial markings. Hazy opacity in the lung bases, likely combination of atelectasis and pleural fluid. Small to moderate-sized pleural effusions very likely present. Mediastinum: Marked cardiomegaly. Endotracheal tube tip approximately 6.7 cm above the miguel ángel. Right-sided central venous catheter, tip near cavoatrial junction.  Bones: No evidence of fracture or suspicious bone lesion. ------------------------------------------ 
 
 IMPRESSION: 1. Bilateral pleural effusions with bibasilar pulmonary atelectasis. 2. Probable mild pulmonary edema. Xr Chest [facility] Result Date: 4/24/2020 EXAM: XR CHEST PORT CLINICAL INDICATION/HISTORY: hypoxia -Additional: None COMPARISON: one day prior TECHNIQUE: Portable frontal view of the chest _______________ FINDINGS: SUPPORT DEVICES: Right IJV central venous catheter tip in the proximal SVC. Endotracheal tube unchanged. HEART AND MEDIASTINUM: Cardiomegaly. LUNGS AND PLEURAL SPACES: Central venous vascular congestion and interstitial infiltrates. Probable small bilateral effusions. _______________ IMPRESSION: Central venous vascular congestion and interstitial infiltrates. Probable small bilateral effusions. Xr Chest [facility] Result Date: 4/23/2020 EXAM: CHEST RADIOGRAPH, SINGLE VIEW CLINICAL INDICATION/HISTORY: Shortness of breath, hypoxia, endotracheal tube placement COMPARISON: 4/22/2020 TECHNIQUE: Portable frontal view of the chest was obtained. _______________ FINDINGS: SUPPORT DEVICES:   >  Endotracheal tube tip is located approximately 5.3 cm above the miguel ángel, adequately positioned.   >  Enteric tube is adequately visualized at the level of thoracic inlet and just above the miguel ángel, slightly eccentric to the left but is not clearly visualized throughout the lower aspect of the left hemithorax, potentially artifactual given patient's body habitus and adjacent overlying global cardiomegaly. > Unchanged adequately positioned right jugular central venous catheter. HEART AND MEDIASTINUM: Cardiomediastinal silhouette appears unchanged, markedly enlarged. Tortuous and atherosclerotic thoracic aorta. Prominent central pulmonary vascular congestion. LUNGS AND PLEURAL SPACES: Diffusely increased interstitial markings and retrocardiac left lower lobe opacity, silhouetting medial left hemidiaphragm. Probable small pleural effusions. No pneumothorax.   BONY THORAX AND SOFT TISSUES: No acute osseous abnormality. _______________ IMPRESSION: 1. Endotracheal tube is adequately positioned. Unchanged, adequately positioned right jugular central venous catheter. 2. Enteric tube is not clearly visualized below the level of the miguel ángel; indeterminate to what extent this represents artifact related to patient's body habitus and global cardiomegaly or instead of the enteric tube is proximal in position. 3. Unchanged cardiomegaly, central vascular congestion and interstitial pulmonary edema. Probable small pleural effusions. Xr Chest [facility] Result Date: 4/22/2020 EXAM: XR CHEST PORT CLINICAL INDICATION/HISTORY: hypoxia -Additional: None COMPARISON: Earlier the same day TECHNIQUE: Portable frontal view of the chest _______________ FINDINGS: SUPPORT DEVICES: Interval placement of right IJV central venous catheter. Endotracheal and enteric tubes are unchanged. HEART AND MEDIASTINUM: Stable cardiomediastinal silhouette. LUNGS AND PLEURAL SPACES: Limited study, bilateral lower lung fields are excluded from field-of-view. Prominent central vasculature and interstitial edema. No pneumothorax _______________ IMPRESSION: Limited study. Interval placement of right IJV central venous catheter with tip at the SVC. No pneumothorax. Xr Chest [facility] Result Date: 4/22/2020 EXAM: CHEST RADIOGRAPH, SINGLE VIEW CLINICAL INDICATION/HISTORY: Endotracheal and orogastric tube placement COMPARISON: 4/21/2020 TECHNIQUE: Portable frontal view of the chest was obtained. _______________ FINDINGS: SUPPORT DEVICES: Vertebral placement of endotracheal tube with tip approximately 5.3 cm above the miguel ángel. Enteric tube is seen coursing through midline thoracic esophagus. HEART AND MEDIASTINUM: Cardiomediastinal silhouette appears unchanged, markedly enlarged. Tortuous and atherosclerotic thoracic aorta. No central vascular congestion.  LUNGS AND PLEURAL SPACES: Retrocardiac medial left lower lobe opacity silhouettes left hemidiaphragm. No confluent airspace opacity elsewhere throughout the lungs. No definite pleural effusion. No pneumothorax. BONY THORAX AND SOFT TISSUES: No acute osseous abnormality. _______________ IMPRESSION: 1. Adequate interval endotracheal tube placement. Enteric tube is better visualized on the concurrent abdominal radiographs. 2. Unchanged global cardiomegaly and retrocardiac left lower lobe atelectasis/infiltrate. Xr Chest [facility] Result Date: 4/21/2020 EXAM: CHEST RADIOGRAPH CLINICAL INDICATION/HISTORY: SOB   > Additional: None COMPARISON: 10/16/2018. TECHNIQUE: Portable frontal view of the chest _______________ FINDINGS: SUPPORT DEVICES: None. HEART AND MEDIASTINUM: Cardiac size remains enlarged. Pulmonary vasculature mediastinal structures are stable. LUNGS AND PLEURAL SPACES: Shallow inspiration. Cannot exclude a left retrocardiac opacity. No definite pleural effusion or pneumothorax. BONY THORAX AND SOFT TISSUES: Unremarkable. _______________ IMPRESSION: 1. Stable cardiomegaly with probable mild pulmonary congestion. 2. Cannot exclude left lower lobe infiltrate/atelectasis. Xr Abd Port  1 V Result Date: 4/24/2020 EXAM: SUPINE ABDOMINAL RADIOGRAPH CLINICAL INDICATION/HISTORY: OG tube placement -Additional: None COMPARISON: 4/22/2014 TECHNIQUE: Single supine view of the abdomen. _______________ FINDINGS: BOWEL GAS PATTERN: Limited study secondary to body habitus. Nonvisualization of enteric tube along the expected course of the digestive tract. Elgin Brooks BONES: Unremarkable. OTHER: None. _______________ IMPRESSION: Limited study secondary to body habitus. Nonvisualization of enteric tube along the expected course of the digestive tract. Elgin Brooks Ir Guide Insert Picc Over 5 Yrs Result Date: 5/7/2020 PROCEDURE: PICC placement ATTENDING: Oscar Manuel M.D. INDICATION: Long-term central venous access. COMPLICATIONS: None.  PROCEDURE: Informed consent was obtained. Physician lead timeout was performed. The arm was prepped and draped in sterile fashion. The procedure was performed following standard maximal barrier technique which includes, cap, mask, sterile gown, sterile gloves, sterile full body drape, hand hygiene with alcohol foam, and 2% chlorhexidine for cutaneous antisepsis. Sterile ultrasound gel and a sterile cover for the US probe was used. After successfully identifying a patent left cephalic vein, real-time ultrasound guidance demonstrated patent vein with normal waveforms. Then, real-time ultrasound guidance was used to puncture the vein. Permanent recording was created for the patient's record. Guidewire was passed centrally, portable radiograph was performed to confirm tip position. A peel-away sheath was inserted over the guidewire. The intravascular distance was measured. The PICC measures 55 cm. The double lumen PICC was placed into the vein with its tip in the brachiocephalic/SVC junction, confirmed with post placement portable radiograph. Both ports aspirated and flushed easily. The PICC was secured to the skin, a sterile dressing including Biopatch was applied. IMPRESSION: Dual lumen left PICC placement, ready for immediate use.   
 
 
Adry Montero MD

## 2020-05-13 NOTE — PROGRESS NOTES
1900: Bedside and Verbal shift change report given to Camilla Cantrell RN (oncoming nurse) by Maude Rodriguez RN (offgoing nurse). Report included the following information SBAR, Kardex, Procedure Summary, Intake/Output, MAR, Recent Results, Med Rec Status, Cardiac Rhythm NSR/Sinus Alia Ela and Alarm Parameters  . 2000: Shift assessment completed and bathed at this time. 0000: Reassessment completed. 0400: Reassessment completed, pt attempted to write to better communicate - improving. - Morning labs drawn and submitted to resulting. 0710: Bedside and Verbal shift change report given to RISHI Thomas RN (oncoming nurse) by Camilla Cantrell RN (offgoing nurse). Report included the following information SBAR, Kardex, Procedure Summary, Intake/Output, MAR, Recent Results, Med Rec Status, Cardiac Rhythm NSR/Sinus Alia Ela and Alarm Parameters .

## 2020-05-14 NOTE — PROGRESS NOTES
D/w Dr. Faith Cabrales: tentative trach scheduled on Monday. ASA held. Will update Dr. Faith Cabrales if hypoxia improves more.   
 
Azeb Anidno MD 5/14/2020 2:41 PM

## 2020-05-14 NOTE — PROGRESS NOTES
NUTRITION FOLLOW-UP 
 
RECOMMENDATIONS/PLAN:  
- EN: Continue to increase tube feeds to meet estimated needs Monitor labs/lytes, tube feed tolerance, skin integrity, wt, fluid status, BM 
 
NUTRITION ASSESSMENT:  
Client Update: 53 yrs old Male with resp failure w/ hypoxia and hypercapnia-intubated in ICU, COVID 19 ruled out, hypotension, acute on chronic systolic CHF, elevated trop, morbid obesity, pt self extubated w/ NSTEMI due to PEA arrest, chronic a-fib, elevated trop-cardiology following  HU showed  LVEF 45 to 50% and grade 1 DD, persistent leg edema, noted blisters due to fluid overload-wound care following, tolerating tube feeds continue to adv towards goal 
 
FOOD/NUTRITION INTAKE Diet Order:  Vital High Protein @ 85ml/hr to provide 1870kcal 164g PRO 1563ml H20 209g CHO 43g Fat Food Allergies: NKFA Average PO Intake:     
No data found. Pertinent Medications:  [x] Reviewed; colace, lasix, miralax, protonix, propofol, methylprednisolone, Electrolyte Replacement Protocol: [x]K [x]Mg [x]PO4 Insulin:  []SSI  []Pre-meal   []Basal    []Drip  []None Cultural/[employer] Food Preferences: None Identified BIOCHEMICAL DATA & MEDICAL TESTS Pertinent Labs:  [x] Reviewed; phos-2.4  ANTHROPOMETRICS Height: 5' 10\" (177.8 cm)       Weight: (!) 234 kg (515 lb 14 oz) BMI: 74 kg/m^2 morbidly obese (Greater than or = to 40% BMI) Adm Weight: 485 lbs                Weight change: +30lbs noted fluid overload Adjusted Body Weight: 124.2kg NUTRITION-FOCUSED PHYSICAL ASSESSMENT Skin: small blisters-wound care following GI: +BM 5/14/20 NUTRITION PRESCRIPTION Calories: 1886kcal/day based on 25kcal/kg of IBW Protein: 113- 151 g/day based on 1.5-2.0 g/kg of IBW 
CHO: 236 g/day based on 50% of total energy Fluid: 1886 ml/day based on 1 kcal/ml      
 
NUTRITION DIAGNOSES:  
1.   Inadequate oral intake related to intubation as evidence by need for nutrition support  
  
 
 NUTRITION INTERVENTIONS:  
INTERVENTIONS:        GOALS: 
1. - EN: Continue to increase tube feeds to meet estimated needs 1. Continue to adv tube feeds by next review 4 days LEARNING NEEDS (Diet, Supplementation, Food/Nutrient-Drug Interaction): 
 [] None Identified   [] Education provided/documented      Identified and patient: [] Declined   [x] Was not appropriate/indicated NUTRITION MONITORING /EVALUATION:  
Adjust EN/PN as appropriate Monitor wt Monitor renal labs, electrolytes, fluid status Previous Recommendations Implemented: yes Previous Goals Met:  yes -tolerating feeds and adv towards goal rate  
   
[] Participated in Interdisciplinary Rounds   
[x] [address] [place] [address] Reviewed DISCHARGE NUTRITION RECOMMENDATIONS ADDRESSED:  
  [x] To be determined closer to discharge NUTRITION RISK:           [x] At risk                        [] Not currently at risk Will follow-up per policy. Vini Spencer 1

## 2020-05-14 NOTE — PROGRESS NOTES
Cardiology Progress Note Patient: Rosi Ramos. Sex: male          DOA: 4/21/2020 YOB: 1975      Age:  53 y.o.        LOS:  LOS: 22 days Patient seen and examined, chart reviewed. Assessment/Plan Patient Active Problem List  
Diagnosis Code  Cellulitis of left lower leg L03. 116  
 Anemia D64.9  Morbid obesity ([facility]) E66.01  
 Dyspnea R06.00  Sleep apnea G47.30  Sinus tachycardia R00.0  BMI 70 and over, adult [facility]) O70.84  Chronic back pain M54.9, G89.29  
 Arthritis M19.90  Migraine G43.909  
 History of atrial flutter Z86.79  
 Limited mobility Z74.09  
 Smoking F17.200  Chronic renal insufficiency, stage II (mild) N18.2  CHF (congestive heart failure) ([facility]) I50.9  Acute diastolic CHF (congestive heart failure) ([facility]) I50.31  Chronic a-fib I48.20  Atrial flutter ([facility]) I48.92  
 Elevated troponin R79.89  
 Acute respiratory distress R06.03  
 Acute CHF (congestive heart failure) ([facility]) I50.9  Respiratory failure with hypoxia and hypercapnia ([facility]) J96.91, J96.92  
 Acute on chronic respiratory failure with hypoxia and hypercapnia ([facility]) J96.21, J96.22  
 Cardiac arrest ([facility]) I46.9  Advanced care planning/counseling discussion Z70.80  
 NSTEMI (non-ST elevated myocardial infarction) ([facility] [address] I21.4 Acute on chronic systolic heart failure NSTEMI type II due to PEA arrest 
Paroxysmal atrial flutter Thrombocytopenia Hypernatremia Bradycardia  
  
Patient self extubated and had PEA arrest. 
He had brief episode of A fib with RVR and was started On Amiodarone drip. Currently he is in Sinus rhythm. 
  
Amiodarone discontinued due to bradycardia. 
  
Echocardiogram  
  
· Left Ventricle: Normal wall thickness and systolic function (ejection fraction normal). Mildly dilated left ventricle. The estimated ejection fraction is 45 - 50%.  There is moderate (grade 2) left ventricular diastolic dysfunction E/e' Ratio = 17.09. Wall Scoring: The left ventricular wall motion is globally hypokinetic. · Right Ventricle: Not well visualized. Normal global systolic function. Moderately dilated right ventricle. · Right Atrium: Right atrium not well visualized. Moderately dilated right atrium. · IVC/Hepatic Veins: Dilated inferior vena cava. Mechanically ventilated; cannot use inferior caval vein diameter to estimate central venous pressure. 
  
IV heparin was discontinued due to thrombocytopenia and bleeding at central line site. Started on Argatroban.  
  
Patient had frequent oxygen desaturations and pinkish/bloody aspirate from tracheal suctioning.  
  
HU done revealed LVEF 96-82%, Grade I diastolic dysfunction, Negative bubble study. 
  
Plan: 
  
Eliquis on hold Continue Lasix drip Strict I/O Avoid any AV debbie blocking agents due to bradycardia. Monitor electrolytes and renal function Continue Ventilatory support. Continue management as per hospital medicine and pulmonary critical care Subjective:  
 cc: 
Orally intubated REVIEW OF SYSTEMS:  
 
Can not obtain Objective:  
  
Visit Vitals /81 Pulse (!) 58 Temp 98.6 °F (37 °C) Resp 18 Ht 5' 10\" (1.778 m) Wt (!) 234 kg (515 lb 14 oz) SpO2 99% BMI 74.02 kg/m² Body mass index is 74.02 kg/m². Physical Exam: 
General Appearance: Comfortable, Morbidly obese, not using accessory muscles of respiration. Orally intubated HEENT: AMMON. HEAD: Atraumatic NECK: No JVD, no thyroidomeglay. CAROTIDS: No bruit LUNGS: bilateral conducted sounds HEART: S1+S2 audible, no murmur, no pericardial rub. NEUROLOGICAL: Sedated. Medication: 
Current Facility-Administered Medications Medication Dose Route Frequency  albumin human 25% (BUMINATE) solution 25 g  25 g IntraVENous Q6H  
 methylPREDNISolone (PF) (SOLU-MEDROL) injection 40 mg  40 mg IntraVENous Q8H  
  [Held by provider] apixaban (ELIQUIS) tablet 2.5 mg  2.5 mg Oral BID  scopolamine (TRANSDERM-SCOP) 1 mg over 3 days 1 Patch  1 Patch TransDERmal Q72H  fentaNYL (DURAGESIC) 50 mcg/hr patch 1 Patch  1 Patch TransDERmal Q72H  furosemide (LASIX) 100 mg in 0.9% sodium chloride 100 mL infusion  6 mg/hr IntraVENous CONTINUOUS  
 LORazepam (ATIVAN) injection 2 mg  2 mg IntraVENous Q4H PRN  
 docusate sodium (COLACE) capsule 100 mg  100 mg Oral BID  polyethylene glycol (MIRALAX) packet 17 g  17 g Oral BID  heparin (porcine) 100 unit/mL injection 500 Units  500 Units InterCATHeter Q8H PRN  
 fentaNYL citrate (PF) injection 50 mcg  50 mcg IntraVENous Q3H PRN  chlorhexidine (PERIDEX) 0.12 % mouthwash 10 mL  10 mL Oral Q12H  
 albuterol-ipratropium (DUO-NEB) 2.5 MG-0.5 MG/3 ML  3 mL Nebulization Q6H PRN  
 acetaminophen (TYLENOL) tablet 650 mg  650 mg Oral Q6H PRN  
 ELECTROLYTE REPLACEMENT PROTOCOL - Calcium   1 Each Other PRN  
 ELECTROLYTE REPLACEMENT PROTOCOL - Magnesium   1 Each Other PRN  
 ELECTROLYTE REPLACEMENT PROTOCOL - Potassium Standard Dosing   1 Each Other PRN  
 ELECTROLYTE REPLACEMENT PROTOCOL - Phosphorus  Standard Dosing  1 Each Other PRN  pantoprazole (PROTONIX) 40 mg in 0.9% sodium chloride 10 mL injection  40 mg IntraVENous DAILY  midazolam in normal saline (VERSED) 1 mg/mL infusion  0-5 mg/hr IntraVENous TITRATE  aspirin chewable tablet 81 mg  81 mg Oral DAILY  sodium chloride (NS) flush 5-40 mL  5-40 mL IntraVENous Q8H  
 sodium chloride (NS) flush 5-40 mL  5-40 mL IntraVENous PRN Facility-Administered Medications Ordered in Other Encounters Medication Dose Route Frequency  propofoL (DIPRIVAN) 10 mg/mL injection   IntraVENous PRN  
 ePHEDrine (PF) (MISTOLE) 10 mg/mL in NS syringe   IntraVENous PRN  
 PHENYLephrine (RUPERT-SYNEPHRINE) 100 mcg/mL in NS syringe   IntraVENous PRN Lab/Data Reviewed: 
 
  
Recent Labs 05/13/20 
0402 05/12/20 4495 05/11/20 
0526 WBC 6.5 7.4 8.7 HGB 9.8* 10.0* 10.4* HCT 33.0* 33.6* 34.9*  
 171 150 Recent Labs 05/13/20 
1922 05/13/20 
1204 05/13/20 
0402  143 143  
K 4.1 3.8 4.3  106 105 CO2 36* 32 35* * 139* 131* BUN 21* 21* 21* CREA 0.71 0.66 0.72  
CA 8.1* 7.2* 7.9*  
 
38 minutes of critical care time spent in the direct evaluation and treatment of this high risk patient. The reason for providing this level of medical care for this critically ill patient was due a critical illness that impaired one or more vital organ systems such that there was a high probability of imminent or life threatening deterioration in the patients condition. This care involved high complexity decision making to assess, manipulate, and support vital system functions, to treat this degree vital organ system failure and to prevent further life threatening deterioration of the patients condition. Signed By: Braden Higginbotham MD   
 May 13, 2020

## 2020-05-14 NOTE — PROGRESS NOTES
Hospitalist Progress Note-critical care note Patient: Junito Mckeon MRN: [id number]  CSN: [id number] YOB: 1975  Age: 53 y.o. Sex: male DOA: 4/21/2020 LOS:  LOS: 23 days Chief complaint: cardiac arrest, respiratory failure, CHF, chronic A. fib CKD morbid obesity Assessment/Plan Hospital Problems  Date Reviewed: 5/2/2020 Codes Class Noted POA  
 NSTEMI (non-ST elevated myocardial infarction) ([facility] ICD-10-CM: I21.4 ICD-9-CM: 410.70  5/13/2020 Unknown Advanced care planning/counseling discussion ICD-10-CM: Z71.89 ICD-9-CM: V65.49  Unknown Unknown Cardiac arrest [facility]) ICD-10-CM: I46.9 ICD-9-CM: 427.5  4/23/2020 Unknown Acute on chronic respiratory failure with hypoxia and hypercapnia (HCC) ICD-10-CM: J96.21, J96.22 
ICD-9-CM: 518.84, 786.09, 799.02  4/22/2020 Yes Acute CHF (congestive heart failure) (HCC) ICD-10-CM: I50.9 ICD-9-CM: 428.0  4/21/2020 Yes * (Principal) Respiratory failure with hypoxia and hypercapnia (HCC) ICD-10-CM: J96.91, J96.92 
ICD-9-CM: 518.81  4/21/2020 Yes Chronic a-fib ICD-10-CM: I48.20 ICD-9-CM: 427.31  10/15/2018 Yes Chronic renal insufficiency, stage II (mild) ICD-10-CM: N18.2 ICD-9-CM: 173. 2  Unknown Yes  
   
 BMI 70 and over, adult [facility]) ICD-10-CM: X61.28 ICD-9-CM: V85.45  Unknown Yes Morbid obesity ([facility] ICD-10-CM: E66.01 
ICD-9-CM: 278.01  4/4/2016 Yes Sleep apnea ICD-10-CM: G47.30 ICD-9-CM: 780.57  4/4/2016 Yes 54 y/o male with hx of morbid obesity, MARIA FERNANDA, chronic afib on anticoagulation, and systolic CHF (EF 62-84%) is admitted with respiratory failure with hypoxia and hypercapnia requiring BiPAP as well as acute CHF exacerbation-now on vent CRITICAL CARE PLAN 
   
Resp Acute respiratory respiratory failure with hypoxia and hypercapnia On vent and wean , need trach when vent setting permit Off sedation am, on 50% O2 and peep 12 eliquis hold and bloody secretion improving Will defer intensivist to decide when need trach per ENT  
 
   
Aspiration pneumonia on Zosyn 
 
[facility]  
 
Cardiac arrest-PEA after self extubated  
follow-up with cardiologist 
Avoid beta-blocker due to bradycardia HU done on 5/12, no shunt noted Chronic A. Fib, Eliquis was decreased -now hold due to bloody secretion from ET tube Follow-up with cardiology On aspirin Acute on chronic systolic heart failure On Lasix drip increased to 8  
continue monitor renal function and electrolytes. 
  
nstemi On aspirin, bbb was hold due to natalia Dr. Apollo Armando f/u ID on Zosyn Heme/onc Follow H&H, plts. INR. H/h stable now  
  
Renal  
Hypernatremia-resolved Trend BUN, Cr, follow I/O, daniel in place. Continue icu electrolytes replacement protocol Neuro: 
Off sedation AM and follow the command  
  
Endocrine - Follow FSG,  
  
  
GI -tube feeding,  
  
Prophylaxis - DVT: SCDs, GI: protonix Morbid obesity Rn: stable 30 minutes of critical care time spent in the direct evaluation and treatment of this high risk patient. The reason for providing this level of medical care for this critically ill patient was due a critical illness that impaired one or more vital organ systems such that there was a high probability of imminent or life threatening deterioration in the patients condition. This care involved high complexity decision making to assess, manipulate, and support vital system functions, to treat this degreee vital organ system failure and to prevent further life threatening deterioration of the patients condition. Disposition :tbd, Review of systems: 
unable to obtain due to intubation Vital signs/Intake and Output: 
Visit Vitals BP (!) 158/95 Pulse (!) 44 Temp 98.7 °F (37.1 °C) Resp 17 Ht 5' 10\" (1.778 m) Wt (!) 234 kg (515 lb 14 oz) SpO2 98% BMI 74.02 kg/m² Current Shift:  No intake/output data recorded. Last three shifts:  05/12 1901 - 05/14 0700 In: 2819.1 [I.V.:1209.1] Out: 3740 [RZZQH:7270; Drains:250] Physical Exam: 
General: Intubated, alert on vent and follow the command HEENT: NC, Atraumatic. PERRLA, anicteric sclerae. ET tube noted Lungs: Courses breathing sound Heart:  Regular  rhythm,  No murmur, No Rubs, No Gallops Abdomen: Soft, Non distended, Non tender. +Bowel sounds, Clark noted Extremities: No c/c, edema Psych:             calm on sedation Neurologic:  Unable to obtain due to intubation, but alert/follow the command, move all extremities Labs: Results:  
   
Chemistry Recent Labs 05/14/20 0400 05/14/20 0205 05/13/20 1922 05/13/20 0402 05/12/20 0316 * 132* 122*   < > 131*   < > 101*  144 143   < > 143   < > 146*  
K 4.1 4.2 4.1   < > 4.3   < > 3.6  105 104   < > 105   < > 107 CO2 35* 35* 36*   < > 35*   < > 36* BUN 24* 23* 21*   < > 21*   < > 24* CREA 0.69 0.69 0.71   < > 0.72   < > 0.76 CA 8.2* 7.9* 8.1*   < > 7.9*   < > 7.7* AGAP 3 4 3   < > 3   < > 3  
BUCR 35* 33* 30*   < > 29*   < > 32* AP 45  --   --   --  56  --  55  
TP 6.2*  --   --   --  5.9*  --  5.5* ALB 3.2*  --   --   --  2.6*  --  2.1*  
GLOB 3.0  --   --   --  3.3  --  3.4 AGRAT 1.1  --   --   --  0.8  --  0.6*  
 < > = values in this interval not displayed. CBC w/Diff Recent Labs 05/14/20 0400 05/13/20 0402 05/12/20 0316 WBC 8.3 6.5 7.4  
RBC 3.67* 3.75* 3.78* HGB 9.6* 9.8* 10.0* HCT 31.8* 33.0* 33.6*  
 169 171 GRANS 89* 89* 78* LYMPH 6* 5* 12* EOS 0 0 3 Cardiac Enzymes No results for input(s): CPK, CKND1, CONSTANZA in the last 72 hours. No lab exists for component: Jamarcus Prasad Coagulation Recent Labs 05/14/20 
0400 05/13/20 
0402 PTP 17.5* 18.0* INR 1.5* 1.5* APTT 30.1 35.3 Lipid Panel No results found for: CHOL, CHOLPOCT, CHOLX, [address], [place], [id number], HDL, HDLP, LDL, LDLC, DLDLP, [id number], VLDLC, VLDL, TGLX, TRIGL, TRIGP, TGLPOCT, CHHD, CHHDX  
BNP No results for input(s): BNPP in the last 72 hours. Liver Enzymes Recent Labs 05/14/20 
0400 TP 6.2* ALB 3.2* AP 45 SGOT 21 Thyroid Studies Lab Results Component Value Date/Time TSH 1.45 04/27/2020 03:50 AM  
    
Procedures/imaging: see electronic medical records for all procedures/Xrays and details which were not copied into this note but were reviewed prior to creation of Plan Xr Chest [facility] Result Date: 4/25/2020 CLINICAL HISTORY:  Endotracheal tube placement/repositioning. COMPARISONS:  Chest x-ray, earlier the same day. TECHNIQUE:  single frontal view of the chest ------------------------------------------ FINDINGS: Exam limited by patient large body habitus. Lungs:  Hazy opacity in the lung bases, more apparent than on the prior study. Upper lungs clear. Mediastinum: Marked cardiomegaly. Endotracheal tube has been repositioned, tip now 5.5 cm above the miguel ángel. Right transjugular venous catheter, tip near cavoatrial junction. Bones: No evidence of fracture or suspicious bone lesion. ------------------------------------------ 
 
IMPRESSION: 1. Hazy bilateral lower lobe opacity, likely a combination of pleural fluid and atelectasis, slightly more apparent than on recent comparison study. Pneumonia or aspiration not excluded. 2. Endotracheal tube has been repositioned, now in good position. Xr Abd (kub) Result Date: 5/7/2020 EXAM: SUPINE ABDOMINAL RADIOGRAPH CLINICAL INDICATION/HISTORY: eval for ileus -Additional: None COMPARISON: 5/6/2020 TECHNIQUE: Single supine view of the abdomen. _______________ FINDINGS/IMPRESSION: BOWEL GAS PATTERN: Limited study secondary to body habitus. Enteric tube poorly visualized. Contrast injection demonstrates tip at the GE junction/proximal stomach. Oral contrast within the colon. Xr Abd (kub) Result Date: 5/6/2020 Abdomen, single view COMPARISON: May 2, 2020 INDICATION: Nasogastric tube placement FINDINGS: Supine AP view the abdomen obtained before and after administration of contrast per the nasogastric tube. Anatomic detail is markedly limited by the patient's body habitus with collimation of the majority of the right lateral abdomen. Radiopaque tubing noted below the diaphragm, the tip of which is indistinct. Contrast opacifies the stomach. Impression: 1. Detail limited examination. Nasogastric tube is below the diaphragm. Although the exact tip position is unclear on the provided images, opacification of gastric rugal folds is compatible with tube position in the stomach. Xr Abd (kub) Result Date: 5/3/2020 EXAM: SUPINE ABDOMINAL RADIOGRAPH CLINICAL INDICATION/HISTORY: ng placement -Additional: None COMPARISON: None TECHNIQUE: Single supine view of the abdomen. _______________ FINDINGS: BOWEL GAS PATTERN: Outside the field of view. BONES: Unremarkable. OTHER: Subtle linear density likely corresponding with reported nasogastric tube, possible tip seen in the inferior thorax distal esophagus region. Endotracheal tube noted which is above the miguel ángel. _______________ IMPRESSION: Nondiagnostic exam for purposes of nasogastric tube positioning. It is suspected that the tip of the tube is in the distal esophagus, but exam quality is not adequate for confident localization. Xr Abd (kub) Result Date: 4/22/2020 EXAM: XR ABD (KUB) CLINICAL INDICATION/HISTORY: ETT and OG placement COMPARISON: Chest radiograph same day TECHNIQUE: Supine AP view of the abdomen was obtained. _______________ FINDINGS: Enteric tube is not reliably visualized on this exam; indeterminate to what extent this is related to its potential high lying intrathoracic position versus or instead if the tube is simply obscured because of patient's obese body habitus. _______________ IMPRESSION: Enteric tube is not reliably visualized within the distal thoracic esophagus normal or distally within the left upper quadrant. Xr Chest [facility] Result Date: 5/10/2020 EXAM: CHEST RADIOGRAPH, SINGLE VIEW CLINICAL INDICATION/HISTORY: Aspiration pneumonia, ET tube placement      <Additional:  None. COMPARISON: 5/9/2020 TECHNIQUE: Portable frontal view of the chest was obtained. _______________ FINDINGS: SUPPORT DEVICES: ETT is 5.9 cm above the miguel ángel. PICC from a left arm approach projects at the [facility]. HEART AND MEDIASTINUM: Cardiac silhouette is top normal in size. LUNGS AND PLEURAL SPACES: Extensive bilateral alveolar densities are present throughout both lungs, slightly more dense on the left than on the right without significant changes. Left costophrenic angle is blunted versus obscured. The right costophrenic angle is blunted. No pneumothorax. BONY THORAX AND SOFT TISSUES: No acute osseous abnormality. _______________ IMPRESSION: Diffuse bilateral asymmetrical left greater than right edema versus pneumonia versus ARDS without change. Small right effusion, cannot exclude a small left pleural effusion. Xr Chest [facility] Result Date: 5/9/2020 EXAM: CHEST RADIOGRAPH, SINGLE VIEW CLINICAL INDICATION/HISTORY: Aspiration pneumonia, ET tube placement      <Additional:  None. COMPARISON: 5/8/2020 TECHNIQUE: Portable frontal view of the chest was obtained. _______________ FINDINGS: SUPPORT DEVICES: ETT is 7.0 cm above the miguel ángel, PICC tip from a left arm approach projects at the distal SVC. HEART AND MEDIASTINUM: Cardiac silhouette is top normal in size. LUNGS AND PLEURAL SPACES: Extensive bilateral alveolar densities are present throughout both lungs, slightly more dense on the left than on the right, without significant changes. Left costophrenic angle is cut off, the right costophrenic angle is blunted. BONY THORAX AND SOFT TISSUES: No acute osseous abnormality. _______________ IMPRESSION: Diffuse bilateral left greater than right pneumonia versus pulmonary edema. Small right pleural effusion. No acute changes. Xr Chest [facility] Result Date: 5/8/2020 AP portable chest, 5/8/2020 at 0500 hours: INDICATION: Shortness of breath. Intubated. Comparison 5/7/2020. Endotracheal tube just below the clavicles but well above the miguel ángel. Extensive bilateral edema or infiltrate most prominent in the upper lobes. Perhaps some increase in right basilar density with partial obscuration of the right hemidiaphragm in the interval. Persistent retrocardiac opacification with obscuration of the left hemidiaphragm. The heart is stable and top normal. Left arm PICC line tip is at the cavoatrial junction in good position. IMPRESSION: Extensive bilateral edema or infiltrate, most prominent in the upper lobes, certainly potentially in part aspiration. Some increase in right basilar density perhaps, increasing focal edema and/or pleural effusion/atelectasis. No additional change. Xr Chest [facility] Result Date: 5/7/2020 EXAM: Portable frontal view of the chest. CLINICAL INDICATION/HISTORY: Aspiration pneumonia, follow-up intubation COMPARISON: 5/6/2020 _______________ FINDINGS: Stable and satisfactory position endotracheal tube and left arm PICC line. No significant change in bilateral areas of airspace filling process with stable mildly enlarged cardiac silhouette with no definite pleural effusion or pneumothorax. _______________ IMPRESSION: 1. Stable chest. 
 
Xr Chest [facility] Result Date: 5/6/2020 EXAM: XR CHEST PORT CLINICAL INDICATION/HISTORY: Aspiration pneumonia, ET tube placement -Additional: None COMPARISON: Several prior exams, most recently May 5, 2020 TECHNIQUE: Portable frontal view of the chest _______________ FINDINGS: SUPPORT DEVICES: Endotracheal tube, right IJ central venous catheter, and left approach PICC line noted projecting in stable position. HEART AND MEDIASTINUM: Cardiac size and mediastinal contours appear stable. Cardiac silhouette is enlarged, as previously. LUNGS AND PLEURAL SPACES: Asymmetric airspace process within the right mid and lower lungs with slightly improved left lung base aeration. Potential right pleural effusion. No pneumothorax. BONY THORAX AND SOFT TISSUES: Unremarkable. _______________ IMPRESSION: 1. Adequate/stable position of support devices. 2. Little interval change in the appearance of right-sided airspace opacity, basilar changes of atelectasis and presumptively small right pleural effusion. Xr Chest [facility] Result Date: 5/5/2020 EXAM: XR CHEST PORT CLINICAL INDICATION/HISTORY: Aspiration pneumonia, ET tube placement -Additional: None COMPARISON: 5/4/2020 TECHNIQUE: Portable frontal view of the chest _______________ FINDINGS: SUPPORT DEVICES: Endotracheal tube and right IJV central venous catheter unchanged. HEART AND MEDIASTINUM: Cardiomegaly. LUNGS AND PLEURAL SPACES: Limited secondary to body habitus. Right mid to lower lung zone opacification, unchanged. . Probable right pleural effusion. No large pneumothorax. _______________ IMPRESSION: Right mid to lower lungs on opacification, unchanged. . Probable right pleural effusion. Xr Chest [facility] Result Date: 5/4/2020 EXAM: XR CHEST PORT CLINICAL INDICATION/HISTORY: Aspiration pneumonia, ET tube placement -Additional: None COMPARISON: Several prior exams, most recently May 3, 2020 TECHNIQUE: Portable frontal view of the chest _______________ FINDINGS: SUPPORT DEVICES: Endotracheal tube and right IJ approach central venous catheter both project in stable position. HEART AND MEDIASTINUM: Stable enlarged cardiac silhouette and mediastinal contours. LUNGS AND PLEURAL SPACES: Hazy lower lung opacities noted with mid to lower lung predominance, somewhat increased on the left from prior exam.. No evidence of pneumothorax or large pleural effusion.  As previously, limited visualization due to technique and body habitus. BONY THORAX AND SOFT TISSUES: Unremarkable. _______________ IMPRESSION: 1. Stable/adequate position of support devices. 2. Little interval change in the appearance of right mid to lower lung airspace disease/atelectasis and likely left basilar atelectasis. Xr Chest [facility] Result Date: 5/3/2020 EXAM: PORTABLE FRONTAL CHEST RADIOGRAPH CLINICAL INDICATION/HISTORY: Intubation. COMPARISON: 5/2/2020 TECHNIQUE: Portable frontal view of the chest _______________ FINDINGS: SUPPORT DEVICES: Adequately positioned endotracheal tube and right IJ central venous catheter. HEART AND MEDIASTINUM: Rotated to the right. Moderately enlarged cardiomediastinal silhouette appears similar to prior. LUNGS AND PLEURAL SPACES: Homogeneous opacification right mid/lower thorax similar if not increasingly dense in the interval. Relatively clear appearance of left lung. Overall limited visualization due to technique and body habitus. BONY THORAX AND SOFT TISSUES: Unremarkable. _______________ IMPRESSION: 1. Adequately positioned endotracheal tube. 2. Right thoracic opacification similar if not slightly increased in density in the interval, likely some combination of pleural effusion and lung consolidation (atelectasis/pneumonia). Xr Chest [facility] Result Date: 5/2/2020 EXAM: PORTABLE FRONTAL CHEST RADIOGRAPH CLINICAL INDICATION/HISTORY: Aspiration pneumonia. Intubation. COMPARISON: 5/1/2020 TECHNIQUE: Portable frontal view of the chest _______________ FINDINGS: SUPPORT DEVICES: Adequately positioned endotracheal tube and right IJ central venous catheter. HEART AND MEDIASTINUM: Rightward rotation. Stable cardiomediastinal silhouette, with enlargement of cardiac silhouette.  LUNGS AND PLEURAL SPACES: Hazy opacification lower right thorax with obscuration of the diaphragm, similar or slightly increased from prior exam. BONY THORAX AND SOFT TISSUES: Unremarkable. _______________ IMPRESSION: 1. Adequately positioned endotracheal tube. 2. Right basilar opacification may be slightly increased from prior exam, likely some combination of effusion and lung consolidation (pneumonia/atelectasis). Xr Chest [facility] Result Date: 5/1/2020 AP portable chest, 5/1/2020 at 0449 hours: INDICATION: Pneumonia. ET tube placement. Comparison 4/30/2020. Endotracheal tube is in the upper thoracic trachea below the clavicles. The study is lordotically positioned. Right internal jugular central line is in the upper SVC. Asymmetric right perihilar edema or infiltrate. Cardiomegaly. Mild interstitial perihilar changes on the left without new focal infiltrate. IMPRESSION: Little change. Right lung edema or infiltrate. Endotracheal tube in good position. Xr Chest [facility] Result Date: 4/30/2020 EXAM: XR CHEST PORT CLINICAL INDICATION/HISTORY: Aspiration pneumonia, ET tube placement -Additional: None COMPARISON: One day prior TECHNIQUE: Portable frontal view of the chest _______________ FINDINGS: SUPPORT DEVICES: Right IJV central venous catheter unchanged. Endotracheal tube unchanged. HEART AND MEDIASTINUM: Cardiomegaly. LUNGS AND PLEURAL SPACES: Limited study secondary to body habitus and exclusion of the left costophrenic angle. Central vascular congestion with moderate bilateral effusions and pulmonary edema. No pneumothorax. _______________ IMPRESSION: Limited study secondary to body habitus and exclusion of the left costophrenic angle. Central vascular congestion with moderate bilateral effusions and pulmonary edema. No pneumothorax. Xr Chest [facility] Result Date: 4/29/2020 EXAM: XR CHEST PORT CLINICAL INDICATION/HISTORY: Aspiration pneumonia, ET tube placement -Additional: None COMPARISON: 4/27/2020 TECHNIQUE: Portable frontal view of the chest _______________ FINDINGS: SUPPORT DEVICES: Right IJV central venous catheter unchanged. Endotracheal tube unchanged. HEART AND MEDIASTINUM: Cardiomegaly. LUNGS AND PLEURAL SPACES: Limited study secondary to body habitus and exclusion of the left costophrenic angle. Central vascular congestion with moderate bilateral effusions and pulmonary edema. No pneumothorax. _______________ IMPRESSION: Limited study secondary to body habitus and exclusion of the left costophrenic angle. Central vascular congestion with moderate bilateral effusions and pulmonary edema. No pneumothorax. Xr Chest [place] Result Date: 4/27/2020 EXAM: XR CHEST PORT CLINICAL INDICATION/HISTORY: Intubated -Additional: None COMPARISON: 4/26/2020 TECHNIQUE: Portable frontal view of the chest _______________ FINDINGS: SUPPORT DEVICES: Endotracheal tube projects in stable position, estimated at approximately 4.9 cm above the miguel ángel. Right IJ approach central venous catheter with tip projecting over the SVC. HEART AND MEDIASTINUM: Stable appearing cardiac size and mediastinal contours. Enlarged appearing cardiac silhouette redemonstrated. LUNGS AND PLEURAL SPACES: Basilar areas of opacity and dense left retrocardiac opacity noted. No evidence of pneumothorax or large pleural effusion. BONY THORAX AND SOFT TISSUES: Unremarkable. _______________ IMPRESSION: 1. Endotracheal tube and right IJ approach central venous catheter in position as above. 2. Cardiomegaly and bibasilar areas of airspace disease/atelectasis without significant change Xr Chest [place] Result Date: 4/26/2020 
--------------------------------------------------------------------------- <<<<<<<<<           [facility]           >>>>>>>>> --------------------------------------------------------------------------- CLINICAL HISTORY:  Hypoxia.  COMPARISON EXAMINATIONS:  April 25, 2020. ---  SINGLE FRONTAL VIEW OF THE CHEST  --- Portions of the lower lung fields are not imaged. The cardiomediastinal silhouette is grossly stable. There is an endotracheal tube seen in good position below the clavicles and above the miguel ángel. A right central line is noted with tip at the level of the upper SVC. There are bilateral mid to lower lung field opacities. No significant osseous abnormalities are identified.  -------------- Impression: -------------- Limited exam. Endotracheal tube and right central line in place. Bilateral mid to lower lung field opacities possibly pleural fluid with atelectasis and/or infiltrates. Similar findings were seen previously. Xr Chest [facility] Result Date: 4/25/2020 CLINICAL HISTORY:  Endotracheal tube placement COMPARISONS:  Chest x-ray, earlier the same day TECHNIQUE:  single frontal view of the chest ------------------------------------------ FINDINGS: Lungs:  Lungs slightly suboptimally assessed due to patient large body habitus. Hypoinflation with mild bandlike opacity in the left lung base, likely atelectasis. Left basilar consolidation not excluded. Mediastinum: Right transjugular venous catheter, tip near cavoatrial junction. High position of endotracheal tube, with tip approximately 12 cm above the miguel ángel. Cardiomegaly. Bones: No evidence of fracture or suspicious bone lesion. ------------------------------------------ 
 
IMPRESSION: 1. Endotracheal tube has been pulled back in the interval and is now high in position. Recommend advancement. 2. Lungs slightly suboptimally assessed due to body habitus, though left basilar consolidation not excluded, not appreciably changed. Xr Chest [facility] Result Date: 4/25/2020 CLINICAL HISTORY:  As above. COMPARISONS:  Chest x-ray 4/24/2020 TECHNIQUE:  single frontal view of the chest ------------------------------------------ FINDINGS: Lungs:  Mild increase in bilateral pulmonary interstitial markings. Hazy opacity in the lung bases, likely combination of atelectasis and pleural fluid.  Small to moderate-sized pleural effusions very likely present. Mediastinum: Marked cardiomegaly. Endotracheal tube tip approximately 6.7 cm above the miguel ángel. Right-sided central venous catheter, tip near cavoatrial junction. Bones: No evidence of fracture or suspicious bone lesion. ------------------------------------------ 
 
IMPRESSION: 1. Bilateral pleural effusions with bibasilar pulmonary atelectasis. 2. Probable mild pulmonary edema. Xr Chest [facility] Result Date: 4/24/2020 EXAM: XR CHEST PORT CLINICAL INDICATION/HISTORY: hypoxia -Additional: None COMPARISON: one day prior TECHNIQUE: Portable frontal view of the chest _______________ FINDINGS: SUPPORT DEVICES: Right IJV central venous catheter tip in the proximal SVC. Endotracheal tube unchanged. HEART AND MEDIASTINUM: Cardiomegaly. LUNGS AND PLEURAL SPACES: Central venous vascular congestion and interstitial infiltrates. Probable small bilateral effusions. _______________ IMPRESSION: Central venous vascular congestion and interstitial infiltrates. Probable small bilateral effusions. Xr Chest [facility] Result Date: 4/23/2020 EXAM: CHEST RADIOGRAPH, SINGLE VIEW CLINICAL INDICATION/HISTORY: Shortness of breath, hypoxia, endotracheal tube placement COMPARISON: 4/22/2020 TECHNIQUE: Portable frontal view of the chest was obtained. _______________ FINDINGS: SUPPORT DEVICES:   >  Endotracheal tube tip is located approximately 5.3 cm above the miguel ángel, adequately positioned.   >  Enteric tube is adequately visualized at the level of thoracic inlet and just above the miguel ángel, slightly eccentric to the left but is not clearly visualized throughout the lower aspect of the left hemithorax, potentially artifactual given patient's body habitus and adjacent overlying global cardiomegaly. > Unchanged adequately positioned right jugular central venous catheter.  HEART AND MEDIASTINUM: Cardiomediastinal silhouette appears unchanged, markedly enlarged. Tortuous and atherosclerotic thoracic aorta. Prominent central pulmonary vascular congestion. LUNGS AND PLEURAL SPACES: Diffusely increased interstitial markings and retrocardiac left lower lobe opacity, silhouetting medial left hemidiaphragm. Probable small pleural effusions. No pneumothorax. BONY THORAX AND SOFT TISSUES: No acute osseous abnormality. _______________ IMPRESSION: 1. Endotracheal tube is adequately positioned. Unchanged, adequately positioned right jugular central venous catheter. 2. Enteric tube is not clearly visualized below the level of the miguel ángel; indeterminate to what extent this represents artifact related to patient's body habitus and global cardiomegaly or instead of the enteric tube is proximal in position. 3. Unchanged cardiomegaly, central vascular congestion and interstitial pulmonary edema. Probable small pleural effusions. Xr Chest [facility] Result Date: 4/22/2020 EXAM: XR CHEST PORT CLINICAL INDICATION/HISTORY: hypoxia -Additional: None COMPARISON: Earlier the same day TECHNIQUE: Portable frontal view of the chest _______________ FINDINGS: SUPPORT DEVICES: Interval placement of right IJV central venous catheter. Endotracheal and enteric tubes are unchanged. HEART AND MEDIASTINUM: Stable cardiomediastinal silhouette. LUNGS AND PLEURAL SPACES: Limited study, bilateral lower lung fields are excluded from field-of-view. Prominent central vasculature and interstitial edema. No pneumothorax _______________ IMPRESSION: Limited study. Interval placement of right IJV central venous catheter with tip at the SVC. No pneumothorax. Xr Chest [facility] Result Date: 4/22/2020 EXAM: CHEST RADIOGRAPH, SINGLE VIEW CLINICAL INDICATION/HISTORY: Endotracheal and orogastric tube placement COMPARISON: 4/21/2020 TECHNIQUE: Portable frontal view of the chest was obtained. _______________ FINDINGS: SUPPORT DEVICES: Vertebral placement of endotracheal tube with tip approximately 5.3 cm above the miguel ángel. Enteric tube is seen coursing through midline thoracic esophagus. HEART AND MEDIASTINUM: Cardiomediastinal silhouette appears unchanged, markedly enlarged. Tortuous and atherosclerotic thoracic aorta. No central vascular congestion. LUNGS AND PLEURAL SPACES: Retrocardiac medial left lower lobe opacity silhouettes left hemidiaphragm. No confluent airspace opacity elsewhere throughout the lungs. No definite pleural effusion. No pneumothorax. BONY THORAX AND SOFT TISSUES: No acute osseous abnormality. _______________ IMPRESSION: 1. Adequate interval endotracheal tube placement. Enteric tube is better visualized on the concurrent abdominal radiographs. 2. Unchanged global cardiomegaly and retrocardiac left lower lobe atelectasis/infiltrate. Xr Chest [facility] Result Date: 4/21/2020 EXAM: CHEST RADIOGRAPH CLINICAL INDICATION/HISTORY: SOB   > Additional: None COMPARISON: 10/16/2018. TECHNIQUE: Portable frontal view of the chest _______________ FINDINGS: SUPPORT DEVICES: None. HEART AND MEDIASTINUM: Cardiac size remains enlarged. Pulmonary vasculature mediastinal structures are stable. LUNGS AND PLEURAL SPACES: Shallow inspiration. Cannot exclude a left retrocardiac opacity. No definite pleural effusion or pneumothorax. BONY THORAX AND SOFT TISSUES: Unremarkable. _______________ IMPRESSION: 1. Stable cardiomegaly with probable mild pulmonary congestion. 2. Cannot exclude left lower lobe infiltrate/atelectasis. Xr Abd Port  1 V Result Date: 4/24/2020 EXAM: SUPINE ABDOMINAL RADIOGRAPH CLINICAL INDICATION/HISTORY: OG tube placement -Additional: None COMPARISON: 4/22/2014 TECHNIQUE: Single supine view of the abdomen. _______________ FINDINGS: BOWEL GAS PATTERN: Limited study secondary to body habitus. Nonvisualization of enteric tube along the expected course of the digestive tract. Alex Ra BONES: Unremarkable. OTHER: None. _______________ IMPRESSION: Limited study secondary to body habitus. Nonvisualization of enteric tube along the expected course of the digestive tract. Chip Seth Ir Guide Insert Picc Over 5 Yrs Result Date: 5/7/2020 PROCEDURE: PICC placement ATTENDING: Melony Thomas M.D. INDICATION: Long-term central venous access. COMPLICATIONS: None. PROCEDURE: Informed consent was obtained. Physician lead timeout was performed. The arm was prepped and draped in sterile fashion. The procedure was performed following standard maximal barrier technique which includes, cap, mask, sterile gown, sterile gloves, sterile full body drape, hand hygiene with alcohol foam, and 2% chlorhexidine for cutaneous antisepsis. Sterile ultrasound gel and a sterile cover for the US probe was used. After successfully identifying a patent left cephalic vein, real-time ultrasound guidance demonstrated patent vein with normal waveforms. Then, real-time ultrasound guidance was used to puncture the vein. Permanent recording was created for the patient's record. Guidewire was passed centrally, portable radiograph was performed to confirm tip position. A peel-away sheath was inserted over the guidewire. The intravascular distance was measured. The PICC measures 55 cm. The double lumen PICC was placed into the vein with its tip in the brachiocephalic/SVC junction, confirmed with post placement portable radiograph. Both ports aspirated and flushed easily. The PICC was secured to the skin, a sterile dressing including Biopatch was applied. IMPRESSION: Dual lumen left PICC placement, ready for immediate use.   
 
 
Henry Guthrie MD

## 2020-05-14 NOTE — PROGRESS NOTES
Pulmonary, Critical Care, and Sleep Medicine Pulmonary Progress Note Name: Dean Darling. : 1975 MRN: [id number] Date: 2020 [x]I have reviewed the flowsheet and previous days notes. Events, vitals, medications and notes from last 24 hours reviewed. Subjective/History:  
47-year-old male with morbid obesity, ex-smoker, history of asthma, history of severe obstructive sleep apnea on CPAP, questionable component compliance, history of congestive heart failure with reduced ejection fraction atrial fibrillation atrial flutter on amiodarone, Xarelto, history of chronic renal insufficiency, cellulitis of lower extremities and anemia. As per family did not have any fever or upper respiratory tract infections and no flulike symptoms. On Lasix, CPAP not clear if missed. Presented with acute shortness of breath worsening edema of lower extremities. He was found to be in acute hypercarbic hypoxemic respiratory failure initially had a trial of BiPAP but failed and then eventually was intubated. He was hypotensive. Moderate amount of secretions. Morbid obesity. Patient compliant with Xarelto. Patient self extubated and had PEA arrest. 
 
Subjective:  
20 Patient intubated  for hypoxemic resp failure. Remains in ICU, orally intubated, on ventilator. Sedated, but opens eyes and follows all commands. D/w patient about need for trach, he agreed. Temperature max 98.8 Sinus bradycardia but BP stable. On lasix drip at 6 mg/hr, tolerating without any more hypotension. Increased lasix to 8 mg/hr. I/O negative now. Leg edema slightly improved. ETT bloody secretions improved, now has mild dark clotted blood. Drips: versed, Lasix Intake/Output Summary (Last 24 hours) at 2020 1009 Last data filed at 2020 0400 Gross per 24 hour Intake 906 ml Output 2375 ml Net -1469 ml  
 
 
ROS: Review of systems not obtained due to patient factors. Past Medical History: 
Past Medical History:  
Diagnosis Date  Arrhythmia  Arthritis   
 knees  CHF (congestive heart failure) ([facility] [address]  Chronic back pain  Chronic renal insufficiency, stage II (mild)  History of atrial flutter Dx 2016 - anticoagulated by Cloretta   Hypertension   Limited mobility  Migraine headache  Morbid obesity ([facility] [address]  Morbid obesity with body mass index of 60.0-69.9 in adult [facility])  Sleep apnea   
 uses c-pap instructed to bring day of surgery  Smoking   
 very passive / cigars only Allergy: No Known Allergies Vital Signs:   
Blood pressure (!) 158/95, pulse (!) 42, temperature 98.7 °F (37.1 °C), resp. rate 16, height 5' 10\" (1.778 m), weight (!) 234 kg (515 lb 14 oz), SpO2 98 %. Body mass index is 74.02 kg/m². O2 Device: Endotracheal tube O2 Flow Rate (L/min): 16 l/min Temp (24hrs), Av.1 °F (36.7 °C), Min:96.9 °F (36.1 °C), Max:98.7 °F (37.1 °C) Intake/Output:  
Last shift:      No intake/output data recorded. Last 3 shifts:  1901 -  0700 In: 2819.1 [I.V.:1209.1] Out: 3740 [AWCGH:3221; Drains:250] Intake/Output Summary (Last 24 hours) at 2020 1009 Last data filed at 2020 0400 Gross per 24 hour Intake 906 ml Output 2375 ml Net -1469 ml Ventilator Settings: 
Mode Rate Tidal Volume Pressure FiO2 PEEP Pressure control   580 ml    50 %(decreased to 50%) 15 cm H20 Peak airway pressure: 31 cm H2O Minute ventilation: 10.8 l/min Physical Exam: 
General: sedated, arousable, obese male; no cyanosis; on ventilator HEENT: LIBBY, orally intubated; orogastric tube; no bleeding; pupils not dilated, reactive Neck: thick short obese neck Chest: normal, obese chest wall Lungs: moderate air entry, obese chest limits exam; decreased BS bases; symmetrical chest expansion Heart: S1S2 present or without murmur or extra heart sounds; JVD not elevated Abdomen: obese, bowel sounds normoactive, soft without significant tenderness; no organomegaly; no guarding or rigidity Extremity: 2+, pitting bilateral thigh/leg and arm edema; negative for cyanosis, clubbing Capillary refill: normal 
Neuro: sedated, arousable, moving all 4 limbs and following all commands. Skin: Skin color, texture, turgor fair. Skin dry, warm, non-diaphoretic DATA:  
Current Facility-Administered Medications Medication Dose Route Frequency  albumin human 25% (BUMINATE) solution 25 g  25 g IntraVENous Q6H  
 methylPREDNISolone (PF) (SOLU-MEDROL) injection 40 mg  40 mg IntraVENous Q8H  
 [Held by provider] apixaban (ELIQUIS) tablet 2.5 mg  2.5 mg Oral BID  scopolamine (TRANSDERM-SCOP) 1 mg over 3 days 1 Patch  1 Patch TransDERmal Q72H  fentaNYL (DURAGESIC) 50 mcg/hr patch 1 Patch  1 Patch TransDERmal Q72H  furosemide (LASIX) 100 mg in 0.9% sodium chloride 100 mL infusion  8 mg/hr IntraVENous CONTINUOUS  
 LORazepam (ATIVAN) injection 2 mg  2 mg IntraVENous Q4H PRN  
 docusate sodium (COLACE) capsule 100 mg  100 mg Oral BID  polyethylene glycol (MIRALAX) packet 17 g  17 g Oral BID  heparin (porcine) 100 unit/mL injection 500 Units  500 Units InterCATHeter Q8H PRN  
 fentaNYL citrate (PF) injection 50 mcg  50 mcg IntraVENous Q3H PRN  chlorhexidine (PERIDEX) 0.12 % mouthwash 10 mL  10 mL Oral Q12H  
 albuterol-ipratropium (DUO-NEB) 2.5 MG-0.5 MG/3 ML  3 mL Nebulization Q6H PRN  
 acetaminophen (TYLENOL) tablet 650 mg  650 mg Oral Q6H PRN  
 ELECTROLYTE REPLACEMENT PROTOCOL - Calcium   1 Each Other PRN  
 ELECTROLYTE REPLACEMENT PROTOCOL - Magnesium   1 Each Other PRN  
 ELECTROLYTE REPLACEMENT PROTOCOL - Potassium Standard Dosing   1 Each Other PRN  
 ELECTROLYTE REPLACEMENT PROTOCOL - Phosphorus  Standard Dosing  1 Each Other PRN  pantoprazole (PROTONIX) 40 mg in 0.9% sodium chloride 10 mL injection  40 mg IntraVENous DAILY  midazolam in normal saline (VERSED) 1 mg/mL infusion  0-5 mg/hr IntraVENous TITRATE  aspirin chewable tablet 81 mg  81 mg Oral DAILY  sodium chloride (NS) flush 5-40 mL  5-40 mL IntraVENous Q8H  
 sodium chloride (NS) flush 5-40 mL  5-40 mL IntraVENous PRN Facility-Administered Medications Ordered in Other Encounters Medication Dose Route Frequency  propofoL (DIPRIVAN) 10 mg/mL injection   IntraVENous PRN  
 ePHEDrine (PF) (MISTOLE) 10 mg/mL in NS syringe   IntraVENous PRN  
 PHENYLephrine (RUPERT-SYNEPHRINE) 100 mcg/mL in NS syringe   IntraVENous PRN Telemetry: [x]Sinus []A-flutter []Paced []A-fib []Multiple PVCs Labs: 
Recent Results (from the past 24 hour(s)) GLUCOSE, POC Collection Time: 05/13/20 10:56 AM  
Result Value Ref Range Glucose (POC) 122 (H) 70 - 110 mg/dL METABOLIC PANEL, BASIC Collection Time: 05/13/20 12:04 PM  
Result Value Ref Range Sodium 143 136 - 145 mmol/L Potassium 3.8 3.5 - 5.5 mmol/L Chloride 106 100 - 111 mmol/L  
 CO2 32 21 - 32 mmol/L Anion gap 5 3.0 - 18 mmol/L Glucose 139 (H) 74 - 99 mg/dL BUN 21 (H) 7.0 - 18 MG/DL Creatinine 0.66 0.6 - 1.3 MG/DL  
 BUN/Creatinine ratio 32 (H) 12 - 20 GFR est AA >60 >60 ml/min/1.73m2 GFR est non-AA >60 >60 ml/min/1.73m2 Calcium 7.2 (L) 8.5 - 10.1 MG/DL MAGNESIUM Collection Time: 05/13/20 12:04 PM  
Result Value Ref Range Magnesium 1.8 1.6 - 2.6 mg/dL PHOSPHORUS Collection Time: 05/13/20 12:04 PM  
Result Value Ref Range Phosphorus 2.2 (L) 2.5 - 4.9 MG/DL  
GLUCOSE, POC Collection Time: 05/13/20  6:14 PM  
Result Value Ref Range Glucose (POC) 131 (H) 70 - 110 mg/dL METABOLIC PANEL, BASIC Collection Time: 05/13/20  7:22 PM  
Result Value Ref Range Sodium 143 136 - 145 mmol/L Potassium 4.1 3.5 - 5.5 mmol/L Chloride 104 100 - 111 mmol/L  
 CO2 36 (H) 21 - 32 mmol/L Anion gap 3 3.0 - 18 mmol/L Glucose 122 (H) 74 - 99 mg/dL BUN 21 (H) 7.0 - 18 MG/DL Creatinine 0.71 0.6 - 1.3 MG/DL  
 BUN/Creatinine ratio 30 (H) 12 - 20 GFR est AA >60 >60 ml/min/1.73m2 GFR est non-AA >60 >60 ml/min/1.73m2 Calcium 8.1 (L) 8.5 - 10.1 MG/DL MAGNESIUM Collection Time: 05/13/20  7:22 PM  
Result Value Ref Range Magnesium 2.0 1.6 - 2.6 mg/dL PHOSPHORUS Collection Time: 05/13/20  7:22 PM  
Result Value Ref Range Phosphorus 2.2 (L) 2.5 - 4.9 MG/DL  
METABOLIC PANEL, BASIC Collection Time: 05/14/20  2:05 AM  
Result Value Ref Range Sodium 144 136 - 145 mmol/L Potassium 4.2 3.5 - 5.5 mmol/L Chloride 105 100 - 111 mmol/L  
 CO2 35 (H) 21 - 32 mmol/L Anion gap 4 3.0 - 18 mmol/L Glucose 132 (H) 74 - 99 mg/dL BUN 23 (H) 7.0 - 18 MG/DL Creatinine 0.69 0.6 - 1.3 MG/DL  
 BUN/Creatinine ratio 33 (H) 12 - 20 GFR est AA >60 >60 ml/min/1.73m2 GFR est non-AA >60 >60 ml/min/1.73m2 Calcium 7.9 (L) 8.5 - 10.1 MG/DL MAGNESIUM Collection Time: 05/14/20  2:05 AM  
Result Value Ref Range Magnesium 2.0 1.6 - 2.6 mg/dL PHOSPHORUS Collection Time: 05/14/20  2:05 AM  
Result Value Ref Range Phosphorus 2.4 (L) 2.5 - 4.9 MG/DL  
METABOLIC PANEL, COMPREHENSIVE Collection Time: 05/14/20  4:00 AM  
Result Value Ref Range Sodium 142 136 - 145 mmol/L Potassium 4.1 3.5 - 5.5 mmol/L Chloride 104 100 - 111 mmol/L  
 CO2 35 (H) 21 - 32 mmol/L Anion gap 3 3.0 - 18 mmol/L Glucose 135 (H) 74 - 99 mg/dL BUN 24 (H) 7.0 - 18 MG/DL Creatinine 0.69 0.6 - 1.3 MG/DL  
 BUN/Creatinine ratio 35 (H) 12 - 20 GFR est AA >60 >60 ml/min/1.73m2 GFR est non-AA >60 >60 ml/min/1.73m2 Calcium 8.2 (L) 8.5 - 10.1 MG/DL Bilirubin, total 1.2 (H) 0.2 - 1.0 MG/DL  
 ALT (SGPT) 25 16 - 61 U/L  
 AST (SGOT) 21 10 - 38 U/L Alk. phosphatase 45 45 - 117 U/L Protein, total 6.2 (L) 6.4 - 8.2 g/dL Albumin 3.2 (L) 3.4 - 5.0 g/dL Globulin 3.0 2.0 - 4.0 g/dL A-G Ratio 1.1 0.8 - 1.7 CBC WITH AUTOMATED DIFF Collection Time: 05/14/20  4:00 AM  
Result Value Ref Range WBC 8.3 4.6 - 13.2 K/uL  
 RBC 3.67 (L) 4.70 - 5.50 M/uL HGB 9.6 (L) 13.0 - 16.0 g/dL HCT 31.8 (L) 36.0 - 48.0 % MCV 86.6 74.0 - 97.0 FL  
 MCH 26.2 24.0 - 34.0 PG  
 MCHC 30.2 (L) 31.0 - 37.0 g/dL  
 RDW 16.4 (H) 11.6 - 14.5 % PLATELET 451 931 - 596 K/uL MPV 10.9 9.2 - 11.8 FL  
 NEUTROPHILS 89 (H) 40 - 73 % LYMPHOCYTES 6 (L) 21 - 52 % MONOCYTES 5 3 - 10 % EOSINOPHILS 0 0 - 5 % BASOPHILS 0 0 - 2 %  
 ABS. NEUTROPHILS 7.3 1.8 - 8.0 K/UL  
 ABS. LYMPHOCYTES 0.5 (L) 0.9 - 3.6 K/UL  
 ABS. MONOCYTES 0.4 0.05 - 1.2 K/UL  
 ABS. EOSINOPHILS 0.0 0.0 - 0.4 K/UL  
 ABS. BASOPHILS 0.0 0.0 - 0.1 K/UL  
 DF AUTOMATED MAGNESIUM Collection Time: 05/14/20  4:00 AM  
Result Value Ref Range Magnesium 2.1 1.6 - 2.6 mg/dL CALCIUM, IONIZED Collection Time: 05/14/20  4:00 AM  
Result Value Ref Range Ionized Calcium 1.12 1.12 - 1.32 MMOL/L  
PTT Collection Time: 05/14/20  4:00 AM  
Result Value Ref Range aPTT 30.1 23.0 - 36.4 SEC PROTHROMBIN TIME + INR Collection Time: 05/14/20  4:00 AM  
Result Value Ref Range Prothrombin time 17.5 (H) 11.5 - 15.2 sec INR 1.5 (H) 0.8 - 1.2 PHOSPHORUS Collection Time: 05/14/20  4:00 AM  
Result Value Ref Range Phosphorus 2.4 (L) 2.5 - 4.9 MG/DL  
POC G3 Collection Time: 05/14/20  4:43 AM  
Result Value Ref Range Device: VENT    
 FIO2 (POC) 0.65 % pH (POC) 7.453 (H) 7.35 - 7.45    
 pCO2 (POC) 43.8 35.0 - 45.0 MMHG  
 pO2 (POC) 106 (H) 80 - 100 MMHG  
 HCO3 (POC) 30.9 (H) 22 - 26 MMOL/L  
 sO2 (POC) 98 (H) 92 - 97 % Base excess (POC) 7 mmol/L Mode ASSIST CONTROL Set Rate 16 bpm  
 PEEP/CPAP (POC) 15 cmH2O  
 PIP (POC) 15 Allens test (POC) N/A Inspiratory Time 0.9 sec Total resp. rate 19 Site DRAWN FROM ARTERIAL LINE Patient temp. 97.4  Specimen type (POC) ARTERIAL    
 Performed by Nura Barba Pressure control YES    
GLUCOSE, POC Collection Time: 05/14/20  5:06 AM  
Result Value Ref Range Glucose (POC) 114 (H) 70 - 110 mg/dL Recent Labs 05/14/20 
0274 05/13/20 
2727 05/12/20 
0542 FIO2I 0.65 0.7 0.75 HCO3I 30.9* 34.1* 38.4* PCO2I 43.8 51.3* 58.5* PHI 7.453* 7.430 7.426 PO2I 106* 83 78* All Micro Results Procedure Component Value Units Date/Time CULTURE, RESPIRATORY/SPUTUM/BRONCH Marian Polson STAIN [[id number]]  (Abnormal) Collected:  05/08/20 1630 Order Status:  Completed Specimen:  Sputum from Tracheal Aspirate Updated:  05/10/20 1214 Special Requests: NO SPECIAL REQUESTS     
  GRAM STAIN RARE WBCS SEEN     
      
  RARE EPITHELIAL CELLS SEEN  
     
   NO ORGANISMS SEEN Culture result: RARE YEAST     
      
  NO NORMAL RESPIRATORY BRYN ISOLATED  
     
 CULTURE, RESPIRATORY/SPUTUM/BRONCH W Delfino Allen [[id number]] Collected:  05/04/20 1315 Order Status:  Canceled Specimen:  Sputum from Tracheal Aspirate CULTURE, RESPIRATORY/SPUTUM/BRONCH Marian Polson STAIN [[id number]]  (Abnormal)  (Susceptibility) Collected:  04/22/20 1018 Order Status:  Completed Specimen:  Sputum from Tracheal Aspirate Updated:  04/25/20 7748 Special Requests: NO SPECIAL REQUESTS     
  GRAM STAIN FEW WBCS SEEN     
      
  OCCASIONAL EPITHELIAL CELLS SEEN  
     
      
  FEW GRAM POSITIVE COCCI IN CLUSTERS  
     
   RARE GRAM NEGATIVE RODS Culture result:    
  LIGHT STAPHYLOCOCCUS AUREUS  
     
      
  LIGHT NORMAL RESPIRATORY BRYN  
     
 CULTURE, BLOOD [[id number]] Collected:  04/24/20 1015 Order Status:  Canceled Specimen:  Blood Imaging: 
[x]I have personally reviewed the patients chest radiographs images and report Results from [facility] [place] Encounter encounter on 04/21/20 XR ABD (KUB) Narrative EXAM: SUPINE ABDOMINAL RADIOGRAPH 
 
CLINICAL INDICATION/HISTORY: NGT placement post procedure -Additional: None COMPARISON: 5/7/2020 TECHNIQUE: Single supine view of the abdomen. 
 
_______________ FINDINGS: 
 
BOWEL GAS PATTERN: Markedly limited study secondary to body habitus. No definite 
enteric tube visualized. Overall paucity of bowel gas. 
 
_______________ Impression IMPRESSION: 
 
Markedly limited study secondary to body habitus. No definite enteric tube 
visualized. Overall paucity of bowel gas. US LEs 4/22/2020 Interpretation Summary · No evidence of deep vein thrombosis in the right lower extremity veins assessed. · No evidence of deep vein thrombosis in the left lower extremity veins assessed. Echo 4/22 Result status: Edited Result - FINAL Addendum by Duane Haile MD on Wed Apr 22, 2020  5:12 PM  
· Left Ventricle: Normal wall thickness and systolic function (ejection fraction normal). Mildly dilated left ventricle. The estimated ejection fraction is 45 - 50%. There is moderate (grade 2) left ventricular diastolic dysfunction E/e' Ratio = 17.09. Wall Scoring: The left ventricular wall motion is globally hypokinetic. · Right Ventricle: Not well visualized. Normal global systolic function. Moderately dilated right ventricle. · Right Atrium: Right atrium not well visualized. Moderately dilated right atrium. · IVC/Hepatic Veins: Dilated inferior vena cava. Mechanically ventilated; cannot use inferior caval vein diameter to estimate central venous pressure. HU 5/12/20: 
Result status: Final result · Image quality: good. The procedure, risks and alternatives were explained. Informed consent was obtained. . Topical anesthetic not used. . Sedation was achieved by conscious sedation. 4 mg Versed was administered. 100 mcg of Fentanyl was administered. No complications. HU probe was removed. Estimated blood loss: 0 mL. Color flow Doppler was performed and pulse wave and/or continuous wave Doppler was performed.  Saline contrast was given to evaluate for intracardiac shunt. No shunt seen. · Left Ventricle: Normal cavity size and wall thickness. Mild systolic dysfunction. The estimated ejection fraction is 45 - 50%. Visually measured ejection fraction. Abnormal left ventricular septal motion. There is mild (grade 1) left ventricular diastolic dysfunction. Wall Scoring: The left ventricular wall motion is globally hypokinetic. · Interatrial Septum: Agitated saline contrast study was performed. There was no shunting at baseline or with Valsalva. No atrial septal defect present. CXR 5/14/20: 
1. Support devices in stable/appropriate position. 2. Cardiomegaly and airspace disease overall similar to prior. IMPRESSION:  
Patient Active Problem List  
Diagnosis Code  Acute on chronic respiratory failure with hypoxia and hypercapnia ([facility]) J96.21, J96.22  
 Acute on chronic systolic and diastolic CHF (congestive heart failure) ([facility]) I50.43 Aspiration pneumonitis from multiple patient initiated ET tube displacement events J69.0  Cardiac arrest ([facility] [address] I46.9 MSSA in sputum culture A49.01  
 Elevated troponin, NSTEMI R79.89, I21.4  Chronic a-fib I48.20  Atrial flutter ([facility] [address] I48.92 Prolonged QTc R94.31  
 Chronic renal insufficiency, stage II (mild) N18.2 Thrombocytopenia D69.6  Smoking F17.200  Arthritis M19.90  Migraine G43.909  Chronic back pain M54.9, G89.29  
 Obstructive Sleep apnea G47.33  
 Morbid obesity ([facility]) E66.01  
  
PLAN:  
RS -intubated, on ventilator since 4/25/2020. HU negative for any cardiac causes for severe hypoxemia or shunting. He could have pulmonary hemorrhage knowing bloody/pinkish ETT secretions and so started on Solu-Medrol 40 mg IV every 8 hour since 5/12/2020. FiO2 improved to 50% and PEEP to 14 after started on lasix drip and steroids. ETT secretions improved. Tolerating lasix drip at 6 mg/hr, BP stable. Increase drip to at 8 mg/hr. Monitor I's and O's, renal functions, electrolytes and replace per ICU protocol. Continue albumin. Morbidly obese and suspect to have MARIA FERNANDA/OHS with chronic hypercapnic respiratory failure. D/w pt who stated that he is diagnosed with MARIA FERNANDA. He agreed for trach placement. Patient cannot have CT chest due to body weight limitations. C/w vent support. VAP prevention bundle, head of the bed at 30' all times Patient is off paralytic medication since 5/4/2020 Sedation- On fentanyl patch. On versed drip, d/w RN to lower the drip. Vent and sedation bundles Continue bronchodilators prn; pulmonary hygiene care. Dr Gaudencio Hector in ENT on case: paged out, waiting for call back to consider trach since fio2 at 50% and peep 14 now. CVS - 
HU unremarkable for any cause for persistent hypoxemia, bubble study negative. Only grade 1 diastolic dysfunction with LVEF 45 to 50%. No pulmonary hypertension. US legs negative for DVT. Continue Lasix drip as above. Dr. Nano Etienne on board. ID - SARS-COV-2 negative x 2 Antibiotic choice: Zosyn completed on 5/12/2020 10 days antibiotic MSSA in resp cxs from before; respiratory cultures 5/8 so far no growth. Procalcitonin repeat is negative. No leukocytosis. No more fever. Hem - HIT panel and LEYDI Ab NEG on 4/28. He is definitely high risk for PEs; CTA chest cannot be done due to limitations due to weight; stopped argatroban 5/4; INR 2 [suspect fatty liver/GASTELUM]; Eliquis held due to bloody secretions from the ET tube. Vasc - PVL bl LE negative Renal -continue Lasix drip as above; monitor BUN and creatinine stable; potassium replacement per protocol. Nephrology Dr. Muriel Umana hs signed off.   
GI - GI placed OG tube; good position confirmed with abdominal x-ray using Gastrografin; patient on tube feedings; LFTs remain normal 
Neurology: patient follows commands despite of being on sedation, and moving extremities Prognosis guarded. Patient chronically vent dependent. Overall poor prognosis. Full code. Will defer respective systems problem management to primary and other consultant and follow patient in ICU with primary and other medical team 
Quality Care: PPI, DVT prophylaxis, HOB elevated, Infection control all reviewed and addressed. PAIN AND SEDATION: As above · Skin/Wound:  Blister on foot, does not appear pressure sore. Continue local care per wound care consult. · Nutrition:  Start tube feeding · Prophylaxis: DVT and GI Prophylaxis reviewed. · Restraints: prn 
· PT/OT eval and treat: as needed when stable · Lines/Tubes: A-line 4/23/20; daniel 4/21/20, ET tube 4/25/20; PICC line left arm 5/5 ADVANCE DIRECTIVE: Full code DISCUSSION: spoke with RN, RT, ICU staff, MDR Events and notes from last 24 hours reviewed. Care plan discussed with nursing Total CC time: 32 minutes. High complexity review and management Chandrakant Dempsey MD  
5/14/2020

## 2020-05-14 NOTE — PROGRESS NOTES
Cardiology Progress Note Patient: Arsh Shipley. Sex: male          DOA: 4/21/2020 YOB: 1975      Age:  53 y.o.        LOS:  LOS: 23 days Patient seen and examined, chart reviewed. Assessment/Plan Patient Active Problem List  
Diagnosis Code  Cellulitis of left lower leg L03. 116  
 Anemia D64.9  Morbid obesity ([facility]) E66.01  
 Dyspnea R06.00  Sleep apnea G47.30  Sinus tachycardia R00.0  BMI 70 and over, adult [facility]) T02.51  Chronic back pain M54.9, G89.29  
 Arthritis M19.90  Migraine G43.909  
 History of atrial flutter Z86.79  
 Limited mobility Z74.09  
 Smoking F17.200  Chronic renal insufficiency, stage II (mild) N18.2  CHF (congestive heart failure) ([facility]) I50.9  Acute diastolic CHF (congestive heart failure) ([facility]) I50.31  Chronic a-fib I48.20  Atrial flutter ([facility]) I48.92  
 Elevated troponin R79.89  
 Acute respiratory distress R06.03  
 Acute CHF (congestive heart failure) ([facility]) I50.9  Respiratory failure with hypoxia and hypercapnia ([facility]) J96.91, J96.92  
 Acute on chronic respiratory failure with hypoxia and hypercapnia ([facility]) J96.21, J96.22  
 Cardiac arrest ([facility]) I46.9  Advanced care planning/counseling discussion Z70.80  
 NSTEMI (non-ST elevated myocardial infarction) ([facility] [address] I21.4 Acute on chronic systolic heart failure NSTEMI type II due to PEA arrest 
Paroxysmal atrial flutter Thrombocytopenia Hypernatremia Bradycardia  
  
Patient self extubated and had PEA arrest. 
He had brief episode of A fib with RVR and was started On Amiodarone drip. Currently he is in Sinus rhythm. 
  
Amiodarone discontinued due to bradycardia. 
  
Echocardiogram  
  
· Left Ventricle: Normal wall thickness and systolic function (ejection fraction normal). Mildly dilated left ventricle. The estimated ejection fraction is 45 - 50%.  There is moderate (grade 2) left ventricular diastolic dysfunction E/e' Ratio = 17.09. Wall Scoring: The left ventricular wall motion is globally hypokinetic. · Right Ventricle: Not well visualized. Normal global systolic function. Moderately dilated right ventricle. · Right Atrium: Right atrium not well visualized. Moderately dilated right atrium. · IVC/Hepatic Veins: Dilated inferior vena cava. Mechanically ventilated; cannot use inferior caval vein diameter to estimate central venous pressure. 
  
IV heparin was discontinued due to thrombocytopenia and bleeding at central line site. Started on Argatroban.  
  
Patient had frequent oxygen desaturations and pinkish/bloody aspirate from tracheal suctioning.  
  
HU done revealed LVEF 01-00%, Grade I diastolic dysfunction, Negative bubble study. Monitor reviewed: heart rate more than 40 bpm Sinus bradycardia  
  
Plan: 
  
Eliquis on hold Continue Lasix drip Strict I/O Avoid any AV debbie blocking agents due to bradycardia. Can use IV hydralazine for hypertension if needed. Monitor electrolytes and renal function Continue Ventilatory support. Continue management as per hospital medicine and pulmonary critical care Plan discussed with Justin Weeks Subjective:  
 cc: 
Orally intubated REVIEW OF SYSTEMS:  
 
Can not obtain Objective:  
  
Visit Vitals BP (!) 181/97 Pulse (!) 56 Temp 98.7 °F (37.1 °C) Resp 21 Ht 5' 10\" (1.778 m) Wt (!) 234 kg (515 lb 14 oz) SpO2 93% BMI 74.02 kg/m² Body mass index is 74.02 kg/m². Physical Exam: 
General Appearance: Comfortable, Morbidly obese, not using accessory muscles of respiration. Orally intubated HEENT: AMMON. HEAD: Atraumatic NECK: No JVD, no thyroidomeglay. CAROTIDS: No bruit LUNGS: bilateral conducted sounds HEART: S1+S2 audible, no murmur, no pericardial rub. NEUROLOGICAL: Sedated. Medication: 
Current Facility-Administered Medications Medication Dose Route Frequency  albumin human 25% (BUMINATE) solution 25 g  25 g IntraVENous Q6H  
 methylPREDNISolone (PF) (SOLU-MEDROL) injection 40 mg  40 mg IntraVENous Q8H  
 [Held by provider] apixaban (ELIQUIS) tablet 2.5 mg  2.5 mg Oral BID  scopolamine (TRANSDERM-SCOP) 1 mg over 3 days 1 Patch  1 Patch TransDERmal Q72H  fentaNYL (DURAGESIC) 50 mcg/hr patch 1 Patch  1 Patch TransDERmal Q72H  furosemide (LASIX) 100 mg in 0.9% sodium chloride 100 mL infusion  8 mg/hr IntraVENous CONTINUOUS  
 LORazepam (ATIVAN) injection 2 mg  2 mg IntraVENous Q4H PRN  
 docusate sodium (COLACE) capsule 100 mg  100 mg Oral BID  polyethylene glycol (MIRALAX) packet 17 g  17 g Oral BID  heparin (porcine) 100 unit/mL injection 500 Units  500 Units InterCATHeter Q8H PRN  
 fentaNYL citrate (PF) injection 50 mcg  50 mcg IntraVENous Q3H PRN  chlorhexidine (PERIDEX) 0.12 % mouthwash 10 mL  10 mL Oral Q12H  
 albuterol-ipratropium (DUO-NEB) 2.5 MG-0.5 MG/3 ML  3 mL Nebulization Q6H PRN  
 acetaminophen (TYLENOL) tablet 650 mg  650 mg Oral Q6H PRN  
 ELECTROLYTE REPLACEMENT PROTOCOL - Calcium   1 Each Other PRN  
 ELECTROLYTE REPLACEMENT PROTOCOL - Magnesium   1 Each Other PRN  
 ELECTROLYTE REPLACEMENT PROTOCOL - Potassium Standard Dosing   1 Each Other PRN  
 ELECTROLYTE REPLACEMENT PROTOCOL - Phosphorus  Standard Dosing  1 Each Other PRN  pantoprazole (PROTONIX) 40 mg in 0.9% sodium chloride 10 mL injection  40 mg IntraVENous DAILY  midazolam in normal saline (VERSED) 1 mg/mL infusion  0-5 mg/hr IntraVENous TITRATE  [Held by provider] aspirin chewable tablet 81 mg  81 mg Oral DAILY  sodium chloride (NS) flush 5-40 mL  5-40 mL IntraVENous Q8H  
 sodium chloride (NS) flush 5-40 mL  5-40 mL IntraVENous PRN Facility-Administered Medications Ordered in Other Encounters Medication Dose Route Frequency  ePHEDrine (PF) (MISTOLE) 10 mg/mL in NS syringe   IntraVENous PRN Lab/Data Reviewed: Recent Labs 05/14/20 
0400 05/13/20 
0402 05/12/20 
0316 WBC 8.3 6.5 7.4 HGB 9.6* 9.8* 10.0* HCT 31.8* 33.0* 33.6*  
 169 171 Recent Labs 05/14/20 
0400 05/14/20 
0205 05/13/20 
1922  144 143  
K 4.1 4.2 4.1  105 104 CO2 35* 35* 36* * 132* 122* BUN 24* 23* 21* CREA 0.69 0.69 0.71 CA 8.2* 7.9* 8.1*  
 
32 minutes of critical care time spent in the direct evaluation and treatment of this high risk patient. The reason for providing this level of medical care for this critically ill patient was due a critical illness that impaired one or more vital organ systems such that there was a high probability of imminent or life threatening deterioration in the patients condition. This care involved high complexity decision making to assess, manipulate, and support vital system functions, to treat this degree vital organ system failure and to prevent further life threatening deterioration of the patients condition. Signed By: Armando Roth MD   
 May 14, 2020

## 2020-05-14 NOTE — PROGRESS NOTES
1900: Bedside and Verbal shift change report given to Priscila Wagner RN (oncoming nurse) by Cristino Schaeffer RN (offgoing nurse). Report included the following information SBAR, Kardex, Intake/Output, MAR, Recent Results, Med Rec Status, Cardiac Rhythm NSR/Sinus Carollynn Halsey and Alarm Parameters . 2000: Shift assessment completed. 2130: Pt communicating with wife and children via zoom video chat at this time. Pt appears to enjoy this moment. 0000: Reassessment completed, pt resting quietly at this time. 0400: Reassessment completed. 0700: Bedside and Verbal shift change report given to RISHI Thomas RN (oncoming nurse) by Priscila Wagner RN (offgoing nurse). Report included the following information SBAR, Kardex, Intake/Output, MAR, Recent Results, Med Rec Status, Cardiac Rhythm NSR/Sinus Julionn Halsey and Alarm Parameters .

## 2020-05-14 NOTE — PROGRESS NOTES
Chart reviewed and update received from , pt remains in ICU will need trach when medically stable. cm will cont to review to assist with transitional care after trach placement.

## 2020-05-15 PROBLEM — E83.51 HYPOCALCEMIA: Status: ACTIVE | Noted: 2020-01-01

## 2020-05-15 NOTE — PROGRESS NOTES
1930: Bedside and Verbal shift change report received by Leola Young RN. Report included the following information SBAR, Intake/Output, MAR, Recent Results, Cardiac Rhythm   and Alarm Parameters . 2021: Assessment complete, see flow sheets. 2100: zoom call with wife for about 30 minutes. Pt able to write conversation. Very tearful for surgery planned on Monday but in good spirits. Prayed with patient and wife. 2200: Resting, repositioned. 2300:Bedside and Verbal shift change report given to ANAND Vizcaino. Report included the following information SBAR.

## 2020-05-15 NOTE — PROGRESS NOTES
Hospitalist Progress Note-critical care note Patient: Hilliard Meigs. MRN: [id number]  CSN: [id number] YOB: 1975  Age: 53 y.o. Sex: male DOA: 4/21/2020 LOS:  LOS: 24 days Chief complaint: cardiac arrest, respiratory failure, CHF, chronic A. fib CKD morbid obesity Assessment/Plan Hospital Problems  Date Reviewed: 5/2/2020 Codes Class Noted POA  
 NSTEMI (non-ST elevated myocardial infarction) ([facility].) ICD-10-CM: I21.4 ICD-9-CM: 410.70  5/13/2020 Unknown Advanced care planning/counseling discussion ICD-10-CM: Z71.89 ICD-9-CM: V65.49  Unknown Unknown Cardiac arrest [facility]) ICD-10-CM: I46.9 ICD-9-CM: 427.5  4/23/2020 Unknown Acute on chronic respiratory failure with hypoxia and hypercapnia (HCC) ICD-10-CM: J96.21, J96.22 
ICD-9-CM: 518.84, 786.09, 799.02  4/22/2020 Yes Acute CHF (congestive heart failure) (HCC) ICD-10-CM: I50.9 ICD-9-CM: 428.0  4/21/2020 Yes * (Principal) Respiratory failure with hypoxia and hypercapnia (HCC) ICD-10-CM: J96.91, J96.92 
ICD-9-CM: 518.81  4/21/2020 Yes Chronic a-fib ICD-10-CM: I48.20 ICD-9-CM: 427.31  10/15/2018 Yes Chronic renal insufficiency, stage II (mild) ICD-10-CM: N18.2 ICD-9-CM: 399. 2  Unknown Yes  
   
 BMI 70 and over, adult [facility]) ICD-10-CM: Q66.93 ICD-9-CM: V85.45  Unknown Yes Morbid obesity ([facility].) ICD-10-CM: E66.01 
ICD-9-CM: 278.01  4/4/2016 Yes Sleep apnea ICD-10-CM: G47.30 ICD-9-CM: 780.57  4/4/2016 Yes 52 y/o male with hx of morbid obesity, MARIA FERNANDA, chronic afib on anticoagulation, and systolic CHF (EF 68-27%) is admitted with respiratory failure with hypoxia and hypercapnia requiring BiPAP as well as acute CHF exacerbation-now on vent CRITICAL CARE PLAN 
   
Resp Acute respiratory respiratory failure with hypoxia and hypercapnia On vent and wean , need trach when vent setting permit Off sedation am, on 50% O2 and peep 14-ent will have trach on Monday,continue vent support  
 aspirin hold for trach Aspiration pneumonia-on  Zosyn-completed the treatment  
 
jac [facility]  
 
Cardiac arrest-PEA after self extubated  
follow-up with cardiologist 
Avoid beta-blocker due to bradycardia HU done on 5/12, no shunt noted Chronic A. Fib, Eliquis was decreased -now hold due to bloody secretion from ET tube Follow-up with cardiology Aspirin is hold Acute on chronic systolic heart failure On Lasix drip, so far renal function wnl  
continue monitor renal function and electrolytes. 
  
nstemi Aspirin hold due to trach on [place]. bbb was hold due to natalia Dr. Celso Carlton f/u ID on Zosyn Heme/onc Follow H&H, plts. INR. H/h stable now  
  
Renal  
Hypernatremia-resolved  
 
hypocalcemia Replace Ca Trend BUN, Cr, follow I/O, daniel in place. Continue icu electrolytes replacement protocol Neuro: 
Off sedation AM and follow the command  
  
Endocrine - Follow FSG,  
  
  
GI -tube feeding,  
  
Prophylaxis - DVT: SCDs, GI: protonix Morbid obesity Rn: stable, no issue last night 30 minutes of critical care time spent in the direct evaluation and treatment of this high risk patient. The reason for providing this level of medical care for this critically ill patient was due a critical illness that impaired one or more vital organ systems such that there was a high probability of imminent or life threatening deterioration in the patients condition. This care involved high complexity decision making to assess, manipulate, and support vital system functions, to treat this degreee vital organ system failure and to prevent further life threatening deterioration of the patients condition. Disposition :tbd, Review of systems: 
unable to obtain due to intubation Vital signs/Intake and Output: 
Visit Vitals /86 Pulse (!) 51 Temp 98.8 °F (37.1 °C) Resp 17  
  5' 10\" (1.778 m) Wt (!) 234 kg (515 lb 14 oz) SpO2 94% BMI 74.02 kg/m² Current Shift:  No intake/output data recorded. Last three shifts:  05/13 1901 - 05/15 0700 In: 3957.4 [I.V.:801.4] Out: Darlys Meckel [QVWPB:0515; Drains:170] Physical Exam: 
General: Intubated, alert on vent, follow the command HEENT: NC, Atraumatic. PERRLA, anicteric sclerae. ET tube noted Lungs: Courses breathing sound Heart:  Regular  rhythm,  No murmur, No Rubs, No Gallops Abdomen: Soft, Non distended, Non tender. +Bowel sounds, Clark noted Extremities: No c/c, edema -improving Psych:             calm on sedation Neurologic:  Unable to obtain due to intubation, but alert/follow the command, move all extremities Labs: Results:  
   
Chemistry Recent Labs 05/15/20 
0425 05/14/20 
0400 05/14/20 
0205  05/13/20 
0402 * 135* 132*   < > 131*  142 144   < > 143 K 3.9 4.1 4.2   < > 4.3  104 105   < > 105 CO2 34* 35* 35*   < > 35* BUN 28* 24* 23*   < > 21* CREA 0.76 0.69 0.69   < > 0.72  
CA 8.4* 8.2* 7.9*   < > 7.9* AGAP 5 3 4   < > 3  
BUCR 37* 35* 33*   < > 29* AP 40* 45  --   --  56  
TP 6.4 6.2*  --   --  5.9* ALB 3.2* 3.2*  --   --  2.6*  
GLOB 3.2 3.0  --   --  3.3 AGRAT 1.0 1.1  --   --  0.8  
 < > = values in this interval not displayed. CBC w/Diff Recent Labs 05/15/20 
0425 05/14/20 
0400 05/13/20 
0402 WBC 8.2 8.3 6.5  
RBC 3.74* 3.67* 3.75* HGB 10.0* 9.6* 9.8* HCT 32.3* 31.8* 33.0*  
 195 169 GRANS 90* 89* 89* LYMPH 5* 6* 5* EOS 0 0 0 Cardiac Enzymes No results for input(s): CPK, CKND1, CONSTANZA in the last 72 hours. No lab exists for component: Bladimir Innocent Coagulation Recent Labs 05/15/20 
0425 05/14/20 
0400 PTP 17.2* 17.5* INR 1.4* 1.5* APTT 28.0 30.1 Lipid Panel No results found for: CHOL, CHOLPOCT, CHOLX, CHLST, CHOLV, 127668, HDL, HDLP, LDL, LDLC, DLDLP, 599810, VLDLC, VLDL, TGLX, TRIGL, TRIGP, TGLPOCT, CHHD, CHHDX  
BNP No results for input(s): BNPP in the last 72 hours. Liver Enzymes Recent Labs 05/15/20 
0425 TP 6.4 ALB 3.2* AP 40* SGOT 29 Thyroid Studies Lab Results Component Value Date/Time TSH 1.45 04/27/2020 03:50 AM  
    
Procedures/imaging: see electronic medical records for all procedures/Xrays and details which were not copied into this note but were reviewed prior to creation of Plan Xr Chest [facility] V Result Date: 4/25/2020 CLINICAL HISTORY:  Endotracheal tube placement/repositioning. COMPARISONS:  Chest x-ray, earlier the same day. TECHNIQUE:  single frontal view of the chest ------------------------------------------ FINDINGS: Exam limited by patient large body habitus. Lungs:  Hazy opacity in the lung bases, more apparent than on the prior study. Upper lungs clear. Mediastinum: Marked cardiomegaly. Endotracheal tube has been repositioned, tip now 5.5 cm above the miguel ángel. Right transjugular venous catheter, tip near cavoatrial junction. Bones: No evidence of fracture or suspicious bone lesion. ------------------------------------------ 
 
IMPRESSION: 1. Hazy bilateral lower lobe opacity, likely a combination of pleural fluid and atelectasis, slightly more apparent than on recent comparison study. Pneumonia or aspiration not excluded. 2. Endotracheal tube has been repositioned, now in good position. Xr Abd (kub) Result Date: 5/7/2020 EXAM: SUPINE ABDOMINAL RADIOGRAPH CLINICAL INDICATION/HISTORY: eval for ileus -Additional: None COMPARISON: 5/6/2020 TECHNIQUE: Single supine view of the abdomen. _______________ FINDINGS/IMPRESSION: BOWEL GAS PATTERN: Limited study secondary to body habitus. Enteric tube poorly visualized. Contrast injection demonstrates tip at the GE junction/proximal stomach. Oral contrast within the colon. Xr Abd (kub) Result Date: 5/6/2020 Abdomen, single view COMPARISON: May 2, 2020 INDICATION: Nasogastric tube placement FINDINGS: Supine AP view the abdomen obtained before and after administration of contrast per the nasogastric tube. Anatomic detail is markedly limited by the patient's body habitus with collimation of the majority of the right lateral abdomen. Radiopaque tubing noted below the diaphragm, the tip of which is indistinct. Contrast opacifies the stomach. Impression: 1. Detail limited examination. Nasogastric tube is below the diaphragm. Although the exact tip position is unclear on the provided images, opacification of gastric rugal folds is compatible with tube position in the stomach. Xr Abd (kub) Result Date: 5/3/2020 EXAM: SUPINE ABDOMINAL RADIOGRAPH CLINICAL INDICATION/HISTORY: ng placement -Additional: None COMPARISON: None TECHNIQUE: Single supine view of the abdomen. _______________ FINDINGS: BOWEL GAS PATTERN: Outside the field of view. BONES: Unremarkable. OTHER: Subtle linear density likely corresponding with reported nasogastric tube, possible tip seen in the inferior thorax distal esophagus region. Endotracheal tube noted which is above the miguel ángel. _______________ IMPRESSION: Nondiagnostic exam for purposes of nasogastric tube positioning. It is suspected that the tip of the tube is in the distal esophagus, but exam quality is not adequate for confident localization. Xr Abd (kub) Result Date: 4/22/2020 EXAM: XR ABD (KUB) CLINICAL INDICATION/HISTORY: ETT and OG placement COMPARISON: Chest radiograph same day TECHNIQUE: Supine AP view of the abdomen was obtained. _______________ FINDINGS: Enteric tube is not reliably visualized on this exam; indeterminate to what extent this is related to its potential high lying intrathoracic position versus or instead if the tube is simply obscured because of patient's obese body habitus. _______________ IMPRESSION: Enteric tube is not reliably visualized within the distal thoracic esophagus normal or distally within the left upper quadrant. Xr Chest [facility] Result Date: 5/10/2020 EXAM: CHEST RADIOGRAPH, SINGLE VIEW CLINICAL INDICATION/HISTORY: Aspiration pneumonia, ET tube placement      <Additional:  None. COMPARISON: 5/9/2020 TECHNIQUE: Portable frontal view of the chest was obtained. _______________ FINDINGS: SUPPORT DEVICES: ETT is 5.9 cm above the miguel ángel. PICC from a left arm approach projects at the [facility]. HEART AND MEDIASTINUM: Cardiac silhouette is top normal in size. LUNGS AND PLEURAL SPACES: Extensive bilateral alveolar densities are present throughout both lungs, slightly more dense on the left than on the right without significant changes. Left costophrenic angle is blunted versus obscured. The right costophrenic angle is blunted. No pneumothorax. BONY THORAX AND SOFT TISSUES: No acute osseous abnormality. _______________ IMPRESSION: Diffuse bilateral asymmetrical left greater than right edema versus pneumonia versus ARDS without change. Small right effusion, cannot exclude a small left pleural effusion. Xr Chest [facility] Result Date: 5/9/2020 EXAM: CHEST RADIOGRAPH, SINGLE VIEW CLINICAL INDICATION/HISTORY: Aspiration pneumonia, ET tube placement      <Additional:  None. COMPARISON: 5/8/2020 TECHNIQUE: Portable frontal view of the chest was obtained. _______________ FINDINGS: SUPPORT DEVICES: ETT is 7.0 cm above the miguel ángel, PICC tip from a left arm approach projects at the distal SVC. HEART AND MEDIASTINUM: Cardiac silhouette is top normal in size. LUNGS AND PLEURAL SPACES: Extensive bilateral alveolar densities are present throughout both lungs, slightly more dense on the left than on the right, without significant changes. Left costophrenic angle is cut off, the right costophrenic angle is blunted. BONY THORAX AND SOFT TISSUES: No acute osseous abnormality. _______________ IMPRESSION: Diffuse bilateral left greater than right pneumonia versus pulmonary edema. Small right pleural effusion. No acute changes. Xr Chest [facility] Result Date: 5/8/2020 AP portable chest, 5/8/2020 at 0500 hours: INDICATION: Shortness of breath. Intubated. Comparison 5/7/2020. Endotracheal tube just below the clavicles but well above the miguel ágnel. Extensive bilateral edema or infiltrate most prominent in the upper lobes. Perhaps some increase in right basilar density with partial obscuration of the right hemidiaphragm in the interval. Persistent retrocardiac opacification with obscuration of the left hemidiaphragm. The heart is stable and top normal. Left arm PICC line tip is at the cavoatrial junction in good position. IMPRESSION: Extensive bilateral edema or infiltrate, most prominent in the upper lobes, certainly potentially in part aspiration. Some increase in right basilar density perhaps, increasing focal edema and/or pleural effusion/atelectasis. No additional change. Xr Chest [facility] Result Date: 5/7/2020 EXAM: Portable frontal view of the chest. CLINICAL INDICATION/HISTORY: Aspiration pneumonia, follow-up intubation COMPARISON: 5/6/2020 _______________ FINDINGS: Stable and satisfactory position endotracheal tube and left arm PICC line. No significant change in bilateral areas of airspace filling process with stable mildly enlarged cardiac silhouette with no definite pleural effusion or pneumothorax. _______________ IMPRESSION: 1. Stable chest. 
 
Xr Chest [facility] Result Date: 5/6/2020 EXAM: XR CHEST PORT CLINICAL INDICATION/HISTORY: Aspiration pneumonia, ET tube placement -Additional: None COMPARISON: Several prior exams, most recently May 5, 2020 TECHNIQUE: Portable frontal view of the chest _______________ FINDINGS: SUPPORT DEVICES: Endotracheal tube, right IJ central venous catheter, and left approach PICC line noted projecting in stable position.  HEART AND MEDIASTINUM: Cardiac size and mediastinal contours appear stable. Cardiac silhouette is enlarged, as previously. LUNGS AND PLEURAL SPACES: Asymmetric airspace process within the right mid and lower lungs with slightly improved left lung base aeration. Potential right pleural effusion. No pneumothorax. BONY THORAX AND SOFT TISSUES: Unremarkable. _______________ IMPRESSION: 1. Adequate/stable position of support devices. 2. Little interval change in the appearance of right-sided airspace opacity, basilar changes of atelectasis and presumptively small right pleural effusion. Xr Chest [facility] Result Date: 5/5/2020 EXAM: XR CHEST PORT CLINICAL INDICATION/HISTORY: Aspiration pneumonia, ET tube placement -Additional: None COMPARISON: 5/4/2020 TECHNIQUE: Portable frontal view of the chest _______________ FINDINGS: SUPPORT DEVICES: Endotracheal tube and right IJV central venous catheter unchanged. HEART AND MEDIASTINUM: Cardiomegaly. LUNGS AND PLEURAL SPACES: Limited secondary to body habitus. Right mid to lower lung zone opacification, unchanged. . Probable right pleural effusion. No large pneumothorax. _______________ IMPRESSION: Right mid to lower lungs on opacification, unchanged. . Probable right pleural effusion. Xr Chest [facility] Result Date: 5/4/2020 EXAM: XR CHEST PORT CLINICAL INDICATION/HISTORY: Aspiration pneumonia, ET tube placement -Additional: None COMPARISON: Several prior exams, most recently May 3, 2020 TECHNIQUE: Portable frontal view of the chest _______________ FINDINGS: SUPPORT DEVICES: Endotracheal tube and right IJ approach central venous catheter both project in stable position. HEART AND MEDIASTINUM: Stable enlarged cardiac silhouette and mediastinal contours. LUNGS AND PLEURAL SPACES: Hazy lower lung opacities noted with mid to lower lung predominance, somewhat increased on the left from prior exam.. No evidence of pneumothorax or large pleural effusion.  As previously, limited visualization due to technique and body habitus. BONY THORAX AND SOFT TISSUES: Unremarkable. _______________ IMPRESSION: 1. Stable/adequate position of support devices. 2. Little interval change in the appearance of right mid to lower lung airspace disease/atelectasis and likely left basilar atelectasis. Xr Chest [facility] Result Date: 5/3/2020 EXAM: PORTABLE FRONTAL CHEST RADIOGRAPH CLINICAL INDICATION/HISTORY: Intubation. COMPARISON: 5/2/2020 TECHNIQUE: Portable frontal view of the chest _______________ FINDINGS: SUPPORT DEVICES: Adequately positioned endotracheal tube and right IJ central venous catheter. HEART AND MEDIASTINUM: Rotated to the right. Moderately enlarged cardiomediastinal silhouette appears similar to prior. LUNGS AND PLEURAL SPACES: Homogeneous opacification right mid/lower thorax similar if not increasingly dense in the interval. Relatively clear appearance of left lung. Overall limited visualization due to technique and body habitus. BONY THORAX AND SOFT TISSUES: Unremarkable. _______________ IMPRESSION: 1. Adequately positioned endotracheal tube. 2. Right thoracic opacification similar if not slightly increased in density in the interval, likely some combination of pleural effusion and lung consolidation (atelectasis/pneumonia). Xr Chest [facility] Result Date: 5/2/2020 EXAM: PORTABLE FRONTAL CHEST RADIOGRAPH CLINICAL INDICATION/HISTORY: Aspiration pneumonia. Intubation. COMPARISON: 5/1/2020 TECHNIQUE: Portable frontal view of the chest _______________ FINDINGS: SUPPORT DEVICES: Adequately positioned endotracheal tube and right IJ central venous catheter. HEART AND MEDIASTINUM: Rightward rotation. Stable cardiomediastinal silhouette, with enlargement of cardiac silhouette.  LUNGS AND PLEURAL SPACES: Hazy opacification lower right thorax with obscuration of the diaphragm, similar or slightly increased from prior exam. BONY THORAX AND SOFT TISSUES: Unremarkable. _______________ IMPRESSION: 1. Adequately positioned endotracheal tube. 2. Right basilar opacification may be slightly increased from prior exam, likely some combination of effusion and lung consolidation (pneumonia/atelectasis). Xr Chest [facility] Result Date: 5/1/2020 AP portable chest, 5/1/2020 at 0449 hours: INDICATION: Pneumonia. ET tube placement. Comparison 4/30/2020. Endotracheal tube is in the upper thoracic trachea below the clavicles. The study is lordotically positioned. Right internal jugular central line is in the upper SVC. Asymmetric right perihilar edema or infiltrate. Cardiomegaly. Mild interstitial perihilar changes on the left without new focal infiltrate. IMPRESSION: Little change. Right lung edema or infiltrate. Endotracheal tube in good position. Xr Chest [facility] Result Date: 4/30/2020 EXAM: XR CHEST PORT CLINICAL INDICATION/HISTORY: Aspiration pneumonia, ET tube placement -Additional: None COMPARISON: One day prior TECHNIQUE: Portable frontal view of the chest _______________ FINDINGS: SUPPORT DEVICES: Right IJV central venous catheter unchanged. Endotracheal tube unchanged. HEART AND MEDIASTINUM: Cardiomegaly. LUNGS AND PLEURAL SPACES: Limited study secondary to body habitus and exclusion of the left costophrenic angle. Central vascular congestion with moderate bilateral effusions and pulmonary edema. No pneumothorax. _______________ IMPRESSION: Limited study secondary to body habitus and exclusion of the left costophrenic angle. Central vascular congestion with moderate bilateral effusions and pulmonary edema. No pneumothorax. Xr Chest [facility] Result Date: 4/29/2020 EXAM: XR CHEST PORT CLINICAL INDICATION/HISTORY: Aspiration pneumonia, ET tube placement -Additional: None COMPARISON: 4/27/2020 TECHNIQUE: Portable frontal view of the chest _______________ FINDINGS: SUPPORT DEVICES: Right IJV central venous catheter unchanged. Endotracheal tube unchanged. HEART AND MEDIASTINUM: Cardiomegaly. LUNGS AND PLEURAL SPACES: Limited study secondary to body habitus and exclusion of the left costophrenic angle. Central vascular congestion with moderate bilateral effusions and pulmonary edema. No pneumothorax. _______________ IMPRESSION: Limited study secondary to body habitus and exclusion of the left costophrenic angle. Central vascular congestion with moderate bilateral effusions and pulmonary edema. No pneumothorax. Xr Chest [facility] Result Date: 4/27/2020 EXAM: XR CHEST PORT CLINICAL INDICATION/HISTORY: Intubated -Additional: None COMPARISON: 4/26/2020 TECHNIQUE: Portable frontal view of the chest _______________ FINDINGS: SUPPORT DEVICES: Endotracheal tube projects in stable position, estimated at approximately 4.9 cm above the miguel ángel. Right IJ approach central venous catheter with tip projecting over the SVC. HEART AND MEDIASTINUM: Stable appearing cardiac size and mediastinal contours. Enlarged appearing cardiac silhouette redemonstrated. LUNGS AND PLEURAL SPACES: Basilar areas of opacity and dense left retrocardiac opacity noted. No evidence of pneumothorax or large pleural effusion. BONY THORAX AND SOFT TISSUES: Unremarkable. _______________ IMPRESSION: 1. Endotracheal tube and right IJ approach central venous catheter in position as above. 2. Cardiomegaly and bibasilar areas of airspace disease/atelectasis without significant change Xr Chest [facility] Result Date: 4/26/2020 
--------------------------------------------------------------------------- <<<<<<<<<           [facility]           >>>>>>>>> --------------------------------------------------------------------------- CLINICAL HISTORY:  Hypoxia.  COMPARISON EXAMINATIONS:  April 25, 2020. ---  SINGLE FRONTAL VIEW OF THE CHEST  --- Portions of the lower lung fields are not imaged. The cardiomediastinal silhouette is grossly stable. There is an endotracheal tube seen in good position below the clavicles and above the miguel ángel. A right central line is noted with tip at the level of the upper SVC. There are bilateral mid to lower lung field opacities. No significant osseous abnormalities are identified.  -------------- Impression: -------------- Limited exam. Endotracheal tube and right central line in place. Bilateral mid to lower lung field opacities possibly pleural fluid with atelectasis and/or infiltrates. Similar findings were seen previously. Xr [facility] Result Date: 4/25/2020 CLINICAL HISTORY:  Endotracheal tube placement COMPARISONS:  Chest x-ray, earlier the same day TECHNIQUE:  single frontal view of the chest ------------------------------------------ FINDINGS: Lungs:  Lungs slightly suboptimally assessed due to patient large body habitus. Hypoinflation with mild bandlike opacity in the left lung base, likely atelectasis. Left basilar consolidation not excluded. Mediastinum: Right transjugular venous catheter, tip near cavoatrial junction. High position of endotracheal tube, with tip approximately 12 cm above the miguel ángel. Cardiomegaly. Bones: No evidence of fracture or suspicious bone lesion. ------------------------------------------ 
 
IMPRESSION: 1. Endotracheal tube has been pulled back in the interval and is now high in position. Recommend advancement. 2. Lungs slightly suboptimally assessed due to body habitus, though left basilar consolidation not excluded, not appreciably changed. Xr [facility] Result Date: 4/25/2020 CLINICAL HISTORY:  As above. COMPARISONS:  Chest x-ray 4/24/2020 TECHNIQUE:  single frontal view of the chest ------------------------------------------ FINDINGS: Lungs:  Mild increase in bilateral pulmonary interstitial markings. Hazy opacity in the lung bases, likely combination of atelectasis and pleural fluid.  Small to moderate-sized pleural effusions very likely present. Mediastinum: Marked cardiomegaly. Endotracheal tube tip approximately 6.7 cm above the miguel ángel. Right-sided central venous catheter, tip near cavoatrial junction. Bones: No evidence of fracture or suspicious bone lesion. ------------------------------------------ 
 
IMPRESSION: 1. Bilateral pleural effusions with bibasilar pulmonary atelectasis. 2. Probable mild pulmonary edema. Xr Chest [facility] Result Date: 4/24/2020 EXAM: XR CHEST PORT CLINICAL INDICATION/HISTORY: hypoxia -Additional: None COMPARISON: one day prior TECHNIQUE: Portable frontal view of the chest _______________ FINDINGS: SUPPORT DEVICES: Right IJV central venous catheter tip in the proximal SVC. Endotracheal tube unchanged. HEART AND MEDIASTINUM: Cardiomegaly. LUNGS AND PLEURAL SPACES: Central venous vascular congestion and interstitial infiltrates. Probable small bilateral effusions. _______________ IMPRESSION: Central venous vascular congestion and interstitial infiltrates. Probable small bilateral effusions. Xr Chest [facility] [facility] Result Date: 4/23/2020 EXAM: CHEST RADIOGRAPH, SINGLE VIEW CLINICAL INDICATION/HISTORY: Shortness of breath, hypoxia, endotracheal tube placement COMPARISON: 4/22/2020 TECHNIQUE: Portable frontal view of the chest was obtained. _______________ FINDINGS: SUPPORT DEVICES:   >  Endotracheal tube tip is located approximately 5.3 cm above the miguel ángel, adequately positioned.   >  Enteric tube is adequately visualized at the level of thoracic inlet and just above the miguel ángel, slightly eccentric to the left but is not clearly visualized throughout the lower aspect of the left hemithorax, potentially artifactual given patient's body habitus and adjacent overlying global cardiomegaly. > Unchanged adequately positioned right jugular central venous catheter.  HEART AND MEDIASTINUM: Cardiomediastinal silhouette appears unchanged, markedly enlarged. Tortuous and atherosclerotic thoracic aorta. Prominent central pulmonary vascular congestion. LUNGS AND PLEURAL SPACES: Diffusely increased interstitial markings and retrocardiac left lower lobe opacity, silhouetting medial left hemidiaphragm. Probable small pleural effusions. No pneumothorax. BONY THORAX AND SOFT TISSUES: No acute osseous abnormality. _______________ IMPRESSION: 1. Endotracheal tube is adequately positioned. Unchanged, adequately positioned right jugular central venous catheter. 2. Enteric tube is not clearly visualized below the level of the miguel ángel; indeterminate to what extent this represents artifact related to patient's body habitus and global cardiomegaly or instead of the enteric tube is proximal in position. 3. Unchanged cardiomegaly, central vascular congestion and interstitial pulmonary edema. Probable small pleural effusions. Xr Chest [facility] Result Date: 4/22/2020 EXAM: XR CHEST PORT CLINICAL INDICATION/HISTORY: hypoxia -Additional: None COMPARISON: Earlier the same day TECHNIQUE: Portable frontal view of the chest _______________ FINDINGS: SUPPORT DEVICES: Interval placement of right IJV central venous catheter. Endotracheal and enteric tubes are unchanged. HEART AND MEDIASTINUM: Stable cardiomediastinal silhouette. LUNGS AND PLEURAL SPACES: Limited study, bilateral lower lung fields are excluded from field-of-view. Prominent central vasculature and interstitial edema. No pneumothorax _______________ IMPRESSION: Limited study. Interval placement of right IJV central venous catheter with tip at the SVC. No pneumothorax. Xr Chest [facility] Result Date: 4/22/2020 EXAM: CHEST RADIOGRAPH, SINGLE VIEW CLINICAL INDICATION/HISTORY: Endotracheal and orogastric tube placement COMPARISON: 4/21/2020 TECHNIQUE: Portable frontal view of the chest was obtained. _______________ FINDINGS: SUPPORT DEVICES: Vertebral placement of endotracheal tube with tip approximately 5.3 cm above the miguel ángel. Enteric tube is seen coursing through midline thoracic esophagus. HEART AND MEDIASTINUM: Cardiomediastinal silhouette appears unchanged, markedly enlarged. Tortuous and atherosclerotic thoracic aorta. No central vascular congestion. LUNGS AND PLEURAL SPACES: Retrocardiac medial left lower lobe opacity silhouettes left hemidiaphragm. No confluent airspace opacity elsewhere throughout the lungs. No definite pleural effusion. No pneumothorax. BONY THORAX AND SOFT TISSUES: No acute osseous abnormality. _______________ IMPRESSION: 1. Adequate interval endotracheal tube placement. Enteric tube is better visualized on the concurrent abdominal radiographs. 2. Unchanged global cardiomegaly and retrocardiac left lower lobe atelectasis/infiltrate. Xr Chest [facility] Result Date: 4/21/2020 EXAM: CHEST RADIOGRAPH CLINICAL INDICATION/HISTORY: SOB   > Additional: None COMPARISON: 10/16/2018. TECHNIQUE: Portable frontal view of the chest _______________ FINDINGS: SUPPORT DEVICES: None. HEART AND MEDIASTINUM: Cardiac size remains enlarged. Pulmonary vasculature mediastinal structures are stable. LUNGS AND PLEURAL SPACES: Shallow inspiration. Cannot exclude a left retrocardiac opacity. No definite pleural effusion or pneumothorax. BONY THORAX AND SOFT TISSUES: Unremarkable. _______________ IMPRESSION: 1. Stable cardiomegaly with probable mild pulmonary congestion. 2. Cannot exclude left lower lobe infiltrate/atelectasis. Xr Abd Port  1 V Result Date: 4/24/2020 EXAM: SUPINE ABDOMINAL RADIOGRAPH CLINICAL INDICATION/HISTORY: OG tube placement -Additional: None COMPARISON: 4/22/2014 TECHNIQUE: Single supine view of the abdomen. _______________ FINDINGS: BOWEL GAS PATTERN: Limited study secondary to body habitus. Nonvisualization of enteric tube along the expected course of the digestive tract. Glendy Graven BONES: Unremarkable. OTHER: None. _______________ IMPRESSION: Limited study secondary to body habitus. Nonvisualization of enteric tube along the expected course of the digestive tract. Gigi Rose Ir Guide Insert Picc Over 5 Yrs Result Date: 5/7/2020 PROCEDURE: PICC placement ATTENDING: Tico Chang M.D. INDICATION: Long-term central venous access. COMPLICATIONS: None. PROCEDURE: Informed consent was obtained. Physician lead timeout was performed. The arm was prepped and draped in sterile fashion. The procedure was performed following standard maximal barrier technique which includes, cap, mask, sterile gown, sterile gloves, sterile full body drape, hand hygiene with alcohol foam, and 2% chlorhexidine for cutaneous antisepsis. Sterile ultrasound gel and a sterile cover for the US probe was used. After successfully identifying a patent left cephalic vein, real-time ultrasound guidance demonstrated patent vein with normal waveforms. Then, real-time ultrasound guidance was used to puncture the vein. Permanent recording was created for the patient's record. Guidewire was passed centrally, portable radiograph was performed to confirm tip position. A peel-away sheath was inserted over the guidewire. The intravascular distance was measured. The PICC measures 55 cm. The double lumen PICC was placed into the vein with its tip in the brachiocephalic/SVC junction, confirmed with post placement portable radiograph. Both ports aspirated and flushed easily. The PICC was secured to the skin, a sterile dressing including Biopatch was applied. IMPRESSION: Dual lumen left PICC placement, ready for immediate use.   
 
 
Marlyn Lanes, MD

## 2020-05-15 NOTE — PROGRESS NOTES
Chart reviewed noted pt scheduled  For surgery next week with ENT, weekend cm will be available for immediate needs thru [facility] .

## 2020-05-15 NOTE — PROGRESS NOTES
[id number] patient scheduled for surgery with ENT next week. .. verified with Infectious Control that patient doesn't need a repeat covid test prior to procedure. ..

## 2020-05-16 NOTE — PROGRESS NOTES
Hospitalist Progress Note-critical care note Patient: Junito Mckeon MRN: [id number]  [facility]: [id number] YOB: 1975  Age: 53 y.o. Sex: male DOA: 4/21/2020 LOS:  LOS: 25 days Chief complaint: cardiac arrest, respiratory failure, CHF, chronic A. fib CKD morbid obesity Assessment/Plan Hospital Problems  Date Reviewed: 5/2/2020 Codes Class Noted POA Hypocalcemia ICD-10-CM: E83.51 
ICD-9-CM: 275.41  5/15/2020 Unknown NSTEMI (non-ST elevated myocardial infarction) ([facility] ICD-10-CM: I21.4 ICD-9-CM: 410.70  5/13/2020 Unknown Advanced care planning/counseling discussion ICD-10-CM: Z71.89 ICD-9-CM: V65.49  Unknown Unknown Cardiac arrest [facility]) ICD-10-CM: I46.9 ICD-9-CM: 427.5  4/23/2020 Unknown Acute on chronic respiratory failure with hypoxia and hypercapnia (HCC) ICD-10-CM: J96.21, J96.22 
ICD-9-CM: 518.84, 786.09, 799.02  4/22/2020 Yes Acute CHF (congestive heart failure) (HCC) ICD-10-CM: I50.9 ICD-9-CM: 428.0  4/21/2020 Yes * (Principal) Respiratory failure with hypoxia and hypercapnia (HCC) ICD-10-CM: J96.91, J96.92 
ICD-9-CM: 518.81  4/21/2020 Yes Chronic a-fib ICD-10-CM: I48.20 ICD-9-CM: 427.31  10/15/2018 Yes Chronic renal insufficiency, stage II (mild) ICD-10-CM: N18.2 ICD-9-CM: 685. 2  Unknown Yes  
   
 BMI 70 and over, adult [facility]) ICD-10-CM: N96.00 ICD-9-CM: V85.45  Unknown Yes Morbid obesity ([facility] ICD-10-CM: E66.01 
ICD-9-CM: 278.01  4/4/2016 Yes Sleep apnea ICD-10-CM: G47.30 ICD-9-CM: 780.57  4/4/2016 Yes 52 y/o male with hx of morbid obesity, MARIA FERNANDA, chronic afib on anticoagulation, and systolic CHF (EF 34-08%) is admitted with respiratory failure with hypoxia and hypercapnia requiring BiPAP as well as acute CHF exacerbation-now on vent CRITICAL CARE PLAN 
   
Resp Acute respiratory respiratory failure with hypoxia and hypercapnia On vent and wean, will have scheduled trach on May 18, Off sedation am, on 45 % O2 and peep 14 this am  
 aspirin hold for trach Aspiration pneumonia-on  Zosyn-completed the treatment Respiratory cx:NO NORMAL RESPIRATORY BRYN ISOLATED On steroid   
 
jac CVS  
 
Cardiac arrest-PEA after self extubated Avoid beta-blocker due to bradycardia HU done on 5/12, no shunt noted Chronic A. Fib, Eliquis/aspirin  Have  been hold Follow-up with cardiology Aspirin is hold Acute on chronic systolic heart failure Lasix drip has been hold, negative 5 L  
 so far renal function wnl  
continue monitor renal function and electrolytes. 
  
nstemi Aspirin hold due to trach on [place]. bbb was hold due to natalia Dr. Blas Finley f/u ID was on n Zosyn Heme/onc Follow H&H, plts. INR. H/h stable now  
  
Renal  
Hypernatremia-resolved  
 
hypocalcemia Replace Ca Trend BUN, Cr, follow I/O, daniel in place. Continue icu electrolytes replacement protocol Neuro: 
Off sedation  
  
Endocrine - Follow FSG,  
  
  
GI -tube feeding,  
  
Prophylaxis - DVT: SCDs, GI: protonix Morbid obesity Rn: stable, no issue last night 30 minutes of critical care time spent in the direct evaluation and treatment of this high risk patient. The reason for providing this level of medical care for this critically ill patient was due a critical illness that impaired one or more vital organ systems such that there was a high probability of imminent or life threatening deterioration in the patients condition. This care involved high complexity decision making to assess, manipulate, and support vital system functions, to treat this degreee vital organ system failure and to prevent further life threatening deterioration of the patients condition. Disposition :tbd, Review of systems: 
unable to obtain due to intubation, nodding head -mean doing well Vital signs/Intake and Output: 
Visit Vitals /74 (BP 1 Location: Right arm, BP Patient Position: At rest) Pulse 69 Temp 98.5 °F (36.9 °C) Resp 18 Ht 5' 10\" (1.778 m) Wt (!) 230 kg (507 lb 0.9 oz) SpO2 98% BMI 72.76 kg/m² Current Shift:  No intake/output data recorded. Last three shifts:  05/14 1901 - 05/16 0700 In: 9293 Out: 2364 [BGTQH:3243; Drains:170] Physical Exam: 
General: Intubated, alert on vent, follow the command HEENT: NC, Atraumatic. PERRLA, anicteric sclerae. ET tube noted Lungs: Courses breathing sound Heart:  Regular  rhythm,  No murmur, No Rubs, No Gallops Abdomen: Soft, Non distended, Non tender. +Bowel sounds, Clark noted Extremities: No c/c, no edema Psych:             calm on sedation Neurologic:  Unable to obtain due to intubation, but alert/follow the command, move all extremities Labs: Results:  
   
Chemistry Recent Labs 05/16/20 
0530 05/15/20 
0425 05/14/20 
0400 * 116* 135*  142 142  
K 3.9 3.9 4.1  103 104 CO2 28 34* 35* BUN 27* 28* 24* CREA 0.75 0.76 0.69 CA 8.6 8.4* 8.2* AGAP 6 5 3 BUCR 36* 37* 35* AP 37* 40* 45  
TP 6.6 6.4 6.2* ALB 3.7 3.2* 3.2*  
GLOB 2.9 3.2 3.0 AGRAT 1.3 1.0 1.1  
  
CBC w/Diff Recent Labs 05/16/20 
0530 05/15/20 
0425 05/14/20 
0400 WBC 8.2 8.2 8.3  
RBC 3.81* 3.74* 3.67* HGB 10.0* 10.0* 9.6* HCT 32.7* 32.3* 31.8*  
 207 195 GRANS 87* 90* 89* LYMPH 7* 5* 6*  
EOS 0 0 0 Cardiac Enzymes No results for input(s): CPK, CKND1, CONSTANZA in the last 72 hours. No lab exists for component: Maty [place] Coagulation Recent Labs 05/16/20 
0530 05/15/20 
0425 PTP 17.3* 17.2* INR 1.4* 1.4* APTT 27.1 28.0 Lipid Panel No results found for: CHOL, CHOLPOCT, CHOLX, CHLST, CHOLV, 869948, HDL, HDLP, LDL, LDLC, DLDLP, 679056, VLDLC, VLDL, TGLX, TRIGL, TRIGP, TGLPOCT, CHHD, CHHDX  
BNP No results for input(s): BNPP in the last 72 hours. Liver Enzymes Recent Labs 05/16/20 
0530 TP 6.6 ALB 3.7 AP 37* SGOT 35 Thyroid Studies Lab Results Component Value Date/Time TSH 1.45 04/27/2020 03:50 AM  
    
Procedures/imaging: see electronic medical records for all procedures/Xrays and details which were not copied into this note but were reviewed prior to creation of Plan Xr Chest Sngl V Result Date: 4/25/2020 CLINICAL HISTORY:  Endotracheal tube placement/repositioning. COMPARISONS:  Chest x-ray, earlier the same day. TECHNIQUE:  single frontal view of the chest ------------------------------------------ FINDINGS: Exam limited by patient large body habitus. Lungs:  Hazy opacity in the lung bases, more apparent than on the prior study. Upper lungs clear. Mediastinum: Marked cardiomegaly. Endotracheal tube has been repositioned, tip now 5.5 cm above the miguel ángel. Right transjugular venous catheter, tip near cavoatrial junction. Bones: No evidence of fracture or suspicious bone lesion. ------------------------------------------ 
 
IMPRESSION: 1. Hazy bilateral lower lobe opacity, likely a combination of pleural fluid and atelectasis, slightly more apparent than on recent comparison study. Pneumonia or aspiration not excluded. 2. Endotracheal tube has been repositioned, now in good position. Xr Abd (kub) Result Date: 5/7/2020 EXAM: SUPINE ABDOMINAL RADIOGRAPH CLINICAL INDICATION/HISTORY: eval for ileus -Additional: None COMPARISON: 5/6/2020 TECHNIQUE: Single supine view of the abdomen. _______________ FINDINGS/IMPRESSION: BOWEL GAS PATTERN: Limited study secondary to body habitus. Enteric tube poorly visualized. Contrast injection demonstrates tip at the GE junction/proximal stomach. Oral contrast within the colon. Xr Abd (kub) Result Date: 5/6/2020 Abdomen, single view COMPARISON: May 2, 2020 INDICATION: Nasogastric tube placement FINDINGS: Supine AP view the abdomen obtained before and after administration of contrast per the nasogastric tube.  Anatomic detail is markedly limited by the patient's body habitus with collimation of the majority of the right lateral abdomen. Radiopaque tubing noted below the diaphragm, the tip of which is indistinct. Contrast opacifies the stomach. Impression: 1. Detail limited examination. Nasogastric tube is below the diaphragm. Although the exact tip position is unclear on the provided images, opacification of gastric rugal folds is compatible with tube position in the stomach. Xr Abd (kub) Result Date: 5/3/2020 EXAM: SUPINE ABDOMINAL RADIOGRAPH CLINICAL INDICATION/HISTORY: ng placement -Additional: None COMPARISON: None TECHNIQUE: Single supine view of the abdomen. _______________ FINDINGS: BOWEL GAS PATTERN: Outside the field of view. BONES: Unremarkable. OTHER: Subtle linear density likely corresponding with reported nasogastric tube, possible tip seen in the inferior thorax distal esophagus region. Endotracheal tube noted which is above the miguel ángel. _______________ IMPRESSION: Nondiagnostic exam for purposes of nasogastric tube positioning. It is suspected that the tip of the tube is in the distal esophagus, but exam quality is not adequate for confident localization. Xr Abd (kub) Result Date: 4/22/2020 EXAM: XR ABD (KUB) CLINICAL INDICATION/HISTORY: ETT and OG placement COMPARISON: Chest radiograph same day TECHNIQUE: Supine AP view of the abdomen was obtained. _______________ FINDINGS: Enteric tube is not reliably visualized on this exam; indeterminate to what extent this is related to its potential high lying intrathoracic position versus or instead if the tube is simply obscured because of patient's obese body habitus. _______________ IMPRESSION: Enteric tube is not reliably visualized within the distal thoracic esophagus normal or distally within the left upper quadrant. Xr Chest [facility] Result Date: 5/10/2020 EXAM: CHEST RADIOGRAPH, SINGLE VIEW CLINICAL INDICATION/HISTORY: Aspiration pneumonia, ET tube placement      <Additional:  None. COMPARISON: 5/9/2020 TECHNIQUE: Portable frontal view of the chest was obtained. _______________ FINDINGS: SUPPORT DEVICES: ETT is 5.9 cm above the miguel ángel. PICC from a left arm approach projects at the [place] [facility]. HEART AND MEDIASTINUM: Cardiac silhouette is top normal in size. LUNGS AND PLEURAL SPACES: Extensive bilateral alveolar densities are present throughout both lungs, slightly more dense on the left than on the right without significant changes. Left costophrenic angle is blunted versus obscured. The right costophrenic angle is blunted. No pneumothorax. BONY THORAX AND SOFT TISSUES: No acute osseous abnormality. _______________ IMPRESSION: Diffuse bilateral asymmetrical left greater than right edema versus pneumonia versus ARDS without change. Small right effusion, cannot exclude a small left pleural effusion. Xr Chest [facility] Result Date: 5/9/2020 EXAM: CHEST RADIOGRAPH, SINGLE VIEW CLINICAL INDICATION/HISTORY: Aspiration pneumonia, ET tube placement      <Additional:  None. COMPARISON: 5/8/2020 TECHNIQUE: Portable frontal view of the chest was obtained. _______________ FINDINGS: SUPPORT DEVICES: ETT is 7.0 cm above the miguel ángel, PICC tip from a left arm approach projects at the distal SVC. HEART AND MEDIASTINUM: Cardiac silhouette is top normal in size. LUNGS AND PLEURAL SPACES: Extensive bilateral alveolar densities are present throughout both lungs, slightly more dense on the left than on the right, without significant changes. Left costophrenic angle is cut off, the right costophrenic angle is blunted. BONY THORAX AND SOFT TISSUES: No acute osseous abnormality. _______________ IMPRESSION: Diffuse bilateral left greater than right pneumonia versus pulmonary edema. Small right pleural effusion. No acute changes. Xr Chest [facility] Result Date: 5/8/2020 AP portable chest, 5/8/2020 at 0500 hours: INDICATION: Shortness of breath. Intubated. Comparison 5/7/2020. Endotracheal tube just below the clavicles but well above the miguel ángel. Extensive bilateral edema or infiltrate most prominent in the upper lobes. Perhaps some increase in right basilar density with partial obscuration of the right hemidiaphragm in the interval. Persistent retrocardiac opacification with obscuration of the left hemidiaphragm. The heart is stable and top normal. Left arm PICC line tip is at the cavoatrial junction in good position. IMPRESSION: Extensive bilateral edema or infiltrate, most prominent in the upper lobes, certainly potentially in part aspiration. Some increase in right basilar density perhaps, increasing focal edema and/or pleural effusion/atelectasis. No additional change. Xr Chest [facility] Result Date: 5/7/2020 EXAM: Portable frontal view of the chest. CLINICAL INDICATION/HISTORY: Aspiration pneumonia, follow-up intubation COMPARISON: 5/6/2020 _______________ FINDINGS: Stable and satisfactory position endotracheal tube and left arm PICC line. No significant change in bilateral areas of airspace filling process with stable mildly enlarged cardiac silhouette with no definite pleural effusion or pneumothorax. _______________ IMPRESSION: 1. Stable chest. 
 
Xr Chest [facility] Result Date: 5/6/2020 EXAM: XR CHEST PORT CLINICAL INDICATION/HISTORY: Aspiration pneumonia, ET tube placement -Additional: None COMPARISON: Several prior exams, most recently May 5, 2020 TECHNIQUE: Portable frontal view of the chest _______________ FINDINGS: SUPPORT DEVICES: Endotracheal tube, right IJ central venous catheter, and left approach PICC line noted projecting in stable position. HEART AND MEDIASTINUM: Cardiac size and mediastinal contours appear stable. Cardiac silhouette is enlarged, as previously. LUNGS AND PLEURAL SPACES: Asymmetric airspace process within the right mid and lower lungs with slightly improved left lung base aeration.  Potential right pleural effusion. No pneumothorax. BONY THORAX AND SOFT TISSUES: Unremarkable. _______________ IMPRESSION: 1. Adequate/stable position of support devices. 2. Little interval change in the appearance of right-sided airspace opacity, basilar changes of atelectasis and presumptively small right pleural effusion. Xr Chest [facility] Result Date: 5/5/2020 EXAM: XR CHEST PORT CLINICAL INDICATION/HISTORY: Aspiration pneumonia, ET tube placement -Additional: None COMPARISON: 5/4/2020 TECHNIQUE: Portable frontal view of the chest _______________ FINDINGS: SUPPORT DEVICES: Endotracheal tube and right IJV central venous catheter unchanged. HEART AND MEDIASTINUM: Cardiomegaly. LUNGS AND PLEURAL SPACES: Limited secondary to body habitus. Right mid to lower lung zone opacification, unchanged. . Probable right pleural effusion. No large pneumothorax. _______________ IMPRESSION: Right mid to lower lungs on opacification, unchanged. . Probable right pleural effusion. Xr Chest [facility] Result Date: 5/4/2020 EXAM: XR CHEST PORT CLINICAL INDICATION/HISTORY: Aspiration pneumonia, ET tube placement -Additional: None COMPARISON: Several prior exams, most recently May 3, 2020 TECHNIQUE: Portable frontal view of the chest _______________ FINDINGS: SUPPORT DEVICES: Endotracheal tube and right IJ approach central venous catheter both project in stable position. HEART AND MEDIASTINUM: Stable enlarged cardiac silhouette and mediastinal contours. LUNGS AND PLEURAL SPACES: Hazy lower lung opacities noted with mid to lower lung predominance, somewhat increased on the left from prior exam.. No evidence of pneumothorax or large pleural effusion. As previously, limited visualization due to technique and body habitus. BONY THORAX AND SOFT TISSUES: Unremarkable. _______________ IMPRESSION: 1. Stable/adequate position of support devices.  2. Little interval change in the appearance of right mid to lower lung airspace disease/atelectasis and likely left basilar atelectasis. Xr Chest [facility] Result Date: 5/3/2020 EXAM: PORTABLE FRONTAL CHEST RADIOGRAPH CLINICAL INDICATION/HISTORY: Intubation. COMPARISON: 5/2/2020 TECHNIQUE: Portable frontal view of the chest _______________ FINDINGS: SUPPORT DEVICES: Adequately positioned endotracheal tube and right IJ central venous catheter. HEART AND MEDIASTINUM: Rotated to the right. Moderately enlarged cardiomediastinal silhouette appears similar to prior. LUNGS AND PLEURAL SPACES: Homogeneous opacification right mid/lower thorax similar if not increasingly dense in the interval. Relatively clear appearance of left lung. Overall limited visualization due to technique and body habitus. BONY THORAX AND SOFT TISSUES: Unremarkable. _______________ IMPRESSION: 1. Adequately positioned endotracheal tube. 2. Right thoracic opacification similar if not slightly increased in density in the interval, likely some combination of pleural effusion and lung consolidation (atelectasis/pneumonia). Xr Chest [facility] Result Date: 5/2/2020 EXAM: PORTABLE FRONTAL CHEST RADIOGRAPH CLINICAL INDICATION/HISTORY: Aspiration pneumonia. Intubation. COMPARISON: 5/1/2020 TECHNIQUE: Portable frontal view of the chest _______________ FINDINGS: SUPPORT DEVICES: Adequately positioned endotracheal tube and right IJ central venous catheter. HEART AND MEDIASTINUM: Rightward rotation. Stable cardiomediastinal silhouette, with enlargement of cardiac silhouette. LUNGS AND PLEURAL SPACES: Hazy opacification lower right thorax with obscuration of the diaphragm, similar or slightly increased from prior exam. BONY THORAX AND SOFT TISSUES: Unremarkable. _______________ IMPRESSION: 1. Adequately positioned endotracheal tube. 2. Right basilar opacification may be slightly increased from prior exam, likely some combination of effusion and lung consolidation (pneumonia/atelectasis). Xr Chest [facility] Result Date: 5/1/2020 AP portable chest, 5/1/2020 at 0449 hours: INDICATION: Pneumonia. ET tube placement. Comparison 4/30/2020. Endotracheal tube is in the upper thoracic trachea below the clavicles. The study is lordotically positioned. Right internal jugular central line is in the upper SVC. Asymmetric right perihilar edema or infiltrate. Cardiomegaly. Mild interstitial perihilar changes on the left without new focal infiltrate. IMPRESSION: Little change. Right lung edema or infiltrate. Endotracheal tube in good position. Xr Chest [facility] Result Date: 4/30/2020 EXAM: XR CHEST PORT CLINICAL INDICATION/HISTORY: Aspiration pneumonia, ET tube placement -Additional: None COMPARISON: One day prior TECHNIQUE: Portable frontal view of the chest _______________ FINDINGS: SUPPORT DEVICES: Right IJV central venous catheter unchanged. Endotracheal tube unchanged. HEART AND MEDIASTINUM: Cardiomegaly. LUNGS AND PLEURAL SPACES: Limited study secondary to body habitus and exclusion of the left costophrenic angle. Central vascular congestion with moderate bilateral effusions and pulmonary edema. No pneumothorax. _______________ IMPRESSION: Limited study secondary to body habitus and exclusion of the left costophrenic angle. Central vascular congestion with moderate bilateral effusions and pulmonary edema. No pneumothorax. Xr Chest [facility] Result Date: 4/29/2020 EXAM: XR CHEST PORT CLINICAL INDICATION/HISTORY: Aspiration pneumonia, ET tube placement -Additional: None COMPARISON: 4/27/2020 TECHNIQUE: Portable frontal view of the chest _______________ FINDINGS: SUPPORT DEVICES: Right IJV central venous catheter unchanged. Endotracheal tube unchanged. HEART AND MEDIASTINUM: Cardiomegaly. LUNGS AND PLEURAL SPACES: Limited study secondary to body habitus and exclusion of the left costophrenic angle. Central vascular congestion with moderate bilateral effusions and pulmonary edema. No pneumothorax. _______________ IMPRESSION: Limited study secondary to body habitus and exclusion of the left costophrenic angle. Central vascular congestion with moderate bilateral effusions and pulmonary edema. No pneumothorax. Xr Chest [facility] Result Date: 4/27/2020 EXAM: XR CHEST PORT CLINICAL INDICATION/HISTORY: Intubated -Additional: None COMPARISON: 4/26/2020 TECHNIQUE: Portable frontal view of the chest _______________ FINDINGS: SUPPORT DEVICES: Endotracheal tube projects in stable position, estimated at approximately 4.9 cm above the miguel ángel. Right IJ approach central venous catheter with tip projecting over the SVC. HEART AND MEDIASTINUM: Stable appearing cardiac size and mediastinal contours. Enlarged appearing cardiac silhouette redemonstrated. LUNGS AND PLEURAL SPACES: Basilar areas of opacity and dense left retrocardiac opacity noted. No evidence of pneumothorax or large pleural effusion. BONY THORAX AND SOFT TISSUES: Unremarkable. _______________ IMPRESSION: 1. Endotracheal tube and right IJ approach central venous catheter in position as above. 2. Cardiomegaly and bibasilar areas of airspace disease/atelectasis without significant change Xr Chest [facility] Result Date: 4/26/2020 
--------------------------------------------------------------------------- <<<<<<<<<           [facility]           >>>>>>>>> --------------------------------------------------------------------------- CLINICAL HISTORY:  Hypoxia. COMPARISON EXAMINATIONS:  April 25, 2020. ---  SINGLE FRONTAL VIEW OF THE CHEST  --- Portions of the lower lung fields are not imaged. The cardiomediastinal silhouette is grossly stable. There is an endotracheal tube seen in good position below the clavicles and above the miguel ángel. A right central line is noted with tip at the level of the upper SVC. There are bilateral mid to lower lung field opacities.  No significant osseous abnormalities are identified.  -------------- 
 
 Impression: -------------- Limited exam. Endotracheal tube and right central line in place. Bilateral mid to lower lung field opacities possibly pleural fluid with atelectasis and/or infiltrates. Similar findings were seen previously. Xr [facility] Result Date: 4/25/2020 CLINICAL HISTORY:  Endotracheal tube placement COMPARISONS:  Chest x-ray, earlier the same day TECHNIQUE:  single frontal view of the chest ------------------------------------------ FINDINGS: Lungs:  Lungs slightly suboptimally assessed due to patient large body habitus. Hypoinflation with mild bandlike opacity in the left lung base, likely atelectasis. Left basilar consolidation not excluded. Mediastinum: Right transjugular venous catheter, tip near cavoatrial junction. High position of endotracheal tube, with tip approximately 12 cm above the miguel ángel. Cardiomegaly. Bones: No evidence of fracture or suspicious bone lesion. ------------------------------------------ 
 
IMPRESSION: 1. Endotracheal tube has been pulled back in the interval and is now high in position. Recommend advancement. 2. Lungs slightly suboptimally assessed due to body habitus, though left basilar consolidation not excluded, not appreciably changed. Xr [facility] Result Date: 4/25/2020 CLINICAL HISTORY:  As above. COMPARISONS:  Chest x-ray 4/24/2020 TECHNIQUE:  single frontal view of the chest ------------------------------------------ FINDINGS: Lungs:  Mild increase in bilateral pulmonary interstitial markings. Hazy opacity in the lung bases, likely combination of atelectasis and pleural fluid. Small to moderate-sized pleural effusions very likely present. Mediastinum: Marked cardiomegaly. Endotracheal tube tip approximately 6.7 cm above the miguel ángel. Right-sided central venous catheter, tip near cavoatrial junction.  Bones: No evidence of fracture or suspicious bone lesion. ------------------------------------------ 
 
 IMPRESSION: 1. Bilateral pleural effusions with bibasilar pulmonary atelectasis. 2. Probable mild pulmonary edema. Xr Chest [facility] Result Date: 4/24/2020 EXAM: XR CHEST PORT CLINICAL INDICATION/HISTORY: hypoxia -Additional: None COMPARISON: one day prior TECHNIQUE: Portable frontal view of the chest _______________ FINDINGS: SUPPORT DEVICES: Right IJV central venous catheter tip in the proximal SVC. Endotracheal tube unchanged. HEART AND MEDIASTINUM: Cardiomegaly. LUNGS AND PLEURAL SPACES: Central venous vascular congestion and interstitial infiltrates. Probable small bilateral effusions. _______________ IMPRESSION: Central venous vascular congestion and interstitial infiltrates. Probable small bilateral effusions. Xr Chest [facility] [place] Result Date: 4/23/2020 EXAM: CHEST RADIOGRAPH, SINGLE VIEW CLINICAL INDICATION/HISTORY: Shortness of breath, hypoxia, endotracheal tube placement COMPARISON: 4/22/2020 TECHNIQUE: Portable frontal view of the chest was obtained. _______________ FINDINGS: SUPPORT DEVICES:   >  Endotracheal tube tip is located approximately 5.3 cm above the miguel ángel, adequately positioned.   >  Enteric tube is adequately visualized at the level of thoracic inlet and just above the miguel ángel, slightly eccentric to the left but is not clearly visualized throughout the lower aspect of the left hemithorax, potentially artifactual given patient's body habitus and adjacent overlying global cardiomegaly. > Unchanged adequately positioned right jugular central venous catheter. HEART AND MEDIASTINUM: Cardiomediastinal silhouette appears unchanged, markedly enlarged. Tortuous and atherosclerotic thoracic aorta. Prominent central pulmonary vascular congestion. LUNGS AND PLEURAL SPACES: Diffusely increased interstitial markings and retrocardiac left lower lobe opacity, silhouetting medial left hemidiaphragm. Probable small pleural effusions. No pneumothorax.   BONY THORAX AND SOFT TISSUES: No acute osseous abnormality. _______________ IMPRESSION: 1. Endotracheal tube is adequately positioned. Unchanged, adequately positioned right jugular central venous catheter. 2. Enteric tube is not clearly visualized below the level of the miguel ángel; indeterminate to what extent this represents artifact related to patient's body habitus and global cardiomegaly or instead of the enteric tube is proximal in position. 3. Unchanged cardiomegaly, central vascular congestion and interstitial pulmonary edema. Probable small pleural effusions. Xr Chest [facility] [place] Result Date: 4/22/2020 EXAM: XR CHEST PORT CLINICAL INDICATION/HISTORY: hypoxia -Additional: None COMPARISON: Earlier the same day TECHNIQUE: Portable frontal view of the chest _______________ FINDINGS: SUPPORT DEVICES: Interval placement of right IJV central venous catheter. Endotracheal and enteric tubes are unchanged. HEART AND MEDIASTINUM: Stable cardiomediastinal silhouette. LUNGS AND PLEURAL SPACES: Limited study, bilateral lower lung fields are excluded from field-of-view. Prominent central vasculature and interstitial edema. No pneumothorax _______________ IMPRESSION: Limited study. Interval placement of right IJV central venous catheter with tip at the SVC. No pneumothorax. Xr Chest [facility] [place] Result Date: 4/22/2020 EXAM: CHEST RADIOGRAPH, SINGLE VIEW CLINICAL INDICATION/HISTORY: Endotracheal and orogastric tube placement COMPARISON: 4/21/2020 TECHNIQUE: Portable frontal view of the chest was obtained. _______________ FINDINGS: SUPPORT DEVICES: Vertebral placement of endotracheal tube with tip approximately 5.3 cm above the miguel ángel. Enteric tube is seen coursing through midline thoracic esophagus. HEART AND MEDIASTINUM: Cardiomediastinal silhouette appears unchanged, markedly enlarged. Tortuous and atherosclerotic thoracic aorta. No central vascular congestion.  LUNGS AND PLEURAL SPACES: Retrocardiac medial left lower lobe opacity silhouettes left hemidiaphragm. No confluent airspace opacity elsewhere throughout the lungs. No definite pleural effusion. No pneumothorax. BONY THORAX AND SOFT TISSUES: No acute osseous abnormality. _______________ IMPRESSION: 1. Adequate interval endotracheal tube placement. Enteric tube is better visualized on the concurrent abdominal radiographs. 2. Unchanged global cardiomegaly and retrocardiac left lower lobe atelectasis/infiltrate. Xr Chest [facility] Result Date: 4/21/2020 EXAM: CHEST RADIOGRAPH CLINICAL INDICATION/HISTORY: SOB   > Additional: None COMPARISON: 10/16/2018. TECHNIQUE: Portable frontal view of the chest _______________ FINDINGS: SUPPORT DEVICES: None. HEART AND MEDIASTINUM: Cardiac size remains enlarged. Pulmonary vasculature mediastinal structures are stable. LUNGS AND PLEURAL SPACES: Shallow inspiration. Cannot exclude a left retrocardiac opacity. No definite pleural effusion or pneumothorax. BONY THORAX AND SOFT TISSUES: Unremarkable. _______________ IMPRESSION: 1. Stable cardiomegaly with probable mild pulmonary congestion. 2. Cannot exclude left lower lobe infiltrate/atelectasis. Xr Abd Port  1 V Result Date: 4/24/2020 EXAM: SUPINE ABDOMINAL RADIOGRAPH CLINICAL INDICATION/HISTORY: OG tube placement -Additional: None COMPARISON: 4/22/2014 TECHNIQUE: Single supine view of the abdomen. _______________ FINDINGS: BOWEL GAS PATTERN: Limited study secondary to body habitus. Nonvisualization of enteric tube along the expected course of the digestive tract. [place] Yazmin BONES: Unremarkable. OTHER: None. _______________ IMPRESSION: Limited study secondary to body habitus. Nonvisualization of enteric tube along the expected course of the digestive tract. Joyce Arce Ir Guide Insert Picc Over 5 Yrs Result Date: 5/7/2020 PROCEDURE: PICC placement ATTENDING: Estefany Gimenez M.D. INDICATION: Long-term central venous access. COMPLICATIONS: None.  PROCEDURE: Informed consent was obtained. Physician lead timeout was performed. The arm was prepped and draped in sterile fashion. The procedure was performed following standard maximal barrier technique which includes, cap, mask, sterile gown, sterile gloves, sterile full body drape, hand hygiene with alcohol foam, and 2% chlorhexidine for cutaneous antisepsis. Sterile ultrasound gel and a sterile cover for the US probe was used. After successfully identifying a patent left cephalic vein, real-time ultrasound guidance demonstrated patent vein with normal waveforms. Then, real-time ultrasound guidance was used to puncture the vein. Permanent recording was created for the patient's record. Guidewire was passed centrally, portable radiograph was performed to confirm tip position. A peel-away sheath was inserted over the guidewire. The intravascular distance was measured. The PICC measures 55 cm. The double lumen PICC was placed into the vein with its tip in the brachiocephalic/SVC junction, confirmed with post placement portable radiograph. Both ports aspirated and flushed easily. The PICC was secured to the skin, a sterile dressing including Biopatch was applied. IMPRESSION: Dual lumen left PICC placement, ready for immediate use.   
 
 
Werner Valle MD

## 2020-05-16 NOTE — PROGRESS NOTES
Cardiology Progress Note Patient: Suleman Trinidad. Sex: male          DOA: 4/21/2020 YOB: 1975      Age:  53 y.o.        LOS:  LOS: 25 days Patient seen and examined, chart reviewed. Assessment/Plan Patient Active Problem List  
Diagnosis Code  Cellulitis of left lower leg L03. 116  
 Anemia D64.9  Morbid obesity ([facility]) E66.01  
 Dyspnea R06.00  Sleep apnea G47.30  Sinus tachycardia R00.0  BMI 70 and over, adult [facility]) T16.94  Chronic back pain M54.9, G89.29  
 Arthritis M19.90  Migraine G43.909  
 History of atrial flutter Z86.79  
 Limited mobility Z74.09  
 Smoking F17.200  Chronic renal insufficiency, stage II (mild) N18.2  CHF (congestive heart failure) ([facility]) I50.9  Acute diastolic CHF (congestive heart failure) ([facility]) I50.31  Chronic a-fib I48.20  Atrial flutter ([facility]) I48.92  
 Elevated troponin R79.89  
 Acute respiratory distress R06.03  
 Acute CHF (congestive heart failure) ([facility]) I50.9  Respiratory failure with hypoxia and hypercapnia ([facility]) J96.91, J96.92  
 Acute on chronic respiratory failure with hypoxia and hypercapnia ([facility]) J96.21, J96.22  
 Cardiac arrest ([facility]) I46.9  Advanced care planning/counseling discussion Z70.80  
 NSTEMI (non-ST elevated myocardial infarction) ([facility] [address] I21.4  Hypocalcemia E83.51 Acute on chronic systolic heart failure NSTEMI type II due to PEA arrest 
Paroxysmal atrial flutter Thrombocytopenia Hypernatremia Bradycardia  
  
Patient self extubated and had PEA arrest. 
He had brief episode of A fib with RVR and was started On Amiodarone drip. Currently he is in Sinus rhythm. 
  
Amiodarone discontinued due to bradycardia. 
  
Echocardiogram  
  
· Left Ventricle: Normal wall thickness and systolic function (ejection fraction normal). Mildly dilated left ventricle.  The estimated ejection fraction is 45 - 50%. There is moderate (grade 2) left ventricular diastolic dysfunction E/e' Ratio = 17.09. Wall Scoring: The left ventricular wall motion is globally hypokinetic. · Right Ventricle: Not well visualized. Normal global systolic function. Moderately dilated right ventricle. · Right Atrium: Right atrium not well visualized. Moderately dilated right atrium. · IVC/Hepatic Veins: Dilated inferior vena cava. Mechanically ventilated; cannot use inferior caval vein diameter to estimate central venous pressure. 
  
IV heparin was discontinued due to thrombocytopenia and bleeding at central line site. Started on Argatroban.  
  
Patient had frequent oxygen desaturations and pinkish/bloody aspirate from tracheal suctioning.  
  
HU done revealed LVEF 15-87%, Grade I diastolic dysfunction, Negative bubble study. Oxygenation significantly improved after he was started on steroid  
  
Plan: 
  
Eliquis on hold Continue Acetazolamide Strict I/O Avoid any AV debbie blocking agents due to bradycardia. Continue  IV hydralazine for hypertension if needed. Monitor electrolytes and renal function Continue Ventilatory support. Continue management as per hospital medicine and pulmonary critical care Subjective:  
 cc: 
Orally intubated REVIEW OF SYSTEMS:  
 
Can not obtain Objective:  
  
Visit Vitals /88 Pulse 60 Temp 98.5 °F (36.9 °C) Resp 17 Ht 5' 10\" (1.778 m) Wt (!) 230 kg (507 lb 0.9 oz) SpO2 96% BMI 72.76 kg/m² Body mass index is 72.76 kg/m². Physical Exam: 
General Appearance: Comfortable, Morbidly obese, not using accessory muscles of respiration. Orally intubated HEENT: AMMON. HEAD: Atraumatic NECK: No JVD, no thyroidomeglay. CAROTIDS: No bruit LUNGS: bilateral conducted sounds HEART: S1+S2 audible, no murmur, no pericardial rub. NEUROLOGICAL: Opens eyes on verbal commands. Medication: Current Facility-Administered Medications Medication Dose Route Frequency  methylPREDNISolone (PF) (SOLU-MEDROL) injection 40 mg  40 mg IntraVENous Q12H  
 acetaZOLAMIDE (DIAMOX) 500 mg in sterile water (preservative free) 5 mL injection  500 mg IntraVENous Q8H  
 hydrALAZINE (APRESOLINE) 20 mg/mL injection 20 mg  20 mg IntraVENous Q6H PRN  
 amLODIPine (NORVASC) tablet 10 mg  10 mg Oral DAILY  albumin human 25% (BUMINATE) solution 25 g  25 g IntraVENous Q6H  
 [Held by provider] apixaban (ELIQUIS) tablet 2.5 mg  2.5 mg Oral BID  scopolamine (TRANSDERM-SCOP) 1 mg over 3 days 1 Patch  1 Patch TransDERmal Q72H  LORazepam (ATIVAN) injection 2 mg  2 mg IntraVENous Q4H PRN  
 docusate sodium (COLACE) capsule 100 mg  100 mg Oral BID  polyethylene glycol (MIRALAX) packet 17 g  17 g Oral BID  heparin (porcine) 100 unit/mL injection 500 Units  500 Units InterCATHeter Q8H PRN  
 fentaNYL citrate (PF) injection 50 mcg  50 mcg IntraVENous Q3H PRN  chlorhexidine (PERIDEX) 0.12 % mouthwash 10 mL  10 mL Oral Q12H  
 albuterol-ipratropium (DUO-NEB) 2.5 MG-0.5 MG/3 ML  3 mL Nebulization Q6H PRN  
 acetaminophen (TYLENOL) tablet 650 mg  650 mg Oral Q6H PRN  
 ELECTROLYTE REPLACEMENT PROTOCOL - Calcium   1 Each Other PRN  
 ELECTROLYTE REPLACEMENT PROTOCOL - Magnesium   1 Each Other PRN  
 ELECTROLYTE REPLACEMENT PROTOCOL - Potassium Standard Dosing   1 Each Other PRN  
 ELECTROLYTE REPLACEMENT PROTOCOL - Phosphorus  Standard Dosing  1 Each Other PRN  pantoprazole (PROTONIX) 40 mg in 0.9% sodium chloride 10 mL injection  40 mg IntraVENous DAILY  [Held by provider] aspirin chewable tablet 81 mg  81 mg Oral DAILY  sodium chloride (NS) flush 5-40 mL  5-40 mL IntraVENous Q8H  
 sodium chloride (NS) flush 5-40 mL  5-40 mL IntraVENous PRN Facility-Administered Medications Ordered in Other Encounters Medication Dose Route Frequency  ePHEDrine (PF) (MISTOLE) 10 mg/mL in NS syringe   IntraVENous PRN Lab/Data Reviewed: 
 
  
Recent Labs 05/16/20 
0530 05/15/20 
0425 05/14/20 
0400 WBC 8.2 8.2 8.3 HGB 10.0* 10.0* 9.6* HCT 32.7* 32.3* 31.8*  
 207 195 Recent Labs 05/16/20 
0530 05/15/20 
0425 05/14/20 
0400  142 142  
K 3.9 3.9 4.1  103 104 CO2 28 34* 35* * 116* 135* BUN 27* 28* 24* CREA 0.75 0.76 0.69 CA 8.6 8.4* 8.2*  
 
32 minutes of critical care time spent in the direct evaluation and treatment of this high risk patient. The reason for providing this level of medical care for this critically ill patient was due a critical illness that impaired one or more vital organ systems such that there was a high probability of imminent or life threatening deterioration in the patients condition. This care involved high complexity decision making to assess, manipulate, and support vital system functions, to treat this degree vital organ system failure and to prevent further life threatening deterioration of the patients condition. Signed By: Chu Peña MD   
 May 16, 2020

## 2020-05-16 NOTE — PROGRESS NOTES
Cardiology Progress Note Patient: Angelica Phillips. Sex: male          DOA: 4/21/2020 YOB: 1975      Age:  53 y.o.        LOS:  LOS: 24 days Patient seen and examined, chart reviewed. Assessment/Plan Patient Active Problem List  
Diagnosis Code  Cellulitis of left lower leg L03. 116  
 Anemia D64.9  Morbid obesity ([facility]) E66.01  
 Dyspnea R06.00  Sleep apnea G47.30  Sinus tachycardia R00.0  BMI 70 and over, adult [facility]) L54.98  Chronic back pain M54.9, G89.29  
 Arthritis M19.90  Migraine G43.909  
 History of atrial flutter Z86.79  
 Limited mobility Z74.09  
 Smoking F17.200  Chronic renal insufficiency, stage II (mild) N18.2  CHF (congestive heart failure) ([facility]) I50.9  Acute diastolic CHF (congestive heart failure) ([facility]) I50.31  Chronic a-fib I48.20  Atrial flutter ([facility]) I48.92  
 Elevated troponin R79.89  
 Acute respiratory distress R06.03  
 Acute CHF (congestive heart failure) ([facility]) I50.9  Respiratory failure with hypoxia and hypercapnia ([facility]) J96.91, J96.92  
 Acute on chronic respiratory failure with hypoxia and hypercapnia ([facility]) J96.21, J96.22  
 Cardiac arrest ([facility]) I46.9  Advanced care planning/counseling discussion Z70.80  
 NSTEMI (non-ST elevated myocardial infarction) ([facility] [address] I21.4  Hypocalcemia E83.51 Acute on chronic systolic heart failure NSTEMI type II due to PEA arrest 
Paroxysmal atrial flutter Thrombocytopenia Hypernatremia Bradycardia  
  
Patient self extubated and had PEA arrest. 
He had brief episode of A fib with RVR and was started On Amiodarone drip. Currently he is in Sinus rhythm. 
  
Amiodarone discontinued due to bradycardia. 
  
Echocardiogram  
  
· Left Ventricle: Normal wall thickness and systolic function (ejection fraction normal). Mildly dilated left ventricle.  The estimated ejection fraction is 45 - 50%. There is moderate (grade 2) left ventricular diastolic dysfunction E/e' Ratio = 17.09. Wall Scoring: The left ventricular wall motion is globally hypokinetic. · Right Ventricle: Not well visualized. Normal global systolic function. Moderately dilated right ventricle. · Right Atrium: Right atrium not well visualized. Moderately dilated right atrium. · IVC/Hepatic Veins: Dilated inferior vena cava. Mechanically ventilated; cannot use inferior caval vein diameter to estimate central venous pressure. 
  
IV heparin was discontinued due to thrombocytopenia and bleeding at central line site. Started on Argatroban.  
  
Patient had frequent oxygen desaturations and pinkish/bloody aspirate from tracheal suctioning.  
  
HU done revealed LVEF 37-57%, Grade I diastolic dysfunction, Negative bubble study. Monitor reviewed: heart rate more than 40 bpm Sinus bradycardia  
  
Plan: 
  
Eliquis on hold Continue Acetazolamide Strict I/O Avoid any AV debbie blocking agents due to bradycardia. Continue  IV hydralazine for hypertension if needed. Monitor electrolytes and renal function Continue Ventilatory support. Continue management as per hospital medicine and pulmonary critical care Plan discussed with Buster Night Subjective:  
 cc: 
Orally intubated REVIEW OF SYSTEMS:  
 
Can not obtain Objective:  
  
Visit Vitals /73 Pulse (!) 58 Temp 98.3 °F (36.8 °C) Resp 16 Ht 5' 10\" (1.778 m) Wt (!) 230 kg (507 lb 0.9 oz) SpO2 98% BMI 72.76 kg/m² Body mass index is 72.76 kg/m². Physical Exam: 
General Appearance: Comfortable, Morbidly obese, not using accessory muscles of respiration. Orally intubated HEENT: AMMON. HEAD: Atraumatic NECK: No JVD, no thyroidomeglay. CAROTIDS: No bruit LUNGS: bilateral conducted sounds HEART: S1+S2 audible, no murmur, no pericardial rub. NEUROLOGICAL: Opens eyes on verbal commands. Medication: 
Current Facility-Administered Medications Medication Dose Route Frequency  hydrALAZINE (APRESOLINE) 20 mg/mL injection 20 mg  20 mg IntraVENous Q6H PRN  
 amLODIPine (NORVASC) tablet 10 mg  10 mg Oral DAILY  acetaZOLAMIDE (DIAMOX) 500 mg in sterile water (preservative free) 5 mL injection  500 mg IntraVENous Q8H  
 albumin human 25% (BUMINATE) solution 25 g  25 g IntraVENous Q6H  
 methylPREDNISolone (PF) (SOLU-MEDROL) injection 40 mg  40 mg IntraVENous Q8H  
 [Held by provider] apixaban (ELIQUIS) tablet 2.5 mg  2.5 mg Oral BID  scopolamine (TRANSDERM-SCOP) 1 mg over 3 days 1 Patch  1 Patch TransDERmal Q72H  fentaNYL (DURAGESIC) 50 mcg/hr patch 1 Patch  1 Patch TransDERmal Q72H  LORazepam (ATIVAN) injection 2 mg  2 mg IntraVENous Q4H PRN  
 docusate sodium (COLACE) capsule 100 mg  100 mg Oral BID  polyethylene glycol (MIRALAX) packet 17 g  17 g Oral BID  heparin (porcine) 100 unit/mL injection 500 Units  500 Units InterCATHeter Q8H PRN  
 fentaNYL citrate (PF) injection 50 mcg  50 mcg IntraVENous Q3H PRN  chlorhexidine (PERIDEX) 0.12 % mouthwash 10 mL  10 mL Oral Q12H  
 albuterol-ipratropium (DUO-NEB) 2.5 MG-0.5 MG/3 ML  3 mL Nebulization Q6H PRN  
 acetaminophen (TYLENOL) tablet 650 mg  650 mg Oral Q6H PRN  
 ELECTROLYTE REPLACEMENT PROTOCOL - Calcium   1 Each Other PRN  
 ELECTROLYTE REPLACEMENT PROTOCOL - Magnesium   1 Each Other PRN  
 ELECTROLYTE REPLACEMENT PROTOCOL - Potassium Standard Dosing   1 Each Other PRN  
 ELECTROLYTE REPLACEMENT PROTOCOL - Phosphorus  Standard Dosing  1 Each Other PRN  pantoprazole (PROTONIX) 40 mg in 0.9% sodium chloride 10 mL injection  40 mg IntraVENous DAILY  [Held by provider] aspirin chewable tablet 81 mg  81 mg Oral DAILY  sodium chloride (NS) flush 5-40 mL  5-40 mL IntraVENous Q8H  
 sodium chloride (NS) flush 5-40 mL  5-40 mL IntraVENous PRN Facility-Administered Medications Ordered in Other Encounters Medication Dose Route Frequency  ePHEDrine (PF) (MISTOLE) 10 mg/mL in NS syringe   IntraVENous PRN Lab/Data Reviewed: 
 
  
Recent Labs 05/15/20 
0425 05/14/20 
0400 05/13/20 
0402 WBC 8.2 8.3 6.5 HGB 10.0* 9.6* 9.8* HCT 32.3* 31.8* 33.0*  
 195 169 Recent Labs 05/15/20 
0425 05/14/20 
0400 05/14/20 
0205  142 144  
K 3.9 4.1 4.2  104 105 CO2 34* 35* 35* * 135* 132* BUN 28* 24* 23* CREA 0.76 0.69 0.69 CA 8.4* 8.2* 7.9*  
 
34 minutes of critical care time spent in the direct evaluation and treatment of this high risk patient. The reason for providing this level of medical care for this critically ill patient was due a critical illness that impaired one or more vital organ systems such that there was a high probability of imminent or life threatening deterioration in the patients condition. This care involved high complexity decision making to assess, manipulate, and support vital system functions, to treat this degree vital organ system failure and to prevent further life threatening deterioration of the patients condition. Signed By: Leland Freeman MD   
 May 15, 2020

## 2020-05-16 NOTE — PROGRESS NOTES
Otolaryngology head neck surgery Patient seen and examined. Situation discussed with him and Dr. Carlota Tomlin. Patient's condition has significantly improved. He appears to be stable enough at this point to proceed with tracheotomy. His FiO2 has diminished and his PEEP requirements are diminishing as well. Patient is presently on or schedule to proceed with skin flap tracheotomy on May 18. Patient is agreeable to procedure. Patient appears to be alert and fully oriented and is able to sign consent however will also discuss with wife. Have attempted to contact wife but I have been unable to reach her We will hold feeds Monday a.m. at [facility] [address]

## 2020-05-16 NOTE — PROGRESS NOTES
0700- Bedside and Verbal shift change report given to Skyler Pederson RN (oncoming nurse) by Radha Carmen RN (offgoing nurse). Report included the following information SBAR, Kardex, MAR and Recent Results. 0915- Bedside rounding complete. Will D/C arterial line and verify NG placement with contrast and KUB. 9134- Dr. Joelle aSdler in to speak with patient. Dane [place] is scheduled for Monday at 1030. Patient to be NPO Monday at 0000. 
 
0950- Will hold tube feed until NG placement is verified. 1846- Bedside and Verbal shift change report given to Mirela Downs Dr (oncoming nurse) by Skyler Pederson RN (offgoing nurse). Report included the following information SBAR, Kardex, Intake/Output, MAR and Recent Results.

## 2020-05-16 NOTE — PROGRESS NOTES
Pulmonary, Critical Care, and Sleep Medicine Pulmonary Progress Note Name: Len Dueñas. : 1975 MRN: [id number] Date: 2020 [x]I have reviewed the flowsheet and previous days notes. Events, vitals, medications and notes from last 24 hours reviewed. Subjective/History:  
12-year-old male with morbid obesity, ex-smoker, history of asthma, history of severe obstructive sleep apnea on CPAP, questionable component compliance, history of congestive heart failure with reduced ejection fraction atrial fibrillation atrial flutter on amiodarone, Xarelto, history of chronic renal insufficiency, cellulitis of lower extremities and anemia. As per family did not have any fever or upper respiratory tract infections and no flulike symptoms. On Lasix, CPAP not clear if missed. Presented with acute shortness of breath worsening edema of lower extremities. He was found to be in acute hypercarbic hypoxemic respiratory failure initially had a trial of BiPAP but failed and then eventually was intubated. He was hypotensive. Moderate amount of secretions. Morbid obesity. Patient compliant with Xarelto. Patient self extubated and had PEA arrest. 
 
Subjective:  
20 Patient intubated  for hypoxemic resp failure. Remains in ICU, orally intubated, on ventilator. Off sedation. Awake and follows all commands. No fever. Diuresing well. Metabolic alkalosis resolved with diamox. B/l Leg edema improving. BP elevated, on norvasc. HR in 40's sinus  intermittently when sleeping I/O negative ETT bloody secretions improved, now mild scant secretions. Drips: none Intake/Output Summary (Last 24 hours) at 2020 0940 Last data filed at 2020 0600 Gross per 24 hour Intake 540 ml Output 3425 ml Net -2885 ml ROS: Review of systems not obtained due to patient factors. Past Medical History: 
Past Medical History:  
Diagnosis Date  Arrhythmia  Arthritis   
 knees  CHF (congestive heart failure) ([facility] [address]  Chronic back pain  Chronic renal insufficiency, stage II (mild)  History of atrial flutter Dx 2016 - anticoagulated by Kenyon Lilly  Hypertension 2015  Limited mobility  Migraine headache  Morbid obesity ([facility] [address]  Morbid obesity with body mass index of 60.0-69.9 in adult [facility])  Sleep apnea   
 uses c-pap instructed to bring day of surgery  Smoking   
 very passive / cigars only Allergy: No Known Allergies Vital Signs:   
Blood pressure (!) 159/91, pulse 60, temperature 98.3 °F (36.8 °C), resp. rate 18, height 5' 10\" (1.778 m), weight (!) 230 kg (507 lb 0.9 oz), SpO2 99 %. Body mass index is 72.76 kg/m². O2 Device: Room air O2 Flow Rate (L/min): 16 l/min Temp (24hrs), Av.5 °F (36.9 °C), Min:98.2 °F (36.8 °C), Max:99.1 °F (37.3 °C) Intake/Output:  
Last shift:      No intake/output data recorded. Last 3 shifts:  1901 -  0700 In: 8223 Out: 6313 [SSENZ:8572; Drains:170] Intake/Output Summary (Last 24 hours) at 2020 0940 Last data filed at 2020 0600 Gross per 24 hour Intake 540 ml Output 3425 ml Net -2885 ml Ventilator Settings: 
Mode Rate Tidal Volume Pressure FiO2 PEEP Pressure control   580 ml  15 cm H2O 45 %(decreased) 12 cm H20 Peak airway pressure: 31 cm H2O Minute ventilation: 13.4 l/min Physical Exam: 
General: obese male; no cyanosis; alert awake and following all commands. on ventilator HEENT: LIBBY, orally intubated; orogastric tube; no bleeding; pupils not dilated, reactive Neck: thick short obese neck Chest: normal, obese chest wall Lungs: moderate air entry, obese chest limits exam; decreased BS bases; symmetrical chest expansion Heart: S1S2 present or without murmur or extra heart sounds; JVD not elevated Abdomen: obese, bowel sounds normoactive, soft without significant tenderness; no organomegaly; no guarding or rigidity Extremity: b/l leg/feet edema improving, now can see wrinkles of skin; negative for cyanosis, clubbing. Can see wrinkles in feet and leg since edema improving. Capillary refill: normal 
Neuro:  Alert awake and following all commands, moving all 4 limbs and following all commands. Skin: Skin color, texture, turgor fair. Skin dry, warm, non-diaphoretic DATA:  
Current Facility-Administered Medications Medication Dose Route Frequency  methylPREDNISolone (PF) (SOLU-MEDROL) injection 40 mg  40 mg IntraVENous Q12H  hydrALAZINE (APRESOLINE) 20 mg/mL injection 20 mg  20 mg IntraVENous Q6H PRN  
 amLODIPine (NORVASC) tablet 10 mg  10 mg Oral DAILY  albumin human 25% (BUMINATE) solution 25 g  25 g IntraVENous Q6H  
 [Held by provider] apixaban (ELIQUIS) tablet 2.5 mg  2.5 mg Oral BID  scopolamine (TRANSDERM-SCOP) 1 mg over 3 days 1 Patch  1 Patch TransDERmal Q72H  fentaNYL (DURAGESIC) 50 mcg/hr patch 1 Patch  1 Patch TransDERmal Q72H  LORazepam (ATIVAN) injection 2 mg  2 mg IntraVENous Q4H PRN  
 docusate sodium (COLACE) capsule 100 mg  100 mg Oral BID  polyethylene glycol (MIRALAX) packet 17 g  17 g Oral BID  heparin (porcine) 100 unit/mL injection 500 Units  500 Units InterCATHeter Q8H PRN  
 fentaNYL citrate (PF) injection 50 mcg  50 mcg IntraVENous Q3H PRN  chlorhexidine (PERIDEX) 0.12 % mouthwash 10 mL  10 mL Oral Q12H  
 albuterol-ipratropium (DUO-NEB) 2.5 MG-0.5 MG/3 ML  3 mL Nebulization Q6H PRN  
 acetaminophen (TYLENOL) tablet 650 mg  650 mg Oral Q6H PRN  
 ELECTROLYTE REPLACEMENT PROTOCOL - Calcium   1 Each Other PRN  
 ELECTROLYTE REPLACEMENT PROTOCOL - Magnesium   1 Each Other PRN  
 ELECTROLYTE REPLACEMENT PROTOCOL - Potassium Standard Dosing   1 Each Other PRN  
 ELECTROLYTE REPLACEMENT PROTOCOL - Phosphorus  Standard Dosing  1 Each Other PRN  
  pantoprazole (PROTONIX) 40 mg in 0.9% sodium chloride 10 mL injection  40 mg IntraVENous DAILY  [Held by provider] aspirin chewable tablet 81 mg  81 mg Oral DAILY  sodium chloride (NS) flush 5-40 mL  5-40 mL IntraVENous Q8H  
 sodium chloride (NS) flush 5-40 mL  5-40 mL IntraVENous PRN Facility-Administered Medications Ordered in Other Encounters Medication Dose Route Frequency  ePHEDrine (PF) (MISTOLE) 10 mg/mL in NS syringe   IntraVENous PRN Telemetry: [x]Sinus []A-flutter []Paced []A-fib []Multiple PVCs Labs: 
Recent Results (from the past 24 hour(s)) GLUCOSE, POC Collection Time: 05/15/20 11:59 AM  
Result Value Ref Range Glucose (POC) 102 70 - 110 mg/dL GLUCOSE, POC Collection Time: 05/15/20  6:02 PM  
Result Value Ref Range Glucose (POC) 114 (H) 70 - 110 mg/dL POC G3 Collection Time: 05/16/20  5:18 AM  
Result Value Ref Range Device: VENT    
 FIO2 (POC) 50 % pH (POC) 7.401 7.35 - 7.45    
 pCO2 (POC) 44.0 35.0 - 45.0 MMHG  
 pO2 (POC) 116 (H) 80 - 100 MMHG  
 HCO3 (POC) 27.3 (H) 22 - 26 MMOL/L  
 sO2 (POC) 99 (H) 92 - 97 % Base excess (POC) 3 mmol/L Mode ASSIST CONTROL Set Rate 16 bpm  
 PEEP/CPAP (POC) 12 cmH2O  
 PIP (POC) 15 Allens test (POC) N/A Total resp. rate 17 Site DRAWN FROM ARTERIAL LINE Specimen type (POC) ARTERIAL Performed by Janet Velásquez   
 Pressure control YES    
METABOLIC PANEL, COMPREHENSIVE Collection Time: 05/16/20  5:30 AM  
Result Value Ref Range Sodium 138 136 - 145 mmol/L Potassium 3.9 3.5 - 5.5 mmol/L Chloride 104 100 - 111 mmol/L  
 CO2 28 21 - 32 mmol/L Anion gap 6 3.0 - 18 mmol/L Glucose 104 (H) 74 - 99 mg/dL BUN 27 (H) 7.0 - 18 MG/DL Creatinine 0.75 0.6 - 1.3 MG/DL  
 BUN/Creatinine ratio 36 (H) 12 - 20 GFR est AA >60 >60 ml/min/1.73m2 GFR est non-AA >60 >60 ml/min/1.73m2 Calcium 8.6 8.5 - 10.1 MG/DL  Bilirubin, total 1.4 (H) 0.2 - 1.0 MG/DL  
 ALT (SGPT) 50 16 - 61 U/L  
 AST (SGOT) 35 10 - 38 U/L Alk. phosphatase 37 (L) 45 - 117 U/L Protein, total 6.6 6.4 - 8.2 g/dL Albumin 3.7 3.4 - 5.0 g/dL Globulin 2.9 2.0 - 4.0 g/dL A-G Ratio 1.3 0.8 - 1.7    
CBC WITH AUTOMATED DIFF Collection Time: 05/16/20  5:30 AM  
Result Value Ref Range WBC 8.2 4.6 - 13.2 K/uL  
 RBC 3.81 (L) 4.70 - 5.50 M/uL  
 HGB 10.0 (L) 13.0 - 16.0 g/dL HCT 32.7 (L) 36.0 - 48.0 % MCV 85.8 74.0 - 97.0 FL  
 MCH 26.2 24.0 - 34.0 PG  
 MCHC 30.6 (L) 31.0 - 37.0 g/dL  
 RDW 16.9 (H) 11.6 - 14.5 % PLATELET 323 393 - 847 K/uL MPV 9.9 9.2 - 11.8 FL  
 NEUTROPHILS 87 (H) 40 - 73 % LYMPHOCYTES 7 (L) 21 - 52 % MONOCYTES 6 3 - 10 % EOSINOPHILS 0 0 - 5 % BASOPHILS 0 0 - 2 %  
 ABS. NEUTROPHILS 7.2 1.8 - 8.0 K/UL  
 ABS. LYMPHOCYTES 0.6 (L) 0.9 - 3.6 K/UL  
 ABS. MONOCYTES 0.5 0.05 - 1.2 K/UL  
 ABS. EOSINOPHILS 0.0 0.0 - 0.4 K/UL  
 ABS. BASOPHILS 0.0 0.0 - 0.1 K/UL  
 DF AUTOMATED MAGNESIUM Collection Time: 05/16/20  5:30 AM  
Result Value Ref Range Magnesium 2.2 1.6 - 2.6 mg/dL CALCIUM, IONIZED Collection Time: 05/16/20  5:30 AM  
Result Value Ref Range Ionized Calcium 1.19 1.12 - 1.32 MMOL/L  
PTT Collection Time: 05/16/20  5:30 AM  
Result Value Ref Range aPTT 27.1 23.0 - 36.4 SEC PROTHROMBIN TIME + INR Collection Time: 05/16/20  5:30 AM  
Result Value Ref Range Prothrombin time 17.3 (H) 11.5 - 15.2 sec INR 1.4 (H) 0.8 - 1.2 PHOSPHORUS Collection Time: 05/16/20  5:30 AM  
Result Value Ref Range Phosphorus 3.2 2.5 - 4.9 MG/DL Recent Labs 05/16/20 
0518 05/15/20 
9127 05/14/20 
0950 FIO2I 50 50 0.65 HCO3I 27.3* 34.6* 30.9* PCO2I 44.0 43.1 43.8 PHI 7.401 7.512* 7.453* PO2I 116* 78* 106* All Micro Results Procedure Component Value Units Date/Time CULTURE, RESPIRATORY/SPUTUM/BRONCH Bartolo Bull STAIN [[id number]]  (Abnormal) Collected:  05/08/20 7776 Order Status:  Completed Specimen:  Sputum from Tracheal Aspirate Updated:  05/10/20 1214 Special Requests: NO SPECIAL REQUESTS     
  GRAM STAIN RARE WBCS SEEN     
      
  RARE EPITHELIAL CELLS SEEN  
     
   NO ORGANISMS SEEN Culture result: RARE YEAST     
      
  NO NORMAL RESPIRATORY BRYN ISOLATED  
     
 CULTURE, RESPIRATORY/SPUTUM/BRONCH W Amish Shah [[id number]] Collected:  05/04/20 1315 Order Status:  Canceled Specimen:  Sputum from Tracheal Aspirate CULTURE, RESPIRATORY/SPUTUM/BRONCH Daly Ly STAIN [[id number]]  (Abnormal)  (Susceptibility) Collected:  04/22/20 1018 Order Status:  Completed Specimen:  Sputum from Tracheal Aspirate Updated:  04/25/20 8985 Special Requests: NO SPECIAL REQUESTS     
  GRAM STAIN FEW WBCS SEEN     
      
  OCCASIONAL EPITHELIAL CELLS SEEN  
     
      
  FEW GRAM POSITIVE COCCI IN CLUSTERS  
     
   RARE GRAM NEGATIVE RODS Culture result:    
  LIGHT STAPHYLOCOCCUS AUREUS  
     
      
  LIGHT NORMAL RESPIRATORY BRYN  
     
 CULTURE, BLOOD [[id number]] Collected:  04/24/20 1015 Order Status:  Canceled Specimen:  Blood Imaging: 
[x]I have personally reviewed the patients chest radiographs images and report Results from [facility] Encounter encounter on 04/21/20 XR CHEST PORT Narrative EXAM: CHEST RADIOGRAPH 
 
CLINICAL INDICATION/HISTORY: Aspiration pneumonia, ET tube placement 
  > Additional: None COMPARISON: 5/15/2020 TECHNIQUE: Portable frontal view of the chest 
 
_______________ FINDINGS: 
 
SUPPORT DEVICES: Endotracheal tube in place with the tip approximately 5.5 cm 
above the miguel ángel. Left PICC line identified with the tip in the region of the 
SVC. HEART AND MEDIASTINUM: Heart is enlarged, unchanged. LUNGS AND PLEURAL SPACES: Vascular congestion is similar to the prior exam. Hazy 
opacities identified at the lung bases, similar to the prior study. No new focal 
consolidation or pneumothorax. BONES AND SOFT TISSUES: Unremarkable. 
 
_______________ Impression IMPRESSION: 
 
1. Endotracheal tube and left PICC line as above. 2. Cardiomegaly with vascular congestion, similar to the prior study. US LEs 4/22/2020 Interpretation Summary · No evidence of deep vein thrombosis in the right lower extremity veins assessed. · No evidence of deep vein thrombosis in the left lower extremity veins assessed. Echo 4/22 Result status: Edited Result - FINAL Addendum by Lord Laura MD on Wed Apr 22, 2020  5:12 PM  
· Left Ventricle: Normal wall thickness and systolic function (ejection fraction normal). Mildly dilated left ventricle. The estimated ejection fraction is 45 - 50%. There is moderate (grade 2) left ventricular diastolic dysfunction E/e' Ratio = 17.09. Wall Scoring: The left ventricular wall motion is globally hypokinetic. · Right Ventricle: Not well visualized. Normal global systolic function. Moderately dilated right ventricle. · Right Atrium: Right atrium not well visualized. Moderately dilated right atrium. · IVC/Hepatic Veins: Dilated inferior vena cava. Mechanically ventilated; cannot use inferior caval vein diameter to estimate central venous pressure. HU 5/12/20: 
Result status: Final result · Image quality: good. The procedure, risks and alternatives were explained. Informed consent was obtained. . Topical anesthetic not used. . Sedation was achieved by conscious sedation. 4 mg Versed was administered. 100 mcg of Fentanyl was administered. No complications. HU probe was removed. Estimated blood loss: 0 mL. Color flow Doppler was performed and pulse wave and/or continuous wave Doppler was performed. Saline contrast was given to evaluate for intracardiac shunt. No shunt seen. · Left Ventricle: Normal cavity size and wall thickness. Mild systolic dysfunction. The estimated ejection fraction is 45 - 50%.  Visually measured ejection fraction. Abnormal left ventricular septal motion. There is mild (grade 1) left ventricular diastolic dysfunction. Wall Scoring: The left ventricular wall motion is globally hypokinetic. · Interatrial Septum: Agitated saline contrast study was performed. There was no shunting at baseline or with Valsalva. No atrial septal defect present. cxr 5/15/20 reviewed: improving alveolar densities and congestion. IMPRESSION:  
Patient Active Problem List  
Diagnosis Code  Acute on chronic respiratory failure with hypoxia and hypercapnia ([facility]) J96.21, J96.22  
 Acute on chronic systolic and diastolic CHF (congestive heart failure) ([facility]) I50.43 Aspiration pneumonitis from multiple patient initiated ET tube displacement events J69.0  Cardiac arrest ([facility] [address] I46.9 MSSA in sputum culture A49.01  
 Elevated troponin, NSTEMI R79.89, I21.4  Chronic a-fib I48.20  Atrial flutter ([address] I48.92 Prolonged QTc R94.31  
 Chronic renal insufficiency, stage II (mild) N18.2 Thrombocytopenia D69.6  Smoking F17.200  Arthritis M19.90  Migraine G43.909  Chronic back pain M54.9, G89.29  
 Obstructive Sleep apnea G47.33  
 Morbid obesity ([facility]) E66.01  
  
PLAN:  
RS -intubated, on ventilator since 4/25/2020. HU negative for any cardiac causes for severe hypoxemia or shunting. He could have pulmonary hemorrhage knowing bloody/pinkish ETT secretions and so started on Solu-Medrol 40 mg IV every 8 hour since 5/12/2020. Since then fio2 improved to 40% and peep 12 and improved ETT bloody secretions. Reduce steroids to 40 mg q12 hrs. I/o improved, leg edema improved Metabolic  alkalosis improved since lasix stopped and diamox given since 5/15/20. Will give diamox 1 more day and then restart lasix drip depending on the clinical response. Repeat ABG in am.  
Monitor I's and O's, renal functions, electrolytes and replace per ICU protocol. Continue albumin. Morbidly obese and suspect to have MARIA FERNANDA/OHS with chronic hypercapnic respiratory failure. D/w pt who stated that he is diagnosed with MARIA FERNANDA. He agreed for trach placement. Patient cannot have CT chest due to body weight limitations. C/w vent support, vent and sedation bundle. VAP prevention bundle, head of the bed at 30' all times Patient is off paralytic medication since 5/4/2020 Sedation- Off versed drip since 5/14/20. No pain, will d/c fentanyl patch as well. Continue bronchodilators prn; pulmonary hygiene care. D/w Dr Christianne Alvarez at bedside today: planned trach on Monday 10.30 am. ICU team confirmed with OR: patient doesn't need COVID testing for this surgery. CVS - 
HU unremarkable for any cause for persistent hypoxemia, bubble study negative. Only grade 1 diastolic dysfunction with LVEF 45 to 50%. No pulmonary hypertension. US legs negative for DVT. Diuretics as above. ASA held for trach plan. Eliquis held for possible pulmonary hemorrhage. Dr. Olga Mcguire on board. ID - SARS-COV-2 negative x 2 Antibiotic: s/p Zosyn completed on 5/12/2020 10 days antibiotic MSSA in resp cxs from before; respiratory cultures 5/8 no growth. Procalcitonin repeat is negative. No leukocytosis. No fever. Hem - HIT panel and LEYDI Ab NEG on 4/28. He is definitely high risk for PEs; CTA chest cannot be done due to limitations due to weight; stopped argatroban 5/4;[suspect fatty liver/GASTELUM]; Eliquis held due to bloody secretions from the ET tube. Vasc - PVL bl LE negative Renal -continue monitor BUN and creatinine stable; electrolyte replacement per protocol. Nephrology Dr. Gale Bourne hs signed off.   
GI - GI placed OG tube; good position confirmed with abdominal x-ray using Gastrografin. Patient pulled NGT out last night, replaced by RN and TF was started last night. Asked RN to hold the TF now until placement confirmed.   D/w RN to call IR and arrange for gastrograffin NGT placement as due to morbid obese and body habitus KUB is not able to confirm the NGT placement. LFTs remain normal 
Neurology: moving extremities and follows all commands. No focal deficit. Overall poor prognosis. Patient chronically vent dependent. Full code. Will defer respective systems problem management to primary and other consultant and follow patient in ICU with primary and other medical team 
Quality Care: PPI, DVT prophylaxis, HOB elevated, Infection control all reviewed and addressed. PAIN AND SEDATION: As above · Skin/Wound:  Blister on foot, does not appear pressure sore. Continue local care per wound care consult. · Nutrition:  Start tube feeding · Prophylaxis: DVT and GI Prophylaxis reviewed. · Restraints: prn 
· PT/OT eval and treat: as needed when stable · Lines/Tubes: A-line 4/23/20; daniel 4/21/20, ET tube 4/25/20; PICC line left arm 5/5 ADVANCE DIRECTIVE: Full code DISCUSSION: spoke with RN, RT, ICU staff, MDR Events and notes from last 24 hours reviewed. Care plan discussed with nursing Total CC time: 34minutes. High complexity review and management Anthony Charles MD  
5/16/2020

## 2020-05-17 NOTE — PROGRESS NOTES
Hospitalist Progress Note-critical care note Patient: Staci Alcantara MRN: [id number]  [facility]: [id number] YOB: 1975  Age: 53 y.o. Sex: male DOA: 4/21/2020 LOS:  LOS: 26 days Chief complaint: cardiac arrest, respiratory failure, CHF, chronic A. fib CKD morbid obesity Assessment/Plan Hospital Problems  Date Reviewed: 5/2/2020 Codes Class Noted POA Hypocalcemia ICD-10-CM: E83.51 
ICD-9-CM: 275.41  5/15/2020 Unknown NSTEMI (non-ST elevated myocardial infarction) ([facility] ICD-10-CM: I21.4 ICD-9-CM: 410.70  5/13/2020 Unknown Advanced care planning/counseling discussion ICD-10-CM: Z71.89 ICD-9-CM: V65.49  Unknown Unknown Cardiac arrest [facility]) ICD-10-CM: I46.9 ICD-9-CM: 427.5  4/23/2020 Unknown Acute on chronic respiratory failure with hypoxia and hypercapnia (HCC) ICD-10-CM: J96.21, J96.22 
ICD-9-CM: 518.84, 786.09, 799.02  4/22/2020 Yes Acute CHF (congestive heart failure) (HCC) ICD-10-CM: I50.9 ICD-9-CM: 428.0  4/21/2020 Yes * (Principal) Respiratory failure with hypoxia and hypercapnia (HCC) ICD-10-CM: J96.91, J96.92 
ICD-9-CM: 518.81  4/21/2020 Yes Chronic a-fib ICD-10-CM: I48.20 ICD-9-CM: 427.31  10/15/2018 Yes Chronic renal insufficiency, stage II (mild) ICD-10-CM: N18.2 ICD-9-CM: 969. 2  Unknown Yes  
   
 BMI 70 and over, adult [facility]) ICD-10-CM: U96.33 ICD-9-CM: V85.45  Unknown Yes Morbid obesity ([facility] ICD-10-CM: E66.01 
ICD-9-CM: 278.01  4/4/2016 Yes Sleep apnea ICD-10-CM: G47.30 ICD-9-CM: 780.57  4/4/2016 Yes 54 y/o male with hx of morbid obesity, MARIA FERNANDA, chronic afib on anticoagulation, and systolic CHF (EF 02-21%) is admitted with respiratory failure with hypoxia and hypercapnia requiring BiPAP as well as acute CHF exacerbation-now on vent CRITICAL CARE PLAN 
   
Resp Acute respiratory respiratory failure with hypoxia and hypercapnia On vent and wean, will have scheduled trach on May 18 morning Off sedation am, on 40 % O2 and peep 14 this  
 aspirin hold for trach Aspiration pneumonia-on  Zosyn-completed the treatment Respiratory cx:NO NORMAL RESPIRATORY BRYN ISOLATED On steroid   
 
jac CVS  
So far stable, no pressor needed Cardiac arrest-PEA after self extubated Avoid beta-blocker due to bradycardia HU done on 5/12, no shunt noted Chronic A. Fib, Eliquis/aspirin  Have  been hold Follow-up with cardiology Aspirin is hold Acute on chronic systolic heart failure 
 received Lasix drip  
 so far renal function wnl  
continue monitor renal function and electrolytes. 
  
nstemi Aspirin hold due to trach on [place]. bbb was hold due to natalia Dr. Tatianna Reno f/u ID was on n Zosyn Heme/onc H/h stable, continue monitoring  
  
Renal  
Hypernatremia-resolved Continue monitoring urine out-put  
 
hypocalcemia Resolved Trend BUN, Cr, follow I/O, daniel in place. Continue icu electrolytes replacement protocol Neuro: 
Off sedation alert and oriented  
  
Endocrine - Follow FSG,  
  
  
GI -tube feeding,  
  
Prophylaxis - DVT: SCDs, GI: protonix Morbid obesity Rn: terrie, noel AM  
 
30 minutes of critical care time spent in the direct evaluation and treatment of this high risk patient. The reason for providing this level of medical care for this critically ill patient was due a critical illness that impaired one or more vital organ systems such that there was a high probability of imminent or life threatening deterioration in the patients condition. This care involved high complexity decision making to assess, manipulate, and support vital system functions, to treat this degreee vital organ system failure and to prevent further life threatening deterioration of the patients condition. Disposition :tbd, Review of systems: 
unable to obtain due to intubation, Vital signs/Intake and Output: Visit Vitals /79 Pulse (!) 59 Temp 99.1 °F (37.3 °C) Resp 20 Ht 5' 10\" (1.778 m) Wt (!) 230 kg (507 lb 0.9 oz) SpO2 97% BMI 72.76 kg/m² Current Shift:  05/17 0701 - 05/17 1900 In: -  
Out: 3810 [Urine:450; VWXUAV:6960] Last three shifts:  05/15 1901 - 05/17 0700 In: 0207 [I.V.:500] Out: 6300 [Urine:5700; Drains:600] Physical Exam: 
General: Intubated, alert on vent, follow the command HEENT: NC, Atraumatic. PERRLA, anicteric sclerae. ET tube noted Lungs: Courses breathing sound Heart:  Regular  rhythm,  No murmur, No Rubs, No Gallops Abdomen: Soft, Non distended, Non tender. +Bowel sounds, Clark noted Extremities: No c/c, no edema Psych:             calm on sedation Neurologic:   alert/follow the command, move all extremities no acute neuro deficit noted Labs: Results:  
   
Chemistry Recent Labs 05/17/20 
0545 05/16/20 
0530 05/15/20 
0425 * 104* 116*  138 142  
K 4.4 3.9 3.9  104 103 CO2 24 28 34* BUN 28* 27* 28* CREA 0.74 0.75 0.76 CA 8.8 8.6 8.4* AGAP 5 6 5 BUCR 38* 36* 37*  37* 40* TP 6.8 6.6 6.4 ALB 3.8 3.7 3.2*  
GLOB 3.0 2.9 3.2 AGRAT 1.3 1.3 1.0  
  
CBC w/Diff Recent Labs 05/17/20 
0545 05/16/20 
0530 05/15/20 
0425 WBC 7.9 8.2 8.2  
RBC 3.95* 3.81* 3.74* HGB 10.6* 10.0* 10.0* HCT 33.7* 32.7* 32.3*  
 213 207 GRANS 87* 87* 90* LYMPH 9* 7* 5* EOS 0 0 0 Cardiac Enzymes No results for input(s): CPK, CKND1, CONSTANZA in the last 72 hours. No lab exists for component: Erick Muck Coagulation Recent Labs 05/17/20 
0545 05/16/20 
0530 PTP 17.1* 17.3* INR 1.4* 1.4* APTT 25.3 27.1 Lipid Panel No results found for: CHOL, CHOLPOCT, CHOLX, CHLST, CHOLV, 750387, HDL, HDLP, LDL, LDLC, DLDLP, 234144, VLDLC, VLDL, TGLX, TRIGL, TRIGP, TGLPOCT, CHHD, CHHDX  
BNP No results for input(s): BNPP in the last 72 hours. Liver Enzymes Recent Labs 05/17/20 
0545  
TP 6.8 ALB 3.8  SGOT 50* Thyroid Studies Lab Results Component Value Date/Time TSH 1.45 04/27/2020 03:50 AM  
    
Procedures/imaging: see electronic medical records for all procedures/Xrays and details which were not copied into this note but were reviewed prior to creation of Plan Xr Chest Sng[facility] V Result Date: 4/25/2020 CLINICAL HISTORY:  Endotracheal tube placement/repositioning. COMPARISONS:  Chest x-ray, earlier the same day. TECHNIQUE:  single frontal view of the chest ------------------------------------------ FINDINGS: Exam limited by patient large body habitus. Lungs:  Hazy opacity in the lung bases, more apparent than on the prior study. Upper lungs clear. Mediastinum: Marked cardiomegaly. Endotracheal tube has been repositioned, tip now 5.5 cm above the miguel ángel. Right transjugular venous catheter, tip near cavoatrial junction. Bones: No evidence of fracture or suspicious bone lesion. ------------------------------------------ 
 
IMPRESSION: 1. Hazy bilateral lower lobe opacity, likely a combination of pleural fluid and atelectasis, slightly more apparent than on recent comparison study. Pneumonia or aspiration not excluded. 2. Endotracheal tube has been repositioned, now in good position. Xr Abd (kub) Result Date: 5/7/2020 EXAM: SUPINE ABDOMINAL RADIOGRAPH CLINICAL INDICATION/HISTORY: eval for ileus -Additional: None COMPARISON: 5/6/2020 TECHNIQUE: Single supine view of the abdomen. _______________ FINDINGS/IMPRESSION: BOWEL GAS PATTERN: Limited study secondary to body habitus. Enteric tube poorly visualized. Contrast injection demonstrates tip at the GE junction/proximal stomach. Oral contrast within the colon. Xr Abd (kub) Result Date: 5/6/2020 Abdomen, single view COMPARISON: May 2, 2020 INDICATION: Nasogastric tube placement FINDINGS: Supine AP view the abdomen obtained before and after administration of contrast per the nasogastric tube.  Anatomic detail is markedly limited by the patient's body habitus with collimation of the majority of the right lateral abdomen. Radiopaque tubing noted below the diaphragm, the tip of which is indistinct. Contrast opacifies the stomach. Impression: 1. Detail limited examination. Nasogastric tube is below the diaphragm. Although the exact tip position is unclear on the provided images, opacification of gastric rugal folds is compatible with tube position in the stomach. Xr Abd (kub) Result Date: 5/3/2020 EXAM: SUPINE ABDOMINAL RADIOGRAPH CLINICAL INDICATION/HISTORY: ng placement -Additional: None COMPARISON: None TECHNIQUE: Single supine view of the abdomen. _______________ FINDINGS: BOWEL GAS PATTERN: Outside the field of view. BONES: Unremarkable. OTHER: Subtle linear density likely corresponding with reported nasogastric tube, possible tip seen in the inferior thorax distal esophagus region. Endotracheal tube noted which is above the miguel ángel. _______________ IMPRESSION: Nondiagnostic exam for purposes of nasogastric tube positioning. It is suspected that the tip of the tube is in the distal esophagus, but exam quality is not adequate for confident localization. Xr Abd (kub) Result Date: 4/22/2020 EXAM: XR ABD (KUB) CLINICAL INDICATION/HISTORY: ETT and OG placement COMPARISON: Chest radiograph same day TECHNIQUE: Supine AP view of the abdomen was obtained. _______________ FINDINGS: Enteric tube is not reliably visualized on this exam; indeterminate to what extent this is related to its potential high lying intrathoracic position versus or instead if the tube is simply obscured because of patient's obese body habitus. _______________ IMPRESSION: Enteric tube is not reliably visualized within the distal thoracic esophagus normal or distally within the left upper quadrant. Xr Chest [facility] Result Date: 5/10/2020 EXAM: CHEST RADIOGRAPH, SINGLE VIEW CLINICAL INDICATION/HISTORY: Aspiration pneumonia, ET tube placement      <Additional:  None. COMPARISON: 5/9/2020 TECHNIQUE: Portable frontal view of the chest was obtained. _______________ FINDINGS: SUPPORT DEVICES: ETT is 5.9 cm above the miguel ángel. PICC from a left arm approach projects at the [place] [facility]. HEART AND MEDIASTINUM: Cardiac silhouette is top normal in size. LUNGS AND PLEURAL SPACES: Extensive bilateral alveolar densities are present throughout both lungs, slightly more dense on the left than on the right without significant changes. Left costophrenic angle is blunted versus obscured. The right costophrenic angle is blunted. No pneumothorax. BONY THORAX AND SOFT TISSUES: No acute osseous abnormality. _______________ IMPRESSION: Diffuse bilateral asymmetrical left greater than right edema versus pneumonia versus ARDS without change. Small right effusion, cannot exclude a small left pleural effusion. Xr Chest [facility] Result Date: 5/9/2020 EXAM: CHEST RADIOGRAPH, SINGLE VIEW CLINICAL INDICATION/HISTORY: Aspiration pneumonia, ET tube placement      <Additional:  None. COMPARISON: 5/8/2020 TECHNIQUE: Portable frontal view of the chest was obtained. _______________ FINDINGS: SUPPORT DEVICES: ETT is 7.0 cm above the miguel ángel, PICC tip from a left arm approach projects at the distal SVC. HEART AND MEDIASTINUM: Cardiac silhouette is top normal in size. LUNGS AND PLEURAL SPACES: Extensive bilateral alveolar densities are present throughout both lungs, slightly more dense on the left than on the right, without significant changes. Left costophrenic angle is cut off, the right costophrenic angle is blunted. BONY THORAX AND SOFT TISSUES: No acute osseous abnormality. _______________ IMPRESSION: Diffuse bilateral left greater than right pneumonia versus pulmonary edema. Small right pleural effusion. No acute changes. Xr Chest [facility] Result Date: 5/8/2020 AP portable chest, 5/8/2020 at 0500 hours: INDICATION: Shortness of breath. Intubated. Comparison 5/7/2020. Endotracheal tube just below the clavicles but well above the miguel ángel. Extensive bilateral edema or infiltrate most prominent in the upper lobes. Perhaps some increase in right basilar density with partial obscuration of the right hemidiaphragm in the interval. Persistent retrocardiac opacification with obscuration of the left hemidiaphragm. The heart is stable and top normal. Left arm PICC line tip is at the cavoatrial junction in good position. IMPRESSION: Extensive bilateral edema or infiltrate, most prominent in the upper lobes, certainly potentially in part aspiration. Some increase in right basilar density perhaps, increasing focal edema and/or pleural effusion/atelectasis. No additional change. Xr Chest [facility] Result Date: 5/7/2020 EXAM: Portable frontal view of the chest. CLINICAL INDICATION/HISTORY: Aspiration pneumonia, follow-up intubation COMPARISON: 5/6/2020 _______________ FINDINGS: Stable and satisfactory position endotracheal tube and left arm PICC line. No significant change in bilateral areas of airspace filling process with stable mildly enlarged cardiac silhouette with no definite pleural effusion or pneumothorax. _______________ IMPRESSION: 1. Stable chest. 
 
Xr Chest [facility] Result Date: 5/6/2020 EXAM: XR CHEST PORT CLINICAL INDICATION/HISTORY: Aspiration pneumonia, ET tube placement -Additional: None COMPARISON: Several prior exams, most recently May 5, 2020 TECHNIQUE: Portable frontal view of the chest _______________ FINDINGS: SUPPORT DEVICES: Endotracheal tube, right IJ central venous catheter, and left approach PICC line noted projecting in stable position. HEART AND MEDIASTINUM: Cardiac size and mediastinal contours appear stable. Cardiac silhouette is enlarged, as previously. LUNGS AND PLEURAL SPACES: Asymmetric airspace process within the right mid and lower lungs with slightly improved left lung base aeration.  Potential right pleural effusion. No pneumothorax. BONY THORAX AND SOFT TISSUES: Unremarkable. _______________ IMPRESSION: 1. Adequate/stable position of support devices. 2. Little interval change in the appearance of right-sided airspace opacity, basilar changes of atelectasis and presumptively small right pleural effusion. Xr Chest [facility] Result Date: 5/5/2020 EXAM: XR CHEST PORT CLINICAL INDICATION/HISTORY: Aspiration pneumonia, ET tube placement -Additional: None COMPARISON: 5/4/2020 TECHNIQUE: Portable frontal view of the chest _______________ FINDINGS: SUPPORT DEVICES: Endotracheal tube and right IJV central venous catheter unchanged. HEART AND MEDIASTINUM: Cardiomegaly. LUNGS AND PLEURAL SPACES: Limited secondary to body habitus. Right mid to lower lung zone opacification, unchanged. . Probable right pleural effusion. No large pneumothorax. _______________ IMPRESSION: Right mid to lower lungs on opacification, unchanged. . Probable right pleural effusion. Xr Chest [facility] Result Date: 5/4/2020 EXAM: XR CHEST PORT CLINICAL INDICATION/HISTORY: Aspiration pneumonia, ET tube placement -Additional: None COMPARISON: Several prior exams, most recently May 3, 2020 TECHNIQUE: Portable frontal view of the chest _______________ FINDINGS: SUPPORT DEVICES: Endotracheal tube and right IJ approach central venous catheter both project in stable position. HEART AND MEDIASTINUM: Stable enlarged cardiac silhouette and mediastinal contours. LUNGS AND PLEURAL SPACES: Hazy lower lung opacities noted with mid to lower lung predominance, somewhat increased on the left from prior exam.. No evidence of pneumothorax or large pleural effusion. As previously, limited visualization due to technique and body habitus. BONY THORAX AND SOFT TISSUES: Unremarkable. _______________ IMPRESSION: 1. Stable/adequate position of support devices.  2. Little interval change in the appearance of right mid to lower lung airspace disease/atelectasis and likely left basilar atelectasis. Xr Chest [facility] Result Date: 5/3/2020 EXAM: PORTABLE FRONTAL CHEST RADIOGRAPH CLINICAL INDICATION/HISTORY: Intubation. COMPARISON: 5/2/2020 TECHNIQUE: Portable frontal view of the chest _______________ FINDINGS: SUPPORT DEVICES: Adequately positioned endotracheal tube and right IJ central venous catheter. HEART AND MEDIASTINUM: Rotated to the right. Moderately enlarged cardiomediastinal silhouette appears similar to prior. LUNGS AND PLEURAL SPACES: Homogeneous opacification right mid/lower thorax similar if not increasingly dense in the interval. Relatively clear appearance of left lung. Overall limited visualization due to technique and body habitus. BONY THORAX AND SOFT TISSUES: Unremarkable. _______________ IMPRESSION: 1. Adequately positioned endotracheal tube. 2. Right thoracic opacification similar if not slightly increased in density in the interval, likely some combination of pleural effusion and lung consolidation (atelectasis/pneumonia). Xr Chest [facility] Result Date: 5/2/2020 EXAM: PORTABLE FRONTAL CHEST RADIOGRAPH CLINICAL INDICATION/HISTORY: Aspiration pneumonia. Intubation. COMPARISON: 5/1/2020 TECHNIQUE: Portable frontal view of the chest _______________ FINDINGS: SUPPORT DEVICES: Adequately positioned endotracheal tube and right IJ central venous catheter. HEART AND MEDIASTINUM: Rightward rotation. Stable cardiomediastinal silhouette, with enlargement of cardiac silhouette. LUNGS AND PLEURAL SPACES: Hazy opacification lower right thorax with obscuration of the diaphragm, similar or slightly increased from prior exam. BONY THORAX AND SOFT TISSUES: Unremarkable. _______________ IMPRESSION: 1. Adequately positioned endotracheal tube. 2. Right basilar opacification may be slightly increased from prior exam, likely some combination of effusion and lung consolidation (pneumonia/atelectasis). Xr Chest [facility] Result Date: 5/1/2020 AP portable chest, 5/1/2020 at 0449 hours: INDICATION: Pneumonia. ET tube placement. Comparison 4/30/2020. Endotracheal tube is in the upper thoracic trachea below the clavicles. The study is lordotically positioned. Right internal jugular central line is in the upper SVC. Asymmetric right perihilar edema or infiltrate. Cardiomegaly. Mild interstitial perihilar changes on the left without new focal infiltrate. IMPRESSION: Little change. Right lung edema or infiltrate. Endotracheal tube in good position. Xr Chest [facility] Result Date: 4/30/2020 EXAM: XR CHEST PORT CLINICAL INDICATION/HISTORY: Aspiration pneumonia, ET tube placement -Additional: None COMPARISON: One day prior TECHNIQUE: Portable frontal view of the chest _______________ FINDINGS: SUPPORT DEVICES: Right IJV central venous catheter unchanged. Endotracheal tube unchanged. HEART AND MEDIASTINUM: Cardiomegaly. LUNGS AND PLEURAL SPACES: Limited study secondary to body habitus and exclusion of the left costophrenic angle. Central vascular congestion with moderate bilateral effusions and pulmonary edema. No pneumothorax. _______________ IMPRESSION: Limited study secondary to body habitus and exclusion of the left costophrenic angle. Central vascular congestion with moderate bilateral effusions and pulmonary edema. No pneumothorax. Xr Chest [facility] Result Date: 4/29/2020 EXAM: XR CHEST PORT CLINICAL INDICATION/HISTORY: Aspiration pneumonia, ET tube placement -Additional: None COMPARISON: 4/27/2020 TECHNIQUE: Portable frontal view of the chest _______________ FINDINGS: SUPPORT DEVICES: Right IJV central venous catheter unchanged. Endotracheal tube unchanged. HEART AND MEDIASTINUM: Cardiomegaly. LUNGS AND PLEURAL SPACES: Limited study secondary to body habitus and exclusion of the left costophrenic angle. Central vascular congestion with moderate bilateral effusions and pulmonary edema. No pneumothorax. _______________ IMPRESSION: Limited study secondary to body habitus and exclusion of the left costophrenic angle. Central vascular congestion with moderate bilateral effusions and pulmonary edema. No pneumothorax. Xr Chest [facility] Result Date: 4/27/2020 EXAM: XR CHEST PORT CLINICAL INDICATION/HISTORY: Intubated -Additional: None COMPARISON: 4/26/2020 TECHNIQUE: Portable frontal view of the chest _______________ FINDINGS: SUPPORT DEVICES: Endotracheal tube projects in stable position, estimated at approximately 4.9 cm above the miguel ángel. Right IJ approach central venous catheter with tip projecting over the SVC. HEART AND MEDIASTINUM: Stable appearing cardiac size and mediastinal contours. Enlarged appearing cardiac silhouette redemonstrated. LUNGS AND PLEURAL SPACES: Basilar areas of opacity and dense left retrocardiac opacity noted. No evidence of pneumothorax or large pleural effusion. BONY THORAX AND SOFT TISSUES: Unremarkable. _______________ IMPRESSION: 1. Endotracheal tube and right IJ approach central venous catheter in position as above. 2. Cardiomegaly and bibasilar areas of airspace disease/atelectasis without significant change Xr Chest [facility] Result Date: 4/26/2020 
--------------------------------------------------------------------------- <<<<<<<<<           [facility]           >>>>>>>>> --------------------------------------------------------------------------- CLINICAL HISTORY:  Hypoxia. COMPARISON EXAMINATIONS:  April 25, 2020. ---  SINGLE FRONTAL VIEW OF THE CHEST  --- Portions of the lower lung fields are not imaged. The cardiomediastinal silhouette is grossly stable. There is an endotracheal tube seen in good position below the clavicles and above the miguel ángel. A right central line is noted with tip at the level of the upper SVC. There are bilateral mid to lower lung field opacities.  No significant osseous abnormalities are identified.  -------------- 
 
 Impression: -------------- Limited exam. Endotracheal tube and right central line in place. Bilateral mid to lower lung field opacities possibly pleural fluid with atelectasis and/or infiltrates. Similar findings were seen previously. Xr [facility] Result Date: 4/25/2020 CLINICAL HISTORY:  Endotracheal tube placement COMPARISONS:  Chest x-ray, earlier the same day TECHNIQUE:  single frontal view of the chest ------------------------------------------ FINDINGS: Lungs:  Lungs slightly suboptimally assessed due to patient large body habitus. Hypoinflation with mild bandlike opacity in the left lung base, likely atelectasis. Left basilar consolidation not excluded. Mediastinum: Right transjugular venous catheter, tip near cavoatrial junction. High position of endotracheal tube, with tip approximately 12 cm above the miguel ángel. Cardiomegaly. Bones: No evidence of fracture or suspicious bone lesion. ------------------------------------------ 
 
IMPRESSION: 1. Endotracheal tube has been pulled back in the interval and is now high in position. Recommend advancement. 2. Lungs slightly suboptimally assessed due to body habitus, though left basilar consolidation not excluded, not appreciably changed. Xr [facility] Result Date: 4/25/2020 CLINICAL HISTORY:  As above. COMPARISONS:  Chest x-ray 4/24/2020 TECHNIQUE:  single frontal view of the chest ------------------------------------------ FINDINGS: Lungs:  Mild increase in bilateral pulmonary interstitial markings. Hazy opacity in the lung bases, likely combination of atelectasis and pleural fluid. Small to moderate-sized pleural effusions very likely present. Mediastinum: Marked cardiomegaly. Endotracheal tube tip approximately 6.7 cm above the miguel ángel. Right-sided central venous catheter, tip near cavoatrial junction.  Bones: No evidence of fracture or suspicious bone lesion. ------------------------------------------ 
 
 IMPRESSION: 1. Bilateral pleural effusions with bibasilar pulmonary atelectasis. 2. Probable mild pulmonary edema. Xr Chest [facility] Result Date: 4/24/2020 EXAM: XR CHEST PORT CLINICAL INDICATION/HISTORY: hypoxia -Additional: None COMPARISON: one day prior TECHNIQUE: Portable frontal view of the chest _______________ FINDINGS: SUPPORT DEVICES: Right IJV central venous catheter tip in the proximal SVC. Endotracheal tube unchanged. HEART AND MEDIASTINUM: Cardiomegaly. LUNGS AND PLEURAL SPACES: Central venous vascular congestion and interstitial infiltrates. Probable small bilateral effusions. _______________ IMPRESSION: Central venous vascular congestion and interstitial infiltrates. Probable small bilateral effusions. Xr Chest [facility] Result Date: 4/23/2020 EXAM: CHEST RADIOGRAPH, SINGLE VIEW CLINICAL INDICATION/HISTORY: Shortness of breath, hypoxia, endotracheal tube placement COMPARISON: 4/22/2020 TECHNIQUE: Portable frontal view of the chest was obtained. _______________ FINDINGS: SUPPORT DEVICES:   >  Endotracheal tube tip is located approximately 5.3 cm above the miguel ángel, adequately positioned.   >  Enteric tube is adequately visualized at the level of thoracic inlet and just above the miguel ángel, slightly eccentric to the left but is not clearly visualized throughout the lower aspect of the left hemithorax, potentially artifactual given patient's body habitus and adjacent overlying global cardiomegaly. > Unchanged adequately positioned right jugular central venous catheter. HEART AND MEDIASTINUM: Cardiomediastinal silhouette appears unchanged, markedly enlarged. Tortuous and atherosclerotic thoracic aorta. Prominent central pulmonary vascular congestion. LUNGS AND PLEURAL SPACES: Diffusely increased interstitial markings and retrocardiac left lower lobe opacity, silhouetting medial left hemidiaphragm. Probable small pleural effusions. No pneumothorax.   BONY THORAX AND SOFT TISSUES: No acute osseous abnormality. _______________ IMPRESSION: 1. Endotracheal tube is adequately positioned. Unchanged, adequately positioned right jugular central venous catheter. 2. Enteric tube is not clearly visualized below the level of the miguel ángel; indeterminate to what extent this represents artifact related to patient's body habitus and global cardiomegaly or instead of the enteric tube is proximal in position. 3. Unchanged cardiomegaly, central vascular congestion and interstitial pulmonary edema. Probable small pleural effusions. Xr Chest [facility] Result Date: 4/22/2020 EXAM: XR CHEST PORT CLINICAL INDICATION/HISTORY: hypoxia -Additional: None COMPARISON: Earlier the same day TECHNIQUE: Portable frontal view of the chest _______________ FINDINGS: SUPPORT DEVICES: Interval placement of right IJV central venous catheter. Endotracheal and enteric tubes are unchanged. HEART AND MEDIASTINUM: Stable cardiomediastinal silhouette. LUNGS AND PLEURAL SPACES: Limited study, bilateral lower lung fields are excluded from field-of-view. Prominent central vasculature and interstitial edema. No pneumothorax _______________ IMPRESSION: Limited study. Interval placement of right IJV central venous catheter with tip at the SVC. No pneumothorax. Xr Chest [facility] Result Date: 4/22/2020 EXAM: CHEST RADIOGRAPH, SINGLE VIEW CLINICAL INDICATION/HISTORY: Endotracheal and orogastric tube placement COMPARISON: 4/21/2020 TECHNIQUE: Portable frontal view of the chest was obtained. _______________ FINDINGS: SUPPORT DEVICES: Vertebral placement of endotracheal tube with tip approximately 5.3 cm above the miguel ángel. Enteric tube is seen coursing through midline thoracic esophagus. HEART AND MEDIASTINUM: Cardiomediastinal silhouette appears unchanged, markedly enlarged. Tortuous and atherosclerotic thoracic aorta. No central vascular congestion.  LUNGS AND PLEURAL SPACES: Retrocardiac medial left lower lobe opacity silhouettes left hemidiaphragm. No confluent airspace opacity elsewhere throughout the lungs. No definite pleural effusion. No pneumothorax. BONY THORAX AND SOFT TISSUES: No acute osseous abnormality. _______________ IMPRESSION: 1. Adequate interval endotracheal tube placement. Enteric tube is better visualized on the concurrent abdominal radiographs. 2. Unchanged global cardiomegaly and retrocardiac left lower lobe atelectasis/infiltrate. Xr Chest [facility] Result Date: 4/21/2020 EXAM: CHEST RADIOGRAPH CLINICAL INDICATION/HISTORY: SOB   > Additional: None COMPARISON: 10/16/2018. TECHNIQUE: Portable frontal view of the chest _______________ FINDINGS: SUPPORT DEVICES: None. HEART AND MEDIASTINUM: Cardiac size remains enlarged. Pulmonary vasculature mediastinal structures are stable. LUNGS AND PLEURAL SPACES: Shallow inspiration. Cannot exclude a left retrocardiac opacity. No definite pleural effusion or pneumothorax. BONY THORAX AND SOFT TISSUES: Unremarkable. _______________ IMPRESSION: 1. Stable cardiomegaly with probable mild pulmonary congestion. 2. Cannot exclude left lower lobe infiltrate/atelectasis. Xr Abd Port  1 V Result Date: 4/24/2020 EXAM: SUPINE ABDOMINAL RADIOGRAPH CLINICAL INDICATION/HISTORY: OG tube placement -Additional: None COMPARISON: 4/22/2014 TECHNIQUE: Single supine view of the abdomen. _______________ FINDINGS: BOWEL GAS PATTERN: Limited study secondary to body habitus. Nonvisualization of enteric tube along the expected course of the digestive tract. Sy Hilt BONES: Unremarkable. OTHER: None. _______________ IMPRESSION: Limited study secondary to body habitus. Nonvisualization of enteric tube along the expected course of the digestive tract. Sy Hilt Ir Guide Insert Picc Over 5 Yrs Result Date: 5/7/2020 PROCEDURE: PICC placement ATTENDING: Dyllan Lr M.D. INDICATION: Long-term central venous access. COMPLICATIONS: None.  PROCEDURE: Informed consent was obtained. Physician lead timeout was performed. The arm was prepped and draped in sterile fashion. The procedure was performed following standard maximal barrier technique which includes, cap, mask, sterile gown, sterile gloves, sterile full body drape, hand hygiene with alcohol foam, and 2% chlorhexidine for cutaneous antisepsis. Sterile ultrasound gel and a sterile cover for the US probe was used. After successfully identifying a patent left cephalic vein, real-time ultrasound guidance demonstrated patent vein with normal waveforms. Then, real-time ultrasound guidance was used to puncture the vein. Permanent recording was created for the patient's record. Guidewire was passed centrally, portable radiograph was performed to confirm tip position. A peel-away sheath was inserted over the guidewire. The intravascular distance was measured. The PICC measures 55 cm. The double lumen PICC was placed into the vein with its tip in the brachiocephalic/SVC junction, confirmed with post placement portable radiograph. Both ports aspirated and flushed easily. The PICC was secured to the skin, a sterile dressing including Biopatch was applied. IMPRESSION: Dual lumen left PICC placement, ready for immediate use.   
 
 
Emily Muniz MD

## 2020-05-17 NOTE — PROGRESS NOTES
No significant overnight events, remains alert, oriented, and appropriate when awake. Able to communicate needs with hand gestures and written words as needed. Denies needs at this time, staff continues to closely monitor.

## 2020-05-17 NOTE — PROGRESS NOTES
0800 Assumd care of patient. .. Alert and in bed. following commands. . repostioned lines and verified placement 0930 Round with crit care MD.. Patient to have Trac in Am. .. mD wants care management to discus LT[facility]. .. will inform night nurse 
1200 Reassessment no change at this time. .. 1600 Reassessment completed. .. Patient confirmed anxiety about AM surgery. .. lorazepam given. .. Bedside and Verbal shift change report given to [address] [place] [address] (oncoming nurse) by Karyle Doheny RN (offgoing nurse). Report included the following information SBAR and Kardex.

## 2020-05-17 NOTE — PROGRESS NOTES
Pulmonary, Critical Care, and Sleep Medicine Pulmonary Progress Note Name: Junito Stewart. : 1975 MRN: [id number] Date: 2020 [x]I have reviewed the flowsheet and previous days notes. Events, vitals, medications and notes from last 24 hours reviewed. Subjective/History:  
18-year-old male with morbid obesity, ex-smoker, history of asthma, history of severe obstructive sleep apnea on CPAP, questionable component compliance, history of congestive heart failure with reduced ejection fraction atrial fibrillation atrial flutter on amiodarone, Xarelto, history of chronic renal insufficiency, cellulitis of lower extremities and anemia. As per family did not have any fever or upper respiratory tract infections and no flulike symptoms. On Lasix, CPAP not clear if missed. Presented with acute shortness of breath worsening edema of lower extremities. He was found to be in acute hypercarbic hypoxemic respiratory failure initially had a trial of BiPAP but failed and then eventually was intubated. He was hypotensive. Moderate amount of secretions. Morbid obesity. Patient compliant with Xarelto. Patient self extubated and had PEA arrest. 
 
Subjective:  
18 Patient intubated  for hypoxemic resp failure. Remains in ICU, orally intubated, on ventilator. Off sedation. Awake and follows all commands. No fever. BP stable. Diuresing well. Metabolic alkalosis resolved with diamox. B/l Leg edema improving. CXR slight improvement. fio2 now at 40% and peep reduced to 8. BP elevated, on norvasc. HR in 40's sinus  intermittently when sleeping I/O negative ETT bloody secretions improved since diuresed and on steroids, now mild scant secretions. Drips: none Intake/Output Summary (Last 24 hours) at 2020 1052 Last data filed at 2020 9998 Gross per 24 hour Intake 2615 ml Output 5650 ml Net -3035 ml ROS: Review of systems not obtained due to patient factors. Past Medical History: 
Past Medical History:  
Diagnosis Date  Arrhythmia  Arthritis   
 knees  CHF (congestive heart failure) ([facility] [address]  Chronic back pain  Chronic renal insufficiency, stage II (mild)  History of atrial flutter Dx 2016 - anticoagulated by Nicholas Collier  Hypertension 2015  Limited mobility  Migraine headache  Morbid obesity ([facility] [address]  Morbid obesity with body mass index of 60.0-69.9 in adult [facility])  Sleep apnea   
 uses c-pap instructed to bring day of surgery  Smoking   
 very passive / cigars only Allergy: No Known Allergies Vital Signs:   
Blood pressure (!) 173/97, pulse 72, temperature 99.1 °F (37.3 °C), resp. rate 22, height 5' 10\" (1.778 m), weight (!) 230 kg (507 lb 0.9 oz), SpO2 94 %. Body mass index is 72.76 kg/m². O2 Device: Endotracheal tube, Ventilator O2 Flow Rate (L/min): 16 l/min Temp (24hrs), Av.6 °F (37 °C), Min:98.2 °F (36.8 °C), Max:99.1 °F (37.3 °C) Intake/Output:  
Last shift:       07 - 1900 In: -  
Out: 6625 [Urine:450; ECPYRN:9392] Last 3 shifts: 05/15 190 -  0700 In: 2102 [I.V.:500] Out: 6300 [Urine:5700; Drains:600] Intake/Output Summary (Last 24 hours) at 2020 1052 Last data filed at 2020 3160 Gross per 24 hour Intake 2615 ml Output 5650 ml Net -3035 ml Ventilator Settings: 
Mode Rate Tidal Volume Pressure FiO2 PEEP Pressure control   580 ml  15 cm H2O 40 % 8 cm H20 Peak airway pressure: 24 cm H2O Minute ventilation: 15.8 l/min Physical Exam: 
General: obese male; no cyanosis; alert awake and following all commands. on ventilator HEENT: LIBBY, orally intubated; orogastric tube; no bleeding; pupils not dilated, reactive Neck: thick short obese neck Chest: normal, obese chest wall Lungs: moderate air entry, obese chest limits exam; decreased BS bases; symmetrical chest expansion Heart: S1S2 present or without murmur or extra heart sounds; JVD not elevated Abdomen: obese, bowel sounds normoactive, soft without significant tenderness; no organomegaly; no guarding or rigidity Extremity: b/l leg/feet edema improving, now can see wrinkles of skin; negative for cyanosis, clubbing. Can see wrinkles in feet and leg since edema improving. Capillary refill: normal 
Neuro:  Alert awake and following all commands, moving all 4 limbs and following all commands. Skin: Skin color, texture, turgor fair. Skin dry, warm, non-diaphoretic DATA:  
Current Facility-Administered Medications Medication Dose Route Frequency  methylPREDNISolone (PF) (SOLU-MEDROL) injection 40 mg  40 mg IntraVENous Q12H  hydrALAZINE (APRESOLINE) 20 mg/mL injection 20 mg  20 mg IntraVENous Q6H PRN  
 amLODIPine (NORVASC) tablet 10 mg  10 mg Oral DAILY  albumin human 25% (BUMINATE) solution 25 g  25 g IntraVENous Q6H  
 [Held by provider] apixaban (ELIQUIS) tablet 2.5 mg  2.5 mg Oral BID  scopolamine (TRANSDERM-SCOP) 1 mg over 3 days 1 Patch  1 Patch TransDERmal Q72H  LORazepam (ATIVAN) injection 2 mg  2 mg IntraVENous Q4H PRN  
 docusate sodium (COLACE) capsule 100 mg  100 mg Oral BID  polyethylene glycol (MIRALAX) packet 17 g  17 g Oral BID  heparin (porcine) 100 unit/mL injection 500 Units  500 Units InterCATHeter Q8H PRN  
 fentaNYL citrate (PF) injection 50 mcg  50 mcg IntraVENous Q3H PRN  chlorhexidine (PERIDEX) 0.12 % mouthwash 10 mL  10 mL Oral Q12H  
 albuterol-ipratropium (DUO-NEB) 2.5 MG-0.5 MG/3 ML  3 mL Nebulization Q6H PRN  
 acetaminophen (TYLENOL) tablet 650 mg  650 mg Oral Q6H PRN  
 ELECTROLYTE REPLACEMENT PROTOCOL - Calcium   1 Each Other PRN  
 ELECTROLYTE REPLACEMENT PROTOCOL - Magnesium   1 Each Other PRN  
 ELECTROLYTE REPLACEMENT PROTOCOL - Potassium Standard Dosing   1 Each Other PRN  
  ELECTROLYTE REPLACEMENT PROTOCOL - Phosphorus  Standard Dosing  1 Each Other PRN  pantoprazole (PROTONIX) 40 mg in 0.9% sodium chloride 10 mL injection  40 mg IntraVENous DAILY  [Held by provider] aspirin chewable tablet 81 mg  81 mg Oral DAILY  sodium chloride (NS) flush 5-40 mL  5-40 mL IntraVENous Q8H  
 sodium chloride (NS) flush 5-40 mL  5-40 mL IntraVENous PRN Facility-Administered Medications Ordered in Other Encounters Medication Dose Route Frequency  ePHEDrine (PF) (MISTOLE) 10 mg/mL in NS syringe   IntraVENous PRN Telemetry: [x]Sinus []A-flutter []Paced []A-fib []Multiple PVCs Labs: 
Recent Results (from the past 24 hour(s)) POC G3 Collection Time: 05/17/20  4:48 AM  
Result Value Ref Range Device: VENT    
 FIO2 (POC) 0.40 % pH (POC) 7.376 7.35 - 7.45    
 pCO2 (POC) 37.7 35.0 - 45.0 MMHG  
 pO2 (POC) 89 80 - 100 MMHG  
 HCO3 (POC) 22.1 22 - 26 MMOL/L  
 sO2 (POC) 97 92 - 97 % Base deficit (POC) 3 mmol/L Mode ASSIST CONTROL Set Rate 16 bpm  
 PEEP/CPAP (POC) 10 cmH2O  
 PIP (POC) 26 Allens test (POC) N/A Inspiratory Time 0.90 sec Total resp. rate 18 Site DRAWN FROM ARTERIAL LINE Patient temp. 98.4 Specimen type (POC) ARTERIAL Performed by Edgar Rush Pressure control YES    
METABOLIC PANEL, COMPREHENSIVE Collection Time: 05/17/20  5:45 AM  
Result Value Ref Range Sodium 137 136 - 145 mmol/L Potassium 4.4 3.5 - 5.5 mmol/L Chloride 108 100 - 111 mmol/L  
 CO2 24 21 - 32 mmol/L Anion gap 5 3.0 - 18 mmol/L Glucose 105 (H) 74 - 99 mg/dL BUN 28 (H) 7.0 - 18 MG/DL Creatinine 0.74 0.6 - 1.3 MG/DL  
 BUN/Creatinine ratio 38 (H) 12 - 20 GFR est AA >60 >60 ml/min/1.73m2 GFR est non-AA >60 >60 ml/min/1.73m2 Calcium 8.8 8.5 - 10.1 MG/DL Bilirubin, total 1.4 (H) 0.2 - 1.0 MG/DL  
 ALT (SGPT) 99 (H) 16 - 61 U/L  
 AST (SGOT) 50 (H) 10 - 38 U/L Alk.  phosphatase 111 45 - 117 U/L  
 Protein, total 6.8 6.4 - 8.2 g/dL Albumin 3.8 3.4 - 5.0 g/dL Globulin 3.0 2.0 - 4.0 g/dL A-G Ratio 1.3 0.8 - 1.7    
CBC WITH AUTOMATED DIFF Collection Time: 05/17/20  5:45 AM  
Result Value Ref Range WBC 7.9 4.6 - 13.2 K/uL  
 RBC 3.95 (L) 4.70 - 5.50 M/uL  
 HGB 10.6 (L) 13.0 - 16.0 g/dL HCT 33.7 (L) 36.0 - 48.0 % MCV 85.3 74.0 - 97.0 FL  
 MCH 26.8 24.0 - 34.0 PG  
 MCHC 31.5 31.0 - 37.0 g/dL  
 RDW 16.4 (H) 11.6 - 14.5 % PLATELET 260 070 - 628 K/uL MPV 10.1 9.2 - 11.8 FL  
 NEUTROPHILS 87 (H) 40 - 73 % LYMPHOCYTES 9 (L) 21 - 52 % MONOCYTES 4 3 - 10 % EOSINOPHILS 0 0 - 5 % BASOPHILS 0 0 - 2 %  
 ABS. NEUTROPHILS 6.9 1.8 - 8.0 K/UL  
 ABS. LYMPHOCYTES 0.7 (L) 0.9 - 3.6 K/UL  
 ABS. MONOCYTES 0.3 0.05 - 1.2 K/UL  
 ABS. EOSINOPHILS 0.0 0.0 - 0.4 K/UL  
 ABS. BASOPHILS 0.0 0.0 - 0.1 K/UL  
 DF AUTOMATED MAGNESIUM Collection Time: 05/17/20  5:45 AM  
Result Value Ref Range Magnesium 2.3 1.6 - 2.6 mg/dL CALCIUM, IONIZED Collection Time: 05/17/20  5:45 AM  
Result Value Ref Range Ionized Calcium 1.19 1.12 - 1.32 MMOL/L  
PTT Collection Time: 05/17/20  5:45 AM  
Result Value Ref Range aPTT 25.3 23.0 - 36.4 SEC PROTHROMBIN TIME + INR Collection Time: 05/17/20  5:45 AM  
Result Value Ref Range Prothrombin time 17.1 (H) 11.5 - 15.2 sec INR 1.4 (H) 0.8 - 1.2 Recent Labs 05/17/20 
0448 05/16/20 
0518 05/15/20 
0219 FIO2I 0.40 50 50 HCO3I 22.1 27.3* 34.6* PCO2I 37.7 44.0 43.1 PHI 7.376 7.401 7.512* PO2I 89 116* 78* All Micro Results Procedure Component Value Units Date/Time CULTURE, RESPIRATORY/SPUTUM/BRONCH Roosvelt Yeni STAIN [[id number]]  (Abnormal) Collected:  05/08/20 1630 Order Status:  Completed Specimen:  Sputum from Tracheal Aspirate Updated:  05/10/20 1214   Special Requests: NO SPECIAL REQUESTS     
  GRAM STAIN RARE WBCS SEEN     
      
  RARE EPITHELIAL CELLS SEEN  
     
   NO ORGANISMS SEEN     
 Culture result: RARE YEAST     
      
  NO NORMAL RESPIRATORY BRYN ISOLATED  
     
 CULTURE, RESPIRATORY/SPUTUM/BRONCH W Maria Dolores Figueroa [[id number]] Collected:  05/04/20 1315 Order Status:  Canceled Specimen:  Sputum from Tracheal Aspirate CULTURE, RESPIRATORY/SPUTUM/BRONCH Weyman Prima STAIN [[id number]]  (Abnormal)  (Susceptibility) Collected:  04/22/20 1018 Order Status:  Completed Specimen:  Sputum from Tracheal Aspirate Updated:  04/25/20 1150 Special Requests: NO SPECIAL REQUESTS     
  GRAM STAIN FEW WBCS SEEN     
      
  OCCASIONAL EPITHELIAL CELLS SEEN  
     
      
  FEW GRAM POSITIVE COCCI IN CLUSTERS  
     
   RARE GRAM NEGATIVE RODS Culture result:    
  LIGHT STAPHYLOCOCCUS AUREUS  
     
      
  LIGHT NORMAL RESPIRATORY BRYN  
     
 CULTURE, BLOOD [[id number]] Collected:  04/24/20 1015 Order Status:  Canceled Specimen:  Blood Imaging: 
[x]I have personally reviewed the patients chest radiographs images and report Results from [facility] Encounter encounter on 04/21/20 XR ABD (KUB) Narrative EXAM: Single view Abdomen X-rays supine INDICATION: Enteric contrast was placed via a nasogastric tube. COMPARISON: None. 
 
___________ FINDINGS:  
 
 
Limited visualization due to body habitus. Nasogastric tube is not well 
visualized. Enteric contrast is visualized within the abdomen. 
  
___________ Impression IMPRESSION:  
 
Limited examination due to body habitus. The nasogastric tube is not well visualized. Final image demonstrates enteric 
contrast both within the stomach and the visualized portions of the distal 
esophagus. The nasogastric tube is not well visualized. US LEs 4/22/2020 Interpretation Summary · No evidence of deep vein thrombosis in the right lower extremity veins assessed. · No evidence of deep vein thrombosis in the left lower extremity veins assessed. Echo 4/22 Result status: Edited Result - FINAL  
 Addendum by Yara Rivera MD on Wed Apr 22, 2020  5:12 PM  
· Left Ventricle: Normal wall thickness and systolic function (ejection fraction normal). Mildly dilated left ventricle. The estimated ejection fraction is 45 - 50%. There is moderate (grade 2) left ventricular diastolic dysfunction E/e' Ratio = 17.09. Wall Scoring: The left ventricular wall motion is globally hypokinetic. · Right Ventricle: Not well visualized. Normal global systolic function. Moderately dilated right ventricle. · Right Atrium: Right atrium not well visualized. Moderately dilated right atrium. · IVC/Hepatic Veins: Dilated inferior vena cava. Mechanically ventilated; cannot use inferior caval vein diameter to estimate central venous pressure. HU 5/12/20: 
Result status: Final result · Image quality: good. The procedure, risks and alternatives were explained. Informed consent was obtained. . Topical anesthetic not used. . Sedation was achieved by conscious sedation. 4 mg Versed was administered. 100 mcg of Fentanyl was administered. No complications. HU probe was removed. Estimated blood loss: 0 mL. Color flow Doppler was performed and pulse wave and/or continuous wave Doppler was performed. Saline contrast was given to evaluate for intracardiac shunt. No shunt seen. · Left Ventricle: Normal cavity size and wall thickness. Mild systolic dysfunction. The estimated ejection fraction is 45 - 50%. Visually measured ejection fraction. Abnormal left ventricular septal motion. There is mild (grade 1) left ventricular diastolic dysfunction. Wall Scoring: The left ventricular wall motion is globally hypokinetic. · Interatrial Septum: Agitated saline contrast study was performed. There was no shunting at baseline or with Valsalva. No atrial septal defect present. Gastrografin KUB 5/16/20: 
Limited examination due to body habitus. The nasogastric tube is not well visualized. Final image demonstrates enteric contrast both within the stomach and the visualized portions of the distal 
esophagus. The nasogastric tube is not well visualized. cxr 5/17/20 reviewed:  
1. Stable support lines and tubes. 2. Central pulmonary vascular congestion, possibly with small bilateral pleural 
effusions and associated atelectasis/airspace disease. IMPRESSION:  
Patient Active Problem List  
Diagnosis Code  Acute on chronic respiratory failure with hypoxia and hypercapnia ([facility] J96.21, J96.22  
 Acute on chronic systolic and diastolic CHF (congestive heart failure) ([facility]) I50.43 Aspiration pneumonitis from multiple patient initiated ET tube displacement events J69.0  Cardiac arrest ([facility] [address] I46.9 MSSA in sputum culture A49.01  
 Elevated troponin, NSTEMI R79.89, I21.4  Chronic a-fib I48.20  Atrial flutter ([facility] [address] I48.92 Prolonged QTc R94.31  
 Chronic renal insufficiency, stage II (mild) N18.2 Thrombocytopenia D69.6  Smoking F17.200  Arthritis M19.90  Migraine G43.909  Chronic back pain M54.9, G89.29  
 Obstructive Sleep apnea G47.33  
 Morbid obesity ([facility]) E66.01  
  
PLAN:  
RS -intubated, on ventilator since 4/25/2020. HU negative for any cardiac causes for severe hypoxemia or shunting. He could have pulmonary hemorrhage knowing bloody/pinkish ETT secretions and so started on Solu-Medrol 40 mg IV every 8 hour since 5/12/2020. Since then fio2 improved to 40% and peep 8 and improved ETT bloody secretions. Reduce steroids to 40 mg q12 hrs on 5/16/20. Will taper per clinical response. I/o improved, leg edema improved Metabolic  alkalosis resolved since lasix stopped and diamox given since 5/15/20 for 2 days. Will start lasix 40 q6 hours rather than lasix drip. Inf I/o not negative, then consider restarting lasix drip. Repeat ABG in am.  
Monitor I's and O's, renal functions, electrolytes and replace per ICU protocol. Continue albumin. Morbidly obese and suspect to have MARIA FERNANDA/OHS with chronic hypercapnic respiratory failure. D/w pt who stated that he is diagnosed with MARIA FERNANDA. He agreed for trach placement. Patient cannot have CT chest due to body weight limitations. C/w vent support, vent and sedation bundle. VAP prevention bundle, head of the bed at 30' all times Patient is off paralytic medication since 5/4/2020 Sedation- Off versed drip since 5/14/20. No pain, off fentanyl patch as well. Continue bronchodilators prn; pulmonary hygiene care. D/w Dr Mariela Damico on 5/16: planned trach tomorrow. Hold TF after midnight, d/w RN. ICU team confirmed with OR: patient doesn't need COVID testing for this surgery. CVS - 
HU unremarkable for any cause for persistent hypoxemia, bubble study negative. Only grade 1 diastolic dysfunction with LVEF 45 to 50%. No pulmonary hypertension. US legs negative for DVT. Diuretics as above. ASA held for trach plan. Eliquis held for possible pulmonary hemorrhage. Dr. Di Bhagat on board. ID - SARS-COV-2 negative x 2 Antibiotic: s/p Zosyn completed on 5/12/2020 10 days antibiotic MSSA in resp cxs from before; respiratory cultures 5/8 no growth. Procalcitonin repeat is negative. No leukocytosis. No fever. Hem - HIT panel and LEYDI Ab NEG on 4/28. He is definitely high risk for PEs; CTA chest cannot be done due to limitations due to weight; stopped argatroban 5/4;[suspect fatty liver/GASTELUM]; Eliquis held due to bloody secretions from the ET tube. Vasc - PVL bl LE negative A-line was supposed to be d/c yesterday, not done yet. D/w RN to d/c today. Renal -continue monitor BUN and creatinine stable; electrolyte replacement per protocol. Nephrology Dr. Babs Eisenmenger hs signed off.   
GI - GI placed OG tube; good position confirmed with abdominal x-ray using Gastrografin again on 5/16/20 after NGT replaced since patient pulled it out.   
LFTs slightly increased, will repeat in am.  
 Neurology: moving extremities and follows all commands. No focal deficit. Overall poor prognosis. Patient chronically vent dependent. Full code. Will defer respective systems problem management to primary and other consultant and follow patient in ICU with primary and other medical team 
Quality Care: PPI, DVT prophylaxis, HOB elevated, Infection control all reviewed and addressed. PAIN AND SEDATION: As above · Skin/Wound:  Blister on foot, does not appear pressure sore. Continue local care per wound care consult. · Nutrition:  Start tube feeding · Prophylaxis: DVT and GI Prophylaxis reviewed. · Restraints: prn 
· PT/OT eval and treat: as needed when stable · Lines/Tubes: A-line 4/23/20- to be d/c today 5/17/20; daniel 4/21/20, ET tube 4/25/20; PICC line left arm 5/5 ADVANCE DIRECTIVE: Full code DISCUSSION: spoke with RN, RT, ICU staff, MDR Events and notes from last 24 hours reviewed. Care plan discussed with nursing Total CC time: 33 minutes. High complexity review and management Ángela Pablo MD  
5/17/2020

## 2020-05-17 NOTE — PROGRESS NOTES
Cardiology Progress Note Patient: Rosi Ramos. Sex: male          DOA: 4/21/2020 YOB: 1975      Age:  53 y.o.        LOS:  LOS: 26 days Patient seen and examined, chart reviewed. Assessment/Plan Patient Active Problem List  
Diagnosis Code  Cellulitis of left lower leg L03. 116  
 Anemia D64.9  Morbid obesity ([facility]) E66.01  
 Dyspnea R06.00  Sleep apnea G47.30  Sinus tachycardia R00.0  BMI 70 and over, adult [facility]) W33.98  Chronic back pain M54.9, G89.29  
 Arthritis M19.90  Migraine G43.909  
 History of atrial flutter Z86.79  
 Limited mobility Z74.09  
 Smoking F17.200  Chronic renal insufficiency, stage II (mild) N18.2  CHF (congestive heart failure) ([facility]) I50.9  Acute diastolic CHF (congestive heart failure) ([facility]) I50.31  Chronic a-fib I48.20  Atrial flutter ([facility]) I48.92  
 Elevated troponin R79.89  
 Acute respiratory distress R06.03  
 Acute CHF (congestive heart failure) ([facility]) I50.9  Respiratory failure with hypoxia and hypercapnia ([facility]) J96.91, J96.92  
 Acute on chronic respiratory failure with hypoxia and hypercapnia ([facility]) J96.21, J96.22  
 Cardiac arrest ([facility]) I46.9  Advanced care planning/counseling discussion Z70.80  
 NSTEMI (non-ST elevated myocardial infarction) ([facility] [address] I21.4  Hypocalcemia E83.51 Acute on chronic systolic heart failure NSTEMI type II due to PEA arrest 
Paroxysmal atrial flutter Thrombocytopenia Hypernatremia Bradycardia  
  
Patient self extubated and had PEA arrest. 
He had brief episode of A fib with RVR and was started On Amiodarone drip. Currently he is in Sinus rhythm. 
  
Amiodarone discontinued due to bradycardia. 
  
Echocardiogram  
  
· Left Ventricle: Normal wall thickness and systolic function (ejection fraction normal). Mildly dilated left ventricle.  The estimated ejection fraction is 45 - 50%. There is moderate (grade 2) left ventricular diastolic dysfunction E/e' Ratio = 17.09. Wall Scoring: The left ventricular wall motion is globally hypokinetic. · Right Ventricle: Not well visualized. Normal global systolic function. Moderately dilated right ventricle. · Right Atrium: Right atrium not well visualized. Moderately dilated right atrium. · IVC/Hepatic Veins: Dilated inferior vena cava. Mechanically ventilated; cannot use inferior caval vein diameter to estimate central venous pressure. 
  
IV heparin was discontinued due to thrombocytopenia and bleeding at central line site. Started on Argatroban.  
  
Patient had frequent oxygen desaturations and pinkish/bloody aspirate from tracheal suctioning.  
  
HU done revealed LVEF 40-92%, Grade I diastolic dysfunction, Negative bubble study. Oxygenation significantly improved after he was started on steroid  
  
Plan: 
  
Eliquis on hold Continue Acetazolamide Strict I/O Avoid any AV debbie blocking agents due to bradycardia. Continue  IV hydralazine for hypertension if needed. Monitor electrolytes and renal function Continue Ventilatory support. Continue management as per hospital medicine and pulmonary critical care Subjective:  
 cc: 
Orally intubated REVIEW OF SYSTEMS:  
 
Can not obtain Objective:  
  
Visit Vitals BP (!) 173/104 Pulse 98 Temp 99.1 °F (37.3 °C) Resp 22 Ht 5' 10\" (1.778 m) Wt (!) 230 kg (507 lb 0.9 oz) SpO2 93% BMI 72.76 kg/m² Body mass index is 72.76 kg/m². Physical Exam: 
General Appearance: Comfortable, Morbidly obese, not using accessory muscles of respiration. Orally intubated HEENT: AMMON. HEAD: Atraumatic NECK: No JVD, no thyroidomeglay. CAROTIDS: No bruit LUNGS: bilateral conducted sounds HEART: S1+S2 audible, no murmur, no pericardial rub. NEUROLOGICAL: Opens eyes on verbal commands. Medication: Current Facility-Administered Medications Medication Dose Route Frequency  methylPREDNISolone (PF) (SOLU-MEDROL) injection 40 mg  40 mg IntraVENous Q12H  hydrALAZINE (APRESOLINE) 20 mg/mL injection 20 mg  20 mg IntraVENous Q6H PRN  
 amLODIPine (NORVASC) tablet 10 mg  10 mg Oral DAILY  albumin human 25% (BUMINATE) solution 25 g  25 g IntraVENous Q6H  
 [Held by provider] apixaban (ELIQUIS) tablet 2.5 mg  2.5 mg Oral BID  scopolamine (TRANSDERM-SCOP) 1 mg over 3 days 1 Patch  1 Patch TransDERmal Q72H  LORazepam (ATIVAN) injection 2 mg  2 mg IntraVENous Q4H PRN  
 docusate sodium (COLACE) capsule 100 mg  100 mg Oral BID  polyethylene glycol (MIRALAX) packet 17 g  17 g Oral BID  heparin (porcine) 100 unit/mL injection 500 Units  500 Units InterCATHeter Q8H PRN  
 fentaNYL citrate (PF) injection 50 mcg  50 mcg IntraVENous Q3H PRN  chlorhexidine (PERIDEX) 0.12 % mouthwash 10 mL  10 mL Oral Q12H  
 albuterol-ipratropium (DUO-NEB) 2.5 MG-0.5 MG/3 ML  3 mL Nebulization Q6H PRN  
 acetaminophen (TYLENOL) tablet 650 mg  650 mg Oral Q6H PRN  
 ELECTROLYTE REPLACEMENT PROTOCOL - Calcium   1 Each Other PRN  
 ELECTROLYTE REPLACEMENT PROTOCOL - Magnesium   1 Each Other PRN  
 ELECTROLYTE REPLACEMENT PROTOCOL - Potassium Standard Dosing   1 Each Other PRN  
 ELECTROLYTE REPLACEMENT PROTOCOL - Phosphorus  Standard Dosing  1 Each Other PRN  pantoprazole (PROTONIX) 40 mg in 0.9% sodium chloride 10 mL injection  40 mg IntraVENous DAILY  [Held by provider] aspirin chewable tablet 81 mg  81 mg Oral DAILY  sodium chloride (NS) flush 5-40 mL  5-40 mL IntraVENous Q8H  
 sodium chloride (NS) flush 5-40 mL  5-40 mL IntraVENous PRN Facility-Administered Medications Ordered in Other Encounters Medication Dose Route Frequency  ePHEDrine (PF) (MISTOLE) 10 mg/mL in NS syringe   IntraVENous PRN Lab/Data Reviewed: 
 
  
Recent Labs 05/17/20 
0545 05/16/20 
0530 [facility] 7.9 8.2 8.2 HGB 10.6* 10.0* 10.0* HCT 33.7* 32.7* 32.3*  
 213 207 Recent Labs 05/17/20 
0545 05/16/20 
0530 05/15/20 
0425  138 142  
K 4.4 3.9 3.9  104 103 CO2 24 28 34* * 104* 116* BUN 28* 27* 28* CREA 0.74 0.75 0.76 CA 8.8 8.6 8.4*  
 
34 minutes of critical care time spent in the direct evaluation and treatment of this high risk patient. The reason for providing this level of medical care for this critically ill patient was due a critical illness that impaired one or more vital organ systems such that there was a high probability of imminent or life threatening deterioration in the patients condition. This care involved high complexity decision making to assess, manipulate, and support vital system functions, to treat this degree vital organ system failure and to prevent further life threatening deterioration of the patients condition. Signed By: Tash Hwang MD   
 May 17, 2020

## 2020-05-18 NOTE — ANESTHESIA POSTPROCEDURE EVALUATION
Pt tx to ICU from [address] to [facility]. Report to RN without issue. Pt alisson procedure/ TX well with Sats maintained mid 90's for TX. VSS. RT in to connect to vent.

## 2020-05-18 NOTE — PROGRESS NOTES
Chart reviewed. Noted plan for trach today. Please consider ordering therapy services to assist with transition of care. Referral has been sent to [facility] for continuity of care to see if pt would be an appropriate candidate for this facility. CM to continue to follow and assist with transition of care needs. Care Management Interventions Palliative Care Criteria Met (RRAT>21 & CHF Dx)?: Yes 
Palliative Consult Recommended?: Yes Transition of Care Consult (CM Consult): Discharge Planning Current Support Network: Lives with Spouse

## 2020-05-18 NOTE — PROGRESS NOTES
Pulmonary, Critical Care, and Sleep Medicine Pulmonary Progress Note Name: Junito Stewart. : 1975 MRN: [id number] Date: 2020 [x]I have reviewed the flowsheet and previous days notes. Events, vitals, medications and notes from last 24 hours reviewed. Subjective/History:  
61-year-old male with morbid obesity, ex-smoker, history of asthma, history of severe obstructive sleep apnea on CPAP, questionable component compliance, history of congestive heart failure with reduced ejection fraction atrial fibrillation atrial flutter on amiodarone, Xarelto, history of chronic renal insufficiency, cellulitis of lower extremities and anemia. As per family did not have any fever or upper respiratory tract infections and no flulike symptoms. On Lasix, CPAP not clear if missed. Presented with acute shortness of breath worsening edema of lower extremities. He was found to be in acute hypercarbic hypoxemic respiratory failure initially had a trial of BiPAP but failed and then eventually was intubated. He was hypotensive. Moderate amount of secretions. Morbid obesity. Patient compliant with Xarelto. Patient self extubated and had PEA arrest. 
 
Subjective:  
20 Patient remains in the ICU critically air chronic hypoxemic respiratory failure with very minimal improvement. Tracheostomy today. Finally on PEEP of 8. FiO2 35 to 40%. Awake following commands. Patient had 1 self extubation with cardiac arrest. 
Very difficult airway. Cannot be safely extubated tracheostomy. Has very severe obstructive sleep apnea. If stable we will perform CT of the chest. 
Weight was an issue. Currently patient lost some weight. Might be able to fit in a CT of the chest. 
Pressors. Afebrile. 20 Patient intubated  for hypoxemic resp failure. Remains in ICU, orally intubated, on ventilator. Off sedation. Awake and follows all commands. No fever. BP stable. Diuresing well. Metabolic alkalosis resolved with diamox. B/l Leg edema improving. CXR slight improvement. fio2 now at 40% and peep reduced to 8. BP elevated, on norvasc. HR in 40's sinus  intermittently when sleeping I/O negative ETT bloody secretions improved since diuresed and on steroids, now mild scant secretions. Drips: none Intake/Output Summary (Last 24 hours) at 2020 1104 Last data filed at 2020 8645 Gross per 24 hour Intake 2575 ml Output 2025 ml Net 550 ml  
 
 
ROS: Review of systems not obtained due to patient factors. Past Medical History: 
Past Medical History:  
Diagnosis Date  Arrhythmia  Arthritis   
 knees  CHF (congestive heart failure) ([facility] [address]  Chronic back pain  Chronic renal insufficiency, stage II (mild)  History of atrial flutter Dx 2016 - anticoagulated by Gabriela Villafuerte  Hypertension   Limited mobility  Migraine headache  Morbid obesity ([facility] [address]  Morbid obesity with body mass index of 60.0-69.9 in adult [facility])  Sleep apnea   
 uses c-pap instructed to bring day of surgery  Smoking   
 very passive / cigars only Allergy: No Known Allergies Vital Signs:   
Blood pressure 140/69, pulse 71, temperature 99 °F (37.2 °C), resp. rate 19, height 5' 10\" (1.778 m), weight (!) 222.4 kg (490 lb 4.9 oz), SpO2 93 %. Body mass index is 70.35 kg/m². O2 Device: Ventilator O2 Flow Rate (L/min): 16 l/min Temp (24hrs), Av °F (37.2 °C), Min:98.3 °F (36.8 °C), Max:99.8 °F (37.7 °C) Intake/Output:  
Last shift:       07 - 1900 In: -  
Out: 525 [Urine:525] Last 3 shifts: 1901 -  0700 In: 1170 [I.V.:400] Out: 5400 [Urine:4100; UHBSFT:0341] Intake/Output Summary (Last 24 hours) at 2020 1104 Last data filed at 2020 9828 Gross per 24 hour Intake 2575 ml Output 2025 ml Net 550 ml Ventilator Settings: 
Mode Rate Tidal Volume Pressure FiO2 PEEP  
 Pressure control   580 ml  15 cm H2O 35 % 8 cm H20 Peak airway pressure: 24 cm H2O Minute ventilation: 13.1 l/min Physical Exam: 
General: obese male; no cyanosis; alert awake and following all commands. on ventilator HEENT: LIBBY, orally intubated; orogastric tube; no bleeding; pupils not dilated, reactive Neck: thick short obese neck Chest: normal, obese chest wall Lungs: moderate air entry, obese chest limits exam; decreased BS bases; symmetrical chest expansion Heart: S1S2 present or without murmur or extra heart sounds; JVD not elevated Abdomen: obese, bowel sounds normoactive, soft without significant tenderness; no organomegaly; no guarding or rigidity Extremity: b/l leg/feet edema improving, now can see wrinkles of skin; negative for cyanosis, clubbing. Can see wrinkles in feet and leg since edema improving. Capillary refill: normal 
Neuro:  Alert awake and following all commands, moving all 4 limbs and following all commands. Skin: Skin color, texture, turgor fair. Skin dry, warm, non-diaphoretic DATA:  
Current Facility-Administered Medications Medication Dose Route Frequency  methylPREDNISolone (PF) (SOLU-MEDROL) injection 40 mg  40 mg IntraVENous Q12H  hydrALAZINE (APRESOLINE) 20 mg/mL injection 20 mg  20 mg IntraVENous Q6H PRN  
 amLODIPine (NORVASC) tablet 10 mg  10 mg Oral DAILY  albumin human 25% (BUMINATE) solution 25 g  25 g IntraVENous Q6H  
 [Held by provider] apixaban (ELIQUIS) tablet 2.5 mg  2.5 mg Oral BID  scopolamine (TRANSDERM-SCOP) 1 mg over 3 days 1 Patch  1 Patch TransDERmal Q72H  LORazepam (ATIVAN) injection 2 mg  2 mg IntraVENous Q4H PRN  
 docusate sodium (COLACE) capsule 100 mg  100 mg Oral BID  polyethylene glycol (MIRALAX) packet 17 g  17 g Oral BID  heparin (porcine) 100 unit/mL injection 500 Units  500 Units InterCATHeter Q8H PRN  
 fentaNYL citrate (PF) injection 50 mcg  50 mcg IntraVENous Q3H PRN  
  chlorhexidine (PERIDEX) 0.12 % mouthwash 10 mL  10 mL Oral Q12H  
 albuterol-ipratropium (DUO-NEB) 2.5 MG-0.5 MG/3 ML  3 mL Nebulization Q6H PRN  
 acetaminophen (TYLENOL) tablet 650 mg  650 mg Oral Q6H PRN  
 ELECTROLYTE REPLACEMENT PROTOCOL - Calcium   1 Each Other PRN  
 ELECTROLYTE REPLACEMENT PROTOCOL - Magnesium   1 Each Other PRN  
 ELECTROLYTE REPLACEMENT PROTOCOL - Potassium Standard Dosing   1 Each Other PRN  
 ELECTROLYTE REPLACEMENT PROTOCOL - Phosphorus  Standard Dosing  1 Each Other PRN  pantoprazole (PROTONIX) 40 mg in 0.9% sodium chloride 10 mL injection  40 mg IntraVENous DAILY  [Held by provider] aspirin chewable tablet 81 mg  81 mg Oral DAILY  sodium chloride (NS) flush 5-40 mL  5-40 mL IntraVENous Q8H  
 sodium chloride (NS) flush 5-40 mL  5-40 mL IntraVENous PRN Facility-Administered Medications Ordered in Other Encounters Medication Dose Route Frequency  ePHEDrine (PF) (MISTOLE) 10 mg/mL in NS syringe   IntraVENous PRN Telemetry: [x]Sinus []A-flutter []Paced []A-fib []Multiple PVCs Labs: 
Recent Results (from the past 24 hour(s)) GLUCOSE, POC Collection Time: 05/17/20 12:37 PM  
Result Value Ref Range Glucose (POC) 101 70 - 110 mg/dL GLUCOSE, POC Collection Time: 05/17/20  6:07 PM  
Result Value Ref Range Glucose (POC) 105 70 - 110 mg/dL GLUCOSE, POC Collection Time: 05/17/20 11:10 PM  
Result Value Ref Range Glucose (POC) 114 (H) 70 - 110 mg/dL METABOLIC PANEL, COMPREHENSIVE Collection Time: 05/18/20  5:05 AM  
Result Value Ref Range Sodium 138 136 - 145 mmol/L Potassium 4.1 3.5 - 5.5 mmol/L Chloride 108 100 - 111 mmol/L  
 CO2 26 21 - 32 mmol/L Anion gap 4 3.0 - 18 mmol/L Glucose 100 (H) 74 - 99 mg/dL BUN 25 (H) 7.0 - 18 MG/DL Creatinine 0.65 0.6 - 1.3 MG/DL  
 BUN/Creatinine ratio 38 (H) 12 - 20 GFR est AA >60 >60 ml/min/1.73m2 GFR est non-AA >60 >60 ml/min/1.73m2 Calcium 9.0 8.5 - 10.1 MG/DL Bilirubin, total 1.6 (H) 0.2 - 1.0 MG/DL  
 ALT (SGPT) 80 (H) 16 - 61 U/L  
 AST (SGOT) 26 10 - 38 U/L Alk. phosphatase 78 45 - 117 U/L Protein, total 7.1 6.4 - 8.2 g/dL Albumin 4.2 3.4 - 5.0 g/dL Globulin 2.9 2.0 - 4.0 g/dL A-G Ratio 1.4 0.8 - 1.7    
CBC WITH AUTOMATED DIFF Collection Time: 05/18/20  5:05 AM  
Result Value Ref Range WBC 8.6 4.6 - 13.2 K/uL  
 RBC 4.03 (L) 4.70 - 5.50 M/uL  
 HGB 10.8 (L) 13.0 - 16.0 g/dL HCT 34.1 (L) 36.0 - 48.0 % MCV 84.6 74.0 - 97.0 FL  
 MCH 26.8 24.0 - 34.0 PG  
 MCHC 31.7 31.0 - 37.0 g/dL  
 RDW 16.6 (H) 11.6 - 14.5 % PLATELET 110 130 - 048 K/uL MPV 9.7 9.2 - 11.8 FL  
 NEUTROPHILS 86 (H) 40 - 73 % LYMPHOCYTES 9 (L) 21 - 52 % MONOCYTES 5 3 - 10 % EOSINOPHILS 0 0 - 5 % BASOPHILS 0 0 - 2 %  
 ABS. NEUTROPHILS 7.4 1.8 - 8.0 K/UL  
 ABS. LYMPHOCYTES 0.8 (L) 0.9 - 3.6 K/UL  
 ABS. MONOCYTES 0.4 0.05 - 1.2 K/UL  
 ABS. EOSINOPHILS 0.0 0.0 - 0.4 K/UL  
 ABS. BASOPHILS 0.0 0.0 - 0.1 K/UL  
 DF AUTOMATED MAGNESIUM Collection Time: 05/18/20  5:05 AM  
Result Value Ref Range Magnesium 2.3 1.6 - 2.6 mg/dL CALCIUM, IONIZED Collection Time: 05/18/20  5:05 AM  
Result Value Ref Range Ionized Calcium 1.24 1.12 - 1.32 MMOL/L  
PTT Collection Time: 05/18/20  5:05 AM  
Result Value Ref Range aPTT 22.1 (L) 23.0 - 36.4 SEC PROTHROMBIN TIME + INR Collection Time: 05/18/20  5:05 AM  
Result Value Ref Range Prothrombin time 16.9 (H) 11.5 - 15.2 sec INR 1.4 (H) 0.8 - 1.2 PHOSPHORUS Collection Time: 05/18/20  5:05 AM  
Result Value Ref Range Phosphorus 3.3 2.5 - 4.9 MG/DL  
GLUCOSE, POC Collection Time: 05/18/20  5:30 AM  
Result Value Ref Range Glucose (POC) 95 70 - 110 mg/dL POC G3 Collection Time: 05/18/20  5:42 AM  
Result Value Ref Range Device: VENT    
 FIO2 (POC) 0.4 % pH (POC) 7.368 7.35 - 7.45    
 pCO2 (POC) 40.1 35.0 - 45.0 MMHG pO2 (POC) 86 80 - 100 MMHG  
 HCO3 (POC) 23.0 22 - 26 MMOL/L  
 sO2 (POC) 96 92 - 97 % Base deficit (POC) 2 mmol/L Mode ASSIST CONTROL Set Rate 16 bpm  
 PEEP/CPAP (POC) 8 cmH2O  
 PIP (POC) 15 Allens test (POC) N/A Inspiratory Time 0.9 sec Total resp. rate 22 Site DRAWN FROM ARTERIAL LINE Patient temp. 99.0 Specimen type (POC) ARTERIAL Performed by Sharon Leon Pressure control YES Recent Labs 05/18/20 
7049 05/17/20 
0448 05/16/20 
0518 FIO2I 0.4 0.40 50 HCO3I 23.0 22.1 27.3*  
PCO2I 40.1 37.7 44.0 PHI 7.368 7.376 7.401 PO2I 86 89 116* All Micro Results Procedure Component Value Units Date/Time CULTURE, RESPIRATORY/SPUTUM/BRONCH Edwyna Purvi STAIN [[id number]]  (Abnormal) Collected:  05/08/20 1630 Order Status:  Completed Specimen:  Sputum from Tracheal Aspirate Updated:  05/10/20 1214 Special Requests: NO SPECIAL REQUESTS     
  GRAM STAIN RARE WBCS SEEN     
      
  RARE EPITHELIAL CELLS SEEN  
     
   NO ORGANISMS SEEN Culture result: RARE YEAST     
      
  NO NORMAL RESPIRATORY BRYN ISOLATED  
     
 CULTURE, RESPIRATORY/SPUTUM/BRONCH W Chance Hazy [[id number]] Collected:  05/04/20 1315 Order Status:  Canceled Specimen:  Sputum from Tracheal Aspirate CULTURE, RESPIRATORY/SPUTUM/BRONCH Edwyna Purvi STAIN [[id number]]  (Abnormal)  (Susceptibility) Collected:  04/22/20 1018 Order Status:  Completed Specimen:  Sputum from Tracheal Aspirate Updated:  04/25/20 6913 Special Requests: NO SPECIAL REQUESTS     
  GRAM STAIN FEW WBCS SEEN     
      
  OCCASIONAL EPITHELIAL CELLS SEEN  
     
      
  FEW GRAM POSITIVE COCCI IN CLUSTERS  
     
   RARE GRAM NEGATIVE RODS Culture result:    
  LIGHT STAPHYLOCOCCUS AUREUS  
     
      
  LIGHT NORMAL RESPIRATORY BRYN  
     
 CULTURE, BLOOD [[id number]] Collected:  04/24/20 1015 Order Status:  Canceled Specimen:  Blood Imaging: [x]I have personally reviewed the patients chest radiographs images and report Results from [facility] GALVE[facility] - DES Encounter encounter on 04/21/20 XR ABD (KUB) Narrative EXAM: Single view Abdomen X-rays supine INDICATION: Enteric contrast was placed via a nasogastric tube. COMPARISON: None. 
 
___________ FINDINGS:  
 
 
Limited visualization due to body habitus. Nasogastric tube is not well 
visualized. Enteric contrast is visualized within the abdomen. 
  
___________ Impression IMPRESSION:  
 
Limited examination due to body habitus. The nasogastric tube is not well visualized. Final image demonstrates enteric 
contrast both within the stomach and the visualized portions of the distal 
esophagus. The nasogastric tube is not well visualized. US LEs 4/22/2020 Interpretation Summary · No evidence of deep vein thrombosis in the right lower extremity veins assessed. · No evidence of deep vein thrombosis in the left lower extremity veins assessed. Echo 4/22 Result status: Edited Result - FINAL Addendum by Rebekah Burkett MD on Wed Apr 22, 2020  5:12 PM  
· Left Ventricle: Normal wall thickness and systolic function (ejection fraction normal). Mildly dilated left ventricle. The estimated ejection fraction is 45 - 50%. There is moderate (grade 2) left ventricular diastolic dysfunction E/e' Ratio = 17.09. Wall Scoring: The left ventricular wall motion is globally hypokinetic. · Right Ventricle: Not well visualized. Normal global systolic function. Moderately dilated right ventricle. · Right Atrium: Right atrium not well visualized. Moderately dilated right atrium. · IVC/Hepatic Veins: Dilated inferior vena cava. Mechanically ventilated; cannot use inferior caval vein diameter to estimate central venous pressure. HU 5/12/20: 
Result status: Final result · Image quality: good.  The procedure, risks and alternatives were explained. Informed consent was obtained. . Topical anesthetic not used. . Sedation was achieved by conscious sedation. 4 mg Versed was administered. 100 mcg of Fentanyl was administered. No complications. HU probe was removed. Estimated blood loss: 0 mL. Color flow Doppler was performed and pulse wave and/or continuous wave Doppler was performed. Saline contrast was given to evaluate for intracardiac shunt. No shunt seen. · Left Ventricle: Normal cavity size and wall thickness. Mild systolic dysfunction. The estimated ejection fraction is 45 - 50%. Visually measured ejection fraction. Abnormal left ventricular septal motion. There is mild (grade 1) left ventricular diastolic dysfunction. Wall Scoring: The left ventricular wall motion is globally hypokinetic. · Interatrial Septum: Agitated saline contrast study was performed. There was no shunting at baseline or with Valsalva. No atrial septal defect present. Gastrografin KUB 5/16/20: 
Limited examination due to body habitus. The nasogastric tube is not well visualized. Final image demonstrates enteric 
contrast both within the stomach and the visualized portions of the distal 
esophagus. The nasogastric tube is not well visualized. cxr 5/17/20 reviewed:  
1. Stable support lines and tubes. 2. Central pulmonary vascular congestion, possibly with small bilateral pleural 
effusions and associated atelectasis/airspace disease. IMPRESSION:  
Patient Active Problem List  
Diagnosis Code  Acute on chronic respiratory failure with hypoxia and hypercapnia ([facility]) J96.21, J96.22  
 Acute on chronic systolic and diastolic CHF (congestive heart failure) ([facility]) I50.43 Aspiration pneumonitis from multiple patient initiated ET tube displacement events J69.0  Cardiac arrest ([facility] [address] I46.9 MSSA in sputum culture A49.01  
 Elevated troponin, NSTEMI R79.89, I21.4  Chronic a-fib I48.20  Atrial flutter ([address] I48.92  Prolonged QTc R94.31  
  Chronic renal insufficiency, stage II (mild) N18.2 Thrombocytopenia D69.6  Smoking F17.200  Arthritis M19.90  Migraine G43.909  Chronic back pain M54.9, G89.29  
 Obstructive Sleep apnea G47.33  
 Morbid obesity (HCC) E66.01  
  
PLAN:  
RS -intubated, on ventilator since 4/25/2020. HU negative for any cardiac causes for severe hypoxemia or shunting. He could have pulmonary hemorrhage knowing bloody/pinkish ETT secretions and so started on Solu-Medrol 40 mg IV every 8 hour since 5/12/2020. Since then fio2 improved to 40% and peep 8 and improved ETT bloody secretions. Reduce steroids to 40 mg q12 hrs on 5/16/20. Will taper per clinical response. I/o improved, leg edema improved Metabolic  alkalosis resolved since lasix stopped and diamox given since 5/15/20 for 2 days. Will start lasix 40 q6 hours rather than lasix drip. Inf I/o not negative, then consider restarting lasix drip. Repeat ABG in am.  
Monitor I's and O's, renal functions, electrolytes and replace per ICU protocol. Continue albumin. Morbidly obese and suspect to have MARIA FERNANDA/OHS with chronic hypercapnic respiratory failure. D/w pt who stated that he is diagnosed with MARIA FERNANDA. He agreed for trach placement. Patient cannot have CT chest due to body weight limitations. C/w vent support, vent and sedation bundle. VAP prevention bundle, head of the bed at 30' all times Patient is off paralytic medication since 5/4/2020 Sedation- Off versed drip since 5/14/20. No pain, off fentanyl patch as well. Continue bronchodilators prn; pulmonary hygiene care. D/w Dr Ginger Servin on 5/16: planned trach tomorrow. Hold TF after midnight, d/w RN. ICU team confirmed with OR: patient doesn't need COVID testing for this surgery. CVS - 
HU unremarkable for any cause for persistent hypoxemia, bubble study negative. Only grade 1 diastolic dysfunction with LVEF 45 to 50%. No pulmonary hypertension. US legs negative for DVT. Diuretics as above. ASA held for trach plan. Eliquis held for possible pulmonary hemorrhage. Dr. Flip Phillips on board. ID - SARS-COV-2 negative x 2 Antibiotic: s/p Zosyn completed on 5/12/2020 10 days antibiotic MSSA in resp cxs from before; respiratory cultures 5/8 no growth. Procalcitonin repeat is negative. No leukocytosis. No fever. Hem - HIT panel and LEYDI Ab NEG on 4/28. He is definitely high risk for PEs; CTA chest cannot be done due to limitations due to weight; stopped argatroban 5/4;[suspect fatty liver/GASTELUM]; Eliquis held due to bloody secretions from the ET tube. Vasc - PVL bl LE negative A-line was supposed to be d/c yesterday, not done yet. D/w RN to d/c today. Renal -continue monitor BUN and creatinine stable; electrolyte replacement per protocol. Nephrology Dr. Faviola Soria hs signed off.   
GI - GI placed OG tube; good position confirmed with abdominal x-ray using Gastrografin again on 5/16/20 after NGT replaced since patient pulled it out. LFTs slightly increased, will repeat in am.  
Neurology: moving extremities and follows all commands. No focal deficit. Overall poor prognosis. Patient chronically vent dependent. Full code. Will defer respective systems problem management to primary and other consultant and follow patient in ICU with primary and other medical team 
Quality Care: PPI, DVT prophylaxis, HOB elevated, Infection control all reviewed and addressed. PAIN AND SEDATION: As above · Skin/Wound:  Blister on foot, does not appear pressure sore. Continue local care per wound care consult. · Nutrition:  Start tube feeding · Prophylaxis: DVT and GI Prophylaxis reviewed. · Restraints: prn 
· PT/OT eval and treat: as needed when stable · Lines/Tubes: A-line 4/23/20- to be d/c today 5/17/20; daniel 4/21/20, ET tube 4/25/20; PICC line left arm 5/5 ADVANCE DIRECTIVE: Full code DISCUSSION: spoke with RN, RT, ICU staff, MDR 
 Events and notes from last 24 hours reviewed. Care plan discussed with nursing Total CC time: 33 minutes. High complexity review and management Jonah Meza MD  
5/18/2020

## 2020-05-18 NOTE — ANESTHESIA PREPROCEDURE EVALUATION
Relevant Problems No relevant active problems Anesthetic History No history of anesthetic complications Review of Systems / Medical History Patient summary reviewed, nursing notes reviewed and pertinent labs reviewed Pulmonary Sleep apnea Neuro/Psych Within defined limits Cardiovascular Hypertension Dysrhythmias : atrial fibrillation Past MI (NSTEMI) and CAD Exercise tolerance: <4 METS Comments: PEA arrest  
GI/Hepatic/Renal 
  
 
 
Renal disease: CRI Endo/Other Morbid obesity and arthritis Other Findings Comments: Super morbid obesity/respiratory failure Following commands - per chart Anesthetic Plan ASA: 4 Anesthesia type: general 
 
 
 
 
 
Anesthetic plan and risks discussed with: Patient Will assess patient upon entry to OR. Reportedly stable and following commands.

## 2020-05-18 NOTE — PROGRESS NOTES
Hospitalist Progress Note Patient: Shayy Huggins MRN: [id number]  CSN: [id number] YOB: 1975  Age: 53 y.o. Sex: male DOA: 4/21/2020 LOS:  LOS: 27 days Assessment/Plan Patient Active Problem List  
Diagnosis Code  Cellulitis of left lower leg L03. 116  
 Anemia D64.9  Morbid obesity ([facility]) E66.01  
 Dyspnea R06.00  Sleep apnea G47.30  Sinus tachycardia R00.0  BMI 70 and over, adult [facility]) J09.77  Chronic back pain M54.9, G89.29  
 Arthritis M19.90  Migraine G43.909  
 History of atrial flutter Z86.79  
 Limited mobility Z74.09  
 Smoking F17.200  Chronic renal insufficiency, stage II (mild) N18.2  CHF (congestive heart failure) ([facility]) I50.9  Acute diastolic CHF (congestive heart failure) ([facility]) I50.31  Chronic a-fib I48.20  Atrial flutter ([facility]) I48.92  
 Elevated troponin R79.89  
 Acute respiratory distress R06.03  
 Acute CHF (congestive heart failure) ([facility]) I50.9  Respiratory failure with hypoxia and hypercapnia ([facility]) J96.91, J96.92  
 Acute on chronic respiratory failure with hypoxia and hypercapnia ([facility]) J96.21, J96.22  
 Cardiac arrest ([facility]) I46.9  Advanced care planning/counseling discussion Z70.80  
 NSTEMI (non-ST elevated myocardial infarction) ([facility] I21.4  Hypocalcemia E83.51  
  
 
 
 
54 y/o male with hx of morbid obesity, MARIA FERNANDA, chronic afib on anticoagulation, and systolic CHF (EF 30-64%) is admitted with respiratory failure with hypoxia and hypercapnia, he required to be intubated. 04/23/2020 Patient self extubated this morning and went into PEA arrest, he was re intubated, difficult intubation. He is awake and post resuscitation, placed on sedation. Requiring high PEEP. 
 
04/24/2020 Patient again tried to self extubate in spite of sedation. 04/25/2020 ET tube leak, ET tube exchanged 04/27/2020 Had bleeding around IJ site, heparin stopped, H/H stable He is awake, alert, follows command CRITICAL CARE PLAN Resp - See vent orders, VAP bundle. HOB>30 degrees. Acute respiratory failure - with hypoxia and hypercapnia,  
MARIA FERNANDA/OHS -  
S/p trach 05/18/2020 PEEP of 8, 40% FiO2 ID - ANTIBIOTICS  Off antibiotics. Seen by ID 
COVID 19 negative x 2 
 
 
[facility] - Monitor HD. Hypotension - resolved, off pressors Paroxysmal atrial flutter - monitor rate, was on amiodarone. Has been bradycardic, toprol xl held. Acute on chronic systolic CHF -  
received lasix drip Echo with EF of 45-50% Elevated troponin - NSTEMI type II due to PEA arrest 
HU with no intracardiac shunt Heme/onc - Follow H&H, plts. Renal - Trend BUN, Cr, follow I/O, daniel in place. Check and replace Mg, K, phos. Hypernatremia - resolved, Endocrine -  Follow [facility] Neuro/ Pain/ Sedation - Off paralytic GI - NPO for now, , tube feeding. Morbid obesity Prophylaxis - DVT: SCD, GI: protonix. 1235 -1310 
35 minutes of critical care time spent in the direct evaluation and treatment of this high risk patient. The reason for providing this level of medical care for this critically ill patient was due a critical illness that impaired one or more vital organ systems such that there was a high probability of imminent or life threatening deterioration in the patients condition. This care involved high complexity decision making to assess, manipulate, and support vital system functions, to treat this degreee vital organ system failure and to prevent further life threatening deterioration of the patients condition. Disposition : TBD Physical Exam: 
General: As above   
HEENT: NC, Atraumatic. PERRLA, anicteric sclerae. Lungs: CTA Bilaterally. Distant breath sounds secondary to body habitus. Heart:  S1 S2, No murmur, No Rubs, No Gallops Abdomen: Soft, Non distended, obese, Non tender.  +Bowel sounds, Extremities: LE edema Vital signs/Intake and Output: 
Visit Vitals /66 Pulse 82 Temp 97.5 °F (36.4 °C) Resp 17 Ht 5' 10\" (1.778 m) Wt (!) 222.4 kg (490 lb 4.9 oz) SpO2 95% BMI 70.35 kg/m² Current Shift:  05/18 0701 - 05/18 1900 In: 0 Out: 1225 [Urine:1225] Last three shifts:  05/16 1901 - 05/18 0700 In: 6182 [I.V.:400] Out: 5400 [Urine:4100; REQRYE:4154] Labs: Results:  
   
Chemistry Recent Labs 05/18/20 
0505 05/17/20 
0545 05/16/20 
0530 * 105* 104*  137 138  
K 4.1 4.4 3.9  108 104 CO2 26 24 28 BUN 25* 28* 27* CREA 0.65 0.74 0.75 CA 9.0 8.8 8.6 AGAP 4 5 6 BUCR 38* 38* 36* AP 78 111 37* TP 7.1 6.8 6.6 ALB 4.2 3.8 3.7 GLOB 2.9 3.0 2.9 AGRAT 1.4 1.3 1.3  
  
CBC w/Diff Recent Labs 05/18/20 
0505 05/17/20 
0545 05/16/20 
0530 WBC 8.6 7.9 8.2  
RBC 4.03* 3.95* 3.81* HGB 10.8* 10.6* 10.0* HCT 34.1* 33.7* 32.7*  
 210 213 GRANS 86* 87* 87* LYMPH 9* 9* 7* EOS 0 0 0 Cardiac Enzymes No results for input(s): CPK, CKND1, CONSTANZA in the last 72 hours. No lab exists for component: Ta Brownots Coagulation Recent Labs 05/18/20 
0505 05/17/20 
9968 PTP 16.9* 17.1* INR 1.4* 1.4* APTT 22.1* 25.3 Lipid Panel No results found for: CHOL, CHOLPOCT, CHOLX, CHLST, CHOLV, 251827, HDL, HDLP, LDL, LDLC, DLDLP, 250063, VLDLC, VLDL, TGLX, TRIGL, TRIGP, TGLPOCT, CHHD, CHHDX  
BNP No results for input(s): BNPP in the last 72 hours. Liver Enzymes Recent Labs 05/18/20 
0505 TP 7.1 ALB 4.2 AP 78 SGOT 26 Thyroid Studies Lab Results Component Value Date/Time TSH 1.45 04/27/2020 03:50 AM  
    
Procedures/imaging: see electronic medical records for all procedures/Xrays and details which were not copied into this note but were reviewed prior to creation of Plan

## 2020-05-18 NOTE — PROGRESS NOTES
Cardiology Progress Note Patient: Shan Freeman. Sex: male          DOA: 4/21/2020 YOB: 1975      Age:  53 y.o.        LOS:  LOS: 27 days Patient seen and examined, chart reviewed. Assessment/Plan Patient Active Problem List  
Diagnosis Code  Cellulitis of left lower leg L03. 116  
 Anemia D64.9  Morbid obesity ([facility]) E66.01  
 Dyspnea R06.00  Sleep apnea G47.30  Sinus tachycardia R00.0  BMI 70 and over, adult [facility]) Y24.84  Chronic back pain M54.9, G89.29  
 Arthritis M19.90  Migraine G43.909  
 History of atrial flutter Z86.79  
 Limited mobility Z74.09  
 Smoking F17.200  Chronic renal insufficiency, stage II (mild) N18.2  CHF (congestive heart failure) ([facility]) I50.9  Acute diastolic CHF (congestive heart failure) ([facility]) I50.31  Chronic a-fib I48.20  Atrial flutter ([facility]) I48.92  
 Elevated troponin R79.89  
 Acute respiratory distress R06.03  
 Acute CHF (congestive heart failure) ([facility]) I50.9  Respiratory failure with hypoxia and hypercapnia ([facility]) J96.91, J96.92  
 Acute on chronic respiratory failure with hypoxia and hypercapnia ([facility]) J96.21, J96.22  
 Cardiac arrest ([facility]) I46.9  Advanced care planning/counseling discussion Z70.80  
 NSTEMI (non-ST elevated myocardial infarction) ([facility] [address] I21.4  Hypocalcemia E83.51 Acute on chronic systolic heart failure NSTEMI type II due to PEA arrest 
Paroxysmal atrial flutter Thrombocytopenia Bradycardia  
  
Patient self extubated and had PEA arrest. 
He had brief episode of A fib with RVR and was started On Amiodarone drip. Currently he is in Sinus rhythm. 
  
Amiodarone discontinued due to bradycardia. 
  
Echocardiogram  
  
· Left Ventricle: Normal wall thickness and systolic function (ejection fraction normal). Mildly dilated left ventricle. The estimated ejection fraction is 45 - 50%.  There is moderate (grade 2) left ventricular diastolic dysfunction E/e' Ratio = 17.09. Wall Scoring: The left ventricular wall motion is globally hypokinetic. · Right Ventricle: Not well visualized. Normal global systolic function. Moderately dilated right ventricle. · Right Atrium: Right atrium not well visualized. Moderately dilated right atrium. · IVC/Hepatic Veins: Dilated inferior vena cava. Mechanically ventilated; cannot use inferior caval vein diameter to estimate central venous pressure. 
  
IV heparin was discontinued due to thrombocytopenia and bleeding at central line site. Started on Argatroban.  
  
Patient had frequent oxygen desaturations and pinkish/bloody aspirate from tracheal suctioning.  
  
HU done revealed LVEF 54-75%, Grade I diastolic dysfunction, Negative bubble study. Oxygenation significantly improved after he was started on steroid S/p Tracheostomy  
  
Plan: 
  
Eliquis on hold Will start Bumex Strict I/O Avoid any AV debbie blocking agents due to bradycardia. Continue  IV hydralazine for hypertension if needed. Monitor electrolytes and renal function Continue Ventilatory support. Continue management as per hospital medicine and pulmonary critical care Subjective:  
 cc: On ventilator REVIEW OF SYSTEMS:  
 
Can not obtain Objective:  
  
Visit Vitals /66 Pulse 79 Temp 97.5 °F (36.4 °C) Resp 16 Ht 5' 10\" (1.778 m) Wt (!) 222.4 kg (490 lb 4.9 oz) SpO2 95% BMI 70.35 kg/m² Body mass index is 70.35 kg/m². Physical Exam: 
General Appearance: Comfortable, Morbidly obese, not using accessory muscles of respiration. HEENT: AMMON. HEAD: Atraumatic NECK: No JVD, no thyroidomeglay. CAROTIDS: No bruit LUNGS: bilateral conducted sounds HEART: S1+S2 audible, no murmur, no pericardial rub. NEUROLOGICAL: Opens eyes on verbal commands. Medication: 
Current Facility-Administered Medications Medication Dose Route Frequency  dexmedeTOMidine (PRECEDEX) 400 mcg in 0.9% sodium chloride 100 mL infusion  0.2-0.7 mcg/kg/hr IntraVENous TITRATE  methylPREDNISolone (PF) (SOLU-MEDROL) injection 20 mg  20 mg IntraVENous Q12H  hydrALAZINE (APRESOLINE) 20 mg/mL injection 20 mg  20 mg IntraVENous Q6H PRN  
 amLODIPine (NORVASC) tablet 10 mg  10 mg Oral DAILY  albumin human 25% (BUMINATE) solution 25 g  25 g IntraVENous Q6H  
 [Held by provider] apixaban (ELIQUIS) tablet 2.5 mg  2.5 mg Oral BID  scopolamine (TRANSDERM-SCOP) 1 mg over 3 days 1 Patch  1 Patch TransDERmal Q72H  LORazepam (ATIVAN) injection 2 mg  2 mg IntraVENous Q4H PRN  
 docusate sodium (COLACE) capsule 100 mg  100 mg Oral BID  polyethylene glycol (MIRALAX) packet 17 g  17 g Oral BID  heparin (porcine) 100 unit/mL injection 500 Units  500 Units InterCATHeter Q8H PRN  
 fentaNYL citrate (PF) injection 50 mcg  50 mcg IntraVENous Q3H PRN  chlorhexidine (PERIDEX) 0.12 % mouthwash 10 mL  10 mL Oral Q12H  
 albuterol-ipratropium (DUO-NEB) 2.5 MG-0.5 MG/3 ML  3 mL Nebulization Q6H PRN  
 acetaminophen (TYLENOL) tablet 650 mg  650 mg Oral Q6H PRN  
 ELECTROLYTE REPLACEMENT PROTOCOL - Calcium   1 Each Other PRN  
 ELECTROLYTE REPLACEMENT PROTOCOL - Magnesium   1 Each Other PRN  
 ELECTROLYTE REPLACEMENT PROTOCOL - Potassium Standard Dosing   1 Each Other PRN  
 ELECTROLYTE REPLACEMENT PROTOCOL - Phosphorus  Standard Dosing  1 Each Other PRN  pantoprazole (PROTONIX) 40 mg in 0.9% sodium chloride 10 mL injection  40 mg IntraVENous DAILY  [Held by provider] aspirin chewable tablet 81 mg  81 mg Oral DAILY  sodium chloride (NS) flush 5-40 mL  5-40 mL IntraVENous Q8H  
 sodium chloride (NS) flush 5-40 mL  5-40 mL IntraVENous PRN Facility-Administered Medications Ordered in Other Encounters Medication Dose Route Frequency  ePHEDrine (PF) (MISTOLE) 10 mg/mL in NS syringe   IntraVENous PRN Lab/Data Reviewed: 
 
  
Recent Labs 05/18/20 
0505 05/17/20 
0545 05/16/20 
0530 WBC 8.6 7.9 8.2 HGB 10.8* 10.6* 10.0* HCT 34.1* 33.7* 32.7*  
 210 213 Recent Labs 05/18/20 
0505 05/17/20 
0545 05/16/20 
0530  137 138  
K 4.1 4.4 3.9  108 104 CO2 26 24 28 * 105* 104* BUN 25* 28* 27* CREA 0.65 0.74 0.75 CA 9.0 8.8 8.6  
 
37 minutes of critical care time spent in the direct evaluation and treatment of this high risk patient. The reason for providing this level of medical care for this critically ill patient was due a critical illness that impaired one or more vital organ systems such that there was a high probability of imminent or life threatening deterioration in the patients condition. This care involved high complexity decision making to assess, manipulate, and support vital system functions, to treat this degree vital organ system failure and to prevent further life threatening deterioration of the patients condition. Signed By: Berta Pacheco MD   
 May 18, 2020

## 2020-05-18 NOTE — PROGRESS NOTES
NUTRITION FOLLOW-UP 
 
RECOMMENDATIONS/PLAN:  
-EN: Restart tube feeds when clinically possible Monitor labs/lytes, tube feed tolerance, skin integrity, wt, fluid status, BM 
 
NUTRITION ASSESSMENT:  
Client Update: 53 yrs old Male with resp failure w/ hypoxia and hypercapnia-intubated in ICU, COVID 19 ruled out, hypotension, acute on chronic systolic CHF, elevated trop, morbid obesity, pt self extubated w/ NSTEMI due to PEA arrest, chronic a-fib, elevated trop-cardiology following  HU showed  LVEF 45 to 50% and grade 1 DD, persistent leg edema, tube feeds on hold due to schedule trach today FOOD/NUTRITION INTAKE Diet Order: Vital High Protein @ 85ml/hr to provide 1870kcal 164g PRO 1563ml H20 209g CHO 43g Fat Food Allergies: NKFA/ Average PO Intake:     
No data found. Pertinent Medications:  [x] Reviewed; colace, methylprednisolone, protonix Electrolyte Replacement Protocol: [x]K [x]Mg [x]PO4 Insulin:  []SSI  []Pre-meal   []Basal    []Drip  []None Cultural/Congregational Food Preferences: None Identified BIOCHEMICAL DATA & MEDICAL TESTS Pertinent Labs:  [x] Reviewed; ANTHROPOMETRICS Height: 5' 10\" (177.8 cm)       Weight: (!) 222.4 kg (490 lb 4.9 oz) BMI: 70.4 kg/m^2 morbidly obese (Greater than or = to 40% BMI) Adm Weight: 485 lbs                Weight change: +5lbs Adjusted Body Weight: 124.2kg NUTRITION-FOCUSED PHYSICAL ASSESSMENT Skin: skin tear on right hip wound care following GI: +BM 5/18/20 NUTRITION PRESCRIPTION Calories: 1886kcal/day based on 25kcal/kg of IBW Protein: 113- 151 g/day based on 1.5-2.0 g/kg of IBW 
CHO: 236 g/day based on 50% of total energy Fluid: 1886 ml/day based on 1 kcal/ml      
 
NUTRITION DIAGNOSES:  
1.  Inadequate oral intake related to intubation as evidence by need for nutrition support  
  
 
NUTRITION INTERVENTIONS:  
INTERVENTIONS:        GOALS: 
1. -EN: Restart tube feeds when clinically possible 1.  Restart tube feeds when clinically possible by next review 3 days LEARNING NEEDS (Diet, Supplementation, Food/Nutrient-Drug Interaction): 
 [] None Identified   [] Education provided/documented      Identified and patient: [] Declined   [x] Was not appropriate/indicated NUTRITION MONITORING /EVALUATION:  
Adjust EN/PN as appropriate Monitor wt Monitor renal labs, electrolytes, fluid status Monitor for additional supplement needs Previous Recommendations Implemented: yes Previous Goals Met:  yes -tube feeds on hold for scheduled trach today  
   
[] Participated in Interdisciplinary Rounds   
[x] [address] [place] [address] Reviewed DISCHARGE NUTRITION RECOMMENDATIONS ADDRESSED:  
   [x] To be determined closer to discharge NUTRITION RISK:           [x] At risk                        [] Not currently at risk Will follow-up per policy. Shabana Watson 9

## 2020-05-18 NOTE — PROGRESS NOTES
Naina.Stagger-  Will call CT to see if patient can go for scan sometime this week. 1110- Patient going to [address]- Patient back from OR 
 
1330- Post-op assessment, vital signs and Mago complete. Patient alert and oriented X 4, following commands. #8 Renettacarlos XLT. 1900- Bedside and Verbal shift change report given to Mirela Downs Dr (oncoming nurse) by Rhonda Chilel RN (offgoing nurse). Report included the following information SBAR, Kardex, MAR and Recent Results.

## 2020-05-19 NOTE — PROGRESS NOTES
Set up Zoom meeting with patient and his wife. Left them alone to talk. At the end they requested another [address] as patient will be leaving here tomorrow (likely) for an [address] in [place]. They are not sure if the new facility will have Zoom or other family communications available. Set up the meeting for 9 pm tonight. Emails sent. DIANE CAMPOVERDE [facility] told and she will pass on to night staff. Please note - patient is LEFT HANDED for when he is writing notes - though he has done amazingly well with his right. Patient and family were grateful for the chance to connect. [facility], M.Div. Board Certified [employer] [phone] - Office

## 2020-05-19 NOTE — PROGRESS NOTES
Continues to rest comfortably, complains of \"soreness\" at trach site. Assisted with sleep/discomfort with meds as per [facility]. Excited about planned \"zoom\" call with family for later Monday am.  Otherwise uneventful shift, staff continues to monitor.

## 2020-05-19 NOTE — PROGRESS NOTES
Palliative Medicine Consult [facility] [phone] Patient now has trach. Goals of care established. Family wants Full Code and placement. Palliative medicine will sign off as no needs identified. Thank you for including PM team in the care of this patient.  
 
Melisa Abdul RN

## 2020-05-19 NOTE — PROGRESS NOTES
Cm did speak with wife regarding pt being accepted to [facility] which after conversation with cm wife did speak with Kiley Khanna, wife informed cm after conversation that she is agreeable with Melissa, dania did verify that facility can accept pt with NG tube if peg haven't been placed once medically cleared and insurance auth approved at this time [facility] has been initiated and pending, at this time [facility] is only accepting facility.

## 2020-05-19 NOTE — PROGRESS NOTES
Cardiology Progress Note Patient: Desire Monday. Sex: male          DOA: 4/21/2020 YOB: 1975      Age:  53 y.o.        LOS:  LOS: 28 days Patient seen and examined, chart reviewed. Assessment/Plan Patient Active Problem List  
Diagnosis Code  Cellulitis of left lower leg L03. 116  
 Anemia D64.9  Morbid obesity ([facility]) E66.01  
 Dyspnea R06.00  Sleep apnea G47.30  Sinus tachycardia R00.0  BMI 70 and over, adult [facility]) U01.30  Chronic back pain M54.9, G89.29  
 Arthritis M19.90  Migraine G43.909  
 History of atrial flutter Z86.79  
 Limited mobility Z74.09  
 Smoking F17.200  Chronic renal insufficiency, stage II (mild) N18.2  CHF (congestive heart failure) ([facility]) I50.9  Acute diastolic CHF (congestive heart failure) ([facility]) I50.31  Chronic a-fib I48.20  Atrial flutter ([facility]) I48.92  
 Elevated troponin R79.89  
 Acute respiratory distress R06.03  
 Acute CHF (congestive heart failure) ([facility]) I50.9  Respiratory failure with hypoxia and hypercapnia ([facility]) J96.91, J96.92  
 Acute on chronic respiratory failure with hypoxia and hypercapnia ([facility]) J96.21, J96.22  
 Cardiac arrest ([facility]) I46.9  Advanced care planning/counseling discussion Z70.80  
 NSTEMI (non-ST elevated myocardial infarction) ([facility] [address] I21.4  Hypocalcemia E83.51 Acute on chronic systolic heart failure NSTEMI type II due to PEA arrest 
Paroxysmal atrial flutter Thrombocytopenia Bradycardia  
  
Patient self extubated and had PEA arrest. 
He had brief episode of A fib with RVR and was started On Amiodarone drip. Currently he is in Sinus rhythm. 
  
Amiodarone discontinued due to bradycardia. 
  
Echocardiogram  
  
· Left Ventricle: Normal wall thickness and systolic function (ejection fraction normal). Mildly dilated left ventricle. The estimated ejection fraction is 45 - 50%.  There is moderate (grade 2) left ventricular diastolic dysfunction E/e' Ratio = 17.09. Wall Scoring: The left ventricular wall motion is globally hypokinetic. · Right Ventricle: Not well visualized. Normal global systolic function. Moderately dilated right ventricle. · Right Atrium: Right atrium not well visualized. Moderately dilated right atrium. · IVC/Hepatic Veins: Dilated inferior vena cava. Mechanically ventilated; cannot use inferior caval vein diameter to estimate central venous pressure. 
  
IV heparin was discontinued due to thrombocytopenia and bleeding at central line site. Started on Argatroban.  
  
Patient had frequent oxygen desaturations and pinkish/bloody aspirate from tracheal suctioning.  
  
HU done revealed LVEF 24-88%, Grade I diastolic dysfunction, Negative bubble study. Oxygenation significantly improved after he was started on steroid S/p Tracheostomy Telemetry reviewed: One episode of 3:1 AV block last night 
  
Plan: 
  
Eliquis on hold Give IV Bumex one dose today Strict I/O Avoid any AV debbie blocking agents due to bradycardia. Continue  IV hydralazine for hypertension if needed. Monitor electrolytes and renal function Continue Ventilatory support. Continue management as per hospital medicine and pulmonary critical care Plan discussed with patient's nurse. Subjective:  
 cc: On ventilator REVIEW OF SYSTEMS:  
 
Can not obtain Objective:  
  
Visit Vitals /65 Pulse 67 Temp 99.4 °F (37.4 °C) Resp 21 Ht 5' 10\" (1.778 m) Wt (!) 222.4 kg (490 lb 4.9 oz) SpO2 96% BMI 70.35 kg/m² Body mass index is 70.35 kg/m². Physical Exam: 
General Appearance: Comfortable, Morbidly obese, not using accessory muscles of respiration. HEENT: AMMON. HEAD: Atraumatic NECK: No JVD, no thyroidomeglay. CAROTIDS: No bruit LUNGS: bilateral conducted sounds HEART: S1+S2 audible, no murmur, no pericardial rub. NEUROLOGICAL: Opens eyes on verbal commands. Medication: 
Current Facility-Administered Medications Medication Dose Route Frequency  methylPREDNISolone (PF) (SOLU-MEDROL) injection 20 mg  20 mg IntraVENous Q12H  hydrALAZINE (APRESOLINE) 20 mg/mL injection 20 mg  20 mg IntraVENous Q6H PRN  
 amLODIPine (NORVASC) tablet 10 mg  10 mg Oral DAILY  albumin human 25% (BUMINATE) solution 25 g  25 g IntraVENous Q6H  
 [Held by provider] apixaban (ELIQUIS) tablet 2.5 mg  2.5 mg Oral BID  scopolamine (TRANSDERM-SCOP) 1 mg over 3 days 1 Patch  1 Patch TransDERmal Q72H  LORazepam (ATIVAN) injection 2 mg  2 mg IntraVENous Q4H PRN  
 docusate sodium (COLACE) capsule 100 mg  100 mg Oral BID  polyethylene glycol (MIRALAX) packet 17 g  17 g Oral BID  heparin (porcine) 100 unit/mL injection 500 Units  500 Units InterCATHeter Q8H PRN  
 fentaNYL citrate (PF) injection 50 mcg  50 mcg IntraVENous Q3H PRN  chlorhexidine (PERIDEX) 0.12 % mouthwash 10 mL  10 mL Oral Q12H  
 albuterol-ipratropium (DUO-NEB) 2.5 MG-0.5 MG/3 ML  3 mL Nebulization Q6H PRN  
 acetaminophen (TYLENOL) tablet 650 mg  650 mg Oral Q6H PRN  
 ELECTROLYTE REPLACEMENT PROTOCOL - Calcium   1 Each Other PRN  
 ELECTROLYTE REPLACEMENT PROTOCOL - Magnesium   1 Each Other PRN  
 ELECTROLYTE REPLACEMENT PROTOCOL - Potassium Standard Dosing   1 Each Other PRN  
 ELECTROLYTE REPLACEMENT PROTOCOL - Phosphorus  Standard Dosing  1 Each Other PRN  pantoprazole (PROTONIX) 40 mg in 0.9% sodium chloride 10 mL injection  40 mg IntraVENous DAILY  [Held by provider] aspirin chewable tablet 81 mg  81 mg Oral DAILY  sodium chloride (NS) flush 5-40 mL  5-40 mL IntraVENous Q8H  
 sodium chloride (NS) flush 5-40 mL  5-40 mL IntraVENous PRN Lab/Data Reviewed: 
 
  
Recent Labs 05/19/20 
0510 05/18/20 
0505 05/17/20 
6713 WBC 7.3 8.6 7.9 HGB 9.6* 10.8* 10.6* HCT 30.8* 34.1* 33.7*  
 206 210 Recent Labs 05/19/20 
0510 05/18/20 
0505 05/17/20 
0607  138 137  
K 4.2 4.1 4.4  108 108 CO2 27 26 24 GLU 95 100* 105* BUN 24* 25* 28* CREA 0.64 0.65 0.74 CA 8.6 9.0 8.8  
 
39 minutes of critical care time spent in the direct evaluation and treatment of this high risk patient. The reason for providing this level of medical care for this critically ill patient was due a critical illness that impaired one or more vital organ systems such that there was a high probability of imminent or life threatening deterioration in the patients condition. This care involved high complexity decision making to assess, manipulate, and support vital system functions, to treat this degree vital organ system failure and to prevent further life threatening deterioration of the patients condition. Signed By: Franky Gonsalves MD   
 May 19, 2020

## 2020-05-19 NOTE — PROGRESS NOTES
Hospitalist Progress Note Patient: Desire Monday. MRN: [id number]  CSN: [id number] YOB: 1975  Age: 53 y.o. Sex: male DOA: 4/21/2020 LOS:  LOS: 28 days Assessment/Plan Patient Active Problem List  
Diagnosis Code  Cellulitis of left lower leg L03. 116  
 Anemia D64.9  Morbid obesity ([facility]) E66.01  
 Dyspnea R06.00  Sleep apnea G47.30  Sinus tachycardia R00.0  BMI 70 and over, adult [facility]) A13.21  Chronic back pain M54.9, G89.29  
 Arthritis M19.90  Migraine G43.909  
 History of atrial flutter Z86.79  
 Limited mobility Z74.09  
 Smoking F17.200  Chronic renal insufficiency, stage II (mild) N18.2  CHF (congestive heart failure) ([facility]) I50.9  Acute diastolic CHF (congestive heart failure) ([facility]) I50.31  Chronic a-fib I48.20  Atrial flutter ([facility]) I48.92  
 Elevated troponin R79.89  
 Acute respiratory distress R06.03  
 Acute CHF (congestive heart failure) ([facility]) I50.9  Respiratory failure with hypoxia and hypercapnia ([facility]) J96.91, J96.92  
 Acute on chronic respiratory failure with hypoxia and hypercapnia ([facility]) J96.21, J96.22  
 Cardiac arrest ([facility]) I46.9  Advanced care planning/counseling discussion Z70.80  
 NSTEMI (non-ST elevated myocardial infarction) ([facility] [address] I21.4  Hypocalcemia E83.51  
  
 
 
 
52 y/o male with hx of morbid obesity, MARIA FERNANDA, chronic afib on anticoagulation, and systolic CHF (EF 96-97%) is admitted with respiratory failure with hypoxia and hypercapnia, he required to be intubated. 04/23/2020 Patient self extubated this morning and went into PEA arrest, he was re intubated, difficult intubation. He is awake and post resuscitation, placed on sedation. Requiring high PEEP. 
 
04/24/2020 Patient again tried to self extubate in spite of sedation. 04/25/2020 ET tube leak, ET tube exchanged 04/27/2020 Had bleeding around IJ site, heparin stopped, H/H stable He is awake, alert, follows commands CRITICAL CARE PLAN Resp - See vent orders, VAP bundle. HOB>30 degrees. Acute respiratory failure - with hypoxia and hypercapnia,  
MARIA FERNANDA/OHS -  
S/p trach 05/18/2020 PEEP of 8, 40% FiO2 ID - ANTIBIOTICS  Off antibiotics. Seen by ID 
COVID 19 negative x 2 
 
 
[facility] - Monitor HD. Hypotension - resolved, off pressors Paroxysmal atrial flutter - monitor rate, was on amiodarone. Has been bradycardic, toprol xl held. Acute on chronic systolic CHF -  
received lasix drip Echo with EF of 45-50% Elevated troponin - NSTEMI type II due to PEA arrest 
HU with no intracardiac shunt Heme/onc - Follow H&H, plts. Renal - Trend BUN, Cr, follow I/O, daniel in place. Check and replace Mg, K, phos. Hypernatremia - resolved, Endocrine -  Follow [facility] Neuro/ Pain/ Sedation - Off paralytic GI - NPO for now, NG tube, tube feeding. GI consulted for PEG. Morbid obesity Prophylaxis - DVT: SCD, GI: protonix. [id number] 
35 minutes of critical care time spent in the direct evaluation and treatment of this high risk patient. The reason for providing this level of medical care for this critically ill patient was due a critical illness that impaired one or more vital organ systems such that there was a high probability of imminent or life threatening deterioration in the patients condition. This care involved high complexity decision making to assess, manipulate, and support vital system functions, to treat this degreee vital organ system failure and to prevent further life threatening deterioration of the patients condition. Disposition : TBD Physical Exam: 
General: As above   
HEENT: NC, Atraumatic. PERRLA, anicteric sclerae. Lungs: CTA Bilaterally. Distant breath sounds secondary to body habitus. Heart:  S1 S2, No murmur, No Rubs, No Gallops Abdomen: Soft, Non distended, obese, Non tender.  +Bowel sounds, Extremities: LE edema Vital signs/Intake and Output: Visit Vitals /57 Pulse 82 Temp 99.4 °F (37.4 °C) Resp 20 Ht 5' 10\" (1.778 m) Wt (!) 222.4 kg (490 lb 4.9 oz) SpO2 93% BMI 70.35 kg/m² Current Shift:  05/19 0701 - 05/19 1900 In: -  
Out: 7601 [[id number]] Last three shifts:  05/17 1901 - 05/19 0700 In: 750 [I.V.:600] Out: 4919 [[id number]] Labs: Results:  
   
Chemistry Recent Labs 05/19/20 
0510 05/18/20 
0505 05/17/20 
8593 GLU 95 100* 105*  138 137  
K 4.2 4.1 4.4  108 108 CO2 27 26 24 BUN 24* 25* 28* CREA 0.64 0.65 0.74 CA 8.6 9.0 8.8 AGAP 3 4 5 BUCR 38* 38* 38* AP 54 78 111  
TP 6.6 7.1 6.8 ALB 4.1 4.2 3.8 GLOB 2.5 2.9 3.0 AGRAT 1.6 1.4 1.3  
  
CBC w/Diff Recent Labs 05/19/20 
0510 05/18/20 
0505 05/17/20 
6335 WBC 7.3 8.6 7.9  
RBC 3.64* 4.03* 3.95* HGB 9.6* 10.8* 10.6* HCT 30.8* 34.1* 33.7*  
 206 210 GRANS 84* 86* 87* LYMPH 9* 9* 9* EOS 1 0 0 Cardiac Enzymes No results for input(s): CPK, CKND1, CONSTANZA in the last 72 hours. No lab exists for component: Ananth Buckley Coagulation Recent Labs 05/19/20 
0510 05/18/20 
0505 PTP 18.3* 16.9* INR 1.5* 1.4* APTT 28.2 22.1* Lipid Panel No results found for: CHOL, CHOLPOCT, CHOLX, CHLST, CHOLV, 327204, HDL, HDLP, LDL, LDLC, DLDLP, 677710, VLDLC, VLDL, TGLX, TRIGL, TRIGP, TGLPOCT, CHHD, CHHDX  
BNP No results for input(s): BNPP in the last 72 hours. Liver Enzymes Recent Labs 05/19/20 
0510 TP 6.6 ALB 4.1 AP 54 SGOT 18 Thyroid Studies Lab Results Component Value Date/Time TSH 1.45 04/27/2020 03:50 AM  
    
Procedures/imaging: see electronic medical records for all procedures/Xrays and details which were not copied into this note but were reviewed prior to creation of Plan

## 2020-05-19 NOTE — PROGRESS NOTES
Pulmonary, Critical Care, and Sleep Medicine Pulmonary Progress Note Name: Tiago Elliott. : 1975 MRN: [id number] Date: 2020 [x]I have reviewed the flowsheet and previous days notes. Events, vitals, medications and notes from last 24 hours reviewed. Subjective/History:  
12-year-old male with morbid obesity, ex-smoker, history of asthma, history of severe obstructive sleep apnea on CPAP, questionable component compliance, history of congestive heart failure with reduced ejection fraction atrial fibrillation atrial flutter on amiodarone, Xarelto, history of chronic renal insufficiency, cellulitis of lower extremities and anemia. As per family did not have any fever or upper respiratory tract infections and no flulike symptoms. On Lasix, CPAP not clear if missed. Presented with acute shortness of breath worsening edema of lower extremities. He was found to be in acute hypercarbic hypoxemic respiratory failure initially had a trial of BiPAP but failed and then eventually was intubated. He was hypotensive. Moderate amount of secretions. Morbid obesity. Patient compliant with Xarelto. Patient self extubated and had PEA arrest. 
 
Subjective:  
20 Is post tracheostomy . Patient tolerated the procedure well. Currently down to 40% FiO2. And PEEP only 8. No significant secretions. No fever. Patient probably will be accepted today to long-term facility to to wean ventilation. Previously failed weaning. I suspect patient will need nocturnal ventilation but daytime hopefully we can wean him off. Severe obstructive sleep apnea obesity hypoventilation syndrome morbid obesity. 2020 Patient remains in the ICU critically air chronic hypoxemic respiratory failure with very minimal improvement. Tracheostomy today. Finally on PEEP of 8. FiO2 35 to 40%. Awake following commands. Patient had 1 self extubation with cardiac arrest. 
Very difficult airway. Cannot be safely extubated tracheostomy. Has very severe obstructive sleep apnea. If stable we will perform CT of the chest. 
Weight was an issue. Currently patient lost some weight. Might be able to fit in a CT of the chest. 
Pressors. Afebrile. 5/17/20 Patient intubated 4/22 for hypoxemic resp failure. Remains in ICU, orally intubated, on ventilator. Off sedation. Awake and follows all commands. No fever. BP stable. Diuresing well. Metabolic alkalosis resolved with diamox. B/l Leg edema improving. CXR slight improvement. fio2 now at 40% and peep reduced to 8. BP elevated, on norvasc. HR in 40's sinus  intermittently when sleeping I/O negative ETT bloody secretions improved since diuresed and on steroids, now mild scant secretions. Drips: none Intake/Output Summary (Last 24 hours) at 5/19/2020 1038 Last data filed at 5/19/2020 6509 Gross per 24 hour Intake 400 ml Output 2550 ml Net -2150 ml  
 
 
ROS: Review of systems not obtained due to patient factors. Past Medical History: 
Past Medical History:  
Diagnosis Date  Arrhythmia  Arthritis   
 knees  CHF (congestive heart failure) ([address]  Chronic back pain  Chronic renal insufficiency, stage II (mild)  History of atrial flutter Dx 6/2016 - anticoagulated by Joaquin Nelson  Hypertension 2015  Limited mobility  Migraine headache  Morbid obesity ([facility] [address]  Morbid obesity with body mass index of 60.0-69.9 in adult [facility])  Sleep apnea   
 uses c-pap instructed to bring day of surgery  Smoking   
 very passive / cigars only Allergy: No Known Allergies Vital Signs:   
Blood pressure 157/57, pulse 84, temperature 99.1 °F (37.3 °C), resp. rate 19, height 5' 10\" (1.778 m), weight (!) 222.4 kg (490 lb 4.9 oz), SpO2 94 %. Body mass index is 70.35 kg/m². O2 Device: Other (comment) O2 Flow Rate (L/min): 16 l/min Temp (24hrs), Av.8 °F (37.1 °C), Min:97.5 °F (36.4 °C), Max:99.3 °F (37.4 °C) Intake/Output:  
Last shift:      701 - 1900 In: -  
Out: 550 [Urine:550] Last 3 shifts: 1901 - 700 In: 750 [I.V.:600] Out: 2380 [MGAHI:9552] Intake/Output Summary (Last 24 hours) at 2020 1038 Last data filed at 2020 9445 Gross per 24 hour Intake 400 ml Output 2550 ml Net -2150 ml Ventilator Settings: 
Mode Rate Tidal Volume Pressure FiO2 PEEP Pressure control   580 ml  15 cm H2O 40 % 8 cm H20 Peak airway pressure: 24 cm H2O Minute ventilation: 12.9 l/min Physical Exam: 
General: obese male; no cyanosis; alert awake and following all commands. on ventilator HEENT: LIBBY, orally intubated; orogastric tube; no bleeding; pupils not dilated, reactive Neck: thick short obese neck Chest: normal, obese chest wall Lungs: moderate air entry, obese chest limits exam; decreased BS bases; symmetrical chest expansion Heart: S1S2 present or without murmur or extra heart sounds; JVD not elevated Abdomen: obese, bowel sounds normoactive, soft without significant tenderness; no organomegaly; no guarding or rigidity Extremity: b/l leg/feet edema improving, now can see wrinkles of skin; negative for cyanosis, clubbing. Can see wrinkles in feet and leg since edema improving. Capillary refill: normal 
Neuro:  Alert awake and following all commands, moving all 4 limbs and following all commands. Skin: Skin color, texture, turgor fair. Skin dry, warm, non-diaphoretic DATA:  
Current Facility-Administered Medications Medication Dose Route Frequency  methylPREDNISolone (PF) (SOLU-MEDROL) injection 20 mg  20 mg IntraVENous Q12H  hydrALAZINE (APRESOLINE) 20 mg/mL injection 20 mg  20 mg IntraVENous Q6H PRN  
 amLODIPine (NORVASC) tablet 10 mg  10 mg Oral DAILY  albumin human 25% (BUMINATE) solution 25 g  25 g IntraVENous Q6H  
  [Held by provider] apixaban (ELIQUIS) tablet 2.5 mg  2.5 mg Oral BID  scopolamine (TRANSDERM-SCOP) 1 mg over 3 days 1 Patch  1 Patch TransDERmal Q72H  LORazepam (ATIVAN) injection 2 mg  2 mg IntraVENous Q4H PRN  
 docusate sodium (COLACE) capsule 100 mg  100 mg Oral BID  polyethylene glycol (MIRALAX) packet 17 g  17 g Oral BID  heparin (porcine) 100 unit/mL injection 500 Units  500 Units InterCATHeter Q8H PRN  
 fentaNYL citrate (PF) injection 50 mcg  50 mcg IntraVENous Q3H PRN  chlorhexidine (PERIDEX) 0.12 % mouthwash 10 mL  10 mL Oral Q12H  
 albuterol-ipratropium (DUO-NEB) 2.5 MG-0.5 MG/3 ML  3 mL Nebulization Q6H PRN  
 acetaminophen (TYLENOL) tablet 650 mg  650 mg Oral Q6H PRN  
 ELECTROLYTE REPLACEMENT PROTOCOL - Calcium   1 Each Other PRN  
 ELECTROLYTE REPLACEMENT PROTOCOL - Magnesium   1 Each Other PRN  
 ELECTROLYTE REPLACEMENT PROTOCOL - Potassium Standard Dosing   1 Each Other PRN  
 ELECTROLYTE REPLACEMENT PROTOCOL - Phosphorus  Standard Dosing  1 Each Other PRN  pantoprazole (PROTONIX) 40 mg in 0.9% sodium chloride 10 mL injection  40 mg IntraVENous DAILY  [Held by provider] aspirin chewable tablet 81 mg  81 mg Oral DAILY  sodium chloride (NS) flush 5-40 mL  5-40 mL IntraVENous Q8H  
 sodium chloride (NS) flush 5-40 mL  5-40 mL IntraVENous PRN Facility-Administered Medications Ordered in Other Encounters Medication Dose Route Frequency  ePHEDrine (PF) (MISTOLE) 10 mg/mL in NS syringe   IntraVENous PRN Telemetry: [x]Sinus []A-flutter []Paced []A-fib []Multiple PVCs Labs: 
Recent Results (from the past 24 hour(s)) GLUCOSE, POC Collection Time: 05/18/20  6:48 PM  
Result Value Ref Range Glucose (POC) 97 70 - 110 mg/dL GLUCOSE, POC Collection Time: 05/18/20 11:20 PM  
Result Value Ref Range Glucose (POC) 97 70 - 110 mg/dL METABOLIC PANEL, COMPREHENSIVE Collection Time: 05/19/20  5:10 AM  
Result Value Ref Range Sodium 138 136 - 145 mmol/L Potassium 4.2 3.5 - 5.5 mmol/L Chloride 108 100 - 111 mmol/L  
 CO2 27 21 - 32 mmol/L Anion gap 3 3.0 - 18 mmol/L Glucose 95 74 - 99 mg/dL BUN 24 (H) 7.0 - 18 MG/DL Creatinine 0.64 0.6 - 1.3 MG/DL  
 BUN/Creatinine ratio 38 (H) 12 - 20 GFR est AA >60 >60 ml/min/1.73m2 GFR est non-AA >60 >60 ml/min/1.73m2 Calcium 8.6 8.5 - 10.1 MG/DL Bilirubin, total 1.4 (H) 0.2 - 1.0 MG/DL  
 ALT (SGPT) 59 16 - 61 U/L  
 AST (SGOT) 18 10 - 38 U/L Alk. phosphatase 54 45 - 117 U/L Protein, total 6.6 6.4 - 8.2 g/dL Albumin 4.1 3.4 - 5.0 g/dL Globulin 2.5 2.0 - 4.0 g/dL A-G Ratio 1.6 0.8 - 1.7    
CBC WITH AUTOMATED DIFF Collection Time: 05/19/20  5:10 AM  
Result Value Ref Range WBC 7.3 4.6 - 13.2 K/uL  
 RBC 3.64 (L) 4.70 - 5.50 M/uL HGB 9.6 (L) 13.0 - 16.0 g/dL HCT 30.8 (L) 36.0 - 48.0 % MCV 84.6 74.0 - 97.0 FL  
 MCH 26.4 24.0 - 34.0 PG  
 MCHC 31.2 31.0 - 37.0 g/dL  
 RDW 16.9 (H) 11.6 - 14.5 % PLATELET 086 311 - 909 K/uL MPV 9.9 9.2 - 11.8 FL  
 NEUTROPHILS 84 (H) 40 - 73 % LYMPHOCYTES 9 (L) 21 - 52 % MONOCYTES 6 3 - 10 % EOSINOPHILS 1 0 - 5 % BASOPHILS 0 0 - 2 %  
 ABS. NEUTROPHILS 6.1 1.8 - 8.0 K/UL  
 ABS. LYMPHOCYTES 0.6 (L) 0.9 - 3.6 K/UL  
 ABS. MONOCYTES 0.5 0.05 - 1.2 K/UL  
 ABS. EOSINOPHILS 0.1 0.0 - 0.4 K/UL  
 ABS. BASOPHILS 0.0 0.0 - 0.1 K/UL  
 DF AUTOMATED MAGNESIUM Collection Time: 05/19/20  5:10 AM  
Result Value Ref Range Magnesium 2.2 1.6 - 2.6 mg/dL CALCIUM, IONIZED Collection Time: 05/19/20  5:10 AM  
Result Value Ref Range Ionized Calcium 1.18 1.12 - 1.32 MMOL/L  
PTT Collection Time: 05/19/20  5:10 AM  
Result Value Ref Range aPTT 28.2 23.0 - 36.4 SEC PROTHROMBIN TIME + INR Collection Time: 05/19/20  5:10 AM  
Result Value Ref Range Prothrombin time 18.3 (H) 11.5 - 15.2 sec INR 1.5 (H) 0.8 - 1.2 PHOSPHORUS  Collection Time: 05/19/20  5:10 AM  
 Result Value Ref Range Phosphorus 3.1 2.5 - 4.9 MG/DL  
GLUCOSE, POC Collection Time: 05/19/20  5:23 AM  
Result Value Ref Range Glucose (POC) 99 70 - 110 mg/dL POC G3 Collection Time: 05/19/20  5:27 AM  
Result Value Ref Range Device: VENT    
 FIO2 (POC) 0.4 % pH (POC) 7.411 7.35 - 7.45    
 pCO2 (POC) 37.9 35.0 - 45.0 MMHG  
 pO2 (POC) 74 (L) 80 - 100 MMHG  
 HCO3 (POC) 24.1 22 - 26 MMOL/L  
 sO2 (POC) 95 92 - 97 % Base deficit (POC) 1 mmol/L Mode ASSIST CONTROL Set Rate 16 bpm  
 PEEP/CPAP (POC) 8 cmH2O  
 PIP (POC) 15 Allens test (POC) N/A Inspiratory Time 0.9 sec Total resp. rate 17 Site RIGHT RADIAL Patient temp. 98.9 Specimen type (POC) ARTERIAL Performed by Suhas Arreola Pressure control YES Recent Labs 05/19/20 
0121 05/18/20 
0542 05/17/20 
0448 FIO2I 0.4 0.4 0.40 HCO3I 24.1 23.0 22.1 PCO2I 37.9 40.1 37.7 PHI 7.411 7.368 7.376 PO2I 74* 86 89 All Micro Results Procedure Component Value Units Date/Time CULTURE, RESPIRATORY/SPUTUM/BRONCH [place] STAIN [[id number]]  (Abnormal) Collected:  05/08/20 1630 Order Status:  Completed Specimen:  Sputum from Tracheal Aspirate Updated:  05/10/20 1214 Special Requests: NO SPECIAL REQUESTS     
  GRAM STAIN RARE WBCS SEEN     
      
  RARE EPITHELIAL CELLS SEEN  
     
   NO ORGANISMS SEEN Culture result: RARE YEAST     
      
  NO NORMAL RESPIRATORY BRYN ISOLATED  
     
 CULTURE, RESPIRATORY/SPUTUM/BRONCH [facility] [[id number]] Collected:  05/04/20 1315 Order Status:  Canceled Specimen:  Sputum from Tracheal Aspirate CULTURE, RESPIRATORY/SPUTUM/BRONCH [place] STAIN [[id number]]  (Abnormal)  (Susceptibility) Collected:  04/22/20 1018 Order Status:  Completed Specimen:  Sputum from Tracheal Aspirate Updated:  04/25/20 5691   Special Requests: NO SPECIAL REQUESTS     
  GRAM STAIN FEW WBCS SEEN     
      
 OCCASIONAL EPITHELIAL CELLS SEEN  
     
      
  FEW GRAM POSITIVE COCCI IN CLUSTERS  
     
   RARE GRAM NEGATIVE RODS Culture result:    
  LIGHT STAPHYLOCOCCUS AUREUS  
     
      
  LIGHT NORMAL RESPIRATORY BRYN  
     
 CULTURE, BLOOD [[id number]] Collected:  04/24/20 1015 Order Status:  Canceled Specimen:  Blood Imaging: 
[x]I have personally reviewed the patients chest radiographs images and report Results from SHASHANK GALVEZ  DES Encounter encounter on 04/21/20 XR ABD (KUB) Narrative EXAM: SUPINE ABDOMINAL RADIOGRAPH 
 
CLINICAL INDICATION/HISTORY: NG placement 
-Additional: None COMPARISON: 5/16/2020 TECHNIQUE: Single supine view of the abdomen. 
 
_______________ FINDINGS: 
 
BOWEL GAS PATTERN: Markedly limited study secondary to body habitus. Enteric 
tube appears within the stomach, unchanged from prior. OTHER: None. 
 
_______________ Impression IMPRESSION: 
 
Limited study as above. Limited study secondary to body habitus. Enteric tube appears within the 
stomach, unchanged from prior.  LEs 4/22/2020 Interpretation Summary · No evidence of deep vein thrombosis in the right lower extremity veins assessed. · No evidence of deep vein thrombosis in the left lower extremity veins assessed. Echo 4/22 Result status: Edited Result - FINAL Addendum by Elodia Drake MD on Wed Apr 22, 2020  5:12 PM  
· Left Ventricle: Normal wall thickness and systolic function (ejection fraction normal). Mildly dilated left ventricle. The estimated ejection fraction is 45 - 50%. There is moderate (grade 2) left ventricular diastolic dysfunction E/e' Ratio = 17.09. Wall Scoring: The left ventricular wall motion is globally hypokinetic. · Right Ventricle: Not well visualized. Normal global systolic function. Moderately dilated right ventricle. · Right Atrium: Right atrium not well visualized. Moderately dilated right atrium. · IVC/Hepatic Veins: Dilated inferior vena cava. Mechanically ventilated; cannot use inferior caval vein diameter to estimate central venous pressure. HU 5/12/20: 
Result status: Final result · Image quality: good. The procedure, risks and alternatives were explained. Informed consent was obtained. . Topical anesthetic not used. . Sedation was achieved by conscious sedation. 4 mg Versed was administered. 100 mcg of Fentanyl was administered. No complications. HU probe was removed. Estimated blood loss: 0 mL. Color flow Doppler was performed and pulse wave and/or continuous wave Doppler was performed. Saline contrast was given to evaluate for intracardiac shunt. No shunt seen. · Left Ventricle: Normal cavity size and wall thickness. Mild systolic dysfunction. The estimated ejection fraction is 45 - 50%. Visually measured ejection fraction. Abnormal left ventricular septal motion. There is mild (grade 1) left ventricular diastolic dysfunction. Wall Scoring: The left ventricular wall motion is globally hypokinetic. · Interatrial Septum: Agitated saline contrast study was performed. There was no shunting at baseline or with Valsalva. No atrial septal defect present. Gastrografin KUB 5/16/20: 
Limited examination due to body habitus. The nasogastric tube is not well visualized. Final image demonstrates enteric 
contrast both within the stomach and the visualized portions of the distal 
esophagus. The nasogastric tube is not well visualized. cxr 5/17/20 reviewed:  
1. Stable support lines and tubes. 2. Central pulmonary vascular congestion, possibly with small bilateral pleural 
effusions and associated atelectasis/airspace disease. IMPRESSION:  
Patient Active Problem List  
Diagnosis Code  Acute on chronic respiratory failure with hypoxia and hypercapnia ([facility]) J96.21, J96.22  
 Acute on chronic systolic and diastolic CHF (congestive heart failure) ([facility]) I50.43  
 Aspiration pneumonitis from multiple patient initiated ET tube displacement events J69.0  Cardiac arrest ([facility] [address] I46.9 MSSA in sputum culture A49.01  
 Elevated troponin, NSTEMI R79.89, I21.4  Chronic a-fib I48.20  Atrial flutter ([facility] [address] I48.92 Prolonged QTc R94.31  
 Chronic renal insufficiency, stage II (mild) N18.2 Thrombocytopenia D69.6  Smoking F17.200  Arthritis M19.90  Migraine G43.909  Chronic back pain M54.9, G89.29  
 Obstructive Sleep apnea G47.33  
 Morbid obesity (HCC) E66.01  
  
PLAN:  
RS -intubated, on ventilator since 4/25/2020. HU negative for any cardiac causes for severe hypoxemia or shunting. He could have pulmonary hemorrhage knowing bloody/pinkish ETT secretions and so started on Solu-Medrol 40 mg IV every 8 hour since 5/12/2020. Since then fio2 improved to 40% and peep 8 and improved ETT bloody secretions. Reduce steroids to 40 mg q12 hrs on 5/16/20. Will taper per clinical response. I/o improved, leg edema improved Metabolic  alkalosis resolved since lasix stopped and diamox given since 5/15/20 for 2 days. Will start lasix 40 q6 hours rather than lasix drip. Inf I/o not negative, then consider restarting lasix drip. Repeat ABG in am.  
Monitor I's and O's, renal functions, electrolytes and replace per ICU protocol. Continue albumin. Morbidly obese and suspect to have MARIA FERNANDA/OHS with chronic hypercapnic respiratory failure. D/w pt who stated that he is diagnosed with MARIA FERNANDA. He agreed for trach placement. Patient cannot have CT chest due to body weight limitations. C/w vent support, vent and sedation bundle. VAP prevention bundle, head of the bed at 30' all times Patient is off paralytic medication since 5/4/2020 Sedation- Off versed drip since 5/14/20. No pain, off fentanyl patch as well. Continue bronchodilators prn; pulmonary hygiene care. D/w Dr Joi Patterson on 5/16: planned trach tomorrow.  Hold TF after midnight, d/w RN. ICU team confirmed with OR: patient doesn't need COVID testing for this surgery. CVS - 
HU unremarkable for any cause for persistent hypoxemia, bubble study negative. Only grade 1 diastolic dysfunction with LVEF 45 to 50%. No pulmonary hypertension. US legs negative for DVT. Diuretics as above. ASA held for trach plan. Eliquis held for possible pulmonary hemorrhage. Dr. Oscar Regalado on board. ID - SARS-COV-2 negative x 2 Antibiotic: s/p Zosyn completed on 5/12/2020 10 days antibiotic MSSA in resp cxs from before; respiratory cultures 5/8 no growth. Procalcitonin repeat is negative. No leukocytosis. No fever. Hem - HIT panel and LEYDI Ab NEG on 4/28. He is definitely high risk for PEs; CTA chest cannot be done due to limitations due to weight; stopped argatroban 5/4;[suspect fatty liver/GASTELUM]; Eliquis held due to bloody secretions from the ET tube. Vasc - PVL bl LE negative A-line was supposed to be d/c yesterday, not done yet. D/w RN to d/c today. Renal -continue monitor BUN and creatinine stable; electrolyte replacement per protocol. Nephrology Dr. Gabby Bill hs signed off.   
GI - GI placed OG tube; good position confirmed with abdominal x-ray using Gastrografin again on 5/16/20 after NGT replaced since patient pulled it out. LFTs slightly increased, will repeat in am.  
Neurology: moving extremities and follows all commands. No focal deficit. Overall poor prognosis. Patient chronically vent dependent. Full code. Will defer respective systems problem management to primary and other consultant and follow patient in ICU with primary and other medical team 
Quality Care: PPI, DVT prophylaxis, HOB elevated, Infection control all reviewed and addressed. PAIN AND SEDATION: As above · Skin/Wound:  Blister on foot, does not appear pressure sore. Continue local care per wound care consult. · Nutrition:  Start tube feeding · Prophylaxis: DVT and GI Prophylaxis reviewed. · Restraints: prn 
· PT/OT eval and treat: as needed when stable · Lines/Tubes: A-line 4/23/20- to be d/c today 5/17/20; daniel 4/21/20, ET tube 4/25/20; PICC line left arm 5/5 ADVANCE DIRECTIVE: Full code DISCUSSION: spoke with RN, RT, ICU staff, MDR Events and notes from last 24 hours reviewed. Care plan discussed with nursing Total CC time: 33 minutes. High complexity review and management Giana Ashby MD  
5/19/2020

## 2020-05-19 NOTE — PROGRESS NOTES
n 0700- Bedside and Verbal shift change report given to Magdy John RN (oncoming nurse) by Jayme Quiroz RN (offgoing nurse). Report included the following information SBAR, Kardex, MAR and Recent Results. 0800- Shift assessment complete. Patient alert and oriented X 4. Denies pain. Radial and pedal pulses palpable. 0930- Received call from [address] (Case Management). Patient has been accepted to [facility] when medically clear. Patient's NG tube placement verified this morning. 1030- Patient's tube feed restarted. 5ml/hr and will increase by 5 q6h to goal of 85. 
 
1110- Spoke with [employer]. Will have zoom call with patient's family at 0. 
 
0- Spoke with Dr. Daphney Martinez in reference to patient and potential peg tube placement. Will verify that patient needs PEG to transfer to  facility. 1150- Spoke with Case Management. She will get in touch with [facility] and see if patient can go with NG tube or if PEG is needed. States she is awaiting insurance authorization. 1200- Spoke with Danielle from [facility]. She states that the NG tube should be fine. States Mr. Dionne Conley should be able to transfer to [facility] tomorrow. Call back with any questions [phone]. 
 
1403- Patient had zoom call with wife and children. Another zoom call scheduled for tonight at 2100.   
 
1900- Bedside and Verbal shift change report given to Melanie Morales RN (oncoming nurse) by Magdy John RN (offgoing nurse). Report included the following information SBAR, Kardex, MAR and Recent Results.

## 2020-05-20 NOTE — PROGRESS NOTES
Hospitalist Progress Note Patient: Rosi Ramos. MRN: [id number]  [facility]: [id number] YOB: 1975  Age: 53 y.o. Sex: male DOA: 4/21/2020 LOS:  LOS: 29 days Assessment/Plan Patient Active Problem List  
Diagnosis Code  Cellulitis of left lower leg L03. 116  
 Anemia D64.9  Morbid obesity ([facility]) E66.01  
 Dyspnea R06.00  Sleep apnea G47.30  Sinus tachycardia R00.0  BMI 70 and over, adult [facility]) T80.38  Chronic back pain M54.9, G89.29  
 Arthritis M19.90  Migraine G43.909  
 History of atrial flutter Z86.79  
 Limited mobility Z74.09  
 Smoking F17.200  Chronic renal insufficiency, stage II (mild) N18.2  CHF (congestive heart failure) ([facility]) I50.9  Acute diastolic CHF (congestive heart failure) ([facility]) I50.31  Chronic a-fib I48.20  Atrial flutter ([facility]) I48.92  
 Elevated troponin R79.89  
 Acute respiratory distress R06.03  
 Acute CHF (congestive heart failure) ([facility]) I50.9  Respiratory failure with hypoxia and hypercapnia ([facility]) J96.91, J96.92  
 Acute on chronic respiratory failure with hypoxia and hypercapnia ([facility]) J96.21, J96.22  
 Cardiac arrest ([facility]) I46.9  Advanced care planning/counseling discussion Z70.80  
 NSTEMI (non-ST elevated myocardial infarction) ([facility] [address] I21.4  Hypocalcemia E83.51  
  
 
 
 
54 y/o male with hx of morbid obesity, MARIA FERNANDA, chronic afib on anticoagulation, and systolic CHF (EF 56-05%) is admitted with respiratory failure with hypoxia and hypercapnia, he required to be intubated. 04/23/2020 Patient self extubated this morning and went into PEA arrest, he was re intubated, difficult intubation. He is awake and post resuscitation, placed on sedation. Requiring high PEEP. 
 
04/24/2020 Patient again tried to self extubate in spite of sedation. 04/25/2020 ET tube leak, ET tube exchanged 04/27/2020 Had bleeding around IJ site, heparin stopped, H/H stable 05/18/2020 Tracheostomy He is awake, alert, follows commands CRITICAL CARE PLAN Resp - See vent orders, VAP bundle. HOB>30 degrees. Acute respiratory failure - with hypoxia and hypercapnia,  
MARIA FERNANDA/OHS -  
S/p trach 05/18/2020 PEEP of 8, 40% FiO2 ID - ANTIBIOTICS  Off antibiotics. Seen by ID 
COVID 19 negative x 2 
 
 
[facility] - Monitor HD. Hypotension - resolved, off pressors Paroxysmal atrial flutter - monitor rate, was on amiodarone. Has been bradycardic, toprol xl held. Acute on chronic systolic CHF -  
received lasix drip Echo with EF of 45-50% Elevated troponin - NSTEMI type II due to PEA arrest 
HU with no intracardiac shunt Heme/onc - Follow H&H, plts. Renal - Trend BUN, Cr, follow I/O, daniel in place. Check and replace Mg, K, phos. Hypernatremia - resolved, Endocrine -  Follow FS[facility] Neuro/ Pain/ Sedation - Off paralytic GI - NPO for now, NG tube, tube feeding. Morbid obesity Prophylaxis - DVT: SCD, GI: protonix. [id number] 
35 minutes of critical care time spent in the direct evaluation and treatment of this high risk patient. The reason for providing this level of medical care for this critically ill patient was due a critical illness that impaired one or more vital organ systems such that there was a high probability of imminent or life threatening deterioration in the patients condition. This care involved high complexity decision making to assess, manipulate, and support vital system functions, to treat this degreee vital organ system failure and to prevent further life threatening deterioration of the patients condition. Disposition : TBD Physical Exam: 
General: As above   
HEENT: NC, Atraumatic. PERRLA, anicteric sclerae. Lungs: CTA Bilaterally. Distant breath sounds secondary to body habitus. Heart:  S1 S2, No murmur, No Rubs, No Gallops Abdomen: Soft, Non distended, obese, Non tender.  +Bowel sounds, Extremities: LE edema Vital signs/Intake and Output: 
Visit Vitals /64 Pulse 68 Temp 98.5 °F (36.9 °C) Resp 18 Ht 5' 10\" (1.778 m) Wt (!) 222.4 kg (490 lb 4.9 oz) SpO2 94% BMI 70.35 kg/m² Current Shift:  05/20 0701 - 05/20 1900 In: 0 Out: 2200 [Urine:2200] Last three shifts:  05/18 1901 - 05/20 0700 In: 260 Out: 6425 [[id number]] Labs: Results:  
   
Chemistry Recent Labs  
  05/20/20 
0623 05/19/20 
0510 05/18/20 
0505 * 95 100*  138 138  
K 4.0 4.2 4.1  108 108 CO2 25 27 26 BUN 24* 24* 25* CREA 0.68 0.64 0.65 CA 8.8 8.6 9.0 AGAP 7 3 4 BUCR 35* 38* 38* AP 52 54 78 TP 7.3 6.6 7.1 ALB 4.5 4.1 4.2 GLOB 2.8 2.5 2.9 AGRAT 1.6 1.6 1.4  
  
CBC w/Diff Recent Labs  
  05/20/20 
0558 05/19/20 
0510 05/18/20 
0505 WBC 7.4 7.3 8.6  
RBC 3.93* 3.64* 4.03* HGB 10.4* 9.6* 10.8* HCT 32.9* 30.8* 34.1*  
 177 206 GRANS 87* 84* 86* LYMPH 7* 9* 9* EOS 0 1 0 Cardiac Enzymes No results for input(s): CPK, CKND1, CONSTANZA in the last 72 hours. No lab exists for component: Erick Muck Coagulation Recent Labs  
  05/20/20 
0558 05/19/20 
0510 PTP 17.2* 18.3* INR 1.4* 1.5* APTT 29.0 28.2 Lipid Panel No results found for: CHOL, CHOLPOCT, CHOLX, CHLST, CHOLV, [id number], HDL, HDLP, LDL, LDLC, DLDLP, 803376, VLDLC, VLDL, TGLX, TRIGL, TRIGP, TGLPOCT, CHHD, CHHDX  
BNP No results for input(s): BNPP in the last 72 hours. Liver Enzymes Recent Labs  
  05/20/20 
0558 TP 7.3 ALB 4.5 AP 52 SGOT 16 Thyroid Studies Lab Results Component Value Date/Time TSH 1.45 04/27/2020 03:50 AM  
    
Procedures/imaging: see electronic medical records for all procedures/Xrays and details which were not copied into this note but were reviewed prior to creation of Plan

## 2020-05-20 NOTE — PROGRESS NOTES
Pulmonary, Critical Care, and Sleep Medicine Pulmonary Progress Note Name: Je Alvarado. : 1975 MRN: [id number] Date: 2020 [x]I have reviewed the flowsheet and previous days notes. Events, vitals, medications and notes from last 24 hours reviewed. Subjective/History:  
49-year-old male with morbid obesity, ex-smoker, history of asthma, history of severe obstructive sleep apnea on CPAP, questionable component compliance, history of congestive heart failure with reduced ejection fraction atrial fibrillation atrial flutter on amiodarone, Xarelto, history of chronic renal insufficiency, cellulitis of lower extremities and anemia. As per family did not have any fever or upper respiratory tract infections and no flulike symptoms. On Lasix, CPAP not clear if missed. Presented with acute shortness of breath worsening edema of lower extremities. He was found to be in acute hypercarbic hypoxemic respiratory failure initially had a trial of BiPAP but failed and then eventually was intubated. He was hypotensive. Moderate amount of secretions. Morbid obesity. Patient compliant with Xarelto. Patient self extubated and had PEA arrest. 
 
Subjective:  
20 POD#2 tracheostomy, doing well Improving vent requirements PS trials this morning for 2 hours at 12/5/40% No significant secretions No [facility] Severe MARIA FERNANDA hypoventilation syndrome 2/2 morbid obesity, suspect he will need nocturnal ventilation during weaning Intake/Output Summary (Last 24 hours) at 2020 1008 Last data filed at 2020 1894 Gross per 24 hour Intake 260 ml Output 4575 ml Net -4315 ml  
 
 
ROS: Review of systems not obtained due to patient factors. Past Medical History: 
Past Medical History:  
Diagnosis Date  Arrhythmia  Arthritis   
 knees  CHF (congestive heart failure) ([facility] [address]  Chronic back pain  Chronic renal insufficiency, stage II (mild)  History of atrial flutter Dx 2016 - anticoagulated by Cloretta Demi  Hypertension 2015  Limited mobility  Migraine headache  Morbid obesity ([facility] [address]  Morbid obesity with body mass index of 60.0-69.9 in adult [facility])  Sleep apnea   
 uses c-pap instructed to bring day of surgery  Smoking   
 very passive / cigars only Allergy: No Known Allergies Vital Signs:   
Blood pressure 151/80, pulse 83, temperature 99.3 °F (37.4 °C), resp. rate 17, height 5' 10\" (1.778 m), weight (!) 222.4 kg (490 lb 4.9 oz), SpO2 97 %. Body mass index is 70.35 kg/m². O2 Device: Tracheostomy, Ventilator O2 Flow Rate (L/min): 16 l/min Temp (24hrs), Av.3 °F (37.4 °C), Min:98.9 °F (37.2 °C), Max:99.8 °F (37.7 °C) Intake/Output:  
Last shift:      No intake/output data recorded. Last 3 shifts:  1901 -  0700 In: 260 Out: 6425 [[id number]:[id number]] Intake/Output Summary (Last 24 hours) at 2020 1008 Last data filed at 2020 7442 Gross per 24 hour Intake 260 ml Output 4575 ml Net -4315 ml Ventilator Settings: 
Mode Rate Tidal Volume Pressure FiO2 PEEP  
CPAP, Pressure support   580 ml  7 cm H2O 35 % 5 cm H20 Peak airway pressure: 28 cm H2O Minute ventilation: 18 l/min Physical Exam: 
General: obese male; no cyanosis; alert awake and following all commands. on ventilator HEENT: LIBBY, orally intubated; orogastric tube; no bleeding; pupils not dilated, reactive Neck: thick short obese neck, tracheostomy in place without significant erythema or drainage, no crepitus Chest: normal, obese chest wall Lungs: moderate air entry, obese chest limits exam; decreased BS bases; symmetrical chest expansion Heart: S1S2 present or without murmur or extra heart sounds; JVD not elevated Abdomen: obese, bowel sounds normoactive, soft without significant tenderness; no organomegaly; no guarding or rigidity Extremity: b/l leg/feet edema improving, now can see wrinkles of skin; negative for cyanosis, clubbing. Can see wrinkles in feet and leg since edema improving. Capillary refill: normal 
Neuro:  Alert awake and following all commands, moving all 4 limbs and following all commands. Skin: Skin color, texture, turgor fair. Skin dry, warm, non-diaphoretic DATA:  
Current Facility-Administered Medications Medication Dose Route Frequency  bumetanide (BUMEX) injection 1 mg  1 mg IntraVENous DAILY  methylPREDNISolone (PF) (SOLU-MEDROL) injection 20 mg  20 mg IntraVENous Q12H  hydrALAZINE (APRESOLINE) 20 mg/mL injection 20 mg  20 mg IntraVENous Q6H PRN  
 amLODIPine (NORVASC) tablet 10 mg  10 mg Oral DAILY  albumin human 25% (BUMINATE) solution 25 g  25 g IntraVENous Q6H  
 [Held by provider] apixaban (ELIQUIS) tablet 2.5 mg  2.5 mg Oral BID  scopolamine (TRANSDERM-SCOP) 1 mg over 3 days 1 Patch  1 Patch TransDERmal Q72H  LORazepam (ATIVAN) injection 2 mg  2 mg IntraVENous Q4H PRN  
 docusate sodium (COLACE) capsule 100 mg  100 mg Oral BID  polyethylene glycol (MIRALAX) packet 17 g  17 g Oral BID  heparin (porcine) 100 unit/mL injection 500 Units  500 Units InterCATHeter Q8H PRN  
 fentaNYL citrate (PF) injection 50 mcg  50 mcg IntraVENous Q3H PRN  chlorhexidine (PERIDEX) 0.12 % mouthwash 10 mL  10 mL Oral Q12H  
 albuterol-ipratropium (DUO-NEB) 2.5 MG-0.5 MG/3 ML  3 mL Nebulization Q6H PRN  
 acetaminophen (TYLENOL) tablet 650 mg  650 mg Oral Q6H PRN  
 ELECTROLYTE REPLACEMENT PROTOCOL - Calcium   1 Each Other PRN  
 ELECTROLYTE REPLACEMENT PROTOCOL - Magnesium   1 Each Other PRN  
 ELECTROLYTE REPLACEMENT PROTOCOL - Potassium Standard Dosing   1 Each Other PRN  
 ELECTROLYTE REPLACEMENT PROTOCOL - Phosphorus  Standard Dosing  1 Each Other PRN  pantoprazole (PROTONIX) 40 mg in 0.9% sodium chloride 10 mL injection  40 mg IntraVENous DAILY  [Held by provider] aspirin chewable tablet 81 mg  81 mg Oral DAILY  sodium chloride (NS) flush 5-40 mL  5-40 mL IntraVENous Q8H  
 sodium chloride (NS) flush 5-40 mL  5-40 mL IntraVENous PRN Telemetry: [x]Sinus []A-flutter []Paced []A-fib []Multiple PVCs Labs: 
Recent Results (from the past 24 hour(s)) GLUCOSE, POC Collection Time: 05/19/20 12:17 PM  
Result Value Ref Range Glucose (POC) 88 70 - 110 mg/dL GLUCOSE, POC Collection Time: 05/19/20  5:22 PM  
Result Value Ref Range Glucose (POC) 107 70 - 110 mg/dL GLUCOSE, POC Collection Time: 05/19/20 11:11 PM  
Result Value Ref Range Glucose (POC) 96 70 - 110 mg/dL GLUCOSE, POC Collection Time: 05/20/20  5:35 AM  
Result Value Ref Range Glucose (POC) 113 (H) 70 - 110 mg/dL METABOLIC PANEL, COMPREHENSIVE Collection Time: 05/20/20  5:58 AM  
Result Value Ref Range Sodium 139 136 - 145 mmol/L Potassium 4.0 3.5 - 5.5 mmol/L Chloride 107 100 - 111 mmol/L  
 CO2 25 21 - 32 mmol/L Anion gap 7 3.0 - 18 mmol/L Glucose 107 (H) 74 - 99 mg/dL BUN 24 (H) 7.0 - 18 MG/DL Creatinine 0.68 0.6 - 1.3 MG/DL  
 BUN/Creatinine ratio 35 (H) 12 - 20 GFR est AA >60 >60 ml/min/1.73m2 GFR est non-AA >60 >60 ml/min/1.73m2 Calcium 8.8 8.5 - 10.1 MG/DL Bilirubin, total 1.9 (H) 0.2 - 1.0 MG/DL  
 ALT (SGPT) 48 16 - 61 U/L  
 AST (SGOT) 16 10 - 38 U/L Alk. phosphatase 52 45 - 117 U/L Protein, total 7.3 6.4 - 8.2 g/dL Albumin 4.5 3.4 - 5.0 g/dL Globulin 2.8 2.0 - 4.0 g/dL A-G Ratio 1.6 0.8 - 1.7    
CBC WITH AUTOMATED DIFF Collection Time: 05/20/20  5:58 AM  
Result Value Ref Range WBC 7.4 4.6 - 13.2 K/uL  
 RBC 3.93 (L) 4.70 - 5.50 M/uL  
 HGB 10.4 (L) 13.0 - 16.0 g/dL HCT 32.9 (L) 36.0 - 48.0 % MCV 83.7 74.0 - 97.0 FL  
 MCH 26.5 24.0 - 34.0 PG  
 MCHC 31.6 31.0 - 37.0 g/dL  
 RDW 17.1 (H) 11.6 - 14.5 % PLATELET 731 333 - 704 K/uL  MPV 9.6 9.2 - 11.8 FL  
 NEUTROPHILS 87 (H) 40 - 73 % LYMPHOCYTES 7 (L) 21 - 52 % MONOCYTES 6 3 - 10 % EOSINOPHILS 0 0 - 5 % BASOPHILS 0 0 - 2 %  
 ABS. NEUTROPHILS 6.4 1.8 - 8.0 K/UL  
 ABS. LYMPHOCYTES 0.5 (L) 0.9 - 3.6 K/UL  
 ABS. MONOCYTES 0.4 0.05 - 1.2 K/UL  
 ABS. EOSINOPHILS 0.0 0.0 - 0.4 K/UL  
 ABS. BASOPHILS 0.0 0.0 - 0.1 K/UL  
 DF AUTOMATED MAGNESIUM Collection Time: 05/20/20  5:58 AM  
Result Value Ref Range Magnesium 2.0 1.6 - 2.6 mg/dL CALCIUM, IONIZED Collection Time: 05/20/20  5:58 AM  
Result Value Ref Range Ionized Calcium 1.19 1.12 - 1.32 MMOL/L  
PTT Collection Time: 05/20/20  5:58 AM  
Result Value Ref Range aPTT 29.0 23.0 - 36.4 SEC PROTHROMBIN TIME + INR Collection Time: 05/20/20  5:58 AM  
Result Value Ref Range Prothrombin time 17.2 (H) 11.5 - 15.2 sec INR 1.4 (H) 0.8 - 1.2 POC G3 Collection Time: 05/20/20  6:09 AM  
Result Value Ref Range Device: VENT    
 FIO2 (POC) 40 % pH (POC) 7.447 7.35 - 7.45    
 pCO2 (POC) 36.4 35.0 - 45.0 MMHG  
 pO2 (POC) 72 (L) 80 - 100 MMHG  
 HCO3 (POC) 25.2 22 - 26 MMOL/L  
 sO2 (POC) 95 92 - 97 % Base excess (POC) 1 mmol/L Mode ASSIST CONTROL Set Rate 16 bpm  
 PEEP/CPAP (POC) 8 cmH2O  
 PIP (POC) 15 Allens test (POC) N/A Total resp. rate 20 Site RIGHT RADIAL Specimen type (POC) ARTERIAL Performed by Rose Malloy   
 Pressure control YES Recent Labs  
  05/20/20 
6430 05/19/20 
0527 05/18/20 
0542 FIO2I 40 0.4 0.4 HCO3I 25.2 24.1 23.0 PCO2I 36.4 37.9 40.1 PHI 7.447 7.411 7.368 PO2I 72* 74* 86 All Micro Results Procedure Component Value Units Date/Time CULTURE, RESPIRATORY/SPUTUM/BRONCH Angus Rosenberg STAIN [[id number]]  (Abnormal) Collected:  05/08/20 1630 Order Status:  Completed Specimen:  Sputum from Tracheal Aspirate Updated:  05/10/20 1214   Special Requests: NO SPECIAL REQUESTS     
  GRAM STAIN RARE WBCS SEEN     
      
  RARE EPITHELIAL CELLS SEEN  
 NO ORGANISMS SEEN Culture result: RARE YEAST     
      
  NO NORMAL RESPIRATORY BRYN ISOLATED  
     
 CULTURE, RESPIRATORY/SPUTUM/BRONCH W [place] Elders [[id number]] Collected:  05/04/20 1315 Order Status:  Canceled Specimen:  Sputum from Tracheal Aspirate CULTURE, RESPIRATORY/SPUTUM/BRONCH Armando Loupe STAIN [[id number]]  (Abnormal)  (Susceptibility) Collected:  04/22/20 1018 Order Status:  Completed Specimen:  Sputum from Tracheal Aspirate Updated:  04/25/20 5956 Special Requests: NO SPECIAL REQUESTS     
  GRAM STAIN FEW WBCS SEEN     
      
  OCCASIONAL EPITHELIAL CELLS SEEN  
     
      
  FEW GRAM POSITIVE COCCI IN CLUSTERS  
     
   RARE GRAM NEGATIVE RODS Culture result:    
  LIGHT STAPHYLOCOCCUS AUREUS  
     
      
  LIGHT NORMAL RESPIRATORY BRYN  
     
 CULTURE, BLOOD [[id number]] Collected:  04/24/20 1015 Order Status:  Canceled Specimen:  Blood Imaging: 
[x]I have personally reviewed the patients chest radiographs images and report Results from [facility] Encounter encounter on 04/21/20 XR ABD (KUB) Narrative EXAM: SUPINE ABDOMINAL RADIOGRAPH 
 
CLINICAL INDICATION/HISTORY: NG placement 
-Additional: None COMPARISON: 5/16/2020 TECHNIQUE: Single supine view of the abdomen. 
 
_______________ FINDINGS: 
 
BOWEL GAS PATTERN: Markedly limited study secondary to body habitus. Enteric 
tube appears within the stomach, unchanged from prior. OTHER: None. 
 
_______________ Impression IMPRESSION: 
 
Limited study as above. Limited study secondary to body habitus. Enteric tube appears within the 
stomach, unchanged from prior. US LEs 4/22/2020 Interpretation Summary · No evidence of deep vein thrombosis in the right lower extremity veins assessed. · No evidence of deep vein thrombosis in the left lower extremity veins assessed. Echo 4/22 Result status: Edited Result - FINAL  
 Addendum by Arnoldo Israel MD on Wed Apr 22, 2020  5:12 PM  
· Left Ventricle: Normal wall thickness and systolic function (ejection fraction normal). Mildly dilated left ventricle. The estimated ejection fraction is 45 - 50%. There is moderate (grade 2) left ventricular diastolic dysfunction E/e' Ratio = 17.09. Wall Scoring: The left ventricular wall motion is globally hypokinetic. · Right Ventricle: Not well visualized. Normal global systolic function. Moderately dilated right ventricle. · Right Atrium: Right atrium not well visualized. Moderately dilated right atrium. · IVC/Hepatic Veins: Dilated inferior vena cava. Mechanically ventilated; cannot use inferior caval vein diameter to estimate central venous pressure. HU 5/12/20: 
Result status: Final result · Image quality: good. The procedure, risks and alternatives were explained. Informed consent was obtained. . Topical anesthetic not used. . Sedation was achieved by conscious sedation. 4 mg Versed was administered. 100 mcg of Fentanyl was administered. No complications. HU probe was removed. Estimated blood loss: 0 mL. Color flow Doppler was performed and pulse wave and/or continuous wave Doppler was performed. Saline contrast was given to evaluate for intracardiac shunt. No shunt seen. · Left Ventricle: Normal cavity size and wall thickness. Mild systolic dysfunction. The estimated ejection fraction is 45 - 50%. Visually measured ejection fraction. Abnormal left ventricular septal motion. There is mild (grade 1) left ventricular diastolic dysfunction. Wall Scoring: The left ventricular wall motion is globally hypokinetic. · Interatrial Septum: Agitated saline contrast study was performed. There was no shunting at baseline or with Valsalva. No atrial septal defect present. Gastrografin KUB 5/16/20: 
Limited examination due to body habitus. The nasogastric tube is not well visualized. Final image demonstrates enteric contrast both within the stomach and the visualized portions of the distal 
esophagus. The nasogastric tube is not well visualized. cxr 5/17/20 reviewed:  
1. Stable support lines and tubes. 2. Central pulmonary vascular congestion, possibly with small bilateral pleural 
effusions and associated atelectasis/airspace disease. IMPRESSION:  
Patient Active Problem List  
Diagnosis Code  Acute on chronic respiratory failure with hypoxia and hypercapnia ([facility]) J96.21, J96.22  
 Acute on chronic systolic and diastolic CHF (congestive heart failure) ([facility]) I50.43 Aspiration pneumonitis from multiple patient initiated ET tube displacement events J69.0  Cardiac arrest ([facility] [address] I46.9 MSSA in sputum culture A49.01  
 Elevated troponin, NSTEMI R79.89, I21.4  Chronic a-fib I48.20  Atrial flutter ([facility] [address] I48.92 Prolonged QTc R94.31  
 Chronic renal insufficiency, stage II (mild) N18.2 Thrombocytopenia D69.6  Smoking F17.200  Arthritis M19.90  Migraine G43.909  Chronic back pain M54.9, G89.29  
 Obstructive Sleep apnea G47.33  
 Morbid obesity ([facility]) E66.01  
  
PLAN:  
RS: s/p trach on 5/18, on ventilator since 4/25/2020. HU negative for any cardiac causes for severe hypoxemia or shunting. He could have pulmonary hemorrhage knowing bloody/pinkish ETT secretions and so started on Solu-Medrol 40 mg IV every 8 hour since 5/12/2020. Since then fio2 improved to 40% and peep 8 and improved ETT bloody secretions. Reduce steroids to 40 mg q12 hrs on 5/16/20. Since tapered to 20 mg q12 hrs. I/o improved, leg edema improved Metabolic  alkalosis resolved since lasix stopped and diamox given since 5/15/20 for 2 days. Patient cannot have CT chest due to body weight limitations. C/w vent support, vent and sedation bundle. VAP prevention bundle, head of the bed at 30' all times Patient is off paralytic medication since 5/4/2020 Sedation- Off versed drip since 5/14/20. No pain, off fentanyl patch as well. Continue bronchodilators prn; pulmonary hygiene care. CVS - 
HU unremarkable for any cause for persistent hypoxemia, bubble study negative. Only grade 1 diastolic dysfunction with LVEF 45 to 50%. No pulmonary hypertension. US legs negative for DVT. Diuretics as above. ASA held for trach plan. Eliquis held for possible pulmonary hemorrhage. Dr. Heide Downs on board. ID - SARS-COV-2 negative x 2 Antibiotic: s/p Zosyn completed on 5/12/2020 10 days antibiotic MSSA in resp cxs from before; respiratory cultures 5/8 no growth. Procalcitonin repeat is negative. No leukocytosis. No fever. Hem - HIT panel and LEYDI Ab NEG on 4/28. He is definitely high risk for PEs; CTA chest cannot be done due to limitations due to weight; stopped argatroban 5/4;[suspect fatty liver/GASTELUM]; Eliquis held due to bloody secretions from the ET tube. Vasc - PVL bl LE negative Renal -continue monitor BUN and creatinine stable; electrolyte replacement per protocol. Nephrology Dr. Andres Morrison hs signed off. Bumex 1 mg daily Monitor I's and O's, renal functions, electrolytes and replace per ICU protocol. GI - GI placed OG tube; good position confirmed with abdominal x-ray using Gastrografin again on 5/16/20 after NGT replaced since patient pulled it out. Neurology: moving extremities and follows all commands. No focal deficit. Overall guarded prognosis. Patient chronically vent dependent. Full code. Will defer respective systems problem management to primary and other consultant and follow patient in ICU with primary and other medical team 
Quality Care: PPI, DVT prophylaxis, HOB elevated, Infection control all reviewed and addressed. PAIN AND SEDATION: As above · Skin/Wound:  Blister on foot, does not appear pressure sore. Continue local care per wound care consult. · Nutrition:  Start tube feeding · Prophylaxis: DVT and GI Prophylaxis reviewed. · Restraints: prn 
· PT/OT eval and treat: as needed when stable · Lines/Tubes:  daniel 4/21/20, trach 5/18/20, PICC line left arm 5/5 ADVANCE DIRECTIVE: Full code DISCUSSION: spoke with RN, RT, ICU staff, MDR Events and notes from last 24 hours reviewed. Care plan discussed with nursing Total CC time: 32 minutes. High complexity review and management. Carlos Sevilla PA-C  
5/20/2020

## 2020-05-20 NOTE — PROGRESS NOTES
Plan: [facility] once insurance approves. CM following, authorization still pending. 1515- pt was denied by insurance for LTAC, but insurance has offered peer to peer: 
[phone] (no reference number provided) Before 1p tomorrow. 
email sent to hospitalist  and also called to let her know.  She will assist with scheduling for MD.

## 2020-05-20 NOTE — PROGRESS NOTES
Postop day #1 Patient awake alert No complaints Some mild degree of bleeding from around the trach otherwise doing well Trach clean and dry May need replacement with a proximal XLT Clara #8 in 7 to 10 days Discussed with patient who understands We will follow if any difficulties Amosona Prey

## 2020-05-20 NOTE — PROGRESS NOTES
0700  Bedside, Verbal and Written shift change report given to JAS Jones (oncoming nurse) by ANAND Pereira RN (offgoing nurse). Report included the following information SBAR, Kardex, Intake/Output and Recent Results. 0800  Pt awake, alert, follows commands, mouths words. Nods yes and no appropriately. Pt remains tele monitored, trach with vent. FMS, daniel, special bed. 
1200  No change in pt status. 1600  Pt status unchanged. 1915  Bedside, Verbal and Written shift change report given to JAS Zhou RN (oncoming nurse) by Yesenia Narvaez. Payal Jones (offgoing nurse). Report included the following information SBAR, Kardex, Intake/Output and Recent Results.

## 2020-05-20 NOTE — PROGRESS NOTES
1930- Report and care received, assessment completed per flow sheet. Tracheostomy, on ventilator. NAD. Alert, cooperative, no restraints, reinforced importance of not pulling on lines/tubes. 2100- 2145- Participated in zoom meeting with family for approximately 45 minutes. Patient ended meeting stating that he was tired. Asked if he would like to receive a bath tonight, refused. 2300- Reassessment completed and without change. 0128- C/o headache, fentanyl administered per orders, see MAR. 
 
0200- NAD, resting quietly with eyes closed. 0300- Reassessment completed and without change.

## 2020-05-20 NOTE — PROGRESS NOTES
Cardiology Progress Note Patient: Staci Owens. Sex: male          DOA: 4/21/2020 YOB: 1975      Age:  53 y.o.        LOS:  LOS: 29 days Patient seen and examined, chart reviewed. Assessment/Plan Patient Active Problem List  
Diagnosis Code  Cellulitis of left lower leg L03. 116  
 Anemia D64.9  Morbid obesity ([facility]) E66.01  
 Dyspnea R06.00  Sleep apnea G47.30  Sinus tachycardia R00.0  BMI 70 and over, adult [facility]) I58.13  Chronic back pain M54.9, G89.29  
 Arthritis M19.90  Migraine G43.909  
 History of atrial flutter Z86.79  
 Limited mobility Z74.09  
 Smoking F17.200  Chronic renal insufficiency, stage II (mild) N18.2  CHF (congestive heart failure) ([facility]) I50.9  Acute diastolic CHF (congestive heart failure) ([facility]) I50.31  Chronic a-fib I48.20  Atrial flutter ([facility]) I48.92  
 Elevated troponin R79.89  
 Acute respiratory distress R06.03  
 Acute CHF (congestive heart failure) ([facility]) I50.9  Respiratory failure with hypoxia and hypercapnia ([facility]) J96.91, J96.92  
 Acute on chronic respiratory failure with hypoxia and hypercapnia ([facility]) J96.21, J96.22  
 Cardiac arrest ([facility]) I46.9  Advanced care planning/counseling discussion Z70.80  
 NSTEMI (non-ST elevated myocardial infarction) ([facility] [address] I21.4  Hypocalcemia E83.51 Acute on chronic systolic heart failure NSTEMI type II due to PEA arrest 
Paroxysmal atrial flutter Thrombocytopenia Bradycardia  
  
Patient self extubated and had PEA arrest. 
He had brief episode of A fib with RVR and was started On Amiodarone drip. Currently he is in Sinus rhythm. 
  
Amiodarone discontinued due to bradycardia. 
  
Echocardiogram  
  
· Left Ventricle: Normal wall thickness and systolic function (ejection fraction normal). Mildly dilated left ventricle. The estimated ejection fraction is 45 - 50%.  There is moderate (grade 2) left ventricular diastolic dysfunction E/e' Ratio = 17.09. Wall Scoring: The left ventricular wall motion is globally hypokinetic. · Right Ventricle: Not well visualized. Normal global systolic function. Moderately dilated right ventricle. · Right Atrium: Right atrium not well visualized. Moderately dilated right atrium. · IVC/Hepatic Veins: Dilated inferior vena cava. Mechanically ventilated; cannot use inferior caval vein diameter to estimate central venous pressure. 
  
IV heparin was discontinued due to thrombocytopenia and bleeding at central line site. Started on Argatroban.  
  
Patient had frequent oxygen desaturations and pinkish/bloody aspirate from tracheal suctioning.  
  
HU done revealed LVEF 27-44%, Grade I diastolic dysfunction, Negative bubble study. Oxygenation significantly improved after he was started on steroid S/p Tracheostomy Telemetry reviewed: No episode of heart block or bradycardia  
  
Plan: 
  
Continue Eliquis Continue IV Bumex Strict I/O Avoid any AV debbie blocking agents due to bradycardia. Continue  IV hydralazine for hypertension if needed. Monitor electrolytes and renal function Continue Ventilatory support. Continue management as per hospital medicine and pulmonary critical care Plan discussed with patient's nurse. Subjective:  
 cc: On ventilator REVIEW OF SYSTEMS:  
 
Can not obtain Objective:  
  
Visit Vitals /64 Pulse 67 Temp 98.5 °F (36.9 °C) Resp 18 Ht 5' 10\" (1.778 m) Wt (!) 222.4 kg (490 lb 4.9 oz) SpO2 95% BMI 70.35 kg/m² Body mass index is 70.35 kg/m². Physical Exam: 
General Appearance: Comfortable, Morbidly obese, not using accessory muscles of respiration. HEENT: AMMON. HEAD: Atraumatic NECK: No JVD, no thyroidomeglay. CAROTIDS: No bruit LUNGS: bilateral conducted sounds HEART: S1+S2 audible, no murmur, no pericardial rub. NEUROLOGICAL: Opens eyes on verbal commands. Medication: 
Current Facility-Administered Medications Medication Dose Route Frequency  bumetanide (BUMEX) injection 1 mg  1 mg IntraVENous DAILY  methylPREDNISolone (PF) (SOLU-MEDROL) injection 20 mg  20 mg IntraVENous Q12H  hydrALAZINE (APRESOLINE) 20 mg/mL injection 20 mg  20 mg IntraVENous Q6H PRN  
 amLODIPine (NORVASC) tablet 10 mg  10 mg Oral DAILY  albumin human 25% (BUMINATE) solution 25 g  25 g IntraVENous Q6H  
 [Held by provider] apixaban (ELIQUIS) tablet 2.5 mg  2.5 mg Oral BID  scopolamine (TRANSDERM-SCOP) 1 mg over 3 days 1 Patch  1 Patch TransDERmal Q72H  LORazepam (ATIVAN) injection 2 mg  2 mg IntraVENous Q4H PRN  
 docusate sodium (COLACE) capsule 100 mg  100 mg Oral BID  polyethylene glycol (MIRALAX) packet 17 g  17 g Oral BID  heparin (porcine) 100 unit/mL injection 500 Units  500 Units InterCATHeter Q8H PRN  
 fentaNYL citrate (PF) injection 50 mcg  50 mcg IntraVENous Q3H PRN  chlorhexidine (PERIDEX) 0.12 % mouthwash 10 mL  10 mL Oral Q12H  
 albuterol-ipratropium (DUO-NEB) 2.5 MG-0.5 MG/3 ML  3 mL Nebulization Q6H PRN  
 acetaminophen (TYLENOL) tablet 650 mg  650 mg Oral Q6H PRN  
 ELECTROLYTE REPLACEMENT PROTOCOL - Calcium   1 Each Other PRN  
 ELECTROLYTE REPLACEMENT PROTOCOL - Magnesium   1 Each Other PRN  
 ELECTROLYTE REPLACEMENT PROTOCOL - Potassium Standard Dosing   1 Each Other PRN  
 ELECTROLYTE REPLACEMENT PROTOCOL - Phosphorus  Standard Dosing  1 Each Other PRN  pantoprazole (PROTONIX) 40 mg in 0.9% sodium chloride 10 mL injection  40 mg IntraVENous DAILY  [Held by provider] aspirin chewable tablet 81 mg  81 mg Oral DAILY  sodium chloride (NS) flush 5-40 mL  5-40 mL IntraVENous Q8H  
 sodium chloride (NS) flush 5-40 mL  5-40 mL IntraVENous PRN Lab/Data Reviewed: 
 
  
Recent Labs  
  05/20/20 
1167 05/19/20 
0510 05/18/20 
0505 WBC 7.4 7.3 8.6 HGB 10.4* 9.6* 10.8* HCT 32.9* 30.8* 34.1*  
 177 206 Recent Labs  
  05/20/20 
0558 05/19/20 
0510 05/18/20 
0505  138 138  
K 4.0 4.2 4.1  108 108 CO2 25 27 26 * 95 100* BUN 24* 24* 25* CREA 0.68 0.64 0.65 CA 8.8 8.6 9.0  
 
32 minutes of critical care time spent in the direct evaluation and treatment of this high risk patient. The reason for providing this level of medical care for this critically ill patient was due a critical illness that impaired one or more vital organ systems such that there was a high probability of imminent or life threatening deterioration in the patients condition. This care involved high complexity decision making to assess, manipulate, and support vital system functions, to treat this degree vital organ system failure and to prevent further life threatening deterioration of the patients condition. Signed By: Irlanda Woods MD   
 May 20, 2020

## 2020-05-21 NOTE — PROGRESS NOTES
Pt placed on 40% trach collar with deflated cuff.  RR 16 sats 95%- pt coughing up a large amt of thick tanish sputum. RN aware.

## 2020-05-21 NOTE — PROGRESS NOTES
Otolaryngology head neck surgery Postop day #3 Patient awake and alert. Tracheotomy tube in place and patent. Small amount of bleeding previously noted over the past several days however no active bleeding noted at this time. Patient making progress on weaning from ventilator. If patient subsequently and successfully weaned from ventilator, would recommend replacement of cuffed trach with a non-cuffed trach assuming that patient does not need any ventilatory support. Do not feel that patient is a candidate for decannulation especially in light of what is most probably a significant and severe obstructive sleep apnea Atul Gonzales

## 2020-05-21 NOTE — PROGRESS NOTES
Cardiology Progress Note Patient: Rosi Ramos. Sex: male          DOA: 4/21/2020 YOB: 1975      Age:  53 y.o.        LOS:  LOS: 30 days Patient seen and examined, chart reviewed. Assessment/Plan Patient Active Problem List  
Diagnosis Code  Cellulitis of left lower leg L03. 116  
 Anemia D64.9  Morbid obesity ([facility]) E66.01  
 Dyspnea R06.00  Sleep apnea G47.30  Sinus tachycardia R00.0  BMI 70 and over, adult [facility]) Z28.20  Chronic back pain M54.9, G89.29  
 Arthritis M19.90  Migraine G43.909  
 History of atrial flutter Z86.79  
 Limited mobility Z74.09  
 Smoking F17.200  Chronic renal insufficiency, stage II (mild) N18.2  CHF (congestive heart failure) ([facility]) I50.9  Acute diastolic CHF (congestive heart failure) ([facility]) I50.31  Chronic a-fib I48.20  Atrial flutter ([facility]) I48.92  
 Elevated troponin R79.89  
 Acute respiratory distress R06.03  
 Acute CHF (congestive heart failure) ([facility]) I50.9  Respiratory failure with hypoxia and hypercapnia ([facility]) J96.91, J96.92  
 Acute on chronic respiratory failure with hypoxia and hypercapnia ([facility]) J96.21, J96.22  
 Cardiac arrest ([facility]) I46.9  Advanced care planning/counseling discussion Z70.80  
 NSTEMI (non-ST elevated myocardial infarction) ([facility] [address] I21.4  Hypocalcemia E83.51 Acute on chronic systolic heart failure NSTEMI type II due to PEA arrest 
Paroxysmal atrial flutter Thrombocytopenia Bradycardia  
  
Patient self extubated and had PEA arrest. 
He had brief episode of A fib with RVR and was started On Amiodarone drip. Currently he is in Sinus rhythm. 
  
Amiodarone discontinued due to bradycardia. 
  
Echocardiogram  
  
· Left Ventricle: Normal wall thickness and systolic function (ejection fraction normal). Mildly dilated left ventricle. The estimated ejection fraction is 45 - 50%.  There is moderate (grade 2) left ventricular diastolic dysfunction E/e' Ratio = 17.09. Wall Scoring: The left ventricular wall motion is globally hypokinetic. · Right Ventricle: Not well visualized. Normal global systolic function. Moderately dilated right ventricle. · Right Atrium: Right atrium not well visualized. Moderately dilated right atrium. · IVC/Hepatic Veins: Dilated inferior vena cava. Mechanically ventilated; cannot use inferior caval vein diameter to estimate central venous pressure. 
  
IV heparin was discontinued due to thrombocytopenia and bleeding at central line site. Started on Argatroban.  
  
Patient had frequent oxygen desaturations and pinkish/bloody aspirate from tracheal suctioning.  
  
HU done revealed LVEF 85-04%, Grade I diastolic dysfunction, Negative bubble study. Oxygenation significantly improved after he was started on steroid S/p Tracheostomy Telemetry reviewed: No episode of heart block or bradycardia  
  
Plan: 
  
Continue Eliquis ,WIll switch to 5 mg twice a day if no bleeding issue. Continue IV Bumex Strict I/O Avoid any AV debbie blocking agents due to bradycardia. Continue  IV hydralazine for hypertension if needed. Monitor electrolytes and renal function Continue Ventilatory support. Continue management as per hospital medicine and pulmonary critical care Plan discussed with patient's nurse. Subjective:  
 cc: On ventilator REVIEW OF SYSTEMS:  
 
Can not obtain Objective:  
  
Visit Vitals BP (!) 163/91 Pulse 85 Temp 99 °F (37.2 °C) Resp 22 Ht 5' 10\" (1.778 m) Wt (!) 206 kg (454 lb 2.4 oz) SpO2 95% BMI 65.16 kg/m² Body mass index is 65.16 kg/m². Physical Exam: 
General Appearance: Comfortable, Morbidly obese, not using accessory muscles of respiration. HEENT: AMMON. HEAD: Atraumatic NECK: No JVD, no thyroidomeglay. CAROTIDS: No bruit LUNGS: bilateral conducted sounds HEART: S1+S2 audible, no murmur, no pericardial rub. NEUROLOGICAL: Opens eyes on verbal commands. Medication: 
Current Facility-Administered Medications Medication Dose Route Frequency  bumetanide (BUMEX) injection 1 mg  1 mg IntraVENous DAILY  methylPREDNISolone (PF) (SOLU-MEDROL) injection 20 mg  20 mg IntraVENous Q12H  hydrALAZINE (APRESOLINE) 20 mg/mL injection 20 mg  20 mg IntraVENous Q6H PRN  
 amLODIPine (NORVASC) tablet 10 mg  10 mg Oral DAILY  albumin human 25% (BUMINATE) solution 25 g  25 g IntraVENous Q6H  
 apixaban (ELIQUIS) tablet 2.5 mg  2.5 mg Oral BID  LORazepam (ATIVAN) injection 2 mg  2 mg IntraVENous Q4H PRN  
 docusate sodium (COLACE) capsule 100 mg  100 mg Oral BID  polyethylene glycol (MIRALAX) packet 17 g  17 g Oral BID  heparin (porcine) 100 unit/mL injection 500 Units  500 Units InterCATHeter Q8H PRN  
 fentaNYL citrate (PF) injection 50 mcg  50 mcg IntraVENous Q3H PRN  chlorhexidine (PERIDEX) 0.12 % mouthwash 10 mL  10 mL Oral Q12H  
 albuterol-ipratropium (DUO-NEB) 2.5 MG-0.5 MG/3 ML  3 mL Nebulization Q6H PRN  
 acetaminophen (TYLENOL) tablet 650 mg  650 mg Oral Q6H PRN  
 ELECTROLYTE REPLACEMENT PROTOCOL - Calcium   1 Each Other PRN  
 ELECTROLYTE REPLACEMENT PROTOCOL - Magnesium   1 Each Other PRN  
 ELECTROLYTE REPLACEMENT PROTOCOL - Potassium Standard Dosing   1 Each Other PRN  
 ELECTROLYTE REPLACEMENT PROTOCOL - Phosphorus  Standard Dosing  1 Each Other PRN  pantoprazole (PROTONIX) 40 mg in 0.9% sodium chloride 10 mL injection  40 mg IntraVENous DAILY  aspirin chewable tablet 81 mg  81 mg Oral DAILY  sodium chloride (NS) flush 5-40 mL  5-40 mL IntraVENous Q8H  
 sodium chloride (NS) flush 5-40 mL  5-40 mL IntraVENous PRN Lab/Data Reviewed: 
 
  
Recent Labs  
  05/21/20 
0500 05/20/20 
4341 05/19/20 
0510 WBC 8.9 7.4 7.3 HGB 10.7* 10.4* 9.6* HCT 33.3* 32.9* 30.8*  170 177 Recent Labs  
  05/21/20 
0500 05/20/20 
0558 05/19/20 
0510  139 138 K 4.0 4.0 4.2  107 108 CO2 27 25 27 * 107* 95 BUN 22* 24* 24* CREA 0.66 0.68 0.64 CA 9.2 8.8 8.6 Signed By: Seng Woods MD   
 May 21, 2020

## 2020-05-21 NOTE — PROGRESS NOTES
FIO2 slowly decreased to 35%- pt placed on SBT 7/5 35%.  RR 24 sats 96% RSBI 35- pt resting quietly.

## 2020-05-21 NOTE — PROGRESS NOTES
Problem: Mobility Impaired (Adult and Pediatric) Goal: *Acute Goals and Plan of Care (Insert Text) Description: Physical Therapy Goals Initiated 5/21/2020 and to be accomplished within 5-7 day(s) 1. Patient will move from supine <> sit with Mod A in prep for out of bed activity and change of position. 2.  Patient will perform sit<> stand with Max A with LRAD in prep for transfers/ambulation. 3.  Patient will transfer from bed <> chair with Max A with LRAD for time up in chair for completion of ADL activity. 4.  Patient will ambulate >15 feet with LRAD/Max A for improved functional mobility/safe discharge. Outcome: Progressing Towards Goal 
 PHYSICAL THERAPY EVALUATION Patient: Leonardo Harris (47 y.o. male) Date: 5/21/2020 Primary Diagnosis: Acute CHF (congestive heart failure) ([facility] [address] [I50.9] Hypoxia [R09.02] Acute respiratory failure with hypoxia and hypercarbia (HCC) [J96.01, J96.02] Respiratory failure with hypoxia and hypercapnia (HCC) [J96.91, J96.92] Procedure(s) (LRB): 
TRACH INSERTION (PT IN ICU 9) (N/A) 3 Days Post-Op Precautions: Fall ASSESSMENT : 
Based on the objective data described below, the patient presents with decrease independence w/ bed mobility, transfers, gait, and step negotiation. Pt seen in supine prior to session w/ telemonitor donned, NG tube, trach, and B/L SCDs. Pt denies any pain. Pt unable to verbalized but this PT able to read pt's lip when answering a question. Pt is also able to write as another form of communication. Pt able to sit at the EOB for ~20 and perform min therex activity w/o any difficulty. Did not attempt to stand at this time as pt fatigued during bed mobility task and to ensure pt's safety. Pt transferred back to supine in bed after session, call bell and tray in reach, nurse present in the room upon exiting. Patient will benefit from skilled intervention to address the above impairments. Patients rehabilitation potential is considered to be Good Factors which may influence rehabilitation potential include:  
[]         None noted 
[]         Mental ability/status [x]         Medical condition 
[x]         Home/family situation and support systems 
[x]         Safety awareness 
[]         Pain tolerance/management 
[]         Other: PLAN : 
Recommendations and Planned Interventions: 
[x]           Bed Mobility Training             [x]    Neuromuscular Re-Education 
[x]           Transfer Training                   []    Orthotic/Prosthetic Training 
[x]           Gait Training                          []    Modalities [x]           Therapeutic Exercises          []    Edema Management/Control 
[x]           Therapeutic Activities            [x]    Patient and Family Training/Education 
[]           Other (comment): Frequency/Duration: Patient will be followed by physical therapy daily to address goals. Discharge Recommendations: Rehab vs LTACH Further Equipment Recommendations for Discharge: TBD by next level of care SUBJECTIVE:  
Patient non-verbal but was able to write \"I haven't been able to do this for a month.  (when sitting at the EOB) OBJECTIVE DATA SUMMARY:  
 
Past Medical History:  
Diagnosis Date Arrhythmia Arthritis   
 knees CHF (congestive heart failure) ([facility] [address] Chronic back pain Chronic renal insufficiency, stage II (mild) History of atrial flutter Dx 6/2016 - anticoagulated by Gaby Aponte Hypertension 2015 Limited mobility Migraine headache Morbid obesity ([facility] [address] Morbid obesity with body mass index of 60.0-69.9 in adult [facility]) Sleep apnea   
 uses c-pap instructed to bring day of surgery Smoking   
 very passive / cigars only Past Surgical History:  
Procedure Laterality Date CARDIAC SURG PROCEDURE UNLIST    
 cardioversion ECHOCARDIOGRAM  04/2016 EF 60-65% (performed at [facility] - see cardiology tab) Barriers to Learning/Limitations: yes;  physical 
Compensate with: Verbal Cues and Tactile Cues Prior Level of Function/Home Situation:  
Home Situation Home Environment: Apartment # Steps to Enter: 0 One/Two Story Residence: One story Living Alone: No 
Support Systems: Spouse/Significant Other/Partner Patient Expects to be Discharged to[de-identified] Rehabilitation facility Current DME Used/Available at Home: Walker, rolling Critical Behavior: 
Neurologic State: Alert Orientation Level: Unable to verbalize Cognition: Follows commands Psychosocial 
Patient Behaviors: Calm; Cooperative Purposeful Interaction: Yes 
[employer] Process: Teaching/learning Caring Interventions: Reassure Reassure: Therapeutic listening; Informing Skin Condition/Temp: Dry;Warm 
Skin Integrity: Blister(Right thigh, dressing D/I) Skin Integumentary Skin Color: Appropriate for ethnicity Skin Condition/Temp: Dry;Warm 
Skin Integrity: Blister(Right thigh, dressing D/I) Turgor: Non-tenting Hair Growth: Present Strength:   
Strength: Generally decreased, functional 
Tone & Sensation:  
Tone: Normal 
Sensation: Intact Range Of Motion: 
AROM: Generally decreased, functional 
Functional Mobility: 
Bed Mobility: 
Rolling: Moderate assistance Supine to Sit: Maximum assistance;Assist x2 Sit to Supine: Maximum assistance;Assist x2 Scooting: Maximum assistance;Assist x2 Balance:  
Sitting: Intact Pain: 
Pain Scale 1: Adult Nonverbal Pain Scale Pain Intensity 1: 0 Activity Tolerance:  
Fair Please refer to the flowsheet for vital signs taken during this treatment. After treatment:  
[]         Patient left in no apparent distress sitting up in chair 
[x]         Patient left in no apparent distress in bed 
[x]         Call bell left within reach [x]         Nursing notified 
[]         Caregiver present 
[]         Bed alarm activated COMMUNICATION/EDUCATION:  
[x]         Fall prevention education was provided and the patient/caregiver indicated understanding. [x]         Patient/family have participated as able in goal setting and plan of care. [x]         Patient/family agree to work toward stated goals and plan of care. []         Patient understands intent and goals of therapy, but is neutral about his/her participation. []         Patient is unable to participate in goal setting and plan of care. Thank you for this referral. 
Kenzie Iglesias, PT Time Calculation: 38 mins Eval Complexity: History: HIGH Complexity :3+ comorbidities / personal factors will impact the outcome/ POC Exam:LOW Complexity : 1-2 Standardized tests and measures addressing body structure, function, activity limitation and / or participation in recreation  Presentation: LOW Complexity : Stable, uncomplicated  Clinical Decision Making:High Complexity sat the EOB only  Overall Complexity:HIGH

## 2020-05-21 NOTE — PROGRESS NOTES
0700  Bedside, Verbal and Written shift change report given to JAS Whitaker (oncoming nurse) by Kye Saha (offgoing nurse). Report included the following information SBAR, Kardex, Intake/Output and Recent Results. 0800  Pt aox3, following commands, nods yes and no appropiately, tele monitored, trach on vent, no sedation, daniel, fms, Bariatric bed, tube feeding. 0930  Trach collar per Dr. Michelle Brady. 1051  Pt writing that his phone no longer works properly and asked that I call his family to bring him new phone. Call wife and she stated \" she will come to hospital and pick his phone up and have it repaired\"  I informed Mr. Saad Jolley 200  Dr. Carmella Carrillo at bedside assessing pt. No new orders at this time. Dr. Carmella Carrillo states the Peer to Peer is done. 1700  Pt sats down to low 80s, asked pt if he felt tired and wanted to go back on vent. Pt declined. I insisted pt go back on vent. He agrees. Gael Jeronimo ties and inner canula changed at this time. Pt tolerated well. Sats 98%. 1915  Bedside, Verbal and Written shift change report given to 134 Reginaldo Deutsch (oncoming nurse) by JAS Whitaker (offgoing nurse). Report included the following information SBAR, Kardex, Intake/Output and Recent Results.

## 2020-05-21 NOTE — PROGRESS NOTES
Hospitalist Progress Note Patient: Suleman Trinidad. MRN: [id number]  CSN: [id number] YOB: 1975  Age: 53 y.o. Sex: male DOA: 4/21/2020 LOS:  LOS: 30 days Assessment/Plan Patient Active Problem List  
Diagnosis Code  Cellulitis of left lower leg L03. 116  
 Anemia D64.9  Morbid obesity ([facility]) E66.01  
 Dyspnea R06.00  Sleep apnea G47.30  Sinus tachycardia R00.0  BMI 70 and over, adult [facility]) B47.97  Chronic back pain M54.9, G89.29  
 Arthritis M19.90  Migraine G43.909  
 History of atrial flutter Z86.79  
 Limited mobility Z74.09  
 Smoking F17.200  Chronic renal insufficiency, stage II (mild) N18.2  CHF (congestive heart failure) ([facility]) I50.9  Acute diastolic CHF (congestive heart failure) ([facility]) I50.31  Chronic a-fib I48.20  Atrial flutter ([facility]) I48.92  
 Elevated troponin R79.89  
 Acute respiratory distress R06.03  
 Acute CHF (congestive heart failure) ([facility]) I50.9  Respiratory failure with hypoxia and hypercapnia ([facility]) J96.91, J96.92  
 Acute on chronic respiratory failure with hypoxia and hypercapnia ([facility]) J96.21, J96.22  
 Cardiac arrest ([facility]) I46.9  Advanced care planning/counseling discussion Z70.80  
 NSTEMI (non-ST elevated myocardial infarction) ([facility] [address] I21.4  Hypocalcemia E83.51  
  
 
 
 
52 y/o male with hx of morbid obesity, MARIA FERNANDA, chronic afib on anticoagulation, and systolic CHF (EF 21-63%) is admitted with respiratory failure with hypoxia and hypercapnia, he required to be intubated. 04/23/2020 Patient self extubated this morning and went into PEA arrest, he was re intubated, difficult intubation. He is awake and post resuscitation, placed on sedation. Requiring high PEEP. 
 
04/24/2020 Patient again tried to self extubate in spite of sedation. 04/25/2020 ET tube leak, ET tube exchanged 04/27/2020 Had bleeding around IJ site, heparin stopped, H/H stable 05/18/2020 Tracheostomy He is awake, alert, follows commands CRITICAL CARE PLAN Resp - See vent orders, VAP bundle. HOB>30 degrees. Acute respiratory failure - with hypoxia and hypercapnia,  
MARIA FERNANDA/OHS -  
S/p trach 05/18/2020 ID - ANTIBIOTICS  Off antibiotics. Seen by ID 
COVID 19 negative x 2 
 
 
[facility] - Monitor HD. Hypotension - resolved, off pressors Paroxysmal atrial flutter - monitor rate, was on amiodarone. Has been bradycardic, toprol xl held. On eliquis 2.5mg daily Acute on chronic systolic CHF -  
received lasix drip, on bumex Echo with EF of 45-50% Elevated troponin - NSTEMI type II due to PEA arrest 
HU with no intracardiac shunt Heme/onc - Follow H&H, plts. Renal - Trend BUN, Cr, follow I/O, daniel in place. Check and replace Mg, K, phos. Hypernatremia - resolved, Endocrine -  Follow [facility] Neuro/ Pain/ Sedation - Off paralytic, awake, follows commands GI - NPO for now, NG tube, tube feeding. Plan for PEG on Friday Morbid obesity Prophylaxis - DVT: SCD, GI: protonix. 0920 - 0955 
35 minutes of critical care time spent in the direct evaluation and treatment of this high risk patient. The reason for providing this level of medical care for this critically ill patient was due a critical illness that impaired one or more vital organ systems such that there was a high probability of imminent or life threatening deterioration in the patients condition. This care involved high complexity decision making to assess, manipulate, and support vital system functions, to treat this degreee vital organ system failure and to prevent further life threatening deterioration of the patients condition. Disposition : awaiting transfer to [facility] Physical Exam: 
General: As above   
HEENT: NC, Atraumatic. PERRLA, anicteric sclerae. Lungs: CTA Bilaterally. Distant breath sounds secondary to body habitus. Heart:  S1 S2, No murmur, No Rubs, No Gallops Abdomen: Soft, Non distended, obese, Non tender.  +Bowel sounds, Extremities: LE edema Vital signs/Intake and Output: 
Visit Vitals /89 Pulse (!) 110 Temp 99 °F (37.2 °C) Resp 20 Ht 5' 10\" (1.778 m) Wt (!) 206 kg (454 lb 2.4 oz) SpO2 98% BMI 65.16 kg/m² Current Shift:  No intake/output data recorded. Last three shifts:  05/19 1901 - 05/21 0700 In: 880 [I.V.:200] Out: 8432 [Urine:4600; KFQOWX:489] Labs: Results:  
   
Chemistry Recent Labs  
  05/21/20 
0500 05/20/20 
2996 05/19/20 
0510 * 107* 95  139 138  
K 4.0 4.0 4.2  107 108 CO2 27 25 27 BUN 22* 24* 24* CREA 0.66 0.68 0.64 CA 9.2 8.8 8.6 AGAP 6 7 3 BUCR 33* 35* 38* AP 48 52 54  
TP 7.7 7.3 6.6 ALB 4.8 4.5 4.1 GLOB 2.9 2.8 2.5 AGRAT 1.7 1.6 1.6  
  
CBC w/Diff Recent Labs  
  05/21/20 
0500 05/20/20 
0558 05/19/20 
0510 WBC 8.9 7.4 7.3 RBC 3.97* 3.93* 3.64* HGB 10.7* 10.4* 9.6* HCT 33.3* 32.9* 30.8*  170 177 GRANS 88* 87* 84* LYMPH 5* 7* 9* EOS 0 0 1 Cardiac Enzymes No results for input(s): CPK, CKND1, CONSTANZA in the last 72 hours. No lab exists for component: [place] [place] Coagulation Recent Labs  
  05/21/20 
0500 05/20/20 
0558 PTP 17.0* 17.2* INR 1.4* 1.4* APTT 31.3 29.0 Lipid Panel No results found for: CHOL, CHOLPOCT, CHOLX, CHLST, CHOLV, 150072, HDL, HDLP, LDL, LDLC, DLDLP, 071114, VLDLC, VLDL, TGLX, TRIGL, TRIGP, TGLPOCT, CHHD, CHHDX  
BNP No results for input(s): BNPP in the last 72 hours. Liver Enzymes Recent Labs  
  05/21/20 
0500 TP 7.7 ALB 4.8 AP 48 SGOT 15 Thyroid Studies Lab Results Component Value Date/Time TSH 1.45 04/27/2020 03:50 AM  
    
Procedures/imaging: see electronic medical records for all procedures/Xrays and details which were not copied into this note but were reviewed prior to creation of Plan

## 2020-05-21 NOTE — PROGRESS NOTES
1911- Report and care received, assessment completed per flow sheet. Alert, oriented. Tracheostomy, on ventilator, mouths words and writes to communicate. C/o neck pain at tracheostomy site, fentanyl administered per orders. 2300- Reassessment completed and without change. 36- C/o a headache, fentanyl administered per orders. 9776- Reassessment completed and without change. Bath and linen change completed. 0502- Restless, appears anxious, fentanyl administered per orders.

## 2020-05-21 NOTE — OP NOTES
[facility] – [place], [place] 
OPERATIVE REPORT Name:  Antonia Rodriguez 
MR#:   [id number] :  1975 ACCOUNT #:  [de-identified] DATE OF SERVICE:  2020 PREOPERATIVE DIAGNOSES: 
1. Ventilator dependency. 2.  Morbid obesity. 3.  Obstructive sleep apnea. POSTOPERATIVE DIAGNOSES: 
1. Ventilator dependency. 2.  Morbid obesity. 3.  Obstructive sleep apnea. PROCEDURE PERFORMED:  Skin flap tracheotomy. SURGEON:  Kathe Guerrero MD 
 
ASSISTANT:  None. ANESTHESIA:  General endotracheal anesthesia. COMPLICATIONS:  None. SPECIMENS REMOVED: None IMPLANTS:  An #8 Shiley proximal XLT trach placed. ESTIMATED BLOOD LOSS:  Less than 10 mL. OPERATIVE FINDINGS:  Morbid obese black male, weight of over 500 pounds, making surgical procedure extremely difficult. The patient's positioning markedly difficult, unable to perform on procedure table. PROCEDURE:  The patient was brought to the operating room, placed supine on the operating table. General endotracheal anesthesia was given per Anesthesiology Department. Once the patient was sufficiently anesthetized, the patient was properly positioned. It should be noted that due to the patient's morbid obesity, the patient was unable to be moved from the ICU bed to the procedure table. The procedure table was simply not big enough for patient at this time. At this point, the bed was firmed, attempted best positioning was notable with placement of a shoulder roll. The patient's chest was taped down to the lower bed and reinforced. This allowed for visualization of the patient's neck. The patient's head was also extended as much as possible. The patient was prepped and draped in the usual sterile fashion. Once this was complete, a standard thyroidectomy incision was made approximately 1-1/2 fingerbreadths above the sternal notch. The incision was carried down through the skin and subcutaneous tissue.   The patient was noted to have significant amount of subcutaneous fat. The lower skin flap was then created by defatting the subcutaneous fat especially inferiorly. This was done to facilitate placement of the skin flap down to the tracheostomy. Once this was complete, bleeding was controlled with bipolar cautery. The strap muscles were opened in the midline and dissected layer by layer down to the level of the thyroid isthmus. It should be noted that the thyroid isthmus was noted and subsequently ligated with the use of the Harmonic scalpel. Great care was maintained to ensure the integrity to ensure no evidence of any bleeding was noted. The cut edges of the thyroid isthmus on both sides were then cauterized with the use of bipolar cautery as well. The pretracheal fascia was then identified and cleaned. Any blood vessels overlying this were cauterized as well. The cricoid was then grasped with trach hook and elevated superiorly. The patient had previously been prepared by Anesthesia for removal of the endotracheal tube. An #8 Shiley proximal XLT trach was utilized for the tracheotomy. An inferior trapdoor incision was made in tracheal ring 2-3. This was then sutured to the skin with the use of 3-0 silk suture. This anchored the trachea fairly securely to the skin. Tracheal  was then used to open the wound a bit wider. The endotracheal tube was then withdrawn. This was then withdrawn just to the point just above the tracheotomy site itself. The entrance of the trach was visualized, and the #8 Shiley proximal XLT trach tube was then passed. Good air exchange was noted. CO2 was noted as well. Once this was obtained, the patient was noted to have some bleeding from what appeared to be a superficial vein which was quite large. This was sutured and ligated with 3-0 silk suture as well.  
 
Once this was complete and blood completely cleaned out, the trach tube itself was then packed with Surgicel around the trach entrance to prevent any potential bleeding. No difficulties were encountered. The patient's trach tube was then sutured to the skin and trach ties applied. The patient was subsequently taken from the operating room to the ICU in stable condition after correct needle and sponge counts were obtained. MD MARTA [email or web address] 
D:  05/21/2020 8:27 T:  05/21/2020 12:18 JOB #:  [id number]

## 2020-05-21 NOTE — PROGRESS NOTES
NUTRITION FOLLOW-UP 
 
RECOMMENDATIONS/PLAN:  
-EN: Continue to adv tube feeds to goal rate Monitor labs/lytes, tube feed tolerance, skin integrity, wt, fluid status, BM 
 
NUTRITION ASSESSMENT:  
Client Update: 53 yrs old Male with resp failure w/ hypoxia and hypercapnia-intubated in ICU, COVID 19 ruled out, hypotension, acute on chronic systolic CHF, elevated trop, morbid obesity, pt self extubated w/ NSTEMI due to PEA arrest, chronic a-fib, elevated trop-cardiology following  HU showed  LVEF 45 to 50% and grade 1 DD, persistent leg edema, trach placed, awaiting insurance approval  
 
FOOD/NUTRITION INTAKE Diet Order:  Vital High Protein @ 85ml/hr to provide 1870kcal 164g PRO 1563ml H20 209g CHO 43g Fat  
Food Allergies: NKFA Average PO Intake:     
No data found. Pertinent Medications:  [x] Reviewed; colace, methylprednisolone, protonix, miralax, Electrolyte Replacement Protocol: [x]K [x]Mg [x]PO4 Insulin:  []SSI  []Pre-meal   []Basal    []Drip  []None Cultural/Adventism Food Preferences: None Identified BIOCHEMICAL DATA & MEDICAL TESTS Pertinent Labs:  [x] Reviewed; ANTHROPOMETRICS Height: 5' 10\" (177.8 cm)       Weight: (!) 206 kg (454 lb 2.4 oz) BMI: 65.2 kg/m^2 morbidly obese (Greater than or = to 40% BMI) Adm Weight: 485 lbs                Weight change: -31lbs Adjusted Body Weight: 124.2kg NUTRITION-FOCUSED PHYSICAL ASSESSMENT Skin: No PU     
GI: +BM 5/20/20 NUTRITION PRESCRIPTION Calories: 1886kcal/day based on 25kcal/kg of IBW Protein: 113- 151 g/day based on 1.5-2.0 g/kg of IBW 
CHO: 236 g/day based on 50% of total energy Fluid: 1886 ml/day based on 1 kcal/ml       
 
NUTRITION DIAGNOSES:  
1. Inadequate oral intake related to intubation as evidence by need for nutrition support  
  
 
NUTRITION INTERVENTIONS:  
INTERVENTIONS:        GOALS: 
1.-EN: Continue to adv tube feeds to goal rate 1. Continue to adv tube feeds to goal rate by next review 5 days LEARNING NEEDS (Diet, Supplementation, Food/Nutrient-Drug Interaction): 
 [] None Identified   [] Education provided/documented      Identified and patient: [] Declined   [x] Was not appropriate/indicated NUTRITION MONITORING /EVALUATION:  
Adjust EN/PN as appropriate Monitor wt Monitor renal labs, electrolytes, fluid status Monitor for additional supplement needs Previous Recommendations Implemented: yes Previous Goals Met:  yes - 
   
[] Participated in Interdisciplinary Rounds   
[x] Interdisciplinary Care Plan Reviewed DISCHARGE NUTRITION RECOMMENDATIONS ADDRESSED:  
   [x] To be determined closer to discharge NUTRITION RISK:           [x] At risk                        [] Not currently at risk Will follow-up per policy. Shabana Walton 9

## 2020-05-21 NOTE — PROGRESS NOTES
1918-Bedside verbal report received from JAS Webber RN. Tato, mar, labs, kardex and patient status reviewed. Assumed care of patient. 2000-Assessment completed. No changes from off-going report. No complaints at this time. Suctioned trach for copious amounts of tan secretions. 2100-Cleaned neck/trach for copious secretions. Discussed with Dr. Renee Bryant about patient's copious secretions. Orders received for robinul for secretions. 2330-Reassessment completed. No changes. 0420-Reassessment completed. NO changes. 0620-CHG bath completed. Trach care done. Trach ties and inner cannula changed. 0710-Bedside verbal report given to SAVANA Briones RN. Sbar, mar, labs, kardex and patient status reviewed. Relinquished care of patient.

## 2020-05-21 NOTE — PROGRESS NOTES
Pulmonary, Critical Care, and Sleep Medicine Pulmonary Progress Note Name: Keegan Landon. : 1975 MRN: [id number] Date: 2020 [x]I have reviewed the flowsheet and previous days notes. Events, vitals, medications and notes from last 24 hours reviewed. Subjective/History:  
28-year-old male with morbid obesity, ex-smoker, history of asthma, history of severe obstructive sleep apnea on CPAP, questionable component compliance, history of congestive heart failure with reduced ejection fraction atrial fibrillation atrial flutter on amiodarone, Xarelto, history of chronic renal insufficiency, cellulitis of lower extremities and anemia. As per family did not have any fever or upper respiratory tract infections and no flulike symptoms. On Lasix, CPAP not clear if missed. Presented with acute shortness of breath worsening edema of lower extremities. He was found to be in acute hypercarbic hypoxemic respiratory failure initially had a trial of BiPAP but failed and then eventually was intubated. He was hypotensive. Moderate amount of secretions. Morbid obesity. Patient compliant with Xarelto. Patient self extubated and had PEA arrest. 
 
Subjective:  
20 POD#3 tracheostomy, doing well Improving vent requirements Started trach collar trials this morning, doing well No significant secretions No fever Accepted to [facility] pending bed assignment Severe MARIA FERNANDA hypoventilation syndrome 2/2 morbid obesity, suspect he will need nocturnal ventilation during weaning Intake/Output Summary (Last 24 hours) at 2020 1048 Last data filed at 2020 6816 Gross per 24 hour Intake 620 ml Output 3900 ml Net -3280 ml  
 
 
ROS: Review of systems not obtained due to patient factors. Past Medical History: 
Past Medical History:  
Diagnosis Date  Arrhythmia  Arthritis   
 knees  CHF (congestive heart failure) ([facility] [address]  Chronic back pain  Chronic renal insufficiency, stage II (mild)  History of atrial flutter Dx 2016 - anticoagulated by Ritu Blake  Hypertension 2015  Limited mobility  Migraine headache  Morbid obesity ([facility] [address]  Morbid obesity with body mass index of 60.0-69.9 in adult [facility])  Sleep apnea   
 uses c-pap instructed to bring day of surgery  Smoking   
 very passive / cigars only Allergy: No Known Allergies Vital Signs:   
Blood pressure 156/89, pulse (!) 110, temperature 99 °F (37.2 °C), resp. rate 20, height 5' 10\" (1.778 m), weight (!) 206 kg (454 lb 2.4 oz), SpO2 98 %. Body mass index is 65.16 kg/m². O2 Device: Tracheostomy, Ventilator O2 Flow Rate (L/min): 16 l/min Temp (24hrs), Av °F (37.2 °C), Min:98.5 °F (36.9 °C), Max:99.3 °F (37.4 °C) Intake/Output:  
Last shift:      No intake/output data recorded. Last 3 shifts:  1901 -  0700 In: 880 [I.V.:200] Out: 1789 [Urine:4600; MZIKTB:698] Intake/Output Summary (Last 24 hours) at 2020 1048 Last data filed at 2020 7513 Gross per 24 hour Intake 620 ml Output 3900 ml Net -3280 ml Ventilator Settings: 
Mode Rate Tidal Volume Pressure FiO2 PEEP  
CPAP, Pressure support   580 ml  7 cm H2O 35 % 2 cm H20 Peak airway pressure: 28 cm H2O Minute ventilation: 17.6 l/min Physical Exam: 
General: obese male; no cyanosis; alert awake and following all commands. on ventilator HEENT: LIBBY, orally intubated; orogastric tube; no bleeding; pupils not dilated, reactive Neck: thick short obese neck, tracheostomy in place without significant erythema or drainage, no crepitus Chest: normal, obese chest wall Lungs: moderate air entry, obese chest limits exam; decreased BS bases; symmetrical chest expansion Heart: S1S2 present or without murmur or extra heart sounds; JVD not elevated Abdomen: obese, bowel sounds normoactive, soft without significant tenderness; no organomegaly; no guarding or rigidity Extremity: b/l leg/feet edema improving, now can see wrinkles of skin; negative for cyanosis, clubbing. Can see wrinkles in feet and leg since edema improving. Capillary refill: normal 
Neuro:  Alert awake and following all commands, moving all 4 limbs and following all commands. Skin: Skin color, texture, turgor fair. Skin dry, warm, non-diaphoretic DATA:  
Current Facility-Administered Medications Medication Dose Route Frequency  bumetanide (BUMEX) injection 1 mg  1 mg IntraVENous DAILY  methylPREDNISolone (PF) (SOLU-MEDROL) injection 20 mg  20 mg IntraVENous Q12H  hydrALAZINE (APRESOLINE) 20 mg/mL injection 20 mg  20 mg IntraVENous Q6H PRN  
 amLODIPine (NORVASC) tablet 10 mg  10 mg Oral DAILY  albumin human 25% (BUMINATE) solution 25 g  25 g IntraVENous Q6H  
 apixaban (ELIQUIS) tablet 2.5 mg  2.5 mg Oral BID  LORazepam (ATIVAN) injection 2 mg  2 mg IntraVENous Q4H PRN  
 docusate sodium (COLACE) capsule 100 mg  100 mg Oral BID  polyethylene glycol (MIRALAX) packet 17 g  17 g Oral BID  heparin (porcine) 100 unit/mL injection 500 Units  500 Units InterCATHeter Q8H PRN  
 fentaNYL citrate (PF) injection 50 mcg  50 mcg IntraVENous Q3H PRN  chlorhexidine (PERIDEX) 0.12 % mouthwash 10 mL  10 mL Oral Q12H  
 albuterol-ipratropium (DUO-NEB) 2.5 MG-0.5 MG/3 ML  3 mL Nebulization Q6H PRN  
 acetaminophen (TYLENOL) tablet 650 mg  650 mg Oral Q6H PRN  
 ELECTROLYTE REPLACEMENT PROTOCOL - Calcium   1 Each Other PRN  
 ELECTROLYTE REPLACEMENT PROTOCOL - Magnesium   1 Each Other PRN  
 ELECTROLYTE REPLACEMENT PROTOCOL - Potassium Standard Dosing   1 Each Other PRN  
 ELECTROLYTE REPLACEMENT PROTOCOL - Phosphorus  Standard Dosing  1 Each Other PRN  pantoprazole (PROTONIX) 40 mg in 0.9% sodium chloride 10 mL injection  40 mg IntraVENous DAILY  aspirin chewable tablet 81 mg  81 mg Oral DAILY  sodium chloride (NS) flush 5-40 mL  5-40 mL IntraVENous Q8H  
 sodium chloride (NS) flush 5-40 mL  5-40 mL IntraVENous PRN Telemetry: [x]Sinus []A-flutter []Paced []A-fib []Multiple PVCs Labs: 
Recent Results (from the past 24 hour(s)) GLUCOSE, POC Collection Time: 05/20/20 11:35 AM  
Result Value Ref Range Glucose (POC) 100 70 - 110 mg/dL GLUCOSE, POC Collection Time: 05/20/20  6:23 PM  
Result Value Ref Range Glucose (POC) 101 70 - 110 mg/dL METABOLIC PANEL, COMPREHENSIVE Collection Time: 05/21/20  5:00 AM  
Result Value Ref Range Sodium 139 136 - 145 mmol/L Potassium 4.0 3.5 - 5.5 mmol/L Chloride 106 100 - 111 mmol/L  
 CO2 27 21 - 32 mmol/L Anion gap 6 3.0 - 18 mmol/L Glucose 115 (H) 74 - 99 mg/dL BUN 22 (H) 7.0 - 18 MG/DL Creatinine 0.66 0.6 - 1.3 MG/DL  
 BUN/Creatinine ratio 33 (H) 12 - 20 GFR est AA >60 >60 ml/min/1.73m2 GFR est non-AA >60 >60 ml/min/1.73m2 Calcium 9.2 8.5 - 10.1 MG/DL Bilirubin, total 2.0 (H) 0.2 - 1.0 MG/DL  
 ALT (SGPT) 41 16 - 61 U/L  
 AST (SGOT) 15 10 - 38 U/L Alk. phosphatase 48 45 - 117 U/L Protein, total 7.7 6.4 - 8.2 g/dL Albumin 4.8 3.4 - 5.0 g/dL Globulin 2.9 2.0 - 4.0 g/dL A-G Ratio 1.7 0.8 - 1.7    
CBC WITH AUTOMATED DIFF Collection Time: 05/21/20  5:00 AM  
Result Value Ref Range WBC 8.9 4.6 - 13.2 K/uL  
 RBC 3.97 (L) 4.70 - 5.50 M/uL  
 HGB 10.7 (L) 13.0 - 16.0 g/dL HCT 33.3 (L) 36.0 - 48.0 % MCV 83.9 74.0 - 97.0 FL  
 MCH 27.0 24.0 - 34.0 PG  
 MCHC 32.1 31.0 - 37.0 g/dL  
 RDW 17.4 (H) 11.6 - 14.5 % PLATELET 573 526 - 123 K/uL MPV 10.5 9.2 - 11.8 FL  
 NEUTROPHILS 88 (H) 40 - 73 % LYMPHOCYTES 5 (L) 21 - 52 % MONOCYTES 7 3 - 10 % EOSINOPHILS 0 0 - 5 % BASOPHILS 0 0 - 2 %  
 ABS. NEUTROPHILS 7.9 1.8 - 8.0 K/UL  
 ABS. LYMPHOCYTES 0.4 (L) 0.9 - 3.6 K/UL  
 ABS. MONOCYTES 0.6 0.05 - 1.2 K/UL  
 ABS. EOSINOPHILS 0.0 0.0 - 0.4 K/UL ABS. BASOPHILS 0.0 0.0 - 0.1 K/UL  
 DF AUTOMATED MAGNESIUM Collection Time: 05/21/20  5:00 AM  
Result Value Ref Range Magnesium 2.1 1.6 - 2.6 mg/dL CALCIUM, IONIZED Collection Time: 05/21/20  5:00 AM  
Result Value Ref Range Ionized Calcium 1.21 1.12 - 1.32 MMOL/L  
PTT Collection Time: 05/21/20  5:00 AM  
Result Value Ref Range aPTT 31.3 23.0 - 36.4 SEC PROTHROMBIN TIME + INR Collection Time: 05/21/20  5:00 AM  
Result Value Ref Range Prothrombin time 17.0 (H) 11.5 - 15.2 sec INR 1.4 (H) 0.8 - 1.2 POC G3 Collection Time: 05/21/20  5:10 AM  
Result Value Ref Range Device: VENT    
 FIO2 (POC) 40 % pH (POC) 7.419 7.35 - 7.45    
 pCO2 (POC) 40.0 35.0 - 45.0 MMHG  
 pO2 (POC) 65 (L) 80 - 100 MMHG  
 HCO3 (POC) 25.9 22 - 26 MMOL/L  
 sO2 (POC) 93 92 - 97 % Base excess (POC) 1 mmol/L Mode ASSIST CONTROL Set Rate 16 bpm  
 PEEP/CPAP (POC) 8 cmH2O  
 PIP (POC) 15 Allens test (POC) N/A Total resp. rate 20 Site RIGHT RADIAL Specimen type (POC) ARTERIAL Performed by Crystal Rich   
 Pressure control YES Recent Labs  
  05/21/20 
0510 05/20/20 
0887 05/19/20 
8000 FIO2I 40 40 0.4 HCO3I 25.9 25.2 24.1 PCO2I 40.0 36.4 37.9 PHI 7.419 7.447 7.411 PO2I 65* 72* 74* All Micro Results Procedure Component Value Units Date/Time CULTURE, RESPIRATORY/SPUTUM/BRONCH Edwyna Purvi STAIN [[id number]]  (Abnormal) Collected:  05/08/20 1630 Order Status:  Completed Specimen:  Sputum from Tracheal Aspirate Updated:  05/10/20 1214 Special Requests: NO SPECIAL REQUESTS     
  GRAM STAIN RARE WBCS SEEN     
      
  RARE EPITHELIAL CELLS SEEN  
     
   NO ORGANISMS SEEN Culture result: RARE YEAST     
      
  NO NORMAL RESPIRATORY BRYN ISOLATED  
     
 CULTURE, RESPIRATORY/SPUTUM/BRONCH W Chance Hazy [[id number]] Collected:  05/04/20 1315 Order Status:  Canceled Specimen:  Sputum from Tracheal Aspirate CULTURE, RESPIRATORY/SPUTUM/BRONCH Acosta Kayser STAIN [[id number]]  (Abnormal)  (Susceptibility) Collected:  04/22/20 1018 Order Status:  Completed Specimen:  Sputum from Tracheal Aspirate Updated:  04/25/20 1009 Special Requests: NO SPECIAL REQUESTS     
  GRAM STAIN FEW WBCS SEEN     
      
  OCCASIONAL EPITHELIAL CELLS SEEN  
     
      
  FEW GRAM POSITIVE COCCI IN CLUSTERS  
     
   RARE GRAM NEGATIVE RODS Culture result:    
  LIGHT STAPHYLOCOCCUS AUREUS  
     
      
  LIGHT NORMAL RESPIRATORY BRYN  
     
 CULTURE, BLOOD [[id number]] Collected:  04/24/20 1015 Order Status:  Canceled Specimen:  Blood Imaging: 
[x]I have personally reviewed the patients chest radiographs images and report Results from [facility] Encounter encounter on 04/21/20 XR ABD (KUB) Narrative EXAM: SUPINE ABDOMINAL RADIOGRAPH 
 
CLINICAL INDICATION/HISTORY: NG placement 
-Additional: None COMPARISON: 5/16/2020 TECHNIQUE: Single supine view of the abdomen. 
 
_______________ FINDINGS: 
 
BOWEL GAS PATTERN: Markedly limited study secondary to body habitus. Enteric 
tube appears within the stomach, unchanged from prior. OTHER: None. 
 
_______________ Impression IMPRESSION: 
 
Limited study as above. Limited study secondary to body habitus. Enteric tube appears within the 
stomach, unchanged from prior. US LEs 4/22/2020 Interpretation Summary · No evidence of deep vein thrombosis in the right lower extremity veins assessed. · No evidence of deep vein thrombosis in the left lower extremity veins assessed. Echo 4/22 Result status: Edited Result - FINAL Addendum by Yesenia Croft MD on Wed Apr 22, 2020  5:12 PM  
· Left Ventricle: Normal wall thickness and systolic function (ejection fraction normal). Mildly dilated left ventricle. The estimated ejection fraction is 45 - 50%.  There is moderate (grade 2) left ventricular diastolic dysfunction E/e' Ratio = 17.09. Wall Scoring: The left ventricular wall motion is globally hypokinetic. · Right Ventricle: Not well visualized. Normal global systolic function. Moderately dilated right ventricle. · Right Atrium: Right atrium not well visualized. Moderately dilated right atrium. · IVC/Hepatic Veins: Dilated inferior vena cava. Mechanically ventilated; cannot use inferior caval vein diameter to estimate central venous pressure. HU 5/12/20: 
Result status: Final result · Image quality: good. The procedure, risks and alternatives were explained. Informed consent was obtained. . Topical anesthetic not used. . Sedation was achieved by conscious sedation. 4 mg Versed was administered. 100 mcg of Fentanyl was administered. No complications. HU probe was removed. Estimated blood loss: 0 mL. Color flow Doppler was performed and pulse wave and/or continuous wave Doppler was performed. Saline contrast was given to evaluate for intracardiac shunt. No shunt seen. · Left Ventricle: Normal cavity size and wall thickness. Mild systolic dysfunction. The estimated ejection fraction is 45 - 50%. Visually measured ejection fraction. Abnormal left ventricular septal motion. There is mild (grade 1) left ventricular diastolic dysfunction. Wall Scoring: The left ventricular wall motion is globally hypokinetic. · Interatrial Septum: Agitated saline contrast study was performed. There was no shunting at baseline or with Valsalva. No atrial septal defect present. Gastrografin KUB 5/16/20: 
Limited examination due to body habitus. The nasogastric tube is not well visualized. Final image demonstrates enteric 
contrast both within the stomach and the visualized portions of the distal 
esophagus. The nasogastric tube is not well visualized. cxr 5/17/20 reviewed:  
1. Stable support lines and tubes.  
2. Central pulmonary vascular congestion, possibly with small bilateral pleural 
 effusions and associated atelectasis/airspace disease. IMPRESSION:  
Patient Active Problem List  
Diagnosis Code  Acute on chronic respiratory failure with hypoxia and hypercapnia ([facility]) J96.21, J96.22  
 Acute on chronic systolic and diastolic CHF (congestive heart failure) ([facility]) I50.43 Aspiration pneumonitis from multiple patient initiated ET tube displacement events J69.0  Cardiac arrest ([facility] [address] I46.9 MSSA in sputum culture A49.01  
 Elevated troponin, NSTEMI R79.89, I21.4  Chronic a-fib I48.20  Atrial flutter ([facility] [address] I48.92 Prolonged QTc R94.31  
 Chronic renal insufficiency, stage II (mild) N18.2 Thrombocytopenia D69.6  Smoking F17.200  Arthritis M19.90  Migraine G43.909  Chronic back pain M54.9, G89.29  
 Obstructive Sleep apnea G47.33  
 Morbid obesity ([facility]) E66.01  
  
PLAN:  
RS: s/p trach on 5/18, on ventilator since 4/25/2020. HU negative for any cardiac causes for severe hypoxemia or shunting. He previously could have had pulmonary hemorrhage knowing bloody/pinkish ETT secretions and so started on Solu-Medrol 40 mg IV every 8 hour since 5/12/2020. Since then fio2 improved to 40% and peep 8 and improved ETT bloody secretions. Reduce steroids to 40 mg q12 hrs on 5/16/20. Since tapered to 20 mg q12 hrs. I/o improved, leg edema improved Patient cannot have CT chest due to body weight limitations. C/w vent support, vent and sedation bundle. VAP prevention bundle, head of the bed at 30' all times Patient is off paralytic medication since 5/4/2020 Sedation- Off versed drip since 5/14/20. No pain, off fentanyl patch as well. Continue bronchodilators prn; pulmonary hygiene care. Trach collar trials this morning CVS - 
HU unremarkable for any cause for persistent hypoxemia, bubble study negative. Only grade 1 diastolic dysfunction with LVEF 45 to 50%. No pulmonary hypertension. US legs negative for DVT. Diuretics as above. ASA held for trach plan. Eliquis held for possible pulmonary hemorrhage. Dr. Shelly Goodwin on board. ID - SARS-COV-2 negative x 2 Antibiotic: s/p Zosyn completed on 5/12/2020 10 days antibiotic MSSA in resp cxs from before; respiratory cultures 5/8 no growth. Procalcitonin repeat is negative. No leukocytosis. No fever. Hem - HIT panel and LEYDI Ab NEG on 4/28. He is definitely high risk for PEs; CTA chest cannot be done due to limitations due to weight; stopped argatroban 5/4;[suspect fatty liver/GASTELUM]; Eliquis held due to bloody secretions from the ET tube. Vasc - PVL bl LE negative Renal -continue monitor BUN and creatinine stable; electrolyte replacement per protocol. Nephrology Dr. William Collins hs signed off. Bumex 1 mg daily Monitor I's and O's, renal functions, electrolytes and replace per ICU protocol. GI - GI placed OG tube; good position confirmed with abdominal x-ray using Gastrografin again on 5/16/20 after NGT replaced since patient pulled it out. Neurology: moving extremities and follows all commands. No focal deficit. Overall guarded prognosis. Patient chronically vent dependent. Full code. Will defer respective systems problem management to primary and other consultant and follow patient in ICU with primary and other medical team 
Quality Care: PPI, DVT prophylaxis, HOB elevated, Infection control all reviewed and addressed. PAIN AND SEDATION: As above · Skin/Wound:  Blister on foot, does not appear pressure sore. Continue local care per wound care consult. · Nutrition:  Start tube feeding · Prophylaxis: DVT and GI Prophylaxis reviewed. · Restraints: prn 
· PT/OT eval and treat: as needed when stable · Lines/Tubes:  daniel 4/21/20, trach 5/18/20, PICC line left arm 5/5 ADVANCE DIRECTIVE: Full code DISCUSSION: spoke with RN, RT, ICU staff, MDR Events and notes from last 24 hours reviewed. Care plan discussed with nursing Total CC time: 33 minutes. High complexity review and management. Kelsey Bermudez PA-C  
5/21/2020

## 2020-05-22 NOTE — PROGRESS NOTES
Problem: Self Care Deficits Care Plan (Adult) Goal: *Acute Goals and Plan of Care (Insert Text) Description: Occupational Therapy Goals Initiated 5/22/2020 within 7 day(s). 1.  Patient will perform grooming with supervision/set-up seated EOB. 2.  Patient will perform upper body dressing with supervision/set-up. 3.  Patient will perform lower body dressing with moderate assistance . 4. Patient will perform toilet transfers with maximal assistance. 5.  Patient will perform all aspects of toileting with moderate assistance . 6. Patient will perform upper extremity therapeutic exercise/activities with supervision/set-up for 7 minutes. 7.  Patient will utilize energy conservation techniques during functional activities with 1 verbal cue(s). Outcome: Progressing Towards Goal 
 OCCUPATIONAL THERAPY EVALUATION Patient: Keegan Neely (47 y.o. male) Date: 5/22/2020 Primary Diagnosis: Acute CHF (congestive heart failure) ([facility] [address] [I50.9] Hypoxia [R09.02] Acute respiratory failure with hypoxia and hypercarbia (HCC) [J96.01, J96.02] Respiratory failure with hypoxia and hypercapnia (HCC) [J96.91, J96.92] Procedure(s) (LRB): 
TRACH INSERTION (PT IN ICU 9) (N/A) 4 Days Post-Op Precautions: Fall PLOF: pt mod I for ADLs, living with spouse ASSESSMENT : 
Based on the objective data described below, the patient presents with decreased activity tolerance, strength, BUE AROM. Pt found in supine, agreeable to therapy. Pt seen with PT for additional set of skilled hands. Pt required mod A/additional time/verbal cues to sit up to EOB. Pt O2 low 80's upon sitting EOB, RN aware and present with suction. Pt O2 increased low-mid 90s. Pt tolerated ~45 minutes seated EOB, with intermittent therapeutic exercise/self care tasks. Facilitated BUE therapeutic exercise, see below. Pt performed hand washing/upper body bathing of arms/chest with setup/SBA, and requires assist to wash back.  LUE shoulder AROM limited at 45*, in gravity eliminated plane he demonstrates [employer]. -130 throughout session during therapeutic exercise. Pt required mod A for BLE management to return to supine, and mod A x2 to scoot up towards [facility]. Education: role of OT in acute care, independent therapeutic exercise Patient will benefit from skilled intervention to address the above impairments. Patient's rehabilitation potential is considered to be Good Factors which may influence rehabilitation potential include:  
[]             None noted []             Mental ability/status [x]             Medical condition []             Home/family situation and support systems []             Safety awareness []             Pain tolerance/management 
[]             Other: PLAN : 
Recommendations and Planned Interventions:  
[x]               Self Care Training                  [x]      Therapeutic Activities [x]               Functional Mobility Training   []      Cognitive Retraining 
[x]               Therapeutic Exercises           [x]      Endurance Activities [x]               Balance Training                    []      Neuromuscular Re-Education []               Visual/Perceptual Training     [x]      Home Safety Training 
[x]               Patient Education                   []      Family Training/Education []               Other (comment): Frequency/Duration: Patient will be followed by occupational therapy 1-2 times per day/4-7 days per week to address goals. Discharge Recommendations: LTAC Further Equipment Recommendations for Discharge: To Be Determined (TBD) at next level of care SUBJECTIVE:  
Patient stated Moriah Murillo I'm ready.  OBJECTIVE DATA SUMMARY:  
 
Past Medical History:  
Diagnosis Date Arrhythmia Arthritis   
 knees CHF (congestive heart failure) ([facility] [address] Chronic back pain Chronic renal insufficiency, stage II (mild) History of atrial flutter Dx 6/2016 - anticoagulated by Darin Urban Hypertension 2015 Limited mobility Migraine headache Morbid obesity ([address] Morbid obesity with body mass index of 60.0-69.9 in adult [facility]) Sleep apnea   
 uses c-pap instructed to bring day of surgery Smoking   
 very passive / cigars only Past Surgical History:  
Procedure Laterality Date CARDIAC SURG PROCEDURE UNLIST    
 cardioversion ECHOCARDIOGRAM  04/2016 EF 60-65% (performed at [facility] - see cardiology tab) Barriers to Learning/Limitations: yes;  physical 
Compensate with: visual, verbal, tactile, kinesthetic cues/model Home Situation: pt living in apartment with spouse, independent with ADLs Home Situation Home Environment: Apartment # Steps to Enter: 0 One/Two Story Residence: One story Living Alone: No 
Support Systems: Spouse/Significant Other/Partner Patient Expects to be Discharged to[de-identified] Unknown Current DME Used/Available at Home: Walker, rolling Tub or Shower Type: Tub/Shower combination 
[x]  Right hand dominant   []  Left hand dominant Cognitive/Behavioral Status: 
Neurologic State: Alert Orientation Level: Oriented X4 Cognition: Appropriate decision making; Appropriate for age attention/concentration; Appropriate safety awareness; Follows commands Safety/Judgement: Good awareness of safety precautions; Insight into deficits Coordination: BUE Coordination: Within functional limits Fine Motor Skills-Upper: Left Intact; Right Intact Gross Motor Skills-Upper: Left Impaired;Right Impaired Balance: 
Sitting: Intact Strength: BUE Strength: Generally decreased, functional 
 
Tone & Sensation: BUE Tone: Normal 
Sensation: Intact Range of Motion: BUE 
AROM: Generally decreased, functional 
 
Functional Mobility and Transfers for ADLs: 
Bed Mobility: 
Supine to Sit: Moderate assistance Sit to Supine: Moderate assistance Scooting: Moderate assistance;Assist x2 ADL Assessment:  
Feeding: Setup Oral Facial Hygiene/Grooming: Setup Bathing: Maximum assistance Upper Body Dressing: Minimum assistance Lower Body Dressing: Maximum assistance Toileting: Total assistance ADL Intervention: 
Grooming Grooming Assistance: Set-up Position Performed: Seated edge of bed Washing Hands: Set-up Upper Body Bathing Bathing Assistance: Moderate assistance Position Performed: Seated edge of bed Cues: Verbal cues provided Lower Body Dressing Assistance Dressing Assistance: Total assistance(dependent) Socks: Total assistance (dependent) Position Performed: Supine Cognitive Retraining Safety/Judgement: Good awareness of safety precautions; Insight into deficits Pain: 
Pain level pre-treatment: 0/10 Pain level post-treatment: 0/10 Pain Intervention(s): Medication provided by Nursing (see MAR); Rest, Ice, Repositioning Response to intervention: Nurse notified, See doc flow sheet Activity Tolerance:  
Fair-. Patient able to sit up at EOB ~45 minute(s). Patient requires intermittent rest breaks. Patient limited by strength, ROM, endurance. Patient unsteady. Please refer to the flowsheet for vital signs taken during this treatment. After treatment:  
[] Patient left in no apparent distress sitting up in chair 
[x] Patient left in no apparent distress in bed 
[x] Call bell left within reach [x] Nursing notified 
[] Caregiver present 
[] Bed alarm activated COMMUNICATION/EDUCATION:  
[x] Role of Occupational Therapy in the acute care setting 
[x] Home safety education was provided and the patient/caregiver indicated understanding. [x] Patient/family have participated as able in goal setting and plan of care. [x] Patient/family agree to work toward stated goals and plan of care. [] Patient understands intent and goals of therapy, but is neutral about his/her participation. [] Patient is unable to participate in goal setting and plan of care.  
 
Thank you for this referral. 
 Emily Molina OTR/L Time Calculation: 73 mins Eval Complexity: History: HIGH Complexity : Extensive review of history including physical, cognitive and psychosocial history ; Examination: HIGH Complexity : 5 or more performance deficits relating to physical, cognitive , or psychosocial skils that result in activity limitations and / or participation restrictions; Decision Making:HIGH Complexity : Patient presents with comorbidities that affect occupational performance. Signifigant modification of tasks or assistance (eg, physical or verbal) with assessment (s) is necessary to enable patient to complete evaluation

## 2020-05-22 NOTE — PROGRESS NOTES
Problem: Mobility Impaired (Adult and Pediatric) Goal: *Acute Goals and Plan of Care (Insert Text) Description: Physical Therapy Goals Initiated 5/21/2020 and to be accomplished within 5-7 day(s) 1. Patient will move from supine <> sit with Mod A in prep for out of bed activity and change of position. 2.  Patient will perform sit<> stand with Max A with LRAD in prep for transfers/ambulation. 3.  Patient will transfer from bed <> chair with Max A with LRAD for time up in chair for completion of ADL activity. 4.  Patient will ambulate >15 feet with LRAD/Max A for improved functional mobility/safe discharge. Outcome: Progressing Towards Goal 
 PHYSICAL THERAPY TREATMENT Patient: Collette Maizes. (47 y.o. male) Date: 5/22/2020 Diagnosis: Acute CHF (congestive heart failure) ([facility] [address] [I50.9] Hypoxia [R09.02] Acute respiratory failure with hypoxia and hypercarbia (HCC) [J96.01, J96.02] Respiratory failure with hypoxia and hypercapnia (HCC) [J96.91, J96.92] Respiratory failure with hypoxia and hypercapnia ([address] Procedure(s) (LRB): 
TRACH INSERTION (PT IN ICU 9) (N/A) 4 Days Post-Op Precautions: Fall Chart, physical therapy assessment, plan of care and goals were reviewed. ASSESSMENT: 
Pt is found resting while on trach collar trial. Pt insists on being returned to vent, before start of session, but encouraged to attempt session with collar ( After communication with RT). Pt is able ot increase self assist for bed mobility and transition to EOB. Exercises as listed and reaching activities to establish COG and prep for eventual standing trial. Pt is not able to clear bed for scooting with max assist R UE support. Pt will require extensive PT to regain function. 45 min of total sitting time accomplished on this session. Progression toward goals: 
[x]      Improving appropriately and progressing toward goals 
[]      Improving slowly and progressing toward goals []      Not making progress toward goals and plan of care will be adjusted PLAN: 
Patient continues to benefit from skilled intervention to address the above impairments. Continue treatment per established plan of care. Discharge Recommendations:  LTAC Further Equipment Recommendations for Discharge:  hospital bed, mechanical lift SUBJECTIVE:  
Patient stated I want to be back on the vent.  OBJECTIVE DATA SUMMARY:  
Critical Behavior: 
Neurologic State: Alert Orientation Level: Oriented X4 Cognition: Appropriate decision making, Appropriate for age attention/concentration, Appropriate safety awareness, Follows commands Safety/Judgement: Good awareness of safety precautions, Insight into deficits Functional Mobility Training: 
Bed Mobility: 
Rolling: Moderate assistance Supine to Sit: Moderate assistance Sit to Supine: Moderate assistance Scooting: Moderate assistance;Assist x2 Transfers: 
Balance: 
Sitting: Intact Therapeutic Exercises:  
 
 
EXERCISE Sets Reps Active Active Assist  
Passive Self ROM Comments Ankle Pumps 1 20 [] [] [] [] Quad Sets/Glut Sets 1 10 [x] [] [] [] Hamstring Sets 1 5 [x] [] [] [] Short Arc Quads   [] [] [] [] Heel Slides 1 5 [x] [] [] [] Straight Leg Raises   [] [] [] [] Hip Abd/Add 2 10 [x] [] [] [] Long Arc Quads 2 10 [x] [] [] [] Seated Marching 2 5 [x] [] [] []   
Standing Marching   [] [] [] []   
   [] [] [] []   
 
 
Pain: 
Pain in: 
Pain out: 
Activity Tolerance:  
Fair Please refer to the flowsheet for vital signs taken during this treatment. After treatment:  
[] Patient left in no apparent distress sitting up in chair 
[x] Patient left in no apparent distress in bed 
[x] Call bell left within reach [x] Nursing notified 
[] Caregiver present 
[] Bed alarm activated Michael Diaz PTA Time Calculation: 73 mins

## 2020-05-22 NOTE — PROGRESS NOTES
Tracheostomy is inserted and presently fed by nasogastric feeding tube. Appears to be well tolerated. Recommend to continue until he is able to eat by mouth. It is better than inserting a risky PEG tube that would have to be removed within one month. I prefer that we wait.

## 2020-05-22 NOTE — PROGRESS NOTES
Pulmonary, Critical Care, and Sleep Medicine Pulmonary Progress Note Name: Desire Monday. : 1975 MRN: [id number] Date: 2020 [x]I have reviewed the flowsheet and previous days notes. Events, vitals, medications and notes from last 24 hours reviewed. Subjective/History:  
28-year-old male with morbid obesity, ex-smoker, history of asthma, history of severe obstructive sleep apnea on CPAP, questionable component compliance, history of congestive heart failure with reduced ejection fraction atrial fibrillation atrial flutter on amiodarone, Xarelto, history of chronic renal insufficiency, cellulitis of lower extremities and anemia. As per family did not have any fever or upper respiratory tract infections and no flulike symptoms. On Lasix, CPAP not clear if missed. Presented with acute shortness of breath worsening edema of lower extremities. He was found to be in acute hypercarbic hypoxemic respiratory failure initially had a trial of BiPAP but failed and then eventually was intubated. He was hypotensive. Moderate amount of secretions. Morbid obesity. Patient compliant with Xarelto. Patient self extubated and had PEA arrest. 
 
Subjective:  
20 POD#4 tracheostomy, doing well Improving vent requirements Did 4-5 hours of trach collar yesterday, continue today Rest on vent overnight No significant secretions No fever Accepted to [facility] pending bed assignment Severe MARIA FERNANDA hypoventilation syndrome 2/2 morbid obesity, suspect he will need nocturnal ventilation during weaning Intake/Output Summary (Last 24 hours) at 2020 1031 Last data filed at 2020 0700 Gross per 24 hour Intake 2420 ml Output 1000 ml Net 1420 ml  
 
 
ROS: Review of systems not obtained due to patient factors. Past Medical History: 
Past Medical History:  
Diagnosis Date  Arrhythmia  Arthritis   
 knees  CHF (congestive heart failure) ([facility] [address]  Chronic back pain  Chronic renal insufficiency, stage II (mild)  History of atrial flutter Dx 2016 - anticoagulated by Gaylon Buerger  Hypertension 2015  Limited mobility  Migraine headache  Morbid obesity ([facility] [address]  Morbid obesity with body mass index of 60.0-69.9 in adult [facility])  Sleep apnea   
 uses c-pap instructed to bring day of surgery  Smoking   
 very passive / cigars only Allergy: No Known Allergies Vital Signs:   
Blood pressure 141/70, pulse 73, temperature 98.4 °F (36.9 °C), resp. rate 23, height 5' 10\" (1.778 m), weight (!) 205.2 kg (452 lb 6.1 oz), SpO2 93 %. Body mass index is 64.91 kg/m². O2 Device: Tracheostomy O2 Flow Rate (L/min): 16 l/min Temp (24hrs), Av.4 °F (36.9 °C), Min:97.8 °F (36.6 °C), Max:98.8 °F (37.1 °C) Intake/Output:  
Last shift:      No intake/output data recorded. Last 3 shifts:  190 -  0700 In: 3040 [I.V.:400] Out: 1700 [Urine:1700] Intake/Output Summary (Last 24 hours) at 2020 1031 Last data filed at 2020 0700 Gross per 24 hour Intake 2420 ml Output 1000 ml Net 1420 ml Ventilator Settings: 
Mode Rate Tidal Volume Pressure FiO2 PEEP Assist control, Pressure control   580 ml  7 cm H2O 35 % 5 cm H20 Peak airway pressure: 20 cm H2O Minute ventilation: 9.81 l/min Physical Exam: 
General: obese male; no cyanosis; alert awake and following all commands. on ventilator HEENT: LIBBY, orally intubated; orogastric tube; no bleeding; pupils not dilated, reactive Neck: thick short obese neck, tracheostomy in place without significant erythema or drainage, no crepitus Chest: normal, obese chest wall Lungs: moderate air entry, obese chest limits exam; decreased BS bases; symmetrical chest expansion Heart: S1S2 present or without murmur or extra heart sounds; JVD not elevated Abdomen: obese, bowel sounds normoactive, soft without significant tenderness; no organomegaly; no guarding or rigidity Extremity: b/l leg/feet edema improving, now can see wrinkles of skin; negative for cyanosis, clubbing. Can see wrinkles in feet and leg since edema improving. Capillary refill: normal 
Neuro:  Alert awake and following all commands, moving all 4 limbs and following all commands. Skin: Skin color, texture, turgor fair. Skin dry, warm, non-diaphoretic DATA:  
Current Facility-Administered Medications Medication Dose Route Frequency  potassium bicarb-citric acid (EFFER-K) tablet 20 mEq  20 mEq Oral BID  
 glycopyrrolate (ROBINUL) tablet 1 mg  1 mg Per NG tube BID  bumetanide (BUMEX) injection 1 mg  1 mg IntraVENous DAILY  methylPREDNISolone (PF) (SOLU-MEDROL) injection 20 mg  20 mg IntraVENous Q12H  hydrALAZINE (APRESOLINE) 20 mg/mL injection 20 mg  20 mg IntraVENous Q6H PRN  
 amLODIPine (NORVASC) tablet 10 mg  10 mg Oral DAILY  albumin human 25% (BUMINATE) solution 25 g  25 g IntraVENous Q6H  
 apixaban (ELIQUIS) tablet 2.5 mg  2.5 mg Oral BID  LORazepam (ATIVAN) injection 2 mg  2 mg IntraVENous Q4H PRN  
 docusate sodium (COLACE) capsule 100 mg  100 mg Oral BID  polyethylene glycol (MIRALAX) packet 17 g  17 g Oral BID  heparin (porcine) 100 unit/mL injection 500 Units  500 Units InterCATHeter Q8H PRN  
 fentaNYL citrate (PF) injection 50 mcg  50 mcg IntraVENous Q3H PRN  chlorhexidine (PERIDEX) 0.12 % mouthwash 10 mL  10 mL Oral Q12H  
 albuterol-ipratropium (DUO-NEB) 2.5 MG-0.5 MG/3 ML  3 mL Nebulization Q6H PRN  
 acetaminophen (TYLENOL) tablet 650 mg  650 mg Oral Q6H PRN  
 ELECTROLYTE REPLACEMENT PROTOCOL - Calcium   1 Each Other PRN  
 ELECTROLYTE REPLACEMENT PROTOCOL - Magnesium   1 Each Other PRN  
 ELECTROLYTE REPLACEMENT PROTOCOL - Potassium Standard Dosing   1 Each Other PRN  
 ELECTROLYTE REPLACEMENT PROTOCOL - Phosphorus  Standard Dosing  1 Each Other PRN  pantoprazole (PROTONIX) 40 mg in 0.9% sodium chloride 10 mL injection  40 mg IntraVENous DAILY  aspirin chewable tablet 81 mg  81 mg Oral DAILY  sodium chloride (NS) flush 5-40 mL  5-40 mL IntraVENous Q8H  
 sodium chloride (NS) flush 5-40 mL  5-40 mL IntraVENous PRN Telemetry: [x]Sinus []A-flutter []Paced []A-fib []Multiple PVCs Labs: 
Recent Results (from the past 24 hour(s)) GLUCOSE, POC Collection Time: 05/21/20 12:26 PM  
Result Value Ref Range Glucose (POC) 113 (H) 70 - 110 mg/dL GLUCOSE, POC Collection Time: 05/21/20 11:40 PM  
Result Value Ref Range Glucose (POC) 103 70 - 110 mg/dL METABOLIC PANEL, COMPREHENSIVE Collection Time: 05/22/20  5:20 AM  
Result Value Ref Range Sodium 142 136 - 145 mmol/L Potassium 3.8 3.5 - 5.5 mmol/L Chloride 107 100 - 111 mmol/L  
 CO2 30 21 - 32 mmol/L Anion gap 5 3.0 - 18 mmol/L Glucose 110 (H) 74 - 99 mg/dL BUN 23 (H) 7.0 - 18 MG/DL Creatinine 0.60 0.6 - 1.3 MG/DL  
 BUN/Creatinine ratio 38 (H) 12 - 20 GFR est AA >60 >60 ml/min/1.73m2 GFR est non-AA >60 >60 ml/min/1.73m2 Calcium 9.2 8.5 - 10.1 MG/DL Bilirubin, total 1.8 (H) 0.2 - 1.0 MG/DL  
 ALT (SGPT) 38 16 - 61 U/L  
 AST (SGOT) 15 10 - 38 U/L Alk. phosphatase 47 45 - 117 U/L Protein, total 7.0 6.4 - 8.2 g/dL Albumin 4.2 3.4 - 5.0 g/dL Globulin 2.8 2.0 - 4.0 g/dL A-G Ratio 1.5 0.8 - 1.7    
CBC WITH AUTOMATED DIFF Collection Time: 05/22/20  5:20 AM  
Result Value Ref Range WBC 6.8 4.6 - 13.2 K/uL  
 RBC 3.90 (L) 4.70 - 5.50 M/uL  
 HGB 10.4 (L) 13.0 - 16.0 g/dL HCT 34.0 (L) 36.0 - 48.0 % MCV 87.2 74.0 - 97.0 FL  
 MCH 26.7 24.0 - 34.0 PG  
 MCHC 30.6 (L) 31.0 - 37.0 g/dL  
 RDW 17.9 (H) 11.6 - 14.5 % PLATELET 627 670 - 180 K/uL MPV 9.4 9.2 - 11.8 FL  
 NEUTROPHILS 74 (H) 40 - 73 % LYMPHOCYTES 11 (L) 21 - 52 % MONOCYTES 13 (H) 3 - 10 % EOSINOPHILS 2 0 - 5 % BASOPHILS 0 0 - 2 %  
 ABS. NEUTROPHILS 5.1 1.8 - 8.0 K/UL  
 ABS. LYMPHOCYTES 0.7 (L) 0.9 - 3.6 K/UL ABS. MONOCYTES 0.9 0.05 - 1.2 K/UL  
 ABS. EOSINOPHILS 0.1 0.0 - 0.4 K/UL  
 ABS. BASOPHILS 0.0 0.0 - 0.1 K/UL  
 DF AUTOMATED MAGNESIUM Collection Time: 05/22/20  5:20 AM  
Result Value Ref Range Magnesium 2.1 1.6 - 2.6 mg/dL CALCIUM, IONIZED Collection Time: 05/22/20  5:20 AM  
Result Value Ref Range Ionized Calcium 1.22 1.12 - 1.32 MMOL/L  
PTT Collection Time: 05/22/20  5:20 AM  
Result Value Ref Range aPTT 34.0 23.0 - 36.4 SEC PROTHROMBIN TIME + INR Collection Time: 05/22/20  5:20 AM  
Result Value Ref Range Prothrombin time 17.9 (H) 11.5 - 15.2 sec INR 1.5 (H) 0.8 - 1.2 GLUCOSE, POC Collection Time: 05/22/20  5:51 AM  
Result Value Ref Range Glucose (POC) 103 70 - 110 mg/dL Recent Labs  
  05/21/20 
0510 05/20/20 
5238 FIO2I 40 40 HCO3I 25.9 25.2 PCO2I 40.0 36.4 PHI 7.419 7.447 PO2I 65* 72* All Micro Results Procedure Component Value Units Date/Time CULTURE, RESPIRATORY/SPUTUM/BRONCH [facility] [facility] STAIN [[id number]]  (Abnormal) Collected:  05/08/20 1630 Order Status:  Completed Specimen:  Sputum from Tracheal Aspirate Updated:  05/10/20 1214 Special Requests: NO SPECIAL REQUESTS     
  GRAM STAIN RARE WBCS SEEN     
      
  RARE EPITHELIAL CELLS SEEN  
     
   NO ORGANISMS SEEN Culture result: RARE YEAST     
      
  NO NORMAL RESPIRATORY BRYN ISOLATED  
     
 CULTURE, RESPIRATORY/SPUTUM/BRONCH [facility] [[id number]] Collected:  05/04/20 1315 Order Status:  Canceled Specimen:  Sputum from Tracheal Aspirate CULTURE, RESPIRATORY/SPUTUM/BRONCH [employer] STAIN [[id number]]  (Abnormal)  (Susceptibility) Collected:  04/22/20 1018 Order Status:  Completed Specimen:  Sputum from Tracheal Aspirate Updated:  04/25/20 9909   Special Requests: NO SPECIAL REQUESTS     
  GRAM STAIN FEW WBCS SEEN     
      
  OCCASIONAL EPITHELIAL CELLS SEEN  
     
      
  FEW GRAM POSITIVE COCCI IN CLUSTERS  
     
 RARE GRAM NEGATIVE RODS Culture result:    
  LIGHT STAPHYLOCOCCUS AUREUS  
     
      
  LIGHT NORMAL RESPIRATORY BRYN  
     
 CULTURE, BLOOD [[id number]] Collected:  04/24/20 1015 Order Status:  Canceled Specimen:  Blood Imaging: 
[x]I have personally reviewed the patients chest radiographs images and report Results from [facility] Encounter encounter on 04/21/20 XR ABD (KUB) Narrative EXAM: SUPINE ABDOMINAL RADIOGRAPH 
 
CLINICAL INDICATION/HISTORY: NG placement 
-Additional: None COMPARISON: 5/16/2020 TECHNIQUE: Single supine view of the abdomen. 
 
_______________ FINDINGS: 
 
BOWEL GAS PATTERN: Markedly limited study secondary to body habitus. Enteric 
tube appears within the stomach, unchanged from prior. OTHER: None. 
 
_______________ Impression IMPRESSION: 
 
Limited study as above. Limited study secondary to body habitus. Enteric tube appears within the 
stomach, unchanged from prior. US LEs 4/22/2020 Interpretation Summary · No evidence of deep vein thrombosis in the right lower extremity veins assessed. · No evidence of deep vein thrombosis in the left lower extremity veins assessed. Echo 4/22 Result status: Edited Result - FINAL Addendum by Carl Marmolejo MD on Wed Apr 22, 2020  5:12 PM  
· Left Ventricle: Normal wall thickness and systolic function (ejection fraction normal). Mildly dilated left ventricle. The estimated ejection fraction is 45 - 50%. There is moderate (grade 2) left ventricular diastolic dysfunction E/e' Ratio = 17.09. Wall Scoring: The left ventricular wall motion is globally hypokinetic. · Right Ventricle: Not well visualized. Normal global systolic function. Moderately dilated right ventricle. · Right Atrium: Right atrium not well visualized. Moderately dilated right atrium. · IVC/Hepatic Veins: Dilated inferior vena cava.  Mechanically ventilated; cannot use inferior caval vein diameter to estimate central venous pressure. HU 5/12/20: 
Result status: Final result · Image quality: good. The procedure, risks and alternatives were explained. Informed consent was obtained. . Topical anesthetic not used. . Sedation was achieved by conscious sedation. 4 mg Versed was administered. 100 mcg of Fentanyl was administered. No complications. HU probe was removed. Estimated blood loss: 0 mL. Color flow Doppler was performed and pulse wave and/or continuous wave Doppler was performed. Saline contrast was given to evaluate for intracardiac shunt. No shunt seen. · Left Ventricle: Normal cavity size and wall thickness. Mild systolic dysfunction. The estimated ejection fraction is 45 - 50%. Visually measured ejection fraction. Abnormal left ventricular septal motion. There is mild (grade 1) left ventricular diastolic dysfunction. Wall Scoring: The left ventricular wall motion is globally hypokinetic. · Interatrial Septum: Agitated saline contrast study was performed. There was no shunting at baseline or with Valsalva. No atrial septal defect present. Gastrografin KUB 5/16/20: 
Limited examination due to body habitus. The nasogastric tube is not well visualized. Final image demonstrates enteric 
contrast both within the stomach and the visualized portions of the distal 
esophagus. The nasogastric tube is not well visualized. cxr 5/17/20 reviewed:  
1. Stable support lines and tubes. 2. Central pulmonary vascular congestion, possibly with small bilateral pleural 
effusions and associated atelectasis/airspace disease. IMPRESSION:  
Patient Active Problem List  
Diagnosis Code  Acute on chronic respiratory failure with hypoxia and hypercapnia ([facility]) J96.21, J96.22  
 Acute on chronic systolic and diastolic CHF (congestive heart failure) ([facility]) I50.43 Aspiration pneumonitis from multiple patient initiated ET tube displacement events J69.0  Cardiac arrest ([facility] [address] I46.9 MSSA in sputum culture A49.01  
 Elevated troponin, NSTEMI R79.89, I21.4  Chronic a-fib I48.20  Atrial flutter ([facility] [address] I48.92 Prolonged QTc R94.31  
 Chronic renal insufficiency, stage II (mild) N18.2 Thrombocytopenia D69.6  Smoking F17.200  Arthritis M19.90  Migraine G43.909  Chronic back pain M54.9, G89.29  
 Obstructive Sleep apnea G47.33  
 Morbid obesity (HCC) E66.01  
  
PLAN:  
RS: s/p trach on 5/18, on ventilator since 4/25/2020. HU negative for any cardiac causes for severe hypoxemia or shunting. He previously could have had pulmonary hemorrhage knowing bloody/pinkish ETT secretions and so started on Solu-Medrol 40 mg IV every 8 hour since 5/12/2020. Since then fio2 improved to 40% and peep 8 and improved ETT bloody secretions. Reduce steroids to 40 mg q12 hrs on 5/16/20. Since tapered to 20 mg q12 hrs. I/o improved, leg edema improved Patient cannot have CT chest due to body weight limitations. C/w vent support, vent and sedation bundle. VAP prevention bundle, head of the bed at 30' all times Patient is off paralytic medication since 5/4/2020 Sedation- Off versed drip since 5/14/20. No pain, off fentanyl patch as well. Continue bronchodilators prn; pulmonary hygiene care. Trach collar trials this morning CVS - 
HU unremarkable for any cause for persistent hypoxemia, bubble study negative. Only grade 1 diastolic dysfunction with LVEF 45 to 50%. No pulmonary hypertension. US legs negative for DVT. Diuretics as above. ASA held for trach plan. Eliquis held for possible pulmonary hemorrhage. Dr. Keesha Collier on board. ID - SARS-COV-2 negative x 2 Antibiotic: s/p Zosyn completed on 5/12/2020 10 days antibiotic MSSA in resp cxs from before; respiratory cultures 5/8 no growth. Procalcitonin repeat is negative. No leukocytosis. No fever. Hem - HIT panel and LEYDI Ab NEG on 4/28. He is definitely high risk for PEs; CTA chest cannot be done due to limitations due to weight; stopped argatroban 5/4;[suspect fatty liver/GASTELUM]; Eliquis held due to bloody secretions from the ET tube. Vasc - PVL bl LE negative Renal -continue monitor BUN and creatinine stable; electrolyte replacement per protocol. Nephrology Dr. Travon Gómez hs signed off. Bumex 1 mg daily Monitor I's and O's, renal functions, electrolytes and replace per ICU protocol. GI - GI placed OG tube; good position confirmed with abdominal x-ray using Gastrografin again on 5/16/20 after NGT replaced since patient pulled it out. Neurology: moving extremities and follows all commands. No focal deficit. Overall guarded prognosis. Patient chronically vent dependent. Full code. Will defer respective systems problem management to primary and other consultant and follow patient in ICU with primary and other medical team 
Quality Care: PPI, DVT prophylaxis, HOB elevated, Infection control all reviewed and addressed. PAIN AND SEDATION: As above · Skin/Wound:  Blister on foot, does not appear pressure sore. Continue local care per wound care consult. · Nutrition:  Start tube feeding · Prophylaxis: DVT and GI Prophylaxis reviewed. · Restraints: prn 
· PT/OT eval and treat: as needed when stable · Lines/Tubes:  daniel 4/21/20, trach 5/18/20, PICC line left arm 5/5 ADVANCE DIRECTIVE: Full code DISCUSSION: spoke with RN, RT, ICU staff, MDR Events and notes from last 24 hours reviewed. Care plan discussed with nursing Total CC time: 32 minutes. High complexity review and management. Doug Moritz, PA-C  
5/22/2020

## 2020-05-22 NOTE — PROGRESS NOTES
Hospitalist Progress Note Patient: Justyn Avery MRN: [id number]  CSN: [id number] YOB: 1975  Age: 53 y.o. Sex: male DOA: 4/21/2020 LOS:  LOS: 31 days Assessment/Plan Patient Active Problem List  
Diagnosis Code  Cellulitis of left lower leg L03. 116  
 Anemia D64.9  Morbid obesity ([facility]) E66.01  
 Dyspnea R06.00  Sleep apnea G47.30  Sinus tachycardia R00.0  BMI 70 and over, adult [facility]) C77.59  Chronic back pain M54.9, G89.29  
 Arthritis M19.90  Migraine G43.909  
 History of atrial flutter Z86.79  
 Limited mobility Z74.09  
 Smoking F17.200  Chronic renal insufficiency, stage II (mild) N18.2  CHF (congestive heart failure) ([facility]) I50.9  Acute diastolic CHF (congestive heart failure) ([facility]) I50.31  Chronic a-fib I48.20  Atrial flutter ([facility]) I48.92  
 Elevated troponin R79.89  
 Acute respiratory distress R06.03  
 Acute CHF (congestive heart failure) ([facility]) I50.9  Respiratory failure with hypoxia and hypercapnia ([facility]) J96.91, J96.92  
 Acute on chronic respiratory failure with hypoxia and hypercapnia ([facility]) J96.21, J96.22  
 Cardiac arrest ([facility]) I46.9  Advanced care planning/counseling discussion Z70.80  
 NSTEMI (non-ST elevated myocardial infarction) ([facility] [address] I21.4  Hypocalcemia E83.51  
  
 
 
 
54 y/o male with hx of morbid obesity, MARIA FERNANDA, chronic afib on anticoagulation, and systolic CHF (EF 14-95%) is admitted with respiratory failure with hypoxia and hypercapnia, he required to be intubated. 04/23/2020 Patient self extubated this morning and went into PEA arrest, he was re intubated, difficult intubation. He is awake and post resuscitation, placed on sedation. Requiring high PEEP. 
 
04/24/2020 Patient again tried to self extubate in spite of sedation. 04/25/2020 ET tube leak, ET tube exchanged 04/27/2020 Had bleeding around IJ site, heparin stopped, H/H stable 05/18/2020 Tracheostomy He is awake, alert, follows commands CRITICAL CARE PLAN Resp - See vent orders, VAP bundle. HOB>30 degrees. Acute respiratory failure - with hypoxia and hypercapnia,  
MARIA FERNANDA/OHS -  
S/p trach 05/18/2020 ID - ANTIBIOTICS  Off antibiotics. Seen by ID 
COVID 19 negative x 2 
 
 
[facility] - Monitor HD. Hypotension - resolved, off pressors Paroxysmal atrial flutter - monitor rate, was on amiodarone. Has been bradycardic, toprol xl held. On eliquis 2.5mg daily Acute on chronic systolic CHF -  
received lasix drip, on bumex Echo with EF of 45-50% Elevated troponin - NSTEMI type II due to PEA arrest 
HU with no intracardiac shunt Heme/onc - Follow H&H, plts. Renal - Trend BUN, Cr, follow I/O, daniel in place. Check and replace Mg, K, phos. Hypernatremia - resolved, Endocrine -  Follow FSG Neuro/ Pain/ Sedation - Off paralytic, awake, follows commands GI - NPO for now, NG tube, tube feeding. High risk for PEG tube Morbid obesity Prophylaxis - DVT: SCD, GI: protonix. Disposition : awaiting transfer to [facility] Physical Exam: 
General: As above   
HEENT: NC, Atraumatic. PERRLA, anicteric sclerae. Lungs: CTA Bilaterally. Distant breath sounds secondary to body habitus. Heart:  S1 S2, No murmur, No Rubs, No Gallops Abdomen: Soft, Non distended, obese, Non tender.  +Bowel sounds, Extremities: LE edema Vital signs/Intake and Output: 
Visit Vitals /60 Pulse 97 Temp 97.8 °F (36.6 °C) Resp 19 Ht 5' 10\" (1.778 m) Wt (!) 205.2 kg (452 lb 6.1 oz) SpO2 95% BMI 64.91 kg/m² Current Shift:  05/22 0701 - 05/22 1900 In: 500 Out: 1875 [[facility]:[id number]] Last three shifts:  05/20 1901 - 05/22 0700 In: 3040 [I.V.:400] Out: 1700 [Urine:1700] Labs: Results:  
   
Chemistry Recent Labs  
  05/22/20 
0520 05/21/20 
0500 05/20/20 
6478 * 115* 107*  139 139  
K 3.8 4.0 4.0  
 106 107 CO2 30 27 25 BUN 23* 22* 24* CREA 0.60 0.66 0.68  
CA 9.2 9.2 8.8 AGAP 5 6 7 BUCR 38* 33* 35* AP 47 48 52 TP 7.0 7.7 7.3 ALB 4.2 4.8 4.5  
GLOB 2.8 2.9 2.8 AGRAT 1.5 1.7 1.6 CBC w/Diff Recent Labs  
  05/22/20 
0520 05/21/20 
0500 05/20/20 
3375 WBC 6.8 8.9 7.4  
RBC 3.90* 3.97* 3.93* HGB 10.4* 10.7* 10.4* HCT 34.0* 33.3* 32.9*  
 188 170 GRANS 74* 88* 87* LYMPH 11* 5* 7*  
EOS 2 0 0 Cardiac Enzymes No results for input(s): CPK, CKND1, CONSTANZA in the last 72 hours. No lab exists for component: Ta Caitlin Coagulation Recent Labs  
  05/22/20 
0520 05/21/20 
0500 PTP 17.9* 17.0* INR 1.5* 1.4* APTT 34.0 31.3 Lipid Panel No results found for: CHOL, CHOLPOCT, CHOLX, CHLST, CHOLV, 284611, HDL, HDLP, LDL, LDLC, DLDLP, 244064, VLDLC, VLDL, TGLX, TRIGL, TRIGP, TGLPOCT, CHHD, CHHDX  
BNP No results for input(s): BNPP in the last 72 hours. Liver Enzymes Recent Labs  
  05/22/20 
0520 TP 7.0 ALB 4.2 AP 47 SGOT 15 Thyroid Studies Lab Results Component Value Date/Time TSH 1.45 04/27/2020 03:50 AM  
    
Procedures/imaging: see electronic medical records for all procedures/Xrays and details which were not copied into this note but were reviewed prior to creation of Plan

## 2020-05-22 NOTE — PROGRESS NOTES
Patient requested to be placed back on vent. Removed patient from track collar after PT and placed back on vent at previous setting.

## 2020-05-22 NOTE — PROGRESS NOTES
Cardiology Progress Note Patient: Dean Darling. Sex: male          DOA: 4/21/2020 YOB: 1975      Age:  53 y.o.        LOS:  LOS: 31 days Patient seen and examined, chart reviewed. Assessment/Plan Patient Active Problem List  
Diagnosis Code  Cellulitis of left lower leg L03. 116  
 Anemia D64.9  Morbid obesity ([facility]) E66.01  
 Dyspnea R06.00  Sleep apnea G47.30  Sinus tachycardia R00.0  BMI 70 and over, adult [facility]) K73.17  Chronic back pain M54.9, G89.29  
 Arthritis M19.90  Migraine G43.909  
 History of atrial flutter Z86.79  
 Limited mobility Z74.09  
 Smoking F17.200  Chronic renal insufficiency, stage II (mild) N18.2  CHF (congestive heart failure) ([facility]) I50.9  Acute diastolic CHF (congestive heart failure) ([facility]) I50.31  Chronic a-fib I48.20  Atrial flutter ([facility]) I48.92  
 Elevated troponin R79.89  
 Acute respiratory distress R06.03  
 Acute CHF (congestive heart failure) ([facility]) I50.9  Respiratory failure with hypoxia and hypercapnia ([facility]) J96.91, J96.92  
 Acute on chronic respiratory failure with hypoxia and hypercapnia ([facility]) J96.21, J96.22  
 Cardiac arrest ([facility]) I46.9  Advanced care planning/counseling discussion Z70.80  
 NSTEMI (non-ST elevated myocardial infarction) ([facility] [address] I21.4  Hypocalcemia E83.51 Acute on chronic systolic heart failure NSTEMI type II due to PEA arrest 
Paroxysmal atrial flutter Thrombocytopenia Bradycardia  
  
Patient self extubated and had PEA arrest. 
He had brief episode of A fib with RVR and was started On Amiodarone drip. Currently he is in Sinus rhythm. 
  
Amiodarone discontinued due to bradycardia. 
  
Echocardiogram  
  
· Left Ventricle: Normal wall thickness and systolic function (ejection fraction normal). Mildly dilated left ventricle. The estimated ejection fraction is 45 - 50%.  There is moderate (grade 2) left ventricular diastolic dysfunction E/e' Ratio = 17.09. Wall Scoring: The left ventricular wall motion is globally hypokinetic. · Right Ventricle: Not well visualized. Normal global systolic function. Moderately dilated right ventricle. · Right Atrium: Right atrium not well visualized. Moderately dilated right atrium. · IVC/Hepatic Veins: Dilated inferior vena cava. Mechanically ventilated; cannot use inferior caval vein diameter to estimate central venous pressure. 
  
IV heparin was discontinued due to thrombocytopenia and bleeding at central line site. Started on Argatroban.  
  
Patient had frequent oxygen desaturations and pinkish/bloody aspirate from tracheal suctioning.  
  
HU done revealed LVEF 13-61%, Grade I diastolic dysfunction, Negative bubble study. Oxygenation significantly improved after he was started on steroid S/p Tracheostomy Telemetry reviewed: No episode of heart block or bradycardia  
  
Plan: 
  
Continue Eliquis ,WIll switch to 5 mg twice a day if no bleeding issue. Continue IV Bumex Strict I/O Avoid any AV debbie blocking agents due to bradycardia. Continue  IV hydralazine for hypertension if needed. Monitor electrolytes and renal function Continue Ventilatory support. Continue management as per hospital medicine and pulmonary critical care. Subjective:  
 cc: On ventilator REVIEW OF SYSTEMS:  
 
Can not obtain Objective:  
  
Visit Vitals BP (!) 143/103 Pulse (!) 116 Temp 98.5 °F (36.9 °C) Resp 23 Ht 5' 10\" (1.778 m) Wt (!) 205.2 kg (452 lb 6.1 oz) SpO2 92% BMI 64.91 kg/m² Body mass index is 64.91 kg/m². Physical Exam: 
General Appearance: Comfortable, Morbidly obese, not using accessory muscles of respiration. HEENT: AMMON. HEAD: Atraumatic NECK: No JVD, no thyroidomeglay. CAROTIDS: No bruit LUNGS: bilateral conducted sounds HEART: S1+S2 audible, no murmur, no pericardial rub. NEUROLOGICAL: Opens eyes on verbal commands. Medication: 
Current Facility-Administered Medications Medication Dose Route Frequency  potassium bicarb-citric acid (EFFER-K) tablet 20 mEq  20 mEq Oral BID  
 glycopyrrolate (ROBINUL) tablet 1 mg  1 mg Per NG tube BID  bumetanide (BUMEX) injection 1 mg  1 mg IntraVENous DAILY  methylPREDNISolone (PF) (SOLU-MEDROL) injection 20 mg  20 mg IntraVENous Q12H  hydrALAZINE (APRESOLINE) 20 mg/mL injection 20 mg  20 mg IntraVENous Q6H PRN  
 amLODIPine (NORVASC) tablet 10 mg  10 mg Oral DAILY  albumin human 25% (BUMINATE) solution 25 g  25 g IntraVENous Q6H  
 apixaban (ELIQUIS) tablet 2.5 mg  2.5 mg Oral BID  LORazepam (ATIVAN) injection 2 mg  2 mg IntraVENous Q4H PRN  
 docusate sodium (COLACE) capsule 100 mg  100 mg Oral BID  polyethylene glycol (MIRALAX) packet 17 g  17 g Oral BID  heparin (porcine) 100 unit/mL injection 500 Units  500 Units InterCATHeter Q8H PRN  
 fentaNYL citrate (PF) injection 50 mcg  50 mcg IntraVENous Q3H PRN  chlorhexidine (PERIDEX) 0.12 % mouthwash 10 mL  10 mL Oral Q12H  
 albuterol-ipratropium (DUO-NEB) 2.5 MG-0.5 MG/3 ML  3 mL Nebulization Q6H PRN  
 acetaminophen (TYLENOL) tablet 650 mg  650 mg Oral Q6H PRN  
 ELECTROLYTE REPLACEMENT PROTOCOL - Calcium   1 Each Other PRN  
 ELECTROLYTE REPLACEMENT PROTOCOL - Magnesium   1 Each Other PRN  
 ELECTROLYTE REPLACEMENT PROTOCOL - Potassium Standard Dosing   1 Each Other PRN  
 ELECTROLYTE REPLACEMENT PROTOCOL - Phosphorus  Standard Dosing  1 Each Other PRN  pantoprazole (PROTONIX) 40 mg in 0.9% sodium chloride 10 mL injection  40 mg IntraVENous DAILY  aspirin chewable tablet 81 mg  81 mg Oral DAILY  sodium chloride (NS) flush 5-40 mL  5-40 mL IntraVENous Q8H  
 sodium chloride (NS) flush 5-40 mL  5-40 mL IntraVENous PRN Lab/Data Reviewed: 
 
  
Recent Labs  
  05/22/20 
0520 05/21/20 
0500 05/20/20 
1135 WBC 6.8 8.9 7.4 HGB 10.4* 10.7* 10.4* HCT 34.0* 33.3* 32.9*  
 188 170 Recent Labs  
  05/22/20 
0520 05/21/20 
0500 05/20/20 
2518  139 139  
K 3.8 4.0 4.0  
 106 107 CO2 30 27 25 * 115* 107* BUN 23* 22* 24* CREA 0.60 0.66 0.68  
CA 9.2 9.2 8.8 Signed By: Patrizia Geronimo MD   
 May 22, 2020

## 2020-05-22 NOTE — PROGRESS NOTES
Otolaryngology head neck surgery Patient presently on trach collar and seems to be doing well. Currently may be transferred in the near future. It does appear to be clean and dry. If patient does not need any ventilatory support either daytime or nighttime, consideration may be given for placement of non-cuffed trach Patient is to be transferred, would recommend follow-up with an ENT provider 1 to 2 weeks after transfer Discussed with patient understands. Time 25 Werner Alvarado

## 2020-05-22 NOTE — ROUTINE PROCESS
Updates sent to [facility] waiting on return call regarding bed availability for admission today. 1079- return call received from admission Liason from [address] [place] [address] at this time facility no bed available she will attend staff morning meeting regarding discharges at their facility and update this cm this afternoon. 1300- updated of no bed availability at this time if no bed availability becomes by 3pm today facility will plan for tomorrow discharge. Care Management Interventions PCP Verified by CM: Yes 
Palliative Care Criteria Met (RRAT>21 & CHF Dx)?: Yes 
Palliative Consult Recommended?: Yes Mode of Transport at Discharge: ALS Transition of Care Consult (CM Consult): LTAC Current Support Network: [address] [place] [address] Confirm Follow Up Transport: Family The Plan for Transition of Care is Related to the Following Treatment Goals : [facility]  
The Patient and/or Patient Representative was Provided with a Choice of Provider and Agrees with the Discharge Plan?: Yes Freedom of Choice List was Provided with Basic Dialogue that Supports the Patient's Individualized Plan of Care/Goals, Treatment Preferences and Shares the Quality Data Associated with the Providers?: Yes Discharge Location Discharge Placement: [address] [place] [address]

## 2020-05-23 NOTE — PROGRESS NOTES
Attempt for OT session at 1420. Nursing and resp. Therapist present in room. Not appropriate medically for OT session at this time. To be followed tomorrow.   
 
Carmen Best, OTR/L

## 2020-05-23 NOTE — PROGRESS NOTES
Pharmacy Dosing Services: Vancomycin Consult for Vancomycin Dosing by Pharmacy by Dr. Aashish Guzman Consult provided for this 53y.o. year old male , for indication of Bloodstream Infection. Day of Therapy 1 Ht Readings from Last 1 Encounters:  
05/22/20 177.8 cm (70\") Wt Readings from Last 1 Encounters:  
05/22/20 (!) 202.9 kg (447 lb 5 oz) Other Current Antibiotics Zosyn Serum Creatinine Lab Results Component Value Date/Time Creatinine 0.59 (L) 05/23/2020 04:00 AM  
  
Creatinine Clearance Estimated Creatinine Clearance: 233.1 mL/min (A) (by C-G formula based on SCr of 0.59 mg/dL (L)). BUN Lab Results Component Value Date/Time BUN 29 (H) 05/23/2020 04:00 AM  
  
WBC Lab Results Component Value Date/Time WBC 4.8 05/23/2020 04:00 AM  
  
H/H Lab Results Component Value Date/Time HGB 10.5 (L) 05/23/2020 04:00 AM  
  
Platelets Lab Results Component Value Date/Time PLATELET 227 64/87/3895 04:00 AM  
  
Temp (!) 102.7 °F (39.3 °C) Start therapy, with loading dose:  Vancomycin 2000 mg IV once, administered 5/23/20 at 14:44 Follow with maintenance dose:  Vancomycin 1500 mg IV q8h Goal therapeutic trough of 15 - 20 mcg/mL. Pharmacy to follow daily and will make changes to dose and/or frequency based on clinical status. Pharmacist Bethanie Lares

## 2020-05-23 NOTE — PROGRESS NOTES
Hospitalist Progress Note Patient: Cathy Lemus MRN: [id number]  [facility]: [id number] YOB: 1975  Age: 53 y.o. Sex: male DOA: 4/21/2020 LOS:  LOS: 32 days Assessment/Plan Patient Active Problem List  
Diagnosis Code  Cellulitis of left lower leg L03. 116  
 Anemia D64.9  Morbid obesity ([facility]) E66.01  
 Dyspnea R06.00  Sleep apnea G47.30  Sinus tachycardia R00.0  BMI 70 and over, adult [facility]) R02.50  Chronic back pain M54.9, G89.29  
 Arthritis M19.90  Migraine G43.909  
 History of atrial flutter Z86.79  
 Limited mobility Z74.09  
 Smoking F17.200  Chronic renal insufficiency, stage II (mild) N18.2  CHF (congestive heart failure) ([facility]) I50.9  Acute diastolic CHF (congestive heart failure) ([facility]) I50.31  Chronic a-fib I48.20  Atrial flutter ([facility]) I48.92  
 Elevated troponin R79.89  
 Acute respiratory distress R06.03  
 Acute CHF (congestive heart failure) ([facility]) I50.9  Respiratory failure with hypoxia and hypercapnia ([facility]) J96.91, J96.92  
 Acute on chronic respiratory failure with hypoxia and hypercapnia ([facility]) J96.21, J96.22  
 Cardiac arrest ([facility]) I46.9  Advanced care planning/counseling discussion Z70.80  
 NSTEMI (non-ST elevated myocardial infarction) ([facility] [address] I21.4  Hypocalcemia E83.51  
  
 
 
 
54 y/o male with hx of morbid obesity, MARIA FERNANDA, chronic afib on anticoagulation, and systolic CHF (EF 14-36%) is admitted with respiratory failure with hypoxia and hypercapnia, he required to be intubated. 04/23/2020 Patient self extubated this morning and went into PEA arrest, he was re intubated, difficult intubation. He is awake and post resuscitation, placed on sedation. Requiring high PEEP. 
 
04/24/2020 Patient again tried to self extubate in spite of sedation. 04/25/2020 ET tube leak, ET tube exchanged 04/27/2020 Had bleeding around IJ site, heparin stopped, H/H stable 05/18/2020 Tracheostomy 5/23; 
 Will try eating today not best candidate for PEG tube was able to speak to wife through Nadja? face time He is awake, alert, follows commands denies any pain CRITICAL CARE PLAN Resp - Acute respiratory failure - with hypoxia and hypercapnia,  
MARIA FERNANDA/OHS -  
S/p trach 05/18/2020 tolerating trach collar pending LTAC on prednisone taper per pulmonary ID - ANTIBIOTICS  Off antibiotics. Seen by ID 
COVID 19 negative x 2 
 
 
[facility] - Monitor HD. Hypotension - resolved, off pressors Paroxysmal atrial flutter - monitor rate, was on amiodarone. Has been bradycardic, toprol xl held. On eliquis 2.5mg daily Acute on chronic systolic CHF -  
received lasix drip, on bumex Echo with EF of 45-50% Elevated troponin - NSTEMI type II due to PEA arrest 
HU with no intracardiac shunt Heme/onc - Follow H&H, plts. Renal - Trend BUN, Cr, follow I/O, daniel in place. Check and replace Mg, K, phos. Hypernatremia - resolved, Endocrine -  Follow [facility] Neuro/ Pain/ Sedation - Off paralytic, awake, follows commands GI -tube feeding. High risk for PEG tube aspiration precautions Morbid obesity Prophylaxis - DVT: SCD, GI: protonix. Disposition : awaiting transfer to [facility] Physical Exam: 
General: As above   
HEENT: NC, Atraumatic. PERRLA, anicteric sclerae. Lungs: CTA Bilaterally. Distant breath sounds secondary to body habitus. Heart:  S1 S2, No murmur, No Rubs, No Gallops Abdomen: Soft, Non distended, obese, Non tender.  +Bowel sounds, Extremities: LE edema Vital signs/Intake and Output: 
Visit Vitals /77 Pulse (!) 103 Temp 97.5 °F (36.4 °C) Resp 22 Ht 5' 10\" (1.778 m) Wt (!) 202.9 kg (447 lb 5 oz) SpO2 91% BMI 64.18 kg/m² Current Shift:  No intake/output data recorded. Last three shifts:  05/21 1901 - 05/23 0700 In: 1805 [I.V.:200] Out: 4690 [LAMCS:9451] Labs: Results:  
   
Chemistry Recent Labs  
  05/23/20 
0400 05/22/20 8248 05/21/20 
0500 * 110* 115*  142 139  
K 4.0 3.8 4.0  
 107 106 CO2 31 30 27 BUN 29* 23* 22* CREA 0.59* 0.60 0.66 CA 9.2 9.2 9.2 AGAP 4 5 6 BUCR 49* 38* 33* AP 57 47 48  
TP 7.2 7.0 7.7 ALB 4.2 4.2 4.8  
GLOB 3.0 2.8 2.9 AGRAT 1.4 1.5 1.7 CBC w/Diff Recent Labs  
  05/23/20 
0400 05/22/20 
0520 05/21/20 
0500 WBC 4.8 6.8 8.9  
RBC 3.91* 3.90* 3.97* HGB 10.5* 10.4* 10.7* HCT 34.3* 34.0* 33.3*  
 155 188 GRANS 78* 74* 88* LYMPH 10* 11* 5* EOS 0 2 0 Cardiac Enzymes No results for input(s): CPK, CKND1, CONSTANZA in the last 72 hours. No lab exists for component: [place] Coagulation Recent Labs  
  05/23/20 
0400 05/22/20 
0520 PTP 17.8* 17.9* INR 1.5* 1.5* APTT 36.1 34.0 Lipid Panel No results found for: CHOL, CHOLPOCT, CHOLX, CHLST, CHOLV, 084757, HDL, HDLP, LDL, LDLC, DLDLP, 486547, VLDLC, VLDL, TGLX, TRIGL, TRIGP, TGLPOCT, CHHD, CHHDX  
BNP No results for input(s): BNPP in the last 72 hours. Liver Enzymes Recent Labs  
  05/23/20 
0400 TP 7.2 ALB 4.2 AP 57 SGOT 31 Thyroid Studies Lab Results Component Value Date/Time TSH 1.45 04/27/2020 03:50 AM  
    
Procedures/imaging: see electronic medical records for all procedures/Xrays and details which were not copied into this note but were reviewed prior to creation of Plan

## 2020-05-23 NOTE — PROGRESS NOTES
0800 Assumed care of patient. .. 0900 AM assessment completed. .. patient cleaned at 1000 
1200 Reassessment completed. .. no additional changes at this time. . patient on trach collar 
1400 During rounds patient is chilled and had blanket on. .. checked temp and patient 102.7 orally. . Informed MD and rcvd orders for cultures. .. will folliw up 
1600 Reassessment and temperature check post treatment. .. Temp 100.7 orally. . Will recheck and continue to monitor

## 2020-05-23 NOTE — PROGRESS NOTES
Pulmonary, Critical Care, and Sleep Medicine Pulmonary Progress Note Name: Edith Millard. : 1975 MRN: [id number] Date: 2020 [x]I have reviewed the flowsheet and previous days notes. Events, vitals, medications and notes from last 24 hours reviewed. Subjective/History:  
49-year-old male with morbid obesity, ex-smoker, history of asthma, history of severe obstructive sleep apnea on CPAP, questionable component compliance, history of congestive heart failure with reduced ejection fraction atrial fibrillation atrial flutter on amiodarone, Xarelto, history of chronic renal insufficiency, cellulitis of lower extremities and anemia. As per family did not have any fever or upper respiratory tract infections and no flulike symptoms. On Lasix, CPAP not clear if missed. Presented with acute shortness of breath worsening edema of lower extremities. He was found to be in acute hypercarbic hypoxemic respiratory failure initially had a trial of BiPAP but failed and then eventually was intubated. He was hypotensive. Moderate amount of secretions. Morbid obesity. Patient compliant with Xarelto. Patient self extubated and had PEA arrest. 
 
Subjective:  
20 Status post tracheostomy day 5 doing well. Yesterday tolerated about 4 hours of trach collar. Today we will try to continue all day trach collar at night ventilatory support repeat ABG tomorrow. I will change steroids to low-dose oral x3 days and stop. We can give a first trial today of p.o. intake he passed basic speech evaluation monitor for aspirations closely. High risk for PEG tube morbid obesity discussed with the GI will see how he does with eating 2020 POD#4 tracheostomy, doing well Improving vent requirements Did 4-5 hours of trach collar yesterday, continue today Rest on vent overnight No significant secretions No fever [facility] Severe MARIA FERNANDA hypoventilation syndrome 2/2 morbid obesity, suspect he will need nocturnal ventilation during weaning Intake/Output Summary (Last 24 hours) at 2020 1028 Last data filed at 2020 0600 Gross per 24 hour Intake 1585 ml Output 2675 ml Net -1090 ml  
 
 
ROS: Review of systems not obtained due to patient factors. Past Medical History: 
Past Medical History:  
Diagnosis Date  Arrhythmia  Arthritis   
 knees  CHF (congestive heart failure) ([facility] [address]  Chronic back pain  Chronic renal insufficiency, stage II (mild)  History of atrial flutter Dx 2016 - anticoagulated by Duane Crane  Hypertension 2015  Limited mobility  Migraine headache  Morbid obesity ([facility] [address]  Morbid obesity with body mass index of 60.0-69.9 in adult [facility])  Sleep apnea   
 uses c-pap instructed to bring day of surgery  Smoking   
 very passive / cigars only Allergy: No Known Allergies Vital Signs:   
Blood pressure 148/77, pulse (!) 101, temperature 98.3 °F (36.8 °C), resp. rate 15, height 5' 10\" (1.778 m), weight (!) 202.9 kg (447 lb 5 oz), SpO2 91 %. Body mass index is 64.18 kg/m². O2 Device: Tracheal collar O2 Flow Rate (L/min): 16 l/min Temp (24hrs), Av.9 °F (36.6 °C), Min:97.5 °F (36.4 °C), Max:98.5 °F (36.9 °C) Intake/Output:  
Last shift:      No intake/output data recorded. Last 3 shifts:  190 -  0700 In: 1805 [I.V.:200] Out: 0845 [[id number]] Intake/Output Summary (Last 24 hours) at 2020 1028 Last data filed at 2020 0600 Gross per 24 hour Intake 1585 ml Output 2675 ml Net -1090 ml Ventilator Settings: 
Mode Rate Tidal Volume Pressure FiO2 PEEP Assist control, Pressure control   580 ml  7 cm H2O 40 % 5 cm H20 Peak airway pressure: 22 cm H2O Minute ventilation: 16.9 l/min Physical Exam: 
General: obese male; no cyanosis; alert awake and following all commands. on ventilator HEENT: LIBBY, orally intubated; orogastric tube; no bleeding; pupils not dilated, reactive Neck: thick short obese neck, tracheostomy in place without significant erythema or drainage, no crepitus Chest: normal, obese chest wall Lungs: moderate air entry, obese chest limits exam; decreased BS bases; symmetrical chest expansion Heart: S1S2 present or without murmur or extra heart sounds; JVD not elevated Abdomen: obese, bowel sounds normoactive, soft without significant tenderness; no organomegaly; no guarding or rigidity Extremity: b/l leg/feet edema improving, now can see wrinkles of skin; negative for cyanosis, clubbing. Can see wrinkles in feet and leg since edema improving. Capillary refill: normal 
Neuro:  Alert awake and following all commands, moving all 4 limbs and following all commands. Skin: Skin color, texture, turgor fair. Skin dry, warm, non-diaphoretic DATA:  
Current Facility-Administered Medications Medication Dose Route Frequency  predniSONE (DELTASONE) tablet 10 mg  10 mg Oral DAILY WITH LUNCH  potassium bicarb-citric acid (EFFER-K) tablet 20 mEq  20 mEq Oral BID  
 glycopyrrolate (ROBINUL) tablet 1 mg  1 mg Per NG tube BID  bumetanide (BUMEX) injection 1 mg  1 mg IntraVENous DAILY  hydrALAZINE (APRESOLINE) 20 mg/mL injection 20 mg  20 mg IntraVENous Q6H PRN  
 amLODIPine (NORVASC) tablet 10 mg  10 mg Oral DAILY  apixaban (ELIQUIS) tablet 2.5 mg  2.5 mg Oral BID  LORazepam (ATIVAN) injection 2 mg  2 mg IntraVENous Q4H PRN  
 docusate sodium (COLACE) capsule 100 mg  100 mg Oral BID  polyethylene glycol (MIRALAX) packet 17 g  17 g Oral BID  heparin (porcine) 100 unit/mL injection 500 Units  500 Units InterCATHeter Q8H PRN  
 fentaNYL citrate (PF) injection 50 mcg  50 mcg IntraVENous Q3H PRN  chlorhexidine (PERIDEX) 0.12 % mouthwash 10 mL  10 mL Oral Q12H  
 albuterol-ipratropium (DUO-NEB) 2.5 MG-0.5 MG/3 ML  3 mL Nebulization Q6H PRN  
  acetaminophen (TYLENOL) tablet 650 mg  650 mg Oral Q6H PRN  
 ELECTROLYTE REPLACEMENT PROTOCOL - Calcium   1 Each Other PRN  
 ELECTROLYTE REPLACEMENT PROTOCOL - Magnesium   1 Each Other PRN  
 ELECTROLYTE REPLACEMENT PROTOCOL - Potassium Standard Dosing   1 Each Other PRN  
 ELECTROLYTE REPLACEMENT PROTOCOL - Phosphorus  Standard Dosing  1 Each Other PRN  pantoprazole (PROTONIX) 40 mg in 0.9% sodium chloride 10 mL injection  40 mg IntraVENous DAILY  aspirin chewable tablet 81 mg  81 mg Oral DAILY  sodium chloride (NS) flush 5-40 mL  5-40 mL IntraVENous Q8H  
 sodium chloride (NS) flush 5-40 mL  5-40 mL IntraVENous PRN Telemetry: [x]Sinus []A-flutter []Paced []A-fib []Multiple PVCs Labs: 
Recent Results (from the past 24 hour(s)) GLUCOSE, POC Collection Time: 05/22/20 11:19 AM  
Result Value Ref Range Glucose (POC) 116 (H) 70 - 110 mg/dL GLUCOSE, POC Collection Time: 05/22/20  5:50 PM  
Result Value Ref Range Glucose (POC) 124 (H) 70 - 110 mg/dL METABOLIC PANEL, COMPREHENSIVE Collection Time: 05/23/20  4:00 AM  
Result Value Ref Range Sodium 141 136 - 145 mmol/L Potassium 4.0 3.5 - 5.5 mmol/L Chloride 106 100 - 111 mmol/L  
 CO2 31 21 - 32 mmol/L Anion gap 4 3.0 - 18 mmol/L Glucose 120 (H) 74 - 99 mg/dL BUN 29 (H) 7.0 - 18 MG/DL Creatinine 0.59 (L) 0.6 - 1.3 MG/DL  
 BUN/Creatinine ratio 49 (H) 12 - 20 GFR est AA >60 >60 ml/min/1.73m2 GFR est non-AA >60 >60 ml/min/1.73m2 Calcium 9.2 8.5 - 10.1 MG/DL Bilirubin, total 1.7 (H) 0.2 - 1.0 MG/DL  
 ALT (SGPT) 68 (H) 16 - 61 U/L  
 AST (SGOT) 31 10 - 38 U/L Alk. phosphatase 57 45 - 117 U/L Protein, total 7.2 6.4 - 8.2 g/dL Albumin 4.2 3.4 - 5.0 g/dL Globulin 3.0 2.0 - 4.0 g/dL A-G Ratio 1.4 0.8 - 1.7    
CBC WITH AUTOMATED DIFF Collection Time: 05/23/20  4:00 AM  
Result Value Ref Range WBC 4.8 4.6 - 13.2 K/uL  
 RBC 3.91 (L) 4.70 - 5.50 M/uL HGB 10.5 (L) 13.0 - 16.0 g/dL HCT 34.3 (L) 36.0 - 48.0 % MCV 87.7 74.0 - 97.0 FL  
 MCH 26.9 24.0 - 34.0 PG  
 MCHC 30.6 (L) 31.0 - 37.0 g/dL  
 RDW 18.4 (H) 11.6 - 14.5 % PLATELET 420 444 - 416 K/uL MPV 10.0 9.2 - 11.8 FL  
 NEUTROPHILS 78 (H) 40 - 73 % LYMPHOCYTES 10 (L) 21 - 52 % MONOCYTES 12 (H) 3 - 10 % EOSINOPHILS 0 0 - 5 % BASOPHILS 0 0 - 2 %  
 ABS. NEUTROPHILS 3.7 1.8 - 8.0 K/UL  
 ABS. LYMPHOCYTES 0.5 (L) 0.9 - 3.6 K/UL  
 ABS. MONOCYTES 0.6 0.05 - 1.2 K/UL  
 ABS. EOSINOPHILS 0.0 0.0 - 0.4 K/UL  
 ABS. BASOPHILS 0.0 0.0 - 0.1 K/UL  
 DF AUTOMATED MAGNESIUM Collection Time: 05/23/20  4:00 AM  
Result Value Ref Range Magnesium 2.2 1.6 - 2.6 mg/dL CALCIUM, IONIZED Collection Time: 05/23/20  4:00 AM  
Result Value Ref Range Ionized Calcium 1.22 1.12 - 1.32 MMOL/L  
PTT Collection Time: 05/23/20  4:00 AM  
Result Value Ref Range aPTT 36.1 23.0 - 36.4 SEC PROTHROMBIN TIME + INR Collection Time: 05/23/20  4:00 AM  
Result Value Ref Range Prothrombin time 17.8 (H) 11.5 - 15.2 sec INR 1.5 (H) 0.8 - 1.2 Recent Labs  
  05/21/20 
0510 FIO2I 40 HCO3I 25.9 PCO2I 40.0 PHI 7.419 PO2I 65* All Micro Results Procedure Component Value Units Date/Time CULTURE, RESPIRATORY/SPUTUM/BRONCH Renetta Chyle STAIN [[id number]]  (Abnormal) Collected:  05/08/20 1630 Order Status:  Completed Specimen:  Sputum from Tracheal Aspirate Updated:  05/10/20 1214 Special Requests: NO SPECIAL REQUESTS     
  GRAM STAIN RARE WBCS SEEN     
      
  RARE EPITHELIAL CELLS SEEN  
     
   NO ORGANISMS SEEN Culture result: RARE YEAST     
      
  NO NORMAL RESPIRATORY BRYN ISOLATED  
     
 CULTURE, RESPIRATORY/SPUTUM/BRONCH W Pamla Frame [[id number]] Collected:  05/04/20 1315 Order Status:  Canceled Specimen:  Sputum from Tracheal Aspirate  CULTURE, RESPIRATORY/SPUTUM/BRONCH Renetta Chyle STAIN [[id number]]  (Abnormal) (Susceptibility) Collected:  04/22/20 1018 Order Status:  Completed Specimen:  Sputum from Tracheal Aspirate Updated:  04/25/20 9310 Special Requests: NO SPECIAL REQUESTS     
  GRAM STAIN FEW WBCS SEEN     
      
  OCCASIONAL EPITHELIAL CELLS SEEN  
     
      
  FEW GRAM POSITIVE COCCI IN CLUSTERS  
     
   RARE GRAM NEGATIVE RODS Culture result:    
  LIGHT STAPHYLOCOCCUS AUREUS  
     
      
  LIGHT NORMAL RESPIRATORY BRYN  
     
 CULTURE, BLOOD [[id number]] Collected:  04/24/20 1015 Order Status:  Canceled Specimen:  Blood Imaging: 
[x]I have personally reviewed the patients chest radiographs images and report Results from [facility] Encounter encounter on 04/21/20 XR CHEST PORT Narrative EXAM: XR CHEST PORT 
 
CLINICAL INDICATION/HISTORY: Aspiration pneumonia, ET tube placement 
-Additional: None COMPARISON: Several prior studies, most recently May 20, 2020 TECHNIQUE: Portable frontal view of the chest 
 
_______________ FINDINGS: 
 
SUPPORT DEVICES: Tracheostomy catheter and left approach PICC line both in 
stable position. HEART AND MEDIASTINUM: Enlarged cardiac silhouette and stable mediastinal 
contours. LUNGS AND PLEURAL SPACES: Similar degree of pulmonary inflation with mid to 
lower lung areas of opacity present. No evidence of pneumothorax or large 
pleural effusion. BONY THORAX AND SOFT TISSUES: Unremarkable. 
 
_______________ Impression IMPRESSION: 
 
 
1. Support devices in stable/appropriate position. 2. Mild cardiac enlargement without change. 3. Basilar areas of atelectasis or airspace disease slightly improved in the 
interval from prior study. Arthur RAMIREZ LEs 4/22/2020 Interpretation Summary · No evidence of deep vein thrombosis in the right lower extremity veins assessed. · No evidence of deep vein thrombosis in the left lower extremity veins assessed. Echo 4/22 Result status: Edited Result - FINAL  
 Addendum by Maribel Calderon MD on Wed Apr 22, 2020  5:12 PM  
· Left Ventricle: Normal wall thickness and systolic function (ejection fraction normal). Mildly dilated left ventricle. The estimated ejection fraction is 45 - 50%. There is moderate (grade 2) left ventricular diastolic dysfunction E/e' Ratio = 17.09. Wall Scoring: The left ventricular wall motion is globally hypokinetic. · Right Ventricle: Not well visualized. Normal global systolic function. Moderately dilated right ventricle. · Right Atrium: Right atrium not well visualized. Moderately dilated right atrium. · IVC/Hepatic Veins: Dilated inferior vena cava. Mechanically ventilated; cannot use inferior caval vein diameter to estimate central venous pressure. HU 5/12/20: 
Result status: Final result · Image quality: good. The procedure, risks and alternatives were explained. Informed consent was obtained. . Topical anesthetic not used. . Sedation was achieved by conscious sedation. 4 mg Versed was administered. 100 mcg of Fentanyl was administered. No complications. HU probe was removed. Estimated blood loss: 0 mL. Color flow Doppler was performed and pulse wave and/or continuous wave Doppler was performed. Saline contrast was given to evaluate for intracardiac shunt. No shunt seen. · Left Ventricle: Normal cavity size and wall thickness. Mild systolic dysfunction. The estimated ejection fraction is 45 - 50%. Visually measured ejection fraction. Abnormal left ventricular septal motion. There is mild (grade 1) left ventricular diastolic dysfunction. Wall Scoring: The left ventricular wall motion is globally hypokinetic. · Interatrial Septum: Agitated saline contrast study was performed. There was no shunting at baseline or with Valsalva. No atrial septal defect present. Gastrografin KUB 5/16/20: 
Limited examination due to body habitus. The nasogastric tube is not well visualized. Final image demonstrates enteric contrast both within the stomach and the visualized portions of the distal 
esophagus. The nasogastric tube is not well visualized. cxr 5/17/20 reviewed:  
1. Stable support lines and tubes. 2. Central pulmonary vascular congestion, possibly with small bilateral pleural 
effusions and associated atelectasis/airspace disease. IMPRESSION:  
Patient Active Problem List  
Diagnosis Code  Acute on chronic respiratory failure with hypoxia and hypercapnia ([facility]) J96.21, J96.22  
 Acute on chronic systolic and diastolic CHF (congestive heart failure) ([facility]) I50.43 Aspiration pneumonitis from multiple patient initiated ET tube displacement events J69.0  Cardiac arrest ([facility] [address] I46.9 MSSA in sputum culture A49.01  
 Elevated troponin, NSTEMI R79.89, I21.4  Chronic a-fib I48.20  Atrial flutter ([facility] [address] I48.92 Prolonged QTc R94.31  
 Chronic renal insufficiency, stage II (mild) N18.2 Thrombocytopenia D69.6  Smoking F17.200  Arthritis M19.90  Migraine G43.909  Chronic back pain M54.9, G89.29  
 Obstructive Sleep apnea G47.33  
 Morbid obesity ([facility]) E66.01  
  
PLAN:  
RS: s/p trach on 5/18, on ventilator since 4/25/2020. HU negative for any cardiac causes for severe hypoxemia or shunting. He previously could have had pulmonary hemorrhage knowing bloody/pinkish ETT secretions and so started on Solu-Medrol 40 mg IV every 8 hour since 5/12/2020. Since then fio2 improved to 40% and peep 8 and improved ETT bloody secretions. Reduce steroids to 40 mg q12 hrs on 5/16/20. Since tapered to 20 mg q12 hrs. I/o improved, leg edema improved Patient cannot have CT chest due to body weight limitations. C/w vent support, vent and sedation bundle. VAP prevention bundle, head of the bed at 30' all times Patient is off paralytic medication since 5/4/2020 Sedation- Off versed drip since 5/14/20. No pain, off fentanyl patch as well. Continue bronchodilators prn; pulmonary hygiene care. Trach collar trials this morning [facility] - 
HU unremarkable for any cause for persistent hypoxemia, bubble study negative. Only grade 1 diastolic dysfunction with LVEF 45 to 50%. No pulmonary hypertension. US legs negative for DVT. Diuretics as above. ASA held for trach plan. Eliquis held for possible pulmonary hemorrhage. Dr. Shelly Goodwin on board. ID - SARS-COV-2 negative x 2 Antibiotic: s/p Zosyn completed on 5/12/2020 10 days antibiotic MSSA in resp cxs from before; respiratory cultures 5/8 no growth. Procalcitonin repeat is negative. No leukocytosis. No fever. Hem - HIT panel and LEYDI Ab NEG on 4/28. He is definitely high risk for PEs; CTA chest cannot be done due to limitations due to weight; stopped argatroban 5/4;[suspect fatty liver/GASTELUM]; Eliquis held due to bloody secretions from the ET tube. Vasc - PVL bl LE negative Renal -continue monitor BUN and creatinine stable; electrolyte replacement per protocol. Nephrology Dr. William Collins hs signed off. Bumex 1 mg daily Monitor I's and O's, renal functions, electrolytes and replace per ICU protocol. GI - GI placed OG tube; good position confirmed with abdominal x-ray using Gastrografin again on 5/16/20 after NGT replaced since patient pulled it out. Neurology: moving extremities and follows all commands. No focal deficit. Overall guarded prognosis. Patient chronically vent dependent. Full code. Will defer respective systems problem management to primary and other consultant and follow patient in ICU with primary and other medical team 
Quality Care: PPI, DVT prophylaxis, HOB elevated, Infection control all reviewed and addressed. PAIN AND SEDATION: As above · Skin/Wound:  Blister on foot, does not appear pressure sore. Continue local care per wound care consult. · Nutrition:  Start tube feeding · Prophylaxis: DVT and GI Prophylaxis reviewed. · Restraints: prn 
· PT/OT eval and treat: as needed when stable · Lines/Tubes:  daniel 4/21/20, trach 5/18/20, PICC line left arm 5/5 ADVANCE DIRECTIVE: Full code DISCUSSION: spoke with RN, RT, ICU staff, MDR Events and notes from last 24 hours reviewed. Care plan discussed with nursing Total CC time: 32 minutes. High complexity review and management. Ovi Cross MD  
5/23/2020

## 2020-05-23 NOTE — PROGRESS NOTES
Problem: Voice Impaired (Adult) Goal: *Acute Goals and Plan of Care (Insert Text) Description: PRECAUTIONS: 
1. ONLY WEAR WHEN AWAKE/ALERT (REMOVE WHEN SLEEPING) 2. CUFF MUST BE IN DEFLATED POSITION 3. ONLY WEAR WITH SUPERVISION FROM SLP/RT/RN 4. REMOVE IF PT DEMONSTRATES ANY SIGNS OF DISTRESS Pt will: 1. Produce voice to finger occlusion in 3/5 trials. 2. Initiate PMV placement by SLP when appropriate. 3. Tolerate PMV placement for 30 minute increments without compromise to vitals. 4. Utilize proper breath support/techniques for increasing tolerance of PMV. 5. Phonate 4-6 word utterances with adequate breath support for adequate intensity of speech/increased intelligibilty with PMV. Outcome: Progressing Towards Goal 
 
SPEECH LANGUAGE PATHOLOGY PASSY OWEN VALVE EVALUATION Patient: Renata Noble. (47 y.o. male) Date: 5/23/2020 Diagnosis: Acute CHF (congestive heart failure) ([address] [I50.9] Hypoxia [R09.02] Acute respiratory failure with hypoxia and hypercarbia (HCC) [J96.01, J96.02] Respiratory failure with hypoxia and hypercapnia (HCC) [J96.91, J96.92] Respiratory failure with hypoxia and hypercapnia ([address] Procedure(s) (LRB): 
TRACH INSERTION (PT IN ICU 9) (N/A) 5 Days Post-Op Precautions: Aspiration Fall ASSESSMENT: 
Based on the objective information below, the patient presents with a voice impairment in the setting of tracheostomy. A&Ox4, expressive and receptive language grossly intact. #8 Shiley proximal XLT trach placed 5/21/20. Trach collar in place;  O2 between 94-99 with trach collar use. Patient educated regarding purpose of PMV; restore subglottic air pressure, improve laryngeal/pharyngeal sensation, facilitate improved secretion management/cough reflex, and ultimately decrease aspiration risk. Patient tolerated x4 finger occlusion trials by SLP with no change in vitals. Patient able to phonate \"ah\" and \"Almas\".  PMV was then placed by SLP. Patient tolerated for ~50 minutes with no changes in vitals. Vocal quality breathy with low volume, but audible. Patient able to count 1-10 and phonate appropriately for words and phrases. Educated regarding breathing techniques to improve voicing. Patient intermittently coughing; self-suctioned oral cavity with yankauer. RN educated regarding PMV donning and doffing and safety precautions. Patient attempted donning and doffing, however was unable to complete 2/2 body habitus/upper extremity weakness. PMV removed at patient request following ~50 minutes of use due to increased secretions. Recommend patient continue use of PMV with SLP, RN or RT supervision only. D/w Dr. Elio Krishna. Patient will benefit from skilled intervention to address the above impairments. Patients rehabilitation potential is considered to be Fair Factors which may influence rehabilitation potential include:  
[]            None noted []            Mental ability/status [x]            Medical condition []            Home/family situation and support systems 
[]            Safety awareness 
[]            Pain tolerance/management []            Other: PLAN: 
Recommendations and Planned Interventions: PMV use with SLP/RT/RN supervision Frequency/Duration: Patient will be followed by speech-language pathology 1-2 times per day/4-7 days per week to address goals. Discharge Recommendations: LTAC vs Rehab SUBJECTIVE:  
Patient stated Shelli Cris. OBJECTIVE:  
 Speak Valve: 
 Type of Assessment Type of Assessment: Evaluation Tracheostomy Data/Eval 
 Trach Size/Status: #8 , Cuffed, cuff deflated Date of Placement: 05/21/20 Additional Trach Info: Primary Passy-Angelito Placement: SLP On Ventilator: No 
 Mental Status Neurologic State: Alert Orientation Level: Oriented X4 Cognition: Follows commands, Appropriate decision making, Appropriate for age attention/concentration, Appropriate safety awareness Perception: Appears intact Perseveration: No perseveration noted Safety/Judgement: Awareness of environment Evidence of Comprehension Meaningful Eye Movement/Gaze: Yes Response to Basic Yes/No Questions (%): 100 % Response to Complex Yes/No Questions (%) : 100 % One-Step Basic Commands (%): 100 % Two-Step Basic Commands (%): 100 % Three-Step Basic Commands (%): 100 % Cough : Present Finger Occlusion Application: SLP Tolerance: Tolerated without changes in vitals, breathing effort or level of anxiety Vocal Quality: Breathy Vocal Intensity: Too soft PMV Trial 
 Application: SLP Tolerance: Tolerated without changes in vitals, breathing effort or level of anxiety, Increased secretions Vocal Quality: Breathy Vocal Intensity: Too soft Duration (minutes): 50 minutes Oxygen Therapy O2 Sat (%): 91 % Pulse via Oximetry: 101 beats per minute O2 Device: Tracheal collar O2 Flow Rate (L/min): 16 l/min Airway Suction Suction: Oral 
 Sputum Amount: Moderate Sputum Color/Odor: Tan 
 Sputum Consistency: Thick Suction Device: [employer] Suction Catheter Size: 14 Fr Treatment Diagnosis Treatment Diagnosis: Voice impairment PAIN: 
Start of Eval: 0 End of Eval: 0 After evaluation:  
[]              Patient left in no apparent distress sitting up in chair 
[x]              Patient left in no apparent distress in bed 
[x]              Call bell left within reach [x]              Nursing notified 
[]              Caregiver present 
[]              Bed alarm activated COMMUNICATION/EDUCATION:  
[x]              Aspiration precautions; compensatory swallow techniques [x]              PMV use and care Fifi Arroyo SLP Time Calculation: 35 mins

## 2020-05-23 NOTE — PROGRESS NOTES
PATIENT NOW SPIKING A TEMP OF 102F. RETURNED TO PRIOR VENT SETTINGS. COMPLETE TRACH CARE GIVEN. LARGE AMT WATERY JOYNER/BROWN, FOUL-SMELLING SECRETIONS OOZING FROM STOMA SITE.  DR. Dash Verdin NOTIFIED.

## 2020-05-23 NOTE — PROGRESS NOTES
Patient was doing very well today on trach collar. Unfortunately today in afternoon developed fever. Panculture. Chest x-ray. Back on full ventilator support. ABG. Broad-spectrum antibiotics. In the morning we reevaluate if's if stable for another spontaneous breathing trial/trach collar SAVANA Abdul MD

## 2020-05-23 NOTE — PROGRESS NOTES
Cardiology Progress Note Patient: Lonn Halsted. Sex: male          DOA: 4/21/2020 YOB: 1975      Age:  53 y.o.        LOS:  LOS: 32 days Patient seen and examined, chart reviewed. Assessment/Plan Patient Active Problem List  
Diagnosis Code  Cellulitis of left lower leg L03. 116  
 Anemia D64.9  Morbid obesity ([facility]) E66.01  
 Dyspnea R06.00  Sleep apnea G47.30  Sinus tachycardia R00.0  BMI 70 and over, adult [facility]) J74.94  Chronic back pain M54.9, G89.29  
 Arthritis M19.90  Migraine G43.909  
 History of atrial flutter Z86.79  
 Limited mobility Z74.09  
 Smoking F17.200  Chronic renal insufficiency, stage II (mild) N18.2  CHF (congestive heart failure) ([facility]) I50.9  Acute diastolic CHF (congestive heart failure) ([facility]) I50.31  Chronic a-fib I48.20  Atrial flutter ([facility]) I48.92  
 Elevated troponin R79.89  
 Acute respiratory distress R06.03  
 Acute CHF (congestive heart failure) ([facility]) I50.9  Respiratory failure with hypoxia and hypercapnia ([facility]) J96.91, J96.92  
 Acute on chronic respiratory failure with hypoxia and hypercapnia ([facility]) J96.21, J96.22  
 Cardiac arrest ([facility]) I46.9  Advanced care planning/counseling discussion Z70.80  
 NSTEMI (non-ST elevated myocardial infarction) ([facility] [address] I21.4  Hypocalcemia E83.51 Acute on chronic systolic heart failure NSTEMI type II due to PEA arrest 
Paroxysmal atrial flutter Thrombocytopenia Bradycardia  
  
Patient self extubated and had PEA arrest. 
He had brief episode of A fib with RVR and was started On Amiodarone drip. Currently he is in Sinus rhythm. 
  
Amiodarone discontinued due to bradycardia. 
  
Echocardiogram  
  
· Left Ventricle: Normal wall thickness and systolic function (ejection fraction normal). Mildly dilated left ventricle. The estimated ejection fraction is 45 - 50%.  There is moderate (grade 2) left ventricular diastolic dysfunction E/e' Ratio = 17.09. Wall Scoring: The left ventricular wall motion is globally hypokinetic. · Right Ventricle: Not well visualized. Normal global systolic function. Moderately dilated right ventricle. · Right Atrium: Right atrium not well visualized. Moderately dilated right atrium. · IVC/Hepatic Veins: Dilated inferior vena cava. Mechanically ventilated; cannot use inferior caval vein diameter to estimate central venous pressure. 
  
IV heparin was discontinued due to thrombocytopenia and bleeding at central line site. Started on Argatroban.  
  
Patient had frequent oxygen desaturations and pinkish/bloody aspirate from tracheal suctioning.  
  
HU done revealed LVEF 51-62%, Grade I diastolic dysfunction, Negative bubble study. Oxygenation significantly improved after he was started on steroid S/p Tracheostomy Telemetry reviewed: No episode of heart block or bradycardia  
  
Plan: 
  
Continue Eliquis ,switch to 5 mg twice a day if no bleeding issue. Continue IV Bumex Strict I/O Avoid any AV debbie blocking agents due to bradycardia. Continue  IV hydralazine for hypertension if needed. Monitor electrolytes and renal function Continue Ventilatory support. Continue management as per hospital medicine and pulmonary critical care. Subjective:  
 cc: On ventilator, febrile REVIEW OF SYSTEMS:  
 
Can not obtain Objective:  
  
Visit Vitals /77 Pulse (!) 104 Temp (!) 102.7 °F (39.3 °C) Resp 22 Ht 5' 10\" (1.778 m) Wt (!) 202.9 kg (447 lb 5 oz) SpO2 92% BMI 64.18 kg/m² Body mass index is 64.18 kg/m². Physical Exam: 
General Appearance: Comfortable, Morbidly obese, not using accessory muscles of respiration. HEENT: AMMON. HEAD: Atraumatic NECK: No JVD, no thyroidomeglay. CAROTIDS: No bruit LUNGS: bilateral conducted sounds HEART: S1+S2 audible, no murmur, no pericardial rub. NEUROLOGICAL: Opens eyes on verbal commands. Medication: 
Current Facility-Administered Medications Medication Dose Route Frequency  predniSONE (DELTASONE) tablet 10 mg  10 mg Oral DAILY WITH LUNCH  piperacillin-tazobactam (ZOSYN) 4.5 g in 0.9% sodium chloride (MBP/ADV) 100 mL MBP  4.5 g IntraVENous Q8H  
 vancomycin (VANCOCIN) 2000 mg in  ml infusion  2,000 mg IntraVENous ONCE  Vancomycin - Pharmacy to Dose  1 Each Other Rx Dosing/Monitoring  vancomycin (VANCOCIN) 1,500 mg in 0.9% sodium chloride 500 mL (VIAL-MATE)_  1,500 mg IntraVENous Q8H  potassium bicarb-citric acid (EFFER-K) tablet 20 mEq  20 mEq Oral BID  
 glycopyrrolate (ROBINUL) tablet 1 mg  1 mg Per NG tube BID  bumetanide (BUMEX) injection 1 mg  1 mg IntraVENous DAILY  hydrALAZINE (APRESOLINE) 20 mg/mL injection 20 mg  20 mg IntraVENous Q6H PRN  
 amLODIPine (NORVASC) tablet 10 mg  10 mg Oral DAILY  apixaban (ELIQUIS) tablet 2.5 mg  2.5 mg Oral BID  LORazepam (ATIVAN) injection 2 mg  2 mg IntraVENous Q4H PRN  
 docusate sodium (COLACE) capsule 100 mg  100 mg Oral BID  polyethylene glycol (MIRALAX) packet 17 g  17 g Oral BID  heparin (porcine) 100 unit/mL injection 500 Units  500 Units InterCATHeter Q8H PRN  
 fentaNYL citrate (PF) injection 50 mcg  50 mcg IntraVENous Q3H PRN  chlorhexidine (PERIDEX) 0.12 % mouthwash 10 mL  10 mL Oral Q12H  
 albuterol-ipratropium (DUO-NEB) 2.5 MG-0.5 MG/3 ML  3 mL Nebulization Q6H PRN  
 acetaminophen (TYLENOL) tablet 650 mg  650 mg Oral Q6H PRN  
 ELECTROLYTE REPLACEMENT PROTOCOL - Calcium   1 Each Other PRN  
 ELECTROLYTE REPLACEMENT PROTOCOL - Magnesium   1 Each Other PRN  
 ELECTROLYTE REPLACEMENT PROTOCOL - Potassium Standard Dosing   1 Each Other PRN  
 ELECTROLYTE REPLACEMENT PROTOCOL - Phosphorus  Standard Dosing  1 Each Other PRN  pantoprazole (PROTONIX) 40 mg in 0.9% sodium chloride 10 mL injection  40 mg IntraVENous DAILY  aspirin chewable tablet 81 mg  81 mg Oral DAILY  sodium chloride (NS) flush 5-40 mL  5-40 mL IntraVENous Q8H  
 sodium chloride (NS) flush 5-40 mL  5-40 mL IntraVENous PRN Lab/Data Reviewed: 
 
  
Recent Labs  
  05/23/20 
0400 05/22/20 
0520 05/21/20 
0500 WBC 4.8 6.8 8.9 HGB 10.5* 10.4* 10.7* HCT 34.3* 34.0* 33.3*  
 155 188 Recent Labs  
  05/23/20 
0400 05/22/20 
0520 05/21/20 
0500  142 139  
K 4.0 3.8 4.0  
 107 106 CO2 31 30 27 * 110* 115* BUN 29* 23* 22* CREA 0.59* 0.60 0.66 CA 9.2 9.2 9.2 Signed By: Binta Fox MD   
 May 23, 2020

## 2020-05-23 NOTE — PROGRESS NOTES
Problem: Dysphagia (Adult) Goal: *Acute Goals and Plan of Care (Insert Text) Description: Recommendations: 
Diet: Soft solids, thin liquids Meds: One at a time or as tolerated Aspiration Precautions Oral Care TID Other: Feeding assistance as needed Goals:  Patient will: 1. Tolerate PO trials with 0 s/s overt distress in 4/5 trials 2. Utilize compensatory swallow strategies/maneuvers (decrease bite/sip, size/rate, alt. liq/sol) with min cues in 4/5 trials 3. Perform oral-motor/laryngeal exercises to increase oropharyngeal swallow function with min cues Outcome: Progressing Towards Goal 
 
 
SPEECH LANGUAGE PATHOLOGY BEDSIDE SWALLOW  
EVALUATION & TREATMENT Patient: Lazarus Coronel (47 y.o. male) Date: 5/23/2020 Primary Diagnosis: Acute CHF (congestive heart failure) ([facility] [I50.9] Hypoxia [R09.02] Acute respiratory failure with hypoxia and hypercarbia ([facility]) [J96.01, J96.02] Respiratory failure with hypoxia and hypercapnia ([facility]) [J96.91, J96.92] Procedure(s) (LRB): 
TRACH INSERTION (PT IN ICU 9) (N/A) 5 Days Post-Op Precautions: Aspiration  Fall PLOF: Independent ASSESSMENT : 
Based on the objective data described below, the patient presents with mild oropharyngeal dysphagia. A&Ox4. PO trials presented with PMV in place. Oral-motor exam revealed pt with incomplete dentition; however, all other structures grossly intact for mastication and deglutition. Pt reports no PMHx of dysphagia. Patient presented with ice chips, thin liquid + straw, puree and solid trials. Pt exhibited mildly delayed mastication of solids with otherwise adequate bolus cohesion, manipulation and A-P transit. Pt demo x1 throat clear following serial swallows of thin liquid. 0 overt s/sx of aspiration when utilizing small sips. Recommend initiation of soft solid, thin liquid diet with aspiration precautions, feeding assistance, small bites/sips, slow rate. PMV should be placed for all PO intake. D/w Dr. Celeste Yañez. TREATMENT : 
Skilled therapy initiated; Educated pt on aspiration precautions and importance of compensatory swallow techniques to decrease aspiration risk (decrease rate of intake & sip/bite size, upright @HOB for all po intake and ~30 minutes after po); verbalized comprehension. SLP to follow as indicated. Patient will benefit from skilled intervention to address the above impairments. Patient's rehabilitation potential is considered to be Fair Factors which may influence rehabilitation potential include:  
[]            None noted []            Mental ability/status [x]            Medical condition []            Home/family situation and support systems 
[]            Safety awareness 
[]            Pain tolerance/management []            Other: PLAN : 
Recommendations and Planned Interventions: 
Soft solids/thin liquid Frequency/Duration: Patient will be followed by speech-language pathology 1-2 times per day/4-7 days per week to address goals. Discharge Recommendations: LTAC vs Rehab SUBJECTIVE:  
Patient stated Can I have some more water? . OBJECTIVE:  
 
Past Medical History:  
Diagnosis Date Arrhythmia Arthritis   
 knees CHF (congestive heart failure) ([address] Chronic back pain Chronic renal insufficiency, stage II (mild) History of atrial flutter Dx 6/2016 - anticoagulated by Larry Gudino Hypertension 2015 Limited mobility Migraine headache Morbid obesity ([facility] [address] Morbid obesity with body mass index of 60.0-69.9 in adult [facility]) Sleep apnea   
 uses c-pap instructed to bring day of surgery Smoking   
 very passive / cigars only Past Surgical History:  
Procedure Laterality Date CARDIAC SURG PROCEDURE UNLIST    
 cardioversion ECHOCARDIOGRAM  04/2016 EF 60-65% (performed at [facility] - see cardiology tab) Home Situation:  
Home Situation Home Environment: Apartment # Steps to Enter: 0 One/Two Story Residence: One story Living Alone: No 
Support Systems: Spouse/Significant Other/Partner Patient Expects to be Discharged to[de-identified] Unknown Current DME Used/Available at Home: Walker, rolling Tub or Shower Type: Tub/Shower combination Diet prior to admission: Regular/thin Current Diet:  Soft/thin  
 
Cognitive and Communication Status: 
Neurologic State: Alert Orientation Level: Oriented X4 Cognition: Follows commands, Appropriate decision making, Appropriate for age attention/concentration, Appropriate safety awareness Perception: Appears intact Perseveration: No perseveration noted Safety/Judgement: Awareness of environment Oral Assessment: 
Oral Assessment Labial: No impairment Dentition: Natural;Extractions Oral Hygiene: Good Lingual: No impairment Velum: No impairment Mandible: No impairment P.O. Trials: 
Patient Position: WLE 40 Vocal quality prior to P.O.: Breathy Consistency Presented: Thin liquid;Puree; Solid; Ice chips How Presented: SLP-fed/presented;Straw;Spoon Bolus Acceptance: No impairment Bolus Formation/Control: Impaired Type of Impairment: Delayed;Mastication Propulsion: No impairment Oral Residue: None Initiation of Swallow: No impairment Laryngeal Elevation: Decreased Aspiration Signs/Symptoms: None Pharyngeal Phase Characteristics: Easily fatigued Effective Modifications: Small sips and bites Cues for Modifications: Minimal 
  
 
Oral Phase Severity: Mild Pharyngeal Phase Severity : Mild PAIN: 
Pain level pre-treatment: 0/10 Pain level post-treatment: 0/10 After treatment:  
[]            Patient left in no apparent distress sitting up in chair 
[x]            Patient left in no apparent distress in bed 
[x]            Call bell left within reach [x]            Nursing notified []            Family present 
[]            Caregiver present 
[]            Bed alarm activated COMMUNICATION/EDUCATION:  
[x]            Aspiration precautions; swallow safety; compensatory techniques. [x]            Patient/family have participated as able in goal setting and plan of care. [x]            Patient/family agree to work toward stated goals and plan of care. []            Patient understands intent and goals of therapy; neutral about participation. []            Patient unable to participate in goal setting/plan of care; educ ongoing with interdisciplinary staff 
[]         Posted safety precautions in patient's room. Thank you for this referral. 
GERMAINE Payne Time Calculation: 19 mins Evaluation Time: 10 minutes Treatment Time: 9 minutes

## 2020-05-23 NOTE — PROGRESS NOTES
Zosyn (Piperacillin/Tazobactam) Extended Infusion Lonn Halsted., a 53 y.o. yo male, has been converted to an extended infusion of Zosyn while in the intensive care unit. Indication: HAP / Bloodstream Infection Extended infusions will run over 4 hours (240 minutes). Dose adjusted to:  Zosyn 4.5 grams IV q8h  x 7 days 
 
    (prior order:  Zosyn 3.375 grams IV q6h x 7 days) Recent Labs  
  05/23/20 
0400 05/22/20 
0520 05/21/20 
0500 CREA 0.59* 0.60 0.66 Ht Readings from Last 1 Encounters:  
05/22/20 177.8 cm (70\") Wt Readings from Last 1 Encounters:  
05/22/20 (!) 202.9 kg (447 lb 5 oz) Serum creatinine: 0.59 mg/dL (L) 05/23/20 0400 Estimated creatinine clearance: 233.1 mL/min (A) Renal adjustment of extended infusion of Zosyn 3.375 or 4.5 gm every 8 hours for CrCl >/= 20 ml/min 3.375 or 4.5 gm every 12 hours for CrCl < 20 ml/min, intermittent HD or PD Pharmacy to continue to monitor daily and make dose adjustments as appropriate. Harley Cash, PharmD   [phone]

## 2020-05-23 NOTE — PROGRESS NOTES
PER DR. EPPS, ABG ORDERED FOR TOMORROW AM SHOULD BE DONE WITH PATIENT OFF VENT AND ON TRACH COLLAR. TO BE DRAWN @ 0900.

## 2020-05-24 PROBLEM — J18.9 PNEUMONIA: Status: ACTIVE | Noted: 2020-01-01

## 2020-05-24 NOTE — PROGRESS NOTES
Pulmonary, Critical Care, and Sleep Medicine Pulmonary Progress Note Name: Ochoa Cheng. : 1975 MRN: [id number] Date: 2020 [x]I have reviewed the flowsheet and previous days notes. Events, vitals, medications and notes from last 24 hours reviewed. Subjective/History:  
69-year-old male with morbid obesity, ex-smoker, history of asthma, history of severe obstructive sleep apnea on CPAP, questionable component compliance, history of congestive heart failure with reduced ejection fraction atrial fibrillation atrial flutter on amiodarone, Xarelto, history of chronic renal insufficiency, cellulitis of lower extremities and anemia. As per family did not have any fever or upper respiratory tract infections and no flulike symptoms. On Lasix, CPAP not clear if missed. Presented with acute shortness of breath worsening edema of lower extremities. He was found to be in acute hypercarbic hypoxemic respiratory failure initially had a trial of BiPAP but failed and then eventually was intubated. He was hypotensive. Moderate amount of secretions. Morbid obesity. Patient compliant with Xarelto. Patient self extubated and had PEA arrest. 
 
Subjective:  
20 yeserday later  in afternoon. Patient developed fever. Large amount of secretion foul-smelling around the trach. Trach care was performed. Culture sent. Preliminary gram-negative rods. I added Cipro for possible Pseudomonas infection seems prolonged ventilation and gram-negative loss. Stop vancomycin continue Zosyn. Recommend 2 weeks. Chest x-ray right lower lobe infiltrate aspiration versus ventilator acquired pneumonia. Hemodynamically stable. FiO2 3540%. Patient was on pressure support trach collar trial today did well. Back on the full ventilatory support for transportation tidal volume 500 PEEP 5 FiO2 40%. Awake and alert. Continue Eliquis for atrial fibrillation. Patient will require soon trach change. 5/23/2020 Status post tracheostomy day 5 doing well. Yesterday tolerated about 4 hours of trach collar. Today we will try to continue all day trach collar at night ventilatory support repeat ABG tomorrow. I will change steroids to low-dose oral x3 days and stop. We can give a first trial today of p.o. intake he passed basic speech evaluation monitor for aspirations closely. High risk for PEG tube morbid obesity discussed with the GI will see how he does with eating 5/22/2020 POD#4 tracheostomy, doing well Improving vent requirements Did 4-5 hours of trach collar yesterday, continue today Rest on vent overnight No significant secretions No fever Accepted to [facility] pending bed assignment Severe MARIA FERNANDA hypoventilation syndrome 2/2 morbid obesity, suspect he will need nocturnal ventilation during weaning Intake/Output Summary (Last 24 hours) at 5/24/2020 1118 Last data filed at 5/24/2020 2051 Gross per 24 hour Intake 605 ml Output 2300 ml Net -1695 ml ROS: Review of systems not obtained due to patient factors. Past Medical History: 
Past Medical History:  
Diagnosis Date  Arrhythmia  Arthritis   
 knees  CHF (congestive heart failure) ([address]  Chronic back pain  Chronic renal insufficiency, stage II (mild)  History of atrial flutter Dx 6/2016 - anticoagulated by Darin Urban  Hypertension 2015  Limited mobility  Migraine headache  Morbid obesity [address]  Morbid obesity with body mass index of 60.0-69.9 in [facility])  Sleep apnea   
 uses c-pap instructed to bring day of surgery  Smoking   
 very passive / cigars only Allergy: No Known Allergies Vital Signs:   
Blood pressure 152/74, pulse 76, temperature (!) 101 °F (38.3 °C), resp. rate 25, height 5' 10\" (1.778 m), weight (!) 199.4 kg (439 lb 9.6 oz), SpO2 93 %. Body mass index is 63.08 kg/m². O2 Device: Tracheal collar O2 Flow Rate (L/min): 16 l/min Temp (24hrs), Av.7 °F (37.6 °C), Min:98.2 °F (36.8 °C), Max:102.7 °F (39.3 °C) Intake/Output:  
Last shift:      No intake/output data recorded. Last 3 shifts:  1901 -  0700 In: 7509 [I.V.:100] Out: 3100 [Urine:3100] Intake/Output Summary (Last 24 hours) at 2020 1118 Last data filed at 2020 9280 Gross per 24 hour Intake 605 ml Output 2300 ml Net -1695 ml Ventilator Settings: 
Mode Rate Tidal Volume Pressure FiO2 PEEP Pressure support, CPAP   580 ml  10 cm H2O 35 % 5 cm H20 Peak airway pressure: 21 cm H2O Minute ventilation: 16.6 l/min Physical Exam: 
General: obese male; no cyanosis; alert awake and following all commands. on ventilator HEENT: LIBBY, orally intubated; orogastric tube; no bleeding; pupils not dilated, reactive Neck: thick short obese neck, tracheostomy in place without significant erythema or drainage, no crepitus Chest: normal, obese chest wall Lungs: moderate air entry, obese chest limits exam; decreased BS bases; symmetrical chest expansion Heart: S1S2 present or without murmur or extra heart sounds; JVD not elevated Abdomen: obese, bowel sounds normoactive, soft without significant tenderness; no organomegaly; no guarding or rigidity Extremity: b/l leg/feet edema improving, now can see wrinkles of skin; negative for cyanosis, clubbing. Can see wrinkles in feet and leg since edema improving. Capillary refill: normal 
Neuro:  Alert awake and following all commands, moving all 4 limbs and following all commands. Skin: Skin color, texture, turgor fair. Skin dry, warm, non-diaphoretic DATA:  
Current Facility-Administered Medications Medication Dose Route Frequency  methylPREDNISolone (PF) (SOLU-MEDROL) injection 20 mg  20 mg IntraVENous Q8H  
 ciprofloxacin (CIPRO) 400 mg in D5W IVPB (premix)  400 mg IntraVENous Q12H  
 arformoteroL (BROVANA) neb solution 15 mcg  15 mcg Nebulization BID RT  
  budesonide (PULMICORT) 500 mcg/2 ml nebulizer suspension  500 mcg Nebulization BID RT  
 ipratropium (ATROVENT) 0.02 % nebulizer solution 0.5 mg  0.5 mg Nebulization Q8H RT  
 piperacillin-tazobactam (ZOSYN) 4.5 g in 0.9% sodium chloride (MBP/ADV) 100 mL MBP  4.5 g IntraVENous Q8H  potassium bicarb-citric acid (EFFER-K) tablet 20 mEq  20 mEq Oral BID  bumetanide (BUMEX) injection 1 mg  1 mg IntraVENous DAILY  hydrALAZINE (APRESOLINE) 20 mg/mL injection 20 mg  20 mg IntraVENous Q6H PRN  
 amLODIPine (NORVASC) tablet 10 mg  10 mg Oral DAILY  apixaban (ELIQUIS) tablet 2.5 mg  2.5 mg Oral BID  LORazepam (ATIVAN) injection 2 mg  2 mg IntraVENous Q4H PRN  
 docusate sodium (COLACE) capsule 100 mg  100 mg Oral BID  polyethylene glycol (MIRALAX) packet 17 g  17 g Oral BID  heparin (porcine) 100 unit/mL injection 500 Units  500 Units InterCATHeter Q8H PRN  
 fentaNYL citrate (PF) injection 50 mcg  50 mcg IntraVENous Q3H PRN  chlorhexidine (PERIDEX) 0.12 % mouthwash 10 mL  10 mL Oral Q12H  
 albuterol-ipratropium (DUO-NEB) 2.5 MG-0.5 MG/3 ML  3 mL Nebulization Q6H PRN  
 acetaminophen (TYLENOL) tablet 650 mg  650 mg Oral Q6H PRN  
 ELECTROLYTE REPLACEMENT PROTOCOL - Calcium   1 Each Other PRN  
 ELECTROLYTE REPLACEMENT PROTOCOL - Magnesium   1 Each Other PRN  
 ELECTROLYTE REPLACEMENT PROTOCOL - Potassium Standard Dosing   1 Each Other PRN  
 ELECTROLYTE REPLACEMENT PROTOCOL - Phosphorus  Standard Dosing  1 Each Other PRN  pantoprazole (PROTONIX) 40 mg in 0.9% sodium chloride 10 mL injection  40 mg IntraVENous DAILY  aspirin chewable tablet 81 mg  81 mg Oral DAILY  sodium chloride (NS) flush 5-40 mL  5-40 mL IntraVENous Q8H  
 sodium chloride (NS) flush 5-40 mL  5-40 mL IntraVENous PRN Telemetry: [x]Sinus []A-flutter []Paced []A-fib []Multiple PVCs Labs: 
Recent Results (from the past 24 hour(s)) GLUCOSE, POC  Collection Time: 05/23/20 12:17 PM  
 Result Value Ref Range Glucose (POC) 113 (H) 70 - 110 mg/dL POC G3 Collection Time: 05/23/20  3:23 PM  
Result Value Ref Range Device: VENT    
 FIO2 (POC) 35 % pH (POC) 7.469 (H) 7.35 - 7.45    
 pCO2 (POC) 42.0 35.0 - 45.0 MMHG  
 pO2 (POC) 71 (L) 80 - 100 MMHG  
 HCO3 (POC) 30.0 (H) 22 - 26 MMOL/L  
 sO2 (POC) 93 92 - 97 % Base excess (POC) 7 mmol/L Mode ASSIST CONTROL Set Rate 16 bpm  
 PEEP/CPAP (POC) 5 cmH2O  
 PIP (POC) 15 Allens test (POC) YES Total resp. rate 24 Site RIGHT RADIAL Patient temp. 102.0 Specimen type (POC) ARTERIAL Performed by Adelaida Ramirez Pressure control YES    
CULTURE, BLOOD Collection Time: 05/23/20  5:05 PM  
Result Value Ref Range Special Requests: NO SPECIAL REQUESTS    
 GRAM STAIN GRAM NEGATIVE RODS AEROBIC BOTTLE    
 GRAM STAIN     
  SMEAR CALLED TO AND CORRECTLY REPEATED BY: [address] [place] [address] LAURI RN ICU TO [address] [place] [address] AT [facility] ON 5/24/2020. Culture result: CULTURE IN PROGRESS,FURTHER UPDATES TO FOLLOW Culture result:     
  Sent to [facility] for ID/Susceptibility if indicated. GLUCOSE, POC Collection Time: 05/23/20  5:50 PM  
Result Value Ref Range Glucose (POC) 105 70 - 110 mg/dL URINALYSIS W/MICROSCOPIC Collection Time: 05/23/20  7:27 PM  
Result Value Ref Range Color DARK YELLOW Appearance CLEAR Specific gravity >1.030 (H) 1.005 - 1.030  
 pH (UA) 5.5 5.0 - 8.0 Protein 100 (A) NEG mg/dL Glucose Negative NEG mg/dL Ketone TRACE (A) NEG mg/dL Bilirubin SMALL (A) NEG Blood LARGE (A) NEG Urobilinogen 1.0 0.2 - 1.0 EU/dL Nitrites Negative NEG Leukocyte Esterase TRACE (A) NEG    
 WBC 0 to 3 0 - 5 /hpf  
 RBC 10 to 25 0 - 5 /hpf Epithelial cells FEW 0 - 5 /lpf Bacteria Negative NEG /hpf  
GLUCOSE, POC Collection Time: 05/24/20 12:36 AM  
Result Value Ref Range Glucose (POC) 119 (H) 70 - 110 mg/dL METABOLIC PANEL, COMPREHENSIVE  
 Collection Time: 05/24/20  4:20 AM  
Result Value Ref Range Sodium 143 136 - 145 mmol/L Potassium 3.6 3.5 - 5.5 mmol/L Chloride 108 100 - 111 mmol/L  
 CO2 31 21 - 32 mmol/L Anion gap 4 3.0 - 18 mmol/L Glucose 113 (H) 74 - 99 mg/dL BUN 32 (H) 7.0 - 18 MG/DL Creatinine 0.78 0.6 - 1.3 MG/DL  
 BUN/Creatinine ratio 41 (H) 12 - 20 GFR est AA >60 >60 ml/min/1.73m2 GFR est non-AA >60 >60 ml/min/1.73m2 Calcium 9.0 8.5 - 10.1 MG/DL Bilirubin, total 1.5 (H) 0.2 - 1.0 MG/DL  
 ALT (SGPT) 206 (H) 16 - 61 U/L  
 AST (SGOT) 106 (H) 10 - 38 U/L Alk. phosphatase 80 45 - 117 U/L Protein, total 7.1 6.4 - 8.2 g/dL Albumin 3.9 3.4 - 5.0 g/dL Globulin 3.2 2.0 - 4.0 g/dL A-G Ratio 1.2 0.8 - 1.7    
CBC WITH AUTOMATED DIFF Collection Time: 05/24/20  4:20 AM  
Result Value Ref Range WBC 4.2 (L) 4.6 - 13.2 K/uL  
 RBC 3.95 (L) 4.70 - 5.50 M/uL  
 HGB 10.7 (L) 13.0 - 16.0 g/dL HCT 35.1 (L) 36.0 - 48.0 % MCV 88.9 74.0 - 97.0 FL  
 MCH 27.1 24.0 - 34.0 PG  
 MCHC 30.5 (L) 31.0 - 37.0 g/dL  
 RDW 18.9 (H) 11.6 - 14.5 % PLATELET 881 855 - 506 K/uL MPV 10.6 9.2 - 11.8 FL  
 NEUTROPHILS 71 40 - 73 % LYMPHOCYTES 13 (L) 21 - 52 % MONOCYTES 14 (H) 3 - 10 % EOSINOPHILS 2 0 - 5 % BASOPHILS 0 0 - 2 %  
 ABS. NEUTROPHILS 3.0 1.8 - 8.0 K/UL  
 ABS. LYMPHOCYTES 0.5 (L) 0.9 - 3.6 K/UL  
 ABS. MONOCYTES 0.6 0.05 - 1.2 K/UL  
 ABS. EOSINOPHILS 0.1 0.0 - 0.4 K/UL  
 ABS. BASOPHILS 0.0 0.0 - 0.1 K/UL  
 DF AUTOMATED MAGNESIUM Collection Time: 05/24/20  4:20 AM  
Result Value Ref Range Magnesium 2.0 1.6 - 2.6 mg/dL CALCIUM, IONIZED Collection Time: 05/24/20  4:20 AM  
Result Value Ref Range Ionized Calcium 1.19 1.12 - 1.32 MMOL/L  
PTT Collection Time: 05/24/20  4:20 AM  
Result Value Ref Range aPTT 30.0 23.0 - 36.4 SEC PROTHROMBIN TIME + INR Collection Time: 05/24/20  4:20 AM  
Result Value Ref Range Prothrombin time 17.4 (H) 11.5 - 15.2 sec INR 1.4 (H) 0.8 - 1.2 GLUCOSE, POC Collection Time: 05/24/20  6:16 AM  
Result Value Ref Range Glucose (POC) 92 70 - 110 mg/dL POC G3 Collection Time: 05/24/20  9:58 AM  
Result Value Ref Range Device: VENT    
 FIO2 (POC) 35 % pH (POC) 7.476 (H) 7.35 - 7.45    
 pCO2 (POC) 41.4 35.0 - 45.0 MMHG  
 pO2 (POC) 64 (L) 80 - 100 MMHG  
 HCO3 (POC) 30.5 (H) 22 - 26 MMOL/L  
 sO2 (POC) 94 92 - 97 % Base excess (POC) 7 mmol/L Mode CPAP/SPON    
 PEEP/CPAP (POC) 5 cmH2O Pressure support 10 cmH2O Allens test (POC) YES Total resp. rate 27 Site RIGHT RADIAL Specimen type (POC) ARTERIAL Performed by Emily Carpio [facility]  
  05/24/20 
4838 05/23/20 
1523 FIO2I 35 35 HCO3I 30.5* 30.0* PCO2I 41.4 42.0 PHI 7.476* 7.469* PO2I 64* 71* All Micro Results Procedure Component Value Units Date/Time CULTURE, BLOOD [[id number]] Collected:  05/23/20 1705 Order Status:  Completed Specimen:  Blood Updated:  05/24/20 1010 Special Requests: NO SPECIAL REQUESTS     
  GRAM STAIN    
  GRAM NEGATIVE RODS AEROBIC BOTTLE  
     
      
  SMEAR CALLED TO AND CORRECTLY REPEATED BY: [address] [facility] C RN ICU TO [address] [place] [address] AT 1008 ON 5/24/2020. Culture result:    
  CULTURE IN PROGRESS,FURTHER UPDATES TO FOLLOW Sent to [facility] for ID/Susceptibility if indicated. CULTURE, RESPIRATORY/SPUTUM/BRONCH Sharlot Alexandre [[id number]] Collected:  05/23/20 1415 Order Status:  Completed Specimen:  Sputum from Tracheal Aspirate Updated:  05/24/20 1007 CULTURE, URINE [[id number]] Collected:  05/23/20 1927 Order Status:  Completed Specimen:  Cath Urine Updated:  05/23/20 1935 CULTURE, RESPIRATORY/SPUTUM/BRONCH Emmet Sprout STAIN [[id number]]  (Abnormal) Collected:  05/08/20 1630 Order Status:  Completed Specimen:  Sputum from Tracheal Aspirate Updated:  05/10/20 1214 Special Requests: NO SPECIAL REQUESTS GRAM STAIN RARE WBCS SEEN     
      
  RARE EPITHELIAL CELLS SEEN  
     
   NO ORGANISMS SEEN Culture result: RARE YEAST     
      
  NO NORMAL RESPIRATORY BRYN ISOLATED  
     
 CULTURE, RESPIRATORY/SPUTUM/BRONCH W Niyah Duquep [[id number]] Collected:  05/04/20 1315 Order Status:  Canceled Specimen:  Sputum from Tracheal Aspirate CULTURE, RESPIRATORY/SPUTUM/BRONCH Annmarie Alosa STAIN [[id number]]  (Abnormal)  (Susceptibility) Collected:  04/22/20 1018 Order Status:  Completed Specimen:  Sputum from Tracheal Aspirate Updated:  04/25/20 1009 Special Requests: NO SPECIAL REQUESTS     
  GRAM STAIN FEW WBCS SEEN     
      
  OCCASIONAL EPITHELIAL CELLS SEEN  
     
      
  FEW GRAM POSITIVE COCCI IN CLUSTERS  
     
   RARE GRAM NEGATIVE RODS Culture result:    
  LIGHT STAPHYLOCOCCUS AUREUS  
     
      
  LIGHT NORMAL RESPIRATORY BRYN  
     
 CULTURE, BLOOD [[id number]] Collected:  04/24/20 1015 Order Status:  Canceled Specimen:  Blood Imaging: 
[x]I have personally reviewed the patients chest radiographs images and report [place] [facility] [place]20 XR CHEST PORT Narrative --------------------------------------------------------------------------- 
<<<<<<<<<           [address] [address] [facility]           >>>>>>>>>  
--------------------------------------------------------------------------- CLINICAL HISTORY:  Hypoxia. COMPARISON EXAMINATIONS:  May 23, 2020. 
 
 
---  SINGLE FRONTAL VIEW OF THE CHEST  --- The lung volumes are low. The cardiac silhouette is enlarged but stable. There 
is pulmonary vascular congestion. There is infiltrative change at the right lung 
base. There is also increased markings/patchy infiltrative change throughout the 
left lung. There appears to be left-sided pleural thickening. A tracheostomy is 
noted in place.  No significant osseous abnormalities are identified.   
 
 
--------------  
 Impression Impression: 
-------------- Right lower lung field infiltrative change. Consider developing pneumonia. Increased markings/patchy infiltrative change throughout the left lung. Findings 
possibly also represents diffuse infiltrate versus asymmetric edema. There is cardiomegaly and apparent pulmonary vascular congestion. Tracheostomy in place. US LEs 4/22/2020 Interpretation Summary · No evidence of deep vein thrombosis in the right lower extremity veins assessed. · No evidence of deep vein thrombosis in the left lower extremity veins assessed. Echo 4/22 Result status: Edited Result - FINAL Addendum by Stephanie Mann MD on Wed Apr 22, 2020  5:12 PM  
· Left Ventricle: Normal wall thickness and systolic function (ejection fraction normal). Mildly dilated left ventricle. The estimated ejection fraction is 45 - 50%. There is moderate (grade 2) left ventricular diastolic dysfunction E/e' Ratio = 17.09. Wall Scoring: The left ventricular wall motion is globally hypokinetic. · Right Ventricle: Not well visualized. Normal global systolic function. Moderately dilated right ventricle. · Right Atrium: Right atrium not well visualized. Moderately dilated right atrium. · IVC/Hepatic Veins: Dilated inferior vena cava. Mechanically ventilated; cannot use inferior caval vein diameter to estimate central venous pressure. HU 5/12/20: 
Result status: Final result · Image quality: good. The procedure, risks and alternatives were explained. Informed consent was obtained. . Topical anesthetic not used. . Sedation was achieved by conscious sedation. 4 mg Versed was administered. 100 mcg of Fentanyl was administered. No complications. HU probe was removed. Estimated blood loss: 0 mL. Color flow Doppler was performed and pulse wave and/or continuous wave Doppler was performed. Saline contrast was given to evaluate for intracardiac shunt. No shunt seen. · Left Ventricle: Normal cavity size and wall thickness. Mild systolic dysfunction. The estimated ejection fraction is 45 - 50%. Visually measured ejection fraction. Abnormal left ventricular septal motion. There is mild (grade 1) left ventricular diastolic dysfunction. Wall Scoring: The left ventricular wall motion is globally hypokinetic. · Interatrial Septum: Agitated saline contrast study was performed. There was no shunting at baseline or with Valsalva. No atrial septal defect present. Gastrografin KUB 5/16/20: 
Limited examination due to body habitus. The nasogastric tube is not well visualized. Final image demonstrates enteric 
contrast both within the stomach and the visualized portions of the distal 
esophagus. The nasogastric tube is not well visualized. cxr 5/17/20 reviewed:  
1. Stable support lines and tubes. 2. Central pulmonary vascular congestion, possibly with small bilateral pleural 
effusions and associated atelectasis/airspace disease. IMPRESSION:  
Patient Active Problem List  
Diagnosis Code  Acute on chronic respiratory failure with hypoxia and hypercapnia ([facility]) J96.21, J96.22  
 Acute on chronic systolic and diastolic CHF (congestive heart failure) ([facility]) I50.43 Aspiration pneumonitis from multiple patient initiated ET tube displacement events J69.0  Cardiac arrest ([facility] [address] I46.9 MSSA in sputum culture A49.01  
 Elevated troponin, NSTEMI R79.89, I21.4  Chronic a-fib I48.20  Atrial flutter ([facility] I48.92 Prolonged QTc R94.31  
 Chronic renal insufficiency, stage II (mild) N18.2 Thrombocytopenia D69.6  Smoking F17.200  Arthritis M19.90  Migraine G43.909  Chronic back pain M54.9, G89.29  
 Obstructive Sleep apnea G47.33  
 Morbid obesity ([facility]) E66.01  
  
PLAN:  
RS: s/p trach on 5/18, on ventilator since 4/25/2020. It was doing very well on the trach collar until yesterday. Yesterday developed fever. Was placed back on ventilatory support. Respiratory cultures gram-negative chantale. Right lower lobe pneumonia. Awake and alert tolerating treatment. He is on Zosyn. I will add Cipro I suspect he could be Pseudomonas. He will require 2 weeks of antibiotics. Patient will be going to rehab. He will need to continue his antibiotics there Extensive work-up due to profound hypoxemia that improved now HU negative for any cardiac causes for severe hypoxemia or shunting. He previously could have had pulmonary hemorrhage knowing bloody/pinkish ETT secretions and so started on Solu-Medrol 40 mg IV every 8 hour since 5/12/2020. Since then fio2 improved to 40% and peep 8 and improved ETT bloody secretions. Reduce steroids to 40 mg q12 hrs on 5/16/20. Since tapered to 20 mg q12 hrs. I/o improved, leg edema improved Patient cannot have CT chest due to body weight limitations. C/w vent support, vent and sedation bundle. VAP prevention bundle, head of the bed at 30' all times Patient is off paralytic medication since 5/4/2020 Sedation- Off versed drip since 5/14/20. No pain, off fentanyl patch as well. Continue bronchodilators prn; pulmonary hygiene care. Trach collar trials this morning CV[facility] - 
HU unremarkable for any cause for persistent hypoxemia, bubble study negative. Only grade 1 diastolic dysfunction with LVEF 45 to 50%. No pulmonary hypertension. US legs negative for DVT. Diuretics as above. ASA held for trach plan. Eliquis held for possible pulmonary hemorrhage. Dr. Mago Wilcox on board. ID - SARS-COV-2 negative x 2 Antibiotic: s/p Zosyn completed on 5/12/2020 10 days antibiotic MSSA in resp cxs from before; respiratory cultures 5/8 no growth. Procalcitonin repeat is negative. No leukocytosis. No fever. Hem - HIT panel and LEYDI Ab NEG on 4/28. He is definitely high risk for PEs; CTA chest cannot be done due to limitations due to weight; stopped argatroban 5/4;[suspect fatty liver/GASTELUM]; Eliquis held due to bloody secretions from the ET tube. Vasc - PVL bl LE negative Renal -continue monitor BUN and creatinine stable; electrolyte replacement per protocol. Nephrology Dr. Butch Leon hs signed off. Bumex 1 mg daily Monitor I's and O's, renal functions, electrolytes and replace per ICU protocol. GI -tube was not placed due to morbid obesity with weight loss eventually he could have a PEG tube depending on his nutrition he was taking some p.o. yesterday GI placed OG tube; good position confirmed with abdominal x-ray using Gastrografin again on 5/16/20 after NGT replaced since patient pulled it out. Neurology: moving extremities and follows all commands. No focal deficit. Overall guarded prognosis. Patient chronically vent dependent. Full code. Will defer respective systems problem management to primary and other consultant and follow patient in ICU with primary and other medical team 
Quality Care: PPI, DVT prophylaxis, HOB elevated, Infection control all reviewed and addressed. PAIN AND SEDATION: As above · Skin/Wound:  Blister on foot, does not appear pressure sore. Continue local care per wound care consult. · Nutrition:  Start tube feeding · Prophylaxis: DVT and GI Prophylaxis reviewed. · Restraints: prn 
· PT/OT eval and treat: as needed when stable · Lines/Tubes:  daniel 4/21/20, trach 5/18/20, PICC line left arm 5/5 ADVANCE DIRECTIVE: Full code DISCUSSION: spoke with RN, RT, ICU staff, MDR Events and notes from last 24 hours reviewed. Care plan discussed with nursing Total CC time: 32 minutes. High complexity review and management. Fátima Chappell MD  
5/24/2020

## 2020-05-24 NOTE — PROGRESS NOTES
0700- Bedside report received from BETTYE Hawkins RN. Care assumed. Trach, vent, PICC line, daniel, and FMS noted and intact. 0800- Shift assessment completed per flow sheets. Will continue to monitor. 1200- Reassessment completed per flow sheets, will continue to monitor. 1300- Report called and given to DIANE Gaona at [facility] in [place]. He will be transported to [facility] from [facility] around 1500.

## 2020-05-24 NOTE — PROGRESS NOTES
COMPLETE TRACH CARE GIVEN.  BLOODY AND FOUL-SMELLING BROWN DRAINAGE NOTED AT SITE. VENT CIRCUIT WAS ALSO CHANGED DUE TO BUILDUP OF SECRETIONS IN TUBING. PATIENT WAS PLACED ON  
CPAP 5/PS 10 @ 35% PER DR. EPPS.  NOT TO GO ON TRACH COLLAR TODAY.

## 2020-05-24 NOTE — DISCHARGE SUMMARY
Discharge Summary Patient: Dean Cortez MRN: [id number]  CSN: [id number] YOB: 1975  Age: 53 y.o. Sex: male DOA: 4/21/2020 LOS:  LOS: 33 days   Discharge Date:   
 
Primary Care Provider:  Chyna Darling MD 
 
Admission Diagnoses: Acute CHF (congestive heart failure) ([facility] [I50.9] Hypoxia [R09.02] Acute respiratory failure with hypoxia and hypercarbia (HCC) [J96.01, J96.02] Respiratory failure with hypoxia and hypercapnia (HCC) [J96.91, J96.92] Discharge Diagnoses:   
Problem List as of 5/24/2020 Date Reviewed: 5/18/2020 Codes Class Noted - Resolved Pneumonia ICD-10-CM: J18.9 ICD-9-CM: 311  5/24/2020 - Present Hypocalcemia ICD-10-CM: E83.51 
ICD-9-CM: 275.41  5/15/2020 - Present NSTEMI (non-ST elevated myocardial infarction) ([facility] ICD-10-CM: I21.4 ICD-9-CM: 410.70  5/13/2020 - Present Advanced care planning/counseling discussion ICD-10-CM: Z71.89 ICD-9-CM: V65.49  Unknown - Present Cardiac arrest [facility]) ICD-10-CM: I46.9 ICD-9-CM: 427.5  4/23/2020 - Present Acute on chronic respiratory failure with hypoxia and hypercapnia (HCC) ICD-10-CM: J96.21, J96.22 
ICD-9-CM: 518.84, 786.09, 799.02  4/22/2020 - Present Acute CHF (congestive heart failure) (HCC) ICD-10-CM: I50.9 ICD-9-CM: 428.0  4/21/2020 - Present * (Principal) Respiratory failure with hypoxia and hypercapnia (HCC) ICD-10-CM: J96.91, J96.92 
ICD-9-CM: 518.81  4/21/2020 - Present Elevated troponin ICD-10-CM: R79.89 ICD-9-CM: 790.6  10/16/2018 - Present Acute respiratory distress ICD-10-CM: R06.03 
ICD-9-CM: 518.82  10/16/2018 - Present CHF (congestive heart failure) (HCC) ICD-10-CM: I50.9 ICD-9-CM: 428.0  10/15/2018 - Present Acute diastolic CHF (congestive heart failure) (HCC) ICD-10-CM: I50.31 ICD-9-CM: 428.31, 428.0  10/15/2018 - Present Chronic a-fib ICD-10-CM: I48.20 ICD-9-CM: 427.31  10/15/2018 - Present Atrial flutter (HCC) (Chronic) ICD-10-CM: E67.81 
ICD-9-CM: 427.32  10/15/2018 - Present Chronic renal insufficiency, stage II (mild) ICD-10-CM: N18.2 ICD-9-CM: 621. 2  Unknown - Present BMI 70 and over, adult [facility]) ICD-10-CM: N71.29 ICD-9-CM: V85.45  Unknown - Present Chronic back pain ICD-10-CM: M54.9, G89.29 ICD-9-CM: 724.5, 338.29  Unknown - Present Arthritis ICD-10-CM: M19.90 ICD-9-CM: 716.90  Unknown - Present Overview Signed 4/24/2017  2:31 PM by REY Benites  
  knees Migraine ICD-10-CM: R44.929 ICD-9-CM: 346.90  Unknown - Present History of atrial flutter ICD-10-CM: Z86.79 
ICD-9-CM: V12.59  Unknown - Present Overview Signed 4/24/2017  2:33 PM by REY Benites Dx 6/2016 - anticoagulated by Benjamen Able Limited mobility ICD-10-CM: Z74.09 
ICD-9-CM: V49.89  Unknown - Present Smoking ICD-10-CM: F17.200 ICD-9-CM: 305.1  Unknown - Present Overview Signed 4/24/2017  2:39 PM by REY Benites  
  very passive Cellulitis of left lower leg ICD-10-CM: L03.116 ICD-9-CM: 682.6  4/4/2016 - Present Anemia ICD-10-CM: D64.9 ICD-9-CM: 285.9  4/4/2016 - Present Morbid obesity ([address] ICD-10-CM: E66.01 
ICD-9-CM: 278.01  4/4/2016 - Present Dyspnea ICD-10-CM: R06.00 
ICD-9-CM: 786.09  4/4/2016 - Present Sleep apnea ICD-10-CM: G47.30 ICD-9-CM: 780.57  4/4/2016 - Present Sinus tachycardia ICD-10-CM: R00.0 ICD-9-CM: 427.89  4/4/2016 - Present RESOLVED: Suspected pulmonary embolism ICD-10-CM: R09.89 ICD-9-CM: 786.9  4/4/2016 - 4/5/2016 RESOLVED: Hypoxia ICD-10-CM: R09.02 
ICD-9-CM: 799.02  4/4/2016 - 4/5/2016 RESOLVED: PE (pulmonary embolism) ICD-10-CM: I26.99 
ICD-9-CM: 415.19  4/4/2016 - 4/5/2016 Discharge Medications:    
Current Discharge Medication List  
  
START taking these medications Details acetaminophen (TYLENOL) 325 mg tablet Take 2 Tabs by mouth every six (6) hours as needed for Pain or Fever. Qty: 30 Tab, Refills: 1  
  
ipratropium (ATROVENT) 0.02 % soln 2.5 mL by Nebulization route every four (4) hours as needed for Wheezing. Qty: 1 Vial, Refills: 0  
  
hydrALAZINE (APRESOLINE) 20 mg/mL injection 1 mL by IntraVENous route every six (6) hours as needed for Other (SBP>160). Qty: 1 Vial, Refills: 0  
  
arformoteroL (BROVANA) 15 mcg/2 mL nebu neb solution 2 mL by Nebulization route two (2) times a day. Qty: 1 Vial, Refills: 0  
  
albuterol-ipratropium (DUO-NEB) 2.5 mg-0.5 mg/3 ml nebu 3 mL by Nebulization route every six (6) hours as needed for Wheezing, Shortness of Breath, Respiratory Distress or Cough. Qty: 30 Nebule, Refills: 1  
  
amLODIPine (NORVASC) 10 mg tablet Take 1 Tab by mouth daily. Qty: 30 Tab, Refills: 1  
  
chlorhexidine (PERIDEX) 0.12 % solution Take 10 mL by mouth every twelve (12) hours for 14 days. Qty: 280 mL, Refills: 0  
  
apixaban (ELIQUIS) 2.5 mg tablet Take 1 Tab by mouth two (2) times a day. Qty: 60 Tab, Refills: 0  
  
budesonide (PULMICORT) 0.5 mg/2 mL nbsp 2 mL by Nebulization route two (2) times a day. Qty: 1 mL, Refills: 1  
  
docusate sodium (COLACE) 100 mg capsule Take 1 Cap by mouth two (2) times a day for 90 days. Qty: 60 Cap, Refills: 2  
  
polyethylene glycol (MIRALAX) 17 gram packet Take 1 Packet by mouth once as needed for Constipation for up to 1 dose. Qty: 1 Packet, Refills: 1  
  
bumetanide (BUMEX) 0.25 mg/mL injection 4 mL by IntraVENous route daily. Qty: 1 Vial, Refills: 0  
  
piperacillin-tazobactam 4.5 gram 4.5 g IVPB 4.5 g by IntraVENous route every eight (8) hours. Qty: 1 Dose, Refills: 1  
  
aspirin 81 mg chewable tablet Take 1 Tab by mouth daily. Qty: 30 Tab, Refills: 0  
  
potassium bicarb-citric acid (EFFER-K) 20 mEq tablet Take 1 Tab by mouth two (2) times a day. Qty: 60 Tab, Refills: 0 ciprofloxacin in D5W (CIPRO) 400 mg/200 mL pgbk 200 mL by IntraVENous route every twelve (12) hours every twelve (12) hours. Qty: 1 mL, Refills: 1  
  
!! pantoprazole (Protonix) 40 mg tablet Take 1 Tab by mouth daily for 30 days. Qty: 30 Tab, Refills: 0  
  
!! pantoprazole (Protonix) 40 mg tablet Take 1 Tab by mouth daily for 30 days. Qty: 30 Tab, Refills: 0  
  
 !! - Potential duplicate medications found. Please discuss with provider. STOP taking these medications  
  
 losartan (COZAAR) 25 mg tablet Comments:  
Reason for Stopping:   
   
 metoprolol succinate (TOPROL-XL) 25 mg XL tablet Comments:  
Reason for Stopping:   
   
 furosemide (LASIX) 40 mg tablet Comments:  
Reason for Stopping:   
   
 amiodarone (CORDARONE) 200 mg tablet Comments:  
Reason for Stopping:   
   
  
 
 
Discharge Condition: [facility] Procedures :  
1. HU Pre-procedure Diagnoses Acute combined systolic and diastolic ACC/AHA stage C congestive heart failure ([address] [I50.41] Hypoxia [R09.02] Post-procedure Diagnoses Systolic CHF, acute on chronic ([address] [I50.23] Procedures ECHO TRANSESOPHAGEAL (HU) W OR WO CONTR [[id number]] []Hide copied text []Hover for details HU done with strict aseptic precaution. Patient is sedated, intubated and on Mechanical ventilator. LVEF 45% Negative bubble study. No right to left shunt seen with bubble study. Patient tolerated procedure. Follow full HU report. Findings discussed with . Called patient's wife and test result discussed with her. Continue management as per hospital medicine. Trach 
  PREOPERATIVE DIAGNOSES: 
1. Ventilator dependency. 2.  Morbid obesity. 3.  Obstructive sleep apnea. 
  
POSTOPERATIVE DIAGNOSES: 
1. Ventilator dependency. 2.  Morbid obesity. 3.  Obstructive sleep apnea. 
  
PROCEDURE PERFORMED:  Skin flap tracheotomy Consults: Cardiology PHYSICAL EXAM  
Visit Vitals /74 Pulse 79 Temp 99.2 °F (37.3 °C) Resp 24 Ht 5' 10\" (1.778 m) Wt (!) 199.4 kg (439 lb 9.6 oz) SpO2 98% BMI 63.08 kg/m² General: Awake, cooperative, no acute distress morbidly obese has lost over 100 pounds in hospitalHEENT: NC, Atraumatic. PERRLA, EOMI. Anicteric sclerae. Lungs:  CTA Bilaterally. No occasional rhonchi wheezing/Rhonchi/Rales. Heart:  Regular  rhythm,  No murmur, No Rubs, No Gallops Abdomen: Soft, Non distended, Non tender. +Bowel sounds, Extremities: No c/c/e dry excoriation Psych:   Not anxious or agitated. .     
                          
 
 
Admission HPI :  
Derrick Kauffman. is a 53 y.o. male who has a hx of morbid obesity, MARIA FERNANDA, chronic afib on anticoagulation, and systolic CHF who presents with worsening swelling of his legs and scrotum over the past 3 days. He was not able to get his lasix refilled due to the pandemic. He reports taking all of his other prescribed medications and compliance with CPAP at night. He denies any CP or SOB. He has some abdominal distension. He reports being intubated once in the past for breathing problems. 
  
Hospital Course : 
8 y/o male with hx of morbid obesity, MARIA FERNANDA, chronic afib on anticoagulation, and systolic CHF (EF 56-63%) is admitted with respiratory failure with hypoxia and hypercapnia, he required to be intubated. 04/23/2020 Patient self extubated this morning and went into PEA arrest, he was re intubated, difficult intubation. He is awake and post resuscitation, placed on sedation. Requiring high PEEP. 
 
04/24/2020 Patient again tried to self extubate in spite of sedation. 04/25/2020 ET tube leak, ET tube exchanged 04/27/2020 Had bleeding around IJ site, heparin stopped, H/H stable 05/18/2020 Tracheostomy 5/23; Will try eating today not best candidate for PEG tube was able to speak to wife through Cristhiann? face time He is awake, alert, follows commands denies any pain 5/24 Fever early am  
 Xray suggesting new infiltrate Now on Vanc/zosyn ? Aspiration now on Cipro as well Bed available at [facility] CRITICAL CARE PLAN Resp - Acute respiratory failure - with hypoxia and hypercapnia, patient required ventilation for longer than 2 weeks he had self extubated several times and went into cardiac arrest he was transitioned to tracheostomy without any difficulties and tolerating trach collar most of the time clearing secretions on his own MARIA FERNNADA/OHS - yesterday 5/23 foul smelling secretions covered for psedonomas with cipro/zosyn likely needs abx for 2 weeks prelim 
cultrues GNR S/p trach 05/18/2020 tolerating trach collar ID - ANTIBIOTICS patient was tested for COVID twice and was negative unfortunately 2 days ago developed a fever x-ray shows developing infiltrate he is started on Zosyn and Cipro IV on the 23rd 
yeserday later  in afternoon. Patient developed fever. Large amount of secretion foul-smelling around the trach. Trach care was performed. Culture sent. Preliminary gram-negative rods. I added Cipro for possible Pseudomonas infection seems prolonged ventilation and gram-negative loss. Stop vancomycin continue Zosyn. Recommend 2 weeks. CVS - Monitor HD. Hypotension - resolved, off pressors with improvement of respiratory status and infection Paroxysmal atrial flutter - monitor rate, was on amiodarone. This was discontinued Has been bradycardic, toprol xl held. On eliquis 2.5mg daily for chronic anticoagulation Acute on chronic systolic CHF -  
received lasix drip, on bumex Echo with EF of 45-50% Elevated troponin - NSTEMI type II due to PEA arrest 
HU with no intracardiac shunt Heme/onc -stable did not require transfusions  
renal - Trend BUN, Cr, follow I/O, daniel in place. Check and replace Mg, K, phos. Hypernatremia - resolved, Endocrine -   blood sugars were covered with sliding scale insulin as needed Neuro/ Pain/ Sedation -  
 Off paralytic, awake, follows commands requires no Ativan requires no fentanyl follows all commands very calm GI -tube feeding. Through the nose for now High risk for PEG tube aspiration precautions GI did not recommend due to morbid obesity Morbid obesity Prophylaxis - DVT: SCD, GI: protonix. And Eliquis Activity: Bedrest 
 
Diet: Tube feeds Follow-up: Cardiology once [address] and PCP Disposition: LTAC Minutes spent on discharge: Over 30 minutes Labs: Results:  
   
Chemistry Recent Labs  
  05/24/20 0420 05/23/20 0400 05/22/20 
0474 * 120* 110*  141 142  
K 3.6 4.0 3.8  106 107 CO2 31 31 30 BUN 32* 29* 23* CREA 0.78 0.59* 0.60  
CA 9.0 9.2 9.2 AGAP 4 4 5 BUCR 41* 49* 38* AP 80 57 47  
TP 7.1 7.2 7.0 ALB 3.9 4.2 4.2 GLOB 3.2 3.0 2.8 AGRAT 1.2 1.4 1.5  
  
CBC w/Diff Recent Labs  
  05/24/20 0420 05/23/20 0400 05/22/20 
0520 WBC 4.2* 4.8 6.8  
RBC 3.95* 3.91* 3.90* HGB 10.7* 10.5* 10.4* HCT 35.1* 34.3* 34.0*  
 183 155 GRANS 71 78* 74* LYMPH 13* 10* 11* EOS 2 0 2 Cardiac Enzymes No results for input(s): CPK, CKND1, CONSTANZA in the last 72 hours. No lab exists for component: Tafranny Tucker Coagulation Recent Labs  
  05/24/20 0420 05/23/20 
0400 PTP 17.4* 17.8* INR 1.4* 1.5* APTT 30.0 36.1 Lipid Panel No results found for: CHOL, CHOLPOCT, CHOLX, CHLST, CHOLV, 632087, HDL, HDLP, LDL, LDLC, DLDLP, 119699, VLDLC, VLDL, TGLX, TRIGL, TRIGP, TGLPOCT, CHHD, CHHDX  
BNP No results for input(s): BNPP in the last 72 hours. Liver Enzymes Recent Labs  
  05/24/20 
0420 TP 7.1 ALB 3.9 AP 80 SGOT 106* Thyroid Studies Lab Results Component Value Date/Time TSH 1.45 04/27/2020 03:50 AM  
    
 
 
Significant Diagnostic Studies: Xr Chest Sngl V Result Date: 4/25/2020 CLINICAL HISTORY:  Endotracheal tube placement/repositioning.  COMPARISONS: Chest x-ray, earlier the same day. TECHNIQUE:  single frontal view of the chest ------------------------------------------ FINDINGS: Exam limited by patient large body habitus. Lungs:  Hazy opacity in the lung bases, more apparent than on the prior study. Upper lungs clear. Mediastinum: Marked cardiomegaly. Endotracheal tube has been repositioned, tip now 5.5 cm above the miguel ángel. Right transjugular venous catheter, tip near cavoatrial junction. Bones: No evidence of fracture or suspicious bone lesion. ------------------------------------------ 
 
IMPRESSION: 1. Hazy bilateral lower lobe opacity, likely a combination of pleural fluid and atelectasis, slightly more apparent than on recent comparison study. Pneumonia or aspiration not excluded. 2. Endotracheal tube has been repositioned, now in good position. Xr Abd (kub) Result Date: 5/19/2020 EXAM: SUPINE ABDOMINAL RADIOGRAPH CLINICAL INDICATION/HISTORY: NG placement -Additional: None COMPARISON: 5/16/2020 TECHNIQUE: Single supine view of the abdomen. _______________ FINDINGS: BOWEL GAS PATTERN: Markedly limited study secondary to body habitus. Enteric tube appears within the stomach, unchanged from prior. OTHER: None. _______________ IMPRESSION: Limited study as above. Limited study secondary to body habitus. Enteric tube appears within the stomach, unchanged from prior. Xr Abd (kub) Result Date: 5/16/2020 EXAM: Single view Abdomen X-rays supine INDICATION: Enteric contrast was placed via a nasogastric tube. COMPARISON: None. ___________ FINDINGS: Limited visualization due to body habitus. Nasogastric tube is not well visualized. Enteric contrast is visualized within the abdomen.  ___________ IMPRESSION: Limited examination due to body habitus. The nasogastric tube is not well visualized. Final image demonstrates enteric contrast both within the stomach and the visualized portions of the distal esophagus. The nasogastric tube is not well visualized. Xr Abd (kub) Result Date: 5/12/2020 EXAM: SUPINE ABDOMINAL RADIOGRAPH CLINICAL INDICATION/HISTORY: NGT placement post procedure -Additional: None COMPARISON: 5/7/2020 TECHNIQUE: Single supine view of the abdomen. _______________ FINDINGS: BOWEL GAS PATTERN: Markedly limited study secondary to body habitus. No definite enteric tube visualized. Overall paucity of bowel gas. _______________ IMPRESSION: Markedly limited study secondary to body habitus. No definite enteric tube visualized. Overall paucity of bowel gas. Xr Abd (kub) Result Date: 5/7/2020 EXAM: SUPINE ABDOMINAL RADIOGRAPH CLINICAL INDICATION/HISTORY: eval for ileus -Additional: None COMPARISON: 5/6/2020 TECHNIQUE: Single supine view of the abdomen. _______________ FINDINGS/IMPRESSION: BOWEL GAS PATTERN: Limited study secondary to body habitus. Enteric tube poorly visualized. Contrast injection demonstrates tip at the GE junction/proximal stomach. Oral contrast within the colon. Xr Abd (kub) Result Date: 5/6/2020 Abdomen, single view COMPARISON: May 2, 2020 INDICATION: Nasogastric tube placement FINDINGS: Supine AP view the abdomen obtained before and after administration of contrast per the nasogastric tube. Anatomic detail is markedly limited by the patient's body habitus with collimation of the majority of the right lateral abdomen. Radiopaque tubing noted below the diaphragm, the tip of which is indistinct. Contrast opacifies the stomach. Impression: 1. Detail limited examination. Nasogastric tube is below the diaphragm. Although the exact tip position is unclear on the provided images, opacification of gastric rugal folds is compatible with tube position in the stomach. Xr Abd (kub) Result Date: 5/3/2020 EXAM: SUPINE ABDOMINAL RADIOGRAPH CLINICAL INDICATION/HISTORY: ng placement -Additional: None COMPARISON: None TECHNIQUE: Single supine view of the abdomen. _______________ FINDINGS: BOWEL GAS PATTERN: Outside the field of view. BONES: Unremarkable. OTHER: Subtle linear density likely corresponding with reported nasogastric tube, possible tip seen in the inferior thorax distal esophagus region. Endotracheal tube noted which is above the miguel ángel. _______________ IMPRESSION: Nondiagnostic exam for purposes of nasogastric tube positioning. It is suspected that the tip of the tube is in the distal esophagus, but exam quality is not adequate for confident localization. Xr Chest [facility] Result Date: 5/24/2020 
--------------------------------------------------------------------------- <<<<<<<<<           [facility]           >>>>>>>>> --------------------------------------------------------------------------- CLINICAL HISTORY:  Hypoxia. COMPARISON EXAMINATIONS:  May 23, 2020. ---  SINGLE FRONTAL VIEW OF THE CHEST  --- The lung volumes are low. The cardiac silhouette is enlarged but stable. There is pulmonary vascular congestion. There is infiltrative change at the right lung base. There is also increased markings/patchy infiltrative change throughout the left lung. There appears to be left-sided pleural thickening. A tracheostomy is noted in place. No significant osseous abnormalities are identified.  -------------- Impression: -------------- Right lower lung field infiltrative change. Consider developing pneumonia. Increased markings/patchy infiltrative change throughout the left lung. Findings possibly also represents diffuse infiltrate versus asymmetric edema. There is cardiomegaly and apparent pulmonary vascular congestion. Tracheostomy in place. Xr Chest [facility] Result Date: 5/23/2020 EXAM: XR CHEST PORT CLINICAL INDICATION/HISTORY: Aspiration pneumonia, ET tube placement -Additional: None COMPARISON: Several prior studies, most recently May 20, 2020 TECHNIQUE: Portable frontal view of the chest _______________ FINDINGS: SUPPORT DEVICES: Tracheostomy catheter and left approach PICC line both in stable position. HEART AND MEDIASTINUM: Enlarged cardiac silhouette and stable mediastinal contours. LUNGS AND PLEURAL SPACES: Similar degree of pulmonary inflation with mid to lower lung areas of opacity present. No evidence of pneumothorax or large pleural effusion. BONY THORAX AND SOFT TISSUES: Unremarkable. _______________ IMPRESSION: 1. Support devices in stable/appropriate position. 2. Mild cardiac enlargement without change. 3. Basilar areas of atelectasis or airspace disease slightly improved in the interval from prior study. Sy Hilt Xr Chest [facility] Result Date: 5/20/2020 EXAM: XR CHEST PORT CLINICAL INDICATION/HISTORY: Aspiration pneumonia, ET tube placement -Additional: None COMPARISON: One day prior TECHNIQUE: Portable frontal view of the chest _______________ FINDINGS: SUPPORT DEVICES: Tracheostomy. HEART AND MEDIASTINUM: Cardiomegaly. LUNGS AND PLEURAL SPACES: Limited study. Diffuse interstitial and parenchymal opacities left worse than right. Probable left pleural effusion, incompletely evaluated. _______________ IMPRESSION: Limited study. Diffuse interstitial and parenchymal opacities left worse than right. Probable left pleural effusion, incompletely evaluated. Xr Chest [facility] Result Date: 5/19/2020 EXAM: XR CHEST PORT CLINICAL INDICATION/HISTORY: Aspiration pneumonia, ET tube placement -Additional: None COMPARISON: 5/18/2020 TECHNIQUE: Portable frontal view of the chest _______________ FINDINGS: SUPPORT DEVICES: Tracheostomy. Left upper extremity PICC unchanged. HEART AND MEDIASTINUM: Cardiomegaly. LUNGS AND PLEURAL SPACES: Interstitial edema and parenchymal haziness. Probable small left pleural effusion. No pneumothorax. _______________ IMPRESSION: Interstitial edema and parenchymal haziness. Probable small left pleural effusion. Overall mildly improved aeration. Xr Chest [facility] Result Date: 5/18/2020 EXAM: XR CHEST PORT CLINICAL INDICATION/HISTORY: Aspiration pneumonia, ET tube placement -Additional: None COMPARISON: May 17, 2020 TECHNIQUE: Portable frontal view of the chest _______________ FINDINGS: SUPPORT DEVICES: Endotracheal tube projects approximately 5.6 cm above the miguel ángel. A left approach PICC line noted with tip in stable position. HEART AND MEDIASTINUM: Enlarged cardiac silhouette and stable mediastinal contours. LUNGS AND PLEURAL SPACES: Similar degree of pulmonary inflation. Central vascular congestion and bibasilar opacities without change. Evidence of pneumothorax or large pleural effusion. BONY THORAX AND SOFT TISSUES: Unremarkable. _______________ IMPRESSION: 1. Support devices in appropriate/expected position. 2. Cardiac enlargement and central vascular congestion along with basilar atelectasis or airspace disease not significantly changed. Xr Chest [facility] Result Date: 5/17/2020 EXAM: Chest radiograph  CLINICAL INDICATION/HISTORY: Aspiration pneumonia, mechanical ventilation    --Additional history: None. COMPARISON: 05/16/2020 TECHNIQUE: Frontal view of the chest _______________ FINDINGS: SUPPORT DEVICES: ET tube in stable position. Left PICC in stable position. Numerous telemetry leads project over the chest. HEART AND MEDIASTINUM: Cardiac silhouette is enlarged. Normal mediastinal contours . There is prominence of the central pulmonary vasculature. LUNGS AND PLEURAL SPACES: There are essentially stable hazy and patchy bibasilar opacities, partially obscuring both hemidiaphragms. Perhaps blunting of both costophrenic sulci. No pneumothoraces. BONY THORAX AND SOFT TISSUES: Unremarkable. _______________ IMPRESSION: 1. Stable support lines and tubes. 2. Central pulmonary vascular congestion, possibly with small bilateral pleural effusions and associated atelectasis/airspace disease. Xr Chest [facility] Result Date: 5/16/2020 EXAM: CHEST RADIOGRAPH CLINICAL INDICATION/HISTORY: Aspiration pneumonia, ET tube placement   > Additional: None COMPARISON: 5/15/2020 TECHNIQUE: Portable frontal view of the chest _______________ FINDINGS: SUPPORT DEVICES: Endotracheal tube in place with the tip approximately 5.5 cm above the miguel ángel. Left PICC line identified with the tip in the region of the SVC. HEART AND MEDIASTINUM: Heart is enlarged, unchanged. LUNGS AND PLEURAL SPACES: Vascular congestion is similar to the prior exam. Hazy opacities identified at the lung bases, similar to the prior study. No new focal consolidation or pneumothorax. BONES AND SOFT TISSUES: Unremarkable. _______________ IMPRESSION: 1. Endotracheal tube and left PICC line as above. 2. Cardiomegaly with vascular congestion, similar to the prior study. Xr Chest [facility] Result Date: 5/15/2020 EXAM: XR CHEST PORT CLINICAL INDICATION/HISTORY: Aspiration pneumonia, ET tube placement -Additional: None COMPARISON: 1 day prior TECHNIQUE: Portable frontal view of the chest _______________ FINDINGS: SUPPORT DEVICES: Endotracheal tube and left upper extremity PICC unchanged. HEART AND MEDIASTINUM: Stable cardiomediastinal silhouette LUNGS AND PLEURAL SPACES: Limited study secondary to body habitus. Bilateral hazy and parenchymal infiltrates, essentially unchanged from prior. No pneumothorax. _______________ IMPRESSION: Bilateral hazy and parenchymal infiltrates, essentially unchanged from prior. Xr Chest [facility] Result Date: 5/14/2020 EXAM: XR CHEST PORT CLINICAL INDICATION/HISTORY: Aspiration pneumonia, ET tube placement -Additional: None COMPARISON: May 13, 2020 TECHNIQUE: Portable frontal view of the chest _______________ FINDINGS: SUPPORT DEVICES: Endotracheal tube in stable position. Left approach PICC line with tip projecting over the superior cavoatrial junction. HEART AND MEDIASTINUM: Stable cardiac size and mediastinal contours.  LUNGS AND PLEURAL SPACES: Patchy bilateral perihilar and lower lung predominant airspace disease overall similar in appearance to prior. No pneumothorax or large pleural effusion. BONY THORAX AND SOFT TISSUES: Unremarkable. _______________ IMPRESSION: 1. Support devices in stable/appropriate position. 2. Cardiomegaly and airspace disease overall similar to prior. Xr Chest [facility] Result Date: 5/13/2020 EXAM: XR CHEST PORT CLINICAL INDICATION/HISTORY: Aspiration pneumonia, ET tube placement -Additional: None COMPARISON: One day prior TECHNIQUE: Portable frontal view of the chest _______________ FINDINGS: SUPPORT DEVICES: Endotracheal tube and left upper extremity PICC unchanged. HEART AND MEDIASTINUM: Stable cardiomediastinal silhouette LUNGS AND PLEURAL SPACES: Limited study secondary to body habitus. Bilateral hazy and parenchymal infiltrates, slightly progressed from prior. No pneumothorax. _______________ IMPRESSION: Bilateral hazy and parenchymal infiltrates, slightly progressed from prior. Xr Chest [facility] Result Date: 5/12/2020 EXAM: XR CHEST PORT CLINICAL INDICATION/HISTORY: Aspiration pneumonia, ET tube placement -Additional: None COMPARISON: 5/11/2020 TECHNIQUE: Portable frontal view of the chest _______________ FINDINGS: SUPPORT DEVICES: Endotracheal tube unchanged. Left upper extremity PICC unchanged. Maikol Melo HEART AND MEDIASTINUM: Cardiomegaly. LUNGS AND PLEURAL SPACES: Limited study secondary to body habitus. Extensive bilateral alveolar infiltrates, left greater than right, unchanged. Probable bilateral effusions. _______________ IMPRESSION: Limited study secondary to body habitus. Extensive bilateral alveolar infiltrates, left greater than right, unchanged. Probable bilateral effusions. Xr Chest [facility] Result Date: 5/11/2020 EXAM: XR CHEST PORT CLINICAL INDICATION/HISTORY: Aspiration pneumonia, ET tube placement -Additional: None COMPARISON: None TECHNIQUE: Portable frontal view of the chest _______________ FINDINGS: SUPPORT DEVICES: Endotracheal tube unchanged. Left upper extremity PICC unchanged. Seb Sparks HEART AND MEDIASTINUM: Cardiomegaly. LUNGS AND PLEURAL SPACES: Limited study secondary to body habitus. Extensive bilateral alveolar infiltrates, left greater than right, unchanged. Probable bilateral effusions. _______________ IMPRESSION: Limited study secondary to body habitus. Extensive bilateral alveolar infiltrates, left greater than right, unchanged. Probable bilateral effusions. Xr Chest [facility] Result Date: 5/10/2020 EXAM: CHEST RADIOGRAPH, SINGLE VIEW CLINICAL INDICATION/HISTORY: Aspiration pneumonia, ET tube placement      <Additional:  None. COMPARISON: 5/9/2020 TECHNIQUE: Portable frontal view of the chest was obtained. _______________ FINDINGS: SUPPORT DEVICES: ETT is 5.9 cm above the miguel ángel. PICC from a left arm approach projects at the [facility]. HEART AND MEDIASTINUM: Cardiac silhouette is top normal in size. LUNGS AND PLEURAL SPACES: Extensive bilateral alveolar densities are present throughout both lungs, slightly more dense on the left than on the right without significant changes. Left costophrenic angle is blunted versus obscured. The right costophrenic angle is blunted. No pneumothorax. BONY THORAX AND SOFT TISSUES: No acute osseous abnormality. _______________ IMPRESSION: Diffuse bilateral asymmetrical left greater than right edema versus pneumonia versus ARDS without change. Small right effusion, cannot exclude a small left pleural effusion. Xr Chest [facility] Result Date: 5/9/2020 EXAM: CHEST RADIOGRAPH, SINGLE VIEW CLINICAL INDICATION/HISTORY: Aspiration pneumonia, ET tube placement      <Additional:  None. COMPARISON: 5/8/2020 TECHNIQUE: Portable frontal view of the chest was obtained. _______________ FINDINGS: SUPPORT DEVICES: ETT is 7.0 cm above the miguel ángel, PICC tip from a left arm approach projects at the distal SVC. HEART AND MEDIASTINUM: Cardiac silhouette is top normal in size. LUNGS AND PLEURAL SPACES: Extensive bilateral alveolar densities are present throughout both lungs, slightly more dense on the left than on the right, without significant changes. Left costophrenic angle is cut off, the right costophrenic angle is blunted. BONY THORAX AND SOFT TISSUES: No acute osseous abnormality. _______________ IMPRESSION: Diffuse bilateral left greater than right pneumonia versus pulmonary edema. Small right pleural effusion. No acute changes. Xr Chest [facility] Result Date: 5/8/2020 AP portable chest, 5/8/2020 at 0500 hours: INDICATION: Shortness of breath. Intubated. Comparison 5/7/2020. Endotracheal tube just below the clavicles but well above the miguel ángel. Extensive bilateral edema or infiltrate most prominent in the upper lobes. Perhaps some increase in right basilar density with partial obscuration of the right hemidiaphragm in the interval. Persistent retrocardiac opacification with obscuration of the left hemidiaphragm. The heart is stable and top normal. Left arm PICC line tip is at the cavoatrial junction in good position. IMPRESSION: Extensive bilateral edema or infiltrate, most prominent in the upper lobes, certainly potentially in part aspiration. Some increase in right basilar density perhaps, increasing focal edema and/or pleural effusion/atelectasis. No additional change. Xr Chest [facility] Result Date: 5/7/2020 EXAM: Portable frontal view of the chest. CLINICAL INDICATION/HISTORY: Aspiration pneumonia, follow-up intubation COMPARISON: 5/6/2020 _______________ FINDINGS: Stable and satisfactory position endotracheal tube and left arm PICC line. No significant change in bilateral areas of airspace filling process with stable mildly enlarged cardiac silhouette with no definite pleural effusion or pneumothorax. _______________ IMPRESSION: 1. Stable chest. 
 
Xr Chest [facility] Result Date: 5/6/2020 EXAM: XR CHEST PORT CLINICAL INDICATION/HISTORY: Aspiration pneumonia, ET tube placement -Additional: None COMPARISON: Several prior exams, most recently May 5, 2020 TECHNIQUE: Portable frontal view of the chest _______________ FINDINGS: SUPPORT DEVICES: Endotracheal tube, right IJ central venous catheter, and left approach PICC line noted projecting in stable position. HEART AND MEDIASTINUM: Cardiac size and mediastinal contours appear stable. Cardiac silhouette is enlarged, as previously. LUNGS AND PLEURAL SPACES: Asymmetric airspace process within the right mid and lower lungs with slightly improved left lung base aeration. Potential right pleural effusion. No pneumothorax. BONY THORAX AND SOFT TISSUES: Unremarkable. _______________ IMPRESSION: 1. Adequate/stable position of support devices. 2. Little interval change in the appearance of right-sided airspace opacity, basilar changes of atelectasis and presumptively small right pleural effusion. Xr Chest [facility] Result Date: 5/5/2020 EXAM: XR CHEST PORT CLINICAL INDICATION/HISTORY: Aspiration pneumonia, ET tube placement -Additional: None COMPARISON: 5/4/2020 TECHNIQUE: Portable frontal view of the chest _______________ FINDINGS: SUPPORT DEVICES: Endotracheal tube and right IJV central venous catheter unchanged. HEART AND MEDIASTINUM: Cardiomegaly. LUNGS AND PLEURAL SPACES: Limited secondary to body habitus. Right mid to lower lung zone opacification, unchanged. . Probable right pleural effusion. No large pneumothorax. _______________ IMPRESSION: Right mid to lower lungs on opacification, unchanged. . Probable right pleural effusion. Xr Chest [facility] Result Date: 5/4/2020 EXAM: XR CHEST PORT CLINICAL INDICATION/HISTORY: Aspiration pneumonia, ET tube placement -Additional: None COMPARISON: Several prior exams, most recently May 3, 2020 TECHNIQUE: Portable frontal view of the chest _______________ FINDINGS: SUPPORT DEVICES: Endotracheal tube and right IJ approach central venous catheter both project in stable position. HEART AND MEDIASTINUM: Stable enlarged cardiac silhouette and mediastinal contours. LUNGS AND PLEURAL SPACES: Hazy lower lung opacities noted with mid to lower lung predominance, somewhat increased on the left from prior exam.. No evidence of pneumothorax or large pleural effusion. As previously, limited visualization due to technique and body habitus. BONY THORAX AND SOFT TISSUES: Unremarkable. _______________ IMPRESSION: 1. Stable/adequate position of support devices. 2. Little interval change in the appearance of right mid to lower lung airspace disease/atelectasis and likely left basilar atelectasis. Xr Chest [facility] Result Date: 5/3/2020 EXAM: PORTABLE FRONTAL CHEST RADIOGRAPH CLINICAL INDICATION/HISTORY: Intubation. COMPARISON: 5/2/2020 TECHNIQUE: Portable frontal view of the chest _______________ FINDINGS: SUPPORT DEVICES: Adequately positioned endotracheal tube and right IJ central venous catheter. HEART AND MEDIASTINUM: Rotated to the right. Moderately enlarged cardiomediastinal silhouette appears similar to prior. LUNGS AND PLEURAL SPACES: Homogeneous opacification right mid/lower thorax similar if not increasingly dense in the interval. Relatively clear appearance of left lung. Overall limited visualization due to technique and body habitus. BONY THORAX AND SOFT TISSUES: Unremarkable. _______________ IMPRESSION: 1. Adequately positioned endotracheal tube. 2. Right thoracic opacification similar if not slightly increased in density in the interval, likely some combination of pleural effusion and lung consolidation (atelectasis/pneumonia). Xr Chest [facility] Result Date: 5/2/2020 EXAM: PORTABLE FRONTAL CHEST RADIOGRAPH CLINICAL INDICATION/HISTORY: Aspiration pneumonia. Intubation.  COMPARISON: 5/1/2020 TECHNIQUE: Portable frontal view of the chest _______________ FINDINGS: SUPPORT DEVICES: Adequately positioned endotracheal tube and right IJ central venous catheter. HEART AND MEDIASTINUM: Rightward rotation. Stable cardiomediastinal silhouette, with enlargement of cardiac silhouette. LUNGS AND PLEURAL SPACES: Hazy opacification lower right thorax with obscuration of the diaphragm, similar or slightly increased from prior exam. BONY THORAX AND SOFT TISSUES: Unremarkable. _______________ IMPRESSION: 1. Adequately positioned endotracheal tube. 2. Right basilar opacification may be slightly increased from prior exam, likely some combination of effusion and lung consolidation (pneumonia/atelectasis). Xr Chest [facility] Result Date: 5/1/2020 AP portable chest, 5/1/2020 at 0449 hours: INDICATION: Pneumonia. ET tube placement. Comparison 4/30/2020. Endotracheal tube is in the upper thoracic trachea below the clavicles. The study is lordotically positioned. Right internal jugular central line is in the upper SVC. Asymmetric right perihilar edema or infiltrate. Cardiomegaly. Mild interstitial perihilar changes on the left without new focal infiltrate. IMPRESSION: Little change. Right lung edema or infiltrate. Endotracheal tube in good position. Xr Chest [facility] Result Date: 4/30/2020 EXAM: XR CHEST PORT CLINICAL INDICATION/HISTORY: Aspiration pneumonia, ET tube placement -Additional: None COMPARISON: One day prior TECHNIQUE: Portable frontal view of the chest _______________ FINDINGS: SUPPORT DEVICES: Right IJV central venous catheter unchanged. Endotracheal tube unchanged. HEART AND MEDIASTINUM: Cardiomegaly. LUNGS AND PLEURAL SPACES: Limited study secondary to body habitus and exclusion of the left costophrenic angle. Central vascular congestion with moderate bilateral effusions and pulmonary edema. No pneumothorax. _______________ IMPRESSION: Limited study secondary to body habitus and exclusion of the left costophrenic angle. Central vascular congestion with moderate bilateral effusions and pulmonary edema. No pneumothorax. Xr Chest [facility] Result Date: 4/29/2020 EXAM: XR CHEST PORT CLINICAL INDICATION/HISTORY: Aspiration pneumonia, ET tube placement -Additional: None COMPARISON: 4/27/2020 TECHNIQUE: Portable frontal view of the chest _______________ FINDINGS: SUPPORT DEVICES: Right IJV central venous catheter unchanged. Endotracheal tube unchanged. HEART AND MEDIASTINUM: Cardiomegaly. LUNGS AND PLEURAL SPACES: Limited study secondary to body habitus and exclusion of the left costophrenic angle. Central vascular congestion with moderate bilateral effusions and pulmonary edema. No pneumothorax. _______________ IMPRESSION: Limited study secondary to body habitus and exclusion of the left costophrenic angle. Central vascular congestion with moderate bilateral effusions and pulmonary edema. No pneumothorax. Xr Chest [facility] Result Date: 4/27/2020 EXAM: XR CHEST PORT CLINICAL INDICATION/HISTORY: Intubated -Additional: None COMPARISON: 4/26/2020 TECHNIQUE: Portable frontal view of the chest _______________ FINDINGS: SUPPORT DEVICES: Endotracheal tube projects in stable position, estimated at approximately 4.9 cm above the miguel ángel. Right IJ approach central venous catheter with tip projecting over the SVC. HEART AND MEDIASTINUM: Stable appearing cardiac size and mediastinal contours. Enlarged appearing cardiac silhouette redemonstrated. LUNGS AND PLEURAL SPACES: Basilar areas of opacity and dense left retrocardiac opacity noted. No evidence of pneumothorax or large pleural effusion. BONY THORAX AND SOFT TISSUES: Unremarkable. _______________ IMPRESSION: 1. Endotracheal tube and right IJ approach central venous catheter in position as above. 2. Cardiomegaly and bibasilar areas of airspace disease/atelectasis without significant change Xr Chest [facility] Result Date: 4/26/2020 --------------------------------------------------------------------------- <<<<<<<<<           [facility]  [facility]           >>>>>>>>> --------------------------------------------------------------------------- CLINICAL HISTORY:  Hypoxia. COMPARISON EXAMINATIONS:  April 25, 2020. ---  SINGLE FRONTAL VIEW OF THE CHEST  --- Portions of the lower lung fields are not imaged. The cardiomediastinal silhouette is grossly stable. There is an endotracheal tube seen in good position below the clavicles and above the miguel ángel. A right central line is noted with tip at the level of the upper SVC. There are bilateral mid to lower lung field opacities. No significant osseous abnormalities are identified.  -------------- Impression: -------------- Limited exam. Endotracheal tube and right central line in place. Bilateral mid to lower lung field opacities possibly pleural fluid with atelectasis and/or infiltrates. Similar findings were seen previously. Xr Chest [facility] Result Date: 4/25/2020 CLINICAL HISTORY:  Endotracheal tube placement COMPARISONS:  Chest x-ray, earlier the same day TECHNIQUE:  single frontal view of the chest ------------------------------------------ FINDINGS: Lungs:  Lungs slightly suboptimally assessed due to patient large body habitus. Hypoinflation with mild bandlike opacity in the left lung base, likely atelectasis. Left basilar consolidation not excluded. Mediastinum: Right transjugular venous catheter, tip near cavoatrial junction. High position of endotracheal tube, with tip approximately 12 cm above the miguel ángel. Cardiomegaly. Bones: No evidence of fracture or suspicious bone lesion. ------------------------------------------ 
 
IMPRESSION: 1. Endotracheal tube has been pulled back in the interval and is now high in position. Recommend advancement. 2. Lungs slightly suboptimally assessed due to body habitus, though left basilar consolidation not excluded, not appreciably changed. Xr Chest [facility] Result Date: 4/25/2020 CLINICAL HISTORY:  As above. COMPARISONS:  Chest x-ray 4/24/2020 TECHNIQUE:  single frontal view of the chest ------------------------------------------ FINDINGS: Lungs:  Mild increase in bilateral pulmonary interstitial markings. Hazy opacity in the lung bases, likely combination of atelectasis and pleural fluid. Small to moderate-sized pleural effusions very likely present. Mediastinum: Marked cardiomegaly. Endotracheal tube tip approximately 6.7 cm above the miguel ángel. Right-sided central venous catheter, tip near cavoatrial junction. Bones: No evidence of fracture or suspicious bone lesion. ------------------------------------------ 
 
IMPRESSION: 1. Bilateral pleural effusions with bibasilar pulmonary atelectasis. 2. Probable mild pulmonary edema. Xr Chest [facility] Result Date: 4/24/2020 EXAM: XR CHEST PORT CLINICAL INDICATION/HISTORY: hypoxia -Additional: None COMPARISON: one day prior TECHNIQUE: Portable frontal view of the chest _______________ FINDINGS: SUPPORT DEVICES: Right IJV central venous catheter tip in the proximal SVC. Endotracheal tube unchanged. HEART AND MEDIASTINUM: Cardiomegaly. LUNGS AND PLEURAL SPACES: Central venous vascular congestion and interstitial infiltrates. Probable small bilateral effusions. _______________ IMPRESSION: Central venous vascular congestion and interstitial infiltrates. Probable small bilateral effusions. Xr Abd Port  1 V Result Date: 4/24/2020 EXAM: SUPINE ABDOMINAL RADIOGRAPH CLINICAL INDICATION/HISTORY: OG tube placement -Additional: None COMPARISON: 4/22/2014 TECHNIQUE: Single supine view of the abdomen. _______________ FINDINGS: BOWEL GAS PATTERN: Limited study secondary to body habitus. Nonvisualization of enteric tube along the expected course of the digestive tract. Dunellen Hilt BONES: Unremarkable. OTHER: None. _______________ IMPRESSION: Limited study secondary to body habitus.  Nonvisualization of enteric tube along the expected course of the digestive tract. Maikol Melo Ir Guide Insert Picc Over 5 Yrs Result Date: 5/7/2020 PROCEDURE: PICC placement ATTENDING: Hubert Sauceda M.D. INDICATION: Long-term central venous access. COMPLICATIONS: None. PROCEDURE: Informed consent was obtained. Physician lead timeout was performed. The arm was prepped and draped in sterile fashion. The procedure was performed following standard maximal barrier technique which includes, cap, mask, sterile gown, sterile gloves, sterile full body drape, hand hygiene with alcohol foam, and 2% chlorhexidine for cutaneous antisepsis. Sterile ultrasound gel and a sterile cover for the US probe was used. After successfully identifying a patent left cephalic vein, real-time ultrasound guidance demonstrated patent vein with normal waveforms. Then, real-time ultrasound guidance was used to puncture the vein. Permanent recording was created for the patient's record. Guidewire was passed centrally, portable radiograph was performed to confirm tip position. A peel-away sheath was inserted over the guidewire. The intravascular distance was measured. The PICC measures 55 cm. The double lumen PICC was placed into the vein with its tip in the brachiocephalic/SVC junction, confirmed with post placement portable radiograph. Both ports aspirated and flushed easily. The PICC was secured to the skin, a sterile dressing including Biopatch was applied. IMPRESSION: Dual lumen left PICC placement, ready for immediate use. No results found for this or any previous visit.  
 
 
 
CC: Daniel Garcia MD

## 2020-05-24 NOTE — PROGRESS NOTES
NUTRITION UPDATE 
 
- Diet was given per SLP for soft solids, thin liquids; will monitor PO intakes and tolerance; per MD pt developed fever over weekend will continue to monitor Hesham [address]ano RD 
PAGER:  [phone]

## 2020-05-24 NOTE — PROGRESS NOTES
Cardiology Progress Note Patient: Staci Owens. Sex: male          DOA: 4/21/2020 YOB: 1975      Age:  53 y.o.        LOS:  LOS: 33 days Patient seen and examined, chart reviewed. Assessment/Plan Patient Active Problem List  
Diagnosis Code  Cellulitis of left lower leg L03. 116  
 Anemia D64.9  Morbid obesity ([facility]) E66.01  
 Dyspnea R06.00  Sleep apnea G47.30  Sinus tachycardia R00.0  BMI 70 and over, adult [facility]) V74.96  Chronic back pain M54.9, G89.29  
 Arthritis M19.90  Migraine G43.909  
 History of atrial flutter Z86.79  
 Limited mobility Z74.09  
 Smoking F17.200  Chronic renal insufficiency, stage II (mild) N18.2  CHF (congestive heart failure) ([facility]) I50.9  Acute diastolic CHF (congestive heart failure) ([facility]) I50.31  Chronic a-fib I48.20  Atrial flutter ([facility]) I48.92  
 Elevated troponin R79.89  
 Acute respiratory distress R06.03  
 Acute CHF (congestive heart failure) ([facility]) I50.9  Respiratory failure with hypoxia and hypercapnia ([facility]) J96.91, J96.92  
 Acute on chronic respiratory failure with hypoxia and hypercapnia ([facility]) J96.21, J96.22  
 Cardiac arrest ([facility]) I46.9  Advanced care planning/counseling discussion Z70.80  
 NSTEMI (non-ST elevated myocardial infarction) ([facility] [address] I21.4  Hypocalcemia E83.51  
 Pneumonia J18.9 Acute on chronic systolic heart failure NSTEMI type II due to PEA arrest 
Paroxysmal atrial flutter Thrombocytopenia Bradycardia  
  
Patient self extubated and had PEA arrest. 
He had brief episode of A fib with RVR and was started On Amiodarone drip. Currently he is in Sinus rhythm. 
  
Amiodarone discontinued due to bradycardia. 
  
Echocardiogram  
  
· Left Ventricle: Normal wall thickness and systolic function (ejection fraction normal). Mildly dilated left ventricle.  The estimated ejection fraction is 45 - 50%. There is moderate (grade 2) left ventricular diastolic dysfunction E/e' Ratio = 17.09. Wall Scoring: The left ventricular wall motion is globally hypokinetic. · Right Ventricle: Not well visualized. Normal global systolic function. Moderately dilated right ventricle. · Right Atrium: Right atrium not well visualized. Moderately dilated right atrium. · IVC/Hepatic Veins: Dilated inferior vena cava. Mechanically ventilated; cannot use inferior caval vein diameter to estimate central venous pressure. 
  
IV heparin was discontinued due to thrombocytopenia and bleeding at central line site. Started on Argatroban.  
  
Patient had frequent oxygen desaturations and pinkish/bloody aspirate from tracheal suctioning.  
  
HU done revealed LVEF 64-72%, Grade I diastolic dysfunction, Negative bubble study. Oxygenation significantly improved after he was started on steroid S/p Tracheostomy Telemetry reviewed: No episode of heart block or bradycardia  
  
Plan: 
  
Continue Eliquis Continue Bumex Strict I/O Avoid any AV debbie blocking agents due to bradycardia. Continue  IV hydralazine for hypertension if needed. Monitor electrolytes and renal function Continue Ventilatory support. Continue management as per hospital medicine and pulmonary critical care. Patient is being discharged to [facility]. Subjective:  
 cc: On ventilator REVIEW OF SYSTEMS:  
 
Can not obtain Objective:  
  
Visit Vitals /88 Pulse 94 Temp 99.2 °F (37.3 °C) Resp 18 Ht 5' 10\" (1.778 m) Wt (!) 199.4 kg (439 lb 9.6 oz) SpO2 96% BMI 63.08 kg/m² Body mass index is 63.08 kg/m². Physical Exam: 
General Appearance: Comfortable, Morbidly obese, not using accessory muscles of respiration. HEENT: AMMON. HEAD: Atraumatic NECK: No JVD, no thyroidomeglay. CAROTIDS: No bruit LUNGS: bilateral conducted sounds HEART: S1+S2 audible, no murmur, no pericardial rub. NEUROLOGICAL: Opens eyes on verbal commands. Medication: 
Current Facility-Administered Medications Medication Dose Route Frequency  methylPREDNISolone (PF) (SOLU-MEDROL) injection 20 mg  20 mg IntraVENous Q8H  
 ciprofloxacin (CIPRO) 400 mg in D5W IVPB (premix)  400 mg IntraVENous Q12H  
 arformoteroL (BROVANA) neb solution 15 mcg  15 mcg Nebulization BID RT  
 budesonide (PULMICORT) 500 mcg/2 ml nebulizer suspension  500 mcg Nebulization BID RT  
 ipratropium (ATROVENT) 0.02 % nebulizer solution 0.5 mg  0.5 mg Nebulization Q8H RT  
 piperacillin-tazobactam (ZOSYN) 4.5 g in 0.9% sodium chloride (MBP/ADV) 100 mL MBP  4.5 g IntraVENous Q8H  potassium bicarb-citric acid (EFFER-K) tablet 20 mEq  20 mEq Oral BID  bumetanide (BUMEX) injection 1 mg  1 mg IntraVENous DAILY  hydrALAZINE (APRESOLINE) 20 mg/mL injection 20 mg  20 mg IntraVENous Q6H PRN  
 amLODIPine (NORVASC) tablet 10 mg  10 mg Oral DAILY  apixaban (ELIQUIS) tablet 2.5 mg  2.5 mg Oral BID  LORazepam (ATIVAN) injection 2 mg  2 mg IntraVENous Q4H PRN  
 docusate sodium (COLACE) capsule 100 mg  100 mg Oral BID  polyethylene glycol (MIRALAX) packet 17 g  17 g Oral BID  heparin (porcine) 100 unit/mL injection 500 Units  500 Units InterCATHeter Q8H PRN  
 fentaNYL citrate (PF) injection 50 mcg  50 mcg IntraVENous Q3H PRN  chlorhexidine (PERIDEX) 0.12 % mouthwash 10 mL  10 mL Oral Q12H  
 albuterol-ipratropium (DUO-NEB) 2.5 MG-0.5 MG/3 ML  3 mL Nebulization Q6H PRN  
 acetaminophen (TYLENOL) tablet 650 mg  650 mg Oral Q6H PRN  
 ELECTROLYTE REPLACEMENT PROTOCOL - Calcium   1 Each Other PRN  
 ELECTROLYTE REPLACEMENT PROTOCOL - Magnesium   1 Each Other PRN  
 ELECTROLYTE REPLACEMENT PROTOCOL - Potassium Standard Dosing   1 Each Other PRN  
 ELECTROLYTE REPLACEMENT PROTOCOL - Phosphorus  Standard Dosing  1 Each Other PRN  
  pantoprazole (PROTONIX) 40 mg in 0.9% sodium chloride 10 mL injection  40 mg IntraVENous DAILY  aspirin chewable tablet 81 mg  81 mg Oral DAILY  sodium chloride (NS) flush 5-40 mL  5-40 mL IntraVENous Q8H  
 sodium chloride (NS) flush 5-40 mL  5-40 mL IntraVENous PRN Lab/Data Reviewed: 
 
  
Recent Labs  
  05/24/20 
0420 05/23/20 
0400 05/22/20 
2335 WBC 4.2* 4.8 6.8 HGB 10.7* 10.5* 10.4* HCT 35.1* 34.3* 34.0*  
 183 155 Recent Labs  
  05/24/20 
0420 05/23/20 
0400 05/22/20 
0520  141 142  
K 3.6 4.0 3.8  106 107 CO2 31 31 30 * 120* 110* BUN 32* 29* 23* CREA 0.78 0.59* 0.60  
CA 9.0 9.2 9.2 Signed By: Abi Hudson MD   
 May 24, 2020

## 2020-05-24 NOTE — PROGRESS NOTES
Pt preparing for transfer to [facility], no appropriate for PT at this time. Will follow up if pt doesn't transfer for some reason.

## 2020-05-24 NOTE — PROGRESS NOTES
PATIENT WAS RETURNED TO FULL SUPPORT IN PREPARATION FOR TRANSPORT TO [facility]. INCREASED FIO2 TO 40% PER DR. EPPS.

## 2020-05-25 NOTE — ED NOTES
Compressions started. Pt asystole on the monitor and unresponsive. Blood pressure lost. Anesthesiologist paged to bedside.

## 2020-05-25 NOTE — ED PROVIDER NOTES
Mr. Xiang Arnold is a 49-year-old male who presents to the ER with massive bleeding from his tracheostomy site. He recent had a trach placed. He had anoxic brain injury. He was at a long-term care facility and was vent dependent. Shortly prior to arrival, he began to have massive bleeding from his trach site. I had spoken with the physician at the facility. They said they were holding pressure and got the bleeding to somewhat slowed down, however, it was still bleeding considerably from the trach site and to the trach itself. Therefore, he was sent to the ER. According to EMS, he had approximately 1 L of blood that came out in route. He had a blood pressure in the 70s and EMS picked him up. Further history is not obtainable due to the patient's baseline mental status. Past Medical History:  
Diagnosis Date  Arrhythmia  Arthritis   
 knees  CHF (congestive heart failure) ([facility] [address]  Chronic back pain  Chronic renal insufficiency, stage II (mild)  History of atrial flutter Dx 6/2016 - anticoagulated by [place]  Hypertension 2015  Limited mobility  Migraine headache  Morbid obesity ([facility] [address]  Morbid obesity with body mass index of 60.0-69.9 in adult [facility])  Sleep apnea   
 uses c-pap instructed to bring day of surgery  Smoking   
 very passive / cigars only Past Surgical History:  
Procedure Laterality Date  CARDIAC SURG PROCEDURE UNLIST    
 cardioversion  ECHOCARDIOGRAM  04/2016 EF 60-65% (performed at [facility] - see cardiology tab) Family History:  
Problem Relation Age of Onset  Obesity Mother  Liver Disease Sister Social History Socioeconomic History  Marital status:  Spouse name: Not on file  Number of children: Not on file  Years of education: Not on file  Highest education level: Not on file Occupational History  Not on file Social Needs  Financial resource strain: Not on file  Food insecurity Worry: Not on file Inability: Not on file  Transportation needs Medical: Not on file Non-medical: Not on file Tobacco Use  Smoking status: Former Smoker Types: Cigars  Smokeless tobacco: Never Used Substance and Sexual Activity  Alcohol use: No  
 Drug use: No  
 Sexual activity: Yes  
  Partners: Female Lifestyle  Physical activity Days per week: Not on file Minutes per session: Not on file  Stress: Not on file Relationships  Social connections Talks on phone: Not on file Gets together: Not on file Attends [employer]ism service: Not on file Active member of club or organization: Not on file Attends meetings of clubs or organizations: Not on file Relationship status: Not on file  Intimate partner violence Fear of current or ex partner: Not on file Emotionally abused: Not on file Physically abused: Not on file Forced sexual activity: Not on file Other Topics Concern  Not on file Social History Narrative  Not on file ALLERGIES: Patient has no known allergies. Review of Systems Unable to perform ROS: Other (Baseline mental status) HENT:  
     Bleeding from tracheostomy Vitals:  
 05/25/20 1728 05/25/20 1730 05/25/20 1733 05/25/20 1737 BP:  (!) 154/139  123/80 Pulse:  (!) 131  (!) 139 Resp:  (!) 33  28 SpO2:  96% 100% 100% Weight: (!) 199.4 kg (439 lb 9.6 oz) Height: 5' 10\" (1.778 m) Physical Exam  
 
Vital signs reviewed. Nursing notes reviewed. Const: Diaphoretic Head:  Atraumatic, normocephalic Eyes:  PERRL, conjunctiva normal, no scleral icterus Neck: Tracheostomy in place, massive amount of bleeding from tracheostomy site, surgical site has dehisced and there is a large gaping area in the neck that is approximately 4 cm wide Cardiovascular:  RRR, no murmurs, no gallops, no rubs Resp:  resp distress, increased work of breathing, no wheezes, no rhonchi, no rales, Abd:  Soft, non-tender, non-distended, no rebound, no guarding, no CVA tenderness MSK:  No pedal edema, normal ROM Neuro:  no spontaneous movements Skin:  Warm, dry, intact Psych: Nonverbal 
 
 
 
 
MDM Number of Diagnoses or Management Options Anoxic brain injury [facility]):  
Cardiac arrest [facility]):  
Massive hemoptysis:  
  
Amount and/or Complexity of Data Reviewed Review and summarize past medical records: yes Discuss the patient with other providers: yes Critical Care Total time providing critical care: 30-74 minutes (35 min.) Mr. Saad Jolley is a 66-year-old male who presents to the ER with massive bleeding from his tracheostomy site. Initially, we had difficulty bagging him. After suctioning him, we were able to have success bagging him and his oxygen saturations 100%. initially, I was able to get the bleeding under control with direct pressure. His surgical site appears to have dehisced. However, the bleeding recurred. Anesthesia and come to the bedside. I was able to stem the bleeding to a trickle with direct pressure. Anesthesia attempted to orally intubate however, the bleeding was too massive. We  were unable to visualize the cords due to the massive amount of bleeding. In fact, even with suctioning his entire mouth was completely filled with blood. Around this time, he became bradycardic. He then went to V. fib. I attempted a precordial thump. CPR was initiated. I attempted blind digital intubation. I believe that the ET tube likely to go into the airway. However, he had a massive amount of blood come from his ET tube. There is likely half a liter to a liter of response. We were never able to regain pulses. Bedside ultrasound confirmed cardiac standstill. Time of death was pronounced. Procedures

## 2020-05-25 NOTE — ED NOTES
ED provider called time of death 200. This RN, additional RN, PCT, ED provider, anesthesiologist, RT at bedside

## 2020-05-25 NOTE — ED NOTES
Compressions stopped. ED provider & RT unable to obtain airway using CMAC. Anesthesiologist at bedside. Asystole on the monitor.  Blood pressure lost.

## 2020-05-25 NOTE — ED TRIAGE NOTES
Pt arrives via EMS from [facility] actively hemorrhaging from trach tube site- surgical site has dehisced and there ~4cm gap . Pt eyes open but not able to verbally respond. Trach placed 5/18/2020 EMS reports pt was bleeding approximately 50 minutes prior to their arrival at 430 PM. EMS held pressure and bagged pt en route. Bleeding is not well-controlled upon arrival.  
EMS reports ~1000 cc of blood out. BP 70/40 en route, unable to assess pulse ox or heart rate. Pt hx AFIB, respiratory failure, obesity, anoxic brain injury +Blood thinners Pt arrives with daniel, Flexi-seal, tracheostomy and double lumen PICC in place.

## 2020-05-26 ENCOUNTER — TELEPHONE (OUTPATIENT)
Dept: CASE MANAGEMENT | Age: 45
End: 2020-05-26

## 2020-05-26 LAB
BACTERIA SPEC CULT: ABNORMAL
GRAM STN SPEC: ABNORMAL
GRAM STN SPEC: ABNORMAL
SERVICE CMNT-IMP: ABNORMAL

## 2020-05-26 NOTE — PROGRESS NOTES
Responded to notification of pt death in ER; no family was present; provided a prayer of commendation in honor of the  at bedside. . May Tabares, Ph.D., M.Div., M.A., /Director of Spiritual Care Services Paging Service: [phone])

## 2020-05-26 NOTE — TELEPHONE ENCOUNTER
Received call from [facility] requesting updated on patient, noted patient  in ED. Informed that patient did  at [facility].     Serenity Yeung RN, BSN, [place]  ED Care Management  [phone]

## 2020-05-27 LAB
BACTERIA SPEC CULT: ABNORMAL
GRAM STN SPEC: ABNORMAL
SERVICE CMNT-IMP: ABNORMAL

## 2020-05-29 NOTE — CONSULTS
I was called at 5:25 pm Monday May 25 about this patient due to bleeding from a trach placed about 1 week prior. I immediately called back and offered advice about possibly packing the site or applying pressure with the balloon on the trach or a replacement et tube. I immediately left my house to drive to the hospital. I arrived, donned appropriate ppe, and went to the exam room. When I got there, the patient was undergoing cpr after coding. Shortly thereafter the time of death was called. I was not actively involved in the patient's care in the ED but write this note to record that I was called and did come to the ED to see him.

## 2022-04-28 NOTE — DISCHARGE INSTRUCTIONS
Verbal and written post adjustment / UGI instructions given. Patient acknowledges understanding. Discussed diet, activities, and s/s that should be reported. Encouraged to call to schedule next appointment and to call with any questions or concerns. No

## (undated) DEVICE — SOL IRRIGATION INJ NACL 0.9% 500ML BTL

## (undated) DEVICE — HEAD DONUT FOAM POSITIONER: Brand: CARDINAL HEALTH

## (undated) DEVICE — ROUND DISSECTORS: Brand: DEROYAL

## (undated) DEVICE — STERILE POLYISOPRENE POWDER-FREE SURGICAL GLOVES: Brand: PROTEXIS

## (undated) DEVICE — SPONGE GZ W4XL4IN COT 12 PLY TYP VII WVN C FLD DSGN

## (undated) DEVICE — NEEDLE HYPO 25GA L1.5IN BLU POLYPR HUB S STL REG BVL STR

## (undated) DEVICE — HOLDER TRACH TB W1IN FIT UPTO 19.5IN NK 2 PC ADJ BLU

## (undated) DEVICE — GOWN ISOLATN REG BLU POLY UNISX W/ THMB LOOP

## (undated) DEVICE — SYR 10ML LUER LOK 1/5ML GRAD --

## (undated) DEVICE — DISPOSABLE HARDY BAYONET INSULATED BIPOLAR FORCEPS: Brand: INTEGRA® JARIT®

## (undated) DEVICE — SPONGE HEMOSTAT CELLULS 4X8IN -- SURGICEL

## (undated) DEVICE — ENT PACK: Brand: MEDLINE INDUSTRIES, INC.

## (undated) DEVICE — TRAY PREP DRY W/ PREM GLV 2 APPL 6 SPNG 2 UNDPD 1 OVERWRAP

## (undated) DEVICE — SUT SLK 3-0 30IN SH BLK --

## (undated) DEVICE — MAJ-1414 SINGLE USE ADPATER BIOPSY VALV: Brand: SINGLE USE ADAPTOR BIOPSY VALVE

## (undated) DEVICE — GARMENT,MEDLINE,DVT,INT,CALF,MED, GEN2: Brand: MEDLINE

## (undated) DEVICE — DEVON™ KNEE AND BODY STRAP 60" X 3" (1.5 M X 7.6 CM): Brand: DEVON

## (undated) DEVICE — INTENDED FOR TISSUE SEPARATION, AND OTHER PROCEDURES THAT REQUIRE A SHARP SURGICAL BLADE TO PUNCTURE OR CUT.: Brand: BARD-PARKER ® CARBON RIB-BACK BLADES